# Patient Record
Sex: MALE | Race: WHITE | NOT HISPANIC OR LATINO | Employment: OTHER | URBAN - METROPOLITAN AREA
[De-identification: names, ages, dates, MRNs, and addresses within clinical notes are randomized per-mention and may not be internally consistent; named-entity substitution may affect disease eponyms.]

---

## 2017-01-10 ENCOUNTER — CLINICAL SUPPORT (OUTPATIENT)
Dept: INTERNAL MEDICINE | Facility: CLINIC | Age: 82
End: 2017-01-10

## 2017-01-10 DIAGNOSIS — Z00.00 HEALTH CARE MAINTENANCE: ICD-10-CM

## 2017-01-10 PROCEDURE — 90662 IIV NO PRSV INCREASED AG IM: CPT | Performed by: INTERNAL MEDICINE

## 2017-01-10 PROCEDURE — G0008 ADMIN INFLUENZA VIRUS VAC: HCPCS | Performed by: INTERNAL MEDICINE

## 2017-04-10 ENCOUNTER — OFFICE VISIT (OUTPATIENT)
Dept: INTERNAL MEDICINE | Facility: CLINIC | Age: 82
End: 2017-04-10

## 2017-04-10 VITALS
DIASTOLIC BLOOD PRESSURE: 80 MMHG | WEIGHT: 227 LBS | SYSTOLIC BLOOD PRESSURE: 116 MMHG | TEMPERATURE: 99.1 F | HEART RATE: 84 BPM | BODY MASS INDEX: 31.66 KG/M2 | RESPIRATION RATE: 22 BRPM

## 2017-04-10 DIAGNOSIS — M25.473 ANKLE EDEMA: ICD-10-CM

## 2017-04-10 DIAGNOSIS — I10 ESSENTIAL HYPERTENSION: Primary | ICD-10-CM

## 2017-04-10 PROCEDURE — 99214 OFFICE O/P EST MOD 30 MIN: CPT | Performed by: INTERNAL MEDICINE

## 2017-04-10 RX ORDER — HYDROCHLOROTHIAZIDE 25 MG/1
25 TABLET ORAL DAILY
Qty: 90 TABLET | Refills: 3 | Status: SHIPPED | OUTPATIENT
Start: 2017-04-10 | End: 2018-04-23 | Stop reason: SDUPTHER

## 2017-04-10 NOTE — PROGRESS NOTES
Subjective   Jordi Sam is a 83 y.o. male.     History of Present Illness   For the past 3 weeks he has noticed swelling in his ankles, right greater than left.  Some itching in ankles.  No sob, or chest pain.  Just so happens that Dr. Posadas added amlodipine because his blood pressure was elevated.    The following portions of the patient's history were reviewed and updated as appropriate: allergies, current medications, past medical history and problem list.    Review of Systems   Constitutional: Negative.    Eyes: Negative.    Respiratory: Negative.  Negative for cough, chest tightness, shortness of breath and wheezing.    Cardiovascular: Positive for leg swelling. Negative for chest pain and palpitations.   Gastrointestinal: Negative.  Negative for abdominal distention and abdominal pain.       Objective   Physical Exam   Neck: Normal range of motion. Neck supple.   Cardiovascular: Normal rate, regular rhythm and normal heart sounds.    Pulmonary/Chest: Effort normal and breath sounds normal. No respiratory distress. He has no wheezes. He has no rales.   Abdominal: Soft. Bowel sounds are normal.   Musculoskeletal: He exhibits edema.   2 + ankle edema right and 1 + left.  Mild venous stasis right.   Nursing note and vitals reviewed.      Assessment/Plan   Jordi was seen today for edema in both legs.    Diagnoses and all orders for this visit:    Essential hypertension  -     hydrochlorothiazide (HYDRODIURIL) 25 MG tablet; Take 1 tablet by mouth Daily.    Ankle edema    The edema is from the amlodipine.  Will d/c and increase the HCTZ.  Discussed with him.  Will recheck with appointment in June.

## 2017-04-25 RX ORDER — SIMVASTATIN 40 MG
TABLET ORAL
Qty: 90 TABLET | Refills: 0 | Status: SHIPPED | OUTPATIENT
Start: 2017-04-25 | End: 2017-10-17 | Stop reason: SDUPTHER

## 2017-05-05 ENCOUNTER — HOSPITAL ENCOUNTER (EMERGENCY)
Facility: HOSPITAL | Age: 82
Discharge: HOME | End: 2017-05-05
Attending: EMERGENCY MEDICINE | Admitting: EMERGENCY MEDICINE

## 2017-05-05 VITALS
DIASTOLIC BLOOD PRESSURE: 84 MMHG | SYSTOLIC BLOOD PRESSURE: 140 MMHG | RESPIRATION RATE: 18 BRPM | WEIGHT: 220 LBS | OXYGEN SATURATION: 92 % | HEIGHT: 71 IN | HEART RATE: 63 BPM | TEMPERATURE: 97.6 F | BODY MASS INDEX: 30.8 KG/M2

## 2017-05-05 DIAGNOSIS — T63.441A BEE STING REACTION, ACCIDENTAL OR UNINTENTIONAL, INITIAL ENCOUNTER: Primary | ICD-10-CM

## 2017-05-05 PROCEDURE — 99283 EMERGENCY DEPT VISIT LOW MDM: CPT

## 2017-05-05 PROCEDURE — 96375 TX/PRO/DX INJ NEW DRUG ADDON: CPT

## 2017-05-05 PROCEDURE — 96374 THER/PROPH/DIAG INJ IV PUSH: CPT

## 2017-05-05 PROCEDURE — 25010000002 METHYLPREDNISOLONE PER 125 MG: Performed by: PHYSICIAN ASSISTANT

## 2017-05-05 PROCEDURE — 25010000002 DIPHENHYDRAMINE PER 50 MG: Performed by: PHYSICIAN ASSISTANT

## 2017-05-05 RX ORDER — SODIUM CHLORIDE 0.9 % (FLUSH) 0.9 %
10 SYRINGE (ML) INJECTION AS NEEDED
Status: DISCONTINUED | OUTPATIENT
Start: 2017-05-05 | End: 2017-05-05 | Stop reason: HOSPADM

## 2017-05-05 RX ORDER — DIPHENHYDRAMINE HYDROCHLORIDE 50 MG/ML
25 INJECTION INTRAMUSCULAR; INTRAVENOUS ONCE
Status: COMPLETED | OUTPATIENT
Start: 2017-05-05 | End: 2017-05-05

## 2017-05-05 RX ORDER — FAMOTIDINE 10 MG/ML
20 INJECTION, SOLUTION INTRAVENOUS ONCE
Status: COMPLETED | OUTPATIENT
Start: 2017-05-05 | End: 2017-05-05

## 2017-05-05 RX ORDER — EPINEPHRINE 0.3 MG/.3ML
0.3 INJECTION SUBCUTANEOUS ONCE
Qty: 1 EACH | Refills: 0 | Status: SHIPPED | OUTPATIENT
Start: 2017-05-05 | End: 2017-05-05

## 2017-05-05 RX ORDER — METHYLPREDNISOLONE SODIUM SUCCINATE 125 MG/2ML
125 INJECTION, POWDER, LYOPHILIZED, FOR SOLUTION INTRAMUSCULAR; INTRAVENOUS ONCE
Status: COMPLETED | OUTPATIENT
Start: 2017-05-05 | End: 2017-05-05

## 2017-05-05 RX ADMIN — FAMOTIDINE 20 MG: 10 INJECTION, SOLUTION INTRAVENOUS at 16:13

## 2017-05-05 RX ADMIN — METHYLPREDNISOLONE SODIUM SUCCINATE 125 MG: 125 INJECTION, POWDER, FOR SOLUTION INTRAMUSCULAR; INTRAVENOUS at 16:10

## 2017-05-05 RX ADMIN — DIPHENHYDRAMINE HYDROCHLORIDE 25 MG: 50 INJECTION INTRAMUSCULAR; INTRAVENOUS at 16:12

## 2017-05-09 ENCOUNTER — OFFICE VISIT (OUTPATIENT)
Dept: INTERNAL MEDICINE | Facility: CLINIC | Age: 82
End: 2017-05-09

## 2017-05-09 VITALS
TEMPERATURE: 98.1 F | DIASTOLIC BLOOD PRESSURE: 72 MMHG | BODY MASS INDEX: 31.1 KG/M2 | RESPIRATION RATE: 21 BRPM | HEART RATE: 84 BPM | SYSTOLIC BLOOD PRESSURE: 148 MMHG | WEIGHT: 223 LBS

## 2017-05-09 DIAGNOSIS — T63.441D BEE STING REACTION, ACCIDENTAL OR UNINTENTIONAL, SUBSEQUENT ENCOUNTER: Primary | ICD-10-CM

## 2017-05-09 PROCEDURE — 99213 OFFICE O/P EST LOW 20 MIN: CPT | Performed by: INTERNAL MEDICINE

## 2017-06-22 ENCOUNTER — OFFICE VISIT (OUTPATIENT)
Dept: INTERNAL MEDICINE | Facility: CLINIC | Age: 82
End: 2017-06-22

## 2017-06-22 VITALS
WEIGHT: 223 LBS | RESPIRATION RATE: 18 BRPM | SYSTOLIC BLOOD PRESSURE: 140 MMHG | BODY MASS INDEX: 31.1 KG/M2 | HEART RATE: 80 BPM | DIASTOLIC BLOOD PRESSURE: 84 MMHG | TEMPERATURE: 98.1 F

## 2017-06-22 DIAGNOSIS — E11.9 CONTROLLED TYPE 2 DIABETES MELLITUS WITHOUT COMPLICATION, WITHOUT LONG-TERM CURRENT USE OF INSULIN (HCC): ICD-10-CM

## 2017-06-22 DIAGNOSIS — Z79.899 DRUG THERAPY: ICD-10-CM

## 2017-06-22 DIAGNOSIS — I10 ESSENTIAL HYPERTENSION: ICD-10-CM

## 2017-06-22 DIAGNOSIS — K59.1 FUNCTIONAL DIARRHEA: ICD-10-CM

## 2017-06-22 DIAGNOSIS — J43.1 PANLOBULAR EMPHYSEMA (HCC): ICD-10-CM

## 2017-06-22 DIAGNOSIS — I25.10 CORONARY ARTERIOSCLEROSIS IN NATIVE ARTERY: Primary | ICD-10-CM

## 2017-06-22 DIAGNOSIS — E78.2 MIXED HYPERLIPIDEMIA: ICD-10-CM

## 2017-06-22 LAB
ALBUMIN SERPL-MCNC: 4.3 G/DL (ref 3.2–4.8)
ALBUMIN/GLOB SERPL: 2 G/DL (ref 1.5–2.5)
ALP SERPL-CCNC: 64 U/L (ref 25–100)
ALT SERPL W P-5'-P-CCNC: 14 U/L (ref 7–40)
ANION GAP SERPL CALCULATED.3IONS-SCNC: 6 MMOL/L (ref 3–11)
ARTICHOKE IGE QN: 69 MG/DL (ref 0–130)
AST SERPL-CCNC: 25 U/L (ref 0–33)
BILIRUB SERPL-MCNC: 0.7 MG/DL (ref 0.3–1.2)
BUN BLD-MCNC: 19 MG/DL (ref 9–23)
BUN/CREAT SERPL: 17.3 (ref 7–25)
CALCIUM SPEC-SCNC: 9.7 MG/DL (ref 8.7–10.4)
CHLORIDE SERPL-SCNC: 103 MMOL/L (ref 99–109)
CHOLEST SERPL-MCNC: 135 MG/DL (ref 0–200)
CO2 SERPL-SCNC: 31 MMOL/L (ref 20–31)
CREAT BLD-MCNC: 1.1 MG/DL (ref 0.6–1.3)
DEPRECATED RDW RBC AUTO: 47.1 FL (ref 37–54)
ERYTHROCYTE [DISTWIDTH] IN BLOOD BY AUTOMATED COUNT: 13.4 % (ref 11.3–14.5)
EXPIRATION DATE: NORMAL
GFR SERPL CREATININE-BSD FRML MDRD: 64 ML/MIN/1.73
GLOBULIN UR ELPH-MCNC: 2.1 GM/DL
GLUCOSE BLD-MCNC: 122 MG/DL (ref 70–100)
HBA1C MFR BLD: 6 %
HCT VFR BLD AUTO: 47.7 % (ref 38.9–50.9)
HDLC SERPL-MCNC: 46 MG/DL (ref 40–60)
HGB BLD-MCNC: 15.4 G/DL (ref 13.1–17.5)
Lab: NORMAL
MCH RBC QN AUTO: 31.1 PG (ref 27–31)
MCHC RBC AUTO-ENTMCNC: 32.3 G/DL (ref 32–36)
MCV RBC AUTO: 96.4 FL (ref 80–99)
PLATELET # BLD AUTO: 137 10*3/MM3 (ref 150–450)
PMV BLD AUTO: 10.8 FL (ref 6–12)
POTASSIUM BLD-SCNC: 4.1 MMOL/L (ref 3.5–5.5)
PROT SERPL-MCNC: 6.4 G/DL (ref 5.7–8.2)
RBC # BLD AUTO: 4.95 10*6/MM3 (ref 4.2–5.76)
SODIUM BLD-SCNC: 140 MMOL/L (ref 132–146)
TRIGL SERPL-MCNC: 100 MG/DL (ref 0–150)
WBC NRBC COR # BLD: 7.75 10*3/MM3 (ref 3.5–10.8)

## 2017-06-22 PROCEDURE — 99214 OFFICE O/P EST MOD 30 MIN: CPT | Performed by: INTERNAL MEDICINE

## 2017-06-22 PROCEDURE — 36415 COLL VENOUS BLD VENIPUNCTURE: CPT | Performed by: INTERNAL MEDICINE

## 2017-06-22 PROCEDURE — 83036 HEMOGLOBIN GLYCOSYLATED A1C: CPT | Performed by: INTERNAL MEDICINE

## 2017-06-22 PROCEDURE — 80061 LIPID PANEL: CPT | Performed by: INTERNAL MEDICINE

## 2017-06-22 PROCEDURE — 80053 COMPREHEN METABOLIC PANEL: CPT | Performed by: INTERNAL MEDICINE

## 2017-06-22 PROCEDURE — 85027 COMPLETE CBC AUTOMATED: CPT | Performed by: INTERNAL MEDICINE

## 2017-06-22 NOTE — PROGRESS NOTES
Subjective   Jordi Sam is a 83 y.o. male.     History of Present Illness   For follow up of  CAD no chest pain, sob or palpitations.  COPD still is some sob but no different.  NIDDM no new sx.  HTN no headaches.  Hyperlipidemia on meds, no muscle aches.  BPH is stable.    The following portions of the patient's history were reviewed and updated as appropriate: allergies, current medications, past family history, past medical history, past social history, past surgical history and problem list.    Review of Systems   Constitutional: Negative.  Negative for activity change, appetite change, fatigue and fever.   HENT: Negative.  Negative for dental problem, ear pain, hearing loss, postnasal drip, rhinorrhea, sinus pressure, sore throat, trouble swallowing and voice change.    Eyes: Negative.    Respiratory: Positive for cough and shortness of breath. Negative for chest tightness and wheezing.    Cardiovascular: Negative.  Negative for chest pain, palpitations and leg swelling.   Gastrointestinal: Negative.  Negative for abdominal distention, abdominal pain, blood in stool, constipation, diarrhea and nausea.   Endocrine: Negative.  Negative for polydipsia and polyuria.   Genitourinary: Negative.  Negative for decreased urine volume, dysuria, hematuria and urgency.   Musculoskeletal: Negative.  Negative for arthralgias, back pain and neck pain.        Some right knee pain.   Skin: Negative.  Negative for pallor and rash.   Allergic/Immunologic: Negative.    Neurological: Negative.  Negative for dizziness, tremors, speech difficulty, weakness, numbness and headaches.   Hematological: Negative.  Negative for adenopathy.   Psychiatric/Behavioral: Negative.  Negative for agitation, behavioral problems, confusion, dysphoric mood and sleep disturbance. The patient is not nervous/anxious and is not hyperactive.        Objective   Physical Exam   Constitutional: He is oriented to person, place, and time. He appears  well-developed and well-nourished.   HENT:   Head: Normocephalic and atraumatic.   Right Ear: External ear normal.   Left Ear: External ear normal.   Nose: Nose normal.   Mouth/Throat: Oropharynx is clear and moist.   Eyes: Conjunctivae and EOM are normal. Pupils are equal, round, and reactive to light.   Fundoscopic exam:       The right eye shows no AV nicking and no hemorrhage.        The left eye shows no AV nicking and no hemorrhage.   Neck: Normal range of motion. Neck supple. No thyromegaly present.   Cardiovascular: Normal rate, regular rhythm, normal heart sounds and intact distal pulses.  Exam reveals no gallop and no friction rub.    No murmur heard.  Carotids normal.   Pulmonary/Chest: Effort normal and breath sounds normal. No respiratory distress. He has no wheezes. He has no rales. He exhibits no tenderness.   Abdominal: Soft. Bowel sounds are normal. He exhibits no distension and no mass. There is no tenderness. A hernia (bilateral small inguinal hernias.) is present.   Genitourinary: Testes normal and penis normal.   Musculoskeletal: Normal range of motion.   Lymphadenopathy:     He has no cervical adenopathy.   Neurological: He is alert and oriented to person, place, and time. He has normal reflexes. No cranial nerve deficit.   Skin: Skin is warm and dry.   Psychiatric: He has a normal mood and affect. His behavior is normal. Judgment and thought content normal.   Nursing note and vitals reviewed.      Assessment/Plan   Jordi was seen today for follow-up.    Diagnoses and all orders for this visit:    Coronary arteriosclerosis in native artery    Essential hypertension    Mixed hyperlipidemia  -     Comprehensive Metabolic Panel  -     Lipid Panel    Panlobular emphysema    Functional diarrhea    Controlled type 2 diabetes mellitus without complication, without long-term current use of insulin  -     POC Glycosylated Hemoglobin (Hb A1C)    Drug therapy  -     CBC (No Diff)    All problems are  stable.  Labs as ordered.  Same medications.  Recheck here 4 months.

## 2017-07-27 ENCOUNTER — APPOINTMENT (OUTPATIENT)
Dept: CARDIOLOGY | Facility: HOSPITAL | Age: 82
End: 2017-07-27
Attending: EMERGENCY MEDICINE

## 2017-07-27 ENCOUNTER — APPOINTMENT (OUTPATIENT)
Dept: GENERAL RADIOLOGY | Facility: HOSPITAL | Age: 82
End: 2017-07-27

## 2017-07-27 ENCOUNTER — HOSPITAL ENCOUNTER (EMERGENCY)
Facility: HOSPITAL | Age: 82
Discharge: HOME | End: 2017-07-27
Attending: EMERGENCY MEDICINE | Admitting: EMERGENCY MEDICINE

## 2017-07-27 VITALS
HEART RATE: 63 BPM | HEIGHT: 71 IN | OXYGEN SATURATION: 98 % | DIASTOLIC BLOOD PRESSURE: 68 MMHG | SYSTOLIC BLOOD PRESSURE: 134 MMHG | WEIGHT: 220 LBS | BODY MASS INDEX: 30.8 KG/M2 | RESPIRATION RATE: 14 BRPM | TEMPERATURE: 97.5 F

## 2017-07-27 DIAGNOSIS — M79.602 LEFT ARM PAIN: Primary | ICD-10-CM

## 2017-07-27 DIAGNOSIS — T63.481A INSECT STING, ACCIDENTAL OR UNINTENTIONAL, INITIAL ENCOUNTER: ICD-10-CM

## 2017-07-27 LAB
ALBUMIN SERPL-MCNC: 4.5 G/DL (ref 3.2–4.8)
ALBUMIN/GLOB SERPL: 2 G/DL (ref 1.5–2.5)
ALP SERPL-CCNC: 65 U/L (ref 25–100)
ALT SERPL W P-5'-P-CCNC: 17 U/L (ref 7–40)
ANION GAP SERPL CALCULATED.3IONS-SCNC: 9 MMOL/L (ref 3–11)
AST SERPL-CCNC: 24 U/L (ref 0–33)
BASOPHILS # BLD AUTO: 0.03 10*3/MM3 (ref 0–0.2)
BASOPHILS NFR BLD AUTO: 0.4 % (ref 0–1)
BH CV UPPER VENOUS LEFT AXILLARY AUGMENT: NORMAL
BH CV UPPER VENOUS LEFT AXILLARY COMPRESS: NORMAL
BH CV UPPER VENOUS LEFT AXILLARY PHASIC: NORMAL
BH CV UPPER VENOUS LEFT AXILLARY SPONT: NORMAL
BH CV UPPER VENOUS LEFT BASILIC FOREARM COMPRESS: NORMAL
BH CV UPPER VENOUS LEFT BASILIC UPPER COMPRESS: NORMAL
BH CV UPPER VENOUS LEFT BRACHIAL COMPRESS: NORMAL
BH CV UPPER VENOUS LEFT RADIAL COMPRESS: NORMAL
BH CV UPPER VENOUS LEFT SUBCLAVIAN AUGMENT: NORMAL
BH CV UPPER VENOUS LEFT SUBCLAVIAN COMPRESS: NORMAL
BH CV UPPER VENOUS LEFT SUBCLAVIAN PHASIC: NORMAL
BH CV UPPER VENOUS LEFT SUBCLAVIAN SPONT: NORMAL
BH CV UPPER VENOUS RIGHT SUBCLAVIAN AUGMENT: NORMAL
BH CV UPPER VENOUS RIGHT SUBCLAVIAN COMPRESS: NORMAL
BH CV UPPER VENOUS RIGHT SUBCLAVIAN PHASIC: NORMAL
BH CV UPPER VENOUS RIGHT SUBCLAVIAN SPONT: NORMAL
BILIRUB SERPL-MCNC: 1 MG/DL (ref 0.3–1.2)
BUN BLD-MCNC: 15 MG/DL (ref 9–23)
BUN/CREAT SERPL: 15 (ref 7–25)
CALCIUM SPEC-SCNC: 9.6 MG/DL (ref 8.7–10.4)
CHLORIDE SERPL-SCNC: 103 MMOL/L (ref 99–109)
CO2 SERPL-SCNC: 27 MMOL/L (ref 20–31)
CREAT BLD-MCNC: 1 MG/DL (ref 0.6–1.3)
DEPRECATED RDW RBC AUTO: 47.8 FL (ref 37–54)
EOSINOPHIL # BLD AUTO: 0.32 10*3/MM3 (ref 0–0.3)
EOSINOPHIL NFR BLD AUTO: 4.7 % (ref 0–3)
ERYTHROCYTE [DISTWIDTH] IN BLOOD BY AUTOMATED COUNT: 13.4 % (ref 11.3–14.5)
GFR SERPL CREATININE-BSD FRML MDRD: 71 ML/MIN/1.73
GLOBULIN UR ELPH-MCNC: 2.3 GM/DL
GLUCOSE BLD-MCNC: 105 MG/DL (ref 70–100)
HCT VFR BLD AUTO: 45.8 % (ref 38.9–50.9)
HGB BLD-MCNC: 15.1 G/DL (ref 13.1–17.5)
HOLD SPECIMEN: NORMAL
HOLD SPECIMEN: NORMAL
IMM GRANULOCYTES # BLD: 0.01 10*3/MM3 (ref 0–0.03)
IMM GRANULOCYTES NFR BLD: 0.1 % (ref 0–0.6)
LYMPHOCYTES # BLD AUTO: 1.65 10*3/MM3 (ref 0.6–4.8)
LYMPHOCYTES NFR BLD AUTO: 24.1 % (ref 24–44)
MCH RBC QN AUTO: 31.7 PG (ref 27–31)
MCHC RBC AUTO-ENTMCNC: 33 G/DL (ref 32–36)
MCV RBC AUTO: 96 FL (ref 80–99)
MONOCYTES # BLD AUTO: 0.6 10*3/MM3 (ref 0–1)
MONOCYTES NFR BLD AUTO: 8.8 % (ref 0–12)
NEUTROPHILS # BLD AUTO: 4.23 10*3/MM3 (ref 1.5–8.3)
NEUTROPHILS NFR BLD AUTO: 61.9 % (ref 41–71)
PLATELET # BLD AUTO: 136 10*3/MM3 (ref 150–450)
PMV BLD AUTO: 11 FL (ref 6–12)
POTASSIUM BLD-SCNC: 3.8 MMOL/L (ref 3.5–5.5)
PROT SERPL-MCNC: 6.8 G/DL (ref 5.7–8.2)
RBC # BLD AUTO: 4.77 10*6/MM3 (ref 4.2–5.76)
SODIUM BLD-SCNC: 139 MMOL/L (ref 132–146)
TROPONIN I SERPL-MCNC: 0 NG/ML (ref 0–0.07)
TROPONIN I SERPL-MCNC: 0.01 NG/ML (ref 0–0.07)
WBC NRBC COR # BLD: 6.84 10*3/MM3 (ref 3.5–10.8)
WHOLE BLOOD HOLD SPECIMEN: NORMAL
WHOLE BLOOD HOLD SPECIMEN: NORMAL

## 2017-07-27 PROCEDURE — 93005 ELECTROCARDIOGRAM TRACING: CPT

## 2017-07-27 PROCEDURE — 93971 EXTREMITY STUDY: CPT

## 2017-07-27 PROCEDURE — 84484 ASSAY OF TROPONIN QUANT: CPT

## 2017-07-27 PROCEDURE — 73060 X-RAY EXAM OF HUMERUS: CPT

## 2017-07-27 PROCEDURE — 80053 COMPREHEN METABOLIC PANEL: CPT | Performed by: EMERGENCY MEDICINE

## 2017-07-27 PROCEDURE — 85025 COMPLETE CBC W/AUTO DIFF WBC: CPT | Performed by: EMERGENCY MEDICINE

## 2017-07-27 PROCEDURE — 99284 EMERGENCY DEPT VISIT MOD MDM: CPT

## 2017-07-27 PROCEDURE — 93971 EXTREMITY STUDY: CPT | Performed by: INTERNAL MEDICINE

## 2017-07-27 PROCEDURE — 93005 ELECTROCARDIOGRAM TRACING: CPT | Performed by: EMERGENCY MEDICINE

## 2017-07-27 RX ORDER — SODIUM CHLORIDE 0.9 % (FLUSH) 0.9 %
10 SYRINGE (ML) INJECTION AS NEEDED
Status: DISCONTINUED | OUTPATIENT
Start: 2017-07-27 | End: 2017-07-27 | Stop reason: HOSPADM

## 2017-07-27 RX ORDER — ASPIRIN 81 MG/1
324 TABLET, CHEWABLE ORAL ONCE
Status: COMPLETED | OUTPATIENT
Start: 2017-07-27 | End: 2017-07-27

## 2017-07-27 RX ADMIN — ASPIRIN 81 MG CHEWABLE TABLET 324 MG: 81 TABLET CHEWABLE at 17:05

## 2017-07-27 NOTE — ED PROVIDER NOTES
"Subjective   HPI Comments: 82 yo M presents c/o two complaints.  He states that two days ago, he was stung by a wasp on the distal aspect of his right second digit.  He endorses mild pain at the sting site but the pain has improved spontaneously.  His greatest complaint today is atraumatic pain to his left upper arm.  He denies any injury or known trauma.  No known triggers.  No rashes, fevers, or systemic sxs.  No parasthesias.  Pt is R handed.  He does do lifting and yard work at home and wonders if he may have \"strained something.\"  He denies any accompanying CP/SOB or palpitations.  Pain is currently 5/10 w/ no agg/all factors.        History provided by:  Patient and spouse   used: No        Review of Systems   Musculoskeletal:        L upper arm pain   Skin:        Insect bite   All other systems reviewed and are negative.      Past Medical History:   Diagnosis Date   • Acute inferior myocardial infarction     Acute inferior STEMI with RV infarct features, January 2009.    • CAD (coronary artery disease)    • Dyslipidemia    • Goiter     History of “goiter.”   • Hypertension    • Nephrolithiasis    • Type 2 diabetes mellitus        No Known Allergies    Past Surgical History:   Procedure Laterality Date   • CHOLECYSTECTOMY     • CORONARY ANGIOPLASTY WITH STENT PLACEMENT  01/09/2009    Sirolimus eluting stents to the RCA, 01/09/2009.    • HERNIA REPAIR      Hernia repair x 2.        Family History   Problem Relation Age of Onset   • No Known Problems Mother        Social History     Social History   • Marital status:      Spouse name: N/A   • Number of children: N/A   • Years of education: N/A     Social History Main Topics   • Smoking status: Former Smoker     Packs/day: 2.50     Years: 28.00     Types: Cigarettes     Quit date: 5/16/1983   • Smokeless tobacco: None   • Alcohol use No   • Drug use: No   • Sexual activity: Defer     Other Topics Concern   • None     Social History " Narrative           Objective   Physical Exam   Constitutional: He is oriented to person, place, and time. He appears well-developed and well-nourished. No distress.   Well appearing M in NAD   HENT:   Head: Normocephalic and atraumatic.   Mouth/Throat: Oropharynx is clear and moist.   No MM lesions   Eyes: Conjunctivae are normal. Pupils are equal, round, and reactive to light.   Neck: Normal range of motion. Neck supple. No JVD present.   No bruit present   Cardiovascular: Normal rate, regular rhythm and normal heart sounds.  Exam reveals no gallop and no friction rub.    No murmur heard.  Pulmonary/Chest: Effort normal and breath sounds normal. No respiratory distress. He has no wheezes. He has no rales.   Abdominal: Soft. Bowel sounds are normal. He exhibits no distension and no mass. There is no tenderness. There is no guarding.   Musculoskeletal: Normal range of motion.   LUE: 5/5 strength; normal  strength; painless ROM; no palpable cords; no erythema, warmth induration, crepitus, or POOP to exam   Lymphadenopathy:     He has no cervical adenopathy.   Neurological: He is alert and oriented to person, place, and time. No cranial nerve deficit.   LUE NV intact distally   Skin: Skin is warm and dry. No rash noted. He is not diaphoretic. There is erythema (small area of erythema noted to distal dorsal aspect of R 2nd digit w/o surrounding warmth). No pallor.   Psychiatric: He has a normal mood and affect. Judgment and thought content normal.   Nursing note and vitals reviewed.      Procedures         ED Course  ED Course   Comment By Time   83-year-old male with history of coronary artery disease presents complaining of atraumatic pain to his left upper arm for the past 2 days.  Pain has been constant in nature, and he is unsure as to what may have triggered his symptoms.  No accompanying CP or SOB.  In ED, pt well appearing w/ unremarkable exam.  LUE NV intact w/ normal, painless ROM.  Bounding distal  pulses.  EKG SR w/ first degree AV block, HR of 64, and no ST segments suggestive of or concerning for ischemia.  Will obtain labs and XRs of L humerus and US of LUE as well. Lv Soares MD 07/27 1724   US neg for DVT. Lv Soares MD 07/27 1759   XRs neg. Lv Soares MD 07/27 1814   Repeat troponin and EKG negative and unchanged.  Upon reevaluation, patient improved.  Doubt ACS, PE, dissection or emergent cardiothoracic process this time based on exam, history, clinical presentation, and gestalt.  Reassured and counseled regarding sx tx.  Will f/u w/ PMD w/in one wk.  Agreeable w/ plan and given appropriate return precautions. Lv Soares MD 07/27 1924        Recent Results (from the past 24 hour(s))   Light Blue Top    Collection Time: 07/27/17  4:47 PM   Result Value Ref Range    Extra Tube hold for add-on    Green Top (Gel)    Collection Time: 07/27/17  4:47 PM   Result Value Ref Range    Extra Tube Hold for add-ons.    Lavender Top    Collection Time: 07/27/17  4:47 PM   Result Value Ref Range    Extra Tube hold for add-on    Gold Top - SST    Collection Time: 07/27/17  4:47 PM   Result Value Ref Range    Extra Tube Hold for add-ons.    Comprehensive Metabolic Panel    Collection Time: 07/27/17  4:47 PM   Result Value Ref Range    Glucose 105 (H) 70 - 100 mg/dL    BUN 15 9 - 23 mg/dL    Creatinine 1.00 0.60 - 1.30 mg/dL    Sodium 139 132 - 146 mmol/L    Potassium 3.8 3.5 - 5.5 mmol/L    Chloride 103 99 - 109 mmol/L    CO2 27.0 20.0 - 31.0 mmol/L    Calcium 9.6 8.7 - 10.4 mg/dL    Total Protein 6.8 5.7 - 8.2 g/dL    Albumin 4.50 3.20 - 4.80 g/dL    ALT (SGPT) 17 7 - 40 U/L    AST (SGOT) 24 0 - 33 U/L    Alkaline Phosphatase 65 25 - 100 U/L    Total Bilirubin 1.0 0.3 - 1.2 mg/dL    eGFR Non African Amer 71 >60 mL/min/1.73    Globulin 2.3 gm/dL    A/G Ratio 2.0 1.5 - 2.5 g/dL    BUN/Creatinine Ratio 15.0 7.0 - 25.0    Anion Gap 9.0 3.0 - 11.0 mmol/L   CBC Auto Differential     Collection Time: 07/27/17  4:47 PM   Result Value Ref Range    WBC 6.84 3.50 - 10.80 10*3/mm3    RBC 4.77 4.20 - 5.76 10*6/mm3    Hemoglobin 15.1 13.1 - 17.5 g/dL    Hematocrit 45.8 38.9 - 50.9 %    MCV 96.0 80.0 - 99.0 fL    MCH 31.7 (H) 27.0 - 31.0 pg    MCHC 33.0 32.0 - 36.0 g/dL    RDW 13.4 11.3 - 14.5 %    RDW-SD 47.8 37.0 - 54.0 fl    MPV 11.0 6.0 - 12.0 fL    Platelets 136 (L) 150 - 450 10*3/mm3    Neutrophil % 61.9 41.0 - 71.0 %    Lymphocyte % 24.1 24.0 - 44.0 %    Monocyte % 8.8 0.0 - 12.0 %    Eosinophil % 4.7 (H) 0.0 - 3.0 %    Basophil % 0.4 0.0 - 1.0 %    Immature Grans % 0.1 0.0 - 0.6 %    Neutrophils, Absolute 4.23 1.50 - 8.30 10*3/mm3    Lymphocytes, Absolute 1.65 0.60 - 4.80 10*3/mm3    Monocytes, Absolute 0.60 0.00 - 1.00 10*3/mm3    Eosinophils, Absolute 0.32 (H) 0.00 - 0.30 10*3/mm3    Basophils, Absolute 0.03 0.00 - 0.20 10*3/mm3    Immature Grans, Absolute 0.01 0.00 - 0.03 10*3/mm3   POC Troponin, Rapid    Collection Time: 07/27/17  4:49 PM   Result Value Ref Range    Troponin I 0.00 0.00 - 0.07 ng/mL   Duplex Venous Upper Extremity - Left    Collection Time: 07/27/17  5:51 PM   Result Value Ref Range    Right Subclavian Augment Y     Right Subclavian Compress C     Right Subclavian Phasic Y     Right Subclavian Spont Y     Left Subclavian Augment Y     Left Subclavian Compress C     Left Subclavian Phasic Y     Left Subclavian Spont Y     Left Axillary Augment Y     Left Axillary Compress C     Left Axillary Phasic Y     Left Axillary Spont Y     Left Brachial Compress C     Left Radial Compress C     Left Basilic Upper Compress C     Left Basilic Forearm Compress C    POC Troponin, Rapid    Collection Time: 07/27/17  6:51 PM   Result Value Ref Range    Troponin I 0.01 0.00 - 0.07 ng/mL     Note: In addition to lab results from this visit, the labs listed above may include labs taken at another facility or during a different encounter within the last 24 hours. Please correlate lab times with  "ED admission and discharge times for further clarification of the services performed during this visit.    XR Humerus Left   Final Result   Normal images of the left humerus.       DICTATED:     07/27/2017   EDITED:         07/27/2017       This report was finalized on 7/27/2017 5:47 PM by Dr. Ryan Brady MD.            Vitals:    07/27/17 1633 07/27/17 1745   BP: 171/75 134/68   BP Location: Left arm    Patient Position: Sitting    Pulse: 61    Resp: 14    Temp: 97.5 °F (36.4 °C)    TempSrc: Oral    SpO2: 99% 98%   Weight: 220 lb (99.8 kg)    Height: 71\" (180.3 cm)      Medications   sodium chloride 0.9 % flush 10 mL (not administered)   sodium chloride 0.9 % flush 10 mL (not administered)   aspirin chewable tablet 324 mg (324 mg Oral Given 7/27/17 1705)     ECG/EMG Results (last 24 hours)     Procedure Component Value Units Date/Time    ECG 12 Lead [[id number]] Collected:  07/27/17 1641     Updated:  07/27/17 1641    ECG 12 Lead [[id number]] Collected:  07/27/17 1856     Updated:  07/27/17 1856                  MDM    Final diagnoses:   Left arm pain   Insect sting, accidental or unintentional, initial encounter            Lv Soares MD  07/27/17 1924    "

## 2017-07-27 NOTE — DISCHARGE INSTRUCTIONS

## 2017-08-16 ENCOUNTER — OFFICE VISIT (OUTPATIENT)
Dept: INTERNAL MEDICINE | Facility: CLINIC | Age: 82
End: 2017-08-16

## 2017-08-16 VITALS
DIASTOLIC BLOOD PRESSURE: 78 MMHG | WEIGHT: 224 LBS | HEART RATE: 86 BPM | BODY MASS INDEX: 31.24 KG/M2 | RESPIRATION RATE: 18 BRPM | SYSTOLIC BLOOD PRESSURE: 146 MMHG

## 2017-08-16 DIAGNOSIS — E78.2 MIXED HYPERLIPIDEMIA: ICD-10-CM

## 2017-08-16 DIAGNOSIS — I10 ESSENTIAL HYPERTENSION: ICD-10-CM

## 2017-08-16 DIAGNOSIS — E11.9 CONTROLLED TYPE 2 DIABETES MELLITUS WITHOUT COMPLICATION, WITHOUT LONG-TERM CURRENT USE OF INSULIN (HCC): ICD-10-CM

## 2017-08-16 DIAGNOSIS — E03.9 ACQUIRED HYPOTHYROIDISM: ICD-10-CM

## 2017-08-16 DIAGNOSIS — I25.10 CORONARY ARTERIOSCLEROSIS IN NATIVE ARTERY: ICD-10-CM

## 2017-08-16 DIAGNOSIS — E11.9 TYPE 2 DIABETES MELLITUS WITHOUT COMPLICATION, UNSPECIFIED LONG TERM INSULIN USE STATUS: Primary | ICD-10-CM

## 2017-08-16 LAB
HBA1C MFR BLD: 6.2 %
T4 FREE SERPL-MCNC: 1.07 NG/DL (ref 0.89–1.76)
TSH SERPL DL<=0.05 MIU/L-ACNC: 2.05 MIU/ML (ref 0.35–5.35)

## 2017-08-16 PROCEDURE — 84439 ASSAY OF FREE THYROXINE: CPT | Performed by: INTERNAL MEDICINE

## 2017-08-16 PROCEDURE — 99213 OFFICE O/P EST LOW 20 MIN: CPT | Performed by: INTERNAL MEDICINE

## 2017-08-16 PROCEDURE — 36415 COLL VENOUS BLD VENIPUNCTURE: CPT | Performed by: INTERNAL MEDICINE

## 2017-08-16 PROCEDURE — 84443 ASSAY THYROID STIM HORMONE: CPT | Performed by: INTERNAL MEDICINE

## 2017-08-16 PROCEDURE — 83036 HEMOGLOBIN GLYCOSYLATED A1C: CPT | Performed by: INTERNAL MEDICINE

## 2017-08-16 NOTE — PROGRESS NOTES
Subjective   Jordi Sam is a 83 y.o. male.     History of Present Illness   He has noticed that his blood pressure has been up at home sometimes to 160/90.  He generally feels well but for fatigue.  He has also noted that his blood sugar was up some to 125 or 30 which is a little higher for him.  He has noticed some hoarseness which he has had in the past.  He has a hx of hypothyroidism in the past and was on meds for about 4 years.    The following portions of the patient's history were reviewed and updated as appropriate: allergies, current medications, past medical history and problem list.    Review of Systems   Constitutional: Negative.    HENT: Positive for voice change.    Respiratory: Negative.  Negative for cough, chest tightness, shortness of breath and wheezing.    Cardiovascular: Negative.  Negative for chest pain, palpitations and leg swelling.   Gastrointestinal: Negative.  Negative for abdominal distention and abdominal pain.       Objective   Physical Exam   Constitutional:   He really sounds no more hoarse than usual to me.   Neck: Normal range of motion. Neck supple.   Cardiovascular: Normal rate, regular rhythm, normal heart sounds and intact distal pulses.    Pulmonary/Chest: Effort normal and breath sounds normal.   Abdominal: Soft. Bowel sounds are normal.   Nursing note and vitals reviewed.      Assessment/Plan   Jordi was seen today for diabetes and hypertension.    Diagnoses and all orders for this visit:    Type 2 diabetes mellitus without complication, unspecified long term insulin use status  -     POC Glycosylated Hemoglobin (Hb A1C)  -     T4, Free    Acquired hypothyroidism  -     TSH  -     T4, Free    Coronary arteriosclerosis in native artery    Essential hypertension    Mixed hyperlipidemia    Controlled type 2 diabetes mellitus without complication, without long-term current use of insulin    A1C = 6.2.  Will check thyroid levels.  Right now now need to change  medications.  He will watch blood pressure at home.  Has appointment here in October.

## 2017-08-22 ENCOUNTER — TELEPHONE (OUTPATIENT)
Dept: INTERNAL MEDICINE | Facility: CLINIC | Age: 82
End: 2017-08-22

## 2017-08-22 NOTE — TELEPHONE ENCOUNTER
----- Message from Jessie Frey sent at 8/22/2017 11:44 AM EDT -----  Patient called stating he has a runny nose, runny eyes, sore throat and would like to have a Zpack.  Can be reached at [phone] and uses [facility] in [place].

## 2017-08-28 RX ORDER — SIMVASTATIN 40 MG
TABLET ORAL
Qty: 90 TABLET | Refills: 1 | Status: SHIPPED | OUTPATIENT
Start: 2017-08-28 | End: 2018-02-27 | Stop reason: SDUPTHER

## 2017-10-17 ENCOUNTER — OFFICE VISIT (OUTPATIENT)
Dept: CARDIOLOGY | Facility: CLINIC | Age: 82
End: 2017-10-17

## 2017-10-17 VITALS
SYSTOLIC BLOOD PRESSURE: 144 MMHG | WEIGHT: 223 LBS | DIASTOLIC BLOOD PRESSURE: 67 MMHG | HEIGHT: 71 IN | HEART RATE: 57 BPM | BODY MASS INDEX: 31.22 KG/M2

## 2017-10-17 DIAGNOSIS — I25.10 CORONARY ARTERIOSCLEROSIS IN NATIVE ARTERY: Primary | ICD-10-CM

## 2017-10-17 DIAGNOSIS — I10 ESSENTIAL HYPERTENSION: ICD-10-CM

## 2017-10-17 PROCEDURE — 99213 OFFICE O/P EST LOW 20 MIN: CPT | Performed by: INTERNAL MEDICINE

## 2017-10-17 RX ORDER — AMLODIPINE BESYLATE 10 MG/1
10 TABLET ORAL DAILY
COMMUNITY
End: 2017-10-24

## 2017-10-17 RX ORDER — CARVEDILOL 25 MG/1
25 TABLET ORAL 2 TIMES DAILY
Qty: 180 TABLET | Refills: 3 | Status: SHIPPED | OUTPATIENT
Start: 2017-10-17 | End: 2018-10-30 | Stop reason: SDUPTHER

## 2017-10-17 RX ORDER — MONTELUKAST SODIUM 4 MG/1
1 TABLET, CHEWABLE ORAL AS NEEDED
COMMUNITY
End: 2018-12-27

## 2017-10-17 NOTE — PROGRESS NOTES
"  OFFICE FOLLOW UP     Date of Encounter:10/17/2017     Name: Jordi Sam  : 1934  Address: [facility] [address] [facility] [address]  Phone: [phone]    PCP: Warren Glass MD  [address] [address]    Jordi Sam is a 83 y.o. male.      Chief Complaint: Annual follow up of CAD, HTN    Problem List:   1. Coronary artery disease:  a. Acute inferior STEMI with RV infarct features, 2009.   b. Normal LV function, single vessel  RCA disease.     c. Sirolimus eluting stents to the RCA, 2009.   d. Pericarditis, resolved.   e. C, 2012 (for recurrent angina).   i.  Normal LV function.  ii.  of RCA with failed intervention.  2.  Hypertension.   3. Dyslipidemia. LDL 54 (2016)  4. Diabetes mellitus, type 2.  A1C 6.1 (2016)  5. Nephrolithiasis.   6. History of “goiter.”  7. Surgical history:  a. Cholecystectomy.  b. Hernia repair x 2.     Allergies:  No Known Allergies    Current Medications:  •  aspirin 81 mg by mouth Daily.  •  carvedilol 12.5 MG by mouth 2 (Two) Times a Day.  •  Hydrochlorothiazide 25 MG by mouth Daily.  •  lisinopril 20 MG by mouth 2 (Two) Times a Day.  •  metFORMIN 500 MG tablet, TAKE ONE TABLET BY MOUTH ONCE DAILY  •  nitroglycerin 0.4 MG SL tablet, As Needed for chest pain.  •  Simvastatin 40 MG BY MOUTH ONCE DAILY  •  tamsulosin 0.4 MG by mouth Daily.    History of Present Illness: Mr. Sam returns for follow-up today.  He has been moderately active and asymptomatic.  He specifically denies symptoms of heart failure or any episodes of typical angina.  He does have occasional \"fleeting\" atypical symptoms of chest discomfort.  He tried amlodipine for blood pressure treatment; however, this was discontinued because of severe peripheral edema.  He brings his home blood pressure diary with him and this shows frequent systolic blood pressures in the range of 160-165.  He states that he is compliant with prescribed blood pressure " "medicines.             The following portions of the patient's history were reviewed and updated as appropriate: allergies, current medications and problem list.    HPI: Pertinent positives as listed in the HPI.  All other systems reviewed and negative.    Objective:    Vitals:    10/17/17 1312 10/17/17 1313   BP: 168/75 144/67   BP Location: Left arm Left arm   Patient Position: Sitting Standing   Pulse: 54 57   Weight: 223 lb (101 kg)    Height: 71\" (180.3 cm)    Resting pulse BY ME is 70 BPM    Physical Exam:  GENERAL: Alert, cooperative, in no acute distress.   HEENT: Fundoscopic deferred, otherwise unremarkable.  NECK: No Jugular venous distention, adenopathy, or thyromegaly noted.   HEART: Regular rhythm, normal rate, and no murmurs, gallops, or rubs.   LUNGS: Clear to auscultation bilaterally. No wheezing, rales or ronchi.  ABDOMEN: Flat without evidence of organomegaly, masses, or tenderness.  NEUROLOGIC: No focal abnormalities involving strength or sensation are noted.   EXTREMITIES: No clubbing, cyanosis, or edema noted.     Diagnostic Data:      Procedures      Assessment and Plan: Today we will increase his carvedilol from 12.5 mg to 25 mg by mouth twice daily.  We recommend that systolic blood pressure be maintained no higher than about 1 40 mmHg and that LDL be targeted to a value of less than 70.  Continued surveillance of diabetes mellitus type 2 with a hemoglobin A1c target of less than 7 is also encouraged.  The patient will follow-up with Kishor Negron M.D. in terms of blood pressure control and see me back in one year unless circumstances warrant earlier follow-up.    Scribed for Efrem Posadas MD by Shahla Jacobsen RN. 10/17/2017 1:19 PM.      I, Efrem Posadas MD, [facility], [facility], personally performed the services described in this documentation as scribed by the above named individual in my presence, and it is both accurate and complete. At 1:38 PM on 10/17/2017  EMR " Dragon/Transcription Disclaimer:  Much of this encounter note is an electronic transcription/translation of spoken language to printed text.  The electronic translation of spoken language may permit erroneous, or at times, nonsensical words or phrases to be inadvertently transcribed.  Although I have reviewed the note for such errors, some may still exist.

## 2017-10-24 ENCOUNTER — OFFICE VISIT (OUTPATIENT)
Dept: INTERNAL MEDICINE | Facility: CLINIC | Age: 82
End: 2017-10-24

## 2017-10-24 VITALS
BODY MASS INDEX: 31.8 KG/M2 | HEART RATE: 70 BPM | DIASTOLIC BLOOD PRESSURE: 80 MMHG | RESPIRATION RATE: 21 BRPM | WEIGHT: 228 LBS | SYSTOLIC BLOOD PRESSURE: 142 MMHG

## 2017-10-24 DIAGNOSIS — Z23 NEED FOR INFLUENZA VACCINATION: Primary | ICD-10-CM

## 2017-10-24 DIAGNOSIS — E11.9 CONTROLLED TYPE 2 DIABETES MELLITUS WITHOUT COMPLICATION, WITHOUT LONG-TERM CURRENT USE OF INSULIN (HCC): ICD-10-CM

## 2017-10-24 DIAGNOSIS — I25.10 CORONARY ARTERIOSCLEROSIS IN NATIVE ARTERY: ICD-10-CM

## 2017-10-24 DIAGNOSIS — E78.2 MIXED HYPERLIPIDEMIA: ICD-10-CM

## 2017-10-24 DIAGNOSIS — J43.1 PANLOBULAR EMPHYSEMA (HCC): ICD-10-CM

## 2017-10-24 DIAGNOSIS — I10 ESSENTIAL HYPERTENSION: ICD-10-CM

## 2017-10-24 DIAGNOSIS — K59.1 FUNCTIONAL DIARRHEA: ICD-10-CM

## 2017-10-24 LAB
EXPIRATION DATE: NORMAL
HBA1C MFR BLD: 6.2 %
Lab: NORMAL

## 2017-10-24 PROCEDURE — G0008 ADMIN INFLUENZA VIRUS VAC: HCPCS | Performed by: INTERNAL MEDICINE

## 2017-10-24 PROCEDURE — 99214 OFFICE O/P EST MOD 30 MIN: CPT | Performed by: INTERNAL MEDICINE

## 2017-10-24 PROCEDURE — 90662 IIV NO PRSV INCREASED AG IM: CPT | Performed by: INTERNAL MEDICINE

## 2017-10-24 PROCEDURE — 83036 HEMOGLOBIN GLYCOSYLATED A1C: CPT | Performed by: INTERNAL MEDICINE

## 2017-10-24 NOTE — PROGRESS NOTES
Subjective   Jordi Sam is a 83 y.o. male.     History of Present Illness   For follow up of  CAD no chest pain no palpitations, just saw Dr. Posadas and he increased his coreg because of his blood pressure.  COPD about the same still sob at times.  Hyperlipidemia on meds, no muscle aches.  NIDDM on meds, no new sx.    The following portions of the patient's history were reviewed and updated as appropriate: allergies, current medications, past medical history and problem list.    Review of Systems   Constitutional: Negative.  Negative for fatigue.   Respiratory: Positive for shortness of breath. Negative for cough, chest tightness, wheezing and stridor.    Cardiovascular: Negative.  Negative for chest pain, palpitations and leg swelling.   Gastrointestinal: Positive for diarrhea (has been better lately.). Negative for abdominal distention and abdominal pain.   Genitourinary: Negative.        Objective   Physical Exam   Constitutional: He appears well-developed and well-nourished.   Cardiovascular: Normal rate, regular rhythm and normal heart sounds.  Exam reveals no gallop and no friction rub.    No murmur heard.  Pulmonary/Chest: Effort normal and breath sounds normal. No respiratory distress. He has no wheezes. He has no rales. He exhibits no tenderness.   Abdominal: Soft. Bowel sounds are normal. He exhibits no distension and no mass. There is no tenderness.   Nursing note and vitals reviewed.      Assessment/Plan   Jordi was seen today for diabetes.    Diagnoses and all orders for this visit:    Need for influenza vaccination  -     Flu Vaccine High Dose PF 65YR+    Controlled type 2 diabetes mellitus without complication, without long-term current use of insulin  -     POC Glycosylated Hemoglobin (Hb A1C)    Coronary arteriosclerosis in native artery    Essential hypertension    Mixed hyperlipidemia    Panlobular emphysema    Functional diarrhea    A1C = 6.2  All problems are stable.  Same meds.  Recheck  here 6 months.

## 2017-11-02 RX ORDER — LISINOPRIL 20 MG/1
TABLET ORAL
Qty: 180 TABLET | Refills: 3 | Status: SHIPPED | OUTPATIENT
Start: 2017-11-02 | End: 2018-10-30 | Stop reason: SDUPTHER

## 2017-11-13 ENCOUNTER — TELEPHONE (OUTPATIENT)
Dept: INTERNAL MEDICINE | Facility: CLINIC | Age: 82
End: 2017-11-13

## 2017-11-13 NOTE — TELEPHONE ENCOUNTER
----- Message from Esme Mai MA sent at 11/13/2017  3:04 PM EST -----  Pt called stating his BP has been running high. Last three readings are 171/80, 138/80, 162/77    Pt denies any nausea, fatigue, or any other symptoms. However, Pt nonchalantly states he's had some numbness in his right arm.       [phone]

## 2017-11-20 RX ORDER — AMLODIPINE BESYLATE 5 MG/1
5 TABLET ORAL DAILY
Qty: 30 TABLET | Refills: 2 | Status: SHIPPED | OUTPATIENT
Start: 2017-11-20 | End: 2018-02-06 | Stop reason: SDUPTHER

## 2017-11-20 NOTE — TELEPHONE ENCOUNTER
RX sent to pharmacy . Spoke with Pt and informed them of Rx. Pt verbalized understanding and much appr'c.

## 2017-11-20 NOTE — TELEPHONE ENCOUNTER
PT is already taking Lisinopril 20mg bid. Please advise what you'd like him to take. Did schedule Pt for a 2wk fu on 12-6-17

## 2017-11-20 NOTE — TELEPHONE ENCOUNTER
Have him increase the lisinopril to 20mg twice daily.  Have him make an appointment to see me in two weeks.

## 2017-11-20 NOTE — TELEPHONE ENCOUNTER
PT CALLING BACK TO GIVE US HIS BP READINGS AND PT SAYS IT IS STILL RUNNING HIGH Tuesday IT /72 830 AM , 175/70 AT 9 PM ,   Wednesday 8 /59 , Thursday 7 /77 , Friday  8 /62 AND 2 /81 , Saturday 740 /79 AND 11 /76   PT THINKS IT IS RUNNING HIGHER AT NIGHT , CALL PT TO DISCUSS

## 2017-12-06 ENCOUNTER — OFFICE VISIT (OUTPATIENT)
Dept: INTERNAL MEDICINE | Facility: CLINIC | Age: 82
End: 2017-12-06

## 2017-12-06 VITALS
DIASTOLIC BLOOD PRESSURE: 58 MMHG | HEART RATE: 70 BPM | BODY MASS INDEX: 32.02 KG/M2 | RESPIRATION RATE: 20 BRPM | WEIGHT: 229.6 LBS | SYSTOLIC BLOOD PRESSURE: 136 MMHG | TEMPERATURE: 98.3 F

## 2017-12-06 DIAGNOSIS — E11.9 CONTROLLED TYPE 2 DIABETES MELLITUS WITHOUT COMPLICATION, WITHOUT LONG-TERM CURRENT USE OF INSULIN (HCC): ICD-10-CM

## 2017-12-06 DIAGNOSIS — J43.1 PANLOBULAR EMPHYSEMA (HCC): ICD-10-CM

## 2017-12-06 DIAGNOSIS — I25.10 CORONARY ARTERIOSCLEROSIS IN NATIVE ARTERY: ICD-10-CM

## 2017-12-06 DIAGNOSIS — I10 ESSENTIAL HYPERTENSION: Primary | ICD-10-CM

## 2017-12-06 PROCEDURE — 99213 OFFICE O/P EST LOW 20 MIN: CPT | Performed by: INTERNAL MEDICINE

## 2017-12-06 NOTE — PROGRESS NOTES
Subjective   Jordi Sam is a 83 y.o. male.     History of Present Illness   For follow up of  HTN now on amlodipine as well.  Blood pressures have been a little better in general.  Has a trace of ankle edema worse on right.  Otherwise no headaches or chest pain.  COPD is about the same.    The following portions of the patient's history were reviewed and updated as appropriate: allergies, current medications, past medical history and problem list.    Review of Systems   Constitutional: Negative.    Eyes: Negative.    Respiratory: Negative.  Negative for cough, chest tightness, shortness of breath and wheezing.    Cardiovascular: Positive for leg swelling (trace as noted.). Negative for chest pain and palpitations.   Gastrointestinal: Negative for abdominal distention and abdominal pain.       Objective   Physical Exam   Constitutional: He appears well-developed and well-nourished.   132/80 by me.   Neck: Normal range of motion. Neck supple.   Cardiovascular: Normal rate, regular rhythm and normal heart sounds.  Exam reveals no gallop and no friction rub.    No murmur heard.  Pulmonary/Chest: Effort normal and breath sounds normal. No respiratory distress. He has no wheezes. He has no rales.   Musculoskeletal: He exhibits edema (trace right ankle.).   Nursing note and vitals reviewed.      Assessment/Plan   Jordi was seen today for hypertension.    Diagnoses and all orders for this visit:    Essential hypertension    Coronary arteriosclerosis in native artery    Panlobular emphysema    Controlled type 2 diabetes mellitus without complication, without long-term current use of insulin    Will leave medications as they are for now.  Recheck here in February, he has regular appointment.  He continues to check his blood pressure and will call if problems.

## 2018-02-06 RX ORDER — AMLODIPINE BESYLATE 5 MG/1
TABLET ORAL
Qty: 30 TABLET | Refills: 2 | Status: SHIPPED | OUTPATIENT
Start: 2018-02-06 | End: 2018-03-07 | Stop reason: SDUPTHER

## 2018-02-27 ENCOUNTER — OFFICE VISIT (OUTPATIENT)
Dept: INTERNAL MEDICINE | Facility: CLINIC | Age: 83
End: 2018-02-27

## 2018-02-27 VITALS
RESPIRATION RATE: 21 BRPM | DIASTOLIC BLOOD PRESSURE: 64 MMHG | BODY MASS INDEX: 31.8 KG/M2 | SYSTOLIC BLOOD PRESSURE: 136 MMHG | WEIGHT: 228 LBS | HEART RATE: 74 BPM

## 2018-02-27 DIAGNOSIS — I25.10 CORONARY ARTERIOSCLEROSIS IN NATIVE ARTERY: ICD-10-CM

## 2018-02-27 DIAGNOSIS — E11.9 CONTROLLED TYPE 2 DIABETES MELLITUS WITHOUT COMPLICATION, WITHOUT LONG-TERM CURRENT USE OF INSULIN (HCC): Primary | ICD-10-CM

## 2018-02-27 DIAGNOSIS — E78.2 MIXED HYPERLIPIDEMIA: ICD-10-CM

## 2018-02-27 DIAGNOSIS — I10 ESSENTIAL HYPERTENSION: ICD-10-CM

## 2018-02-27 DIAGNOSIS — J43.1 PANLOBULAR EMPHYSEMA (HCC): ICD-10-CM

## 2018-02-27 LAB
EXPIRATION DATE: NORMAL
HBA1C MFR BLD: 6.2 %
Lab: NORMAL

## 2018-02-27 PROCEDURE — 99214 OFFICE O/P EST MOD 30 MIN: CPT | Performed by: INTERNAL MEDICINE

## 2018-02-27 PROCEDURE — 83036 HEMOGLOBIN GLYCOSYLATED A1C: CPT | Performed by: INTERNAL MEDICINE

## 2018-02-27 RX ORDER — SIMVASTATIN 40 MG
40 TABLET ORAL DAILY
Qty: 90 TABLET | Refills: 3 | Status: SHIPPED | OUTPATIENT
Start: 2018-02-27 | End: 2019-03-04 | Stop reason: SDUPTHER

## 2018-02-27 NOTE — PROGRESS NOTES
Subjective   Jordi Sam is a 84 y.o. male.     History of Present Illness   For follow up of  CAD no chest pain or palpitations.  COPD is always a little sob but no different.  NIDDM sugars have been good.  HTN no headaches.  He has noted some swelling in right ankle with amlodipine.    The following portions of the patient's history were reviewed and updated as appropriate: allergies, current medications, past medical history and problem list.    Review of Systems   Constitutional: Negative.  Negative for fatigue.   HENT: Negative.    Eyes: Negative.    Respiratory: Positive for shortness of breath. Negative for cough, chest tightness and wheezing.    Cardiovascular: Positive for leg swelling. Negative for chest pain and palpitations.   Gastrointestinal: Negative.  Negative for abdominal distention and abdominal pain.   Genitourinary: Negative.        Objective   Physical Exam   Constitutional: He appears well-developed and well-nourished.   Neck: Normal range of motion. Neck supple.   Cardiovascular: Normal rate, regular rhythm, normal heart sounds and intact distal pulses.  Exam reveals no gallop and no friction rub.    No murmur heard.  Pulmonary/Chest: Effort normal and breath sounds normal. No respiratory distress. He has no wheezes. He has no rales.   Abdominal: Soft. Bowel sounds are normal. He exhibits no distension. There is no tenderness.   Musculoskeletal: He exhibits edema (Treace edema right ankle but has evidence of venous stasis.).   Nursing note and vitals reviewed.      Assessment/Plan   Jordi was seen today for hypertension.    Diagnoses and all orders for this visit:    Controlled type 2 diabetes mellitus without complication, without long-term current use of insulin  -     POC Glycosylated Hemoglobin (Hb A1C)    Coronary arteriosclerosis in native artery    Mixed hyperlipidemia  -     simvastatin (ZOCOR) 40 MG tablet; Take 1 tablet by mouth Daily.    Essential hypertension    Panlobular  emphysema    A1C = 6.2.  All problems are stable.  Discussed his swelling and believe is more related to the venous stasis.  Same meds.  Recheck 4 months.

## 2018-03-07 ENCOUNTER — TELEPHONE (OUTPATIENT)
Dept: INTERNAL MEDICINE | Facility: CLINIC | Age: 83
End: 2018-03-07

## 2018-03-07 RX ORDER — AMLODIPINE BESYLATE 5 MG/1
5 TABLET ORAL DAILY
Qty: 90 TABLET | Refills: 2 | Status: ON HOLD | OUTPATIENT
Start: 2018-03-07 | End: 2019-01-30

## 2018-03-07 NOTE — TELEPHONE ENCOUNTER
----- Message from Esme Branch sent at 3/7/2018 11:30 AM EST -----  [phone]  REFILL ON AMLODIPINE 5MG 90 DAY SUPPLY   [facility] DIA

## 2018-04-08 ENCOUNTER — HOSPITAL ENCOUNTER (EMERGENCY)
Facility: HOSPITAL | Age: 83
Discharge: HOME | End: 2018-04-08
Attending: EMERGENCY MEDICINE | Admitting: EMERGENCY MEDICINE

## 2018-04-08 VITALS
RESPIRATION RATE: 20 BRPM | SYSTOLIC BLOOD PRESSURE: 145 MMHG | OXYGEN SATURATION: 97 % | TEMPERATURE: 97.5 F | HEIGHT: 71 IN | DIASTOLIC BLOOD PRESSURE: 77 MMHG | BODY MASS INDEX: 30.8 KG/M2 | HEART RATE: 62 BPM | WEIGHT: 220 LBS

## 2018-04-08 DIAGNOSIS — T16.2XXA FOREIGN BODY OF LEFT EAR, INITIAL ENCOUNTER: Primary | ICD-10-CM

## 2018-04-08 PROCEDURE — 99282 EMERGENCY DEPT VISIT SF MDM: CPT

## 2018-04-08 NOTE — ED PROVIDER NOTES
Subjective   Hearing air broke off in pt left ear. Pt wife tried digging it out but he reports she pushed it in further.    No ear pain.        Foreign Body   Location:  L ear  Suspected object: hearing aid.  Pain quality:  Unable to specify  Pain severity:  No pain  Duration:  2 hours  Timing:  Constant  Progression:  Unable to specify  Chronicity:  New  Worsened by:  Nothing  Ineffective treatments:  Removal attempts with tweezers  Associated symptoms: no abdominal pain, no choking, no drooling and no ear pain        Review of Systems   HENT: Negative for drooling and ear pain.    Respiratory: Negative for choking.    Gastrointestinal: Negative for abdominal pain.   All other systems reviewed and are negative.      Past Medical History:   Diagnosis Date   • Acute inferior myocardial infarction     Acute inferior STEMI with RV infarct features, January 2009.    • CAD (coronary artery disease)    • Dyslipidemia    • Goiter     History of “goiter.”   • Hypertension    • Nephrolithiasis    • Type 2 diabetes mellitus        Allergies   Allergen Reactions   • Bee Venom Swelling       Past Surgical History:   Procedure Laterality Date   • CHOLECYSTECTOMY     • CORONARY ANGIOPLASTY WITH STENT PLACEMENT  01/09/2009    Sirolimus eluting stents to the RCA, 01/09/2009.    • HERNIA REPAIR      Hernia repair x 2.        Family History   Problem Relation Age of Onset   • No Known Problems Mother        Social History     Social History   • Marital status:      Social History Main Topics   • Smoking status: Former Smoker     Packs/day: 2.50     Years: 28.00     Types: Cigarettes     Quit date: 5/16/1983   • Alcohol use No   • Drug use: No   • Sexual activity: Defer     Other Topics Concern   • Not on file           Objective   Physical Exam   Constitutional: He is oriented to person, place, and time. He appears well-developed and well-nourished.   HENT:   Head: Normocephalic and atraumatic.   Right Ear: External ear  normal.   Left Ear: External ear normal.   Nose: Nose normal.   Mouth/Throat: Oropharynx is clear and moist.   Hearing aid piece noted to left ear canal    Eyes: Conjunctivae and EOM are normal. Pupils are equal, round, and reactive to light.   Neck: Normal range of motion. Neck supple.   Cardiovascular: Normal rate, regular rhythm, normal heart sounds and intact distal pulses.    Pulmonary/Chest: Effort normal and breath sounds normal.   Abdominal: Soft. Bowel sounds are normal.   Musculoskeletal: Normal range of motion.   Neurological: He is alert and oriented to person, place, and time.   Skin: Skin is warm and dry.   Psychiatric: He has a normal mood and affect. His behavior is normal. Judgment normal.       Foreign Body Removal - Orifice  Date/Time: 4/8/2018 10:36 AM  Performed by: LIDIA THOMAS  Authorized by: INOCENCIA TENA     Consent:     Consent obtained:  Verbal    Risks discussed:  TM perforation, worsening of condition, pain and damage to surrounding structures  Location:     Location:  Ear    Ear location:  L ear  Pre-procedure details:     Imaging:  None  Anesthesia (see MAR for exact dosages):     Topical anesthetic:  None  Procedure details:     Localization method:  Direct visualization    Removal mechanism:  Forceps    Procedure complexity:  Simple    Foreign bodies recovered:  1    Description:  Hearing aid    Intact foreign body removal: yes    Post-procedure details:     Confirmation:  No additional foreign bodies on visualization    Patient tolerance of procedure:  Tolerated well, no immediate complications             ED Course  ED Course   Comment By Time   FB removed. Compared to other and pt reports all their. No other FB noted in canal. No ear pain.    [facility] another device. MAKAYLA Garland 04/08 1031                  [facility]    Final diagnoses:   Foreign body of left ear, initial encounter            MAKAYLA Garland  04/08/18 1037

## 2018-04-23 DIAGNOSIS — I10 ESSENTIAL HYPERTENSION: ICD-10-CM

## 2018-04-23 RX ORDER — HYDROCHLOROTHIAZIDE 25 MG/1
TABLET ORAL
Qty: 90 TABLET | Refills: 3 | Status: SHIPPED | OUTPATIENT
Start: 2018-04-23 | End: 2019-01-17

## 2018-06-05 ENCOUNTER — TELEPHONE (OUTPATIENT)
Dept: CARDIOLOGY | Facility: CLINIC | Age: 83
End: 2018-06-05

## 2018-06-05 NOTE — TELEPHONE ENCOUNTER
The patient called stating that he has been short of breath for 6 months, which seems to be worsening.  He denies chest pain at this time.  -144.  HR unavailable.  He states he has BLE which started when he began taking amlodipine, and does not improve with elevation.  His dyspnea is worse with exertion and does not worsen when he lays down. I advised that we can move his appt up to 7/17 at 4 pm.  I asked that he let us know if his s/s worsen or change, and to go to the ED if he feels its necessary at any point in the interim.  All questions and concerns addressed at this time.  Understanding verbalized.

## 2018-06-28 ENCOUNTER — OFFICE VISIT (OUTPATIENT)
Dept: INTERNAL MEDICINE | Facility: CLINIC | Age: 83
End: 2018-06-28

## 2018-06-28 VITALS
WEIGHT: 228 LBS | SYSTOLIC BLOOD PRESSURE: 132 MMHG | RESPIRATION RATE: 19 BRPM | BODY MASS INDEX: 31.92 KG/M2 | HEART RATE: 80 BPM | DIASTOLIC BLOOD PRESSURE: 72 MMHG | HEIGHT: 71 IN

## 2018-06-28 DIAGNOSIS — I25.10 CORONARY ARTERIOSCLEROSIS IN NATIVE ARTERY: Primary | ICD-10-CM

## 2018-06-28 DIAGNOSIS — E78.2 MIXED HYPERLIPIDEMIA: ICD-10-CM

## 2018-06-28 DIAGNOSIS — E11.9 CONTROLLED TYPE 2 DIABETES MELLITUS WITHOUT COMPLICATION, WITHOUT LONG-TERM CURRENT USE OF INSULIN (HCC): ICD-10-CM

## 2018-06-28 DIAGNOSIS — I10 ESSENTIAL HYPERTENSION: ICD-10-CM

## 2018-06-28 DIAGNOSIS — J43.1 PANLOBULAR EMPHYSEMA (HCC): ICD-10-CM

## 2018-06-28 DIAGNOSIS — Z79.899 DRUG THERAPY: ICD-10-CM

## 2018-06-28 LAB
ALBUMIN SERPL-MCNC: 4.51 G/DL (ref 3.2–4.8)
ALBUMIN/GLOB SERPL: 2.5 G/DL (ref 1.5–2.5)
ALP SERPL-CCNC: 75 U/L (ref 25–100)
ALT SERPL W P-5'-P-CCNC: 16 U/L (ref 7–40)
ANION GAP SERPL CALCULATED.3IONS-SCNC: 9 MMOL/L (ref 3–11)
ARTICHOKE IGE QN: 50 MG/DL (ref 0–130)
AST SERPL-CCNC: 27 U/L (ref 0–33)
BILIRUB SERPL-MCNC: 0.9 MG/DL (ref 0.3–1.2)
BUN BLD-MCNC: 16 MG/DL (ref 9–23)
BUN/CREAT SERPL: 15.2 (ref 7–25)
CALCIUM SPEC-SCNC: 9.6 MG/DL (ref 8.7–10.4)
CHLORIDE SERPL-SCNC: 104 MMOL/L (ref 99–109)
CHOLEST SERPL-MCNC: 102 MG/DL (ref 0–200)
CO2 SERPL-SCNC: 28 MMOL/L (ref 20–31)
CREAT BLD-MCNC: 1.05 MG/DL (ref 0.6–1.3)
DEPRECATED RDW RBC AUTO: 47.8 FL (ref 37–54)
ERYTHROCYTE [DISTWIDTH] IN BLOOD BY AUTOMATED COUNT: 13.8 % (ref 11.3–14.5)
GFR SERPL CREATININE-BSD FRML MDRD: 67 ML/MIN/1.73
GLOBULIN UR ELPH-MCNC: 1.8 GM/DL
GLUCOSE BLD-MCNC: 131 MG/DL (ref 70–100)
HBA1C MFR BLD: 6.5 % (ref 4.8–5.6)
HCT VFR BLD AUTO: 42.2 % (ref 38.9–50.9)
HDLC SERPL-MCNC: 42 MG/DL (ref 40–60)
HGB BLD-MCNC: 13.6 G/DL (ref 13.1–17.5)
MCH RBC QN AUTO: 30.3 PG (ref 27–31)
MCHC RBC AUTO-ENTMCNC: 32.2 G/DL (ref 32–36)
MCV RBC AUTO: 94 FL (ref 80–99)
PLATELET # BLD AUTO: 100 10*3/MM3 (ref 150–450)
PMV BLD AUTO: 12 FL (ref 6–12)
POTASSIUM BLD-SCNC: 3.9 MMOL/L (ref 3.5–5.5)
PROT SERPL-MCNC: 6.3 G/DL (ref 5.7–8.2)
RBC # BLD AUTO: 4.49 10*6/MM3 (ref 4.2–5.76)
SODIUM BLD-SCNC: 141 MMOL/L (ref 132–146)
TRIGL SERPL-MCNC: 88 MG/DL (ref 0–150)
WBC NRBC COR # BLD: 6.03 10*3/MM3 (ref 3.5–10.8)

## 2018-06-28 PROCEDURE — 83036 HEMOGLOBIN GLYCOSYLATED A1C: CPT | Performed by: INTERNAL MEDICINE

## 2018-06-28 PROCEDURE — 85027 COMPLETE CBC AUTOMATED: CPT | Performed by: INTERNAL MEDICINE

## 2018-06-28 PROCEDURE — 99214 OFFICE O/P EST MOD 30 MIN: CPT | Performed by: INTERNAL MEDICINE

## 2018-06-28 PROCEDURE — 36415 COLL VENOUS BLD VENIPUNCTURE: CPT | Performed by: INTERNAL MEDICINE

## 2018-06-28 PROCEDURE — 80053 COMPREHEN METABOLIC PANEL: CPT | Performed by: INTERNAL MEDICINE

## 2018-06-28 PROCEDURE — 80061 LIPID PANEL: CPT | Performed by: INTERNAL MEDICINE

## 2018-06-28 NOTE — PROGRESS NOTES
Subjective   Jordi Sam is a 84 y.o. male.     History of Present Illness   For follow up of  CAD he has been having some mild exertional chest pain for the past 3 months.  He has an appointment with Dr. Posadas in a few weeks.  HTN on meds.  The norvasc made him swell too much, so he has cut back on it taking it prn.  No headaches.  COPD is about the same, still sob easily.  NIDDM on meds, no sx and sugars have been doing good.  BPH doing fine on meds.    The following portions of the patient's history were reviewed and updated as appropriate: allergies, current medications, past family history, past medical history, past social history, past surgical history and problem list.    Review of Systems   Constitutional: Negative.  Negative for activity change, appetite change, fatigue and fever.   HENT: Negative.  Negative for dental problem, ear pain, hearing loss, postnasal drip, rhinorrhea, sinus pressure, sore throat, trouble swallowing and voice change.    Eyes: Negative.  Negative for redness and visual disturbance.   Respiratory: Positive for shortness of breath. Negative for cough, chest tightness and wheezing.    Cardiovascular: Positive for chest pain and leg swelling. Negative for palpitations.   Gastrointestinal: Negative.  Negative for abdominal distention, abdominal pain, blood in stool, constipation, diarrhea and nausea.   Endocrine: Negative.  Negative for polydipsia and polyuria.   Genitourinary: Negative.  Negative for decreased urine volume, dysuria, hematuria and urgency.   Musculoskeletal: Negative.  Negative for arthralgias, back pain and neck pain.   Skin: Negative.  Negative for pallor and rash.   Allergic/Immunologic: Negative.    Neurological: Negative.  Negative for dizziness, tremors, speech difficulty, weakness, numbness and confusion.   Hematological: Negative.  Negative for adenopathy.   Psychiatric/Behavioral: Negative.  Negative for agitation, behavioral problems, dysphoric mood, sleep  disturbance, negative for hyperactivity and depressed mood. The patient is not nervous/anxious.        Objective   Physical Exam   Constitutional: He is oriented to person, place, and time. He appears well-developed and well-nourished.   HENT:   Head: Normocephalic and atraumatic.   Right Ear: External ear normal.   Left Ear: External ear normal.   Nose: Nose normal.   Mouth/Throat: Oropharynx is clear and moist.   Eyes: Conjunctivae and EOM are normal. Pupils are equal, round, and reactive to light.   Fundoscopic exam:       The right eye shows no AV nicking and no hemorrhage.        The left eye shows no AV nicking and no hemorrhage.   Neck: Normal range of motion. Neck supple. No thyromegaly present.   Cardiovascular: Normal rate, regular rhythm, normal heart sounds and intact distal pulses.  Exam reveals no gallop and no friction rub.    No murmur heard.  Carotids normal.   Pulmonary/Chest: Effort normal and breath sounds normal. No respiratory distress. He has no wheezes. He has no rales. He exhibits no tenderness.   Abdominal: Soft. Bowel sounds are normal. He exhibits no distension and no mass. There is no tenderness. No hernia.   Genitourinary: Testes normal and penis normal.   Musculoskeletal: Normal range of motion. He exhibits edema (one plus bilaterally).   Lymphadenopathy:     He has no cervical adenopathy.   Neurological: He is alert and oriented to person, place, and time. He has normal reflexes. No cranial nerve deficit.   Skin: Skin is warm and dry.   Psychiatric: He has a normal mood and affect. His behavior is normal. Judgment and thought content normal.   Nursing note and vitals reviewed.        Assessment/Plan   Jordi was seen today for essential hypertension.    Diagnoses and all orders for this visit:    Coronary arteriosclerosis in native artery    Essential hypertension    Mixed hyperlipidemia  -     Comprehensive Metabolic Panel  -     Lipid Panel    Panlobular emphysema    Controlled  type 2 diabetes mellitus without complication, without long-term current use of insulin  -     Hemoglobin A1c    Drug therapy  -     CBC (No Diff)    Agreed with cardiology visit.  Rest of problems are stable.  He will start taking 1/2 of 5mg amlodipine daily.  Labs as ordered.  Same meds otherwise.  Recheck 4 months.

## 2018-07-17 ENCOUNTER — OFFICE VISIT (OUTPATIENT)
Dept: CARDIOLOGY | Facility: CLINIC | Age: 83
End: 2018-07-17

## 2018-07-17 VITALS
HEART RATE: 62 BPM | WEIGHT: 222 LBS | SYSTOLIC BLOOD PRESSURE: 117 MMHG | HEIGHT: 71 IN | DIASTOLIC BLOOD PRESSURE: 49 MMHG | BODY MASS INDEX: 31.08 KG/M2

## 2018-07-17 DIAGNOSIS — R07.89 CHEST DISCOMFORT: ICD-10-CM

## 2018-07-17 DIAGNOSIS — I10 ESSENTIAL HYPERTENSION: ICD-10-CM

## 2018-07-17 DIAGNOSIS — R06.02 SHORTNESS OF BREATH: ICD-10-CM

## 2018-07-17 DIAGNOSIS — I25.10 CORONARY ARTERIOSCLEROSIS IN NATIVE ARTERY: Primary | ICD-10-CM

## 2018-07-17 DIAGNOSIS — E78.2 MIXED HYPERLIPIDEMIA: ICD-10-CM

## 2018-07-17 PROCEDURE — 99213 OFFICE O/P EST LOW 20 MIN: CPT | Performed by: INTERNAL MEDICINE

## 2018-07-17 RX ORDER — NITROGLYCERIN 0.4 MG/1
0.4 TABLET SUBLINGUAL
Qty: 25 TABLET | Refills: 6 | Status: SHIPPED | OUTPATIENT
Start: 2018-07-17 | End: 2020-03-31

## 2018-07-17 NOTE — PROGRESS NOTES
"  OFFICE FOLLOW UP     Date of Encounter:2018     Name: Jordi Sam  : 1934  Address: [facility] [address] [facility] [address]  Phone:     PCP: Warren Glass MD  [address] [address]    Jordi Sam is a 84 y.o. male.      Chief Complaint: Follow up of CAD, HTN, HLD - shortness of breath    Problem List:   1. Coronary artery disease:  a. Acute inferior STEMI with RV infarct features, 2009.   b. Normal LV function, single vessel  RCA disease.     c. Sirolimus eluting stents to the RCA, 2009.   d. Pericarditis, resolved.   e. LHC, 2012 (for recurrent angina).   i.  Normal LV function.  ii.  of RCA with failed intervention.  2. Hypertension.   3. Dyslipidemia.  4. Diabetes mellitus, type 2.  A1C 6.1 (2016)  5. Nephrolithiasis.   6. History of “goiter.”  7. Surgical history:  a. Cholecystectomy.  b. Hernia repair x 2.     Allergies   Allergen Reactions   • Bee Venom Swelling       Current Medications:  •  amLODIPine 5 MG by mouth Daily.  •  aspirin 81 MG by mouth Daily  •  carvedilol 25 MG by mouth 2 (Two) Times a Day.  •  colestipol 1 g tablet, Take 1 g by mouth As Needed  •  hydrochlorothiazide 25 MG BY MOUTH ONCE DAILY  •  Lisinopril 20 MG BY MOUTH DAILY  •  loperamide 2 mg by mouth 4 (Four) Times a Day As Needed for diarrhea.  •  metFORMIN 500 MG BY MOUTH ONCE DAILY  •  nitroglycerin 0.4 MG SL As Needed for chest pain.  •  simvastatin 40 MG by mouth Daily  •  tamsulosin 0.4 MG by mouth Daily    History of Present Illness:   Mr. Sam comes in at his request for an \"early\" appointment.  In about May 2018, he began to note exertional dyspnea that was associated with left chest \"pressure\".  Symptoms responded to sublingual nitroglycerin.    In early , the patient continued to have exertional dyspnea that was \"unusual\" for him and worsening; however, associated symptoms of chest discomfort no longer occurred.  At the current time the " "patient has difficulty walking more than one block on level ground or up one flight of stairs at an average pace because of exertional dyspnea.  He has not had symptoms of heart failure.  He has not had symptoms of claudication or neurovascular symptoms.          The following portions of the patient's history were reviewed and updated as appropriate: allergies, current medications and problem list.    ROS: Pertinent positives as listed in the HPI.  All other systems reviewed and negative.    Objective:    Vitals:    07/17/18 1600 07/17/18 1604   BP: 126/68 117/49   BP Location: Left arm Left arm   Patient Position: Sitting Sitting   Pulse: 61 62   Weight: 101 kg (222 lb)    Height: 180.3 cm (71\")        Physical Exam:  GENERAL: Alert, cooperative, in no acute distress.   HEENT: Normocephalic, no adenopathy, no jugular venous distention  HEART: No discrete PMI is noted. Regular rhythm, normal rate, and no murmurs, gallops, or rubs.   LUNGS: Clear to auscultation bilaterally. No wheezing, rales or ronchi.  ABDOMEN: Soft, bowel sounds present, non-tender   NEUROLOGIC: No focal abnormalities involving strength or sensation are noted.   EXTREMITIES: No clubbing, cyanosis, or edema noted. Peripheral pulses are present.    Diagnostic Data:    Lab Results   Component Value Date    HGBA1C 6.50 (H) 06/28/2018     Lab Results   Component Value Date    CHOL 102 06/28/2018    CHLPL 123 05/16/2016    TRIG 88 06/28/2018    HDL 42 06/28/2018    LDL 50 06/28/2018     Lab Results   Component Value Date    GLUCOSE 131 (H) 06/28/2018    BUN 16 06/28/2018    CREATININE 1.05 06/28/2018    EGFRIFNONA 67 06/28/2018    BCR 15.2 06/28/2018    K 3.9 06/28/2018    CO2 28.0 06/28/2018    CALCIUM 9.6 06/28/2018    ALBUMIN 4.51 06/28/2018    AST 27 06/28/2018    ALT 16 06/28/2018     Lab Results   Component Value Date    WBC 6.03 06/28/2018    HGB 13.6 06/28/2018    HCT 42.2 06/28/2018    MCV 94.0 06/28/2018     (L) 06/28/2018 " "    Procedures    Assessment and Plan:   1.  CAD: I'm concerned about the patient's recent symptoms that began is exertional discomfort and continue as unusual exertional dyspnea.  His history of RCA chronic total occlusion with a failed intervention 6 years ago is noted.  I think that it would be prudent for us, at this time, to carry out an exercise Cardiolite SPECT myocardial perfusion study with final disposition to follow.  He will hold carvedilol for 24 hours prior to the exercise study.  2.  HTN: Today's readings are \"at target\" per AHA recommendations.  3.  HLD: His LDL of 50 is \"at target\" for known coronary artery disease.    I, Efrem Posadas MD, [facility], personally performed the services described in this documentation as scribed by the above named individual in my presence, and it is both accurate and complete. At 5:32 PM on 07/17/2018     I will see Jordi Sam back in 6 months or sooner on an as needed basis.    Scribed for Efrem Posadas MD by Shahla Jacobsen RN. 07/17/2018 4:37 PM.    EMR Dragon/Transcription Disclaimer:  Much of this encounter note is an electronic transcription/translation of spoken language to printed text.  The electronic translation of spoken language may permit erroneous, or at times, nonsensical words or phrases to be inadvertently transcribed.  Although I have reviewed the note for such errors, some may still exist.             "

## 2018-07-27 ENCOUNTER — HOSPITAL ENCOUNTER (OUTPATIENT)
Dept: CARDIOLOGY | Facility: HOSPITAL | Age: 83
Discharge: HOME | End: 2018-07-27
Attending: INTERNAL MEDICINE

## 2018-07-27 VITALS
WEIGHT: 222.66 LBS | DIASTOLIC BLOOD PRESSURE: 72 MMHG | BODY MASS INDEX: 31.17 KG/M2 | SYSTOLIC BLOOD PRESSURE: 174 MMHG | HEART RATE: 72 BPM | HEIGHT: 71 IN

## 2018-07-27 DIAGNOSIS — I25.10 CORONARY ARTERIOSCLEROSIS IN NATIVE ARTERY: ICD-10-CM

## 2018-07-27 DIAGNOSIS — R06.02 SHORTNESS OF BREATH: ICD-10-CM

## 2018-07-27 DIAGNOSIS — R07.89 CHEST DISCOMFORT: ICD-10-CM

## 2018-07-27 PROCEDURE — 93017 CV STRESS TEST TRACING ONLY: CPT

## 2018-07-27 PROCEDURE — 0 TECHNETIUM SESTAMIBI: Performed by: INTERNAL MEDICINE

## 2018-07-27 PROCEDURE — 78452 HT MUSCLE IMAGE SPECT MULT: CPT

## 2018-07-27 PROCEDURE — A9500 TC99M SESTAMIBI: HCPCS | Performed by: INTERNAL MEDICINE

## 2018-07-27 PROCEDURE — 93018 CV STRESS TEST I&R ONLY: CPT | Performed by: INTERNAL MEDICINE

## 2018-07-27 PROCEDURE — 78452 HT MUSCLE IMAGE SPECT MULT: CPT | Performed by: INTERNAL MEDICINE

## 2018-07-27 RX ADMIN — TECHNETIUM TC 99M SESTAMIBI 1 DOSE: 1 INJECTION INTRAVENOUS at 13:40

## 2018-07-27 RX ADMIN — TECHNETIUM TC 99M SESTAMIBI 1 DOSE: 1 INJECTION INTRAVENOUS at 11:45

## 2018-07-30 LAB
BH CV STRESS BP STAGE 1: NORMAL
BH CV STRESS DURATION MIN STAGE 1: 4
BH CV STRESS DURATION SEC STAGE 1: 0
BH CV STRESS GRADE STAGE 1: 10
BH CV STRESS METS STAGE 1: 5
BH CV STRESS PROTOCOL 1: NORMAL
BH CV STRESS RECOVERY BP: NORMAL MMHG
BH CV STRESS RECOVERY HR: 80 BPM
BH CV STRESS SPEED STAGE 1: 1.7
BH CV STRESS STAGE 1: 1
LV EF NUC BP: 83 %
MAXIMAL PREDICTED HEART RATE: 136 BPM
PERCENT MAX PREDICTED HR: 88.24 %
STRESS BASELINE BP: NORMAL MMHG
STRESS BASELINE HR: 76 BPM
STRESS O2 SAT REST: 98 %
STRESS PERCENT HR: 104 %
STRESS POST ESTIMATED WORKLOAD: 4.6 METS
STRESS POST EXERCISE DUR MIN: 4 MIN
STRESS POST EXERCISE DUR SEC: 0 SEC
STRESS POST O2 SAT PEAK: 96 %
STRESS POST PEAK BP: NORMAL MMHG
STRESS POST PEAK HR: 120 BPM
STRESS TARGET HR: 116 BPM

## 2018-10-30 RX ORDER — CARVEDILOL 25 MG/1
TABLET ORAL
Qty: 180 TABLET | Refills: 2 | Status: SHIPPED | OUTPATIENT
Start: 2018-10-30 | End: 2019-01-17

## 2018-10-30 RX ORDER — LISINOPRIL 20 MG/1
TABLET ORAL
Qty: 180 TABLET | Refills: 2 | Status: SHIPPED | OUTPATIENT
Start: 2018-10-30 | End: 2019-06-25 | Stop reason: SDUPTHER

## 2018-11-01 ENCOUNTER — OFFICE VISIT (OUTPATIENT)
Dept: INTERNAL MEDICINE | Facility: CLINIC | Age: 83
End: 2018-11-01

## 2018-11-01 VITALS
WEIGHT: 232 LBS | HEART RATE: 80 BPM | SYSTOLIC BLOOD PRESSURE: 112 MMHG | BODY MASS INDEX: 32.37 KG/M2 | DIASTOLIC BLOOD PRESSURE: 64 MMHG | RESPIRATION RATE: 21 BRPM

## 2018-11-01 DIAGNOSIS — E11.9 CONTROLLED TYPE 2 DIABETES MELLITUS WITHOUT COMPLICATION, WITHOUT LONG-TERM CURRENT USE OF INSULIN (HCC): ICD-10-CM

## 2018-11-01 DIAGNOSIS — J43.1 PANLOBULAR EMPHYSEMA (HCC): ICD-10-CM

## 2018-11-01 DIAGNOSIS — E78.2 MIXED HYPERLIPIDEMIA: ICD-10-CM

## 2018-11-01 DIAGNOSIS — Z23 NEED FOR INFLUENZA VACCINATION: Primary | ICD-10-CM

## 2018-11-01 DIAGNOSIS — I25.10 CORONARY ARTERIOSCLEROSIS IN NATIVE ARTERY: ICD-10-CM

## 2018-11-01 DIAGNOSIS — K59.1 FUNCTIONAL DIARRHEA: ICD-10-CM

## 2018-11-01 DIAGNOSIS — I10 ESSENTIAL HYPERTENSION: ICD-10-CM

## 2018-11-01 LAB
EXPIRATION DATE: NORMAL
HBA1C MFR BLD: 6.2 %
Lab: NORMAL

## 2018-11-01 PROCEDURE — 83036 HEMOGLOBIN GLYCOSYLATED A1C: CPT | Performed by: INTERNAL MEDICINE

## 2018-11-01 PROCEDURE — G0008 ADMIN INFLUENZA VIRUS VAC: HCPCS | Performed by: INTERNAL MEDICINE

## 2018-11-01 PROCEDURE — 99214 OFFICE O/P EST MOD 30 MIN: CPT | Performed by: INTERNAL MEDICINE

## 2018-11-01 PROCEDURE — 90662 IIV NO PRSV INCREASED AG IM: CPT | Performed by: INTERNAL MEDICINE

## 2018-11-01 NOTE — PROGRESS NOTES
Subjective   Jordi Sam is a 84 y.o. male.     History of Present Illness   For follow up of  CAD no chest pain or palpitations.  Just had stress test with Dr. Posadas and all was fine.  HTN on meds, does have some mild swelling from the amlodipine.  No headaches.  IBS is stable.  NIDDM on meds, no sx.  COPD is mildly sob, no change.    The following portions of the patient's history were reviewed and updated as appropriate: allergies, current medications, past medical history and problem list.    Review of Systems   Constitutional: Negative.  Negative for fatigue and fever.   Eyes: Negative.    Respiratory: Positive for shortness of breath. Negative for cough, chest tightness and wheezing.    Cardiovascular: Negative.  Negative for chest pain, palpitations and leg swelling.   Gastrointestinal: Positive for diarrhea (mild at times.). Negative for abdominal distention and abdominal pain.   Genitourinary: Negative.    Musculoskeletal:        Is considering right knee replacement.       Objective   Physical Exam   Constitutional: He appears well-developed and well-nourished.   Neck: Normal range of motion. Neck supple.   Cardiovascular: Normal rate, regular rhythm and normal heart sounds.  Exam reveals no gallop and no friction rub.    No murmur heard.  Pulmonary/Chest: Effort normal and breath sounds normal. No respiratory distress. He has no wheezes. He has no rales. He exhibits no tenderness.   Abdominal: Soft. Bowel sounds are normal. He exhibits no distension and no mass. There is no tenderness. There is no guarding.   Musculoskeletal: He exhibits edema (1+ bilaterally.).   Nursing note and vitals reviewed.        Assessment/Plan   Jordi was seen today for essential hypertension.    Diagnoses and all orders for this visit:    Need for influenza vaccination  -     Fluzone High Dose =>65Years    Coronary arteriosclerosis in native artery    Essential hypertension    Mixed hyperlipidemia    Panlobular emphysema  (CMS/[facility])    Functional diarrhea    Controlled type 2 diabetes mellitus without complication, without long-term current use of insulin (CMS/[facility])    A1C = 6.2.  All problems are stable.  Same meds.  Recheck 4 months.

## 2018-12-04 ENCOUNTER — DOCUMENTATION (OUTPATIENT)
Dept: INTERNAL MEDICINE | Facility: CLINIC | Age: 83
End: 2018-12-04

## 2018-12-26 ENCOUNTER — TELEPHONE (OUTPATIENT)
Dept: CARDIOLOGY | Facility: CLINIC | Age: 83
End: 2018-12-26

## 2018-12-26 NOTE — TELEPHONE ENCOUNTER
"Pt phoned reporting SOA with exertion for 2-3 weeks.  Requests appt in the heart and valve clinic.  States he has requested appt with Dr Cuauhtemoc jimenez and office unable to work him in.  Pt also notes \"coughing a lot and lower extremity swelling.\"  W/i with ELYSIA Weber 12/27/2018 @ 8061  Brianna Duran RN    "

## 2018-12-27 ENCOUNTER — HOSPITAL ENCOUNTER (OUTPATIENT)
Dept: GENERAL RADIOLOGY | Facility: HOSPITAL | Age: 83
Discharge: HOME | End: 2018-12-27

## 2018-12-27 ENCOUNTER — HOSPITAL ENCOUNTER (OUTPATIENT)
Dept: CARDIOLOGY | Facility: HOSPITAL | Age: 83
Discharge: HOME | End: 2018-12-27
Admitting: NURSE PRACTITIONER

## 2018-12-27 ENCOUNTER — OFFICE VISIT (OUTPATIENT)
Dept: CARDIOLOGY | Facility: HOSPITAL | Age: 83
End: 2018-12-27

## 2018-12-27 VITALS
HEIGHT: 71 IN | DIASTOLIC BLOOD PRESSURE: 65 MMHG | WEIGHT: 248 LBS | TEMPERATURE: 97 F | RESPIRATION RATE: 20 BRPM | HEART RATE: 62 BPM | OXYGEN SATURATION: 94 % | BODY MASS INDEX: 34.72 KG/M2 | SYSTOLIC BLOOD PRESSURE: 121 MMHG

## 2018-12-27 DIAGNOSIS — I50.9 ACUTE CONGESTIVE HEART FAILURE, UNSPECIFIED HEART FAILURE TYPE (HCC): ICD-10-CM

## 2018-12-27 DIAGNOSIS — R60.0 LOCALIZED EDEMA: Primary | ICD-10-CM

## 2018-12-27 DIAGNOSIS — I48.91 ATRIAL FIBRILLATION, UNSPECIFIED TYPE (HCC): ICD-10-CM

## 2018-12-27 DIAGNOSIS — R60.0 LOCALIZED EDEMA: ICD-10-CM

## 2018-12-27 DIAGNOSIS — R06.02 SHORTNESS OF BREATH: ICD-10-CM

## 2018-12-27 DIAGNOSIS — I50.9 CONGESTIVE HEART FAILURE, UNSPECIFIED HF CHRONICITY, UNSPECIFIED HEART FAILURE TYPE (HCC): ICD-10-CM

## 2018-12-27 DIAGNOSIS — I25.10 CORONARY ARTERIOSCLEROSIS IN NATIVE ARTERY: ICD-10-CM

## 2018-12-27 DIAGNOSIS — I10 ESSENTIAL HYPERTENSION: ICD-10-CM

## 2018-12-27 LAB
ALBUMIN SERPL-MCNC: 3.92 G/DL (ref 3.2–4.8)
ALBUMIN/GLOB SERPL: 2.8 G/DL (ref 1.5–2.5)
ALP SERPL-CCNC: 82 U/L (ref 25–100)
ALT SERPL W P-5'-P-CCNC: 20 U/L (ref 7–40)
ANION GAP SERPL CALCULATED.3IONS-SCNC: 6 MMOL/L (ref 3–11)
AST SERPL-CCNC: 32 U/L (ref 0–33)
BILIRUB SERPL-MCNC: 0.9 MG/DL (ref 0.3–1.2)
BNP SERPL-MCNC: 267 PG/ML (ref 0–100)
BUN BLD-MCNC: 16 MG/DL (ref 9–23)
BUN/CREAT SERPL: 15.4 (ref 7–25)
CALCIUM SPEC-SCNC: 9 MG/DL (ref 8.7–10.4)
CHLORIDE SERPL-SCNC: 104 MMOL/L (ref 99–109)
CO2 SERPL-SCNC: 29 MMOL/L (ref 20–31)
CREAT BLD-MCNC: 1.04 MG/DL (ref 0.6–1.3)
DEPRECATED RDW RBC AUTO: 52.5 FL (ref 37–54)
ERYTHROCYTE [DISTWIDTH] IN BLOOD BY AUTOMATED COUNT: 14.6 % (ref 11.3–14.5)
GFR SERPL CREATININE-BSD FRML MDRD: 68 ML/MIN/1.73
GLOBULIN UR ELPH-MCNC: 1.4 GM/DL
GLUCOSE BLD-MCNC: 124 MG/DL (ref 70–100)
HCT VFR BLD AUTO: 40.4 % (ref 38.9–50.9)
HGB BLD-MCNC: 12.7 G/DL (ref 13.1–17.5)
MAGNESIUM SERPL-MCNC: 2 MG/DL (ref 1.3–2.7)
MCH RBC QN AUTO: 30.8 PG (ref 27–31)
MCHC RBC AUTO-ENTMCNC: 31.4 G/DL (ref 32–36)
MCV RBC AUTO: 98.1 FL (ref 80–99)
PLATELET # BLD AUTO: 80 10*3/MM3 (ref 150–450)
PMV BLD AUTO: 11.5 FL (ref 6–12)
POTASSIUM BLD-SCNC: 4.2 MMOL/L (ref 3.5–5.5)
PROT SERPL-MCNC: 5.3 G/DL (ref 5.7–8.2)
RBC # BLD AUTO: 4.12 10*6/MM3 (ref 4.2–5.76)
SODIUM BLD-SCNC: 139 MMOL/L (ref 132–146)
TSH SERPL DL<=0.05 MIU/L-ACNC: 3.58 MIU/ML (ref 0.35–5.35)
WBC NRBC COR # BLD: 4.93 10*3/MM3 (ref 3.5–10.8)

## 2018-12-27 PROCEDURE — 93010 ELECTROCARDIOGRAM REPORT: CPT | Performed by: INTERNAL MEDICINE

## 2018-12-27 PROCEDURE — 84443 ASSAY THYROID STIM HORMONE: CPT | Performed by: NURSE PRACTITIONER

## 2018-12-27 PROCEDURE — 83880 ASSAY OF NATRIURETIC PEPTIDE: CPT | Performed by: NURSE PRACTITIONER

## 2018-12-27 PROCEDURE — 80053 COMPREHEN METABOLIC PANEL: CPT | Performed by: NURSE PRACTITIONER

## 2018-12-27 PROCEDURE — 93005 ELECTROCARDIOGRAM TRACING: CPT | Performed by: NURSE PRACTITIONER

## 2018-12-27 PROCEDURE — 85027 COMPLETE CBC AUTOMATED: CPT | Performed by: NURSE PRACTITIONER

## 2018-12-27 PROCEDURE — 83735 ASSAY OF MAGNESIUM: CPT | Performed by: NURSE PRACTITIONER

## 2018-12-27 PROCEDURE — 99214 OFFICE O/P EST MOD 30 MIN: CPT | Performed by: NURSE PRACTITIONER

## 2018-12-27 PROCEDURE — 71046 X-RAY EXAM CHEST 2 VIEWS: CPT

## 2018-12-27 RX ORDER — FUROSEMIDE 10 MG/ML
40 INJECTION INTRAMUSCULAR; INTRAVENOUS ONCE
Status: DISCONTINUED | OUTPATIENT
Start: 2018-12-27 | End: 2018-12-27

## 2018-12-27 RX ORDER — POTASSIUM CHLORIDE 20 MEQ/1
20 TABLET, EXTENDED RELEASE ORAL DAILY
Qty: 30 TABLET | Refills: 0 | Status: SHIPPED | OUTPATIENT
Start: 2018-12-27 | End: 2019-01-25 | Stop reason: SDUPTHER

## 2018-12-27 RX ORDER — LANOLIN ALCOHOL/MO/W.PET/CERES
400 CREAM (GRAM) TOPICAL DAILY
COMMUNITY

## 2018-12-27 RX ORDER — FUROSEMIDE 40 MG/1
40 TABLET ORAL DAILY
Qty: 30 TABLET | Refills: 0 | Status: SHIPPED | OUTPATIENT
Start: 2018-12-27 | End: 2019-01-25 | Stop reason: SDUPTHER

## 2018-12-27 NOTE — PROGRESS NOTES
Called and reviewed CXR and lab results with pt and wife    Results for orders placed or performed in visit on 12/27/18   Comprehensive Metabolic Panel   Result Value Ref Range    Glucose 124 (H) 70 - 100 mg/dL    BUN 16 9 - 23 mg/dL    Creatinine 1.04 0.60 - 1.30 mg/dL    Sodium 139 132 - 146 mmol/L    Potassium 4.2 3.5 - 5.5 mmol/L    Chloride 104 99 - 109 mmol/L    CO2 29.0 20.0 - 31.0 mmol/L    Calcium 9.0 8.7 - 10.4 mg/dL    Total Protein 5.3 (L) 5.7 - 8.2 g/dL    Albumin 3.92 3.20 - 4.80 g/dL    ALT (SGPT) 20 7 - 40 U/L    AST (SGOT) 32 0 - 33 U/L    Alkaline Phosphatase 82 25 - 100 U/L    Total Bilirubin 0.9 0.3 - 1.2 mg/dL    eGFR Non African Amer 68 >60 mL/min/1.73    Globulin 1.4 gm/dL    A/G Ratio 2.8 (H) 1.5 - 2.5 g/dL    BUN/Creatinine Ratio 15.4 7.0 - 25.0    Anion Gap 6.0 3.0 - 11.0 mmol/L   CBC (No Diff)   Result Value Ref Range    WBC 4.93 3.50 - 10.80 10*3/mm3    RBC 4.12 (L) 4.20 - 5.76 10*6/mm3    Hemoglobin 12.7 (L) 13.1 - 17.5 g/dL    Hematocrit 40.4 38.9 - 50.9 %    MCV 98.1 80.0 - 99.0 fL    MCH 30.8 27.0 - 31.0 pg    MCHC 31.4 (L) 32.0 - 36.0 g/dL    RDW 14.6 (H) 11.3 - 14.5 %    RDW-SD 52.5 37.0 - 54.0 fl    MPV 11.5 6.0 - 12.0 fL    Platelets 80 (L) 150 - 450 10*3/mm3   BNP   Result Value Ref Range    .0 (H) 0.0 - 100.0 pg/mL   TSH   Result Value Ref Range    TSH 3.582 0.350 - 5.350 mIU/mL   Magnesium   Result Value Ref Range    Magnesium 2.0 1.3 - 2.7 mg/dL     Chest x-ray with trace central pulmonary vascular congestion, trace right and small left pleural effusions with atelectasis    Pt will begin Lasix 40 mg daily.  KCL 20 meq daily. Repeat BMP 1 week.  Completed echo as ordered.      F/u with Dr. Posadas as scheduled.  [facility]/u [facility] as needed, as determined by cardiology, or if s/s worsen or do not improved.

## 2018-12-27 NOTE — PROGRESS NOTES
[facility]  [facility]      Encounter Date:12/27/2018     Jordi Sam  [address] [address]  [unfilled]    1934    Warren Glass MD    Jordi Sam is a 84 y.o. male.      Subjective:     Chief Complaint:  Shortness of Breath (20 lb weight gain, edema)       HPI     Before-year-old male, patient of Dr. Efrem Posadas.  History of coronary artery disease, MI 2009, left heart catheterization 1/13/2012 for recurrent angina with  RCA with failed intervention.  Exercise MPS completed on 7/30/18 with no ischemia.  History of hypertension, dyslipidemia, diabetes type 2, emphysema.      Patient called requesting an appointment due to dyspnea with exertion for 2-3 weeks.  Associated coughing and lower extremity edema. aaprox 20 lb weight gain in 1 month.  Pt denies CP, pressure.  Has not used NTG SL.  Reports decreased appetite.  Cough is non-productive. No fevers,chills, N/V.  Hx of intermittent loose stools. No change in frequency.  Pt denies change in meds.  BP has been well controlled.  100-130's.  HR 60-80's.    Pt denies OTC medication use except for Occasional Pepto-Bismol.Patient denies use of Tylenol, ibuprofen.  No history of liver or kidney problems.  No history of arrhythmias.  No history of CHF.    Patient Active Problem List    Diagnosis Date Noted   • Nephrolithiasis    • Controlled type 2 diabetes mellitus without complication, without long-term current use of insulin (CMS/[facility]) 08/30/2016   • Acute maxillary sinusitis 08/25/2016   • Cough 08/25/2016   • Hemoptysis 08/25/2016   • Coronary arteriosclerosis in native artery 05/16/2016     Note Last Updated: 9/30/2016     1. Coronary artery disease:  a. Acute inferior STEMI with RV infarct features, January 2009.   b. Normal LV function, single vessel RCA disease.     c. Sirolimus eluting stents to the RCA, 01/09/2009.   d. Pericarditis, resolved.   e. LHC, 01/13/2012 (for recurrent angina).    i.  Normal LV function.  ii.  of RCA with failed intervention.       • Panlobular emphysema (CMS/HCC) 05/16/2016   • Functional diarrhea 05/16/2016   • Mixed hyperlipidemia 05/16/2016   • Essential hypertension 05/16/2016         Past Surgical History:   Procedure Laterality Date   • CHOLECYSTECTOMY     • CORONARY ANGIOPLASTY WITH STENT PLACEMENT  01/09/2009    Sirolimus eluting stents to the RCA, 01/09/2009.    • HERNIA REPAIR      Hernia repair x 2.        Allergies   Allergen Reactions   • Bee Venom Swelling         Current Outpatient Medications:   •  amLODIPine (NORVASC) 5 MG tablet, Take 1 tablet by mouth Daily. (Patient taking differently: Take 2.5 mg by mouth Daily.), Disp: 90 tablet, Rfl: 2  •  aspirin 81 MG tablet, Take 81 mg by mouth Daily., Disp: , Rfl:   •  carvedilol (COREG) 25 MG tablet, TAKE ONE TABLET BY MOUTH TWICE DAILY (INCREASE IN DOSE), Disp: 180 tablet, Rfl: 2  •  folic acid (FOLVITE) 400 MCG tablet, Take 400 mcg by mouth Daily., Disp: , Rfl:   •  hydrochlorothiazide (HYDRODIURIL) 25 MG tablet, TAKE ONE TABLET BY MOUTH ONCE DAILY, Disp: 90 tablet, Rfl: 3  •  lisinopril (PRINIVIL,ZESTRIL) 20 MG tablet, TAKE ONE TABLET BY MOUTH TWICE DAILY, Disp: 180 tablet, Rfl: 2  •  metFORMIN (GLUCOPHAGE) 500 MG tablet, TAKE ONE TABLET BY MOUTH ONCE DAILY, Disp: 90 tablet, Rfl: 3  •  simvastatin (ZOCOR) 40 MG tablet, Take 1 tablet by mouth Daily., Disp: 90 tablet, Rfl: 3  •  tamsulosin (FLOMAX) 0.4 MG capsule 24 hr capsule, Take  by mouth Daily., Disp: , Rfl:   •  nitroglycerin (NITROSTAT) 0.4 MG SL tablet, Place 1 tablet under the tongue Every 5 (Five) Minutes As Needed for Chest Pain., Disp: 25 tablet, Rfl: 6    Current Facility-Administered Medications:   •  furosemide (LASIX) injection 40 mg, 40 mg, Intravenous, Once, Diogo Weber APRN    The following portions of the patient's history were reviewed and updated as appropriate: allergies, current medications, past family history, past medical  "history, past social history, past surgical history and problem list.    Review of Systems   Constitution: Positive for weakness and weight gain.   Cardiovascular: Positive for dyspnea on exertion and leg swelling. Negative for chest pain, near-syncope, orthopnea, palpitations, paroxysmal nocturnal dyspnea and syncope.   Respiratory: Positive for cough and shortness of breath.    Gastrointestinal: Positive for bloating. Negative for abdominal pain.   Neurological: Negative for dizziness, light-headedness, loss of balance and numbness.   All other systems reviewed and are negative.      Objective:     Vitals:    12/27/18 1240 12/27/18 1244   BP: 122/63 121/65   BP Location: Left arm Left arm   Patient Position: Sitting Sitting   Pulse: 63 62   Resp: 20    Temp: 97 °F (36.1 °C)    TempSrc: Temporal    SpO2: 94%    Weight: 112 kg (248 lb)    Height: 180.3 cm (71\")          Physical Exam   Constitutional: He is oriented to person, place, and time. He appears well-developed and well-nourished. No distress.   HENT:   Head: Normocephalic and atraumatic.   Mouth/Throat: Oropharynx is clear and moist.   Eyes: Conjunctivae are normal. Pupils are equal, round, and reactive to light. No scleral icterus.   Neck: No hepatojugular reflux and no JVD present. Carotid bruit is not present. No tracheal deviation present. No thyromegaly present.   Cardiovascular: Normal rate, normal heart sounds and intact distal pulses. An irregularly irregular rhythm present. Exam reveals no friction rub.   No murmur heard.  Pulmonary/Chest: Effort normal. He has decreased breath sounds in the left lower field.   Abdominal: Bowel sounds are normal. He exhibits distension. There is no tenderness.   Musculoskeletal: He exhibits edema (2+ bilaterally).   Lymphadenopathy:     He has no cervical adenopathy.   Neurological: He is alert and oriented to person, place, and time.   Skin: Skin is warm, dry and intact. No rash noted. No cyanosis or erythema. No " pallor.   Psychiatric: He has a normal mood and affect. His behavior is normal. Thought content normal.   Vitals reviewed.      Lab and Diagnostic Review:  Lab Results   Component Value Date    WBC 6.03 06/28/2018    HGB 13.6 06/28/2018    HCT 42.2 06/28/2018    MCV 94.0 06/28/2018     (L) 06/28/2018     Lab Results   Component Value Date    GLUCOSE 131 (H) 06/28/2018    BUN 16 06/28/2018    CREATININE 1.05 06/28/2018    EGFRIFNONA 67 06/28/2018    BCR 15.2 06/28/2018    K 3.9 06/28/2018    CO2 28.0 06/28/2018    CALCIUM 9.6 06/28/2018    ALBUMIN 4.51 06/28/2018    AST 27 06/28/2018    ALT 16 06/28/2018     IV diuresis today in office. Patient received Lasix 40 mg  today through a butterfly needle in the Left AC over slow IV push. During IV diuresis, vitals were monitored and stable. Please see IV diuresis record for those vitals. Patient voided 500 ml in the office prior to discharge from the office. Area was free of erythema, ecchymosis, or drainage.   Assessment and Plan:         1. Congestive heart failure, unspecified HF chronicity, unspecified heart failure type (CMS/HCC)  Weight again, abdominal fullness, edema, dyspnea    - Comprehensive Metabolic Panel  - CBC (No Diff)  - BNP  - TSH  - Magnesium    - Adult Transthoracic Echo Complete W/ Cont if Necessary Per Protocol; Future    - XR Chest PA & Lateral; Future      - furosemide (LASIX) injection 40 mg; Infuse 4 mL into a venous catheter 1 (One) Time.    Will add diuretics if labs stable    2. Atrial fibrillation, unspecified type (CMS/HCC)  EKG today.  AFib 56 bpm  Newly diagnosed, duration unknown  Recent normal stress  On BB  Will start Eliquis 5 mg BID    3. Shortness of breath      - Adult Transthoracic Echo Complete W/ Cont if Necessary Per Protocol; Future  - XR Chest PA & Lateral; Future  - ECG 12 Lead; Future  - furosemide (LASIX) injection 40 mg; Infuse 4 mL into a venous catheter 1 (One) Time.    4. Localized edema      5. Coronary  arteriosclerosis in native artery  Normal stress  Asa, statin    6. Essential hypertension  Stable    F/u scheduled with Dr. Posadas in 3 weeks.           *Please note that portions of this note were completed with a voice recognition program. Efforts were made to edit the dictations, but occasionally words are mistranscribed.

## 2018-12-28 DIAGNOSIS — R06.02 SHORTNESS OF BREATH: ICD-10-CM

## 2018-12-28 DIAGNOSIS — I50.9 CONGESTIVE HEART FAILURE, UNSPECIFIED HF CHRONICITY, UNSPECIFIED HEART FAILURE TYPE (HCC): ICD-10-CM

## 2018-12-28 PROCEDURE — 93005 ELECTROCARDIOGRAM TRACING: CPT | Performed by: NURSE PRACTITIONER

## 2018-12-31 ENCOUNTER — TELEPHONE (OUTPATIENT)
Dept: CARDIOLOGY | Facility: HOSPITAL | Age: 83
End: 2018-12-31

## 2018-12-31 NOTE — TELEPHONE ENCOUNTER
Pt reports improved dyspnea, decreased edema and 6 lb weight loss    Have repeat BMP and echo completed as scheduled on 01/04/18      ----- Message from MAKAYLA Hooper sent at 12/27/2018  5:29 PM EST -----  F/u after lasix and KCL  ? Echo to be scheduled  Labs 1 week

## 2019-01-04 ENCOUNTER — HOSPITAL ENCOUNTER (OUTPATIENT)
Dept: CARDIOLOGY | Facility: HOSPITAL | Age: 84
Discharge: HOME | End: 2019-01-04
Admitting: NURSE PRACTITIONER

## 2019-01-04 ENCOUNTER — LAB (OUTPATIENT)
Dept: LAB | Facility: HOSPITAL | Age: 84
End: 2019-01-04

## 2019-01-04 VITALS — BODY MASS INDEX: 33.6 KG/M2 | WEIGHT: 240 LBS | HEIGHT: 71 IN

## 2019-01-04 DIAGNOSIS — I50.9 ACUTE CONGESTIVE HEART FAILURE, UNSPECIFIED HEART FAILURE TYPE (HCC): ICD-10-CM

## 2019-01-04 DIAGNOSIS — R06.02 SHORTNESS OF BREATH: ICD-10-CM

## 2019-01-04 DIAGNOSIS — R60.0 LOCALIZED EDEMA: ICD-10-CM

## 2019-01-04 DIAGNOSIS — I50.9 CONGESTIVE HEART FAILURE, UNSPECIFIED HF CHRONICITY, UNSPECIFIED HEART FAILURE TYPE (HCC): ICD-10-CM

## 2019-01-04 LAB
ANION GAP SERPL CALCULATED.3IONS-SCNC: 5 MMOL/L (ref 3–11)
BH CV ECHO MEAS - AO ROOT AREA (BSA CORRECTED): 1.4
BH CV ECHO MEAS - AO ROOT AREA: 8 CM^2
BH CV ECHO MEAS - AO ROOT DIAM: 3.2 CM
BH CV ECHO MEAS - BSA(HAYCOCK): 2.4 M^2
BH CV ECHO MEAS - BSA: 2.3 M^2
BH CV ECHO MEAS - BZI_BMI: 33.5 KILOGRAMS/M^2
BH CV ECHO MEAS - BZI_METRIC_HEIGHT: 180.3 CM
BH CV ECHO MEAS - BZI_METRIC_WEIGHT: 108.9 KG
BH CV ECHO MEAS - EDV(CUBED): 85.2 ML
BH CV ECHO MEAS - EDV(MOD-SP2): 80 ML
BH CV ECHO MEAS - EDV(MOD-SP4): 50 ML
BH CV ECHO MEAS - EDV(TEICH): 87.7 ML
BH CV ECHO MEAS - EF(CUBED): 63 %
BH CV ECHO MEAS - EF(MOD-BP): 61 %
BH CV ECHO MEAS - EF(MOD-SP2): 65 %
BH CV ECHO MEAS - EF(MOD-SP4): 58 %
BH CV ECHO MEAS - EF(TEICH): 54.7 %
BH CV ECHO MEAS - ESV(CUBED): 31.6 ML
BH CV ECHO MEAS - ESV(MOD-SP2): 28 ML
BH CV ECHO MEAS - ESV(MOD-SP4): 21 ML
BH CV ECHO MEAS - ESV(TEICH): 39.7 ML
BH CV ECHO MEAS - FS: 28.2 %
BH CV ECHO MEAS - IVS/LVPW: 1.1
BH CV ECHO MEAS - IVSD: 1.2 CM
BH CV ECHO MEAS - LA DIMENSION: 4.6 CM
BH CV ECHO MEAS - LA/AO: 1.4
BH CV ECHO MEAS - LAD MAJOR: 7.3 CM
BH CV ECHO MEAS - LAT PEAK E' VEL: 12.1 CM/SEC
BH CV ECHO MEAS - LATERAL E/E' RATIO: 8
BH CV ECHO MEAS - LV DIASTOLIC VOL/BSA (35-75): 21.9 ML/M^2
BH CV ECHO MEAS - LV MASS(C)D: 187.9 GRAMS
BH CV ECHO MEAS - LV MASS(C)DI: 82.4 GRAMS/M^2
BH CV ECHO MEAS - LV SYSTOLIC VOL/BSA (12-30): 9.2 ML/M^2
BH CV ECHO MEAS - LVIDD: 4.4 CM
BH CV ECHO MEAS - LVIDS: 3.2 CM
BH CV ECHO MEAS - LVLD AP2: 7 CM
BH CV ECHO MEAS - LVLD AP4: 6.2 CM
BH CV ECHO MEAS - LVLS AP2: 6.4 CM
BH CV ECHO MEAS - LVLS AP4: 5.7 CM
BH CV ECHO MEAS - LVPWD: 1.1 CM
BH CV ECHO MEAS - MED PEAK E' VEL: 9.3 CM/SEC
BH CV ECHO MEAS - MEDIAL E/E' RATIO: 10.4
BH CV ECHO MEAS - MV DEC SLOPE: 393 CM/SEC^2
BH CV ECHO MEAS - MV DEC TIME: 0.22 SEC
BH CV ECHO MEAS - MV E MAX VEL: 97.2 CM/SEC
BH CV ECHO MEAS - PA ACC SLOPE: 522.5 CM/SEC^2
BH CV ECHO MEAS - PA ACC TIME: 0.12 SEC
BH CV ECHO MEAS - PA PR(ACCEL): 25.9 MMHG
BH CV ECHO MEAS - RAP SYSTOLE: 15 MMHG
BH CV ECHO MEAS - RVSP: 30 MMHG
BH CV ECHO MEAS - SI(CUBED): 23.5 ML/M^2
BH CV ECHO MEAS - SI(MOD-SP2): 22.8 ML/M^2
BH CV ECHO MEAS - SI(MOD-SP4): 12.7 ML/M^2
BH CV ECHO MEAS - SI(TEICH): 21 ML/M^2
BH CV ECHO MEAS - SV(CUBED): 53.6 ML
BH CV ECHO MEAS - SV(MOD-SP2): 52 ML
BH CV ECHO MEAS - SV(MOD-SP4): 29 ML
BH CV ECHO MEAS - SV(TEICH): 48 ML
BH CV ECHO MEAS - TAPSE (>1.6): 1.2 CM2
BH CV ECHO MEAS - TR MAX PG: 15 MMHG
BH CV ECHO MEAS - TR MAX VEL: 192.8 CM/SEC
BH CV ECHO MEASUREMENTS AVERAGE E/E' RATIO: 9.08
BH CV VAS BP RIGHT ARM: NORMAL MMHG
BH CV XLRA - RV BASE: 5.2 CM
BH CV XLRA - RV LENGTH: 6.9 CM
BH CV XLRA - RV MID: 4.3 CM
BH CV XLRA - TDI S': 7 CM/SEC
BUN BLD-MCNC: 19 MG/DL (ref 9–23)
BUN/CREAT SERPL: 17.1 (ref 7–25)
CALCIUM SPEC-SCNC: 9.5 MG/DL (ref 8.7–10.4)
CHLORIDE SERPL-SCNC: 100 MMOL/L (ref 99–109)
CO2 SERPL-SCNC: 35 MMOL/L (ref 20–31)
CREAT BLD-MCNC: 1.11 MG/DL (ref 0.6–1.3)
GFR SERPL CREATININE-BSD FRML MDRD: 63 ML/MIN/1.73
GLUCOSE BLD-MCNC: 93 MG/DL (ref 70–100)
LEFT ATRIUM VOLUME INDEX: 26.8 ML/M^2
LEFT ATRIUM VOLUME: 61 ML
MAXIMAL PREDICTED HEART RATE: 136 BPM
POTASSIUM BLD-SCNC: 3.6 MMOL/L (ref 3.5–5.5)
SODIUM BLD-SCNC: 140 MMOL/L (ref 132–146)
STRESS TARGET HR: 116 BPM

## 2019-01-04 PROCEDURE — 80048 BASIC METABOLIC PNL TOTAL CA: CPT

## 2019-01-04 PROCEDURE — 36415 COLL VENOUS BLD VENIPUNCTURE: CPT

## 2019-01-04 PROCEDURE — 93306 TTE W/DOPPLER COMPLETE: CPT | Performed by: INTERNAL MEDICINE

## 2019-01-04 PROCEDURE — 93306 TTE W/DOPPLER COMPLETE: CPT

## 2019-01-08 ENCOUNTER — DOCUMENTATION (OUTPATIENT)
Dept: CARDIOLOGY | Facility: HOSPITAL | Age: 84
End: 2019-01-08

## 2019-01-08 NOTE — PROGRESS NOTES
Lab Results   Component Value Date    GLUCOSE 93 01/04/2019    CALCIUM 9.5 01/04/2019     01/04/2019    K 3.6 01/04/2019    CO2 35.0 (H) 01/04/2019     01/04/2019    BUN 19 01/04/2019    CREATININE 1.11 01/04/2019    EGFRIFNONA 63 01/04/2019    BCR 17.1 01/04/2019    ANIONGAP 5.0 01/04/2019     Labs stable.  Continue meds as scheduled.  F/u with Dr. Posadas as scheduled.

## 2019-01-09 PROBLEM — I50.9 CHF (CONGESTIVE HEART FAILURE) (HCC): Status: ACTIVE | Noted: 2019-01-09

## 2019-01-10 ENCOUNTER — TELEPHONE (OUTPATIENT)
Dept: CARDIOLOGY | Facility: HOSPITAL | Age: 84
End: 2019-01-10

## 2019-01-10 NOTE — TELEPHONE ENCOUNTER
Called and reviewed echo results with pt.  Pt will fu with Dr. Posadas next week for further management.     ----- Message from Darryl Botello MA sent at 1/9/2019  4:36 PM EST -----  Regarding: results  Pt called for test results.

## 2019-01-17 ENCOUNTER — OFFICE VISIT (OUTPATIENT)
Dept: CARDIOLOGY | Facility: CLINIC | Age: 84
End: 2019-01-17

## 2019-01-17 VITALS
WEIGHT: 225 LBS | BODY MASS INDEX: 31.5 KG/M2 | HEART RATE: 57 BPM | HEIGHT: 71 IN | SYSTOLIC BLOOD PRESSURE: 126 MMHG | DIASTOLIC BLOOD PRESSURE: 47 MMHG

## 2019-01-17 DIAGNOSIS — I25.10 CORONARY ARTERIOSCLEROSIS IN NATIVE ARTERY: ICD-10-CM

## 2019-01-17 DIAGNOSIS — E78.2 MIXED HYPERLIPIDEMIA: Primary | ICD-10-CM

## 2019-01-17 DIAGNOSIS — I50.811 ACUTE RIGHT-SIDED HEART FAILURE (HCC): ICD-10-CM

## 2019-01-17 DIAGNOSIS — I38 VALVULAR DISEASE: ICD-10-CM

## 2019-01-17 DIAGNOSIS — I10 ESSENTIAL HYPERTENSION: ICD-10-CM

## 2019-01-17 DIAGNOSIS — I48.91 ATRIAL FIBRILLATION, UNSPECIFIED TYPE (HCC): ICD-10-CM

## 2019-01-17 DIAGNOSIS — I07.1 SEVERE TRICUSPID VALVE REGURGITATION: ICD-10-CM

## 2019-01-17 PROCEDURE — 99214 OFFICE O/P EST MOD 30 MIN: CPT | Performed by: INTERNAL MEDICINE

## 2019-01-17 PROCEDURE — 93000 ELECTROCARDIOGRAM COMPLETE: CPT | Performed by: INTERNAL MEDICINE

## 2019-01-17 RX ORDER — CARVEDILOL 12.5 MG/1
12.5 TABLET ORAL 2 TIMES DAILY WITH MEALS
COMMUNITY
End: 2019-05-08 | Stop reason: DRUGHIGH

## 2019-01-17 RX ORDER — AMOXICILLIN AND CLAVULANATE POTASSIUM 875; 125 MG/1; MG/1
1 TABLET, FILM COATED ORAL 2 TIMES DAILY
Status: ON HOLD | COMMUNITY
End: 2019-01-30

## 2019-01-17 RX ORDER — HYDROCHLOROTHIAZIDE 12.5 MG/1
25 CAPSULE, GELATIN COATED ORAL DAILY
COMMUNITY
End: 2019-01-30 | Stop reason: HOSPADM

## 2019-01-17 NOTE — PROGRESS NOTES
OFFICE FOLLOW UP     Date of Encounter:2019     Name: Jordi Sam  : 1934  Address: [facility] [address] [facility] [address]  Home Phone: [phone]    PCP: Warren Glass MD  [address] [address]    Jordi Sam is a 84 y.o. male.    Chief Complaint: Follow up of CAD, HTN, HLD    Problem List:   1. Coronary artery disease:  a. Acute inferior STEMI with RV infarct features, 2009.   b. Normal LV function, single vessel  RCA disease.     c. Sirolimus eluting stents to the RCA, 2009.   d. Pericarditis, resolved.   e. LHC, 2012 (for recurrent angina).   i.  Normal LV function.  ii.  of RCA with failed intervention.  f. Stress MPS 2018: NORMAL   2. Right heart failure 2019  a. Echo 2009: Normal LVEF, RV is moderately enlarged, RVSF is mildly reduced, moderate TR  b. Echo 2019: marked enlargement of the right heart, biatrial enlargement, LVSF is normal, large left pleural effusion, severe TR, RVSP 30mmHg  3. New onset atrial fibrillation 2019  a. Asymptomatic  b. CHADsVASC= 6, on Eliquis   4. Hypertension.   5. Dyslipidemia.  6. Diabetes mellitus, type 2.  A1C 6.1 (2016)  7. Nephrolithiasis.   8. History of “goiter.”  9. Thrombocytopenia   10. Surgical history:  a. Cholecystectomy.  b. Hernia repair x 2.   Allergies:  Allergies   Allergen Reactions   • Bee Venom Swelling     Current Medications:  •  amLODIPine 2.5 mg by mouth Daily.  •  amoxicillin-clavulanate 875-125 MG per tablet, Take 1 tablet by mouth 2 (Two) Times a Day. Ends 19  •  apixaban (ELIQUIS) 5 MG tablet tablet, Take 1 tablet by mouth 2 (Two) Times a Day  •  aspirin 81 MG tablet, Take 81 mg by mouth Daily  •  carvedilol (COREG) 12.5 MG tablet, Take 12.5 mg by mouth 2 (Two) Times a Day With Meals.  •  folic acid (FOLVITE) 400 MCG tablet, Take 400 mcg by mouth Daily  •  furosemide (LASIX) 40 MG tablet, Take 1 tablet by mouth Daily  •   "hydrochlorothiazide (MICROZIDE) 12.5 MG capsule, Take 12.5 mg by mouth Daily.  •  lisinopril (PRINIVIL,ZESTRIL) 20 MG tablet, TAKE ONE TABLET BY MOUTH TWICE DAILY  •  metFORMIN (GLUCOPHAGE) 500 MG tablet, TAKE ONE TABLET BY MOUTH ONCE DAILY  •  nitroglycerin (NITROSTAT) 0.4 MG SL As Needed for Chest Pain  •  potassium chloride (K-DUR,KLOR-CON) 20 MEQ CR tablet, Take 1 tablet by mouth Daily.  •  simvastatin (ZOCOR) 40 MG tablet, Take 1 tablet by mouth Daily.  •  tamsulosin (FLOMAX) 0.4 MG capsule 24 hr capsule, Take  by mouth Daily.    History of Present Illness:            Mr. Sam returns today for 6 month follow up. In December he noticed symptoms of severe exertional dyspnea, lower extremity edema, and cough. He was seen in the Heart and Valve center and was diuresed with Lasix. He was also noted to be in new onset atrial fibrillation with controlled ventricular response. Echo, labs and CXR were obtained. Echo showed very enlarged right heart, and normal LVSF, severe TR without PAH. CXR and labs reviewed. He has lost about 10lbs since December, and feels improved. He continues to have exertional dyspnea, orthopnea and PND. His lower extremity edema is improved but still present.     The following portions of the patient's history were reviewed and updated as appropriate: allergies, current medications and problem list.    ROS: Pertinent positives as listed in the HPI.  All other systems reviewed and negative.    Objective:  Vitals:    01/17/19 1525 01/17/19 1528   BP: 119/64 126/47   BP Location: Left arm Left arm   Patient Position: Sitting Standing   Pulse: 55 57   Weight: 102 kg (225 lb)    Height: 180.3 cm (71\")        Physical Exam:  GENERAL: Alert, cooperative, in no acute distress.   HEENT: Normocephalic, no adenopathy, no jugular venous distention  HEART: No discrete PMI is noted. Irregular rhythm, slow rate, and no murmurs, gallops, or rubs.   LUNGS: Diminished left base, no wheezing, rales or " eric.  ABDOMEN: Soft, bowel sounds present, non-tender   NEUROLOGIC: No focal abnormalities involving strength or sensation are noted.   EXTREMITIES: No clubbing, cyanosis. 2+ edema bilaterally     Diagnostic Data:       Lab Results   Component Value Date    TSH 3.582 12/27/2018            BNP                       267                                                                  12/27/2018    Lab Results   Component Value Date    WBC 4.93 12/27/2018    HGB 12.7 (L) 12/27/2018    HCT 40.4 12/27/2018    MCV 98.1 12/27/2018    PLT 80 (L) 12/27/2018      Lab Results   Component Value Date    GLUCOSE 93 01/04/2019    BUN 19 01/04/2019    CREATININE 1.11 01/04/2019    EGFRIFNONA 63 01/04/2019    BCR 17.1 01/04/2019    K 3.6 01/04/2019    CO2 35.0 (H) 01/04/2019    CALCIUM 9.5 01/04/2019    ALBUMIN 3.92 12/27/2018    AST 32 12/27/2018    ALT 20 12/27/2018      Lab Results   Component Value Date    HGBA1C 6.2 11/01/2018        ECG 12 Lead  Date/Time: 1/17/2019 5:07 PM  Performed by: Griselda Bonilla PA-C  Authorized by: Griselda Bonilla PA-C   Rhythm: atrial fibrillation  Rate: bradycardic  BPM: 47  Conduction: right bundle branch block  Clinical impression: abnormal ECG          Assessment and Plan:   1. He has symptoms and signs of right heart failure, which is improved since December, however is not resolved. He needs to continue current medical regimen which includes Lasix and Eliquis.  2. He needs a RANDALL with agitated saline to rule out intracardiac shunt.   3. Following the RANDALL, he will need a right and left heart cath.  4. Final disposition to follow the above.       Scribed for Efrem Posadas MD by Shahla Jacobsen RN. 01/17/2019 3:41 PM.    I, Efrem Posadas MD, [facility], [facility], personally performed the services described in this documentation as scribed by the above named individual in my presence, and it is both accurate and complete. At 5:14 PM on 01/17/2019      EMR  Dragon/Transcription Disclaimer:  Much of this encounter note is an electronic transcription/translation of spoken language to printed text.  The electronic translation of spoken language may permit erroneous, or at times, nonsensical words or phrases to be inadvertently transcribed.  Although I have reviewed the note for such errors, some may still exist.

## 2019-01-22 ENCOUNTER — HOSPITAL ENCOUNTER (OUTPATIENT)
Dept: CARDIOLOGY | Facility: HOSPITAL | Age: 84
Discharge: HOME | End: 2019-01-22
Attending: INTERNAL MEDICINE | Admitting: INTERNAL MEDICINE

## 2019-01-22 VITALS
OXYGEN SATURATION: 92 % | RESPIRATION RATE: 18 BRPM | SYSTOLIC BLOOD PRESSURE: 104 MMHG | DIASTOLIC BLOOD PRESSURE: 54 MMHG | HEART RATE: 61 BPM

## 2019-01-22 DIAGNOSIS — I50.811 ACUTE RIGHT-SIDED HEART FAILURE (HCC): ICD-10-CM

## 2019-01-22 DIAGNOSIS — I07.1 SEVERE TRICUSPID VALVE REGURGITATION: ICD-10-CM

## 2019-01-22 DIAGNOSIS — I25.10 CORONARY ARTERIOSCLEROSIS IN NATIVE ARTERY: ICD-10-CM

## 2019-01-22 DIAGNOSIS — I38 VALVULAR DISEASE: ICD-10-CM

## 2019-01-22 LAB
BH CV ECHO MEAS - BSA(HAYCOCK): 2.3 M^2
BH CV ECHO MEAS - BSA: 2.2 M^2
BH CV ECHO MEAS - BZI_BMI: 31.5 KILOGRAMS/M^2
BH CV ECHO MEAS - BZI_METRIC_HEIGHT: 180 CM
BH CV ECHO MEAS - BZI_METRIC_WEIGHT: 102.1 KG
BH CV VAS BP LEFT ARM: NORMAL MMHG
LV EF 2D ECHO EST: 60 %

## 2019-01-22 PROCEDURE — 93312 ECHO TRANSESOPHAGEAL: CPT | Performed by: INTERNAL MEDICINE

## 2019-01-22 PROCEDURE — 93325 DOPPLER ECHO COLOR FLOW MAPG: CPT

## 2019-01-22 PROCEDURE — 99152 MOD SED SAME PHYS/QHP 5/>YRS: CPT | Performed by: INTERNAL MEDICINE

## 2019-01-22 PROCEDURE — 25010000002 FENTANYL CITRATE (PF) 100 MCG/2ML SOLUTION: Performed by: INTERNAL MEDICINE

## 2019-01-22 PROCEDURE — 93321 DOPPLER ECHO F-UP/LMTD STD: CPT | Performed by: INTERNAL MEDICINE

## 2019-01-22 PROCEDURE — 25010000002 MIDAZOLAM PER 1 MG: Performed by: INTERNAL MEDICINE

## 2019-01-22 PROCEDURE — 93312 ECHO TRANSESOPHAGEAL: CPT

## 2019-01-22 PROCEDURE — 93321 DOPPLER ECHO F-UP/LMTD STD: CPT

## 2019-01-22 PROCEDURE — 93325 DOPPLER ECHO COLOR FLOW MAPG: CPT | Performed by: INTERNAL MEDICINE

## 2019-01-22 RX ORDER — MIDAZOLAM HYDROCHLORIDE 1 MG/ML
INJECTION INTRAMUSCULAR; INTRAVENOUS
Status: COMPLETED | OUTPATIENT
Start: 2019-01-22 | End: 2019-01-22

## 2019-01-22 RX ORDER — FENTANYL CITRATE 50 UG/ML
INJECTION, SOLUTION INTRAMUSCULAR; INTRAVENOUS
Status: COMPLETED | OUTPATIENT
Start: 2019-01-22 | End: 2019-01-22

## 2019-01-22 RX ADMIN — FENTANYL CITRATE 50 MCG: 50 INJECTION, SOLUTION INTRAMUSCULAR; INTRAVENOUS at 10:00

## 2019-01-22 RX ADMIN — MIDAZOLAM HYDROCHLORIDE 2 MG: 1 INJECTION, SOLUTION INTRAMUSCULAR; INTRAVENOUS at 10:03

## 2019-01-22 RX ADMIN — FENTANYL CITRATE 50 MCG: 50 INJECTION, SOLUTION INTRAMUSCULAR; INTRAVENOUS at 10:03

## 2019-01-22 RX ADMIN — METHOHEXITAL SODIUM 10 MG: 500 INJECTION, POWDER, LYOPHILIZED, FOR SOLUTION INTRAMUSCULAR; INTRAVENOUS; RECTAL at 10:08

## 2019-01-22 RX ADMIN — MIDAZOLAM HYDROCHLORIDE 2 MG: 1 INJECTION, SOLUTION INTRAMUSCULAR; INTRAVENOUS at 10:00

## 2019-01-22 RX ADMIN — METHOHEXITAL SODIUM 10 MG: 500 INJECTION, POWDER, LYOPHILIZED, FOR SOLUTION INTRAMUSCULAR; INTRAVENOUS; RECTAL at 10:23

## 2019-01-22 RX ADMIN — METHOHEXITAL SODIUM 10 MG: 500 INJECTION, POWDER, LYOPHILIZED, FOR SOLUTION INTRAMUSCULAR; INTRAVENOUS; RECTAL at 10:06

## 2019-01-22 RX ADMIN — METHOHEXITAL SODIUM 20 MG: 500 INJECTION, POWDER, LYOPHILIZED, FOR SOLUTION INTRAMUSCULAR; INTRAVENOUS; RECTAL at 10:10

## 2019-01-22 NOTE — PROGRESS NOTES
[facility] Daily Note       LOS: 0 days   Patient Care Team:  Warren Glass MD as PCP - General  Warren Glass MD as PCP - Family Medicine    Chief Complaint:  Shortness of breath    Problem List:  1. Coronary artery disease:  a. Acute inferior STEMI with RV infarct features, January 2009.   b. Normal LV function, single vessel  RCA disease.     c. Sirolimus eluting stents to the RCA, 01/09/2009.   d. Pericarditis, resolved.   e. LHC, 01/13/2012 (for recurrent angina).   i.  Normal LV function.  ii.  of RCA with failed intervention.  f. Stress MPS 7/27/2018: NORMAL   2. Right heart failure December 2019  a. Echo 01/2009: Normal LVEF, RV is moderately enlarged, RVSF is mildly reduced, moderate TR  b. Echo 1/4/2019: marked enlargement of the right heart, biatrial enlargement, LVSF is normal, large left pleural effusion, severe TR, RVSP 30mmHg  3. New onset atrial fibrillation December 2019  a. Asymptomatic  b. CHADsVASC= 6, on Eliquis   4. Hypertension.   5. Dyslipidemia.  6. Diabetes mellitus, type 2.  A1C 6.1 (8/2016)  7. Nephrolithiasis.   8. History of “goiter.”  9. Thrombocytopenia   10. Surgical history:  a. Cholecystectomy.  b. Hernia repair x 2.     Subjective     Subjective: Patient recently seen by Dr Posadas on 1/17/19.  In December, he was experiencing severe exertional dyspnea, BLE edema and cough.  He was diuresed with Lasix in the Heart and Valve center at that time.  He was also noted to be in a-fib with CVR.  Echo noted below.  He states he has gone from 240 pounds down to 218 pounds through diuresis, but continues to have exertional dyspnea, orthopnea and PND; though improving.  BLE improved, but not resolved.       Review of Systems:   As above.    Medications:  Current Outpatient Medications on File Prior to Encounter   Medication Sig Dispense Refill   • amLODIPine (NORVASC) 5 MG tablet Take 1 tablet by mouth Daily. (Patient taking differently: Take 2.5 mg by mouth Daily.) 90  tablet 2   • amoxicillin-clavulanate (AUGMENTIN) 875-125 MG per tablet Take 1 tablet by mouth 2 (Two) Times a Day. Ends 1-20-19     • apixaban (ELIQUIS) 5 MG tablet tablet Take 1 tablet by mouth 2 (Two) Times a Day. 60 tablet 3   • aspirin 81 MG tablet Take 81 mg by mouth Daily.     • carvedilol (COREG) 12.5 MG tablet Take 12.5 mg by mouth 2 (Two) Times a Day With Meals.     • folic acid (FOLVITE) 400 MCG tablet Take 400 mcg by mouth Daily.     • furosemide (LASIX) 40 MG tablet Take 1 tablet by mouth Daily. 30 tablet 0   • hydrochlorothiazide (MICROZIDE) 12.5 MG capsule Take 12.5 mg by mouth Daily.     • lisinopril (PRINIVIL,ZESTRIL) 20 MG tablet TAKE ONE TABLET BY MOUTH TWICE DAILY 180 tablet 2   • metFORMIN (GLUCOPHAGE) 500 MG tablet TAKE ONE TABLET BY MOUTH ONCE DAILY 90 tablet 3   • nitroglycerin (NITROSTAT) 0.4 MG SL tablet Place 1 tablet under the tongue Every 5 (Five) Minutes As Needed for Chest Pain. 25 tablet 6   • potassium chloride (K-DUR,KLOR-CON) 20 MEQ CR tablet Take 1 tablet by mouth Daily. 30 tablet 0   • simvastatin (ZOCOR) 40 MG tablet Take 1 tablet by mouth Daily. 90 tablet 3   • tamsulosin (FLOMAX) 0.4 MG capsule 24 hr capsule Take  by mouth Daily.       No current facility-administered medications on file prior to encounter.        Objective     Vital Sign Min/Max for last 24 hours  No Data Recorded   BP  Min: 139/80  Max: 139/80   Pulse  Min: 73  Max: 73   Resp  Min: 18  Max: 18   SpO2  Min: 97 %  Max: 97 %   No Data Recorded   No Data Recorded    No intake or output data in the 24 hours ending 01/22/19 0931         Physical Exam:  GENERAL: well-developed, well-nourished; in no acute distress.   NECK:  Carotid upstrokes are 2+ and  symmetrical without bruits.   LUNGS: Slightly Diminished to auscultation LLL without wheezing, rhonchi, or rales noted.   CARDIOVASCULAR: The heart has an irregularly irregular rate with a normal S1 and S2. There is no murmur, gallop, rub, or click appreciated. The  PMI is nondisplaced.   ABDOMEN: Soft and nontender  NEUROLOGICAL: Nonfocal; Alert and oriented  PERIPHERAL VASCULAR:  Posterior tibial pulses are 2+ and symmetrical. There is 1+ peripheral edema.   SKIN:  Warm and dry    Results Review:    I reviewed the patient's new clinical results.    Tele:  A-fib with occ PVC's @ 60-70    Labs:        Lab Results   Component Value Date    CHOL 102 06/28/2018    CHOL 135 06/22/2017     Lab Results   Component Value Date    TRIG 88 06/28/2018    TRIG 100 06/22/2017    TRIG 109 05/16/2016     Lab Results   Component Value Date    HDL 42 06/28/2018    HDL 46 06/22/2017    HDL 43 05/16/2016     Echocardiogram 1/4/19:  · There is biatrial enlargement.  · There is marked enlargement of the right heart.  · Global and segmental LV wall motion is normal.  · There are sclerotic changes in the aortic valve.  · There is a small circumferential pericardial effusion.  · There is a large left pleural effusion.  · Mild mitral regurgitation is preesent.  · Severe tricuspid regurgitation is seen. The IVC is dilated and inspiratory collapse is absent.  · PA hypertension is not suggested.  · Further evaluation for intracardiac shunting should be considered (this might begin with an agitated saline study).      Ejection Fraction:  61%     Assessment   Assessment:  1. He has symptoms and signs of right heart failure, which is improved since December, however is not resolved. He needs to continue current medical regimen which includes Lasix and Eliquis.  2. He needs a RANDALL with agitated saline to rule out intracardiac shunt.   3. Following the RANDALL, he will need a right and left heart cath.  4. Final disposition to follow the above.       Plan:  RANDALL today with Dr Joanne Majano.  The risks, benefits, and potential complications have all been addressed and he is agreeable to proceed.      Columba Chen, APRN  01/22/19  9:31 AM    DOM Majano MD personally performed the services described  in this documentation as scribed by the above individual in my presence, and it is both accurate and complete.    Joanne Majano MD, FACC

## 2019-01-24 DIAGNOSIS — I25.10 CORONARY ARTERIOSCLEROSIS IN NATIVE ARTERY: ICD-10-CM

## 2019-01-24 DIAGNOSIS — I10 ESSENTIAL HYPERTENSION: ICD-10-CM

## 2019-01-24 DIAGNOSIS — I50.9 CONGESTIVE HEART FAILURE, UNSPECIFIED HF CHRONICITY, UNSPECIFIED HEART FAILURE TYPE (HCC): Primary | ICD-10-CM

## 2019-01-25 DIAGNOSIS — R60.0 LOCALIZED EDEMA: ICD-10-CM

## 2019-01-25 DIAGNOSIS — I50.9 ACUTE CONGESTIVE HEART FAILURE, UNSPECIFIED HEART FAILURE TYPE (HCC): ICD-10-CM

## 2019-01-25 RX ORDER — POTASSIUM CHLORIDE 20 MEQ/1
20 TABLET, EXTENDED RELEASE ORAL DAILY
Qty: 30 TABLET | Refills: 6 | Status: SHIPPED | OUTPATIENT
Start: 2019-01-25 | End: 2019-03-22 | Stop reason: ALTCHOICE

## 2019-01-25 RX ORDER — FUROSEMIDE 40 MG/1
40 TABLET ORAL DAILY
Qty: 30 TABLET | Refills: 6 | Status: SHIPPED | OUTPATIENT
Start: 2019-01-25 | End: 2019-03-05 | Stop reason: SDUPTHER

## 2019-01-28 ENCOUNTER — PREP FOR SURGERY (OUTPATIENT)
Dept: OTHER | Facility: HOSPITAL | Age: 84
End: 2019-01-28

## 2019-01-28 RX ORDER — NITROGLYCERIN 0.4 MG/1
0.4 TABLET SUBLINGUAL
Status: CANCELLED | OUTPATIENT
Start: 2019-01-28

## 2019-01-28 RX ORDER — ACETAMINOPHEN 325 MG/1
650 TABLET ORAL EVERY 4 HOURS PRN
Status: CANCELLED | OUTPATIENT
Start: 2019-01-28

## 2019-01-28 RX ORDER — SODIUM CHLORIDE 0.9 % (FLUSH) 0.9 %
3-10 SYRINGE (ML) INJECTION AS NEEDED
Status: CANCELLED | OUTPATIENT
Start: 2019-01-28

## 2019-01-28 RX ORDER — SODIUM CHLORIDE 0.9 % (FLUSH) 0.9 %
3 SYRINGE (ML) INJECTION EVERY 12 HOURS SCHEDULED
Status: CANCELLED | OUTPATIENT
Start: 2019-01-28

## 2019-01-30 ENCOUNTER — APPOINTMENT (OUTPATIENT)
Dept: GENERAL RADIOLOGY | Facility: HOSPITAL | Age: 84
End: 2019-01-30

## 2019-01-30 ENCOUNTER — HOSPITAL ENCOUNTER (OUTPATIENT)
Facility: HOSPITAL | Age: 84
Discharge: HOME | End: 2019-01-30
Attending: INTERNAL MEDICINE | Admitting: INTERNAL MEDICINE

## 2019-01-30 VITALS
RESPIRATION RATE: 18 BRPM | HEART RATE: 48 BPM | HEIGHT: 71 IN | SYSTOLIC BLOOD PRESSURE: 121 MMHG | WEIGHT: 219.14 LBS | OXYGEN SATURATION: 96 % | DIASTOLIC BLOOD PRESSURE: 59 MMHG | BODY MASS INDEX: 30.68 KG/M2 | TEMPERATURE: 98.4 F

## 2019-01-30 DIAGNOSIS — I10 ESSENTIAL HYPERTENSION: ICD-10-CM

## 2019-01-30 DIAGNOSIS — I25.10 CORONARY ARTERIOSCLEROSIS IN NATIVE ARTERY: ICD-10-CM

## 2019-01-30 DIAGNOSIS — I50.9 CONGESTIVE HEART FAILURE, UNSPECIFIED HF CHRONICITY, UNSPECIFIED HEART FAILURE TYPE (HCC): ICD-10-CM

## 2019-01-30 LAB
ALBUMIN SERPL-MCNC: 4.22 G/DL (ref 3.2–4.8)
ALBUMIN/GLOB SERPL: 2.1 G/DL (ref 1.5–2.5)
ALP SERPL-CCNC: 82 U/L (ref 25–100)
ALT SERPL W P-5'-P-CCNC: 18 U/L (ref 7–40)
ANION GAP SERPL CALCULATED.3IONS-SCNC: 6 MMOL/L (ref 3–11)
ARTERIAL PATENCY WRIST A: ABNORMAL
ARTICHOKE IGE QN: 47 MG/DL (ref 0–130)
AST SERPL-CCNC: 26 U/L (ref 0–33)
ATMOSPHERIC PRESS: ABNORMAL MMHG
BASE EXCESS BLDA CALC-SCNC: 4.4 MMOL/L (ref 0–2)
BASE EXCESS BLDV CALC-SCNC: 5.2 MMOL/L (ref -2–2)
BASE EXCESS BLDV CALC-SCNC: 5.4 MMOL/L (ref -2–2)
BASE EXCESS BLDV CALC-SCNC: 5.5 MMOL/L (ref -2–2)
BASE EXCESS BLDV CALC-SCNC: 5.5 MMOL/L (ref -2–2)
BDY SITE: ABNORMAL
BILIRUB SERPL-MCNC: 1.2 MG/DL (ref 0.3–1.2)
BODY TEMPERATURE: 37 C
BUN BLD-MCNC: 29 MG/DL (ref 9–23)
BUN/CREAT SERPL: 22.8 (ref 7–25)
CALCIUM SPEC-SCNC: 9.5 MG/DL (ref 8.7–10.4)
CHLORIDE SERPL-SCNC: 103 MMOL/L (ref 99–109)
CHOLEST SERPL-MCNC: 94 MG/DL (ref 0–200)
CO2 BLDA-SCNC: 29.5 MMOL/L (ref 23–27)
CO2 BLDA-SCNC: 31.8 MMOL/L (ref 23–27)
CO2 BLDA-SCNC: 31.9 MMOL/L (ref 23–27)
CO2 BLDA-SCNC: 32.1 MMOL/L (ref 23–27)
CO2 BLDA-SCNC: 32.3 MMOL/L (ref 23–27)
CO2 SERPL-SCNC: 32 MMOL/L (ref 20–31)
COHGB MFR BLD: 1.7 %
COHGB MFR BLD: 1.7 %
COHGB MFR BLD: 1.8 %
COHGB MFR BLD: 1.8 %
COHGB MFR BLD: 1.9 % (ref 0–2)
CREAT BLD-MCNC: 1.27 MG/DL (ref 0.6–1.3)
DEPRECATED RDW RBC AUTO: 52.1 FL (ref 37–54)
EPAP: 0
ERYTHROCYTE [DISTWIDTH] IN BLOOD BY AUTOMATED COUNT: 14.7 % (ref 11.3–14.5)
GFR SERPL CREATININE-BSD FRML MDRD: 54 ML/MIN/1.73
GLOBULIN UR ELPH-MCNC: 2 GM/DL
GLUCOSE BLD-MCNC: 117 MG/DL (ref 70–100)
HBA1C MFR BLD: 6.3 % (ref 4.8–5.6)
HCO3 BLDA-SCNC: 28.3 MMOL/L (ref 20–26)
HCO3 BLDV-SCNC: 30.4 MMOL/L (ref 22–28)
HCO3 BLDV-SCNC: 30.6 MMOL/L (ref 22–28)
HCO3 BLDV-SCNC: 30.6 MMOL/L (ref 22–28)
HCO3 BLDV-SCNC: 30.8 MMOL/L (ref 22–28)
HCT VFR BLD AUTO: 41.6 % (ref 38.9–50.9)
HCT VFR BLD CALC: 38.7 %
HDLC SERPL-MCNC: 33 MG/DL (ref 40–60)
HGB BLD-MCNC: 13.2 G/DL (ref 13.1–17.5)
HGB BLDA-MCNC: 12.3 G/DL (ref 13.5–17.5)
HGB BLDA-MCNC: 12.5 G/DL (ref 13.5–17.5)
HGB BLDA-MCNC: 12.6 G/DL (ref 13.5–17.5)
HOROWITZ INDEX BLD+IHG-RTO: 21 %
IPAP: 0
MCH RBC QN AUTO: 30.8 PG (ref 27–31)
MCHC RBC AUTO-ENTMCNC: 31.7 G/DL (ref 32–36)
MCV RBC AUTO: 97.2 FL (ref 80–99)
METHGB BLD QL: 0.6 %
METHGB BLD QL: 0.7 %
METHGB BLD QL: 0.7 %
METHGB BLD QL: 0.8 %
METHGB BLD QL: 0.8 % (ref 0–1.5)
MODALITY: ABNORMAL
NOTE: ABNORMAL
OXYHGB MFR BLDV: 52.7 %
OXYHGB MFR BLDV: 53.9 %
OXYHGB MFR BLDV: 54.2 %
OXYHGB MFR BLDV: 54.6 %
OXYHGB MFR BLDV: 90.1 % (ref 94–99)
PAW @ PEAK INSP FLOW SETTING VENT: 0 CMH2O
PCO2 BLDA: 38.6 MM HG
PCO2 BLDV: 45.5 MM HG (ref 41–51)
PCO2 BLDV: 45.9 MM HG (ref 41–51)
PCO2 BLDV: 46.3 MM HG (ref 41–51)
PCO2 BLDV: 46.9 MM HG (ref 41–51)
PCO2 TEMP ADJ BLD: 38.6 MM HG (ref 35–48)
PH BLDA: 7.47 PH UNITS (ref 7.35–7.45)
PH BLDV: 7.43 PH UNITS
PH, TEMP CORRECTED: 7.47 PH UNITS
PLATELET # BLD AUTO: 93 10*3/MM3 (ref 150–450)
PMV BLD AUTO: 11.7 FL (ref 6–12)
PO2 BLDA: 60.4 MM HG (ref 83–108)
PO2 BLDV: 29.4 MM HG (ref 27–53)
PO2 BLDV: 30 MM HG (ref 27–53)
PO2 BLDV: 30.1 MM HG (ref 27–53)
PO2 BLDV: 30.2 MM HG (ref 27–53)
PO2 TEMP ADJ BLD: 60.4 MM HG (ref 83–108)
POTASSIUM BLD-SCNC: 3.9 MMOL/L (ref 3.5–5.5)
PROT SERPL-MCNC: 6.2 G/DL (ref 5.7–8.2)
RBC # BLD AUTO: 4.28 10*6/MM3 (ref 4.2–5.76)
SAO2 % BLDCOV: 53.9 % (ref 45–75)
SAO2 % BLDCOV: 55.3 % (ref 45–75)
SAO2 % BLDCOV: 55.6 % (ref 45–75)
SAO2 % BLDCOV: 56 % (ref 45–75)
SODIUM BLD-SCNC: 141 MMOL/L (ref 132–146)
TOTAL RATE: 0 BREATHS/MINUTE
TRIGL SERPL-MCNC: 98 MG/DL (ref 0–150)
WBC NRBC COR # BLD: 5.75 10*3/MM3 (ref 3.5–10.8)

## 2019-01-30 PROCEDURE — C1769 GUIDE WIRE: HCPCS | Performed by: INTERNAL MEDICINE

## 2019-01-30 PROCEDURE — C1894 INTRO/SHEATH, NON-LASER: HCPCS | Performed by: INTERNAL MEDICINE

## 2019-01-30 PROCEDURE — C1760 CLOSURE DEV, VASC: HCPCS | Performed by: INTERNAL MEDICINE

## 2019-01-30 PROCEDURE — 80053 COMPREHEN METABOLIC PANEL: CPT | Performed by: PHYSICIAN ASSISTANT

## 2019-01-30 PROCEDURE — A9270 NON-COVERED ITEM OR SERVICE: HCPCS | Performed by: PHYSICIAN ASSISTANT

## 2019-01-30 PROCEDURE — 93460 R&L HRT ART/VENTRICLE ANGIO: CPT | Performed by: INTERNAL MEDICINE

## 2019-01-30 PROCEDURE — 82805 BLOOD GASES W/O2 SATURATION: CPT

## 2019-01-30 PROCEDURE — 71046 X-RAY EXAM CHEST 2 VIEWS: CPT

## 2019-01-30 PROCEDURE — 82820 HEMOGLOBIN-OXYGEN AFFINITY: CPT

## 2019-01-30 PROCEDURE — 80061 LIPID PANEL: CPT | Performed by: PHYSICIAN ASSISTANT

## 2019-01-30 PROCEDURE — 83036 HEMOGLOBIN GLYCOSYLATED A1C: CPT | Performed by: PHYSICIAN ASSISTANT

## 2019-01-30 PROCEDURE — 0 IOPAMIDOL PER 1 ML: Performed by: INTERNAL MEDICINE

## 2019-01-30 PROCEDURE — 85027 COMPLETE CBC AUTOMATED: CPT | Performed by: PHYSICIAN ASSISTANT

## 2019-01-30 PROCEDURE — 63710000001 CLOPIDOGREL 75 MG TABLET: Performed by: PHYSICIAN ASSISTANT

## 2019-01-30 PROCEDURE — 25010000002 FENTANYL CITRATE (PF) 100 MCG/2ML SOLUTION: Performed by: INTERNAL MEDICINE

## 2019-01-30 PROCEDURE — C1751 CATH, INF, PER/CENT/MIDLINE: HCPCS | Performed by: INTERNAL MEDICINE

## 2019-01-30 RX ORDER — FENTANYL CITRATE 50 UG/ML
INJECTION, SOLUTION INTRAMUSCULAR; INTRAVENOUS AS NEEDED
Status: DISCONTINUED | OUTPATIENT
Start: 2019-01-30 | End: 2019-01-30 | Stop reason: HOSPADM

## 2019-01-30 RX ORDER — ACETAMINOPHEN 325 MG/1
650 TABLET ORAL EVERY 4 HOURS PRN
Status: DISCONTINUED | OUTPATIENT
Start: 2019-01-30 | End: 2019-01-30 | Stop reason: HOSPADM

## 2019-01-30 RX ORDER — NITROGLYCERIN 0.4 MG/1
0.4 TABLET SUBLINGUAL
Status: DISCONTINUED | OUTPATIENT
Start: 2019-01-30 | End: 2019-01-30 | Stop reason: HOSPADM

## 2019-01-30 RX ORDER — SODIUM CHLORIDE 0.9 % (FLUSH) 0.9 %
3-10 SYRINGE (ML) INJECTION AS NEEDED
Status: DISCONTINUED | OUTPATIENT
Start: 2019-01-30 | End: 2019-01-30 | Stop reason: HOSPADM

## 2019-01-30 RX ORDER — SODIUM CHLORIDE 9 MG/ML
1.5 INJECTION, SOLUTION INTRAVENOUS ONCE
Status: DISCONTINUED | OUTPATIENT
Start: 2019-01-30 | End: 2019-01-30 | Stop reason: HOSPADM

## 2019-01-30 RX ORDER — LIDOCAINE HYDROCHLORIDE 10 MG/ML
INJECTION, SOLUTION INFILTRATION; PERINEURAL AS NEEDED
Status: DISCONTINUED | OUTPATIENT
Start: 2019-01-30 | End: 2019-01-30 | Stop reason: HOSPADM

## 2019-01-30 RX ORDER — SODIUM CHLORIDE 0.9 % (FLUSH) 0.9 %
3 SYRINGE (ML) INJECTION EVERY 12 HOURS SCHEDULED
Status: DISCONTINUED | OUTPATIENT
Start: 2019-01-30 | End: 2019-01-30 | Stop reason: HOSPADM

## 2019-01-30 RX ORDER — CLOPIDOGREL BISULFATE 75 MG/1
600 TABLET ORAL ONCE
Status: COMPLETED | OUTPATIENT
Start: 2019-01-30 | End: 2019-01-30

## 2019-01-30 RX ADMIN — SODIUM CHLORIDE, PRESERVATIVE FREE 3 ML: 5 INJECTION INTRAVENOUS at 10:40

## 2019-01-30 RX ADMIN — CLOPIDOGREL BISULFATE 600 MG: 75 TABLET ORAL at 10:40

## 2019-01-31 ENCOUNTER — DOCUMENTATION (OUTPATIENT)
Dept: CARDIAC REHAB | Facility: HOSPITAL | Age: 84
End: 2019-01-31

## 2019-02-22 ENCOUNTER — TELEPHONE (OUTPATIENT)
Dept: CARDIOLOGY | Facility: CLINIC | Age: 84
End: 2019-02-22

## 2019-02-22 NOTE — TELEPHONE ENCOUNTER
Pt called to report he has had swelling in his legs and abdomen, weight gain. Current weight: 224 lbs. Last weight at discharge 1/30/19: 219 lbs. Pt states he has some shortness of breath but not much. He is more bothered by the swelling. Per MRJ take extra 40 mg lasix and K 20 meq X 2 days and update on Monday. BMP in one week with Dr. Glass.   Pt aware to take extra 40 mg tonight and then AM/PM dose tomorrow as well. Then resume daily dose schedule. He will take extra K with the 2 additional doses of Lasix. Pt will update weight and symptoms on Monday. VIOLETTE

## 2019-02-25 NOTE — TELEPHONE ENCOUNTER
PT calls with update after taking extra Lasix this past weekend- he lost a bout a pound- he is wondering if he could increase the dose regularly- will discuss with LILLIAN and Shahla-

## 2019-02-26 NOTE — TELEPHONE ENCOUNTER
He is still symptomatic with shortness of breath however edema better. He is wanting to take extra lasix daily. He was advised to take as needed for weight gain > 2 lbs and increase shortness of air. He understands this and needs to have BMP with appt on Friday. Will watch creatinine closely. VIOLETTE

## 2019-03-01 ENCOUNTER — OFFICE VISIT (OUTPATIENT)
Dept: INTERNAL MEDICINE | Facility: CLINIC | Age: 84
End: 2019-03-01

## 2019-03-01 VITALS
SYSTOLIC BLOOD PRESSURE: 124 MMHG | HEART RATE: 64 BPM | TEMPERATURE: 97.8 F | OXYGEN SATURATION: 97 % | RESPIRATION RATE: 24 BRPM | WEIGHT: 228.4 LBS | HEIGHT: 71 IN | BODY MASS INDEX: 31.98 KG/M2 | DIASTOLIC BLOOD PRESSURE: 62 MMHG

## 2019-03-01 DIAGNOSIS — E11.9 TYPE 2 DIABETES MELLITUS WITHOUT COMPLICATION, UNSPECIFIED WHETHER LONG TERM INSULIN USE (HCC): Primary | ICD-10-CM

## 2019-03-01 DIAGNOSIS — I25.10 CORONARY ARTERIOSCLEROSIS IN NATIVE ARTERY: ICD-10-CM

## 2019-03-01 DIAGNOSIS — I10 ESSENTIAL HYPERTENSION: ICD-10-CM

## 2019-03-01 DIAGNOSIS — I50.42 CHRONIC COMBINED SYSTOLIC AND DIASTOLIC CONGESTIVE HEART FAILURE (HCC): ICD-10-CM

## 2019-03-01 DIAGNOSIS — E11.9 CONTROLLED TYPE 2 DIABETES MELLITUS WITHOUT COMPLICATION, WITHOUT LONG-TERM CURRENT USE OF INSULIN (HCC): ICD-10-CM

## 2019-03-01 DIAGNOSIS — E78.2 MIXED HYPERLIPIDEMIA: ICD-10-CM

## 2019-03-01 DIAGNOSIS — I48.20 CHRONIC ATRIAL FIBRILLATION (HCC): ICD-10-CM

## 2019-03-01 PROBLEM — I50.9 CHF (CONGESTIVE HEART FAILURE): Status: RESOLVED | Noted: 2019-01-09 | Resolved: 2019-03-01

## 2019-03-01 LAB
EXPIRATION DATE: NORMAL
HBA1C MFR BLD: 6 %
Lab: NORMAL

## 2019-03-01 PROCEDURE — 83036 HEMOGLOBIN GLYCOSYLATED A1C: CPT | Performed by: INTERNAL MEDICINE

## 2019-03-01 PROCEDURE — 99214 OFFICE O/P EST MOD 30 MIN: CPT | Performed by: INTERNAL MEDICINE

## 2019-03-01 RX ORDER — HYDROCHLOROTHIAZIDE 12.5 MG/1
12.5 TABLET ORAL DAILY
COMMUNITY
End: 2019-03-01 | Stop reason: ALTCHOICE

## 2019-03-01 NOTE — PROGRESS NOTES
Subjective   Jordi Sam is a 85 y.o. male.     History of Present Illness   For follow up of  CHF he is now in atrial fib and followed by cardiology.  He is sob on exertion and has not improved much and has a lot of edema.  COPD is about the same and does not help.  NIDDM no new sx.  HTN on meds, no headaches or chest pain.    The following portions of the patient's history were reviewed and updated as appropriate: allergies, current medications, past medical history, past social history, past surgical history and problem list.    Review of Systems   Constitutional: Positive for fatigue. Negative for fever.   Eyes: Negative.    Respiratory: Positive for shortness of breath. Negative for cough, choking and chest tightness.    Cardiovascular: Positive for leg swelling. Negative for chest pain and palpitations.   Gastrointestinal: Negative.  Negative for abdominal distention and abdominal pain.   Genitourinary: Negative.    Musculoskeletal: Negative.    Neurological: Negative for confusion.       Objective   Physical Exam   Constitutional: He appears well-developed and well-nourished.   Neck: Normal range of motion. Neck supple.   Cardiovascular: Normal rate, normal heart sounds and intact distal pulses. Exam reveals no gallop and no friction rub.   No murmur heard.  Atrial fib. Rate = 60.   Pulmonary/Chest: Effort normal and breath sounds normal. No stridor. No respiratory distress. He has no rales.   Abdominal: Soft. Bowel sounds are normal. He exhibits no distension. There is no tenderness.   Musculoskeletal: He exhibits edema (2+ bilaterally.).   Neurological: He is alert.         Assessment/Plan   Jordi was seen today for diabetes and shortness of breath.    Diagnoses and all orders for this visit:    Type 2 diabetes mellitus without complication, unspecified whether long term insulin use (CMS/[facility])  -     POC Glycosylated Hemoglobin (Hb A1C)  A1C = 6.0.    Chronic combined systolic and diastolic congestive  heart failure (CMS/[facility])  Not doing great.  I recommended he increase his lasix to 80mg every morning.  [facility] and make an appointment for next week.    Coronary arteriosclerosis in native artery  No change.    Chronic atrial fibrillation (CMS/[facility])  On meds, no change but this is what caused his CHF.    Essential hypertension  Stable on meds.    Mixed hyperlipidemia  NO changes.    Controlled type 2 diabetes mellitus without complication, without long-term current use of insulin (CMS/[facility])  Doing fine on meds.    He needs some increase diuresis.  As above.  Same meds otherwise.  Recheck here 4 months.

## 2019-03-04 ENCOUNTER — TELEPHONE (OUTPATIENT)
Dept: CARDIOLOGY | Facility: HOSPITAL | Age: 84
End: 2019-03-04

## 2019-03-04 DIAGNOSIS — E78.2 MIXED HYPERLIPIDEMIA: ICD-10-CM

## 2019-03-04 DIAGNOSIS — R06.02 SHORTNESS OF BREATH: Primary | ICD-10-CM

## 2019-03-04 RX ORDER — SIMVASTATIN 40 MG
TABLET ORAL
Qty: 90 TABLET | Refills: 3 | Status: SHIPPED | OUTPATIENT
Start: 2019-03-04 | End: 2020-03-09

## 2019-03-04 NOTE — TELEPHONE ENCOUNTER
----- Message from Darryl Botello MA sent at 3/4/2019 10:00 AM EST -----  Regarding: Labs on 3/1/19  Called and left message to fax BMP from 3/1/19 from Dr. Glass's office.

## 2019-03-04 NOTE — TELEPHONE ENCOUNTER
----- Message from Darryl Botello MA sent at 3/4/2019  3:51 PM EST -----  Regarding: RE: pavel  Called Dr. Glass's office and spoke with the nurse to see if pt had BMP on Friday. According to the nurse Dr. Glass did not order BMP for this pt on Friday 3/1/19  ----- Message -----  From: Diogo Weber APRN  Sent: 3/4/2019  10:26 AM  To: Darryl Botello MA  Subject: RE: pavel                                       I have not seen patient since 1/2019.  Pt will need to schedule f/u in H&V if not doing better.   ----- Message -----  From: Darryl Botello MA  Sent: 3/4/2019   9:51 AM  To: MAKAYLA Hooper  Subject: fluids                                           Pt called and reported that he saw his PCP Dr. Glass on Friday who increased his Lasix to 80 mg. Pt reported that he took two lasix 40 mg pills (80 mg) on Saturday, Sunday and today morning. Pt reports having some swelling in ankles, SOA, Cough. Pt's wt on Sunday morning was 220lb.

## 2019-03-05 ENCOUNTER — LAB (OUTPATIENT)
Dept: LAB | Facility: HOSPITAL | Age: 84
End: 2019-03-05

## 2019-03-05 ENCOUNTER — HOSPITAL ENCOUNTER (OUTPATIENT)
Dept: GENERAL RADIOLOGY | Facility: HOSPITAL | Age: 84
Discharge: HOME | End: 2019-03-05
Admitting: INTERNAL MEDICINE

## 2019-03-05 ENCOUNTER — OFFICE VISIT (OUTPATIENT)
Dept: CARDIOLOGY | Facility: CLINIC | Age: 84
End: 2019-03-05

## 2019-03-05 VITALS
SYSTOLIC BLOOD PRESSURE: 111 MMHG | HEIGHT: 71 IN | BODY MASS INDEX: 31.36 KG/M2 | HEART RATE: 57 BPM | DIASTOLIC BLOOD PRESSURE: 57 MMHG | WEIGHT: 224 LBS

## 2019-03-05 DIAGNOSIS — R60.0 LOCALIZED EDEMA: ICD-10-CM

## 2019-03-05 DIAGNOSIS — R06.02 SHORTNESS OF BREATH: ICD-10-CM

## 2019-03-05 DIAGNOSIS — I48.20 CHRONIC ATRIAL FIBRILLATION (HCC): ICD-10-CM

## 2019-03-05 DIAGNOSIS — I50.42 CHRONIC COMBINED SYSTOLIC AND DIASTOLIC CONGESTIVE HEART FAILURE (HCC): ICD-10-CM

## 2019-03-05 DIAGNOSIS — I50.42 CHRONIC COMBINED SYSTOLIC AND DIASTOLIC CONGESTIVE HEART FAILURE (HCC): Primary | ICD-10-CM

## 2019-03-05 DIAGNOSIS — I50.9 ACUTE CONGESTIVE HEART FAILURE, UNSPECIFIED HEART FAILURE TYPE (HCC): ICD-10-CM

## 2019-03-05 LAB
ANION GAP SERPL CALCULATED.3IONS-SCNC: 7 MMOL/L (ref 3–11)
BNP SERPL-MCNC: 252 PG/ML (ref 0–100)
BUN BLD-MCNC: 22 MG/DL (ref 9–23)
BUN/CREAT SERPL: 17.2 (ref 7–25)
CALCIUM SPEC-SCNC: 9.1 MG/DL (ref 8.7–10.4)
CHLORIDE SERPL-SCNC: 106 MMOL/L (ref 99–109)
CO2 SERPL-SCNC: 30 MMOL/L (ref 20–31)
CREAT BLD-MCNC: 1.28 MG/DL (ref 0.6–1.3)
GFR SERPL CREATININE-BSD FRML MDRD: 53 ML/MIN/1.73
GLUCOSE BLD-MCNC: 110 MG/DL (ref 70–100)
POTASSIUM BLD-SCNC: 4.5 MMOL/L (ref 3.5–5.5)
SODIUM BLD-SCNC: 143 MMOL/L (ref 132–146)

## 2019-03-05 PROCEDURE — 99214 OFFICE O/P EST MOD 30 MIN: CPT | Performed by: INTERNAL MEDICINE

## 2019-03-05 PROCEDURE — 71046 X-RAY EXAM CHEST 2 VIEWS: CPT

## 2019-03-05 PROCEDURE — 83880 ASSAY OF NATRIURETIC PEPTIDE: CPT

## 2019-03-05 PROCEDURE — 80048 BASIC METABOLIC PNL TOTAL CA: CPT

## 2019-03-05 PROCEDURE — 36415 COLL VENOUS BLD VENIPUNCTURE: CPT

## 2019-03-05 RX ORDER — FUROSEMIDE 80 MG
80 TABLET ORAL DAILY
Qty: 30 TABLET | Refills: 11 | Status: SHIPPED | OUTPATIENT
Start: 2019-03-05 | End: 2019-06-06

## 2019-03-05 RX ORDER — AMLODIPINE BESYLATE 5 MG/1
5 TABLET ORAL DAILY
COMMUNITY
End: 2019-05-08 | Stop reason: SINTOL

## 2019-03-05 NOTE — PROGRESS NOTES
OFFICE FOLLOW UP     Date of Encounter:2019     Name: Jordi Sam  : 1934  Address: [facility] [address] [place] [address]  Home Phone: [phone]    PCP: Warren Glass MD  [address]  [place] [address]    Jordi Sam is a 85 y.o. male.      Chief Complaint: Short of breath, swelling    Problem List:   1. Coronary artery disease:  a. Acute inferior STEMI with RV infarct features, 2009.   b. Normal LV function, single vessel  RCA disease.     c. Sirolimus eluting stents to the RCA, 2009.   d. Pericarditis, resolved.   e. LHC, 2012 (for recurrent angina).   i.  Normal LV function.  ii.  of RCA with failed intervention.  f. Stress MPS 2018: NORMAL   2. Right heart failure 2019  a. Echo 2009: Normal LVEF, RV is moderately enlarged, RVSF is mildly reduced, moderate TR  b. Echo 2019: marked enlargement of the right heart, biatrial enlargement, LVSF is normal, large left pleural effusion, severe TR, RVSP 30mmHg  c. Cardiac Cath (): Normal LV, Normal PAP, RV enlargement and decreased RVEF, Normal LV, No shunt,  of RCA.  d. Large left pleural effusion.  3. New onset atrial fibrillation 2019  a. Asymptomatic  b. CHADsVASC= 6, on Eliquis   4. Hypertension.   5. Dyslipidemia.  6. Diabetes mellitus, type 2.  A1C 6.1 (2016)  7. Nephrolithiasis.   8. History of “goiter.”  9. Thrombocytopenia   10. Surgical history:  a. Cholecystectomy.  b. Hernia repair x 2.     Allergies:  Allergies   Allergen Reactions   • Bee Venom Swelling   • Sulfamethoxazole-Trimethoprim Other (See Comments)       Current Medications:  •  apixaban 5 MG by mouth 2 (Two) Times a Day.  •  aspirin 81 mg by mouth Daily.  •  carvedilol 12.5 mg by mouth 2 (Two) Times a Day With Meals.  •  folic acid  400 mcg by mouth Daily.  •  Furosemide 40 MG by mouth Daily.  •  lisinopril 20 MG BY MOUTH TWICE DAILY  •  metFORMIN 500 MG BY MOUTH ONCE DAILY  •   "nitroglycerin (NITROSTAT) 0.4 MG SL As Needed for Chest Pain  •  potassium chloride (K-DUR,KLOR-CON) 20 MEQ CR by mouth Daily  •  simvastatin 40 MG BY MOUTH ONCE DAILY  •  tamsulosin (FLOMAX) 0.4 MG by mouth Daily.    History of Present Illness:  Mr. Sam returns for follow-up.  He continues to have edema.  His major complaint is one of \"I can't breathe\".  He has had no angina.  He has had no palpitations.  He's had no neurologic symptoms.  Catheterization study findings from January are reviewed in detail.               The following portions of the patient's history were reviewed and updated as appropriate: allergies, current medications and problem list.    ROS: Pertinent positives as listed in the HPI.  All other systems reviewed and negative.    Objective:    Vitals:    03/05/19 1329 03/05/19 1330   BP: 115/60 111/57   BP Location: Left arm Left arm   Patient Position: Sitting Standing   Pulse: 60 57   Weight: 102 kg (224 lb)    Height: 180.3 cm (71\")        Physical Exam:  GENERAL: Alert, cooperative, in no acute distress.   HEENT: Normocephalic, no adenopathy, no jugular venous distention  HEART: No discrete PMI is noted. Regular rhythm, normal rate, and no murmurs, gallops, or rubs.   LUNGS: Clear to auscultation bilaterally. No wheezing, rales or ronchi. Diminished breath sounds left base.  ABDOMEN: Soft, bowel sounds present, non-tender   NEUROLOGIC: No focal abnormalities involving strength or sensation are noted.   EXTREMITIES: No clubbing, cyanosis noted +4 LE edema.    Diagnostic Data:   Lab Results   Component Value Date    GLUCOSE 110 (H) 03/05/2019    CALCIUM 9.1 03/05/2019     03/05/2019    K 4.5 03/05/2019    CO2 30.0 03/05/2019     03/05/2019    BUN 22 03/05/2019    CREATININE 1.28 03/05/2019    EGFRIFNONA 53 (L) 03/05/2019    BCR 17.2 03/05/2019    ANIONGAP 7.0 03/05/2019           BNP:                      252                                                                  " 03/05/2019    Procedures      Assessment and Plan:   1.  Edema/Shortness of breath:    · Patient has stopped amlodipine.  · Repeat CXR today shows large left pleural effusion; needs diagnostic and therapeutic tap.  · BNP= 252, increase furosemide to 80 mg daily, continue KDur 20 meq daily. This is NOT left heart failure.  · Call in 1 week with update on weight/symptoms.  · May need aldosterone antagonist.    I will see Jordi Sam back in 3 months or sooner on an as needed basis.    I, Efrem Posadas MD, [facility], [facility], personally performed the services described in this documentation as scribed by the above named individual in my presence, and it is both accurate and complete. At 5:26 PM on 03/05/2019        Scribed for Efrem Posadas MD by Shahla Jacobsen RN. 03/05/2019 1:40 PM.        EMR Dragon/Transcription Disclaimer:  Much of this encounter note is an electronic transcription/translation of spoken language to printed text.  The electronic translation of spoken language may permit erroneous, or at times, nonsensical words or phrases to be inadvertently transcribed.  Although I have reviewed the note for such errors, some may still exist.

## 2019-03-06 ENCOUNTER — TRANSCRIBE ORDERS (OUTPATIENT)
Dept: CARDIOLOGY | Facility: CLINIC | Age: 84
End: 2019-03-06

## 2019-03-06 DIAGNOSIS — J90 PLEURAL EFFUSION ON LEFT: Primary | ICD-10-CM

## 2019-03-06 NOTE — PROGRESS NOTES
Per [facility] needs left CT guided thoracentesis, written order given to [facility]. Pt aware to hold Eliquis 3/6/19 (last dose was 3/5/19 PM dose). Please schedule for Friday. 2 days off Eliquis. Pt aware and agreeable. KH

## 2019-03-08 ENCOUNTER — HOSPITAL ENCOUNTER (OUTPATIENT)
Dept: CT IMAGING | Facility: HOSPITAL | Age: 84
Discharge: HOME | End: 2019-03-08
Admitting: INTERNAL MEDICINE

## 2019-03-08 VITALS
BODY MASS INDEX: 31.78 KG/M2 | OXYGEN SATURATION: 96 % | SYSTOLIC BLOOD PRESSURE: 108 MMHG | DIASTOLIC BLOOD PRESSURE: 77 MMHG | RESPIRATION RATE: 20 BRPM | HEIGHT: 71 IN | TEMPERATURE: 97.7 F | WEIGHT: 227 LBS | HEART RATE: 65 BPM

## 2019-03-08 DIAGNOSIS — J90 PLEURAL EFFUSION ON LEFT: ICD-10-CM

## 2019-03-08 LAB
APPEARANCE FLD: ABNORMAL
APTT PPP: 37.1 SECONDS (ref 24–37)
COLOR FLD: YELLOW
GLUCOSE BLDC GLUCOMTR-MCNC: 105 MG/DL (ref 70–130)
GLUCOSE FLD-MCNC: 117 MG/DL
INR PPP: 1.36 (ref 0.85–1.16)
LDH FLD-CCNC: 76 U/L
LYMPHOCYTES NFR FLD MANUAL: 72 %
MACROPHAGE FLUID: 8 %
MESOTHL CELL NFR FLD MANUAL: 3 %
MONOCYTES NFR FLD: 14 %
NEUTROPHILS NFR FLD MANUAL: 3 %
PH FLD: 7.78 [PH]
PLATELET # BLD AUTO: 91 10*3/MM3 (ref 150–450)
PROT FLD-MCNC: 2.5 G/DL
PROTHROMBIN TIME: 16.2 SECONDS (ref 11.2–14.3)
RBC # FLD AUTO: 5000 /MM3
WBC # FLD AUTO: 303 /MM3

## 2019-03-08 PROCEDURE — 87015 SPECIMEN INFECT AGNT CONCNTJ: CPT | Performed by: INTERNAL MEDICINE

## 2019-03-08 PROCEDURE — 87205 SMEAR GRAM STAIN: CPT | Performed by: INTERNAL MEDICINE

## 2019-03-08 PROCEDURE — 88112 CYTOPATH CELL ENHANCE TECH: CPT | Performed by: INTERNAL MEDICINE

## 2019-03-08 PROCEDURE — 85730 THROMBOPLASTIN TIME PARTIAL: CPT | Performed by: INTERNAL MEDICINE

## 2019-03-08 PROCEDURE — C1729 CATH, DRAINAGE: HCPCS

## 2019-03-08 PROCEDURE — 84157 ASSAY OF PROTEIN OTHER: CPT | Performed by: INTERNAL MEDICINE

## 2019-03-08 PROCEDURE — 82945 GLUCOSE OTHER FLUID: CPT | Performed by: INTERNAL MEDICINE

## 2019-03-08 PROCEDURE — 89051 BODY FLUID CELL COUNT: CPT | Performed by: INTERNAL MEDICINE

## 2019-03-08 PROCEDURE — 88305 TISSUE EXAM BY PATHOLOGIST: CPT | Performed by: INTERNAL MEDICINE

## 2019-03-08 PROCEDURE — 75989 ABSCESS DRAINAGE UNDER X-RAY: CPT

## 2019-03-08 PROCEDURE — 83615 LACTATE (LD) (LDH) ENZYME: CPT | Performed by: INTERNAL MEDICINE

## 2019-03-08 PROCEDURE — 85049 AUTOMATED PLATELET COUNT: CPT | Performed by: INTERNAL MEDICINE

## 2019-03-08 PROCEDURE — 87075 CULTR BACTERIA EXCEPT BLOOD: CPT | Performed by: INTERNAL MEDICINE

## 2019-03-08 PROCEDURE — 85610 PROTHROMBIN TIME: CPT | Performed by: INTERNAL MEDICINE

## 2019-03-08 PROCEDURE — 83986 ASSAY PH BODY FLUID NOS: CPT | Performed by: INTERNAL MEDICINE

## 2019-03-08 PROCEDURE — 82962 GLUCOSE BLOOD TEST: CPT

## 2019-03-08 PROCEDURE — 87070 CULTURE OTHR SPECIMN AEROBIC: CPT | Performed by: INTERNAL MEDICINE

## 2019-03-08 RX ORDER — SODIUM CHLORIDE 0.9 % (FLUSH) 0.9 %
3-10 SYRINGE (ML) INJECTION AS NEEDED
Status: DISCONTINUED | OUTPATIENT
Start: 2019-03-08 | End: 2019-03-09 | Stop reason: HOSPADM

## 2019-03-08 RX ORDER — SODIUM CHLORIDE 0.9 % (FLUSH) 0.9 %
3 SYRINGE (ML) INJECTION EVERY 12 HOURS SCHEDULED
Status: DISCONTINUED | OUTPATIENT
Start: 2019-03-08 | End: 2019-03-09 | Stop reason: HOSPADM

## 2019-03-08 RX ORDER — LIDOCAINE HYDROCHLORIDE 10 MG/ML
10 INJECTION, SOLUTION INFILTRATION; PERINEURAL ONCE
Status: COMPLETED | OUTPATIENT
Start: 2019-03-08 | End: 2019-03-08

## 2019-03-08 RX ADMIN — LIDOCAINE HYDROCHLORIDE 10 ML: 10 INJECTION, SOLUTION INFILTRATION; PERINEURAL at 11:30

## 2019-03-08 NOTE — NURSING NOTE
Given printed and oral discharge instructions. Verbalizes understanding. Discharged per wheelchair to front entrance with spouse driving.

## 2019-03-08 NOTE — DISCHARGE INSTR - ACTIVITY
Rest quietly at home today.  You may resume light activity tomorrow as tolerated.  You may shower and remove the bandage tomorrow.

## 2019-03-08 NOTE — NURSING NOTE
Patient placed on cardiac monitors, vitals stable. Images taken and reviewed by Dr. Brady. A total volume of 1500 ml dark serous fluid removed from the left pleural space. Patient tolerated well. Report to RAVINDRA.

## 2019-03-08 NOTE — NURSING NOTE
Returned to room. Tolerated procedure well. Band aid dressing clean, dry, and intact Lt lower lateral chest. No c/o. HOB elevated, provided lunch tray. O2 dec to 2 lpm with O2 sat 100 %

## 2019-03-11 ENCOUNTER — TELEPHONE (OUTPATIENT)
Dept: INFUSION THERAPY | Facility: HOSPITAL | Age: 84
End: 2019-03-11

## 2019-03-12 LAB
BACTERIA FLD CULT: NORMAL
GRAM STN SPEC: NORMAL
LAB AP CASE REPORT: NORMAL
PATH REPORT.FINAL DX SPEC: NORMAL

## 2019-03-14 ENCOUNTER — TELEPHONE (OUTPATIENT)
Dept: INTERNAL MEDICINE | Facility: CLINIC | Age: 84
End: 2019-03-14

## 2019-03-14 NOTE — TELEPHONE ENCOUNTER
Called pt to schedule subsequent AWV.  Patient agrees to AWV appt this Tues, 3/19/19. Office notified.

## 2019-03-15 LAB — BACTERIA SPEC ANAEROBE CULT: NORMAL

## 2019-03-19 ENCOUNTER — OFFICE VISIT (OUTPATIENT)
Dept: INTERNAL MEDICINE | Facility: CLINIC | Age: 84
End: 2019-03-19

## 2019-03-19 VITALS
RESPIRATION RATE: 18 BRPM | TEMPERATURE: 97.7 F | HEIGHT: 71 IN | BODY MASS INDEX: 31.58 KG/M2 | DIASTOLIC BLOOD PRESSURE: 50 MMHG | WEIGHT: 225.6 LBS | SYSTOLIC BLOOD PRESSURE: 98 MMHG | HEART RATE: 60 BPM

## 2019-03-19 DIAGNOSIS — I10 ESSENTIAL HYPERTENSION: ICD-10-CM

## 2019-03-19 DIAGNOSIS — Z23 NEED FOR VACCINATION: ICD-10-CM

## 2019-03-19 DIAGNOSIS — Z00.00 MEDICARE ANNUAL WELLNESS VISIT, INITIAL: Primary | ICD-10-CM

## 2019-03-19 LAB
A/C: NORMAL
EXPIRATION DATE: NORMAL
Lab: NORMAL
POC CREATININE URINE: 300
POC MICROALBUMIN URINE: 150

## 2019-03-19 PROCEDURE — G0438 PPPS, INITIAL VISIT: HCPCS | Performed by: NURSE PRACTITIONER

## 2019-03-19 PROCEDURE — G0009 ADMIN PNEUMOCOCCAL VACCINE: HCPCS | Performed by: NURSE PRACTITIONER

## 2019-03-19 PROCEDURE — 82044 UR ALBUMIN SEMIQUANTITATIVE: CPT | Performed by: NURSE PRACTITIONER

## 2019-03-19 PROCEDURE — 90732 PPSV23 VACC 2 YRS+ SUBQ/IM: CPT | Performed by: NURSE PRACTITIONER

## 2019-03-19 NOTE — PATIENT INSTRUCTIONS
Fall Prevention in the Home, Adult  Falls can cause injuries. They can happen to people of all ages. There are many things you can do to make your home safe and to help prevent falls. Ask for help when making these changes, if needed.  What actions can I take to prevent falls?  General Instructions  · Use good lighting in all rooms. Replace any light bulbs that burn out.  · Turn on the lights when you go into a dark area. Use night-lights.  · Keep items that you use often in easy-to-reach places. Lower the shelves around your home if necessary.  · Set up your furniture so you have a clear path. Avoid moving your furniture around.  · Do not have throw rugs and other things on the floor that can make you trip.  · Avoid walking on wet floors.  · If any of your floors are uneven, fix them.  · Add color or contrast paint or tape to clearly jarod and help you see:  ? Any grab bars or handrails.  ? First and last steps of stairways.  ? Where the edge of each step is.  · If you use a stepladder:  ? Make sure that it is fully opened. Do not climb a closed stepladder.  ? Make sure that both sides of the stepladder are locked into place.  ? Ask someone to hold the stepladder for you while you use it.  · If there are any pets around you, be aware of where they are.  What can I do in the bathroom?  · Keep the floor dry. Clean up any water that spills onto the floor as soon as it happens.  · Remove soap buildup in the tub or shower regularly.  · Use non-skid mats or decals on the floor of the tub or shower.  · Attach bath mats securely with double-sided, non-slip rug tape.  · If you need to sit down in the shower, use a plastic, non-slip stool.  · Install grab bars by the toilet and in the tub and shower. Do not use towel bars as grab bars.  What can I do in the bedroom?  · Make sure that you have a light by your bed that is easy to reach.  · Do not use any sheets or blankets that are too big for your bed. They should not hang  down onto the floor.  · Have a firm chair that has side arms. You can use this for support while you get dressed.  What can I do in the kitchen?  · Clean up any spills right away.  · If you need to reach something above you, use a strong step stool that has a grab bar.  · Keep electrical cords out of the way.  · Do not use floor polish or wax that makes floors slippery. If you must use wax, use non-skid floor wax.  What can I do with my stairs?  · Do not leave any items on the stairs.  · Make sure that you have a light switch at the top of the stairs and the bottom of the stairs. If you do not have them, ask someone to add them for you.  · Make sure that there are handrails on both sides of the stairs, and use them. Fix handrails that are broken or loose. Make sure that handrails are as long as the stairways.  · Install non-slip stair treads on all stairs in your home.  · Avoid having throw rugs at the top or bottom of the stairs. If you do have throw rugs, attach them to the floor with carpet tape.  · Choose a carpet that does not hide the edge of the steps on the stairway.  · Check any carpeting to make sure that it is firmly attached to the stairs. Fix any carpet that is loose or worn.  What can I do on the outside of my home?  · Use bright outdoor lighting.  · Regularly fix the edges of walkways and driveways and fix any cracks.  · Remove anything that might make you trip as you walk through a door, such as a raised step or threshold.  · Trim any bushes or trees on the path to your home.  · Regularly check to see if handrails are loose or broken. Make sure that both sides of any steps have handrails.  · Install guardrails along the edges of any raised decks and porches.  · Clear walking paths of anything that might make someone trip, such as tools or rocks.  · Have any leaves, snow, or ice cleared regularly.  · Use sand or salt on walking paths during winter.  · Clean up any spills in your garage right away.  This includes grease or oil spills.  What other actions can I take?  · Wear shoes that:  ? Have a low heel. Do not wear high heels.  ? Have rubber bottoms.  ? Are comfortable and fit you well.  ? Are closed at the toe. Do not wear open-toe sandals.  · Use tools that help you move around (mobility aids) if they are needed. These include:  ? Canes.  ? Walkers.  ? Scooters.  ? Crutches.  · Review your medicines with your doctor. Some medicines can make you feel dizzy. This can increase your chance of falling.  Ask your doctor what other things you can do to help prevent falls.  Where to find more information  · Centers for Disease Control and Prevention, [place]: https://cdc.gov  · National [place] on Aging: https://qj1crvf.marni.nih.gov  Contact a doctor if:  · You are afraid of falling at home.  · You feel weak, drowsy, or dizzy at home.  · You fall at home.  Summary  · There are many simple things that you can do to make your home safe and to help prevent falls.  · Ways to make your home safe include removing tripping hazards and installing grab bars in the bathroom.  · Ask for help when making these changes in your home.  This information is not intended to replace advice given to you by your health care provider. Make sure you discuss any questions you have with your health care provider.  Document Released: 10/14/2010 Document Revised: 08/02/2018 Document Reviewed: 08/02/2018  [employer] Patient Education © 2019 [employer]  BMI for Adults  Body mass index (BMI) is a number that is calculated from a person's weight and height. In most adults, the number is used to find how much of an adult's weight is made up of fat. BMI is not as accurate as a direct measure of body fat.  How is BMI calculated?  BMI is calculated by dividing weight in kilograms by height in meters squared. It can also be calculated by dividing weight in pounds by height in inches squared, then multiplying the resulting number by 703.  Charts are available to help you find your BMI quickly and easily without doing this calculation.  How is BMI interpreted?  Health care professionals use BMI charts to identify whether an adult is underweight, at a normal weight, or overweight based on the following guidelines:  · Underweight: BMI less than 18.5.  · Normal weight: BMI between 18.5 and 24.9.  · Overweight: BMI between 25 and 29.9.  · Obese: BMI of 30 and above.    BMI is usually interpreted the same for males and females.  Weight includes both fat and muscle, so someone with a muscular build, such as an athlete, may have a BMI that is higher than 24.9. In cases like these, BMI may not accurately depict body fat. To determine if excess body fat is the cause of a BMI of 25 or higher, further assessments may need to be done by a health care provider.  Why is BMI a useful tool?  BMI is used to identify a possible weight problem that may be related to a medical problem or may increase the risk for medical problems. BMI can also be used to promote changes to reach a healthy weight.  This information is not intended to replace advice given to you by your health care provider. Make sure you discuss any questions you have with your health care provider.  Document Released: 08/29/2005 Document Revised: 04/27/2017 Document Reviewed: 05/15/2015  [employer] Interactive Patient Education © 2018 [employer]

## 2019-03-19 NOTE — PROGRESS NOTES
QUICK REFERENCE INFORMATION:  The ABCs of the Annual Wellness Visit    Initial Medicare Wellness Visit    HEALTH RISK ASSESSMENT    1934    Recent Hospitalizations:  No hospitalization(s) within the last year. Heart cath on 2019-no overnight stay.        Current Medical Providers:  Patient Care Team:  Warren Glass MD as PCP - General  Warren Glass MD as PCP - Family Medicine  Warren Glass MD as PCP - Claims Attributed        Smoking Status:  Social History     Tobacco Use   Smoking Status Former Smoker   • Packs/day: 2.50   • Years: 28.00   • Pack years: 70.00   • Types: Cigarettes   • Last attempt to quit: 1983   • Years since quittin.8   Smokeless Tobacco Never Used       Alcohol Consumption:  Social History     Substance and Sexual Activity   Alcohol Use No       Depression Screen:   PHQ-2/PHQ-9 Depression Screening 3/19/2019   Little interest or pleasure in doing things 0   Feeling down, depressed, or hopeless 0   Total Score 0       Health Habits and Functional and Cognitive Screening:  Functional & Cognitive Status 3/19/2019   Do you have difficulty preparing food and eating? No   Do you have difficulty bathing yourself, getting dressed or grooming yourself? No   Do you have difficulty using the toilet? No   Do you have difficulty moving around from place to place? Yes   Do you have trouble with steps or getting out of a bed or a chair? Yes   In the past year have you fallen or experienced a near fall? No   Current Diet Well Balanced Diet   Dental Exam Up to date   Eye Exam Up to date   Exercise (times per week) 2 times per week   Current Exercise Activities Include Walking   Do you need help using the phone?  Yes   Are you deaf or do you have serious difficulty hearing?  Yes   Do you need help with transportation? No   Do you need help shopping? No   Do you need help preparing meals?  No   Do you need help with housework?  Yes   Do you need help with laundry? Yes   Do  you need help taking your medications? Yes   Do you need help managing money? No   Do you ever drive or ride in a car without wearing a seat belt? No   Have you felt unusual stress, anger or loneliness in the last month? No   Who do you live with? Spouse   If you need help, do you have trouble finding someone available to you? No   Have you been bothered in the last four weeks by sexual problems? No   Do you have difficulty concentrating, remembering or making decisions? Yes           Does the patient have evidence of cognitive impairment? No    Asiprin use counseling: Taking ASA appropriately as indicated      Recent Lab Results:    Visual Acuity:  No exam data present    Age-appropriate Screening Schedule:  Refer to the list below for future screening recommendations based on patient's age, sex and/or medical conditions. Orders for these recommended tests are listed in the plan section. The patient has been provided with a written plan.    Health Maintenance   Topic Date Due   • TDAP/TD VACCINES (1 - Tdap) 01/31/1953   • ZOSTER VACCINE (1 of 2) 01/31/1984   • HEMOGLOBIN A1C  09/01/2019   • LIPID PANEL  01/30/2020   • URINE MICROALBUMIN  03/19/2020   • INFLUENZA VACCINE  Completed   • PNEUMOCOCCAL VACCINES (65+ LOW/MEDIUM RISK)  Completed        Subjective   History of Present Illness    Jordi Sam is a 85 y.o. male who presents for an Annual Wellness Visit.  HTN controlled.  The following portions of the patient's history were reviewed and updated as appropriate: allergies, current medications, past family history, past medical history, past social history, past surgical history and problem list.    Outpatient Medications Prior to Visit   Medication Sig Dispense Refill   • amLODIPine (NORVASC) 5 MG tablet Take 5 mg by mouth Daily.     • apixaban (ELIQUIS) 5 MG tablet tablet Take 1 tablet by mouth 2 (Two) Times a Day. 60 tablet 3   • aspirin 81 MG tablet Take 81 mg by mouth Daily.     • carvedilol (COREG) 12.5  MG tablet Take 12.5 mg by mouth 2 (Two) Times a Day With Meals.     • folic acid (FOLVITE) 400 MCG tablet Take 400 mcg by mouth Daily.     • furosemide (LASIX) 80 MG tablet Take 1 tablet by mouth Daily. 30 tablet 11   • lisinopril (PRINIVIL,ZESTRIL) 20 MG tablet TAKE ONE TABLET BY MOUTH TWICE DAILY 180 tablet 2   • metFORMIN (GLUCOPHAGE) 500 MG tablet TAKE ONE TABLET BY MOUTH ONCE DAILY 90 tablet 3   • nitroglycerin (NITROSTAT) 0.4 MG SL tablet Place 1 tablet under the tongue Every 5 (Five) Minutes As Needed for Chest Pain. 25 tablet 6   • potassium chloride (K-DUR,KLOR-CON) 20 MEQ CR tablet Take 1 tablet by mouth Daily. 30 tablet 6   • simvastatin (ZOCOR) 40 MG tablet TAKE 1 TABLET BY MOUTH ONCE DAILY 90 tablet 3   • tamsulosin (FLOMAX) 0.4 MG capsule 24 hr capsule Take  by mouth Daily.       No facility-administered medications prior to visit.        Patient Active Problem List   Diagnosis   • Coronary arteriosclerosis in native artery   • Panlobular emphysema (CMS/HCC)   • Functional diarrhea   • Mixed hyperlipidemia   • Essential hypertension   • Acute maxillary sinusitis   • Cough   • Hemoptysis   • Controlled type 2 diabetes mellitus without complication, without long-term current use of insulin (CMS/HCC)   • Nephrolithiasis   • Chronic combined systolic and diastolic congestive heart failure (CMS/HCC)   • Chronic atrial fibrillation (CMS/HCC)   • Shortness of breath       Advance Care Planning:  has an advance directive - a copy has been provided and is in file    Identification of Risk Factors:  Risk factors include: weight , unhealthy diet, cardiovascular risk, increased fall risk, vision limitations, hearing limitations and polypharmacy.    Review of Systems    Compared to one year ago, the patient feels his physical health is worse-due to heart issues.  Compared to one year ago, the patient feels his mental health is worse-more worried about health.    Objective     Physical Exam    Vitals:    03/19/19  "0850   BP: 98/50   Pulse: 60   Resp: 18   Temp: 97.7 °F (36.5 °C)   Weight: 102 kg (225 lb 9.6 oz)   Height: 180.3 cm (71\")   PainSc: 0-No pain       Patient's Body mass index is 31.46 kg/m². BMI is above normal parameters. Recommendations include: educational material, exercise counseling and nutrition counseling.      Assessment/Plan   Patient Self-Management and Personalized Health Advice  The patient has been provided with information about: diet, exercise, weight management, prevention of cardiac or vascular disease, the relationship between weight and GERD and fall prevention and preventive services including:   · Exercise counseling provided, Fall Risk assessment done, Pneumococcal vaccine , TdaP vaccine, Zostavax vaccine (Herpes Zoster).    Visit Diagnoses:    ICD-10-CM ICD-9-CM   1. Medicare annual wellness visit, initial Z00.00 V70.0   2. Essential hypertension I10 401.9   3. Need for vaccination Z23 V05.9       Orders Placed This Encounter   Procedures   • Pneumococcal Polysaccharide Vaccine 23-Valent Greater Than or Equal To 3yo Subcutaneous / IM   • POC Microalbumin       Outpatient Encounter Medications as of 3/19/2019   Medication Sig Dispense Refill   • amLODIPine (NORVASC) 5 MG tablet Take 5 mg by mouth Daily.     • apixaban (ELIQUIS) 5 MG tablet tablet Take 1 tablet by mouth 2 (Two) Times a Day. 60 tablet 3   • aspirin 81 MG tablet Take 81 mg by mouth Daily.     • carvedilol (COREG) 12.5 MG tablet Take 12.5 mg by mouth 2 (Two) Times a Day With Meals.     • folic acid (FOLVITE) 400 MCG tablet Take 400 mcg by mouth Daily.     • furosemide (LASIX) 80 MG tablet Take 1 tablet by mouth Daily. 30 tablet 11   • lisinopril (PRINIVIL,ZESTRIL) 20 MG tablet TAKE ONE TABLET BY MOUTH TWICE DAILY 180 tablet 2   • metFORMIN (GLUCOPHAGE) 500 MG tablet TAKE ONE TABLET BY MOUTH ONCE DAILY 90 tablet 3   • nitroglycerin (NITROSTAT) 0.4 MG SL tablet Place 1 tablet under the tongue Every 5 (Five) Minutes As Needed for " Chest Pain. 25 tablet 6   • potassium chloride (K-DUR,KLOR-CON) 20 MEQ CR tablet Take 1 tablet by mouth Daily. 30 tablet 6   • simvastatin (ZOCOR) 40 MG tablet TAKE 1 TABLET BY MOUTH ONCE DAILY 90 tablet 3   • tamsulosin (FLOMAX) 0.4 MG capsule 24 hr capsule Take  by mouth Daily.       No facility-administered encounter medications on file as of 3/19/2019.      Patient to inquire about Tdap and Shingrix vaccines at pharmacy.  Reviewed use of high risk medication in the elderly: yes  Reviewed for potential of harmful drug interactions in the elderly: yes    Follow Up:  Return if symptoms worsen or fail to improve, for F/U for Subsequent Wellness in one year & a day.     An After Visit Summary and PPPS with all of these plans were given to the patient.

## 2019-03-21 ENCOUNTER — TELEPHONE (OUTPATIENT)
Dept: CARDIOLOGY | Facility: CLINIC | Age: 84
End: 2019-03-21

## 2019-03-21 DIAGNOSIS — R60.0 LOCALIZED EDEMA: Primary | ICD-10-CM

## 2019-03-21 NOTE — TELEPHONE ENCOUNTER
Patient calling in to report symptoms after recent thoracentesis.  States weight has not changed since procedure.  Continues to complain of SOA and fatigue. Edema is located in the abdomen.  Says he sleeps a lot, and doesn't have much of an appetite.  Can only tolerate minimal activity.  Denies CP.  He is still taking lasix 80mg daily.  He would like to know if Dr. Posadas has any other recs.  Please advise.

## 2019-03-22 RX ORDER — EPLERENONE 25 MG/1
25 TABLET, FILM COATED ORAL DAILY
Qty: 30 TABLET | Refills: 11 | Status: SHIPPED | OUTPATIENT
Start: 2019-03-22 | End: 2019-03-22 | Stop reason: ALTCHOICE

## 2019-03-22 RX ORDER — SPIRONOLACTONE 25 MG/1
25 TABLET ORAL DAILY
Qty: 30 TABLET | Refills: 11 | Status: SHIPPED | OUTPATIENT
Start: 2019-03-22 | End: 2019-05-08

## 2019-03-22 NOTE — TELEPHONE ENCOUNTER
Per [facility] start Inspra 25 mg daily, stop potassium, BMP in a week. Update in 1 week. Pt aware and agreeable. KH    Insurance denied Inspra, must try Spironolactone 25 mg daily. Rx sent to pharmacy

## 2019-03-28 ENCOUNTER — LAB (OUTPATIENT)
Dept: LAB | Facility: HOSPITAL | Age: 84
End: 2019-03-28

## 2019-03-28 DIAGNOSIS — R60.0 LOCALIZED EDEMA: ICD-10-CM

## 2019-03-28 LAB
ANION GAP SERPL CALCULATED.3IONS-SCNC: 11 MMOL/L (ref 3–11)
BUN BLD-MCNC: 25 MG/DL (ref 9–23)
BUN/CREAT SERPL: 20 (ref 7–25)
CALCIUM SPEC-SCNC: 8.6 MG/DL (ref 8.7–10.4)
CHLORIDE SERPL-SCNC: 102 MMOL/L (ref 99–109)
CO2 SERPL-SCNC: 27 MMOL/L (ref 20–31)
CREAT BLD-MCNC: 1.25 MG/DL (ref 0.6–1.3)
GFR SERPL CREATININE-BSD FRML MDRD: 55 ML/MIN/1.73
GLUCOSE BLD-MCNC: 98 MG/DL (ref 70–100)
POTASSIUM BLD-SCNC: 4.3 MMOL/L (ref 3.5–5.5)
SODIUM BLD-SCNC: 140 MMOL/L (ref 132–146)

## 2019-03-28 PROCEDURE — 80048 BASIC METABOLIC PNL TOTAL CA: CPT

## 2019-03-28 PROCEDURE — 36415 COLL VENOUS BLD VENIPUNCTURE: CPT

## 2019-04-02 ENCOUNTER — TELEPHONE (OUTPATIENT)
Dept: CARDIOLOGY | Facility: CLINIC | Age: 84
End: 2019-04-02

## 2019-04-02 DIAGNOSIS — R06.02 SHORTNESS OF BREATH: Primary | ICD-10-CM

## 2019-04-02 NOTE — TELEPHONE ENCOUNTER
"Pt reports he is still short of breath at night. Has to sleep propped up. Still has abdominal \"swelling\". Weight has not changed. Per [facility] check CXR after thoracentesis one month ago. Pt aware and agreeable, will have CXR 4/3/19  "

## 2019-04-03 ENCOUNTER — HOSPITAL ENCOUNTER (OUTPATIENT)
Dept: GENERAL RADIOLOGY | Facility: HOSPITAL | Age: 84
Discharge: HOME | End: 2019-04-03
Admitting: INTERNAL MEDICINE

## 2019-04-03 DIAGNOSIS — R06.02 SHORTNESS OF BREATH: ICD-10-CM

## 2019-04-03 PROCEDURE — 71046 X-RAY EXAM CHEST 2 VIEWS: CPT

## 2019-04-04 DIAGNOSIS — J90 RECURRENT LEFT PLEURAL EFFUSION: Primary | ICD-10-CM

## 2019-04-04 NOTE — PROGRESS NOTES
Per [facility] review of CXR, NOT heart failure. Needs pulmonary evalaution for recurrent left pleural effusion. Pt will continue same medications and will await pulmonary evaluation. Pt agreeable. KH

## 2019-04-08 ENCOUNTER — OFFICE VISIT (OUTPATIENT)
Dept: PULMONOLOGY | Facility: CLINIC | Age: 84
End: 2019-04-08

## 2019-04-08 ENCOUNTER — PREP FOR SURGERY (OUTPATIENT)
Dept: OTHER | Facility: HOSPITAL | Age: 84
End: 2019-04-08

## 2019-04-08 VITALS
SYSTOLIC BLOOD PRESSURE: 92 MMHG | HEART RATE: 50 BPM | TEMPERATURE: 97.7 F | OXYGEN SATURATION: 98 % | DIASTOLIC BLOOD PRESSURE: 64 MMHG

## 2019-04-08 DIAGNOSIS — J90 PLEURAL EFFUSION: Primary | ICD-10-CM

## 2019-04-08 DIAGNOSIS — G47.30 SLEEP-DISORDERED BREATHING: ICD-10-CM

## 2019-04-08 PROCEDURE — 94729 DIFFUSING CAPACITY: CPT | Performed by: INTERNAL MEDICINE

## 2019-04-08 PROCEDURE — 99205 OFFICE O/P NEW HI 60 MIN: CPT | Performed by: INTERNAL MEDICINE

## 2019-04-08 PROCEDURE — 94726 PLETHYSMOGRAPHY LUNG VOLUMES: CPT | Performed by: INTERNAL MEDICINE

## 2019-04-08 PROCEDURE — 94060 EVALUATION OF WHEEZING: CPT | Performed by: INTERNAL MEDICINE

## 2019-04-08 RX ORDER — SODIUM CHLORIDE 0.9 % (FLUSH) 0.9 %
3-10 SYRINGE (ML) INJECTION AS NEEDED
Status: CANCELLED | OUTPATIENT
Start: 2019-04-08

## 2019-04-08 RX ORDER — LIDOCAINE HYDROCHLORIDE 40 MG/ML
4 INJECTION, SOLUTION RETROBULBAR; TOPICAL ONCE
Status: CANCELLED | OUTPATIENT
Start: 2019-04-08 | End: 2019-04-08

## 2019-04-08 RX ORDER — SODIUM CHLORIDE 0.9 % (FLUSH) 0.9 %
3 SYRINGE (ML) INJECTION EVERY 12 HOURS SCHEDULED
Status: CANCELLED | OUTPATIENT
Start: 2019-04-08

## 2019-04-08 RX ORDER — SODIUM CHLORIDE 9 MG/ML
125 INJECTION, SOLUTION INTRAVENOUS CONTINUOUS
Status: CANCELLED | OUTPATIENT
Start: 2019-04-08

## 2019-04-08 RX ORDER — ALBUTEROL SULFATE 90 UG/1
4 AEROSOL, METERED RESPIRATORY (INHALATION) ONCE
Status: COMPLETED | OUTPATIENT
Start: 2019-04-08 | End: 2019-04-08

## 2019-04-08 RX ADMIN — ALBUTEROL SULFATE 4 PUFF: 90 AEROSOL, METERED RESPIRATORY (INHALATION) at 08:34

## 2019-04-08 NOTE — PROGRESS NOTES
CC:    Pleural Effusion    HPI:    84 y/o WM w/ h/o CAD, ICM EF 60%, prior stents, small PFO /w bi-directional shunt, small pericardial effusion, Afib on Eliquis since Dec 2018, HTN, HLD, T2DM who presents for evaluation of recurrent left sided effusion.  Patient also has an interesting history of RV dysfunction.  He had an inferior wall STEMI in 2009 w/ stent to RCA.  He recently had a left and right heart cath that showed patent coronaries except for the RCA which had  w/ probable in-stent stenosis.  Hemodynamics showed CI 1.63, Mean PA 19, PCWP 14, LVEDP 15-20, PVR 1.4 Wood units, and at least moderate global hypokinesia of the RV w/ RV enlargement.  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    He was first noted to have a left sided pleural effusion on CXR in December 2018.  This was eventually drained in 3/2019 in IR w/ drainage of 1400 cc of bloody fluid.  Fluid studies are consistent with a bloody transudate w/ negative culture and cytology.  He did have some symptomatic relief but the effusion re-occurred on repeat CXR 4/3/19.  Doesn't recall any trauma to left ta-thorax.  Has significant smoking history 2.5 ppd x 28 years, quit in 1993.  No hemoptysis, no wheezing.  Also reports weight loss, abdominal swelling, LE edema, and poor appetite.  Remains on Eliquis for Afib.    Also notes snoring, PND, and daytime somnolence.    PMH:    Past Medical History:   Diagnosis Date   • A-fib (CMS/HCC)    • Acute inferior myocardial infarction (CMS/HCC)     Acute inferior STEMI with RV infarct features, January 2009.    • CAD (coronary artery disease)    • Dyslipidemia    • Goiter     History of “goiter.”   • Hyperlipidemia    • Hypertension    • Nephrolithiasis    • Type 2 diabetes mellitus (CMS/HCC)      PSH:    Past Surgical History:   Procedure Laterality Date   • CARDIAC CATHETERIZATION Bilateral 1/30/2019    Procedure: Right and Left Heart Cath;   Surgeon: Efrem Posadas MD;  Location: [facility] CATH INVASIVE LOCATION;  Service: Cardiology   • CARDIAC CATHETERIZATION     • CHOLECYSTECTOMY     • CORONARY ANGIOPLASTY WITH STENT PLACEMENT  2009    Sirolimus eluting stents to the RCA, 2009.    • HERNIA REPAIR      Hernia repair x 2.      FH:    Family History   Problem Relation Age of Onset   • Dementia Mother    • Stroke Mother    • Heart disease Father    • Heart attack Father    • Aneurysm Father    • Cancer Sister    • No Known Problems Brother    • No Known Problems Sister    • Heart disease Brother         CABG   • Hypertension Daughter    • Hypertension Son      SH:    Social History     Socioeconomic History   • Marital status:      Spouse name: Melly   • Number of children: 4   • Years of education: Not on file   • Highest education level: Not on file   Occupational History   • Occupation: retired     Comment: works [employer] [place]   Social Needs   • Financial resource strain: Not hard at all   • Food insecurity:     Worry: Never true     Inability: Never true   • Transportation needs:     Medical: No     Non-medical: No   Tobacco Use   • Smoking status: Former Smoker     Packs/day: 2.50     Years: 28.00     Pack years: 70.00     Types: Cigarettes     Last attempt to quit: 1983     Years since quittin.9   • Smokeless tobacco: Never Used   Substance and Sexual Activity   • Alcohol use: No   • Drug use: No   • Sexual activity: Defer   Lifestyle   • Physical activity:     Days per week: 0 days     Minutes per session: 0 min   • Stress: Not at all   Relationships   • Social connections:     Talks on phone: Twice a week     Gets together: Once a week     Attends [employer] service: 1 to 4 times per year     Active member of club or organization: No     Attends meetings of clubs or organizations: Never     Relationship status:    Social History Narrative    Caffeine Intake: 2-3 servings per day    Patient lives at home wit  his wife     ALLERGIES:    Allergies   Allergen Reactions   • Bee Venom Swelling   • Sulfamethoxazole-Trimethoprim Other (See Comments)     Pt unaware     MEDICATIONS:      Current Outpatient Medications:   •  apixaban (ELIQUIS) 5 MG tablet tablet, Take 1 tablet by mouth 2 (Two) Times a Day., Disp: 60 tablet, Rfl: 3  •  aspirin 81 MG tablet, Take 81 mg by mouth Daily., Disp: , Rfl:   •  carvedilol (COREG) 12.5 MG tablet, Take 12.5 mg by mouth 2 (Two) Times a Day With Meals., Disp: , Rfl:   •  folic acid (FOLVITE) 400 MCG tablet, Take 400 mcg by mouth Daily., Disp: , Rfl:   •  furosemide (LASIX) 80 MG tablet, Take 1 tablet by mouth Daily., Disp: 30 tablet, Rfl: 11  •  lisinopril (PRINIVIL,ZESTRIL) 20 MG tablet, TAKE ONE TABLET BY MOUTH TWICE DAILY, Disp: 180 tablet, Rfl: 2  •  metFORMIN (GLUCOPHAGE) 500 MG tablet, TAKE ONE TABLET BY MOUTH ONCE DAILY, Disp: 90 tablet, Rfl: 3  •  nitroglycerin (NITROSTAT) 0.4 MG SL tablet, Place 1 tablet under the tongue Every 5 (Five) Minutes As Needed for Chest Pain., Disp: 25 tablet, Rfl: 6  •  simvastatin (ZOCOR) 40 MG tablet, TAKE 1 TABLET BY MOUTH ONCE DAILY, Disp: 90 tablet, Rfl: 3  •  spironolactone (ALDACTONE) 25 MG tablet, Take 1 tablet by mouth Daily., Disp: 30 tablet, Rfl: 11  •  tamsulosin (FLOMAX) 0.4 MG capsule 24 hr capsule, Take  by mouth Daily., Disp: , Rfl:   •  amLODIPine (NORVASC) 5 MG tablet, Take 5 mg by mouth Daily., Disp: , Rfl:   No current facility-administered medications for this visit.   ROS:  Per HPI, otherwise all systems reviewed and negative.    DIAGNOSTIC DATA (Reviewed and interpreted by me unless otherwise specified):    PFT 4/8/19 - combined moderately severe obstruction and restriction, no change w/ BD, moderate reduction in DLCO corrects for alveolar volume    Vitals:    04/08/19 0946   BP: 92/64   Pulse:    Temp:    SpO2:        Physical Exam   Constitutional: Oriented to person, place, and time. Appears well-developed and well-nourished.    Head: Normocephalic and atraumatic.   Nose: Nose normal.   Mouth/Throat: Oropharynx is clear and moist.   Eyes: Conjunctivae are normal.  Pupils normal.  Neck: No tracheal deviation present.   Cardiovascular: Normal rate, regular rhythm, normal heart sounds and intact distal pulses.  Exam reveals no gallop and no friction rub.  No thrill.  No JVD. 2+ edema.  No murmur heard.  Pulmonary/Chest:diminished left base.  No tenderness to palpation.  No clubbing.   Abdominal: Soft. Bowel sounds are normal. No distension. No tenderness. There is no guarding.   Musculoskeletal: Normal range of motion.  No tenderness.  Lymphadenopathy:  No cervical adenopathy.   Neurological:  No new focal neurological deficits observed   Skin: Skin is warm and dry. No rash noted.   Psychiatric: Normal mood and affect.  Behavior is normal. Judgment normal.    Assessment/Plan     1)  Recurrent Pleural Effusion - very bloody fluid collection, lack of infectious symptoms / trauma, prior tobacco abuse, all raise alarms for and underlying malignancy.  This needs to be definitively ruled out.  Will hold eliquis 2 days prior to procedure and plan on bronch w/ airway exam and repeat thoracentesis.  Immediately after thoracentesis will get CT Chest / Abdomen / Pelvis w/ IV contrast to evaluate for underlying malignancy.  If effusion re-occurs after this and we will still don't have a definitive diagnosis the next step would be VATS w/ pleurodesis and biopsies of parietal pleura.      2)  Sleep disordered breathing - order in house split night PSG    On separate note BP was on low side today, repeat was better, no symptoms attributable to this.    RTC 1 month    Marciano Higginbotham MD  Pulmonology and Critical Care Medicine  04/08/19 10:22 AM  Electronically Signed    C.C.:  No ref. provider found, Warren Glass MD

## 2019-04-08 NOTE — H&P (VIEW-ONLY)
CC:    Pleural Effusion    HPI:    84 y/o WM w/ h/o CAD, ICM EF 60%, prior stents, small PFO /w bi-directional shunt, small pericardial effusion, Afib on Eliquis since Dec 2018, HTN, HLD, T2DM who presents for evaluation of recurrent left sided effusion.  Patient also has an interesting history of RV dysfunction.  He had an inferior wall STEMI in 2009 w/ stent to RCA.  He recently had a left and right heart cath that showed patent coronaries except for the RCA which had  w/ probable in-stent stenosis.  Hemodynamics showed CI 1.63, Mean PA 19, PCWP 14, LVEDP 15-20, PVR 1.4 Wood units, and at least moderate global hypokinesia of the RV w/ RV enlargement.  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    He was first noted to have a left sided pleural effusion on CXR in December 2018.  This was eventually drained in 3/2019 in IR w/ drainage of 1400 cc of bloody fluid.  Fluid studies are consistent with a bloody transudate w/ negative culture and cytology.  He did have some symptomatic relief but the effusion re-occurred on repeat CXR 4/3/19.  Doesn't recall any trauma to left ta-thorax.  Has significant smoking history 2.5 ppd x 28 years, quit in 1993.  No hemoptysis, no wheezing.  Also reports weight loss, abdominal swelling, LE edema, and poor appetite.  Remains on Eliquis for Afib.    Also notes snoring, PND, and daytime somnolence.    PMH:    Past Medical History:   Diagnosis Date   • A-fib (CMS/HCC)    • Acute inferior myocardial infarction (CMS/HCC)     Acute inferior STEMI with RV infarct features, January 2009.    • CAD (coronary artery disease)    • Dyslipidemia    • Goiter     History of “goiter.”   • Hyperlipidemia    • Hypertension    • Nephrolithiasis    • Type 2 diabetes mellitus (CMS/HCC)      PSH:    Past Surgical History:   Procedure Laterality Date   • CARDIAC CATHETERIZATION Bilateral 1/30/2019    Procedure: Right and Left Heart Cath;   Surgeon: Efrem Posadas MD;  Location: [facility] CATH INVASIVE LOCATION;  Service: Cardiology   • CARDIAC CATHETERIZATION     • CHOLECYSTECTOMY     • CORONARY ANGIOPLASTY WITH STENT PLACEMENT  2009    Sirolimus eluting stents to the RCA, 2009.    • HERNIA REPAIR      Hernia repair x 2.      FH:    Family History   Problem Relation Age of Onset   • Dementia Mother    • Stroke Mother    • Heart disease Father    • Heart attack Father    • Aneurysm Father    • Cancer Sister    • No Known Problems Brother    • No Known Problems Sister    • Heart disease Brother         CABG   • Hypertension Daughter    • Hypertension Son      SH:    Social History     Socioeconomic History   • Marital status:      Spouse name: Melly   • Number of children: 4   • Years of education: Not on file   • Highest education level: Not on file   Occupational History   • Occupation: retired     Comment: works [employer]   Social Needs   • Financial resource strain: Not hard at all   • Food insecurity:     Worry: Never true     Inability: Never true   • Transportation needs:     Medical: No     Non-medical: No   Tobacco Use   • Smoking status: Former Smoker     Packs/day: 2.50     Years: 28.00     Pack years: 70.00     Types: Cigarettes     Last attempt to quit: 1983     Years since quittin.9   • Smokeless tobacco: Never Used   Substance and Sexual Activity   • Alcohol use: No   • Drug use: No   • Sexual activity: Defer   Lifestyle   • Physical activity:     Days per week: 0 days     Minutes per session: 0 min   • Stress: Not at all   Relationships   • Social connections:     Talks on phone: Twice a week     Gets together: Once a week     Attends Jew service: 1 to 4 times per year     Active member of club or organization: No     Attends meetings of clubs or organizations: Never     Relationship status:    Social History Narrative    Caffeine Intake: 2-3 servings per day    Patient lives at home wit  his wife     ALLERGIES:    Allergies   Allergen Reactions   • Bee Venom Swelling   • Sulfamethoxazole-Trimethoprim Other (See Comments)     Pt unaware     MEDICATIONS:      Current Outpatient Medications:   •  apixaban (ELIQUIS) 5 MG tablet tablet, Take 1 tablet by mouth 2 (Two) Times a Day., Disp: 60 tablet, Rfl: 3  •  aspirin 81 MG tablet, Take 81 mg by mouth Daily., Disp: , Rfl:   •  carvedilol (COREG) 12.5 MG tablet, Take 12.5 mg by mouth 2 (Two) Times a Day With Meals., Disp: , Rfl:   •  folic acid (FOLVITE) 400 MCG tablet, Take 400 mcg by mouth Daily., Disp: , Rfl:   •  furosemide (LASIX) 80 MG tablet, Take 1 tablet by mouth Daily., Disp: 30 tablet, Rfl: 11  •  lisinopril (PRINIVIL,ZESTRIL) 20 MG tablet, TAKE ONE TABLET BY MOUTH TWICE DAILY, Disp: 180 tablet, Rfl: 2  •  metFORMIN (GLUCOPHAGE) 500 MG tablet, TAKE ONE TABLET BY MOUTH ONCE DAILY, Disp: 90 tablet, Rfl: 3  •  nitroglycerin (NITROSTAT) 0.4 MG SL tablet, Place 1 tablet under the tongue Every 5 (Five) Minutes As Needed for Chest Pain., Disp: 25 tablet, Rfl: 6  •  simvastatin (ZOCOR) 40 MG tablet, TAKE 1 TABLET BY MOUTH ONCE DAILY, Disp: 90 tablet, Rfl: 3  •  spironolactone (ALDACTONE) 25 MG tablet, Take 1 tablet by mouth Daily., Disp: 30 tablet, Rfl: 11  •  tamsulosin (FLOMAX) 0.4 MG capsule 24 hr capsule, Take  by mouth Daily., Disp: , Rfl:   •  amLODIPine (NORVASC) 5 MG tablet, Take 5 mg by mouth Daily., Disp: , Rfl:   No current facility-administered medications for this visit.   ROS:  Per HPI, otherwise all systems reviewed and negative.    DIAGNOSTIC DATA (Reviewed and interpreted by me unless otherwise specified):    PFT 4/8/19 - combined moderately severe obstruction and restriction, no change w/ BD, moderate reduction in DLCO corrects for alveolar volume    Vitals:    04/08/19 0946   BP: 92/64   Pulse:    Temp:    SpO2:        Physical Exam   Constitutional: Oriented to person, place, and time. Appears well-developed and well-nourished.    Head: Normocephalic and atraumatic.   Nose: Nose normal.   Mouth/Throat: Oropharynx is clear and moist.   Eyes: Conjunctivae are normal.  Pupils normal.  Neck: No tracheal deviation present.   Cardiovascular: Normal rate, regular rhythm, normal heart sounds and intact distal pulses.  Exam reveals no gallop and no friction rub.  No thrill.  No JVD. 2+ edema.  No murmur heard.  Pulmonary/Chest:diminished left base.  No tenderness to palpation.  No clubbing.   Abdominal: Soft. Bowel sounds are normal. No distension. No tenderness. There is no guarding.   Musculoskeletal: Normal range of motion.  No tenderness.  Lymphadenopathy:  No cervical adenopathy.   Neurological:  No new focal neurological deficits observed   Skin: Skin is warm and dry. No rash noted.   Psychiatric: Normal mood and affect.  Behavior is normal. Judgment normal.    Assessment/Plan     1)  Recurrent Pleural Effusion - very bloody fluid collection, lack of infectious symptoms / trauma, prior tobacco abuse, all raise alarms for and underlying malignancy.  This needs to be definitively ruled out.  Will hold eliquis 2 days prior to procedure and plan on bronch w/ airway exam and repeat thoracentesis.  Immediately after thoracentesis will get CT Chest / Abdomen / Pelvis w/ IV contrast to evaluate for underlying malignancy.  If effusion re-occurs after this and we will still don't have a definitive diagnosis the next step would be VATS w/ pleurodesis and biopsies of parietal pleura.      2)  Sleep disordered breathing - order in house split night PSG    On separate note BP was on low side today, repeat was better, no symptoms attributable to this.    RTC 1 month    Marciano Higginbotham MD  Pulmonology and Critical Care Medicine  04/08/19 10:22 AM  Electronically Signed    C.C.:  No ref. provider found, Warren Glass MD

## 2019-04-12 ENCOUNTER — ANESTHESIA EVENT (OUTPATIENT)
Dept: GASTROENTEROLOGY | Facility: HOSPITAL | Age: 84
End: 2019-04-12

## 2019-04-12 ENCOUNTER — ANESTHESIA (OUTPATIENT)
Dept: GASTROENTEROLOGY | Facility: HOSPITAL | Age: 84
End: 2019-04-12

## 2019-04-12 ENCOUNTER — HOSPITAL ENCOUNTER (OUTPATIENT)
Facility: HOSPITAL | Age: 84
Setting detail: HOSPITAL OUTPATIENT SURGERY
Discharge: HOME | End: 2019-04-12
Attending: INTERNAL MEDICINE | Admitting: INTERNAL MEDICINE

## 2019-04-12 ENCOUNTER — APPOINTMENT (OUTPATIENT)
Dept: CT IMAGING | Facility: HOSPITAL | Age: 84
End: 2019-04-12

## 2019-04-12 VITALS
HEART RATE: 58 BPM | SYSTOLIC BLOOD PRESSURE: 97 MMHG | RESPIRATION RATE: 14 BRPM | DIASTOLIC BLOOD PRESSURE: 54 MMHG | TEMPERATURE: 96.9 F | OXYGEN SATURATION: 93 % | WEIGHT: 224 LBS | HEIGHT: 70 IN | BODY MASS INDEX: 32.07 KG/M2

## 2019-04-12 DIAGNOSIS — J90 PLEURAL EFFUSION: ICD-10-CM

## 2019-04-12 DIAGNOSIS — Q31.9: Primary | ICD-10-CM

## 2019-04-12 LAB
APPEARANCE FLD: ABNORMAL
COLOR FLD: YELLOW
GLUCOSE BLDC GLUCOMTR-MCNC: 103 MG/DL (ref 70–130)
GLUCOSE FLD-MCNC: 119 MG/DL
LDH FLD-CCNC: 86 U/L
LYMPHOCYTES NFR FLD MANUAL: 68 %
MACROPHAGE FLUID: 8 %
MESOTHL CELL NFR FLD MANUAL: 6 %
MONOCYTES NFR FLD: 10 %
NEUTROPHILS NFR FLD MANUAL: 8 %
PH FLD: 7.79 [PH]
PROT FLD-MCNC: 2.5 G/DL
RBC # FLD AUTO: 5000 /MM3
WBC # FLD AUTO: 223 /MM3

## 2019-04-12 PROCEDURE — 87206 SMEAR FLUORESCENT/ACID STAI: CPT | Performed by: INTERNAL MEDICINE

## 2019-04-12 PROCEDURE — 88112 CYTOPATH CELL ENHANCE TECH: CPT | Performed by: INTERNAL MEDICINE

## 2019-04-12 PROCEDURE — 87070 CULTURE OTHR SPECIMN AEROBIC: CPT | Performed by: INTERNAL MEDICINE

## 2019-04-12 PROCEDURE — 83986 ASSAY PH BODY FLUID NOS: CPT | Performed by: INTERNAL MEDICINE

## 2019-04-12 PROCEDURE — 89051 BODY FLUID CELL COUNT: CPT | Performed by: INTERNAL MEDICINE

## 2019-04-12 PROCEDURE — 71260 CT THORAX DX C+: CPT

## 2019-04-12 PROCEDURE — 83615 LACTATE (LD) (LDH) ENZYME: CPT | Performed by: INTERNAL MEDICINE

## 2019-04-12 PROCEDURE — 74178 CT ABD&PLV WO CNTR FLWD CNTR: CPT

## 2019-04-12 PROCEDURE — 32555 ASPIRATE PLEURA W/ IMAGING: CPT | Performed by: INTERNAL MEDICINE

## 2019-04-12 PROCEDURE — 82962 GLUCOSE BLOOD TEST: CPT

## 2019-04-12 PROCEDURE — 25010000002 IOPAMIDOL 61 % SOLUTION: Performed by: INTERNAL MEDICINE

## 2019-04-12 PROCEDURE — 84157 ASSAY OF PROTEIN OTHER: CPT | Performed by: INTERNAL MEDICINE

## 2019-04-12 PROCEDURE — 87102 FUNGUS ISOLATION CULTURE: CPT | Performed by: INTERNAL MEDICINE

## 2019-04-12 PROCEDURE — 87116 MYCOBACTERIA CULTURE: CPT | Performed by: INTERNAL MEDICINE

## 2019-04-12 PROCEDURE — 87075 CULTR BACTERIA EXCEPT BLOOD: CPT | Performed by: INTERNAL MEDICINE

## 2019-04-12 PROCEDURE — 87205 SMEAR GRAM STAIN: CPT | Performed by: INTERNAL MEDICINE

## 2019-04-12 PROCEDURE — 25010000002 PROPOFOL 10 MG/ML EMULSION: Performed by: NURSE ANESTHETIST, CERTIFIED REGISTERED

## 2019-04-12 PROCEDURE — 31622 DX BRONCHOSCOPE/WASH: CPT | Performed by: INTERNAL MEDICINE

## 2019-04-12 PROCEDURE — 87015 SPECIMEN INFECT AGNT CONCNTJ: CPT | Performed by: INTERNAL MEDICINE

## 2019-04-12 PROCEDURE — 88305 TISSUE EXAM BY PATHOLOGIST: CPT | Performed by: INTERNAL MEDICINE

## 2019-04-12 PROCEDURE — 82945 GLUCOSE OTHER FLUID: CPT | Performed by: INTERNAL MEDICINE

## 2019-04-12 RX ORDER — SODIUM CHLORIDE 0.9 % (FLUSH) 0.9 %
3 SYRINGE (ML) INJECTION EVERY 12 HOURS SCHEDULED
Status: DISCONTINUED | OUTPATIENT
Start: 2019-04-12 | End: 2019-04-12 | Stop reason: HOSPADM

## 2019-04-12 RX ORDER — IPRATROPIUM BROMIDE AND ALBUTEROL SULFATE 2.5; .5 MG/3ML; MG/3ML
3 SOLUTION RESPIRATORY (INHALATION) ONCE AS NEEDED
Status: DISCONTINUED | OUTPATIENT
Start: 2019-04-12 | End: 2019-04-12 | Stop reason: HOSPADM

## 2019-04-12 RX ORDER — LIDOCAINE HYDROCHLORIDE 10 MG/ML
0.5 INJECTION, SOLUTION EPIDURAL; INFILTRATION; INTRACAUDAL; PERINEURAL ONCE AS NEEDED
Status: DISCONTINUED | OUTPATIENT
Start: 2019-04-12 | End: 2019-04-12 | Stop reason: HOSPADM

## 2019-04-12 RX ORDER — PROMETHAZINE HYDROCHLORIDE 25 MG/ML
6.25 INJECTION, SOLUTION INTRAMUSCULAR; INTRAVENOUS ONCE AS NEEDED
Status: DISCONTINUED | OUTPATIENT
Start: 2019-04-12 | End: 2019-04-12 | Stop reason: HOSPADM

## 2019-04-12 RX ORDER — EPHEDRINE SULFATE 50 MG/ML
INJECTION, SOLUTION INTRAVENOUS AS NEEDED
Status: DISCONTINUED | OUTPATIENT
Start: 2019-04-12 | End: 2019-04-12 | Stop reason: SURG

## 2019-04-12 RX ORDER — PROPOFOL 10 MG/ML
VIAL (ML) INTRAVENOUS AS NEEDED
Status: DISCONTINUED | OUTPATIENT
Start: 2019-04-12 | End: 2019-04-12 | Stop reason: SURG

## 2019-04-12 RX ORDER — MEPERIDINE HYDROCHLORIDE 25 MG/ML
12.5 INJECTION INTRAMUSCULAR; INTRAVENOUS; SUBCUTANEOUS
Status: DISCONTINUED | OUTPATIENT
Start: 2019-04-12 | End: 2019-04-12 | Stop reason: HOSPADM

## 2019-04-12 RX ORDER — LIDOCAINE HYDROCHLORIDE 10 MG/ML
INJECTION, SOLUTION EPIDURAL; INFILTRATION; INTRACAUDAL; PERINEURAL AS NEEDED
Status: DISCONTINUED | OUTPATIENT
Start: 2019-04-12 | End: 2019-04-12 | Stop reason: SURG

## 2019-04-12 RX ORDER — SODIUM CHLORIDE, SODIUM LACTATE, POTASSIUM CHLORIDE, CALCIUM CHLORIDE 600; 310; 30; 20 MG/100ML; MG/100ML; MG/100ML; MG/100ML
9 INJECTION, SOLUTION INTRAVENOUS CONTINUOUS
Status: DISCONTINUED | OUTPATIENT
Start: 2019-04-12 | End: 2019-04-12 | Stop reason: HOSPADM

## 2019-04-12 RX ORDER — HYDROCODONE BITARTRATE AND ACETAMINOPHEN 5; 325 MG/1; MG/1
1 TABLET ORAL ONCE AS NEEDED
Status: DISCONTINUED | OUTPATIENT
Start: 2019-04-12 | End: 2019-04-12 | Stop reason: HOSPADM

## 2019-04-12 RX ORDER — LABETALOL HYDROCHLORIDE 5 MG/ML
5 INJECTION, SOLUTION INTRAVENOUS
Status: DISCONTINUED | OUTPATIENT
Start: 2019-04-12 | End: 2019-04-12 | Stop reason: HOSPADM

## 2019-04-12 RX ORDER — SODIUM CHLORIDE 0.9 % (FLUSH) 0.9 %
1-10 SYRINGE (ML) INJECTION AS NEEDED
Status: DISCONTINUED | OUTPATIENT
Start: 2019-04-12 | End: 2019-04-12 | Stop reason: HOSPADM

## 2019-04-12 RX ORDER — HYDRALAZINE HYDROCHLORIDE 20 MG/ML
5 INJECTION INTRAMUSCULAR; INTRAVENOUS
Status: DISCONTINUED | OUTPATIENT
Start: 2019-04-12 | End: 2019-04-12 | Stop reason: HOSPADM

## 2019-04-12 RX ORDER — SODIUM CHLORIDE 0.9 % (FLUSH) 0.9 %
3-10 SYRINGE (ML) INJECTION AS NEEDED
Status: DISCONTINUED | OUTPATIENT
Start: 2019-04-12 | End: 2019-04-12 | Stop reason: HOSPADM

## 2019-04-12 RX ORDER — SODIUM CHLORIDE 9 MG/ML
50 INJECTION, SOLUTION INTRAVENOUS CONTINUOUS
Status: DISCONTINUED | OUTPATIENT
Start: 2019-04-12 | End: 2019-04-12 | Stop reason: HOSPADM

## 2019-04-12 RX ORDER — SODIUM CHLORIDE 9 MG/ML
125 INJECTION, SOLUTION INTRAVENOUS CONTINUOUS
Status: DISCONTINUED | OUTPATIENT
Start: 2019-04-12 | End: 2019-04-12 | Stop reason: HOSPADM

## 2019-04-12 RX ORDER — FENTANYL CITRATE 50 UG/ML
50 INJECTION, SOLUTION INTRAMUSCULAR; INTRAVENOUS
Status: DISCONTINUED | OUTPATIENT
Start: 2019-04-12 | End: 2019-04-12 | Stop reason: HOSPADM

## 2019-04-12 RX ORDER — LIDOCAINE HYDROCHLORIDE 40 MG/ML
4 INJECTION, SOLUTION RETROBULBAR; TOPICAL ONCE
Status: COMPLETED | OUTPATIENT
Start: 2019-04-12 | End: 2019-04-12

## 2019-04-12 RX ORDER — NALOXONE HCL 0.4 MG/ML
0.4 VIAL (ML) INJECTION AS NEEDED
Status: DISCONTINUED | OUTPATIENT
Start: 2019-04-12 | End: 2019-04-12 | Stop reason: HOSPADM

## 2019-04-12 RX ORDER — ONDANSETRON 2 MG/ML
4 INJECTION INTRAMUSCULAR; INTRAVENOUS ONCE AS NEEDED
Status: DISCONTINUED | OUTPATIENT
Start: 2019-04-12 | End: 2019-04-12 | Stop reason: HOSPADM

## 2019-04-12 RX ORDER — FAMOTIDINE 20 MG/1
20 TABLET, FILM COATED ORAL ONCE
Status: DISCONTINUED | OUTPATIENT
Start: 2019-04-12 | End: 2019-04-12 | Stop reason: HOSPADM

## 2019-04-12 RX ORDER — PROMETHAZINE HYDROCHLORIDE 25 MG/1
25 SUPPOSITORY RECTAL ONCE AS NEEDED
Status: DISCONTINUED | OUTPATIENT
Start: 2019-04-12 | End: 2019-04-12 | Stop reason: HOSPADM

## 2019-04-12 RX ORDER — PROMETHAZINE HYDROCHLORIDE 25 MG/1
25 TABLET ORAL ONCE AS NEEDED
Status: DISCONTINUED | OUTPATIENT
Start: 2019-04-12 | End: 2019-04-12 | Stop reason: HOSPADM

## 2019-04-12 RX ORDER — SODIUM CHLORIDE 9 MG/ML
INJECTION, SOLUTION INTRAVENOUS CONTINUOUS PRN
Status: DISCONTINUED | OUTPATIENT
Start: 2019-04-12 | End: 2019-04-12 | Stop reason: SURG

## 2019-04-12 RX ORDER — FAMOTIDINE 10 MG/ML
20 INJECTION, SOLUTION INTRAVENOUS ONCE
Status: COMPLETED | OUTPATIENT
Start: 2019-04-12 | End: 2019-04-12

## 2019-04-12 RX ADMIN — PROPOFOL 30 MG: 10 INJECTION, EMULSION INTRAVENOUS at 11:05

## 2019-04-12 RX ADMIN — IOPAMIDOL 95 ML: 612 INJECTION, SOLUTION INTRAVENOUS at 13:15

## 2019-04-12 RX ADMIN — SODIUM CHLORIDE: 9 INJECTION, SOLUTION INTRAVENOUS at 10:51

## 2019-04-12 RX ADMIN — PROPOFOL 70 MG: 10 INJECTION, EMULSION INTRAVENOUS at 10:56

## 2019-04-12 RX ADMIN — LIDOCAINE HYDROCHLORIDE 50 MG: 10 INJECTION, SOLUTION EPIDURAL; INFILTRATION; INTRACAUDAL; PERINEURAL at 10:56

## 2019-04-12 RX ADMIN — EPHEDRINE SULFATE 10 MG: 50 INJECTION INTRAMUSCULAR; INTRAVENOUS; SUBCUTANEOUS at 11:09

## 2019-04-12 RX ADMIN — FAMOTIDINE 20 MG: 10 INJECTION, SOLUTION INTRAVENOUS at 10:03

## 2019-04-12 RX ADMIN — SODIUM CHLORIDE 50 ML/HR: 9 INJECTION, SOLUTION INTRAVENOUS at 09:56

## 2019-04-12 RX ADMIN — LIDOCAINE HYDROCHLORIDE 4 ML: 40 INJECTION, SOLUTION RETROBULBAR; TOPICAL at 10:37

## 2019-04-12 NOTE — ANESTHESIA PREPROCEDURE EVALUATION
Anesthesia Evaluation     Patient summary reviewed and Nursing notes reviewed                Airway   Mallampati: II  Dental      Pulmonary    (+) pleural effusion,   Cardiovascular     (+) hypertension, CAD,       Neuro/Psych- negative ROS  GI/Hepatic/Renal/Endo    (+)   diabetes mellitus,     Musculoskeletal (-) negative ROS    Abdominal    Substance History - negative use     OB/GYN negative ob/gyn ROS         Other                        Anesthesia Plan    ASA 3     MAC     intravenous induction   Anesthetic plan, all risks, benefits, and alternatives have been provided, discussed and informed consent has been obtained with: patient.

## 2019-04-12 NOTE — OP NOTE
Pre-Op Diagnosis:  Recurrent Left Sided Pleural Effusion    Post-Op Diagnosis:  1) Recurrent Left Sided Pleural Effusion 2) Abnormal Arytenoid Cartilage 3) Saber Sheath Trachea 4) Severe EDAC     Procedures:    1) Bronchoscopy    2) Ultrasound Guided Thoracentesis (Left) w/ drainage of 750 cc pleural fluid    Indication:    Diagnostic and therapeutic tap, r/o endobronchial malignancy    Description:    Patient given MAC for sedation.  Anesthetized with topical lidocaine.  Bronchoscope introduced through the oropharynx.  The arytenoid cartilages appeared displaced and abnormal, however the vocal cords did appear to abduct and adduct.  Trachea was intubated.  Patient noted to have a saber sheathed trachea and severe EDAC.  Airways examined to segmental level and were normal.  No endobronchial lesions noted.    Next we used ultrasound to examined the left chest.  A large anechoic fluid collection was noted.  An appropriate site was marked and the skin was prepped and draped in the usual sterile fashion.  An 22 gauge needle was used to anesthetize the skin, subcutaneous tissues, and pleural.  We had good return of pleural fluid just over the rib.  An 11 blade was used to make a small puncture in the skin at this site.  Thoracentesis catheter was then thread in the same path just over the rib in the left pleural space in the mid to posterior axillary line.  We had good return of pleural fluid and the catheter was threaded over the needle.  Then hand aspirated 750 cc of dark orange colored pleural fluid.  Catheter was removed, no immediate complications.    Impression and Plan:    1) Pleural Effusion - follow up studies.  Get STAT CT Chest / Abdomen / Pelvis to evaluate for malignancy post thoracentesis (will get better images of underlying pleura & lung parenchyma).    2) Saber Sheath Trachea / EDAC - continue with split night PSG as ordered.  This strengthens the argument for CPAP.    3) Abnormal Arytenoid Cartilage -  refer to ENT    Marciano Higginbotham MD  Pulmonology and Critical Care Medicine  04/12/19 11:33 AM  Electronically Signed

## 2019-04-12 NOTE — ANESTHESIA POSTPROCEDURE EVALUATION
Patient: Jordi Sam    Procedure Summary     Date:  04/12/19 Room / Location:   [facility] /  [facility]    Anesthesia Start:  1051 Anesthesia Stop:  1127    Procedure:  BRONCHOSCOPY WITH THORACENTESIS (N/A Bronchus) Diagnosis:       Pleural effusion      (Pleural effusion [J90])    Surgeon:  Marciano Higginbotham MD Provider:  Ryan Arroyo MD    Anesthesia Type:  MAC ASA Status:  3          Anesthesia Type: MAC  Last vitals  BP   116/66 (04/12/19 0939)   Temp   97.4 °F (36.3 °C) (04/12/19 0939)   Pulse   55 (04/12/19 0939)   Resp   16 (04/12/19 0939)     SpO2   97 % (04/12/19 0939)     Post Anesthesia Care and Evaluation    Patient location during evaluation: PACU  Patient participation: complete - patient participated  Level of consciousness: awake  Pain score: 0  Pain management: adequate  Airway patency: patent  Anesthetic complications: No anesthetic complications  PONV Status: none  Cardiovascular status: hemodynamically stable and acceptable  Respiratory status: nonlabored ventilation, acceptable and nasal cannula  Hydration status: acceptable

## 2019-04-15 LAB
BACTERIA FLD CULT: NORMAL
GRAM STN SPEC: NORMAL
GRAM STN SPEC: NORMAL

## 2019-04-16 LAB — GIE STN SPEC: NORMAL

## 2019-04-17 LAB — BACTERIA SPEC ANAEROBE CULT: NORMAL

## 2019-04-18 ENCOUNTER — DOCUMENTATION (OUTPATIENT)
Dept: PULMONOLOGY | Facility: CLINIC | Age: 84
End: 2019-04-18

## 2019-04-18 DIAGNOSIS — K74.60 CIRRHOSIS OF LIVER WITH ASCITES, UNSPECIFIED HEPATIC CIRRHOSIS TYPE (HCC): Primary | ICD-10-CM

## 2019-04-18 DIAGNOSIS — R18.8 CIRRHOSIS OF LIVER WITH ASCITES, UNSPECIFIED HEPATIC CIRRHOSIS TYPE (HCC): Primary | ICD-10-CM

## 2019-04-18 DIAGNOSIS — J43.1 PANLOBULAR EMPHYSEMA (HCC): ICD-10-CM

## 2019-04-18 NOTE — PROGRESS NOTES
Called patient with results of CT Scans and pleural fluid.  Still waiting on cytology.  Most concerning finding is cirrhotic liver and ascites.  He does have some abdominal discomfort.  Will have patient get tested for Hep B / C, get diagnostic and therapeutic paracentesis, and refer to GI.  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  Patient verbalized understanding.  Will call him back when I have cytology.    Marciano Higginbotham MD  Pulmonology and Critical Care Medicine  04/18/19 4:22 PM  Electronically Signed

## 2019-04-19 LAB
LAB AP CASE REPORT: NORMAL
LAB AP FLOW CYTOMETRY SUMMARY: NORMAL
PATH REPORT.FINAL DX SPEC: NORMAL

## 2019-05-01 ENCOUNTER — HOSPITAL ENCOUNTER (OUTPATIENT)
Dept: CT IMAGING | Facility: HOSPITAL | Age: 84
Discharge: HOME | End: 2019-05-01
Admitting: INTERNAL MEDICINE

## 2019-05-01 VITALS
HEART RATE: 63 BPM | SYSTOLIC BLOOD PRESSURE: 119 MMHG | DIASTOLIC BLOOD PRESSURE: 50 MMHG | WEIGHT: 227.6 LBS | TEMPERATURE: 97.4 F | RESPIRATION RATE: 20 BRPM | OXYGEN SATURATION: 93 % | BODY MASS INDEX: 32.58 KG/M2 | HEIGHT: 70 IN

## 2019-05-01 DIAGNOSIS — J43.1 PANLOBULAR EMPHYSEMA (HCC): ICD-10-CM

## 2019-05-01 LAB
ALBUMIN FLD-MCNC: 1.9 G/DL
APPEARANCE FLD: ABNORMAL
APTT PPP: 35.8 SECONDS (ref 24–37)
COLOR FLD: YELLOW
GLUCOSE BLDC GLUCOMTR-MCNC: 99 MG/DL (ref 70–130)
GLUCOSE FLD-MCNC: 109 MG/DL
INR PPP: 1.28 (ref 0.85–1.16)
LDH FLD-CCNC: 79 U/L
LYMPHOCYTES NFR FLD MANUAL: 10 %
MACROPHAGE FLUID: 73 %
MESOTHL CELL NFR FLD MANUAL: 3 %
MONOCYTES NFR FLD: 6 %
NEUTROPHILS NFR FLD MANUAL: 8 %
PLATELET # BLD AUTO: 98 10*3/MM3 (ref 140–450)
PROT FLD-MCNC: 3.2 G/DL
PROTHROMBIN TIME: 15.4 SECONDS (ref 11.2–14.3)
RBC # FLD AUTO: 7000 /MM3
WBC # FLD AUTO: 251 /MM3

## 2019-05-01 PROCEDURE — C1729 CATH, DRAINAGE: HCPCS

## 2019-05-01 PROCEDURE — 88112 CYTOPATH CELL ENHANCE TECH: CPT | Performed by: INTERNAL MEDICINE

## 2019-05-01 PROCEDURE — 75989 ABSCESS DRAINAGE UNDER X-RAY: CPT

## 2019-05-01 PROCEDURE — 87015 SPECIMEN INFECT AGNT CONCNTJ: CPT | Performed by: INTERNAL MEDICINE

## 2019-05-01 PROCEDURE — 87147 CULTURE TYPE IMMUNOLOGIC: CPT | Performed by: INTERNAL MEDICINE

## 2019-05-01 PROCEDURE — 82962 GLUCOSE BLOOD TEST: CPT

## 2019-05-01 PROCEDURE — 85730 THROMBOPLASTIN TIME PARTIAL: CPT | Performed by: RADIOLOGY

## 2019-05-01 PROCEDURE — 84157 ASSAY OF PROTEIN OTHER: CPT | Performed by: INTERNAL MEDICINE

## 2019-05-01 PROCEDURE — 87070 CULTURE OTHR SPECIMN AEROBIC: CPT | Performed by: INTERNAL MEDICINE

## 2019-05-01 PROCEDURE — 87205 SMEAR GRAM STAIN: CPT | Performed by: INTERNAL MEDICINE

## 2019-05-01 PROCEDURE — 83615 LACTATE (LD) (LDH) ENZYME: CPT | Performed by: INTERNAL MEDICINE

## 2019-05-01 PROCEDURE — 88305 TISSUE EXAM BY PATHOLOGIST: CPT | Performed by: INTERNAL MEDICINE

## 2019-05-01 PROCEDURE — 82042 OTHER SOURCE ALBUMIN QUAN EA: CPT | Performed by: INTERNAL MEDICINE

## 2019-05-01 PROCEDURE — 82945 GLUCOSE OTHER FLUID: CPT | Performed by: INTERNAL MEDICINE

## 2019-05-01 PROCEDURE — 85610 PROTHROMBIN TIME: CPT | Performed by: RADIOLOGY

## 2019-05-01 PROCEDURE — 89051 BODY FLUID CELL COUNT: CPT | Performed by: INTERNAL MEDICINE

## 2019-05-01 PROCEDURE — 85049 AUTOMATED PLATELET COUNT: CPT | Performed by: RADIOLOGY

## 2019-05-01 RX ORDER — LIDOCAINE HYDROCHLORIDE 10 MG/ML
10 INJECTION, SOLUTION EPIDURAL; INFILTRATION; INTRACAUDAL; PERINEURAL ONCE
Status: COMPLETED | OUTPATIENT
Start: 2019-05-01 | End: 2019-05-01

## 2019-05-01 RX ORDER — SODIUM CHLORIDE 0.9 % (FLUSH) 0.9 %
3 SYRINGE (ML) INJECTION EVERY 12 HOURS SCHEDULED
Status: DISCONTINUED | OUTPATIENT
Start: 2019-05-01 | End: 2019-05-02 | Stop reason: HOSPADM

## 2019-05-01 RX ORDER — SODIUM CHLORIDE 0.9 % (FLUSH) 0.9 %
3-10 SYRINGE (ML) INJECTION AS NEEDED
Status: DISCONTINUED | OUTPATIENT
Start: 2019-05-01 | End: 2019-05-02 | Stop reason: HOSPADM

## 2019-05-01 RX ADMIN — LIDOCAINE HYDROCHLORIDE 10 ML: 10 INJECTION, SOLUTION EPIDURAL; INFILTRATION; INTRACAUDAL; PERINEURAL at 13:00

## 2019-05-01 NOTE — NURSING NOTE
Paracentesis done by Dr Bautista.  Pt tolerated well.  5.2 liters of fluid removed, specimen sent to lab.  Pt taken back to [facility] by this nurse and report given at bedside.  Pt clear to discharge one hour post procedure per Dr Bautista.

## 2019-05-02 ENCOUNTER — TELEPHONE (OUTPATIENT)
Dept: INFUSION THERAPY | Facility: HOSPITAL | Age: 84
End: 2019-05-02

## 2019-05-03 LAB
LAB AP CASE REPORT: NORMAL
PATH REPORT.FINAL DX SPEC: NORMAL

## 2019-05-04 LAB
BACTERIA FLD CULT: ABNORMAL
GRAM STN SPEC: ABNORMAL

## 2019-05-08 ENCOUNTER — OFFICE VISIT (OUTPATIENT)
Dept: PULMONOLOGY | Facility: CLINIC | Age: 84
End: 2019-05-08

## 2019-05-08 VITALS
RESPIRATION RATE: 18 BRPM | OXYGEN SATURATION: 97 % | HEART RATE: 70 BPM | HEIGHT: 70 IN | DIASTOLIC BLOOD PRESSURE: 64 MMHG | SYSTOLIC BLOOD PRESSURE: 120 MMHG | WEIGHT: 220 LBS | TEMPERATURE: 97.3 F | BODY MASS INDEX: 31.5 KG/M2

## 2019-05-08 DIAGNOSIS — R18.8 OTHER ASCITES: ICD-10-CM

## 2019-05-08 DIAGNOSIS — Z11.59 ENCOUNTER FOR SCREENING FOR OTHER VIRAL DISEASES: ICD-10-CM

## 2019-05-08 DIAGNOSIS — J90 PLEURAL EFFUSION: Primary | ICD-10-CM

## 2019-05-08 LAB
ANION GAP SERPL CALCULATED.3IONS-SCNC: 12.4 MMOL/L
BUN BLD-MCNC: 33 MG/DL (ref 8–23)
BUN/CREAT SERPL: 20.8 (ref 7–25)
CALCIUM SPEC-SCNC: 9.1 MG/DL (ref 8.6–10.5)
CHLORIDE SERPL-SCNC: 102 MMOL/L (ref 98–107)
CO2 SERPL-SCNC: 25.6 MMOL/L (ref 22–29)
CREAT BLD-MCNC: 1.59 MG/DL (ref 0.76–1.27)
GFR SERPL CREATININE-BSD FRML MDRD: 42 ML/MIN/1.73
GLUCOSE BLD-MCNC: 101 MG/DL (ref 65–99)
POTASSIUM BLD-SCNC: 4.5 MMOL/L (ref 3.5–5.2)
SODIUM BLD-SCNC: 140 MMOL/L (ref 136–145)

## 2019-05-08 PROCEDURE — 87350 HEPATITIS BE AG IA: CPT | Performed by: INTERNAL MEDICINE

## 2019-05-08 PROCEDURE — 80074 ACUTE HEPATITIS PANEL: CPT | Performed by: INTERNAL MEDICINE

## 2019-05-08 PROCEDURE — G0499 HEPB SCREEN HIGH RISK INDIV: HCPCS | Performed by: INTERNAL MEDICINE

## 2019-05-08 PROCEDURE — 86707 HEPATITIS BE ANTIBODY: CPT | Performed by: INTERNAL MEDICINE

## 2019-05-08 PROCEDURE — 36415 COLL VENOUS BLD VENIPUNCTURE: CPT | Performed by: INTERNAL MEDICINE

## 2019-05-08 PROCEDURE — 99214 OFFICE O/P EST MOD 30 MIN: CPT | Performed by: INTERNAL MEDICINE

## 2019-05-08 PROCEDURE — 80048 BASIC METABOLIC PNL TOTAL CA: CPT | Performed by: INTERNAL MEDICINE

## 2019-05-08 RX ORDER — SPIRONOLACTONE 100 MG/1
200 TABLET, FILM COATED ORAL DAILY
Qty: 60 TABLET | Refills: 4 | Status: SHIPPED | OUTPATIENT
Start: 2019-05-08 | End: 2019-06-06

## 2019-05-08 RX ORDER — CARVEDILOL 25 MG/1
25 TABLET ORAL 2 TIMES DAILY WITH MEALS
Refills: 2 | COMMUNITY
Start: 2019-04-27 | End: 2019-06-25 | Stop reason: SDUPTHER

## 2019-05-08 NOTE — PROGRESS NOTES
CC:    Pleural Effusion    HPI:    86 y/o WM w/ h/o CAD, ICM EF 60%, prior stents, small PFO /w bi-directional shunt, small pericardial effusion, Afib on Eliquis since Dec 2018, HTN, HLD, T2DM who presents for evaluation of recurrent left sided effusion.  Patient also has an interesting history of RV dysfunction.  He had an inferior wall STEMI in 2009 w/ stent to RCA.  He recently had a left and right heart cath that showed patent coronaries except for the RCA which had  w/ probable in-stent stenosis.  Hemodynamics showed CI 1.63, Mean PA 19, PCWP 14, LVEDP 15-20, PVR 1.4 Wood units, and at least moderate global hypokinesia of the RV w/ RV enlargement.  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    He was first noted to have a left sided pleural effusion on CXR in December 2018.  This was eventually drained in 3/2019 in IR w/ drainage of 1400 cc of bloody fluid.  Fluid studies are consistent with a bloody transudate w/ negative culture and cytology.  He did have some symptomatic relief but the effusion re-occurred on repeat CXR 4/3/19.  Doesn't recall any trauma to left ta-thorax.  Has significant smoking history 2.5 ppd x 28 years, quit in 1993.  No hemoptysis, no wheezing.  Also reports weight loss, abdominal swelling, LE edema, and poor appetite.  Remains on Eliquis for Afib.    Also notes snoring, PND, and daytime somnolence.    INTERVAL HISTORY:    Patient returns for follow up.  Had bronchoscopy & thoracentesis with benign findings.  Had paracentesis w/ drainage of 5.2 liters of fluid.  Cultures and cytology negative (CNS - contaminant).  Unfortunately it appears his ascites and pleural effusion have re-accumulated.    PMH:    Past Medical History:   Diagnosis Date   • A-fib (CMS/HCC)    • Acute inferior myocardial infarction (CMS/HCC)     Acute inferior STEMI with RV infarct features, January 2009.    • CAD (coronary artery disease)    •  Dyslipidemia    • Goiter     History of “goiter.”   • Hyperlipidemia    • Hypertension    • Nephrolithiasis    • Type 2 diabetes mellitus (CMS/HCC)      PSH:    Past Surgical History:   Procedure Laterality Date   • BRONCHOSCOPY N/A 2019    Procedure: BRONCHOSCOPY WITH THORACENTESIS;  Surgeon: Marciano Higginbotham MD;  Location:  [facility];  Service: Pulmonary   • CARDIAC CATHETERIZATION Bilateral 2019    Procedure: Right and Left Heart Cath;  Surgeon: Efrem Posadas MD;  Location:  [facility] CATH INVASIVE [facility]ATION;  Service: Cardiology   • CARDIAC CATHETERIZATION     • CHOLECYSTECTOMY     • CORONARY ANGIOPLASTY WITH STENT PLACEMENT  2009    Sirolimus eluting stents to the RCA, 2009.    • HERNIA REPAIR      Hernia repair x 2.      FH:    Family History   Problem Relation Age of Onset   • Dementia Mother    • Stroke Mother    • Heart disease Father    • Heart attack Father    • Aneurysm Father    • Cancer Sister    • No Known Problems Brother    • No Known Problems Sister    • Heart disease Brother         CABG   • Hypertension Daughter    • Hypertension Son      SH:    Social History     Socioeconomic History   • Marital status:      Spouse name: Melly   • Number of children: 4   • Years of education: Not on file   • Highest education level: Not on file   Occupational History   • Occupation: retired     Comment: works [employer]   Social Needs   • Financial resource strain: Not hard at all   • Food insecurity:     Worry: Never true     Inability: Never true   • Transportation needs:     Medical: No     Non-medical: No   Tobacco Use   • Smoking status: Former Smoker     Packs/day: 2.50     Years: 28.00     Pack years: 70.00     Types: Cigarettes     Last attempt to quit: 1983     Years since quittin.0   • Smokeless tobacco: Never Used   Substance and Sexual Activity   • Alcohol use: No   • Drug use: No   • Sexual activity: Defer   Lifestyle   • Physical  activity:     Days per week: 0 days     Minutes per session: 0 min   • Stress: Not at all   Relationships   • Social connections:     Talks on phone: Twice a week     Gets together: Once a week     Attends Rastafari service: 1 to 4 times per year     Active member of club or organization: No     Attends meetings of clubs or organizations: Never     Relationship status:    Social History Narrative    Caffeine Intake: 2-3 servings per day    Patient lives at home wit his wife     ALLERGIES:    Allergies   Allergen Reactions   • Bee Venom Swelling   • Sulfamethoxazole-Trimethoprim Other (See Comments)     Pt unaware     MEDICATIONS:      Current Outpatient Medications:   •  apixaban (ELIQUIS) 5 MG tablet tablet, Take 1 tablet by mouth 2 (Two) Times a Day., Disp: 60 tablet, Rfl: 3  •  aspirin 81 MG tablet, Take 81 mg by mouth Daily., Disp: , Rfl:   •  carvedilol (COREG) 25 MG tablet, Take 25 mg by mouth 2 (Two) Times a Day With Meals., Disp: , Rfl: 2  •  folic acid (FOLVITE) 400 MCG tablet, Take 400 mcg by mouth Daily., Disp: , Rfl:   •  furosemide (LASIX) 80 MG tablet, Take 1 tablet by mouth Daily., Disp: 30 tablet, Rfl: 11  •  lisinopril (PRINIVIL,ZESTRIL) 20 MG tablet, TAKE ONE TABLET BY MOUTH TWICE DAILY, Disp: 180 tablet, Rfl: 2  •  metFORMIN (GLUCOPHAGE) 500 MG tablet, TAKE ONE TABLET BY MOUTH ONCE DAILY, Disp: 90 tablet, Rfl: 3  •  nitroglycerin (NITROSTAT) 0.4 MG SL tablet, Place 1 tablet under the tongue Every 5 (Five) Minutes As Needed for Chest Pain., Disp: 25 tablet, Rfl: 6  •  simvastatin (ZOCOR) 40 MG tablet, TAKE 1 TABLET BY MOUTH ONCE DAILY, Disp: 90 tablet, Rfl: 3  •  spironolactone (ALDACTONE) 25 MG tablet, Take 1 tablet by mouth Daily., Disp: 30 tablet, Rfl: 11  •  tamsulosin (FLOMAX) 0.4 MG capsule 24 hr capsule, Take  by mouth Daily., Disp: , Rfl:   ROS:  Per HPI, otherwise all systems reviewed and negative.    DIAGNOSTIC DATA (Reviewed and interpreted by me unless otherwise  specified):    PFT 4/8/19 - combined moderately severe obstruction and restriction, no change w/ BD, moderate reduction in DLCO corrects for alveolar volume    CXR 5/8/19 - moderate left effusion    Vitals:    05/08/19 1153   BP: 120/64   Pulse: 70   Resp: 18   Temp: 97.3 °F (36.3 °C)   SpO2: 97%       Physical Exam   Constitutional: Oriented to person, place, and time. Appears well-developed and well-nourished.   Head: Normocephalic and atraumatic.   Nose: Nose normal.   Mouth/Throat: Oropharynx is clear and moist.   Eyes: Conjunctivae are normal.  Pupils normal.  Neck: No tracheal deviation present.   Cardiovascular: Normal rate, regular rhythm, normal heart sounds and intact distal pulses.  Exam reveals no gallop and no friction rub.  No thrill.  No JVD. 2+ edema.  No murmur heard.  Pulmonary/Chest:diminished left base.  No tenderness to palpation.  No clubbing.   Abdominal: Soft. Distended abdomen, non tender  Musculoskeletal: Normal range of motion.  No tenderness.  Lymphadenopathy:  No cervical adenopathy.   Neurological:  No new focal neurological deficits observed   Skin: Skin is warm and dry. No rash noted.   Psychiatric: Normal mood and affect.  Behavior is normal. Judgment normal.    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  Needs treatment of underlying cirrhosis or this will not resolve.  Check Hep B+C profile today.  Has hepatology apt at  later this month.  Order repeat therapeutic paracentesis.  Spent some time counseling him on sodium restriction to < 2 grams daily.  Currently on Lasix 80 daily and aldactone 25 mg.  Will increase aldactone to 200 mg to maintain appropriate ratio for management of ascites.  Check BMP today and in 1 week.  Ascites appears refractory.  TIPS evaluation?    2)  Sleep disordered breathing - PSG pending    RTC 3 months w/ CXR    Marciano Higginbotham MD  Pulmonology and Critical Care Medicine  05/08/19 2:31 PM  Electronically  Signed    C.C.:  Efrem Posadas MD, Warren Glass MD

## 2019-05-10 LAB
HBV CORE AB SER DONR QL IA: NEGATIVE
HBV CORE IGM SERPL QL IA: NEGATIVE
HBV E AB SERPL QL IA: NEGATIVE
HBV E AG SERPL QL IA: NEGATIVE
HBV SURFACE AB SER QL: NON REACTIVE
HBV SURFACE AG SERPL QL IA: NEGATIVE
HCV AB S/CO SERPL IA: <0.1 S/CO RATIO (ref 0–0.9)
LABORATORY COMMENT REPORT: NORMAL

## 2019-05-13 ENCOUNTER — TELEPHONE (OUTPATIENT)
Dept: PULMONOLOGY | Facility: CLINIC | Age: 84
End: 2019-05-13

## 2019-05-13 NOTE — TELEPHONE ENCOUNTER
Patient calling to state that since increasing the dose of Aldactone from 25mg a to 200 mg a day he has had diarrhea ,nause,and stomach cramping.    Per April notified patient to go back to the 25mg dose and come in to have his labs drawn and to keep his appointment with the Liver Specialist   Patient verbalized understanding

## 2019-05-14 ENCOUNTER — LAB (OUTPATIENT)
Dept: PULMONOLOGY | Facility: CLINIC | Age: 84
End: 2019-05-14

## 2019-05-14 DIAGNOSIS — J90 PLEURAL EFFUSION: ICD-10-CM

## 2019-05-14 PROCEDURE — 80048 BASIC METABOLIC PNL TOTAL CA: CPT | Performed by: INTERNAL MEDICINE

## 2019-05-15 ENCOUNTER — TELEPHONE (OUTPATIENT)
Dept: PULMONOLOGY | Facility: CLINIC | Age: 84
End: 2019-05-15

## 2019-05-15 LAB
ANION GAP SERPL CALCULATED.3IONS-SCNC: 9.4 MMOL/L
BUN BLD-MCNC: 40 MG/DL (ref 8–23)
BUN/CREAT SERPL: 20.1 (ref 7–25)
CALCIUM SPEC-SCNC: 8.8 MG/DL (ref 8.6–10.5)
CHLORIDE SERPL-SCNC: 102 MMOL/L (ref 98–107)
CO2 SERPL-SCNC: 27.6 MMOL/L (ref 22–29)
CREAT BLD-MCNC: 1.99 MG/DL (ref 0.76–1.27)
GFR SERPL CREATININE-BSD FRML MDRD: 32 ML/MIN/1.73
GLUCOSE BLD-MCNC: 98 MG/DL (ref 65–99)
POTASSIUM BLD-SCNC: 5.4 MMOL/L (ref 3.5–5.2)
SODIUM BLD-SCNC: 139 MMOL/L (ref 136–145)

## 2019-05-15 NOTE — TELEPHONE ENCOUNTER
I tried to call patient at home number [phone] and line remains busy. I did call his emergency contact number, his wife, on [phone] and left voice message to call me back at the office and alerted her to the fact the their line was busy all day.  If she does not call by tomorrow and we are still unable to reach by phone, I will send certified letter.

## 2019-05-15 NOTE — TELEPHONE ENCOUNTER
----- Message from Marciano Higginbotham MD sent at 5/15/2019  2:48 PM EDT -----  Regarding: patient follow up  I've tried to call this jazmyn 3 times today and its busy.  He has cirrhosis, hepatic hydrothorax and ascites.  He was on lasix 80 and aldactone 100.  Last visit I increased his aldactone to 200.  His Cr and potassium are worse.    Please call him and have him hold all diuretics for 3 days then resume Lasix at 40 daily and aldactone at 100 daily.  He needs to come back and get BMP 1 week after that.    Thanks,  Marciano

## 2019-05-16 ENCOUNTER — TELEPHONE (OUTPATIENT)
Dept: PULMONOLOGY | Facility: CLINIC | Age: 84
End: 2019-05-16

## 2019-05-16 DIAGNOSIS — K74.60 CIRRHOSIS OF LIVER WITH ASCITES, UNSPECIFIED HEPATIC CIRRHOSIS TYPE (HCC): Primary | ICD-10-CM

## 2019-05-16 DIAGNOSIS — R18.8 CIRRHOSIS OF LIVER WITH ASCITES, UNSPECIFIED HEPATIC CIRRHOSIS TYPE (HCC): Primary | ICD-10-CM

## 2019-05-16 NOTE — TELEPHONE ENCOUNTER
Spoke with patient and his wife on the phone today. Discussed lab results with them, including increasing CRE, BUN, and K+.  The patient had already called the office and had not tolerated the increased dose of aldactone, which he had been instructed to reduce dose back to his original dose of 25 mg. He had been instructed to increase dose to 200 mg, but had only taken 3-4 days at that amount. Due to increases in the aforementioned labs, Dr Marciano Higginbotham wanted to hold his Lasix and aldactone x 3 days, then resume at reduced doses of Lasix 40 mg and aldactone 100 mg daily. Since the patient was already at previous dose, they wanted to resume at the 25 mg dose of aldactone since he had not tolerated increased dose at all. Patient and wife were given these instructions as well as RTC in one week to recheck labs.    The patient also noted that he has a sleep study the night before he has his appointment with the Hepatologist at .  He wanted to cancel sleep study for now. I agreed to take care of that for him and reinforced to him that the GI/ Liver doctor was the most important appointment at this time.

## 2019-05-20 ENCOUNTER — TELEPHONE (OUTPATIENT)
Dept: PULMONOLOGY | Facility: CLINIC | Age: 84
End: 2019-05-20

## 2019-05-20 NOTE — TELEPHONE ENCOUNTER
Patient wanted to speak with someone about the Aldactone dr Tariq prescribed and the lasix if they can interact will you please call

## 2019-05-23 ENCOUNTER — LAB (OUTPATIENT)
Dept: PULMONOLOGY | Facility: CLINIC | Age: 84
End: 2019-05-23

## 2019-05-23 DIAGNOSIS — K74.60 CIRRHOSIS OF LIVER WITH ASCITES, UNSPECIFIED HEPATIC CIRRHOSIS TYPE (HCC): ICD-10-CM

## 2019-05-23 DIAGNOSIS — R18.8 CIRRHOSIS OF LIVER WITH ASCITES, UNSPECIFIED HEPATIC CIRRHOSIS TYPE (HCC): ICD-10-CM

## 2019-05-23 LAB
ALBUMIN SERPL-MCNC: 3.8 G/DL (ref 3.5–5.2)
ALBUMIN/GLOB SERPL: 2.1 G/DL
ALP SERPL-CCNC: 70 U/L (ref 39–117)
ALT SERPL W P-5'-P-CCNC: 13 U/L (ref 1–41)
ANION GAP SERPL CALCULATED.3IONS-SCNC: 12.9 MMOL/L
AST SERPL-CCNC: 25 U/L (ref 1–40)
BILIRUB SERPL-MCNC: 0.7 MG/DL (ref 0.2–1.2)
BUN BLD-MCNC: 31 MG/DL (ref 8–23)
BUN/CREAT SERPL: 18.2 (ref 7–25)
CALCIUM SPEC-SCNC: 8.7 MG/DL (ref 8.6–10.5)
CHLORIDE SERPL-SCNC: 103 MMOL/L (ref 98–107)
CO2 SERPL-SCNC: 25.1 MMOL/L (ref 22–29)
CREAT BLD-MCNC: 1.7 MG/DL (ref 0.76–1.27)
GFR SERPL CREATININE-BSD FRML MDRD: 38 ML/MIN/1.73
GLOBULIN UR ELPH-MCNC: 1.8 GM/DL
GLUCOSE BLD-MCNC: 91 MG/DL (ref 65–99)
POTASSIUM BLD-SCNC: 5 MMOL/L (ref 3.5–5.2)
PROT SERPL-MCNC: 5.6 G/DL (ref 6–8.5)
SODIUM BLD-SCNC: 141 MMOL/L (ref 136–145)

## 2019-05-23 PROCEDURE — 80053 COMPREHEN METABOLIC PANEL: CPT | Performed by: NURSE PRACTITIONER

## 2019-05-24 LAB
FUNGUS WND CULT: NORMAL
MYCOBACTERIUM SPEC CULT: NORMAL
NIGHT BLUE STAIN TISS: NORMAL

## 2019-06-06 ENCOUNTER — OFFICE VISIT (OUTPATIENT)
Dept: CARDIOLOGY | Facility: CLINIC | Age: 84
End: 2019-06-06

## 2019-06-06 VITALS
SYSTOLIC BLOOD PRESSURE: 106 MMHG | HEART RATE: 60 BPM | HEIGHT: 70 IN | WEIGHT: 220 LBS | DIASTOLIC BLOOD PRESSURE: 59 MMHG | BODY MASS INDEX: 31.5 KG/M2

## 2019-06-06 DIAGNOSIS — I48.0 PAF (PAROXYSMAL ATRIAL FIBRILLATION) (HCC): ICD-10-CM

## 2019-06-06 DIAGNOSIS — I50.812 CHRONIC RIGHT HEART FAILURE (HCC): Primary | ICD-10-CM

## 2019-06-06 DIAGNOSIS — I10 ESSENTIAL HYPERTENSION: ICD-10-CM

## 2019-06-06 PROBLEM — I50.813 ACUTE ON CHRONIC RIGHT-SIDED HEART FAILURE (HCC): Status: ACTIVE | Noted: 2019-06-06

## 2019-06-06 PROCEDURE — 99214 OFFICE O/P EST MOD 30 MIN: CPT | Performed by: INTERNAL MEDICINE

## 2019-06-06 RX ORDER — SPIRONOLACTONE 25 MG/1
25 TABLET ORAL DAILY
COMMUNITY
End: 2019-06-25

## 2019-06-06 RX ORDER — FUROSEMIDE 20 MG/1
20 TABLET ORAL DAILY
COMMUNITY
End: 2019-06-26 | Stop reason: SDUPTHER

## 2019-06-06 NOTE — PROGRESS NOTES
OFFICE FOLLOW UP     Date of Encounter:2019     Name: Jordi Sam  : 1934  Address: [facility] [address] [place] [address]    PCP: Warren Glass MD  [address] [facility] [address]  [facility] [address]    Jordi Sam is a 85 y.o. male.    Chief Complaint: Follow up of CAD, PAF    Problem List:   1. Coronary artery disease:  a. Acute inferior STEMI with RV infarct features, 2009.   b. Normal LV function, single vessel  RCA disease.     c. Sirolimus eluting stents to the RCA, 2009.   d. Pericarditis, resolved.   e. LHC, 2012 (for recurrent angina).   i.  Normal LV function.  ii.  of RCA with failed intervention.  f. Stress MPS 2018: Normal   g. LHC 2019: single vessel CAD with  of RCA, normal LVSF   2. 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 Echo 2019: marked enlargement of the right heart, biatrial enlargement, LVSF is normal, large left pleural effusion, severe TR, RVSP 30mmHg  b. Cardiac Cath (): Normal LV, Normal PAP, RV enlargement and decreased RVSF  c. Thoracentesis and Paracentesis March and May 2019  3. New onset atrial fibrillation 2019  a. Asymptomatic  b. CHADsVASC= 6, on Eliquis   4. Hypertension.   5. Dyslipidemia.  6. Diabetes mellitus, type 2.  A1C 6.1 (2016)  7. Nephrolithiasis.   8. History of “goiter.”  9. Thrombocytopenia  10. Cirrhosis, recent evaluation by [facility]  a. Paracentesis with findings consistent with cardiac ascites, Spring 2019  11. Surgical history:  a. Cholecystectomy.  b. Hernia repair x 2.   Allergies:  Allergies   Allergen Reactions   • Bee Venom Swelling   • Sulfamethoxazole-Trimethoprim Other (See Comments)     Pt unaware     Current Medications:  •  apixaban (ELIQUIS) 5 MG tablet tablet, Take 1 tablet by mouth 2 (Two) Times a Day  •  aspirin 81 MG tablet, Take 81 mg by mouth Daily  •  carvedilol (COREG) 25 MG tablet, Take 25 mg by mouth 2 (Two) Times  "a Day With Meals.  •  folic acid (FOLVITE) 400 MCG tablet, Take 400 mcg by mouth Daily.  •  furosemide (LASIX) 20 MG tablet, Take 20 mg by mouth Daily  •  lisinopril (PRINIVIL,ZESTRIL) 20 MG tablet, TAKE ONE TABLET BY MOUTH TWICE DAILY  •  metFORMIN (GLUCOPHAGE) 500 MG tablet, TAKE ONE TABLET BY MOUTH ONCE DAILY  •  nitroglycerin (NITROSTAT) 0.4 MG SL As Needed for Chest Pain.  •  simvastatin (ZOCOR) 40 MG tablet, TAKE 1 TABLET BY MOUTH ONCE DAILY  •  spironolactone (ALDACTONE) 25 MG tablet, Take 25 mg by mouth Daily  •  tamsulosin (FLOMAX) 0.4 MG capsule 24 hr capsule, Take  by mouth Daily    History of Present Illness:           Mr. Sam presents for follow up. He has recently been seen by  Hepatology for his cirrhosis and ascites. He underwent a paracentesis last month with 6L of fluid removed, and studies on this were consistent with cardiac ascites. His Spironolactone dose was increased to 200mg however he could not tolerate this dose and had renal insufficiency, therefore this was reduced to 25mg again. Recently, his Lasix was also reduced to 20mg. He reports he is generally comfortable now in terms of his edema and dyspnea.     The following portions of the patient's history were reviewed and updated as appropriate: allergies, current medications and problem list.    ROS: Pertinent positives as listed in the HPI.  All other systems reviewed and negative.    Objective:  Vitals:    06/06/19 1548 06/06/19 1549   BP: 121/63 106/59   BP Location: Left arm Left arm   Patient Position: Sitting Standing   Pulse: 55 60   Weight: 99.8 kg (220 lb)    Height: 177.8 cm (70\")      Physical Exam:  GENERAL: Alert, cooperative, in no acute distress.   HEENT: Normocephalic, no adenopathy, + jugular venous distention to mandible sitting up with prominent R wave  HEART: No discrete PMI is noted. Regular rhythm, normal rate, distant tones  LUNGS: Diminished left base, no wheezing, rales or ronchi.  ABDOMEN: Distended, bowel " sounds present, non-tender   NEUROLOGIC: No focal abnormalities involving strength or sensation are noted.   EXTREMITIES: No clubbing, cyanosis, 4+ edema noted.     Diagnostic Data:    Lab Results   Component Value Date    GLUCOSE 91 05/23/2019    BUN 31 (H) 05/23/2019    CREATININE 1.70 (H) 05/23/2019    EGFRIFNONA 38 (L) 05/23/2019    BCR 18.2 05/23/2019    K 5.0 05/23/2019    CO2 25.1 05/23/2019    CALCIUM 8.7 05/23/2019    ALBUMIN 3.80 05/23/2019    AST 25 05/23/2019    ALT 13 05/23/2019      Lab Results    PLT 98 (L) 05/01/2019     Labs 5/30/19:  CBC :WBC 5.45, RBC 3.94, Hgb 12.4, Hct 38.1, Plt 112  CMP: Glucose 111, BUN 29, Cr 1.47, Na 140, K 4.8, Cl 104, CO2 25, AST 30, ALT 17, Alk Phos 89  PT: 19.4, INR 1.7     Procedures    Assessment and Plan:   1.  Right heart failure with ascites: The only treatment available is diuresis as tolerated. He is comfortable now, and we will continue current dose of Lasix and Spironolactone. He also needs RV unloading to avoid hypoxia and hypercarbia and this can be followed by Dr. Higginbotham.   2.  PAF: Continue Eliquis for stroke prevention.  3.  HTN: controlled  4. CAD: asymptomatic. Continue ASA and statin.     I will see Jordi Sam back in 3 months or sooner on an as needed basis.      Scribed for Efrem Posadas MD by Griselda Bonilla PA-C. 06/06/2019 3:52 PM.  I, Efrem Posadas MD, [facility], [facility], personally performed the services described in this documentation as scribed by the above named individual in my presence, and it is both accurate and complete. At 5:11 PM on 06/06/2019        EMR Dragon/Transcription Disclaimer:  Much of this encounter note is an electronic transcription/translation of spoken language to printed text.  The electronic translation of spoken language may permit erroneous, or at times, nonsensical words or phrases to be inadvertently transcribed.  Although I have reviewed the note for such errors, some may still exist.

## 2019-06-11 ENCOUNTER — TRANSCRIBE ORDERS (OUTPATIENT)
Dept: ADMINISTRATIVE | Facility: HOSPITAL | Age: 84
End: 2019-06-11

## 2019-06-11 DIAGNOSIS — R18.8 OTHER ASCITES: Primary | ICD-10-CM

## 2019-06-17 ENCOUNTER — HOSPITAL ENCOUNTER (OUTPATIENT)
Dept: CT IMAGING | Facility: HOSPITAL | Age: 84
Discharge: HOME | End: 2019-06-17
Admitting: INTERNAL MEDICINE

## 2019-06-17 VITALS
HEART RATE: 61 BPM | TEMPERATURE: 97.6 F | SYSTOLIC BLOOD PRESSURE: 96 MMHG | HEIGHT: 70 IN | RESPIRATION RATE: 20 BRPM | DIASTOLIC BLOOD PRESSURE: 46 MMHG | WEIGHT: 220 LBS | OXYGEN SATURATION: 96 % | BODY MASS INDEX: 31.5 KG/M2

## 2019-06-17 DIAGNOSIS — R18.8 OTHER ASCITES: ICD-10-CM

## 2019-06-17 LAB
APPEARANCE FLD: ABNORMAL
APTT PPP: 37.5 SECONDS (ref 24–37)
COLOR FLD: YELLOW
GLUCOSE BLDC GLUCOMTR-MCNC: 105 MG/DL (ref 70–130)
INR PPP: 1.39 (ref 0.85–1.16)
LYMPHOCYTES NFR FLD MANUAL: 35 %
MACROPHAGE FLUID: 46 %
MESOTHL CELL NFR FLD MANUAL: 8 %
MONOCYTES NFR FLD: 4 %
NEUTROPHILS NFR FLD MANUAL: 7 %
PLATELET # BLD AUTO: 106 10*3/MM3 (ref 140–450)
PROT FLD-MCNC: 3.2 G/DL
PROTHROMBIN TIME: 16.4 SECONDS (ref 11.2–14.3)
RBC # FLD AUTO: 4000 /MM3
WBC # FLD AUTO: 167 /MM3

## 2019-06-17 PROCEDURE — C1729 CATH, DRAINAGE: HCPCS

## 2019-06-17 PROCEDURE — 84157 ASSAY OF PROTEIN OTHER: CPT | Performed by: NURSE PRACTITIONER

## 2019-06-17 PROCEDURE — 75989 ABSCESS DRAINAGE UNDER X-RAY: CPT

## 2019-06-17 PROCEDURE — 85610 PROTHROMBIN TIME: CPT | Performed by: RADIOLOGY

## 2019-06-17 PROCEDURE — 82962 GLUCOSE BLOOD TEST: CPT

## 2019-06-17 PROCEDURE — 85049 AUTOMATED PLATELET COUNT: CPT | Performed by: RADIOLOGY

## 2019-06-17 PROCEDURE — 89051 BODY FLUID CELL COUNT: CPT | Performed by: NURSE PRACTITIONER

## 2019-06-17 PROCEDURE — 85730 THROMBOPLASTIN TIME PARTIAL: CPT | Performed by: RADIOLOGY

## 2019-06-17 RX ORDER — SODIUM CHLORIDE 0.9 % (FLUSH) 0.9 %
3-10 SYRINGE (ML) INJECTION AS NEEDED
Status: DISCONTINUED | OUTPATIENT
Start: 2019-06-17 | End: 2019-06-18 | Stop reason: HOSPADM

## 2019-06-17 RX ORDER — SODIUM CHLORIDE 0.9 % (FLUSH) 0.9 %
3 SYRINGE (ML) INJECTION EVERY 12 HOURS SCHEDULED
Status: DISCONTINUED | OUTPATIENT
Start: 2019-06-17 | End: 2019-06-18 | Stop reason: HOSPADM

## 2019-06-17 RX ORDER — LIDOCAINE HYDROCHLORIDE 10 MG/ML
20 INJECTION, SOLUTION INFILTRATION; PERINEURAL ONCE
Status: COMPLETED | OUTPATIENT
Start: 2019-06-17 | End: 2019-06-17

## 2019-06-17 RX ADMIN — LIDOCAINE HYDROCHLORIDE 20 ML: 10 INJECTION, SOLUTION INFILTRATION; PERINEURAL at 11:02

## 2019-06-17 NOTE — NURSING NOTE
Paracentesis performed by Dr Monge. VSS throughout procedure, pt tolerated well. 5600 mL of fluid were removed, samples sent to lab for testing. Report called to RAVINDRA. Pt may be discharged 1 hour post procedure.

## 2019-06-17 NOTE — POST-PROCEDURE NOTE
Radiology Procedure    Pre-procedure: Informed written consent was obtained prior to beginning.   Mr. Sam agrees to proceed with CT guided paracentesis.                 Procedure Performed: CT guided paracentesis.     IV Sedation and/or Anesthesia:  No    Complications: None.    Preliminary Findings: 5.6 liters thin light yellow ascites.  Samples sent for labs ordered.    Specimen Removed: As above.     Estimated Blood Loss:  0ml    Post-Procedure Diagnosis: Ascites.    Post-Procedure Plan: Observe 1 hour and d/c.  He has no c/o.      Standard Discharge Instructions Given:yes     David Monge MD  06/17/19  11:57 AM

## 2019-06-18 ENCOUNTER — TELEPHONE (OUTPATIENT)
Dept: INFUSION THERAPY | Facility: HOSPITAL | Age: 84
End: 2019-06-18

## 2019-06-19 ENCOUNTER — LAB (OUTPATIENT)
Dept: LAB | Facility: HOSPITAL | Age: 84
End: 2019-06-19

## 2019-06-19 ENCOUNTER — TRANSCRIBE ORDERS (OUTPATIENT)
Dept: LAB | Facility: HOSPITAL | Age: 84
End: 2019-06-19

## 2019-06-19 DIAGNOSIS — R18.8 OTHER ASCITES: Primary | ICD-10-CM

## 2019-06-19 DIAGNOSIS — R18.8 OTHER ASCITES: ICD-10-CM

## 2019-06-19 LAB
ANION GAP SERPL CALCULATED.3IONS-SCNC: 11.2 MMOL/L
BUN BLD-MCNC: 38 MG/DL (ref 8–23)
BUN/CREAT SERPL: 24.1 (ref 7–25)
CALCIUM SPEC-SCNC: 8.5 MG/DL (ref 8.6–10.5)
CHLORIDE SERPL-SCNC: 102 MMOL/L (ref 98–107)
CO2 SERPL-SCNC: 22.8 MMOL/L (ref 22–29)
CREAT BLD-MCNC: 1.58 MG/DL (ref 0.76–1.27)
GFR SERPL CREATININE-BSD FRML MDRD: 42 ML/MIN/1.73
GLUCOSE BLD-MCNC: 97 MG/DL (ref 65–99)
POTASSIUM BLD-SCNC: 5.6 MMOL/L (ref 3.5–5.2)
SODIUM BLD-SCNC: 136 MMOL/L (ref 136–145)

## 2019-06-19 PROCEDURE — 36415 COLL VENOUS BLD VENIPUNCTURE: CPT

## 2019-06-19 PROCEDURE — 80048 BASIC METABOLIC PNL TOTAL CA: CPT

## 2019-06-21 ENCOUNTER — TELEPHONE (OUTPATIENT)
Dept: CARDIOLOGY | Facility: CLINIC | Age: 84
End: 2019-06-21

## 2019-06-21 NOTE — TELEPHONE ENCOUNTER
Pt has ascites and underwent paracentesis on Monday 6/17/19, 5.5 L removed and pt has remained off Lasix and spironolactone since Dontae Villavicencio APRBLU put on hold. Wife reports today that he is swelling again and becoming more uncomfortable. Per [facility] restart Lasix 20 mg daily and follow up with  clinic early next week. Will see in follow up week of 7/1/19 with [facility]. Last Cr = 1.58, K 5.6. Reports BP 81/47 this morning and not taking lisinopril and carvedilol. He was advised to not take Lasix if SBP remains <90 and is advised to go to ER if symptoms worsen.    Mayda in  Center will call and schedule if needed early next week. Pt does not wish to follow up with  GI, will do paracentesis as needed for palliative treatment. Will likely need referral to palliative care (not discussed with wife at this time).

## 2019-06-25 ENCOUNTER — OFFICE VISIT (OUTPATIENT)
Dept: CARDIOLOGY | Facility: HOSPITAL | Age: 84
End: 2019-06-25

## 2019-06-25 VITALS
BODY MASS INDEX: 31.92 KG/M2 | OXYGEN SATURATION: 98 % | RESPIRATION RATE: 20 BRPM | HEART RATE: 69 BPM | WEIGHT: 223 LBS | HEIGHT: 70 IN | TEMPERATURE: 97.2 F | DIASTOLIC BLOOD PRESSURE: 48 MMHG | SYSTOLIC BLOOD PRESSURE: 106 MMHG

## 2019-06-25 DIAGNOSIS — I95.2 HYPOTENSION DUE TO DRUGS: ICD-10-CM

## 2019-06-25 DIAGNOSIS — I50.812 CHRONIC RIGHT-SIDED HEART FAILURE (HCC): ICD-10-CM

## 2019-06-25 DIAGNOSIS — R18.8 OTHER ASCITES: ICD-10-CM

## 2019-06-25 DIAGNOSIS — I48.91 ATRIAL FIBRILLATION, UNSPECIFIED TYPE (HCC): ICD-10-CM

## 2019-06-25 LAB
ANION GAP SERPL CALCULATED.3IONS-SCNC: 9.3 MMOL/L
BUN BLD-MCNC: 33 MG/DL (ref 8–23)
BUN/CREAT SERPL: 21.4 (ref 7–25)
CALCIUM SPEC-SCNC: 9 MG/DL (ref 8.6–10.5)
CHLORIDE SERPL-SCNC: 107 MMOL/L (ref 98–107)
CO2 SERPL-SCNC: 20.7 MMOL/L (ref 22–29)
CREAT BLD-MCNC: 1.54 MG/DL (ref 0.76–1.27)
DEPRECATED RDW RBC AUTO: 59.2 FL (ref 37–54)
ERYTHROCYTE [DISTWIDTH] IN BLOOD BY AUTOMATED COUNT: 14.9 % (ref 12.3–15.4)
GFR SERPL CREATININE-BSD FRML MDRD: 43 ML/MIN/1.73
GLUCOSE BLD-MCNC: 117 MG/DL (ref 65–99)
HCT VFR BLD AUTO: 38.6 % (ref 37.5–51)
HGB BLD-MCNC: 11.3 G/DL (ref 13–17.7)
MCH RBC QN AUTO: 31.9 PG (ref 26.6–33)
MCHC RBC AUTO-ENTMCNC: 29.3 G/DL (ref 31.5–35.7)
MCV RBC AUTO: 109 FL (ref 79–97)
PLATELET # BLD AUTO: 119 10*3/MM3 (ref 140–450)
PMV BLD AUTO: 10.9 FL (ref 6–12)
POTASSIUM BLD-SCNC: 5.5 MMOL/L (ref 3.5–5.2)
RBC # BLD AUTO: 3.54 10*6/MM3 (ref 4.14–5.8)
SODIUM BLD-SCNC: 137 MMOL/L (ref 136–145)
WBC NRBC COR # BLD: 5.08 10*3/MM3 (ref 3.4–10.8)

## 2019-06-25 PROCEDURE — 99214 OFFICE O/P EST MOD 30 MIN: CPT | Performed by: NURSE PRACTITIONER

## 2019-06-25 PROCEDURE — 85027 COMPLETE CBC AUTOMATED: CPT | Performed by: NURSE PRACTITIONER

## 2019-06-25 PROCEDURE — 80048 BASIC METABOLIC PNL TOTAL CA: CPT | Performed by: NURSE PRACTITIONER

## 2019-06-25 RX ORDER — FUROSEMIDE 10 MG/ML
40 INJECTION INTRAMUSCULAR; INTRAVENOUS ONCE
Status: DISCONTINUED | OUTPATIENT
Start: 2019-06-25 | End: 2019-06-25

## 2019-06-25 RX ORDER — LISINOPRIL 10 MG/1
10 TABLET ORAL DAILY
Qty: 30 TABLET | Refills: 3 | Status: SHIPPED | OUTPATIENT
Start: 2019-06-25 | End: 2019-07-11

## 2019-06-25 RX ORDER — CARVEDILOL 25 MG/1
12.5 TABLET ORAL 2 TIMES DAILY WITH MEALS
Qty: 60 TABLET | Refills: 2 | Status: SHIPPED | OUTPATIENT
Start: 2019-06-25 | End: 2019-06-25 | Stop reason: SDUPTHER

## 2019-06-25 RX ORDER — CARVEDILOL 12.5 MG/1
12.5 TABLET ORAL 2 TIMES DAILY WITH MEALS
Qty: 60 TABLET | Refills: 3 | Status: SHIPPED | OUTPATIENT
Start: 2019-06-25 | End: 2019-10-14 | Stop reason: SDUPTHER

## 2019-06-25 NOTE — PROGRESS NOTES
[facility]  Heart and Valve Center      Encounter Date:06/25/2019     Jordi Sam  [address] [address]  [unfilled]    1934    Warren Glass MD    Jordi Sam is a 85 y.o. male.      Subjective:     Chief Complaint:  Edema (with abdominal fullness) and Hypotension       HPI     85-year-old male with a history of coronary artery disease, right-sided heart failure with cirrhosis and ascites.  History of thoracentesis and paracentesis.  Patient underwent last paracentesis 6/17/2019, 5.6 L removed.  At that time Lasix and spironolactone was placed on hold.  Patient recently called [facility] reporting worsening swelling.  Dr. Posadas restarted Lasix 20 mg daily with instructions to follow-up in the heart valve center this week.  Creatinine 1.58, potassium 5.6.  Patient has been hypotensive with systolic blood pressures in the 80s.  Pt still take Lisinopril 20 mg BID, Coreg 25 mg daily (instread of BID).    He was instructed to hold Lasix for systolic blood pressure less than 90. Pt has not taken since instructed to start.   Wife states is been followed by  CRISTHIAN.  He has scheduled an EGD and follow-up visit.  After that visit she states she would like her care to be transferred to [facility].  Patient reports having for paracentesis since December.  Paracentesis as needed for palliative treatments.    Patient reports dyspnea, edema, weakness, nausea, abdominal fullness.    Patient Active Problem List    Diagnosis Date Noted   • Other ascites 06/25/2019     Note Last Updated: 6/25/2019     · CT-guided paracentesis 5/1/2019 (5.2 L removed)  · CT-guided paracentesis 6/17/2019 (5.6 L removed)     • Chronic right heart failure (CMS/HCC) 06/06/2019   • PAF (paroxysmal atrial fibrillation) (CMS/HCC) 06/06/2019   • Pleural effusion 04/08/2019     Note Last Updated: 4/8/2019     Added automatically from request for surgery [id number]     • Shortness of breath 03/05/2019   • Chronic  atrial fibrillation (CMS/HCC) 03/01/2019   • Chronic combined systolic and diastolic congestive heart failure (CMS/HCC) 01/25/2019     Note Last Updated: 1/25/2019     Added automatically from request for surgery [id number]     • Nephrolithiasis    • Controlled type 2 diabetes mellitus without complication, without long-term current use of insulin (CMS/HCC) 08/30/2016   • Acute maxillary sinusitis 08/25/2016   • Cough 08/25/2016   • Hemoptysis 08/25/2016   • Coronary arteriosclerosis in native artery 05/16/2016     Note Last Updated: 9/30/2016     1. Coronary artery disease:  a. Acute inferior STEMI with RV infarct features, January 2009.   b. Normal LV function, single vessel RCA disease.     c. Sirolimus eluting stents to the RCA, 01/09/2009.   d. Pericarditis, resolved.   e. [facility], 01/13/2012 (for recurrent angina).   i.  Normal LV function.  ii.  of RCA with failed intervention.       • Panlobular emphysema (CMS/HCC) 05/16/2016   • Functional diarrhea 05/16/2016   • Mixed hyperlipidemia 05/16/2016   • Essential hypertension 05/16/2016         Past Surgical History:   Procedure Laterality Date   • BRONCHOSCOPY N/A 4/12/2019    Procedure: BRONCHOSCOPY WITH THORACENTESIS;  Surgeon: Marciano Higginbotham MD;  Location:  [facility];  Service: Pulmonary   • CARDIAC CATHETERIZATION Bilateral 1/30/2019    Procedure: Right and Left Heart Cath;  Surgeon: Efrem Posadas MD;  Location:  [facility] CATH INVASIVE LOCATION;  Service: Cardiology   • CARDIAC CATHETERIZATION     • CHOLECYSTECTOMY     • CORONARY ANGIOPLASTY WITH STENT PLACEMENT  01/09/2009    Sirolimus eluting stents to the RCA, 01/09/2009.    • HERNIA REPAIR      Hernia repair x 2.    • PARACENTESIS  06/17/2019       Allergies   Allergen Reactions   • Bee Venom Swelling   • Sulfamethoxazole-Trimethoprim Other (See Comments)     Pt unaware         Current Outpatient Medications:   •  apixaban (ELIQUIS) 5 MG tablet tablet, Take 1 tablet by mouth 2 (Two)  Times a Day., Disp: 60 tablet, Rfl: 3  •  aspirin 81 MG tablet, Take 81 mg by mouth Daily., Disp: , Rfl:   •  carvedilol (COREG) 25 MG tablet, Take 25 mg by mouth 2 (Two) Times a Day With Meals., Disp: , Rfl: 2  •  folic acid (FOLVITE) 400 MCG tablet, Take 400 mcg by mouth Daily., Disp: , Rfl:   •  furosemide (LASIX) 20 MG tablet, Take 20 mg by mouth Daily., Disp: , Rfl:   •  lisinopril (PRINIVIL,ZESTRIL) 20 MG tablet, TAKE ONE TABLET BY MOUTH TWICE DAILY, Disp: 180 tablet, Rfl: 2  •  metFORMIN (GLUCOPHAGE) 500 MG tablet, TAKE ONE TABLET BY MOUTH ONCE DAILY, Disp: 90 tablet, Rfl: 3  •  nitroglycerin (NITROSTAT) 0.4 MG SL tablet, Place 1 tablet under the tongue Every 5 (Five) Minutes As Needed for Chest Pain., Disp: 25 tablet, Rfl: 6  •  simvastatin (ZOCOR) 40 MG tablet, TAKE 1 TABLET BY MOUTH ONCE DAILY, Disp: 90 tablet, Rfl: 3  •  tamsulosin (FLOMAX) 0.4 MG capsule 24 hr capsule, Take  by mouth Daily., Disp: , Rfl:     The following portions of the patient's history were reviewed and updated today during office visit as appropriate: allergies, current medications, past family history, past medical history, past social history, past surgical history and problem list.    Review of Systems   Constitution: Positive for weakness and weight gain.   Cardiovascular: Positive for dyspnea on exertion and leg swelling. Negative for chest pain, near-syncope, orthopnea, palpitations, paroxysmal nocturnal dyspnea and syncope.   Respiratory: Positive for cough and shortness of breath.    Gastrointestinal: Positive for bloating. Negative for abdominal pain.   Neurological: Negative for dizziness, light-headedness, loss of balance and numbness.   All other systems reviewed and are negative.      Objective:     Vitals:    06/25/19 1518   BP: 106/48   BP Location: Right arm   Patient Position: Sitting   Cuff Size: Adult   Pulse: 69   Resp: 20   Temp: 97.2 °F (36.2 °C)   TempSrc: Temporal   SpO2: 98%   Weight: 101 kg (223 lb)  "  Height: 177.8 cm (70\")         Physical Exam   Constitutional: He is oriented to person, place, and time. He appears ill. No distress.   HENT:   Mouth/Throat: Oropharynx is clear and moist.   Eyes: No scleral icterus.   Neck: JVD present. No hepatojugular reflux present. Carotid bruit is not present. No tracheal deviation present. No thyromegaly present.   Cardiovascular: Normal rate, normal heart sounds and intact distal pulses. An irregularly irregular rhythm present. Exam reveals no friction rub.   No murmur heard.  Pulmonary/Chest: Effort normal. He has decreased breath sounds in the left lower field.   Abdominal: Bowel sounds are normal. He exhibits distension. There is no tenderness.   Musculoskeletal: He exhibits edema (2+ bilaterally).   Lymphadenopathy:     He has no cervical adenopathy.   Neurological: He is alert and oriented to person, place, and time.   Skin: Skin is warm, dry and intact. No rash noted. No cyanosis or erythema. No pallor.   Psychiatric: He has a normal mood and affect. His behavior is normal. Thought content normal.   Vitals reviewed.      Lab and Diagnostic Review:  Lab Results   Component Value Date    GLUCOSE 97 06/19/2019    BUN 38 (H) 06/19/2019    CREATININE 1.58 (H) 06/19/2019    EGFRIFNONA 42 (L) 06/19/2019    BCR 24.1 06/19/2019    K 5.6 (H) 06/19/2019    CO2 22.8 06/19/2019    CALCIUM 8.5 (L) 06/19/2019    ALBUMIN 3.80 05/23/2019    AST 25 05/23/2019    ALT 13 05/23/2019     Lab Results   Component Value Date    WBC 5.75 01/30/2019    HGB 13.2 01/30/2019    HCT 41.6 01/30/2019    MCV 97.2 01/30/2019     (L) 06/17/2019       Assessment and Plan:         1. Right sided HF  Attempted to give IV Lasix.  2 unsuccessful attempts.  Patient declined further attempts.  Patient to take Lasix 40 mg today, then continue 20 mg daily.  May need to consider torsemide    - CBC (No Diff)  - Basic Metabolic Panel    Bmp next week    2. Other ascites  Patient has had multiple " paracentesis.  Question if a peritoneal drain would be an option.    Patient would like to transfer care from [facility] to [facility].  Patient plans to follow-up with a EGD scheduled next week with [facility] and will discuss further plans.    3. Hypotension due to drugs  Decrease:  - lisinopril (PRINIVIL,ZESTRIL) 10 MG tablet; Take 1 tablet by mouth Daily.  Dispense: 30 tablet; Refill: 3  - carvedilol (COREG) 25 MG tablet; Take 0.5 tablets by mouth 2 (Two) Times a Day With Meals.  Dispense: 60 tablet; Refill: 2    4. Atrial fibrillation, unspecified type (CMS/HCC)  Age 85, creatinine 1.5 or greater  Decrease  - apixaban (ELIQUIS) 2.5 MG tablet tablet; Take 1 tablet by mouth 2 (Two) Times a Day.  Dispense: 60 tablet; Refill: 3      F/u next week with Dr. Posadas.  [facility]u [facility] as needed or as determined by [facility]    *Please note that portions of this note were completed with a voice recognition program. Efforts were made to edit the dictations, but occasionally words are mistranscribed.

## 2019-06-26 ENCOUNTER — TELEPHONE (OUTPATIENT)
Dept: CARDIOLOGY | Facility: HOSPITAL | Age: 84
End: 2019-06-26

## 2019-06-26 RX ORDER — FUROSEMIDE 20 MG/1
20 TABLET ORAL DAILY
Qty: 90 TABLET | Refills: 1 | Status: SHIPPED | OUTPATIENT
Start: 2019-06-26 | End: 2019-07-26 | Stop reason: SDUPTHER

## 2019-06-27 ENCOUNTER — TELEPHONE (OUTPATIENT)
Dept: CARDIOLOGY | Facility: HOSPITAL | Age: 84
End: 2019-06-27

## 2019-06-27 NOTE — TELEPHONE ENCOUNTER
Call and reviewed lab results with pt and wife.    Reports feeling better today.  Down 1 lb.  Less bloated, less SOB.  /59    Has f/u with Dr. Posadas next week.      Pt will be scheduled f/u 3 weeks or sooner in [facility].

## 2019-06-28 ENCOUNTER — TELEPHONE (OUTPATIENT)
Dept: CARDIOLOGY | Facility: CLINIC | Age: 84
End: 2019-06-28

## 2019-06-28 NOTE — TELEPHONE ENCOUNTER
Spouse wants  opinion if he should have his EGD done on Monday at  and if so how long he needs to be off his Eliquis? Please advise.

## 2019-07-02 ENCOUNTER — OFFICE VISIT (OUTPATIENT)
Dept: CARDIOLOGY | Facility: CLINIC | Age: 84
End: 2019-07-02

## 2019-07-02 ENCOUNTER — LAB (OUTPATIENT)
Dept: LAB | Facility: HOSPITAL | Age: 84
End: 2019-07-02

## 2019-07-02 VITALS
HEART RATE: 65 BPM | WEIGHT: 224.2 LBS | BODY MASS INDEX: 31.39 KG/M2 | DIASTOLIC BLOOD PRESSURE: 50 MMHG | SYSTOLIC BLOOD PRESSURE: 97 MMHG | HEIGHT: 71 IN

## 2019-07-02 DIAGNOSIS — I50.812 CHRONIC RIGHT HEART FAILURE (HCC): Primary | ICD-10-CM

## 2019-07-02 DIAGNOSIS — I50.812 CHRONIC RIGHT-SIDED HEART FAILURE (HCC): ICD-10-CM

## 2019-07-02 DIAGNOSIS — R18.8 OTHER ASCITES: ICD-10-CM

## 2019-07-02 DIAGNOSIS — I48.20 CHRONIC ATRIAL FIBRILLATION (HCC): ICD-10-CM

## 2019-07-02 LAB
ANION GAP SERPL CALCULATED.3IONS-SCNC: 11.2 MMOL/L (ref 5–15)
BUN BLD-MCNC: 28 MG/DL (ref 8–23)
BUN/CREAT SERPL: 20.4 (ref 7–25)
CALCIUM SPEC-SCNC: 8.9 MG/DL (ref 8.6–10.5)
CHLORIDE SERPL-SCNC: 103 MMOL/L (ref 98–107)
CO2 SERPL-SCNC: 23.8 MMOL/L (ref 22–29)
CREAT BLD-MCNC: 1.37 MG/DL (ref 0.76–1.27)
GFR SERPL CREATININE-BSD FRML MDRD: 49 ML/MIN/1.73
GLUCOSE BLD-MCNC: 89 MG/DL (ref 65–99)
POTASSIUM BLD-SCNC: 4.8 MMOL/L (ref 3.5–5.2)
SODIUM BLD-SCNC: 138 MMOL/L (ref 136–145)

## 2019-07-02 PROCEDURE — 36415 COLL VENOUS BLD VENIPUNCTURE: CPT

## 2019-07-02 PROCEDURE — 99214 OFFICE O/P EST MOD 30 MIN: CPT | Performed by: INTERNAL MEDICINE

## 2019-07-02 PROCEDURE — 80048 BASIC METABOLIC PNL TOTAL CA: CPT

## 2019-07-02 NOTE — PROGRESS NOTES
OFFICE FOLLOW UP     Date of Encounter:2019     Name: Jordi Sam  : 1934  Address: [facility] [address] [place] [address]    PCP: Warren Glass MD  [address]  [facility] [address]    Jordi Sam is a 85 y.o. male.      Chief Complaint: Edema, Right heart failure, ascites, low BP, renal insufficiency    Problem List:   1. Coronary artery disease:  a. Acute inferior STEMI with RV infarct features, 2009.   b. Normal LV function, single vessel  RCA disease.     c. Sirolimus eluting stents to the RCA, 2009.   d. Pericarditis, resolved.   e. LHC, 2012 (for recurrent angina).   i.  Normal LV function.  ii.  of RCA with failed intervention.  f. Stress MPS 2018: Normal   g. LHC 2019: single vessel CAD with  of RCA, normal LVSF   2. 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 Echo 2019: marked enlargement of the right heart, biatrial enlargement, LVSF is normal, large left pleural effusion, severe TR, RVSP 30mmHg  b. Cardiac Cath (): Normal LV, Normal PAP, RV enlargement and decreased RVSF  c. Thoracentesis and Paracentesis March and May 2019  3. New onset atrial fibrillation 2019  a. Asymptomatic  b. CHADsVASC= 6, on Eliquis   4. Hypertension.   5. Dyslipidemia.  6. Diabetes mellitus, type 2.  A1C 6.1 (2016)  7. Nephrolithiasis.   8. History of “goiter.”  9. Thrombocytopenia  10. Cirrhosis, recent evaluation by [facility]  a. Paracentesis with findings consistent with cardiac ascites, Spring 2019  11. Surgical history:  a. Cholecystectomy.  b. Hernia repair x 2.     Allergies:  Allergies   Allergen Reactions   • Bee Venom Swelling   • Sulfamethoxazole-Trimethoprim Other (See Comments)     Pt unaware       Current Medications:  •  apixaban (ELIQUIS) 2.5 MG tablet tablet, Take 1 tablet by mouth 2 (Two) Times a Day.  •  aspirin 81 MG tablet, Take 81 mg by mouth Daily.  •  carvedilol  "(COREG) 12.5 MG tablet, Take 1 tablet by mouth 2 (Two) Times a Day With Meals  •  folic acid (FOLVITE) 400 MCG tablet, Take 400 mcg by mouth Daily.  •  furosemide (LASIX) 20 MG tablet, Take 1 tablet by mouth Daily.  •  lisinopril (PRINIVIL,ZESTRIL) 10 MG tablet, Take 1 tablet by mouth Daily  •  nitroglycerin (NITROSTAT) 0.4 MG SL tablet, Place 1 tablet under the tongue Every 5 (Five) Minutes As Needed for Chest Pain.  •  simvastatin (ZOCOR) 40 MG tablet, TAKE 1 TABLET BY MOUTH ONCE DAILY  •  tamsulosin (FLOMAX) 0.4 MG capsule 24 hr capsule, Take  by mouth Daily    History of Present Illness:       He returns to clinic for followup today. EGD scheduled with [facility] was cancelled by patient. He feels some better with decrease in Lasix. Weight has not significanlty increased since last visit. He reports last Thursday was a \"good\" day and he was able to get outside and do yard work. The following day he felt \"awful\" and thinks he over exerted himself. His last paracentesis was performed on 6/17/19 and removed over 5 liters of fluid.  GI service stopped aldactone and lasix due to elevated Cr of 1.99. He has had issues with low blood pressure as well.      The following portions of the patient's history were reviewed and updated as appropriate: allergies, current medications and problem list.    ROS: Pertinent positives as listed in the HPI.  All other systems reviewed and negative.    Objective:    Vitals:    07/02/19 1154 07/02/19 1156   BP: 106/64 97/50   BP Location: Left arm Left arm   Patient Position: Sitting Standing   Pulse: 62 65   Weight: 102 kg (224 lb 3.2 oz)    Height: 179.1 cm (70.5\")      Wt Readings from Last 3 Encounters:   07/02/19 102 kg (224 lb 3.2 oz)   06/25/19 101 kg (223 lb)   06/17/19 99.8 kg (220 lb)         Physical Exam:  GENERAL: Alert, cooperative, in no acute distress.   HEENT: Normocephalic, no adenopathy, jugular venous distention noted  HEART: No discrete PMI is noted. Regular rhythm, " normal rate, and no murmurs, gallops, or rubs.   LUNGS: Clear to auscultation bilaterally. No wheezing, rales or ronchi.  ABDOMEN: Distended, bowel sounds present, non-tender, ascites noted.   NEUROLOGIC: No focal abnormalities involving strength or sensation are noted.   EXTREMITIES: No clubbing, cyanosis. 4+ edema.      Diagnostic Data:    Lab Results   Component Value Date    GLUCOSE 117 (H) 06/25/2019    CALCIUM 9.0 06/25/2019     06/25/2019    K 5.5 (H) 06/25/2019    CO2 20.7 (L) 06/25/2019     06/25/2019    BUN 33 (H) 06/25/2019    CREATININE 1.54 (H) 06/25/2019    EGFRIFNONA 43 (L) 06/25/2019    BCR 21.4 06/25/2019    ANIONGAP 9.3 06/25/2019     Procedures    Assessment and Plan:   1. Right sided heart failure with LE edema and ascites: Stop lisinopril. Restart spironolactone 25 mg daily and increase furosemide to 40 mg daily for 1 week. Patient and wife will track daily weights and keep BP log.  2.  Renal Insufficiency : Check BMP today. Diuresis with close monitoring of creatinine. He will continue low dose Eliquis.  3.  Cirrhosis/ascites: Reschedule EGD with [facility]. Further paracentesis at [facility] discretion. Ok to hold Eliquis 48-72 hours prior to any procedures.    Scribed for Efrem Posadas MD by Shahla Jacobsen RN. 7/2/2019  1:00 PM           EMR Dragon/Transcription Disclaimer:  Much of this encounter note is an electronic transcription/translation of spoken language to printed text.  The electronic translation of spoken language may permit erroneous, or at times, nonsensical words or phrases to be inadvertently transcribed.  Although I have reviewed the note for such errors, some may still exist.

## 2019-07-03 ENCOUNTER — TELEPHONE (OUTPATIENT)
Dept: CARDIOLOGY | Facility: HOSPITAL | Age: 84
End: 2019-07-03

## 2019-07-03 NOTE — TELEPHONE ENCOUNTER
Called and reviewed lab results with pt.    Lab on 07/02/2019   Component Date Value Ref Range Status   • Glucose 07/02/2019 89  65 - 99 mg/dL Final   • BUN 07/02/2019 28* 8 - 23 mg/dL Final   • Creatinine 07/02/2019 1.37* 0.76 - 1.27 mg/dL Final   • Sodium 07/02/2019 138  136 - 145 mmol/L Final   • Potassium 07/02/2019 4.8  3.5 - 5.2 mmol/L Final   • Chloride 07/02/2019 103  98 - 107 mmol/L Final   • CO2 07/02/2019 23.8  22.0 - 29.0 mmol/L Final   • Calcium 07/02/2019 8.9  8.6 - 10.5 mg/dL Final   • eGFR Non African Amer 07/02/2019 49* >60 mL/min/1.73 Final   • BUN/Creatinine Ratio 07/02/2019 20.4  7.0 - 25.0 Final   • Anion Gap 07/02/2019 11.2  5.0 - 15.0 mmol/L Final     Seen by Dr. Posadas yesterday with med changes.  Plans to follow with [facility] for management.  Encouraged to call with any concerns.

## 2019-07-11 ENCOUNTER — OFFICE VISIT (OUTPATIENT)
Dept: INTERNAL MEDICINE | Facility: CLINIC | Age: 84
End: 2019-07-11

## 2019-07-11 VITALS
RESPIRATION RATE: 19 BRPM | WEIGHT: 225 LBS | HEART RATE: 70 BPM | SYSTOLIC BLOOD PRESSURE: 108 MMHG | BODY MASS INDEX: 31.83 KG/M2 | DIASTOLIC BLOOD PRESSURE: 64 MMHG

## 2019-07-11 DIAGNOSIS — I10 ESSENTIAL HYPERTENSION: ICD-10-CM

## 2019-07-11 DIAGNOSIS — K74.69 OTHER CIRRHOSIS OF LIVER (HCC): ICD-10-CM

## 2019-07-11 DIAGNOSIS — I50.812 CHRONIC RIGHT HEART FAILURE (HCC): ICD-10-CM

## 2019-07-11 DIAGNOSIS — I48.20 CHRONIC ATRIAL FIBRILLATION (HCC): Primary | ICD-10-CM

## 2019-07-11 DIAGNOSIS — E11.9 CONTROLLED TYPE 2 DIABETES MELLITUS WITHOUT COMPLICATION, WITHOUT LONG-TERM CURRENT USE OF INSULIN (HCC): ICD-10-CM

## 2019-07-11 DIAGNOSIS — R18.8 OTHER ASCITES: ICD-10-CM

## 2019-07-11 DIAGNOSIS — J90 PLEURAL EFFUSION: ICD-10-CM

## 2019-07-11 DIAGNOSIS — J43.1 PANLOBULAR EMPHYSEMA (HCC): ICD-10-CM

## 2019-07-11 PROCEDURE — 99214 OFFICE O/P EST MOD 30 MIN: CPT | Performed by: INTERNAL MEDICINE

## 2019-07-11 NOTE — PROGRESS NOTES
Subjective   Jordi Sma is a 85 y.o. male.     History of Present Illness   For follow up of  Chronic atrial fibrillation, he is still sob some, having edema but no chest pain or palpitations.  Chronic right heart failure as above.  NIDDM his sugars have been fine and he is off of meds.  COPD is about the same, he is still sob.  Diagnosed at  with cirrhosis of the liver.  He has had multiple paracenteses but still has a lot of fluid and edema.    The following portions of the patient's history were reviewed and updated as appropriate: allergies, current medications, past medical history, past social history and problem list.    Review of Systems   Constitutional: Positive for fatigue. Negative for fever.   Eyes: Negative.    Respiratory: Positive for shortness of breath. Negative for cough and wheezing.    Cardiovascular: Positive for leg swelling. Negative for chest pain and palpitations.   Gastrointestinal: Positive for abdominal distention. Negative for abdominal pain, blood in stool, constipation, diarrhea and GERD.   Genitourinary: Negative.        Objective   Physical Exam   Constitutional: He appears well-developed and well-nourished.   Neck: Normal range of motion.   Cardiovascular: Normal rate and normal heart sounds. Exam reveals no gallop and no friction rub.   No murmur heard.  Atrial fibrillation with normal rate.   Pulmonary/Chest: Effort normal and breath sounds normal. No respiratory distress. He has no wheezes. He has no rales. He exhibits no tenderness.   Abdominal: Soft. He exhibits distension (mild ascites.). He exhibits no mass. There is no rebound.   Liver does not seem enlarged.   Musculoskeletal: He exhibits edema (3+ bilaterally.).   Neurological: He is alert.   Nursing note and vitals reviewed.        Assessment/Plan   Jordi was seen today for essential hypertension.    Diagnoses and all orders for this visit:    Chronic atrial fibrillation (CMS/HCC)  Stable, but not great, is  followed by cardiology, no change.    Essential hypertension  Stable, no change.    Panlobular emphysema (CMS/HCC)  Stable, no change, sees pulmonary.    Pleural effusion  Better, no change.    Controlled type 2 diabetes mellitus without complication, without long-term current use of insulin (CMS/HCC)  Better, no change.    Other ascites  Better, but still some there, no change.    Other cirrhosis of liver (CMS/HCC)  Being followed at .    Chronic right heart failure (CMS/HCC)  Stable as above, followed by cardiolgy.    All problems are stable but not great.  Same meds.  Recheck 6 months.

## 2019-07-26 ENCOUNTER — OFFICE VISIT (OUTPATIENT)
Dept: CARDIOLOGY | Facility: HOSPITAL | Age: 84
End: 2019-07-26

## 2019-07-26 ENCOUNTER — TELEPHONE (OUTPATIENT)
Dept: CARDIOLOGY | Facility: HOSPITAL | Age: 84
End: 2019-07-26

## 2019-07-26 VITALS
RESPIRATION RATE: 16 BRPM | WEIGHT: 228 LBS | OXYGEN SATURATION: 97 % | HEART RATE: 56 BPM | HEIGHT: 71 IN | DIASTOLIC BLOOD PRESSURE: 62 MMHG | BODY MASS INDEX: 31.92 KG/M2 | TEMPERATURE: 98 F | SYSTOLIC BLOOD PRESSURE: 130 MMHG

## 2019-07-26 DIAGNOSIS — I95.2 HYPOTENSION DUE TO DRUGS: ICD-10-CM

## 2019-07-26 DIAGNOSIS — I48.20 CHRONIC ATRIAL FIBRILLATION (HCC): ICD-10-CM

## 2019-07-26 DIAGNOSIS — I50.812 CHRONIC RIGHT HEART FAILURE (HCC): Primary | ICD-10-CM

## 2019-07-26 PROBLEM — N18.30 STAGE 3 CHRONIC KIDNEY DISEASE (HCC): Status: ACTIVE | Noted: 2019-07-26

## 2019-07-26 LAB
ANION GAP SERPL CALCULATED.3IONS-SCNC: 8 MMOL/L (ref 5–15)
BUN BLD-MCNC: 20 MG/DL (ref 8–23)
BUN/CREAT SERPL: 17.5 (ref 7–25)
CALCIUM SPEC-SCNC: 8.8 MG/DL (ref 8.6–10.5)
CHLORIDE SERPL-SCNC: 104 MMOL/L (ref 98–107)
CO2 SERPL-SCNC: 26 MMOL/L (ref 22–29)
CREAT BLD-MCNC: 1.14 MG/DL (ref 0.76–1.27)
GFR SERPL CREATININE-BSD FRML MDRD: 61 ML/MIN/1.73
GLUCOSE BLD-MCNC: 104 MG/DL (ref 65–99)
POTASSIUM BLD-SCNC: 4.1 MMOL/L (ref 3.5–5.2)
SODIUM BLD-SCNC: 138 MMOL/L (ref 136–145)

## 2019-07-26 PROCEDURE — 80048 BASIC METABOLIC PNL TOTAL CA: CPT | Performed by: NURSE PRACTITIONER

## 2019-07-26 PROCEDURE — 99214 OFFICE O/P EST MOD 30 MIN: CPT | Performed by: NURSE PRACTITIONER

## 2019-07-26 RX ORDER — FUROSEMIDE 40 MG/1
40 TABLET ORAL 2 TIMES DAILY
Qty: 60 TABLET | Refills: 3 | Status: SHIPPED | OUTPATIENT
Start: 2019-07-26 | End: 2019-11-07

## 2019-07-26 RX ORDER — SPIRONOLACTONE 25 MG/1
25 TABLET ORAL DAILY
COMMUNITY
End: 2019-07-26 | Stop reason: SDUPTHER

## 2019-07-26 RX ORDER — SPIRONOLACTONE 25 MG/1
50 TABLET ORAL DAILY
Qty: 60 TABLET | Refills: 0
Start: 2019-07-26 | End: 2020-01-08 | Stop reason: SDUPTHER

## 2019-07-26 RX ORDER — FUROSEMIDE 10 MG/ML
40 INJECTION INTRAMUSCULAR; INTRAVENOUS ONCE
Status: DISCONTINUED | OUTPATIENT
Start: 2019-07-26 | End: 2019-08-13

## 2019-07-26 NOTE — PROGRESS NOTES
[facility]      Encounter Date:07/26/2019     Jordi Sam  [id number] [id number]  [unfilled]    1934    Warren Glass MD Charles D Ryan is a 85 y.o. male.      Subjective:     Chief Complaint:  Shortness of Breath (abdominal fullness)       HPI     85-year-old male with history of coronary artery disease, right-sided heart failure with cirrhosis and ascites.  History of thoracentesis and paracentesis.  Last paracentesis 6/17/2019, 5.6 L removed.  Patient has struggled with hypotension and chronic kidney disease.  Lisinopril recently discontinued.  Spironolactone restarted (had been discontinued).  Lasix increased to 40 mg.  Patient with chronic atrial fib  on low-dose Eliquis.  Pt plans to keep f/u with [facility] for upcoming scheduled EGD.       Patient presents today complaining of worsening abdominal fullness, dyspnea.  States he had been doing better after stopping lisinopril.  He had been out working in his yard riding the [employer].  This something he had not been able to do recently.  Over the last 2 weeks symptoms have progressively worsened.  He continues Lasix 40 mg daily and Aldactone.  Denies chest pain, pressure, PND, orthopnea.  Eats small meals twice a day.  Worsening edema. .    Patient Active Problem List    Diagnosis Date Noted   • Stage 3 chronic kidney disease (CMS/HCC) 07/26/2019   • Other cirrhosis of liver (CMS/HCC) 07/11/2019   • Other ascites 06/25/2019     Note Last Updated: 6/25/2019     · CT-guided paracentesis 5/1/2019 (5.2 L removed)  · CT-guided paracentesis 6/17/2019 (5.6 L removed)     • Chronic right heart failure (CMS/HCC) 06/06/2019   • Pleural effusion 04/08/2019     Note Last Updated: 4/8/2019     Added automatically from request for surgery [id number]     • Shortness of breath 03/05/2019   • Chronic atrial fibrillation (CMS/HCC) 03/01/2019   • Nephrolithiasis    • Controlled type 2 diabetes mellitus without  complication, without long-term current use of insulin (CMS/[facility]) 08/30/2016   • Coronary arteriosclerosis in native artery 05/16/2016     Note Last Updated: 9/30/2016     1. Coronary artery disease:  a. Acute inferior STEMI with RV infarct features, January 2009.   b. Normal LV function, single vessel RCA disease.     c. Sirolimus eluting stents to the RCA, 01/09/2009.   d. Pericarditis, resolved.   e. [facility], 01/13/2012 (for recurrent angina).   i.  Normal LV function.  ii.  of RCA with failed intervention.       • Panlobular emphysema (CMS/[facility]) 05/16/2016   • Functional diarrhea 05/16/2016   • Mixed hyperlipidemia 05/16/2016   • Essential hypertension 05/16/2016         Past Surgical History:   Procedure Laterality Date   • BRONCHOSCOPY N/A 4/12/2019    Procedure: BRONCHOSCOPY WITH THORACENTESIS;  Surgeon: Marciano Higginbotham MD;  Location:  [facility];  Service: Pulmonary   • CARDIAC CATHETERIZATION Bilateral 1/30/2019    Procedure: Right and Left Heart Cath;  Surgeon: Efrem Posadas MD;  Location:  [facility];  Service: Cardiology   • CARDIAC CATHETERIZATION     • CHOLECYSTECTOMY     • CORONARY ANGIOPLASTY WITH STENT PLACEMENT  01/09/2009    Sirolimus eluting stents to the RCA, 01/09/2009.    • HERNIA REPAIR      Hernia repair x 2.    • PARACENTESIS  06/17/2019       Allergies   Allergen Reactions   • Bee Venom Swelling   • Sulfamethoxazole-Trimethoprim Other (See Comments)     Pt unaware         Current Outpatient Medications:   •  apixaban (ELIQUIS) 2.5 MG tablet tablet, Take 1 tablet by mouth 2 (Two) Times a Day., Disp: 60 tablet, Rfl: 3  •  aspirin 81 MG tablet, Take 81 mg by mouth Daily., Disp: , Rfl:   •  carvedilol (COREG) 12.5 MG tablet, Take 1 tablet by mouth 2 (Two) Times a Day With Meals., Disp: 60 tablet, Rfl: 3  •  folic acid (FOLVITE) 400 MCG tablet, Take 400 mcg by mouth Daily., Disp: , Rfl:   •  furosemide (LASIX) 40 MG tablet, Take 1 tablet by mouth 2 (Two)  "Times a Day., Disp: 60 tablet, Rfl: 3  •  nitroglycerin (NITROSTAT) 0.4 MG SL tablet, Place 1 tablet under the tongue Every 5 (Five) Minutes As Needed for Chest Pain., Disp: 25 tablet, Rfl: 6  •  simvastatin (ZOCOR) 40 MG tablet, TAKE 1 TABLET BY MOUTH ONCE DAILY, Disp: 90 tablet, Rfl: 3  •  spironolactone (ALDACTONE) 25 MG tablet, Take 25 mg by mouth Daily., Disp: , Rfl:   •  tamsulosin (FLOMAX) 0.4 MG capsule 24 hr capsule, Take  by mouth Daily., Disp: , Rfl:     The following portions of the patient's history were reviewed and updated today during office visit as appropriate: allergies, current medications, past family history, past medical history, past social history, past surgical history and problem list.    Review of Systems   Constitution: Positive for weakness and weight gain.   Cardiovascular: Positive for dyspnea on exertion and leg swelling. Negative for chest pain, near-syncope, orthopnea, palpitations, paroxysmal nocturnal dyspnea and syncope.   Respiratory: Positive for cough and shortness of breath.    Gastrointestinal: Positive for bloating. Negative for abdominal pain.   Neurological: Negative for dizziness, light-headedness, loss of balance and numbness.   All other systems reviewed and are negative.      Objective:     Vitals:    07/26/19 1245   BP: 130/62   BP Location: Left arm   Patient Position: Sitting   Cuff Size: Adult   Pulse: 56   Resp: 16   Temp: 98 °F (36.7 °C)   TempSrc: Temporal   SpO2: 97%   Weight: 103 kg (228 lb)   Height: 179.1 cm (70.5\")         Physical Exam   Constitutional: He is oriented to person, place, and time. He appears ill. No distress.   HENT:   Mouth/Throat: Oropharynx is clear and moist.   Eyes: No scleral icterus.   Neck: JVD present. No hepatojugular reflux present. Carotid bruit is not present. No tracheal deviation present. No thyromegaly present.   Cardiovascular: Normal rate, normal heart sounds and intact distal pulses. An irregularly irregular rhythm " present. Exam reveals no friction rub.   No murmur heard.  Pulmonary/Chest: Effort normal. He has decreased breath sounds in the left lower field.   Abdominal: Bowel sounds are normal. He exhibits distension. There is no tenderness.   Musculoskeletal: He exhibits edema (2+ bilaterally).   Lymphadenopathy:     He has no cervical adenopathy.   Neurological: He is alert and oriented to person, place, and time.   Skin: Skin is warm, dry and intact. No rash noted. No cyanosis or erythema. No pallor.   Psychiatric: He has a normal mood and affect. His behavior is normal. Thought content normal.   Vitals reviewed.      Lab and Diagnostic Review:  Lab on 07/02/2019   Component Date Value Ref Range Status   • Glucose 07/02/2019 89  65 - 99 mg/dL Final   • BUN 07/02/2019 28* 8 - 23 mg/dL Final   • Creatinine 07/02/2019 1.37* 0.76 - 1.27 mg/dL Final   • Sodium 07/02/2019 138  136 - 145 mmol/L Final   • Potassium 07/02/2019 4.8  3.5 - 5.2 mmol/L Final   • Chloride 07/02/2019 103  98 - 107 mmol/L Final   • CO2 07/02/2019 23.8  22.0 - 29.0 mmol/L Final   • Calcium 07/02/2019 8.9  8.6 - 10.5 mg/dL Final   • eGFR Non African Amer 07/02/2019 49* >60 mL/min/1.73 Final   • BUN/Creatinine Ratio 07/02/2019 20.4  7.0 - 25.0 Final   • Anion Gap 07/02/2019 11.2  5.0 - 15.0 mmol/L Final     IV diuresis today in office. Patient received Lasix 40  mg today through a butterfly needed in the left AC over slow IV push. During IV diuresis, vitals were monitored and stable. Please see IV diuresis record for those vitals. Patient voided X1(pt did not use urinal for measuring).  Area was free of erythema, ecchymosis, or drainage.   Assessment and Plan:         1. Chronic right heart failure (CMS/HCC)  Lasix 40 mg IV X1 given today    Increase  - furosemide (LASIX) 40 MG tablet; Take 1 tablet by mouth 2 (Two) Times a Day.  Dispense: 60 tablet; Refill: 3    Increase aldactone 50 mg daily    - Basic Metabolic Panel stat    May consider paracentesis if  symptoms do not improve prove.  2. Chronic atrial fibrillation (CMS/HCC)  eliquis    3. Hypotension due to drugs  Improved off ace    F/u 2 weeks or sooner if need.       *Please note that portions of this note were completed with a voice recognition program. Efforts were made to edit the dictations, but occasionally words are mistranscribed.

## 2019-07-30 ENCOUNTER — TELEPHONE (OUTPATIENT)
Dept: CARDIOLOGY | Facility: HOSPITAL | Age: 84
End: 2019-07-30

## 2019-07-30 NOTE — TELEPHONE ENCOUNTER
Attempted to call and f/u on H[facility] s/s after receiving IV Lasix last Friday and adjustments of PO lasix and aldactone.    No answer on both home and cell.  Left message to call back.

## 2019-07-30 NOTE — TELEPHONE ENCOUNTER
Spoke to patient and states that he has been feeling much better and that he has lost 1 lb. No wt gain and not a lot of shortness of breath. Patient states that he has had some ankle swelling but has been keeping his legs elevated.

## 2019-08-07 ENCOUNTER — OFFICE VISIT (OUTPATIENT)
Dept: PULMONOLOGY | Facility: CLINIC | Age: 84
End: 2019-08-07

## 2019-08-07 VITALS
OXYGEN SATURATION: 95 % | HEIGHT: 71 IN | HEART RATE: 60 BPM | WEIGHT: 222 LBS | BODY MASS INDEX: 31.08 KG/M2 | DIASTOLIC BLOOD PRESSURE: 70 MMHG | TEMPERATURE: 97.6 F | SYSTOLIC BLOOD PRESSURE: 116 MMHG

## 2019-08-07 DIAGNOSIS — R06.02 SHORTNESS OF BREATH: ICD-10-CM

## 2019-08-07 DIAGNOSIS — I50.812 CHRONIC RIGHT HEART FAILURE (HCC): ICD-10-CM

## 2019-08-07 DIAGNOSIS — J90 PLEURAL EFFUSION: Primary | ICD-10-CM

## 2019-08-07 PROCEDURE — 99214 OFFICE O/P EST MOD 30 MIN: CPT | Performed by: NURSE PRACTITIONER

## 2019-08-07 NOTE — PROGRESS NOTES
[facility] Pulmonary Follow up    CHIEF COMPLAINT    Follow-up pleural effusion    HISTORY OF PRESENT ILLNESS    Jordi Sam is a 85 y.o.male here today for follow-up of his pleural effusion.  He was last seen in the office in May by Dr. Marciano Higginbotham.  At his last appointment he had a follow-up for a bronchoscopy and thoracentesis.  These findings were benign.  He had a paracentesis with drainage of 5.2 L of fluid as well.  Since his last appointment he has seen a liver specialist at [facility] and is planning on having an EGD performed in September.  He is here today for a 3-month follow-up.    He states that he is followed closely by Dr. Posadas and MAKAYLA Hendrickson at the CHF clinic.  They are currently adjusting his diuretics and he states that he is breathing better than he has in a long time.  He states that his weight has remained fairly stable within 1 to 2 pounds.  He does weigh daily.    He denies fever, chills, sputum production, hemoptysis, night sweats, weight loss, chest pain or palpitations.  He has chronic lower extremity edema.  He denies any sinus or allergy symptoms.  He denies reflux symptoms.  He denies any sleeping difficulties.  He states he feels well rested.  He states that he has slept better over the last couple of months now that he is sleeping on his abdomen.  Dr. Higginbotham had recommended him have a sleep study performed at his last appointment however he canceled this appointment.    He is up-to-date on his current vaccinations.  He is accompanied today by his wife.    Patient Active Problem List   Diagnosis   • Coronary arteriosclerosis in native artery   • Panlobular emphysema (CMS/HCC)   • Functional diarrhea   • Mixed hyperlipidemia   • Essential hypertension   • Controlled type 2 diabetes mellitus without complication, without long-term current use of insulin (CMS/HCC)   • Nephrolithiasis   • Chronic atrial fibrillation (CMS/HCC)   • Shortness of breath   • Pleural  effusion   • Chronic right heart failure (CMS/HCC)   • Other ascites   • Other cirrhosis of liver (CMS/HCC)   • Stage 3 chronic kidney disease (CMS/HCC)       Allergies   Allergen Reactions   • Bee Venom Swelling   • Sulfamethoxazole-Trimethoprim Other (See Comments)     Pt unaware       Current Outpatient Medications:   •  apixaban (ELIQUIS) 2.5 MG tablet tablet, Take 1 tablet by mouth 2 (Two) Times a Day., Disp: 60 tablet, Rfl: 3  •  aspirin 81 MG tablet, Take 81 mg by mouth Daily., Disp: , Rfl:   •  carvedilol (COREG) 12.5 MG tablet, Take 1 tablet by mouth 2 (Two) Times a Day With Meals., Disp: 60 tablet, Rfl: 3  •  folic acid (FOLVITE) 400 MCG tablet, Take 400 mcg by mouth Daily., Disp: , Rfl:   •  furosemide (LASIX) 40 MG tablet, Take 1 tablet by mouth 2 (Two) Times a Day., Disp: 60 tablet, Rfl: 3  •  nitroglycerin (NITROSTAT) 0.4 MG SL tablet, Place 1 tablet under the tongue Every 5 (Five) Minutes As Needed for Chest Pain., Disp: 25 tablet, Rfl: 6  •  simvastatin (ZOCOR) 40 MG tablet, TAKE 1 TABLET BY MOUTH ONCE DAILY, Disp: 90 tablet, Rfl: 3  •  spironolactone (ALDACTONE) 25 MG tablet, Take 2 tablets by mouth Daily., Disp: 60 tablet, Rfl: 0  •  tamsulosin (FLOMAX) 0.4 MG capsule 24 hr capsule, Take  by mouth Daily., Disp: , Rfl:     Current Facility-Administered Medications:   •  furosemide (LASIX) injection 40 mg, 40 mg, Intravenous, Once, Diogo Weber APRN  MEDICATION LIST AND ALLERGIES REVIEWED.    Social History     Tobacco Use   • Smoking status: Former Smoker     Packs/day: 2.50     Years: 28.00     Pack years: 70.00     Types: Cigarettes     Last attempt to quit: 1983     Years since quittin.2   • Smokeless tobacco: Never Used   Substance Use Topics   • Alcohol use: No   • Drug use: No       FAMILY AND SOCIAL HISTORY REVIEWED.    Review of Systems   Constitutional: Negative for activity change, appetite change, fatigue, fever and unexpected weight change.   HENT: Negative for congestion,  "postnasal drip, rhinorrhea, sinus pressure, sore throat and voice change.    Eyes: Negative for visual disturbance.   Respiratory: Positive for cough and shortness of breath. Negative for chest tightness and wheezing.    Cardiovascular: Negative for chest pain, palpitations and leg swelling.   Gastrointestinal: Negative for abdominal distention, abdominal pain, nausea and vomiting.   Endocrine: Negative for cold intolerance and heat intolerance.   Genitourinary: Negative for difficulty urinating and urgency.   Musculoskeletal: Negative for arthralgias, back pain and neck pain.   Skin: Negative for color change and pallor.   Allergic/Immunologic: Negative for environmental allergies and food allergies.   Neurological: Negative for dizziness, syncope, weakness and light-headedness.   Hematological: Negative for adenopathy. Does not bruise/bleed easily.   Psychiatric/Behavioral: Negative for agitation and behavioral problems.   .    /70   Pulse 60   Temp 97.6 °F (36.4 °C)   Ht 179.1 cm (70.5\")   Wt 101 kg (222 lb)   SpO2 95% Comment: resting, room air  BMI 31.40 kg/m²     Immunization History   Administered Date(s) Administered   • Flu Mist 01/19/2016   • Flu Vaccine High Dose PF 65YR+ 01/19/2016, 01/10/2017, 10/24/2017, 11/01/2018   • Pneumococcal Conjugate 13-Valent (PCV13) 01/19/2016   • Pneumococcal Polysaccharide (PPSV23) 03/19/2019   • Pneumococcal, Unspecified 01/19/2016       Physical Exam   Constitutional: He is oriented to person, place, and time. He appears well-developed and well-nourished.   HENT:   Head: Normocephalic and atraumatic.   Eyes: Pupils are equal, round, and reactive to light.   Neck: Normal range of motion. Neck supple. No thyromegaly present.   Cardiovascular: Normal rate, regular rhythm, normal heart sounds and intact distal pulses. Exam reveals no gallop and no friction rub.   No murmur heard.  Pulmonary/Chest: Effort normal and breath sounds normal. No respiratory distress. He " has no wheezes. He has no rales. He exhibits no tenderness.   Abdominal: Soft. Bowel sounds are normal. There is no tenderness.   Musculoskeletal: Normal range of motion.   Lymphadenopathy:     He has no cervical adenopathy.   Neurological: He is alert and oriented to person, place, and time.   Skin: Skin is warm and dry. Capillary refill takes less than 2 seconds. He is not diaphoretic.   Psychiatric: He has a normal mood and affect. His behavior is normal.   Nursing note and vitals reviewed.        RESULTS    Xr Chest Pa & Lateral    Result Date: 8/7/2019  There is a stable, relatively small left pleural effusion and left basilar compressive atelectasis. There has been no change when compared with 05/08/2019.  D:  08/07/2019 E:  08/07/2019       PROBLEM LIST    Problem List Items Addressed This Visit        Cardiovascular and Mediastinum    Chronic right heart failure (CMS/HCC)       Respiratory    Shortness of breath    Pleural effusion - Primary    Overview     Added automatically from request for surgery [id number]         Relevant Orders    XR Chest PA & Lateral (Completed)            DISCUSSION    Mr. Sam was here for follow-up of his pleural effusion.  We reviewed his chest x-ray in the office today and he remains to have a stable small left pleural effusion.  I did review this chest x-ray with Dr. Higginbotham as well in the office today and he does not recommend a thoracentesis at this time.  The patient has no respiratory distress and no current complaints of shortness of breath.  He seems to to be breathing better than he has in quite some time.    He will need to continue close follow-up with MAKAYLA Miranda and Mai Posadas for his CHF.  He has a follow-up next week with Loraine.      At this time, I do not think that he would benefit from a paracentesis.  I did advise him that if his breathing were to worsen that we could order a paracentesis to be performed at [facility].  His last  paracentesis was in May.  I advised him that he could call and we would have a paracentesis performed at the hospital if he were to develop new symptoms.  Currently I do not think that he needs this performed.  He is agreeable to this plan.    He will continue to follow-up with the liver specialist at [facility].  He needs to have his EGD performed in September.    At this time he would like to cancel his sleep study.  He states that he is sleeping well and denies any daytime somnolence or fatigue.  I will cancel this testing and advised him that if he would like to reorder this he can call me and I would reorder it.    He will follow-up in 3 months or sooner if his symptoms worsen.  He will call with any additional concerns or questions.  I spent 25 minutes with the patient and family. I spent > 50% percent of this time counseling and discussing diagnosis, prognosis, diagnostic testing, evaluation, current status, treatment options and management.    Elda Arriola, MAKAYLA  08/07/201912:17 PM  Electronically signed     Please note that portions of this note were completed with a voice recognition program. Efforts were made to edit the dictations, but occasionally words are mistranscribed.      CC: Warren Glass MD

## 2019-08-13 ENCOUNTER — OFFICE VISIT (OUTPATIENT)
Dept: CARDIOLOGY | Facility: HOSPITAL | Age: 84
End: 2019-08-13

## 2019-08-13 ENCOUNTER — LAB (OUTPATIENT)
Dept: LAB | Facility: HOSPITAL | Age: 84
End: 2019-08-13

## 2019-08-13 VITALS
HEIGHT: 71 IN | BODY MASS INDEX: 31.38 KG/M2 | DIASTOLIC BLOOD PRESSURE: 63 MMHG | RESPIRATION RATE: 15 BRPM | WEIGHT: 224.13 LBS | TEMPERATURE: 97.2 F | HEART RATE: 60 BPM | OXYGEN SATURATION: 99 % | SYSTOLIC BLOOD PRESSURE: 136 MMHG

## 2019-08-13 DIAGNOSIS — R18.8 OTHER ASCITES: ICD-10-CM

## 2019-08-13 DIAGNOSIS — I48.20 CHRONIC ATRIAL FIBRILLATION (HCC): ICD-10-CM

## 2019-08-13 DIAGNOSIS — I10 ESSENTIAL HYPERTENSION: ICD-10-CM

## 2019-08-13 DIAGNOSIS — N18.30 STAGE 3 CHRONIC KIDNEY DISEASE (HCC): ICD-10-CM

## 2019-08-13 DIAGNOSIS — I50.812 CHRONIC RIGHT HEART FAILURE (HCC): ICD-10-CM

## 2019-08-13 DIAGNOSIS — K74.69 OTHER CIRRHOSIS OF LIVER (HCC): ICD-10-CM

## 2019-08-13 DIAGNOSIS — I50.812 CHRONIC RIGHT HEART FAILURE (HCC): Primary | ICD-10-CM

## 2019-08-13 LAB
ANION GAP SERPL CALCULATED.3IONS-SCNC: 14.1 MMOL/L (ref 5–15)
BUN BLD-MCNC: 23 MG/DL (ref 8–23)
BUN/CREAT SERPL: 17 (ref 7–25)
CALCIUM SPEC-SCNC: 9 MG/DL (ref 8.6–10.5)
CHLORIDE SERPL-SCNC: 99 MMOL/L (ref 98–107)
CO2 SERPL-SCNC: 26.9 MMOL/L (ref 22–29)
CREAT BLD-MCNC: 1.35 MG/DL (ref 0.76–1.27)
GFR SERPL CREATININE-BSD FRML MDRD: 50 ML/MIN/1.73
GLUCOSE BLD-MCNC: 104 MG/DL (ref 65–99)
POTASSIUM BLD-SCNC: 4.4 MMOL/L (ref 3.5–5.2)
SODIUM BLD-SCNC: 140 MMOL/L (ref 136–145)

## 2019-08-13 PROCEDURE — 80048 BASIC METABOLIC PNL TOTAL CA: CPT

## 2019-08-13 PROCEDURE — 99214 OFFICE O/P EST MOD 30 MIN: CPT | Performed by: NURSE PRACTITIONER

## 2019-08-13 PROCEDURE — 36415 COLL VENOUS BLD VENIPUNCTURE: CPT

## 2019-08-13 NOTE — PROGRESS NOTES
[facility]  [facility] Valve [facility]      Encounter Date:08/13/2019     Jordi Sam  [address] [address]  [unfilled]    1934    Warren Glass MD    Jordi Sam is a 85 y.o. male.      Subjective:     Chief Complaint:  Follow-up (heart failure)       HPI     85-year-old male with history of CAD, right-sided heart failure with cirrhosis and ascites.  History of thoracentesis and paracentesis.  Last paracentesis 6/17/2019, 5.6 L removed.  Lisinopril recently discontinued due to hypotension and chronic kidney disease.  Creatinine improved with reinitiation of spironolactone last office visit.  Lasix increased to twice a day last office visit due to worsening abdominal fullness, edema, dyspnea.  Repeat BMP stable.  Patient on chronic atrial fibrillation with low-dose Eliquis.  Patient has follow-up with [facility] for upcoming EGD.  Last chest x-ray with pulmonary showed a stable small left pleural effusion no recommendation for thoracentesis.    Patient reports dyspnea has improved slightly.  Weight has remained stable but no significant weight loss, approximately 4 pounds.  Describes continued abdominal fullness, tightness, abdominal discomfort and difficulty ambulating due to abdominal fullness.  Patient did not increase Aldactone on last office visit as requested.  He is only taken Spironolactone 25 mg a day.  Patient fearful of making medication change due to history of nausea with medicine in the past.  Denies chest pain, pressure, dizziness, near syncope, syncope.    Patient Active Problem List   Diagnosis   • Coronary arteriosclerosis in native artery   • Panlobular emphysema (CMS/HCC)   • Functional diarrhea   • Mixed hyperlipidemia   • Essential hypertension   • Controlled type 2 diabetes mellitus without complication, without long-term current use of insulin (CMS/HCC)   • Nephrolithiasis   • Chronic atrial fibrillation (CMS/HCC)   • Shortness of breath    • Pleural effusion   • Chronic right heart failure (CMS/HCC)   • Other ascites   • Other cirrhosis of liver (CMS/HCC)   • Stage 3 chronic kidney disease (CMS/HCC)         Past Surgical History:   Procedure Laterality Date   • BRONCHOSCOPY N/A 4/12/2019    Procedure: BRONCHOSCOPY WITH THORACENTESIS;  Surgeon: Marciano Higginbotham MD;  Location:  [facility];  Service: Pulmonary   • CARDIAC CATHETERIZATION Bilateral 1/30/2019    Procedure: Right and Left Heart Cath;  Surgeon: Efrem Posadas MD;  Location:  [facility] CATH INVASIVE LOCATION;  Service: Cardiology   • CARDIAC CATHETERIZATION     • CHOLECYSTECTOMY     • CORONARY ANGIOPLASTY WITH STENT PLACEMENT  01/09/2009    Sirolimus eluting stents to the RCA, 01/09/2009.    • HERNIA REPAIR      Hernia repair x 2.    • PARACENTESIS  06/17/2019       Allergies   Allergen Reactions   • Bee Venom Swelling   • Sulfamethoxazole-Trimethoprim Other (See Comments)     Pt unaware         Current Outpatient Medications:   •  apixaban (ELIQUIS) 2.5 MG tablet tablet, Take 1 tablet by mouth 2 (Two) Times a Day., Disp: 60 tablet, Rfl: 3  •  aspirin 81 MG tablet, Take 81 mg by mouth Daily., Disp: , Rfl:   •  carvedilol (COREG) 12.5 MG tablet, Take 1 tablet by mouth 2 (Two) Times a Day With Meals., Disp: 60 tablet, Rfl: 3  •  folic acid (FOLVITE) 400 MCG tablet, Take 400 mcg by mouth Daily., Disp: , Rfl:   •  furosemide (LASIX) 40 MG tablet, Take 1 tablet by mouth 2 (Two) Times a Day., Disp: 60 tablet, Rfl: 3  •  nitroglycerin (NITROSTAT) 0.4 MG SL tablet, Place 1 tablet under the tongue Every 5 (Five) Minutes As Needed for Chest Pain., Disp: 25 tablet, Rfl: 6  •  simvastatin (ZOCOR) 40 MG tablet, TAKE 1 TABLET BY MOUTH ONCE DAILY, Disp: 90 tablet, Rfl: 3  •  spironolactone (ALDACTONE) 25 MG tablet, Take 2 tablets by mouth Daily., Disp: 60 tablet, Rfl: 0  •  tamsulosin (FLOMAX) 0.4 MG capsule 24 hr capsule, Take  by mouth Daily., Disp: , Rfl:   No current  "facility-administered medications for this visit.     The following portions of the patient's history were reviewed and updated today during office visit as appropriate: allergies, current medications, past family history, past medical history, past social history, past surgical history and problem list.    Review of Systems   Constitution: Positive for weakness and weight gain.   Cardiovascular: Positive for dyspnea on exertion and leg swelling. Negative for chest pain, near-syncope, orthopnea, palpitations, paroxysmal nocturnal dyspnea and syncope.   Respiratory: Positive for cough and shortness of breath.    Gastrointestinal: Positive for bloating. Negative for abdominal pain.   Neurological: Negative for dizziness, light-headedness, loss of balance and numbness.   All other systems reviewed and are negative.      Objective:     Vitals:    08/13/19 1322   BP: 136/63   BP Location: Right arm   Patient Position: Sitting   Cuff Size: Adult   Pulse: 60   Resp: 15   Temp: 97.2 °F (36.2 °C)   TempSrc: Temporal   SpO2: 99%   Weight: 102 kg (224 lb 2 oz)   Height: 179.1 cm (70.5\")         Physical Exam   Constitutional: He is oriented to person, place, and time. He appears ill. No distress.   HENT:   Mouth/Throat: Oropharynx is clear and moist.   Eyes: No scleral icterus.   Neck: JVD present. No hepatojugular reflux present. Carotid bruit is not present. No tracheal deviation present. No thyromegaly present.   Cardiovascular: Normal rate, normal heart sounds and intact distal pulses. An irregularly irregular rhythm present. Exam reveals no friction rub.   No murmur heard.  Pulmonary/Chest: Effort normal. He has decreased breath sounds in the left lower field.   Abdominal: Bowel sounds are normal. He exhibits distension. There is no tenderness.   Musculoskeletal: He exhibits edema (2+ bilaterally).   Lymphadenopathy:     He has no cervical adenopathy.   Neurological: He is alert and oriented to person, place, and time. "   Skin: Skin is warm, dry and intact. No rash noted. No cyanosis or erythema. No pallor.   Psychiatric: He has a normal mood and affect. His behavior is normal. Thought content normal.   Vitals reviewed.      Lab and Diagnostic Review:  Lab Results   Component Value Date    WBC 5.08 06/25/2019    HGB 11.3 (L) 06/25/2019    HCT 38.6 06/25/2019    .0 (H) 06/25/2019     (L) 06/25/2019     Lab Results   Component Value Date    GLUCOSE 104 (H) 07/26/2019    CALCIUM 8.8 07/26/2019     07/26/2019    K 4.1 07/26/2019    CO2 26.0 07/26/2019     07/26/2019    BUN 20 07/26/2019    CREATININE 1.14 07/26/2019    EGFRIFNONA 61 07/26/2019    BCR 17.5 07/26/2019    ANIONGAP 8.0 07/26/2019       Assessment and Plan:         1. Chronic right heart failure (CMS/HCC)  Continue lasix  Increase aldactone 50 mg daily    - Basic Metabolic Panel; today    - CT Guided Paracentesis; palliative    2. Chronic atrial fibrillation (CMS/HCC)  eliquis hold 48 hrs prior    3. Essential hypertension  Stable      4. Other ascites    - Basic Metabolic Panel; Future  - CT Guided Paracentesis; Future    5. Stage 3 chronic kidney disease (CMS/HCC)    - Basic Metabolic Panel; Future    6.  Liver cirrhosis      F/u with Dr. Posadas as scheduled.  [facility]u [facility] 3 months or as needed.     *Please note that portions of this note were completed with a voice recognition program. Efforts were made to edit the dictations, but occasionally words are mistranscribed.

## 2019-08-16 ENCOUNTER — TELEPHONE (OUTPATIENT)
Dept: CARDIOLOGY | Facility: HOSPITAL | Age: 84
End: 2019-08-16

## 2019-08-16 NOTE — TELEPHONE ENCOUNTER
reviewed lab results with patient.     No change in Meds at this time.       ----- Message from Jeanne Gordon CMA sent at 8/16/2019  2:32 PM EDT -----  Patient returned call.

## 2019-08-23 ENCOUNTER — HOSPITAL ENCOUNTER (OUTPATIENT)
Dept: CT IMAGING | Facility: HOSPITAL | Age: 84
Discharge: HOME | End: 2019-08-23
Admitting: RADIOLOGY

## 2019-08-23 VITALS
HEIGHT: 71 IN | WEIGHT: 217.6 LBS | BODY MASS INDEX: 30.46 KG/M2 | OXYGEN SATURATION: 98 % | RESPIRATION RATE: 20 BRPM | HEART RATE: 73 BPM | TEMPERATURE: 97.8 F | DIASTOLIC BLOOD PRESSURE: 74 MMHG | SYSTOLIC BLOOD PRESSURE: 121 MMHG

## 2019-08-23 DIAGNOSIS — I50.812 CHRONIC RIGHT HEART FAILURE (HCC): ICD-10-CM

## 2019-08-23 DIAGNOSIS — R18.8 OTHER ASCITES: ICD-10-CM

## 2019-08-23 LAB
APTT PPP: 40.1 SECONDS (ref 24–37)
GLUCOSE BLDC GLUCOMTR-MCNC: 106 MG/DL (ref 70–130)
INR PPP: 1.28 (ref 0.85–1.16)
PLATELET # BLD AUTO: 106 10*3/MM3 (ref 140–450)
PROTHROMBIN TIME: 15.4 SECONDS (ref 11.2–14.3)

## 2019-08-23 PROCEDURE — 85610 PROTHROMBIN TIME: CPT | Performed by: RADIOLOGY

## 2019-08-23 PROCEDURE — 85049 AUTOMATED PLATELET COUNT: CPT | Performed by: RADIOLOGY

## 2019-08-23 PROCEDURE — C1729 CATH, DRAINAGE: HCPCS

## 2019-08-23 PROCEDURE — 82962 GLUCOSE BLOOD TEST: CPT

## 2019-08-23 PROCEDURE — 85730 THROMBOPLASTIN TIME PARTIAL: CPT | Performed by: RADIOLOGY

## 2019-08-23 PROCEDURE — 25010000003 LIDOCAINE 1 % SOLUTION: Performed by: RADIOLOGY

## 2019-08-23 PROCEDURE — 75989 ABSCESS DRAINAGE UNDER X-RAY: CPT

## 2019-08-23 RX ORDER — SODIUM CHLORIDE 0.9 % (FLUSH) 0.9 %
3-10 SYRINGE (ML) INJECTION AS NEEDED
Status: DISCONTINUED | OUTPATIENT
Start: 2019-08-23 | End: 2019-08-24 | Stop reason: HOSPADM

## 2019-08-23 RX ORDER — LIDOCAINE HYDROCHLORIDE 10 MG/ML
20 INJECTION, SOLUTION INFILTRATION; PERINEURAL ONCE
Status: COMPLETED | OUTPATIENT
Start: 2019-08-23 | End: 2019-08-23

## 2019-08-23 RX ORDER — SODIUM CHLORIDE 0.9 % (FLUSH) 0.9 %
3 SYRINGE (ML) INJECTION EVERY 12 HOURS SCHEDULED
Status: DISCONTINUED | OUTPATIENT
Start: 2019-08-23 | End: 2019-08-24 | Stop reason: HOSPADM

## 2019-08-23 RX ADMIN — LIDOCAINE HYDROCHLORIDE 20 ML: 10 INJECTION, SOLUTION INFILTRATION; PERINEURAL at 11:05

## 2019-08-23 NOTE — NURSING NOTE
Paracentesis done by Dr Brady.  5.7 liters of fluid removed.  Pt tolerated well.  Report called to RAVINDRA

## 2019-08-26 ENCOUNTER — TELEPHONE (OUTPATIENT)
Dept: INFUSION THERAPY | Facility: HOSPITAL | Age: 84
End: 2019-08-26

## 2019-09-12 ENCOUNTER — OFFICE VISIT (OUTPATIENT)
Dept: CARDIOLOGY | Facility: CLINIC | Age: 84
End: 2019-09-12

## 2019-09-12 VITALS
BODY MASS INDEX: 30.52 KG/M2 | HEIGHT: 71 IN | HEART RATE: 59 BPM | SYSTOLIC BLOOD PRESSURE: 90 MMHG | WEIGHT: 218 LBS | DIASTOLIC BLOOD PRESSURE: 52 MMHG

## 2019-09-12 DIAGNOSIS — I50.812 CHRONIC RIGHT HEART FAILURE (HCC): Primary | ICD-10-CM

## 2019-09-12 PROCEDURE — 99213 OFFICE O/P EST LOW 20 MIN: CPT | Performed by: INTERNAL MEDICINE

## 2019-09-12 NOTE — PROGRESS NOTES
OFFICE FOLLOW UP     Date of Encounter:2019     Name: Jordi Sam  : 1934  Address: [facility] [address] [place] [address]    PCP: Warren Glass MD  [address]  [facility] [address]    Jordi Sam is a 85 y.o. male.      Chief Complaint: Right heart failure    Problem List:   1. Coronary artery disease:  a. Acute inferior STEMI with RV infarct features, 2009.   b. Normal LV function, single vessel  RCA disease.     c. Sirolimus eluting stents to the RCA, 2009.   d. Pericarditis, resolved.   e. LHC, 2012 (for recurrent angina).   i.  Normal LV function.  ii.  of RCA with failed intervention.  f. Stress MPS 2018: Normal   g. LHC 2019: single vessel CAD with  of RCA, normal LVSF   2. 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 Echo 2019: marked enlargement of the right heart, biatrial enlargement, LVSF is normal, large left pleural effusion, severe TR, RVSP 30mmHg  b. Cardiac Cath (): Normal LV, Normal PAP, RV enlargement and decreased RVSF  c. Thoracentesis and Paracentesis March and May 2019  d. Paracentesis, 5700 mL off 2019  3. New onset atrial fibrillation 2019  a. Asymptomatic  b. CHADsVASC= 6, on Eliquis   4. Hypertension.   5. Dyslipidemia.  6. Diabetes mellitus, type 2.  A1C 6.1 (2016)  7. Nephrolithiasis.   8. History of “goiter.”  9. Thrombocytopenia  10. Cirrhosis, recent evaluation by [facility]  a. Paracentesis with findings consistent with cardiac ascites, Spring 2019  11. Surgical history:  a. Cholecystectomy.  b. Hernia repair x 2.   Allergies:  Allergies   Allergen Reactions   • Bee Venom Swelling   • Sulfamethoxazole-Trimethoprim Other (See Comments)     Pt unaware       Current Medications:  •  apixaban (ELIQUIS) 2.5 MG tablet tablet, Take 1 tablet by mouth 2 (Two) Times a Day.  •  aspirin 81 MG tablet, Take 81 mg by mouth Daily.  •  carvedilol (COREG)  "12.5 MG tablet, Take 1 tablet by mouth 2 (Two) Times a Day With Meals.  •  folic acid (FOLVITE) 400 MCG tablet, Take 400 mcg by mouth Daily  •  furosemide (LASIX) 40 MG tablet, Take 1 tablet by mouth 2 (Two) Times a Day.  •  nitroglycerin (NITROSTAT) 0.4 MG SL tablet, Place 1 tablet under the tongue Every 5 (Five) Minutes As Needed for Chest Pain.  •  simvastatin (ZOCOR) 40 MG tablet, TAKE 1 TABLET BY MOUTH ONCE DAILY  •  spironolactone (ALDACTONE) 25 MG tablet by mouth Daily.  •  tamsulosin (FLOMAX) 0.4 MG capsule 24 hr capsule, Take  by mouth Daily.    History of Present Illness:       Jordi Sam returns for follow up today with his daughter. He has seen Diogo in the heart failure clinic and Dr. Brady for IV diuresis and paracentesis respectively. He has some shortness of breath with exertion and orthopnea. His hip bothers him and when he lies down to relieve the hip pain he has trouble breathing. He can feel the fluid shift into his lungs.      The following portions of the patient's history were reviewed and updated as appropriate: allergies, current medications and problem list.    ROS: Pertinent positives as listed in the HPI.  All other systems reviewed and negative.    Objective:    Vitals:    09/12/19 1437 09/12/19 1443   BP: 113/64 90/52   BP Location: Left arm Left arm   Patient Position: Sitting Standing   Pulse: 62 59   Weight: 98.9 kg (218 lb)    Height: 180.3 cm (71\")        Physical Exam:  GENERAL: Alert, cooperative, in no acute distress.   HEENT: Normocephalic, no adenopathy, jugular venous distention to the angle of the jaw.  HEART: No discrete PMI is noted. Regular rhythm, normal rate, and no murmurs, gallops, or rubs.   LUNGS: Clear to auscultation bilaterally. No wheezing, rales or ronchi.  ABDOMEN: soft, bowel sounds present, non-tender. Ascites noted.  NEUROLOGIC: No focal abnormalities involving strength or sensation are noted.   EXTREMITIES: No clubbing, cyanosis noted. +4 lower " extremity edema      Diagnostic Data:    Lab Results   Component Value Date    WBC 5.08 06/25/2019    HGB 11.3 (L) 06/25/2019    HCT 38.6 06/25/2019    .0 (H) 06/25/2019     (L) 08/23/2019      Lab Results   Component Value Date    GLUCOSE 104 (H) 08/13/2019    CALCIUM 9.0 08/13/2019     08/13/2019    K 4.4 08/13/2019    CO2 26.9 08/13/2019    CL 99 08/13/2019    BUN 23 08/13/2019    CREATININE 1.35 (H) 08/13/2019    EGFRIFNONA 50 (L) 08/13/2019    BCR 17.0 08/13/2019    ANIONGAP 14.1 08/13/2019      Procedures      Assessment and Plan:   1.  Right heart failure: He has JVD to the angle of the jaw and +4 pitting lower extremity edema. Ascites has recurred after recent paracentesis. He continues with furosemide twice daily and spironolactone 25 mg daily (this is his max tolerated dose). We recommend obtaining knee high compression stockings for wear during the day. He will continue follow up with MAKAYLA Hooper as needed for IV diuresis and/or orders for therapeutic paracentesis.    We will see Jordi Sam back in 3 months or sooner on an as needed basis.    Scribed for Efrem Posadas MD by Shahla Jacobsen RN. 09/12/2019 3:39 PM.

## 2019-10-08 ENCOUNTER — TELEPHONE (OUTPATIENT)
Dept: CARDIOLOGY | Facility: HOSPITAL | Age: 84
End: 2019-10-08

## 2019-10-08 NOTE — TELEPHONE ENCOUNTER
Patient called and states that he has been having issues with abdominal fullness. Patient states that he has not been having any SOB or abnormal weight gain or edema any other places but states that this has been an ongoing problem and has been seen at the [facility] before. Patient was given an appointment for when Diogo return but advised that if he started to develop symptoms of SOB or edema any other places with weight gain to call and we would get him in sooner.

## 2019-10-14 DIAGNOSIS — I95.2 HYPOTENSION DUE TO DRUGS: ICD-10-CM

## 2019-10-15 RX ORDER — CARVEDILOL 12.5 MG/1
12.5 TABLET ORAL 2 TIMES DAILY WITH MEALS
Qty: 60 TABLET | Refills: 6 | Status: SHIPPED | OUTPATIENT
Start: 2019-10-15 | End: 2020-03-31

## 2019-10-16 ENCOUNTER — OFFICE VISIT (OUTPATIENT)
Dept: CARDIOLOGY | Facility: HOSPITAL | Age: 84
End: 2019-10-16

## 2019-10-16 ENCOUNTER — LAB (OUTPATIENT)
Dept: LAB | Facility: HOSPITAL | Age: 84
End: 2019-10-16

## 2019-10-16 VITALS
SYSTOLIC BLOOD PRESSURE: 119 MMHG | RESPIRATION RATE: 18 BRPM | HEIGHT: 71 IN | HEART RATE: 50 BPM | BODY MASS INDEX: 31.01 KG/M2 | TEMPERATURE: 97.5 F | DIASTOLIC BLOOD PRESSURE: 56 MMHG | OXYGEN SATURATION: 98 % | WEIGHT: 221.5 LBS

## 2019-10-16 DIAGNOSIS — R18.8 OTHER ASCITES: ICD-10-CM

## 2019-10-16 DIAGNOSIS — I48.20 CHRONIC ATRIAL FIBRILLATION (HCC): ICD-10-CM

## 2019-10-16 DIAGNOSIS — K74.69 OTHER CIRRHOSIS OF LIVER (HCC): ICD-10-CM

## 2019-10-16 DIAGNOSIS — I50.812 CHRONIC RIGHT HEART FAILURE (HCC): Primary | ICD-10-CM

## 2019-10-16 DIAGNOSIS — N18.30 STAGE 3 CHRONIC KIDNEY DISEASE (HCC): ICD-10-CM

## 2019-10-16 DIAGNOSIS — I95.2 HYPOTENSION DUE TO DRUGS: ICD-10-CM

## 2019-10-16 DIAGNOSIS — I50.812 CHRONIC RIGHT HEART FAILURE (HCC): ICD-10-CM

## 2019-10-16 LAB
ANION GAP SERPL CALCULATED.3IONS-SCNC: 12 MMOL/L (ref 5–15)
BASOPHILS # BLD AUTO: 0.05 10*3/MM3 (ref 0–0.2)
BASOPHILS NFR BLD AUTO: 0.9 % (ref 0–1.5)
BUN BLD-MCNC: 23 MG/DL (ref 8–23)
BUN/CREAT SERPL: 16.8 (ref 7–25)
CALCIUM SPEC-SCNC: 9 MG/DL (ref 8.6–10.5)
CHLORIDE SERPL-SCNC: 96 MMOL/L (ref 98–107)
CO2 SERPL-SCNC: 28 MMOL/L (ref 22–29)
CREAT BLD-MCNC: 1.37 MG/DL (ref 0.76–1.27)
DEPRECATED RDW RBC AUTO: 46.4 FL (ref 37–54)
EOSINOPHIL # BLD AUTO: 0.08 10*3/MM3 (ref 0–0.4)
EOSINOPHIL NFR BLD AUTO: 1.4 % (ref 0.3–6.2)
ERYTHROCYTE [DISTWIDTH] IN BLOOD BY AUTOMATED COUNT: 13 % (ref 12.3–15.4)
GFR SERPL CREATININE-BSD FRML MDRD: 49 ML/MIN/1.73
GLUCOSE BLD-MCNC: 86 MG/DL (ref 65–99)
HCT VFR BLD AUTO: 37.4 % (ref 37.5–51)
HGB BLD-MCNC: 12 G/DL (ref 13–17.7)
LYMPHOCYTES # BLD AUTO: 0.94 10*3/MM3 (ref 0.7–3.1)
LYMPHOCYTES NFR BLD AUTO: 16.8 % (ref 19.6–45.3)
MCH RBC QN AUTO: 31.3 PG (ref 26.6–33)
MCHC RBC AUTO-ENTMCNC: 32.1 G/DL (ref 31.5–35.7)
MCV RBC AUTO: 97.7 FL (ref 79–97)
MONOCYTES # BLD AUTO: 0.5 10*3/MM3 (ref 0.1–0.9)
MONOCYTES NFR BLD AUTO: 9 % (ref 5–12)
NEUTROPHILS # BLD AUTO: 4 10*3/MM3 (ref 1.7–7)
NEUTROPHILS NFR BLD AUTO: 71.7 % (ref 42.7–76)
PLATELET # BLD AUTO: 115 10*3/MM3 (ref 140–450)
PMV BLD AUTO: 11.1 FL (ref 6–12)
POTASSIUM BLD-SCNC: 4.3 MMOL/L (ref 3.5–5.2)
RBC # BLD AUTO: 3.83 10*6/MM3 (ref 4.14–5.8)
SODIUM BLD-SCNC: 136 MMOL/L (ref 136–145)
WBC NRBC COR # BLD: 5.58 10*3/MM3 (ref 3.4–10.8)

## 2019-10-16 PROCEDURE — 99214 OFFICE O/P EST MOD 30 MIN: CPT | Performed by: NURSE PRACTITIONER

## 2019-10-16 PROCEDURE — 36415 COLL VENOUS BLD VENIPUNCTURE: CPT

## 2019-10-16 PROCEDURE — 85025 COMPLETE CBC W/AUTO DIFF WBC: CPT

## 2019-10-16 PROCEDURE — 80048 BASIC METABOLIC PNL TOTAL CA: CPT

## 2019-10-16 NOTE — PROGRESS NOTES
[facility]  Heart and Valve Center      Encounter Date:10/16/2019     Jordi PRANAY Flaco  [address] [address]  [unfilled]    1934    Warren Glass MD Charles D Ryan is a 85 y.o. male.      Subjective:     Chief Complaint:  Ascites and Follow-up       HPI     85-year-old male with a history of CAD, right-sided heart failure with cirrhosis and ascites.  History of thoracentesis and paracentesis.  Last CT-guided paracentesis on 8/23/2019 with 5.7 L removed. (total 3 since 04/2019)  Patient currently not on lisinopril due to hypotension and worsening kidney function.  Kidney function initially improved after d/c of ace with reinitiation of spironolactone. Pt unwilling to increase aldactone due to fear of nausea with higher dosing in th past Lasix currently twice a day.  Tells me today that he often forgets his afternoon dosing. Patient with chronic atrial fibrillation on the low-dose Eliquis.  Patient follow-up with [facility] GI/hepatology. Pt requested to transfer care to  GI.     Patient reports over the last 2-week he has had increased abdominal swelling, fatigue.  Denies chest pain.  Dyspnea at baseline moderate with exertion.  Edema is at baseline (moderate).  Not wearing compression stockings. Requesting paracentesis.     Patient Active Problem List    Diagnosis Date Noted   • Stage 3 chronic kidney disease (CMS/HCC) 07/26/2019   • Other cirrhosis of liver (CMS/HCC) 07/11/2019   • Other ascites 06/25/2019     Note Last Updated: 8/23/2019     · CT-guided paracentesis 5/1/2019 (5.2 L removed)  · CT-guided paracentesis 6/17/2019 (5.6 L removed)  · CT-Guided paracentesis 8/23/2019: 5700 mL removed     • Chronic right heart failure (CMS/HCC) 06/06/2019   • Pleural effusion 04/08/2019     Note Last Updated: 4/8/2019     Added automatically from request for surgery [id number]     • Shortness of breath 03/05/2019   • Chronic atrial fibrillation 03/01/2019   •  Nephrolithiasis    • Controlled type 2 diabetes mellitus without complication, without long-term current use of insulin (CMS/[facility]) 08/30/2016   • Coronary arteriosclerosis in native artery 05/16/2016     Note Last Updated: 9/30/2016     1. Coronary artery disease:  a. Acute inferior STEMI with RV infarct features, January 2009.   b. Normal LV function, single vessel RCA disease.     c. Sirolimus eluting stents to the RCA, 01/09/2009.   d. Pericarditis, resolved.   e. C, 01/13/2012 (for recurrent angina).   i.  Normal LV function.  ii.  of RCA with failed intervention.       • Panlobular emphysema (CMS/[facility]) 05/16/2016   • Functional diarrhea 05/16/2016   • Mixed hyperlipidemia 05/16/2016   • Essential hypertension 05/16/2016         Past Surgical History:   Procedure Laterality Date   • BRONCHOSCOPY N/A 4/12/2019    Procedure: BRONCHOSCOPY WITH THORACENTESIS;  Surgeon: Marciano Higginbotham MD;  Location:  [facility] ENDOSCOPY;  Service: Pulmonary   • CARDIAC CATHETERIZATION Bilateral 1/30/2019    Procedure: Right and Left Heart Cath;  Surgeon: Efrem Posadas MD;  Location:  [facility] CATH INVASIVE LOCATION;  Service: Cardiology   • CARDIAC CATHETERIZATION     • CHOLECYSTECTOMY     • CORONARY ANGIOPLASTY WITH STENT PLACEMENT  01/09/2009    Sirolimus eluting stents to the RCA, 01/09/2009.    • HERNIA REPAIR      Hernia repair x 2.    • PARACENTESIS  06/17/2019       Allergies   Allergen Reactions   • Bee Venom Swelling   • Sulfamethoxazole-Trimethoprim Other (See Comments)     Pt unaware         Current Outpatient Medications:   •  apixaban (ELIQUIS) 2.5 MG tablet tablet, Take 1 tablet by mouth 2 (Two) Times a Day., Disp: 60 tablet, Rfl: 3  •  aspirin 81 MG tablet, Take 81 mg by mouth Daily., Disp: , Rfl:   •  carvedilol (COREG) 12.5 MG tablet, Take 1 tablet by mouth 2 (Two) Times a Day With Meals., Disp: 60 tablet, Rfl: 6  •  folic acid (FOLVITE) 400 MCG tablet, Take 400 mcg by mouth Daily., Disp: , Rfl:   •   "furosemide (LASIX) 40 MG tablet, Take 1 tablet by mouth 2 (Two) Times a Day., Disp: 60 tablet, Rfl: 3  •  nitroglycerin (NITROSTAT) 0.4 MG SL tablet, Place 1 tablet under the tongue Every 5 (Five) Minutes As Needed for Chest Pain., Disp: 25 tablet, Rfl: 6  •  simvastatin (ZOCOR) 40 MG tablet, TAKE 1 TABLET BY MOUTH ONCE DAILY, Disp: 90 tablet, Rfl: 3  •  spironolactone (ALDACTONE) 25 MG tablet, Take 2 tablets by mouth Daily., Disp: 60 tablet, Rfl: 0  •  tamsulosin (FLOMAX) 0.4 MG capsule 24 hr capsule, Take  by mouth Daily., Disp: , Rfl:     The following portions of the patient's history were reviewed and updated today during office visit as appropriate: allergies, current medications, past family history, past medical history, past social history, past surgical history and problem list.    Review of Systems   Constitution: Positive for weakness and weight gain.   Cardiovascular: Positive for dyspnea on exertion and leg swelling. Negative for chest pain, near-syncope, orthopnea, palpitations, paroxysmal nocturnal dyspnea and syncope.   Respiratory: Positive for cough and shortness of breath.    Gastrointestinal: Positive for bloating. Negative for abdominal pain.   Neurological: Negative for dizziness, light-headedness, loss of balance and numbness.   All other systems reviewed and are negative.      Objective:     Vitals:    10/16/19 0942   BP: 119/56   BP Location: Left arm   Patient Position: Sitting   Cuff Size: Adult   Pulse: 50   Resp: 18   Temp: 97.5 °F (36.4 °C)   TempSrc: Temporal   SpO2: 98%   Weight: 100 kg (221 lb 8 oz)   Height: 180.3 cm (71\")         Physical Exam   Constitutional: He is oriented to person, place, and time. He appears ill (chroniclly ill). No distress.   HENT:   Mouth/Throat: Oropharynx is clear and moist.   Eyes: No scleral icterus.   Neck: JVD present. No hepatojugular reflux present. Carotid bruit is not present. No tracheal deviation present. No thyromegaly present. "   Cardiovascular: Normal rate, normal heart sounds and intact distal pulses. An irregularly irregular rhythm present. Exam reveals no friction rub.   No murmur heard.  Pulmonary/Chest: Effort normal. He has decreased breath sounds in the left lower field.   Abdominal: Bowel sounds are normal. He exhibits distension. There is no tenderness.   Musculoskeletal: He exhibits edema (2+ bilaterally).   Lymphadenopathy:     He has no cervical adenopathy.   Neurological: He is alert and oriented to person, place, and time.   Skin: Skin is warm, dry and intact. No rash noted. No cyanosis or erythema. No pallor.   Psychiatric: He has a normal mood and affect. His behavior is normal. Thought content normal.   Vitals reviewed.      Lab and Diagnostic Review:  Lab Results   Component Value Date    GLUCOSE 104 (H) 08/13/2019    CALCIUM 9.0 08/13/2019     08/13/2019    K 4.4 08/13/2019    CO2 26.9 08/13/2019    CL 99 08/13/2019    BUN 23 08/13/2019    CREATININE 1.35 (H) 08/13/2019    EGFRIFNONA 50 (L) 08/13/2019    BCR 17.0 08/13/2019    ANIONGAP 14.1 08/13/2019     Lab Results   Component Value Date    WBC 5.08 06/25/2019    HGB 11.3 (L) 06/25/2019    HCT 38.6 06/25/2019    .0 (H) 06/25/2019     (L) 08/23/2019     Lab Results   Component Value Date    ALT 13 05/23/2019       Assessment and Plan:         1. Chronic right heart failure (CMS/HCC)  Encouraged patient to be compliance with med dosing  Increase aldactone 50 mg as prescribed.    - Basic Metabolic Panel; Future  - CBC & Differential; Future    2. Other ascites  Schedule paracentesis interventional radiology  Pt requesting referral to [facility] ([facility])    3. Chronic atrial fibrillation  Hold eliquis for 3 days    4. Stage 3 chronic kidney disease (CMS/HCC)  Continue to monitor    5. Hypotension due to drugs  May consider adding midodrine    F/u 3 months or sooner if needed.     *Please note that portions of this note were completed with a  voice recognition program. Efforts were made to edit the dictations, but occasionally words are mistranscribed.

## 2019-10-30 ENCOUNTER — HOSPITAL ENCOUNTER (OUTPATIENT)
Dept: CT IMAGING | Facility: HOSPITAL | Age: 84
Discharge: HOME | End: 2019-10-30
Admitting: NURSE PRACTITIONER

## 2019-10-30 VITALS
SYSTOLIC BLOOD PRESSURE: 103 MMHG | OXYGEN SATURATION: 98 % | HEIGHT: 71 IN | BODY MASS INDEX: 31.33 KG/M2 | RESPIRATION RATE: 16 BRPM | WEIGHT: 223.8 LBS | TEMPERATURE: 97.6 F | DIASTOLIC BLOOD PRESSURE: 51 MMHG | HEART RATE: 60 BPM

## 2019-10-30 DIAGNOSIS — R18.8 OTHER ASCITES: ICD-10-CM

## 2019-10-30 DIAGNOSIS — I50.812 CHRONIC RIGHT HEART FAILURE (HCC): ICD-10-CM

## 2019-10-30 LAB
APTT PPP: 38.3 SECONDS (ref 24–37)
GLUCOSE BLDC GLUCOMTR-MCNC: 104 MG/DL (ref 70–130)
INR PPP: 1.33 (ref 0.85–1.16)
PLATELET # BLD AUTO: 118 10*3/MM3 (ref 140–450)
PROTHROMBIN TIME: 15.8 SECONDS (ref 11.2–14.3)

## 2019-10-30 PROCEDURE — 85730 THROMBOPLASTIN TIME PARTIAL: CPT | Performed by: RADIOLOGY

## 2019-10-30 PROCEDURE — 25010000002 ALBUMIN HUMAN 25% PER 50 ML: Performed by: NURSE PRACTITIONER

## 2019-10-30 PROCEDURE — 82962 GLUCOSE BLOOD TEST: CPT

## 2019-10-30 PROCEDURE — C1729 CATH, DRAINAGE: HCPCS

## 2019-10-30 PROCEDURE — 85049 AUTOMATED PLATELET COUNT: CPT | Performed by: RADIOLOGY

## 2019-10-30 PROCEDURE — 85610 PROTHROMBIN TIME: CPT | Performed by: RADIOLOGY

## 2019-10-30 PROCEDURE — 75989 ABSCESS DRAINAGE UNDER X-RAY: CPT

## 2019-10-30 PROCEDURE — P9047 ALBUMIN (HUMAN), 25%, 50ML: HCPCS | Performed by: NURSE PRACTITIONER

## 2019-10-30 PROCEDURE — 25010000003 LIDOCAINE 1 % SOLUTION: Performed by: RADIOLOGY

## 2019-10-30 RX ORDER — ALBUMIN (HUMAN) 12.5 G/50ML
25 SOLUTION INTRAVENOUS ONCE
Status: COMPLETED | OUTPATIENT
Start: 2019-10-30 | End: 2019-10-30

## 2019-10-30 RX ORDER — LIDOCAINE HYDROCHLORIDE 10 MG/ML
20 INJECTION, SOLUTION INFILTRATION; PERINEURAL ONCE
Status: COMPLETED | OUTPATIENT
Start: 2019-10-30 | End: 2019-10-30

## 2019-10-30 RX ORDER — SODIUM CHLORIDE 0.9 % (FLUSH) 0.9 %
10 SYRINGE (ML) INJECTION AS NEEDED
Status: DISCONTINUED | OUTPATIENT
Start: 2019-10-30 | End: 2019-10-31 | Stop reason: HOSPADM

## 2019-10-30 RX ADMIN — LIDOCAINE HYDROCHLORIDE 20 ML: 10 INJECTION, SOLUTION INFILTRATION; PERINEURAL at 11:08

## 2019-10-30 RX ADMIN — ALBUMIN HUMAN 25 G: 0.25 SOLUTION INTRAVENOUS at 12:24

## 2019-10-30 NOTE — NURSING NOTE
Patient here for paracentesis today, tolerated well. Pt had 10.5 L of fluid removed today, which is more than he has previously had removed. Call placed to ordering provider (cardiology) who ordered albumin infusion. Pt tolerated well. Pt verbalizes understanding of discharge instructions, and has no questions at this time.

## 2019-10-30 NOTE — NURSING NOTE
Patient placed on cardiac monitors, vitals stable. Images taken and reviewed by Dr. Bautista. [place] 15242 ml serous fluid was removed from the left side of the peritoneum. Patient tolerated well. Report to RAVINDRA.

## 2019-11-06 ENCOUNTER — OFFICE VISIT (OUTPATIENT)
Dept: PULMONOLOGY | Facility: CLINIC | Age: 84
End: 2019-11-06

## 2019-11-06 VITALS
BODY MASS INDEX: 30.24 KG/M2 | SYSTOLIC BLOOD PRESSURE: 100 MMHG | HEART RATE: 80 BPM | WEIGHT: 216 LBS | OXYGEN SATURATION: 96 % | DIASTOLIC BLOOD PRESSURE: 78 MMHG | TEMPERATURE: 98.4 F | HEIGHT: 71 IN

## 2019-11-06 DIAGNOSIS — J90 PLEURAL EFFUSION: Primary | ICD-10-CM

## 2019-11-06 PROCEDURE — 99213 OFFICE O/P EST LOW 20 MIN: CPT | Performed by: INTERNAL MEDICINE

## 2019-11-06 NOTE — PROGRESS NOTES
CC:    Pleural Effusion    HPI:    84 y/o WM w/ h/o CAD, ICM EF 60%, prior stents, small PFO /w bi-directional shunt, small pericardial effusion, Afib on Eliquis since Dec 2018, HTN, HLD, T2DM who presents for evaluation of recurrent left sided effusion.  Patient also has an interesting history of RV dysfunction.  He had an inferior wall STEMI in 2009 w/ stent to RCA.  He recently had a left and right heart cath that showed patent coronaries except for the RCA which had  w/ probable in-stent stenosis.  Hemodynamics showed CI 1.63, Mean PA 19, PCWP 14, LVEDP 15-20, PVR 1.4 Wood units, and at least moderate global hypokinesia of the RV w/ RV enlargement.  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    He was first noted to have a left sided pleural effusion on CXR in December 2018.  This was eventually drained in 3/2019 in IR w/ drainage of 1400 cc of bloody fluid.  Fluid studies are consistent with a bloody transudate w/ negative culture and cytology.  He did have some symptomatic relief but the effusion re-occurred on repeat CXR 4/3/19.  Doesn't recall any trauma to left ta-thorax.  Has significant smoking history 2.5 ppd x 28 years, quit in 1993.  No hemoptysis, no wheezing.  Also reports weight loss, abdominal swelling, LE edema, and poor appetite.  Remains on Eliquis for Afib.    Also notes snoring, PND, and daytime somnolence.    Patient has portal hypertension and refractory ascites.  Has had several paracentesis.  Following with  hepatology.    INTERVAL HISTORY:    Just had a paracentesis 10/30/19 w/ 10 liters removed.  Feeling better.  Has some allergic rhinitis.    PMH:    Past Medical History:   Diagnosis Date   • A-fib (CMS/HCC)    • Acute inferior myocardial infarction (CMS/HCC)     Acute inferior STEMI with RV infarct features, January 2009.    • CAD (coronary artery disease)    • Dyslipidemia    • Goiter     History of “goiter.”   •  Hyperlipidemia    • Hypertension    • Nephrolithiasis    • Type 2 diabetes mellitus (CMS/HCC)      PSH:    Past Surgical History:   Procedure Laterality Date   • BRONCHOSCOPY N/A 2019    Procedure: BRONCHOSCOPY WITH THORACENTESIS;  Surgeon: Marciano Higginbotham MD;  Location:  [facility];  Service: Pulmonary   • CARDIAC CATHETERIZATION Bilateral 2019    Procedure: Right and Left Heart Cath;  Surgeon: Efrem Posadas MD;  Location:  [facility];  Service: Cardiology   • CARDIAC CATHETERIZATION     • CHOLECYSTECTOMY     • CORONARY ANGIOPLASTY WITH STENT PLACEMENT  2009    Sirolimus eluting stents to the RCA, 2009.    • HERNIA REPAIR      Hernia repair x 2.    • PARACENTESIS  2019     FH:    Family History   Problem Relation Age of Onset   • Dementia Mother    • Stroke Mother    • Heart disease Father    • Heart attack Father    • Aneurysm Father    • Cancer Sister    • No Known Problems Brother    • No Known Problems Sister    • Heart disease Brother         CABG   • Hypertension Daughter    • Hypertension Son      SH:    Social History     Socioeconomic History   • Marital status:      Spouse name: Melly   • Number of children: 4   • Years of education: Not on file   • Highest education level: Not on file   Occupational History   • Occupation: retired     Comment: works [employer]   Social Needs   • Financial resource strain: Not hard at all   • Food insecurity:     Worry: Never true     Inability: Never true   • Transportation needs:     Medical: No     Non-medical: No   Tobacco Use   • Smoking status: Former Smoker     Packs/day: 2.50     Years: 28.00     Pack years: 70.00     Types: Cigarettes     Last attempt to quit: 1983     Years since quittin.5   • Smokeless tobacco: Never Used   Substance and Sexual Activity   • Alcohol use: No   • Drug use: No   • Sexual activity: Defer   Lifestyle   • Physical activity:     Days per week:  0 days     Minutes per session: 0 min   • Stress: Not at all   Relationships   • Social connections:     Talks on phone: Twice a week     Gets together: Once a week     Attends Temple service: 1 to 4 times per year     Active member of club or organization: No     Attends meetings of clubs or organizations: Never     Relationship status:    Social History Narrative    Caffeine Intake: 2  servings per day (coffee)    Patient lives at home wit his wife     ALLERGIES:    Allergies   Allergen Reactions   • Bee Venom Swelling   • Sulfamethoxazole-Trimethoprim Other (See Comments)     Pt unaware     MEDICATIONS:      Current Outpatient Medications:   •  apixaban (ELIQUIS) 2.5 MG tablet tablet, Take 1 tablet by mouth 2 (Two) Times a Day. (Patient taking differently: Take 1.25 mg by mouth 2 (Two) Times a Day.), Disp: 60 tablet, Rfl: 3  •  aspirin 81 MG tablet, Take 81 mg by mouth Daily., Disp: , Rfl:   •  carvedilol (COREG) 12.5 MG tablet, Take 1 tablet by mouth 2 (Two) Times a Day With Meals., Disp: 60 tablet, Rfl: 6  •  folic acid (FOLVITE) 400 MCG tablet, Take 400 mcg by mouth Daily., Disp: , Rfl:   •  furosemide (LASIX) 40 MG tablet, Take 1 tablet by mouth 2 (Two) Times a Day., Disp: 60 tablet, Rfl: 3  •  nitroglycerin (NITROSTAT) 0.4 MG SL tablet, Place 1 tablet under the tongue Every 5 (Five) Minutes As Needed for Chest Pain., Disp: 25 tablet, Rfl: 6  •  simvastatin (ZOCOR) 40 MG tablet, TAKE 1 TABLET BY MOUTH ONCE DAILY, Disp: 90 tablet, Rfl: 3  •  spironolactone (ALDACTONE) 25 MG tablet, Take 2 tablets by mouth Daily. (Patient taking differently: Take 25 mg by mouth Daily.), Disp: 60 tablet, Rfl: 0  •  tamsulosin (FLOMAX) 0.4 MG capsule 24 hr capsule, Take  by mouth Daily., Disp: , Rfl:   ROS:  Per HPI, otherwise all systems reviewed and negative.    DIAGNOSTIC DATA (Reviewed and interpreted by me unless otherwise specified):    PFT 4/8/19 - combined moderately severe obstruction and restriction, no change  w/ BD, moderate reduction in DLCO corrects for alveolar volume    CXR 5/8/19 - moderate left effusion    Vitals:    11/06/19 1106   BP: 100/78   Pulse: 80   Temp: 98.4 °F (36.9 °C)   SpO2: 96%       Physical Exam   Constitutional: Oriented to person, place, and time. Appears well-developed and well-nourished.   Head: Normocephalic and atraumatic.   Nose: Nose normal.   Mouth/Throat: Oropharynx is clear and moist.   Eyes: Conjunctivae are normal.  Pupils normal.  Neck: No tracheal deviation present.   Cardiovascular: Normal rate, regular rhythm, normal heart sounds and intact distal pulses.  Exam reveals no gallop and no friction rub.  No thrill.  No JVD. 2+ edema.  No murmur heard.  Pulmonary/Chest:diminished left base.  No tenderness to palpation.  No clubbing.   Abdominal: Soft. Distended abdomen, non tender  Musculoskeletal: Normal range of motion.  No tenderness.  Lymphadenopathy:  No cervical adenopathy.   Neurological:  No new focal neurological deficits observed   Skin: Skin is warm and dry. No rash noted.   Psychiatric: Normal mood and affect.  Behavior is normal. Judgment normal.    Assessment/Plan     1)  Recurrent Pleural Effusion - hepatic hydrothorax.  Being managed appropriately.  I don't have anything to add at this time from a pulmonary standpoint.    2) Allergic Rhinitis - add flonase    RTC prn    Marciano Higginbotham MD  Pulmonology and Critical Care Medicine  11/06/19 11:33 AM  Electronically Signed    C.C.:  Efrem Posadas MD, Warren Glass MD

## 2019-11-07 ENCOUNTER — OFFICE VISIT (OUTPATIENT)
Dept: GASTROENTEROLOGY | Facility: CLINIC | Age: 84
End: 2019-11-07

## 2019-11-07 ENCOUNTER — LAB (OUTPATIENT)
Dept: LAB | Facility: HOSPITAL | Age: 84
End: 2019-11-07

## 2019-11-07 VITALS
DIASTOLIC BLOOD PRESSURE: 57 MMHG | SYSTOLIC BLOOD PRESSURE: 103 MMHG | HEART RATE: 54 BPM | HEIGHT: 71 IN | BODY MASS INDEX: 30.04 KG/M2 | WEIGHT: 214.6 LBS

## 2019-11-07 DIAGNOSIS — K74.60 CIRRHOSIS OF LIVER WITH ASCITES, UNSPECIFIED HEPATIC CIRRHOSIS TYPE (HCC): Primary | ICD-10-CM

## 2019-11-07 DIAGNOSIS — R18.8 CIRRHOSIS OF LIVER WITH ASCITES, UNSPECIFIED HEPATIC CIRRHOSIS TYPE (HCC): ICD-10-CM

## 2019-11-07 DIAGNOSIS — K72.10 END STAGE LIVER DISEASE (HCC): ICD-10-CM

## 2019-11-07 DIAGNOSIS — K74.60 CIRRHOSIS OF LIVER WITH ASCITES, UNSPECIFIED HEPATIC CIRRHOSIS TYPE (HCC): ICD-10-CM

## 2019-11-07 DIAGNOSIS — R18.8 CIRRHOSIS OF LIVER WITH ASCITES, UNSPECIFIED HEPATIC CIRRHOSIS TYPE (HCC): Primary | ICD-10-CM

## 2019-11-07 LAB
ALBUMIN SERPL-MCNC: 3.4 G/DL (ref 3.5–5.2)
ALBUMIN/GLOB SERPL: 1.3 G/DL
ALP SERPL-CCNC: 92 U/L (ref 39–117)
ALPHA-FETOPROTEIN: 2.32 NG/ML (ref 0–8.3)
ALT SERPL W P-5'-P-CCNC: 15 U/L (ref 1–41)
ANION GAP SERPL CALCULATED.3IONS-SCNC: 10.9 MMOL/L (ref 5–15)
AST SERPL-CCNC: 26 U/L (ref 1–40)
BASOPHILS # BLD AUTO: 0.05 10*3/MM3 (ref 0–0.2)
BASOPHILS NFR BLD AUTO: 0.9 % (ref 0–1.5)
BILIRUB SERPL-MCNC: 0.5 MG/DL (ref 0.2–1.2)
BUN BLD-MCNC: 22 MG/DL (ref 8–23)
BUN/CREAT SERPL: 16.1 (ref 7–25)
CALCIUM SPEC-SCNC: 8.8 MG/DL (ref 8.6–10.5)
CHLORIDE SERPL-SCNC: 102 MMOL/L (ref 98–107)
CO2 SERPL-SCNC: 28.1 MMOL/L (ref 22–29)
CREAT BLD-MCNC: 1.37 MG/DL (ref 0.76–1.27)
DEPRECATED RDW RBC AUTO: 47.3 FL (ref 37–54)
EOSINOPHIL # BLD AUTO: 0.11 10*3/MM3 (ref 0–0.4)
EOSINOPHIL NFR BLD AUTO: 1.9 % (ref 0.3–6.2)
ERYTHROCYTE [DISTWIDTH] IN BLOOD BY AUTOMATED COUNT: 13.2 % (ref 12.3–15.4)
GFR SERPL CREATININE-BSD FRML MDRD: 49 ML/MIN/1.73
GLOBULIN UR ELPH-MCNC: 2.6 GM/DL
GLUCOSE BLD-MCNC: 99 MG/DL (ref 65–99)
HCT VFR BLD AUTO: 38.6 % (ref 37.5–51)
HGB BLD-MCNC: 12.2 G/DL (ref 13–17.7)
IMM GRANULOCYTES # BLD AUTO: 0.01 10*3/MM3 (ref 0–0.05)
IMM GRANULOCYTES NFR BLD AUTO: 0.2 % (ref 0–0.5)
INR PPP: 1.36 (ref 0.85–1.16)
LYMPHOCYTES # BLD AUTO: 0.91 10*3/MM3 (ref 0.7–3.1)
LYMPHOCYTES NFR BLD AUTO: 15.8 % (ref 19.6–45.3)
MCH RBC QN AUTO: 30.3 PG (ref 26.6–33)
MCHC RBC AUTO-ENTMCNC: 31.6 G/DL (ref 31.5–35.7)
MCV RBC AUTO: 96 FL (ref 79–97)
MONOCYTES # BLD AUTO: 0.55 10*3/MM3 (ref 0.1–0.9)
MONOCYTES NFR BLD AUTO: 9.6 % (ref 5–12)
NEUTROPHILS # BLD AUTO: 4.12 10*3/MM3 (ref 1.7–7)
NEUTROPHILS NFR BLD AUTO: 71.6 % (ref 42.7–76)
NRBC BLD AUTO-RTO: 0 /100 WBC (ref 0–0.2)
PLATELET # BLD AUTO: 132 10*3/MM3 (ref 140–450)
PMV BLD AUTO: 10.6 FL (ref 6–12)
POTASSIUM BLD-SCNC: 4.5 MMOL/L (ref 3.5–5.2)
PROT SERPL-MCNC: 6 G/DL (ref 6–8.5)
PROTHROMBIN TIME: 16.1 SECONDS (ref 11.2–14.3)
RBC # BLD AUTO: 4.02 10*6/MM3 (ref 4.14–5.8)
SODIUM BLD-SCNC: 141 MMOL/L (ref 136–145)
WBC NRBC COR # BLD: 5.75 10*3/MM3 (ref 3.4–10.8)

## 2019-11-07 PROCEDURE — 36415 COLL VENOUS BLD VENIPUNCTURE: CPT | Performed by: NURSE PRACTITIONER

## 2019-11-07 PROCEDURE — 99214 OFFICE O/P EST MOD 30 MIN: CPT | Performed by: NURSE PRACTITIONER

## 2019-11-07 PROCEDURE — 80053 COMPREHEN METABOLIC PANEL: CPT | Performed by: NURSE PRACTITIONER

## 2019-11-07 PROCEDURE — 85025 COMPLETE CBC W/AUTO DIFF WBC: CPT | Performed by: NURSE PRACTITIONER

## 2019-11-07 PROCEDURE — 85610 PROTHROMBIN TIME: CPT

## 2019-11-07 PROCEDURE — 82105 ALPHA-FETOPROTEIN SERUM: CPT | Performed by: NURSE PRACTITIONER

## 2019-11-07 RX ORDER — BUMETANIDE 2 MG/1
2 TABLET ORAL 2 TIMES DAILY
Qty: 60 TABLET | Refills: 1 | Status: SHIPPED | OUTPATIENT
Start: 2019-11-07 | End: 2019-12-19 | Stop reason: SDDI

## 2019-11-07 NOTE — PROGRESS NOTES
GASTROENTEROLOGY OFFICE NOTE  Jordi Sam  [id number]  1934    CARE TEAM  Patient Care Team:  Warren Glass MD as PCP - General  Warren Glass MD as PCP - Family Medicine  Efrem Posadas MD as PCP - Claims Attributed  Efrem Posadas MD as Consulting Physician (Cardiology)  Jordi Law MD as Consulting Physician (Urology)    Referring Provider: Warren Glass MD    Chief Complaint   Patient presents with   • Cirrhosis        HISTORY OF PRESENT ILLNESS:  Mr. Sam is an 85-year-old male seen in consultation for cirrhosis with coronary artery disease, hyperlipidemia, hypertension, atrial fibrillation, right heart systolic failure, and obesity.    He reports that he began having problems with ascites in September 2018, and started on diuretics in December 2018 when he was also diagnosed with heart failure and atrial fibrillation.  He underwent a thoracentesis (3/2019) with 1.5 L removed, and paracentesis (5/2019) with 6 L removed (no SBP).  Records available for review reflect SAAG 1.9 (ascites albumin 1.9, serum albumin 3.8), and ascitic total protein 3.2 consistent with cardiac ascites.  He is currently taking Lasix 40 mg twice daily and spironolactone 25 mg daily.  This is apparently the maximum dose that he can tolerate due to impaired renal function.  His ascites and peripheral edema is not well controlled however.  He continues to require paracentesis about every 8 weeks.  His last paracentesis was on October 30 with 10 L removed.    Most recent abdominal imaging, CT abdomen and pelvis (4/2019) with moderate pericardial effusion, small to moderate left pleural effusion, small to moderate ascites, and small lobulated liver suggesting cirrhosis (no hepatic lesions, no thrombus).  Right heart cath (1/2019) with LV EF 70% and severe tricuspid regurgitation.  Previous serologic workup for hepatitides ([facility]) was unremarkable.    Today, he complains of abdominal distention  however, he is not symptomatic with shortness of breath as he typically becomes as his ascites increases.  He denies fever/chills, nausea, vomiting, diarrhea, constipation.  He denies rash, itching, abdominal pain, jaundice, dark urine, confusion, hematemesis, melena, or hematochezia.    PAST MEDICAL HISTORY  Past Medical History:   Diagnosis Date   • A-fib (CMS/HCC)    • Acute inferior myocardial infarction (CMS/[facility])     Acute inferior STEMI with RV infarct features, January 2009.    • CAD (coronary artery disease)    • Dyslipidemia    • Goiter     History of “goiter.”   • Hyperlipidemia    • Hypertension    • Nephrolithiasis    • Type 2 diabetes mellitus (CMS/HCC)         PAST SURGICAL HISTORY  Past Surgical History:   Procedure Laterality Date   • BRONCHOSCOPY N/A 4/12/2019    Procedure: BRONCHOSCOPY WITH THORACENTESIS;  Surgeon: Marciano Higginbotham MD;  Location:  [email or web address];  Service: Pulmonary   • CARDIAC CATHETERIZATION Bilateral 1/30/2019    Procedure: Right and Left Heart Cath;  Surgeon: Efrem Posadas MD;  Location:  [facility];  Service: Cardiology   • CARDIAC CATHETERIZATION     • CHOLECYSTECTOMY     • CORONARY ANGIOPLASTY WITH STENT PLACEMENT  01/09/2009    Sirolimus eluting stents to the RCA, 01/09/2009.    • HERNIA REPAIR      Hernia repair x 2.    • PARACENTESIS  06/17/2019        MEDICATIONS:    Current Outpatient Medications:   •  apixaban (ELIQUIS) 2.5 MG tablet tablet, Take 1 tablet by mouth 2 (Two) Times a Day. (Patient taking differently: Take 1.25 mg by mouth 2 (Two) Times a Day.), Disp: 60 tablet, Rfl: 3  •  aspirin 81 MG tablet, Take 81 mg by mouth Daily., Disp: , Rfl:   •  bumetanide (BUMEX) 2 MG tablet, Take 1 tablet by mouth 2 (Two) Times a Day., Disp: 60 tablet, Rfl: 1  •  carvedilol (COREG) 12.5 MG tablet, Take 1 tablet by mouth 2 (Two) Times a Day With Meals., Disp: 60 tablet, Rfl: 6  •  folic acid (FOLVITE) 400 MCG tablet, Take 400 mcg by mouth  Daily., Disp: , Rfl:   •  nitroglycerin (NITROSTAT) 0.4 MG SL tablet, Place 1 tablet under the tongue Every 5 (Five) Minutes As Needed for Chest Pain., Disp: 25 tablet, Rfl: 6  •  simvastatin (ZOCOR) 40 MG tablet, TAKE 1 TABLET BY MOUTH ONCE DAILY, Disp: 90 tablet, Rfl: 3  •  spironolactone (ALDACTONE) 25 MG tablet, Take 2 tablets by mouth Daily. (Patient taking differently: Take 25 mg by mouth Daily.), Disp: 60 tablet, Rfl: 0  •  tamsulosin (FLOMAX) 0.4 MG capsule 24 hr capsule, Take  by mouth Daily., Disp: , Rfl:     ALLERGIES  Allergies   Allergen Reactions   • Bee Venom Swelling   • Sulfamethoxazole-Trimethoprim Other (See Comments)     Pt unaware       FAMILY HISTORY:  Family History   Problem Relation Age of Onset   • Dementia Mother    • Stroke Mother    • Heart disease Father    • Heart attack Father    • Aneurysm Father    • Cancer Sister    • No Known Problems Brother    • No Known Problems Sister    • Heart disease Brother         CABG   • Hypertension Daughter    • Hypertension Son        SOCIAL HISTORY  Social History     Socioeconomic History   • Marital status:      Spouse name: Melly   • Number of children: 4   • Years of education: Not on file   • Highest education level: Not on file   Occupational History   • Occupation: retired     Comment: works [employer]   Social Needs   • Financial resource strain: Not hard at all   • Food insecurity:     Worry: Never true     Inability: Never true   • Transportation needs:     Medical: No     Non-medical: No   Tobacco Use   • Smoking status: Former Smoker     Packs/day: 2.50     Years: 28.00     Pack years: 70.00     Types: Cigarettes     Last attempt to quit: 1983     Years since quittin.5   • Smokeless tobacco: Never Used   Substance and Sexual Activity   • Alcohol use: No   • Drug use: No   • Sexual activity: Defer   Lifestyle   • Physical activity:     Days per week: 0 days     Minutes per session: 0 min   • Stress: Not at all    Relationships   • Social connections:     Talks on phone: Twice a week     Gets together: Once a week     Attends [employer] service: 1 to 4 times per year     Active member of club or organization: No     Attends meetings of clubs or organizations: Never     Relationship status:    Social History Narrative    Caffeine Intake: 2  servings per day (coffee)    Patient lives at home wit his wife       REVIEW OF SYSTEMS  Review of Systems   Constitutional: Positive for fatigue. Negative for activity change, appetite change, chills, diaphoresis, fever, unexpected weight gain and unexpected weight loss.   HENT: Negative for congestion, dental problem, drooling, ear discharge, ear pain, facial swelling, hearing loss, mouth sores, nosebleeds, postnasal drip, rhinorrhea, sinus pressure, sneezing, sore throat, swollen glands, tinnitus, trouble swallowing and voice change.    Respiratory: Negative for apnea, cough, choking, chest tightness, shortness of breath, wheezing and stridor.    Cardiovascular: Positive for leg swelling. Negative for chest pain and palpitations.   Gastrointestinal: Positive for abdominal distention. Negative for abdominal pain, anal bleeding, blood in stool, constipation, diarrhea, nausea, rectal pain, vomiting, GERD and indigestion.   Endocrine: Negative for cold intolerance, heat intolerance, polydipsia, polyphagia and polyuria.   Musculoskeletal: Positive for arthralgias. Negative for back pain, gait problem, joint swelling, myalgias, neck pain, neck stiffness and bursitis.   Skin: Negative for color change, dry skin, rash and skin lesions.   Allergic/Immunologic: Negative for environmental allergies, food allergies and immunocompromised state.   Neurological: Negative for dizziness, tremors, seizures, syncope, facial asymmetry, speech difficulty, weakness, light-headedness, numbness, headache, memory problem and confusion.   Hematological: Negative for adenopathy. Bruises/bleeds easily.  "  Psychiatric/Behavioral: Negative for agitation, behavioral problems, decreased concentration, dysphoric mood, hallucinations, self-injury, sleep disturbance, suicidal ideas, negative for hyperactivity, depressed mood and stress. The patient is not nervous/anxious.      PHYSICAL EXAM   /57 (BP Location: Right arm, Patient Position: Sitting, Cuff Size: Adult)   Pulse 54   Ht 180.3 cm (70.98\")   Wt 97.3 kg (214 lb 9.6 oz)   BMI 29.94 kg/m²   Physical Exam   Constitutional: He is oriented to person, place, and time. He appears well-developed and well-nourished.   HENT:   Head: Normocephalic.   Mouth/Throat: Oropharynx is clear and moist.   Eyes: EOM are normal. Pupils are equal, round, and reactive to light.   Neck: Normal range of motion. Neck supple. JVD present.   Cardiovascular: Normal rate and regular rhythm.   Pulmonary/Chest: Effort normal and breath sounds normal. He has no wheezes. He has no rales.   Abdominal: Soft. Bowel sounds are normal. He exhibits distension and ascites. He exhibits no mass. There is no tenderness. There is no rebound and no guarding. No hernia.   Musculoskeletal: Normal range of motion.   +4 lower extremity edema, bilaterally   Neurological: He is alert and oriented to person, place, and time. No cranial nerve deficit.   Skin: Skin is warm and dry.   Psychiatric: He has a normal mood and affect. His behavior is normal. Judgment normal.   Nursing note and vitals reviewed.    Results Review:  [facility] records reviewed and discussed with patient.     ASSESSMENT / PLAN  1. End stage liver disease  -Discussed with patient that he is not likely transplant candidate due to age and comorbidity   -MELDNa pending  Plan:  -CMP, CBC, PT/INR today    2. Cirrhosis, decompensated by ascites  -cirrhosis secondary to cardiac dysfunction  - no evidence of hepatic encephalopathy    # Ascites/Pedal edema  -Low sodium diet 2 gm/day  -Not controlled on current diuretics. I am going to try to get " better control of his ascites with close monitoring of his renal function  Plan:  -Discontinue Lasix  -Continue Spironolactone 25 mg daily  -Begin Bumex 2 mg BID, BMP in 4 days  -Paracentesis prn    # Surveillance for Esophageal varices needed  Plan:  -EGD    # Hepatocellular carcinoma surveillance  - Abdominal imaging every six months  - Last abdominal imaging CT abd/pelvis (4/2019). Result: small lobulated liver suggesting cirrhosis (no hepatic lesions, no thrombus)  Plan:  -US abdomen complete    # Nutrition  Plan:  -High protein diet  -Low sodium diet 2 gm/day  -Avoid alcohol, avoid NSAIDS    Return in about 4 weeks (around 12/5/2019).    I discussed the patients findings and my recommendations with patient and family    Nilton Jade, APRN

## 2019-11-08 PROBLEM — K72.10 END STAGE LIVER DISEASE (HCC): Status: ACTIVE | Noted: 2019-11-08

## 2019-11-08 PROBLEM — K74.60 CIRRHOSIS OF LIVER WITH ASCITES (HCC): Status: ACTIVE | Noted: 2019-07-11

## 2019-11-08 PROBLEM — R18.8 CIRRHOSIS OF LIVER WITH ASCITES: Status: ACTIVE | Noted: 2019-07-11

## 2019-11-11 ENCOUNTER — APPOINTMENT (OUTPATIENT)
Dept: LAB | Facility: HOSPITAL | Age: 84
End: 2019-11-11

## 2019-11-11 ENCOUNTER — LAB (OUTPATIENT)
Dept: LAB | Facility: HOSPITAL | Age: 84
End: 2019-11-11

## 2019-11-11 DIAGNOSIS — R18.8 CIRRHOSIS OF LIVER WITH ASCITES, UNSPECIFIED HEPATIC CIRRHOSIS TYPE (HCC): Primary | ICD-10-CM

## 2019-11-11 DIAGNOSIS — K74.60 CIRRHOSIS OF LIVER WITH ASCITES, UNSPECIFIED HEPATIC CIRRHOSIS TYPE (HCC): Primary | ICD-10-CM

## 2019-11-11 DIAGNOSIS — R18.8 CIRRHOSIS OF LIVER WITH ASCITES, UNSPECIFIED HEPATIC CIRRHOSIS TYPE (HCC): ICD-10-CM

## 2019-11-11 DIAGNOSIS — K74.60 CIRRHOSIS OF LIVER WITH ASCITES, UNSPECIFIED HEPATIC CIRRHOSIS TYPE (HCC): ICD-10-CM

## 2019-11-11 LAB
ANION GAP SERPL CALCULATED.3IONS-SCNC: 9.3 MMOL/L (ref 5–15)
BUN BLD-MCNC: 22 MG/DL (ref 8–23)
BUN/CREAT SERPL: 14.9 (ref 7–25)
CALCIUM SPEC-SCNC: 8.6 MG/DL (ref 8.6–10.5)
CHLORIDE SERPL-SCNC: 99 MMOL/L (ref 98–107)
CO2 SERPL-SCNC: 32.7 MMOL/L (ref 22–29)
CREAT BLD-MCNC: 1.48 MG/DL (ref 0.76–1.27)
GFR SERPL CREATININE-BSD FRML MDRD: 45 ML/MIN/1.73
GLUCOSE BLD-MCNC: 82 MG/DL (ref 65–99)
POTASSIUM BLD-SCNC: 4.2 MMOL/L (ref 3.5–5.2)
SODIUM BLD-SCNC: 141 MMOL/L (ref 136–145)

## 2019-11-11 PROCEDURE — 80048 BASIC METABOLIC PNL TOTAL CA: CPT

## 2019-11-11 PROCEDURE — 36415 COLL VENOUS BLD VENIPUNCTURE: CPT

## 2019-11-12 ENCOUNTER — TELEPHONE (OUTPATIENT)
Dept: GASTROENTEROLOGY | Facility: CLINIC | Age: 84
End: 2019-11-12

## 2019-11-12 DIAGNOSIS — R18.8 CIRRHOSIS OF LIVER WITH ASCITES, UNSPECIFIED HEPATIC CIRRHOSIS TYPE (HCC): Primary | ICD-10-CM

## 2019-11-12 DIAGNOSIS — K74.60 CIRRHOSIS OF LIVER WITH ASCITES, UNSPECIFIED HEPATIC CIRRHOSIS TYPE (HCC): Primary | ICD-10-CM

## 2019-11-15 ENCOUNTER — HOSPITAL ENCOUNTER (OUTPATIENT)
Dept: ULTRASOUND IMAGING | Facility: HOSPITAL | Age: 84
Discharge: HOME | End: 2019-11-15
Admitting: NURSE PRACTITIONER

## 2019-11-15 ENCOUNTER — LAB (OUTPATIENT)
Dept: LAB | Facility: HOSPITAL | Age: 84
End: 2019-11-15

## 2019-11-15 DIAGNOSIS — R18.8 CIRRHOSIS OF LIVER WITH ASCITES, UNSPECIFIED HEPATIC CIRRHOSIS TYPE (HCC): ICD-10-CM

## 2019-11-15 DIAGNOSIS — K74.60 CIRRHOSIS OF LIVER WITH ASCITES, UNSPECIFIED HEPATIC CIRRHOSIS TYPE (HCC): ICD-10-CM

## 2019-11-15 LAB
ANION GAP SERPL CALCULATED.3IONS-SCNC: 10 MMOL/L (ref 5–15)
BUN BLD-MCNC: 29 MG/DL (ref 8–23)
BUN/CREAT SERPL: 22 (ref 7–25)
CALCIUM SPEC-SCNC: 8.9 MG/DL (ref 8.6–10.5)
CHLORIDE SERPL-SCNC: 100 MMOL/L (ref 98–107)
CO2 SERPL-SCNC: 31 MMOL/L (ref 22–29)
CREAT BLD-MCNC: 1.32 MG/DL (ref 0.76–1.27)
GFR SERPL CREATININE-BSD FRML MDRD: 52 ML/MIN/1.73
GLUCOSE BLD-MCNC: 96 MG/DL (ref 65–99)
POTASSIUM BLD-SCNC: 4.6 MMOL/L (ref 3.5–5.2)
SODIUM BLD-SCNC: 141 MMOL/L (ref 136–145)

## 2019-11-15 PROCEDURE — 36415 COLL VENOUS BLD VENIPUNCTURE: CPT

## 2019-11-15 PROCEDURE — 76700 US EXAM ABDOM COMPLETE: CPT

## 2019-11-15 PROCEDURE — 80048 BASIC METABOLIC PNL TOTAL CA: CPT

## 2019-11-21 ENCOUNTER — TELEPHONE (OUTPATIENT)
Dept: INFUSION THERAPY | Facility: HOSPITAL | Age: 84
End: 2019-11-21

## 2019-11-22 ENCOUNTER — HOSPITAL ENCOUNTER (OUTPATIENT)
Dept: CT IMAGING | Facility: HOSPITAL | Age: 84
Discharge: HOME | End: 2019-11-22
Admitting: NURSE PRACTITIONER

## 2019-11-22 VITALS
WEIGHT: 217.6 LBS | OXYGEN SATURATION: 90 % | DIASTOLIC BLOOD PRESSURE: 98 MMHG | RESPIRATION RATE: 20 BRPM | SYSTOLIC BLOOD PRESSURE: 133 MMHG | HEIGHT: 71 IN | HEART RATE: 89 BPM | BODY MASS INDEX: 30.46 KG/M2 | TEMPERATURE: 97.1 F

## 2019-11-22 DIAGNOSIS — R18.8 OTHER ASCITES: ICD-10-CM

## 2019-11-22 LAB
ALBUMIN FLD-MCNC: 2.1 G/DL
AMYLASE FLD-CCNC: 47 U/L
APPEARANCE FLD: CLEAR
APTT PPP: 37.5 SECONDS (ref 24–37)
COLOR FLD: YELLOW
GLUCOSE BLDC GLUCOMTR-MCNC: 115 MG/DL (ref 70–130)
GLUCOSE FLD-MCNC: 123 MG/DL
INR PPP: 1.25 (ref 0.85–1.16)
LDH FLD-CCNC: 89 U/L
LYMPHOCYTES NFR FLD MANUAL: 18 %
MACROPHAGE FLUID: 73 %
MONOCYTES NFR FLD: 1 %
NEUTROPHILS NFR FLD MANUAL: 8 %
PLATELET # BLD AUTO: 117 10*3/MM3 (ref 140–450)
PROT FLD-MCNC: 2.9 G/DL
PROTHROMBIN TIME: 15.1 SECONDS (ref 11.2–14.3)
RBC # FLD AUTO: 2000 /MM3
WBC # FLD AUTO: 108 /MM3

## 2019-11-22 PROCEDURE — 87015 SPECIMEN INFECT AGNT CONCNTJ: CPT | Performed by: NURSE PRACTITIONER

## 2019-11-22 PROCEDURE — 83615 LACTATE (LD) (LDH) ENZYME: CPT | Performed by: NURSE PRACTITIONER

## 2019-11-22 PROCEDURE — 85730 THROMBOPLASTIN TIME PARTIAL: CPT | Performed by: RADIOLOGY

## 2019-11-22 PROCEDURE — 75989 ABSCESS DRAINAGE UNDER X-RAY: CPT

## 2019-11-22 PROCEDURE — 85610 PROTHROMBIN TIME: CPT | Performed by: RADIOLOGY

## 2019-11-22 PROCEDURE — 88112 CYTOPATH CELL ENHANCE TECH: CPT | Performed by: RADIOLOGY

## 2019-11-22 PROCEDURE — 82150 ASSAY OF AMYLASE: CPT | Performed by: NURSE PRACTITIONER

## 2019-11-22 PROCEDURE — 82945 GLUCOSE OTHER FLUID: CPT | Performed by: NURSE PRACTITIONER

## 2019-11-22 PROCEDURE — 89051 BODY FLUID CELL COUNT: CPT | Performed by: NURSE PRACTITIONER

## 2019-11-22 PROCEDURE — 85049 AUTOMATED PLATELET COUNT: CPT | Performed by: RADIOLOGY

## 2019-11-22 PROCEDURE — 88305 TISSUE EXAM BY PATHOLOGIST: CPT | Performed by: RADIOLOGY

## 2019-11-22 PROCEDURE — 82247 BILIRUBIN TOTAL: CPT | Performed by: NURSE PRACTITIONER

## 2019-11-22 PROCEDURE — 87070 CULTURE OTHR SPECIMN AEROBIC: CPT | Performed by: NURSE PRACTITIONER

## 2019-11-22 PROCEDURE — 82042 OTHER SOURCE ALBUMIN QUAN EA: CPT | Performed by: NURSE PRACTITIONER

## 2019-11-22 PROCEDURE — 25010000003 LIDOCAINE 1 % SOLUTION: Performed by: RADIOLOGY

## 2019-11-22 PROCEDURE — 82962 GLUCOSE BLOOD TEST: CPT

## 2019-11-22 PROCEDURE — 87205 SMEAR GRAM STAIN: CPT | Performed by: NURSE PRACTITIONER

## 2019-11-22 PROCEDURE — C1729 CATH, DRAINAGE: HCPCS

## 2019-11-22 PROCEDURE — 84157 ASSAY OF PROTEIN OTHER: CPT | Performed by: NURSE PRACTITIONER

## 2019-11-22 RX ORDER — SODIUM CHLORIDE 0.9 % (FLUSH) 0.9 %
3 SYRINGE (ML) INJECTION EVERY 12 HOURS SCHEDULED
Status: DISCONTINUED | OUTPATIENT
Start: 2019-11-22 | End: 2019-11-23 | Stop reason: HOSPADM

## 2019-11-22 RX ORDER — SODIUM CHLORIDE 0.9 % (FLUSH) 0.9 %
10 SYRINGE (ML) INJECTION AS NEEDED
Status: DISCONTINUED | OUTPATIENT
Start: 2019-11-22 | End: 2019-11-23 | Stop reason: HOSPADM

## 2019-11-22 RX ORDER — LIDOCAINE HYDROCHLORIDE 10 MG/ML
10 INJECTION, SOLUTION INFILTRATION; PERINEURAL ONCE
Status: COMPLETED | OUTPATIENT
Start: 2019-11-22 | End: 2019-11-22

## 2019-11-22 RX ADMIN — LIDOCAINE HYDROCHLORIDE 10 ML: 10 INJECTION, SOLUTION INFILTRATION; PERINEURAL at 11:16

## 2019-11-22 NOTE — PROCEDURES
Radiology Procedure    Pre-procedure: Large volume ascites     Procedure Performed: CT-guided paracentesis      IV Sedation and/or Anesthesia:  No    Complications: None    Preliminary Findings: Resolution of greater portion fluid s/p drainage    Specimen Removed: 7L yellow fluid    Estimated Blood Loss:  0ml    Post-Procedure Diagnosis: Recurrent ascites    Post-Procedure Plan: Return to ordering physician for future recommendations and management       Standard Discharge Instructions Given:yes     Joe Thakkar DO  11/22/19  1:40 PM

## 2019-11-22 NOTE — NURSING NOTE
Pt discharged from ir dept s/p paracentesis.  Pt tolerated procedure.  Discharge instructions reviewed with patient and spouse both verbalize understanding.  Pt transported to exit via wheelchair.

## 2019-11-22 NOTE — NURSING NOTE
Patient placed on cardiac monitors, vitals stable. Images taken and reviewed by Dr. Bautista. A total volume of 7000 ml serous fluid was removed from the left side of the peritoneum. Patient tolerated well. Report to RAVINDRA

## 2019-11-25 ENCOUNTER — TELEPHONE (OUTPATIENT)
Dept: INFUSION THERAPY | Facility: HOSPITAL | Age: 84
End: 2019-11-25

## 2019-11-25 LAB
BACTERIA FLD CULT: NORMAL
GRAM STN SPEC: NORMAL
GRAM STN SPEC: NORMAL

## 2019-11-26 LAB
BILIRUB SERPL-MCNC: 0.5 MG/DL
LAB AP CASE REPORT: NORMAL
PATH REPORT.FINAL DX SPEC: NORMAL

## 2019-12-12 ENCOUNTER — PREP FOR SURGERY (OUTPATIENT)
Dept: OTHER | Facility: HOSPITAL | Age: 84
End: 2019-12-12

## 2019-12-12 DIAGNOSIS — K74.60 CIRRHOSIS OF LIVER WITH ASCITES, UNSPECIFIED HEPATIC CIRRHOSIS TYPE (HCC): Primary | ICD-10-CM

## 2019-12-12 DIAGNOSIS — R18.8 CIRRHOSIS OF LIVER WITH ASCITES, UNSPECIFIED HEPATIC CIRRHOSIS TYPE (HCC): Primary | ICD-10-CM

## 2019-12-12 RX ORDER — ALBUMIN (HUMAN) 12.5 G/50ML
12.5 SOLUTION INTRAVENOUS ONCE
Status: CANCELLED | OUTPATIENT
Start: 2019-12-12 | End: 2019-12-12

## 2019-12-16 DIAGNOSIS — I50.812 CHRONIC RIGHT HEART FAILURE (HCC): ICD-10-CM

## 2019-12-16 RX ORDER — FUROSEMIDE 40 MG/1
TABLET ORAL
Qty: 60 TABLET | Refills: 0 | OUTPATIENT
Start: 2019-12-16

## 2019-12-16 NOTE — TELEPHONE ENCOUNTER
He did not tolerate the change to Bumex (Cr increased) so he went back to Lasix 40 mg BID which is likely the best we will be able to do with his ascites and edema given his kidney function. The patient is aware of this. I have called the pharmacy to clarify this and refilled Lasix.

## 2019-12-16 NOTE — TELEPHONE ENCOUNTER
Please review refill request for furosemide 40 mg tablets.  Therapy has been changed by Nilton Jade since last seen by MAKAYLA Tian, at [facility]    Thank you  Maye Carlson, PachecoD

## 2019-12-19 ENCOUNTER — OFFICE VISIT (OUTPATIENT)
Dept: CARDIOLOGY | Facility: CLINIC | Age: 84
End: 2019-12-19

## 2019-12-19 VITALS
BODY MASS INDEX: 29.96 KG/M2 | WEIGHT: 214 LBS | DIASTOLIC BLOOD PRESSURE: 55 MMHG | HEIGHT: 71 IN | SYSTOLIC BLOOD PRESSURE: 96 MMHG | HEART RATE: 60 BPM

## 2019-12-19 DIAGNOSIS — I25.10 CORONARY ARTERIOSCLEROSIS IN NATIVE ARTERY: ICD-10-CM

## 2019-12-19 DIAGNOSIS — I10 ESSENTIAL HYPERTENSION: ICD-10-CM

## 2019-12-19 DIAGNOSIS — E78.2 MIXED HYPERLIPIDEMIA: Primary | ICD-10-CM

## 2019-12-19 DIAGNOSIS — I50.812 CHRONIC RIGHT HEART FAILURE (HCC): ICD-10-CM

## 2019-12-19 PROCEDURE — 99213 OFFICE O/P EST LOW 20 MIN: CPT | Performed by: INTERNAL MEDICINE

## 2019-12-19 RX ORDER — FUROSEMIDE 40 MG/1
40 TABLET ORAL 2 TIMES DAILY
COMMUNITY
End: 2020-02-25 | Stop reason: SDUPTHER

## 2019-12-19 NOTE — PROGRESS NOTES
OFFICE FOLLOW UP     Date of Encounter:2019     Name: Jordi Sam  : 1934  Address: [facility] [address] [place] [address]    PCP: Warren Glass MD  [address]  [facility] [address]    Jordi Sam is a 85 y.o. male.      Chief Complaint: Follow up of right heart failure    Problem List:   1. Coronary artery disease:  a. Acute inferior STEMI with RV infarct features, 2009.   b. Normal LV function, single vessel  RCA disease.     c. Sirolimus eluting stents to the RCA, 2009.   d. Pericarditis, resolved.   e. LHC, 2012 (for recurrent angina).   i.  Normal LV function.  ii.  of RCA with failed intervention.  f. Stress MPS 2018: Normal   g. LHC 2019: single vessel CAD with  of RCA, normal LVSF   2. 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 Echo 2019: marked enlargement of the right heart, biatrial enlargement, LVSF is normal, large left pleural effusion, severe TR, RVSP 30mmHg  b. Cardiac Cath (): Normal LV, Normal PAP, RV enlargement and decreased RVSF  c. Thoracentesis and Paracentesis March and May 2019  d. Paracentesis, 5700 mL off 2019  3. New onset atrial fibrillation 2019  a. Asymptomatic  b. CHADsVASC= 6, on Eliquis   4. Hypertension.   5. Dyslipidemia.  6. Diabetes mellitus, type 2.  A1C 6.1 (2016)  7. Nephrolithiasis.   8. History of “goiter.”  9. Thrombocytopenia  10. Cirrhosis, recent evaluation by [facility]  a. Paracentesis with findings consistent with cardiac ascites, Spring 2019  11. Surgical history:  a. Cholecystectomy.  b. Hernia repair x 2.     Allergies:  Allergies   Allergen Reactions   • Bee Venom Swelling   • Sulfamethoxazole-Trimethoprim Other (See Comments)     Pt unaware       Current Medications:  •  apixaban (ELIQUIS) 2.5 MG tablet tablet, Take 1 tablet by mouth 2 (Two) Times a Day.  •  aspirin 81 MG tablet, Take 81 mg by mouth Daily  •   "carvedilol (COREG) 12.5 MG tablet, Take 1 tablet by mouth 2 (Two) Times a Day With Meals  •  folic acid (FOLVITE) 400 MCG tablet, Take 400 mcg by mouth Daily.  •  furosemide (LASIX) 40 MG tablet, Take 40 mg by mouth 2 (Two) Times a Day.  •  nitroglycerin (NITROSTAT) 0.4 MG SL tablet, Place 1 tablet under the tongue Every 5 (Five) Minutes As Needed for Chest Pain  •  simvastatin (ZOCOR) 40 MG tablet, TAKE 1 TABLET BY MOUTH ONCE DAILY  •  spironolactone (ALDACTONE) 25 MG by mouth Daily.  •  tamsulosin (FLOMAX) 0.4 MG capsule 24 hr capsule, Take  by mouth Daily    History of Present Illness:   Jordi Sam returns for follow up today. He is followed by a GI APRN here at [facility] for cirrhosis. He is scheduled for paracentesis on Monday through their office. He had one brief episode of chest pressure on Sunday. This resolved without intervention and has not recurred. He is mostly sedentary. He states his shortness of breath has improved. He is tolerating medications listed above.      The following portions of the patient's history were reviewed and updated as appropriate: allergies, current medications and problem list.    ROS: Pertinent positives as listed in the HPI.  All other systems reviewed and negative.    Objective:    Vitals:    12/19/19 1410 12/19/19 1411   BP: 110/59 96/55   BP Location: Right arm Right arm   Patient Position: Sitting Standing   Pulse: 56 60   Weight: 97.1 kg (214 lb) 97.1 kg (214 lb)   Height: 180.3 cm (71\") 180.3 cm (71\")       Physical Exam:  GENERAL: Alert, cooperative, in no acute distress.   HEENT: Normocephalic, no adenopathy, no jugular venous distention  HEART: No discrete PMI is noted. Regular rhythm, normal rate, and no murmurs, gallops, or rubs. Distant heart tones.  LUNGS: Clear to auscultation bilaterally. No wheezing, rales or ronchi. diminished  ABDOMEN: Soft, bowel sounds present, non-tender, significant ascites noted  NEUROLOGIC: No focal abnormalities involving strength " or sensation are noted.   EXTREMITIES: No clubbing, cyanosis noted. +4 LE edema noted    Diagnostic Data:  No new labs available to review.     Procedures    Assessment and Plan:   1.  Chronic right heart failure: He has significant ascites and lower extremity edema. He is not had EGD recommended by GI to evaluate for esophageal vari. We have asked him to call their office to set this up. We will also send our note and ask them to follow up ASAP.     We will see Jordi Sam back in 6 months or sooner on a PRN basis.   Scribed for Efrem Posadas MD by Shahla Jacobsen RN. 12/19/2019 3:40 PM.      EMR Dragon/Transcription Disclaimer:  Much of this encounter note is an electronic transcription/translation of spoken language to printed text.  The electronic translation of spoken language may permit erroneous, or at times, nonsensical words or phrases to be inadvertently transcribed.  Although I have reviewed the note for such errors, some may still exist.

## 2019-12-23 ENCOUNTER — TELEPHONE (OUTPATIENT)
Dept: GASTROENTEROLOGY | Facility: CLINIC | Age: 84
End: 2019-12-23

## 2019-12-23 ENCOUNTER — HOSPITAL ENCOUNTER (OUTPATIENT)
Dept: CT IMAGING | Facility: HOSPITAL | Age: 84
Discharge: HOME | End: 2019-12-23
Admitting: INTERNAL MEDICINE

## 2019-12-23 ENCOUNTER — DOCUMENTATION (OUTPATIENT)
Dept: GASTROENTEROLOGY | Facility: CLINIC | Age: 84
End: 2019-12-23

## 2019-12-23 VITALS
HEART RATE: 58 BPM | SYSTOLIC BLOOD PRESSURE: 98 MMHG | RESPIRATION RATE: 20 BRPM | DIASTOLIC BLOOD PRESSURE: 56 MMHG | BODY MASS INDEX: 30.8 KG/M2 | HEIGHT: 71 IN | TEMPERATURE: 97.3 F | WEIGHT: 220 LBS | OXYGEN SATURATION: 97 %

## 2019-12-23 DIAGNOSIS — R18.8 CIRRHOSIS OF LIVER WITH ASCITES, UNSPECIFIED HEPATIC CIRRHOSIS TYPE (HCC): ICD-10-CM

## 2019-12-23 DIAGNOSIS — K74.60 CIRRHOSIS OF LIVER WITH ASCITES, UNSPECIFIED HEPATIC CIRRHOSIS TYPE (HCC): ICD-10-CM

## 2019-12-23 LAB
APTT PPP: 36 SECONDS (ref 24–37)
GLUCOSE BLDC GLUCOMTR-MCNC: 107 MG/DL (ref 70–130)
INR PPP: 1.2 (ref 0.85–1.16)
PLATELET # BLD AUTO: 108 10*3/MM3 (ref 140–450)
PROTHROMBIN TIME: 14.6 SECONDS (ref 11.2–14.3)

## 2019-12-23 PROCEDURE — 85049 AUTOMATED PLATELET COUNT: CPT | Performed by: RADIOLOGY

## 2019-12-23 PROCEDURE — 85610 PROTHROMBIN TIME: CPT | Performed by: RADIOLOGY

## 2019-12-23 PROCEDURE — C1729 CATH, DRAINAGE: HCPCS

## 2019-12-23 PROCEDURE — 25010000003 LIDOCAINE 1 % SOLUTION: Performed by: INTERNAL MEDICINE

## 2019-12-23 PROCEDURE — 82962 GLUCOSE BLOOD TEST: CPT

## 2019-12-23 PROCEDURE — 75989 ABSCESS DRAINAGE UNDER X-RAY: CPT

## 2019-12-23 PROCEDURE — 85730 THROMBOPLASTIN TIME PARTIAL: CPT | Performed by: RADIOLOGY

## 2019-12-23 RX ORDER — LIDOCAINE HYDROCHLORIDE 10 MG/ML
20 INJECTION, SOLUTION INFILTRATION; PERINEURAL ONCE
Status: COMPLETED | OUTPATIENT
Start: 2019-12-23 | End: 2019-12-23

## 2019-12-23 RX ADMIN — LIDOCAINE HYDROCHLORIDE 20 ML: 10 INJECTION, SOLUTION INFILTRATION; PERINEURAL at 10:22

## 2019-12-23 NOTE — NURSING NOTE
Paracentesis performed by Dr Thakkar. NOBLE throughout procedure, pt tolerated well. 8750 mL of fluid removed. Report called to RAVINDRA. Pt may be discharged 1 hour post procedure.

## 2019-12-23 NOTE — PROCEDURES
Radiology Procedure    Pre-procedure: Large volume abdominopelvic ascites     Procedure Performed: CT-guided paracentesis      IV Sedation and/or Anesthesia:  No    Complications: None    Preliminary Findings: Resolution of ascites s/p drainage     Specimen Removed: 8750ml yellow-clear fluid    Estimated Blood Loss:  0ml    Post-Procedure Diagnosis: Recurrent ascites    Post-Procedure Plan: Return to ordering physician for future management     Standard Discharge Instructions Given:yes     Joe Thakkar DO  12/23/19  11:12 AM

## 2019-12-23 NOTE — TELEPHONE ENCOUNTER
Called MAKAYLA Chaney because even though  placed the order for a Ct guided paracentesis it is a [facility] patient.     Notified MAKAYLA Chaney that interventional radiology left a voicemail  that they took 8.7 Liters off the patient and wanted to know if the physician would want to put in a order for albumin. Unfortunately by the time we heard the voicemail after clinic and called Bailee back  in inerventional radiology they had let the patient leave without confirming what the physician wanted .     Called to check on the patient. Patient is doing okay and just resting at this time. Notified the patient if they need anything just to give our office a call and we would be open from 8:30AM to 12:00PM  Tomorrow.

## 2019-12-24 ENCOUNTER — TELEPHONE (OUTPATIENT)
Dept: INFUSION THERAPY | Facility: HOSPITAL | Age: 84
End: 2019-12-24

## 2020-01-08 ENCOUNTER — OFFICE VISIT (OUTPATIENT)
Dept: CARDIOLOGY | Facility: HOSPITAL | Age: 85
End: 2020-01-08

## 2020-01-08 VITALS
SYSTOLIC BLOOD PRESSURE: 105 MMHG | BODY MASS INDEX: 30.73 KG/M2 | OXYGEN SATURATION: 97 % | WEIGHT: 219.5 LBS | HEART RATE: 61 BPM | HEIGHT: 71 IN | RESPIRATION RATE: 18 BRPM | TEMPERATURE: 97.1 F | DIASTOLIC BLOOD PRESSURE: 51 MMHG

## 2020-01-08 DIAGNOSIS — I50.812 CHRONIC RIGHT HEART FAILURE (HCC): ICD-10-CM

## 2020-01-08 DIAGNOSIS — I95.2 HYPOTENSION DUE TO DRUGS: ICD-10-CM

## 2020-01-08 DIAGNOSIS — N18.30 STAGE 3 CHRONIC KIDNEY DISEASE (HCC): ICD-10-CM

## 2020-01-08 DIAGNOSIS — I48.20 CHRONIC ATRIAL FIBRILLATION (HCC): ICD-10-CM

## 2020-01-08 DIAGNOSIS — K74.69 OTHER CIRRHOSIS OF LIVER (HCC): ICD-10-CM

## 2020-01-08 PROCEDURE — 99214 OFFICE O/P EST MOD 30 MIN: CPT | Performed by: NURSE PRACTITIONER

## 2020-01-08 RX ORDER — SPIRONOLACTONE 25 MG/1
50 TABLET ORAL DAILY
Qty: 60 TABLET | Refills: 0 | Status: SHIPPED | OUTPATIENT
Start: 2020-01-08 | End: 2020-02-18

## 2020-01-08 NOTE — PROGRESS NOTES
[facility]  Heart [facility] Valve [facility]      Encounter Date:01/08/2020     Jordi PRANAY Sam  [address] [address]  [unfilled]    1934    Warren Glass MD Charles PRANAY Flaco is a 85 y.o. male.      Subjective:     Chief Complaint:  Follow-up (heart failure)       HPI     85-year-old male with a history of CAD, right-sided heart failure with cirrhosis and ascites.  History of thoracentesis and paracentesis.  Last CT-guided paracentesis on Last CT-guided paracentesis 12/23/2019 with removal of 8750 mL from abdomen.  Patient currently not on lisinopril due to hypotension and worsening kidney function.  Kidney function initially improved after d/c of ace with reinitiation of spironolactone. Fluid status not well controlled.  Seeing [facility] with change from lasix to Bumex.  Worsening kidney funciton and resuming Lasix.  Pt has not increased aldactone as prescribed due to fear of nausea. Patient with chronic atrial fibrillation on the low-dose Eliquis.     Pt has not had recommended upper GI to evaluate for esophageal varices.      Patient reports over the last 2-week he has had increased abdominal swelling, fatigue.  Denies chest pain.  Dyspnea at baseline moderate with exertion.  Edema is at baseline (moderate).  Not wearing compression stockings.      Patient has follow-up with GI in 1 week    Patient Active Problem List    Diagnosis Date Noted   • End stage liver disease (CMS/HCC) 11/08/2019   • Stage 3 chronic kidney disease (CMS/HCC) 07/26/2019   • Cirrhosis of liver with ascites (CMS/HCC) 07/11/2019   • Other ascites 06/25/2019     Note Last Updated: 8/23/2019     · CT-guided paracentesis 5/1/2019 (5.2 L removed)  · CT-guided paracentesis 6/17/2019 (5.6 L removed)  · CT-Guided paracentesis 8/23/2019: 5700 mL removed     • Chronic right heart failure (CMS/HCC) 06/06/2019   • Pleural effusion 04/08/2019     Note Last Updated: 4/8/2019     Added automatically from request for  surgery [id number]     • Shortness of breath 03/05/2019   • Chronic atrial fibrillation 03/01/2019   • Nephrolithiasis    • Controlled type 2 diabetes mellitus without complication, without long-term current use of insulin (CMS/[facility]) 08/30/2016   • Coronary arteriosclerosis in native artery 05/16/2016     Note Last Updated: 9/30/2016     1. Coronary artery disease:  a. Acute inferior STEMI with RV infarct features, January 2009.   b. Normal LV function, single vessel RCA disease.     c. Sirolimus eluting stents to the RCA, 01/09/2009.   d. Pericarditis, resolved.   e. LHC, 01/13/2012 (for recurrent angina).   i.  Normal LV function.  ii.  of RCA with failed intervention.       • Panlobular emphysema (CMS/[facility]) 05/16/2016   • Functional diarrhea 05/16/2016   • Mixed hyperlipidemia 05/16/2016   • Essential hypertension 05/16/2016         Past Surgical History:   Procedure Laterality Date   • BRONCHOSCOPY N/A 4/12/2019    Procedure: BRONCHOSCOPY WITH THORACENTESIS;  Surgeon: Marciano Higginbotham MD;  Location:  [facility] ENDOSCOPY;  Service: Pulmonary   • CARDIAC CATHETERIZATION Bilateral 1/30/2019    Procedure: Right and Left Heart Cath;  Surgeon: Efrem Posadas MD;  Location:  [facility] CATH INVASIVE LOCATION;  Service: Cardiology   • CARDIAC CATHETERIZATION     • CHOLECYSTECTOMY     • CORONARY ANGIOPLASTY WITH STENT PLACEMENT  01/09/2009    Sirolimus eluting stents to the RCA, 01/09/2009.    • HERNIA REPAIR      Hernia repair x 2.    • PARACENTESIS  06/17/2019       Allergies   Allergen Reactions   • Bee Venom Swelling   • Sulfamethoxazole-Trimethoprim Other (See Comments)     Pt unaware         Current Outpatient Medications:   •  apixaban (ELIQUIS) 2.5 MG tablet tablet, Take 1 tablet by mouth 2 (Two) Times a Day., Disp: 60 tablet, Rfl: 3  •  aspirin 81 MG tablet, Take 81 mg by mouth Daily., Disp: , Rfl:   •  carvedilol (COREG) 12.5 MG tablet, Take 1 tablet by mouth 2 (Two) Times a Day With Meals., Disp: 60  "tablet, Rfl: 6  •  folic acid (FOLVITE) 400 MCG tablet, Take 400 mcg by mouth Daily., Disp: , Rfl:   •  furosemide (LASIX) 40 MG tablet, Take 40 mg by mouth 2 (Two) Times a Day., Disp: , Rfl:   •  nitroglycerin (NITROSTAT) 0.4 MG SL tablet, Place 1 tablet under the tongue Every 5 (Five) Minutes As Needed for Chest Pain., Disp: 25 tablet, Rfl: 6  •  simvastatin (ZOCOR) 40 MG tablet, TAKE 1 TABLET BY MOUTH ONCE DAILY, Disp: 90 tablet, Rfl: 3  •  spironolactone (ALDACTONE) 25 MG tablet, Take 2 tablets by mouth Daily., Disp: 60 tablet, Rfl: 0  •  tamsulosin (FLOMAX) 0.4 MG capsule 24 hr capsule, Take  by mouth Daily., Disp: , Rfl:     The following portions of the patient's history were reviewed and updated today during office visit as appropriate: allergies, current medications, past family history, past medical history, past social history, past surgical history and problem list.    Review of Systems   Constitution: Positive for weight gain.   Cardiovascular: Positive for dyspnea on exertion and leg swelling. Negative for chest pain, near-syncope, orthopnea, palpitations, paroxysmal nocturnal dyspnea and syncope.   Respiratory: Positive for shortness of breath.    Gastrointestinal: Positive for bloating. Negative for abdominal pain.   Neurological: Positive for weakness. Negative for dizziness, light-headedness, loss of balance and numbness.   All other systems reviewed and are negative.      Objective:     Vitals:    01/08/20 1003   BP: 105/51   BP Location: Right arm   Patient Position: Sitting   Cuff Size: Adult   Pulse: 61   Resp: 18   Temp: 97.1 °F (36.2 °C)   TempSrc: Temporal   SpO2: 97%   Weight: 99.6 kg (219 lb 8 oz)   Height: 180.3 cm (71\")         Physical Exam   Constitutional: He is oriented to person, place, and time. He appears ill (chroniclly ill). No distress.   HENT:   Mouth/Throat: Oropharynx is clear and moist.   Eyes: No scleral icterus.   Neck: JVD present. No hepatojugular reflux present. " Carotid bruit is not present. No tracheal deviation present. No thyromegaly present.   Cardiovascular: Normal rate, normal heart sounds and intact distal pulses. An irregularly irregular rhythm present. Exam reveals no friction rub.   No murmur heard.  Pulmonary/Chest: Effort normal. He has decreased breath sounds in the left lower field.   Abdominal: Bowel sounds are normal. He exhibits distension. There is no tenderness.   Musculoskeletal: He exhibits edema (2+ bilaterally ).   Lymphadenopathy:     He has no cervical adenopathy.   Neurological: He is alert and oriented to person, place, and time.   Skin: Skin is warm, dry and intact. No rash noted. No cyanosis or erythema. No pallor.   Psychiatric: He has a normal mood and affect. His behavior is normal. Thought content normal.   Vitals reviewed.      Lab and Diagnostic Review:  Lab Results   Component Value Date    GLUCOSE 96 11/15/2019    CALCIUM 8.9 11/15/2019     11/15/2019    K 4.6 11/15/2019    CO2 31.0 (H) 11/15/2019     11/15/2019    BUN 29 (H) 11/15/2019    CREATININE 1.32 (H) 11/15/2019    EGFRIFNONA 52 (L) 11/15/2019    BCR 22.0 11/15/2019    ANIONGAP 10.0 11/15/2019     Lab Results   Component Value Date    WBC 5.75 11/07/2019    HGB 12.2 (L) 11/07/2019    HCT 38.6 11/07/2019    MCV 96.0 11/07/2019     (L) 12/23/2019     Lab Results   Component Value Date    ALT 15 11/07/2019     [facility]  Assessment and Plan:         1. Chronic right heart failure (CMS/HCC)  Encouraged patient to be compliance with med dosing  Increase aldactone 50 mg as prescribed.    - Basic Metabolic Panel; Future  - CBC & Differential; Future    2. Other ascites  Followed by [facility] GI     3. Chronic atrial fibrillation  Hold eliquis for 3 days prior to paracentesis scheduled in future    4. Stage 3 chronic kidney disease (CMS/HCC)  Continue to monitor  Cmp next week    5. Hypotension due to drugs  BP stable.     F/u 3 months or sooner if needed.     *Please note  that portions of this note were completed with a voice recognition program. Efforts were made to edit the dictations, but occasionally words are mistranscribed.

## 2020-01-17 ENCOUNTER — LAB (OUTPATIENT)
Dept: LAB | Facility: HOSPITAL | Age: 85
End: 2020-01-17

## 2020-01-17 ENCOUNTER — OFFICE VISIT (OUTPATIENT)
Dept: GASTROENTEROLOGY | Facility: CLINIC | Age: 85
End: 2020-01-17

## 2020-01-17 ENCOUNTER — PREP FOR SURGERY (OUTPATIENT)
Dept: OTHER | Facility: HOSPITAL | Age: 85
End: 2020-01-17

## 2020-01-17 VITALS
BODY MASS INDEX: 31.27 KG/M2 | DIASTOLIC BLOOD PRESSURE: 57 MMHG | WEIGHT: 223.4 LBS | HEIGHT: 71 IN | HEART RATE: 65 BPM | SYSTOLIC BLOOD PRESSURE: 113 MMHG

## 2020-01-17 DIAGNOSIS — R18.8 OTHER ASCITES: Primary | ICD-10-CM

## 2020-01-17 DIAGNOSIS — K74.60 CIRRHOSIS OF LIVER WITH ASCITES, UNSPECIFIED HEPATIC CIRRHOSIS TYPE (HCC): Primary | ICD-10-CM

## 2020-01-17 DIAGNOSIS — R18.8 CIRRHOSIS OF LIVER WITH ASCITES, UNSPECIFIED HEPATIC CIRRHOSIS TYPE (HCC): Primary | ICD-10-CM

## 2020-01-17 DIAGNOSIS — K72.10 END STAGE LIVER DISEASE (HCC): ICD-10-CM

## 2020-01-17 DIAGNOSIS — N18.30 STAGE 3 CHRONIC KIDNEY DISEASE (HCC): ICD-10-CM

## 2020-01-17 LAB
ALBUMIN SERPL-MCNC: 3.7 G/DL (ref 3.5–5.2)
ALBUMIN/GLOB SERPL: 1.5 G/DL
ALP SERPL-CCNC: 74 U/L (ref 39–117)
ALT SERPL W P-5'-P-CCNC: 11 U/L (ref 1–41)
ANION GAP SERPL CALCULATED.3IONS-SCNC: 16.4 MMOL/L (ref 5–15)
AST SERPL-CCNC: 20 U/L (ref 1–40)
BILIRUB SERPL-MCNC: 0.5 MG/DL (ref 0.2–1.2)
BUN BLD-MCNC: 24 MG/DL (ref 8–23)
BUN/CREAT SERPL: 16.4 (ref 7–25)
CALCIUM SPEC-SCNC: 8.9 MG/DL (ref 8.6–10.5)
CHLORIDE SERPL-SCNC: 99 MMOL/L (ref 98–107)
CO2 SERPL-SCNC: 26.6 MMOL/L (ref 22–29)
CREAT BLD-MCNC: 1.46 MG/DL (ref 0.76–1.27)
GFR SERPL CREATININE-BSD FRML MDRD: 46 ML/MIN/1.73
GLOBULIN UR ELPH-MCNC: 2.5 GM/DL
GLUCOSE BLD-MCNC: 84 MG/DL (ref 65–99)
POTASSIUM BLD-SCNC: 4.3 MMOL/L (ref 3.5–5.2)
PROT SERPL-MCNC: 6.2 G/DL (ref 6–8.5)
SODIUM BLD-SCNC: 142 MMOL/L (ref 136–145)

## 2020-01-17 PROCEDURE — 80053 COMPREHEN METABOLIC PANEL: CPT

## 2020-01-17 PROCEDURE — 99214 OFFICE O/P EST MOD 30 MIN: CPT | Performed by: NURSE PRACTITIONER

## 2020-01-17 PROCEDURE — 36415 COLL VENOUS BLD VENIPUNCTURE: CPT

## 2020-01-17 RX ORDER — ALBUMIN (HUMAN) 12.5 G/50ML
12.5 SOLUTION INTRAVENOUS ONCE
Status: CANCELLED | OUTPATIENT
Start: 2020-01-17 | End: 2020-01-17

## 2020-01-17 NOTE — PROGRESS NOTES
GASTROENTEROLOGY OFFICE NOTE  Jordi Sam  [id number]  1934    CARE TEAM  Patient Care Team:  Warren Glass MD as PCP - General  Warren Glass MD as PCP - Family Medicine  Efrem Posadas MD as PCP - Claims Attributed  Efrem Posadas MD as Consulting Physician (Cardiology)  Jordi Law MD as Consulting Physician (Urology)    Referring Provider: Warren Glass MD    Chief Complaint   Patient presents with   • Cirrhosis     f/u        HISTORY OF PRESENT ILLNESS:  Mr. Sam presents in follow-up for cirrhosis, etiology likely secondary to cardiac dysfunction.  Previous work-up at [facility] was negative for liver disease.  CT abdomen in April 2019 showed a small lobulated liver suggestive of cirrhosis.  He has had problems with ascites since September 2018 and was also diagnosed with heart failure and atrial fibrillation.  Paracentesis in May 2019 show ascitic fluid with total protein 3.2 consistent with cardiac ascites.  He is currently taking Lasix 40 mg twice daily and spironolactone 25 mg daily.  He has been unable to tolerate any higher doses secondary to impaired renal function.  At his last visit we attempted changing Lasix to Bumex which he could not tolerate either.  Most recently his cardiologist has increased his spironolactone to 25 mg twice daily.  He has lab work pending today.    His last paracentesis was a month ago.  His abdomen has gradually become distended again and he would like to schedule paracentesis on a monthly basis.  Today, he complains of abdominal distention however, he is not symptomatic with shortness of breath as he has been in the past with ascites.  He denies fever/chills, nausea, vomiting, diarrhea, constipation.  He denies rash, itching, abdominal pain, jaundice, dark urine, confusion, hematemesis, melena, or hematochezia.    PAST MEDICAL HISTORY  Past Medical History:   Diagnosis Date   • A-fib (CMS/HCC)    • Acute inferior myocardial infarction  (CMS/[facility])     Acute inferior STEMI with RV infarct features, January 2009.    • CAD (coronary artery disease)    • Dyslipidemia    • Goiter     History of “goiter.”   • Hyperlipidemia    • Hypertension    • Nephrolithiasis    • Type 2 diabetes mellitus (CMS/[facility])         PAST SURGICAL HISTORY  Past Surgical History:   Procedure Laterality Date   • BRONCHOSCOPY N/A 4/12/2019    Procedure: BRONCHOSCOPY WITH THORACENTESIS;  Surgeon: Marciaon Higginbotham MD;  Location:  [facility];  Service: Pulmonary   • CARDIAC CATHETERIZATION Bilateral 1/30/2019    Procedure: Right and Left Heart Cath;  Surgeon: Efrem Posadas MD;  Location:  [facility] CATH INVASIVE LOCATION;  Service: Cardiology   • CARDIAC CATHETERIZATION     • CHOLECYSTECTOMY     • CORONARY ANGIOPLASTY WITH STENT PLACEMENT  01/09/2009    Sirolimus eluting stents to the RCA, 01/09/2009.    • HERNIA REPAIR      Hernia repair x 2.    • PARACENTESIS  06/17/2019        MEDICATIONS:    Current Outpatient Medications:   •  apixaban (ELIQUIS) 2.5 MG tablet tablet, Take 1 tablet by mouth 2 (Two) Times a Day., Disp: 60 tablet, Rfl: 3  •  aspirin 81 MG tablet, Take 81 mg by mouth Daily., Disp: , Rfl:   •  carvedilol (COREG) 12.5 MG tablet, Take 1 tablet by mouth 2 (Two) Times a Day With Meals., Disp: 60 tablet, Rfl: 6  •  folic acid (FOLVITE) 400 MCG tablet, Take 400 mcg by mouth Daily., Disp: , Rfl:   •  furosemide (LASIX) 40 MG tablet, Take 40 mg by mouth 2 (Two) Times a Day., Disp: , Rfl:   •  nitroglycerin (NITROSTAT) 0.4 MG SL tablet, Place 1 tablet under the tongue Every 5 (Five) Minutes As Needed for Chest Pain., Disp: 25 tablet, Rfl: 6  •  simvastatin (ZOCOR) 40 MG tablet, TAKE 1 TABLET BY MOUTH ONCE DAILY, Disp: 90 tablet, Rfl: 3  •  spironolactone (ALDACTONE) 25 MG tablet, Take 2 tablets by mouth Daily., Disp: 60 tablet, Rfl: 0  •  tamsulosin (FLOMAX) 0.4 MG capsule 24 hr capsule, Take  by mouth Daily., Disp: , Rfl:     ALLERGIES  Allergies   Allergen  Reactions   • Bee Venom Swelling   • Sulfamethoxazole-Trimethoprim Other (See Comments)     Pt unaware       FAMILY HISTORY:  Family History   Problem Relation Age of Onset   • Dementia Mother    • Stroke Mother    • Heart disease Father    • Heart attack Father    • Aneurysm Father    • Cancer Sister    • No Known Problems Brother    • No Known Problems Sister    • Heart disease Brother         CABG   • Hypertension Daughter    • Hypertension Son        SOCIAL HISTORY  Social History     Socioeconomic History   • Marital status:      Spouse name: Melly   • Number of children: 4   • Years of education: Not on file   • Highest education level: Not on file   Occupational History   • Occupation: retired     Comment: works [employer] [place]   Social Needs   • Financial resource strain: Not hard at all   • Food insecurity:     Worry: Never true     Inability: Never true   • Transportation needs:     Medical: No     Non-medical: No   Tobacco Use   • Smoking status: Former Smoker     Packs/day: 2.50     Years: 28.00     Pack years: 70.00     Types: Cigarettes     Last attempt to quit: 1983     Years since quittin.7   • Smokeless tobacco: Never Used   Substance and Sexual Activity   • Alcohol use: No   • Drug use: No   • Sexual activity: Defer   Lifestyle   • Physical activity:     Days per week: 0 days     Minutes per session: 0 min   • Stress: Not at all   Relationships   • Social connections:     Talks on phone: Twice a week     Gets together: Once a week     Attends [employer] service: 1 to 4 times per year     Active member of club or organization: No     Attends meetings of clubs or organizations: Never     Relationship status:    Social History Narrative    Caffeine Intake: 2  servings per day (coffee)    Patient lives at home wit his wife       REVIEW OF SYSTEMS  Review of Systems   Constitutional: Negative for activity change, appetite change, chills, diaphoresis, fatigue, fever, unexpected  "weight gain and unexpected weight loss.   HENT: Negative for trouble swallowing and voice change.    Cardiovascular: Positive for palpitations and leg swelling.   Gastrointestinal: Positive for abdominal distention. Negative for abdominal pain, anal bleeding, blood in stool, constipation, diarrhea, nausea, rectal pain, vomiting, GERD and indigestion.   Skin: Positive for dry skin.     PHYSICAL EXAM   /57 (BP Location: Right arm, Patient Position: Sitting, Cuff Size: Adult)   Pulse 65   Ht 180.3 cm (70.98\")   Wt 101 kg (223 lb 6.4 oz)   BMI 31.17 kg/m²   Physical Exam   Constitutional: He is oriented to person, place, and time. He appears well-developed and well-nourished.   HENT:   Head: Normocephalic.   Mouth/Throat: Oropharynx is clear and moist.   Eyes: Pupils are equal, round, and reactive to light. EOM are normal.   Neck: Normal range of motion. Neck supple.   Cardiovascular: Normal rate and regular rhythm.   Pulmonary/Chest: Effort normal and breath sounds normal. He has no wheezes. He has no rales.   Abdominal: Bowel sounds are normal. He exhibits distension and ascites. He exhibits no mass. There is no tenderness. There is no rebound and no guarding. No hernia.   Musculoskeletal: Normal range of motion.   Neurological: He is alert and oriented to person, place, and time. No cranial nerve deficit.   Skin: Skin is warm and dry.   Psychiatric: He has a normal mood and affect. His behavior is normal. Judgment normal.   Nursing note and vitals reviewed.    Results Review:  [facility] records reviewed and discussed with patient.     ASSESSMENT / PLAN  1. End stage liver disease  -Not likely transplant candidate due to age and comorbidity   -MELDNa 13 (Nov 2019 labs)     2. Cirrhosis, decompensated by ascites  -cirrhosis secondary to cardiac dysfunction  - no evidence of hepatic encephalopathy     # Ascites/Pedal edema  -Low sodium diet 2 gm/day  Plan:  -Continue Lasix 40 mg BID + Spironolactone 25 mg BID   " Spironolactone was recently increased by cardiology, hopefully he can tolerate this but I suspect that he will not be able to given his renal function.  I will not make changes to his diuretics today as I am sure cardiology will be following up with his blood work today to make changes if needed.  -Paracentesis prn, we will set these up for him on a monthly basis.  He knows to give us a call if he seems to need these more frequently.     # Surveillance for Esophageal varices needed  Plan:  -Risk >Benefit from EGD.  We will defer scheduling EGD.     # Hepatocellular carcinoma surveillance  - Abdominal imaging every six months  - CT abd/pelvis (4/2019). Result: small lobulated liver suggesting cirrhosis (no hepatic lesions, no thrombus)  - Ultrasound liver (11/2019) Result: No mention of lesions.  Nodular surface of liver consistent with cirrhosis, ascites, and mild splenomegaly.  Plan:  -US abdomen complete in 6 months (May 2019)     # Nutrition  Plan:  -High protein diet  -Low sodium diet 2 gm/day  -Avoid alcohol, avoid NSAIDS    Return in about 6 months (around 7/17/2020).    I discussed the patients findings and my recommendations with patient and family    MAKAYLA Jimenez

## 2020-01-22 ENCOUNTER — HOSPITAL ENCOUNTER (OUTPATIENT)
Dept: CT IMAGING | Facility: HOSPITAL | Age: 85
Discharge: HOME | End: 2020-01-22
Admitting: NURSE PRACTITIONER

## 2020-01-22 VITALS
HEIGHT: 71 IN | WEIGHT: 220 LBS | DIASTOLIC BLOOD PRESSURE: 54 MMHG | OXYGEN SATURATION: 96 % | BODY MASS INDEX: 30.8 KG/M2 | SYSTOLIC BLOOD PRESSURE: 108 MMHG | RESPIRATION RATE: 18 BRPM | TEMPERATURE: 97.6 F | HEART RATE: 62 BPM

## 2020-01-22 DIAGNOSIS — K74.60 CIRRHOSIS OF LIVER WITH ASCITES, UNSPECIFIED HEPATIC CIRRHOSIS TYPE (HCC): ICD-10-CM

## 2020-01-22 DIAGNOSIS — R18.8 CIRRHOSIS OF LIVER WITH ASCITES, UNSPECIFIED HEPATIC CIRRHOSIS TYPE (HCC): ICD-10-CM

## 2020-01-22 LAB
APTT PPP: 36.8 SECONDS (ref 24–37)
GLUCOSE BLDC GLUCOMTR-MCNC: 97 MG/DL (ref 70–130)
INR PPP: 1.28 (ref 0.85–1.16)
PLATELET # BLD AUTO: 108 10*3/MM3 (ref 140–450)
PROTHROMBIN TIME: 15.4 SECONDS (ref 11.2–14.3)

## 2020-01-22 PROCEDURE — P9047 ALBUMIN (HUMAN), 25%, 50ML: HCPCS | Performed by: INTERNAL MEDICINE

## 2020-01-22 PROCEDURE — 25010000002 ALBUMIN HUMAN 25% PER 50 ML: Performed by: INTERNAL MEDICINE

## 2020-01-22 PROCEDURE — C1729 CATH, DRAINAGE: HCPCS

## 2020-01-22 PROCEDURE — 85049 AUTOMATED PLATELET COUNT: CPT | Performed by: RADIOLOGY

## 2020-01-22 PROCEDURE — 85610 PROTHROMBIN TIME: CPT | Performed by: RADIOLOGY

## 2020-01-22 PROCEDURE — 82962 GLUCOSE BLOOD TEST: CPT

## 2020-01-22 PROCEDURE — 75989 ABSCESS DRAINAGE UNDER X-RAY: CPT

## 2020-01-22 PROCEDURE — 85730 THROMBOPLASTIN TIME PARTIAL: CPT | Performed by: RADIOLOGY

## 2020-01-22 RX ORDER — LIDOCAINE HYDROCHLORIDE 10 MG/ML
20 INJECTION, SOLUTION EPIDURAL; INFILTRATION; INTRACAUDAL; PERINEURAL ONCE
Status: COMPLETED | OUTPATIENT
Start: 2020-01-22 | End: 2020-01-22

## 2020-01-22 RX ORDER — ALBUMIN (HUMAN) 12.5 G/50ML
12.5 SOLUTION INTRAVENOUS ONCE
Status: DISCONTINUED | OUTPATIENT
Start: 2020-01-22 | End: 2020-01-23 | Stop reason: HOSPADM

## 2020-01-22 RX ORDER — SODIUM CHLORIDE 0.9 % (FLUSH) 0.9 %
3 SYRINGE (ML) INJECTION EVERY 12 HOURS SCHEDULED
Status: DISCONTINUED | OUTPATIENT
Start: 2020-01-22 | End: 2020-01-23 | Stop reason: HOSPADM

## 2020-01-22 RX ORDER — SODIUM CHLORIDE 0.9 % (FLUSH) 0.9 %
10 SYRINGE (ML) INJECTION AS NEEDED
Status: DISCONTINUED | OUTPATIENT
Start: 2020-01-22 | End: 2020-01-23 | Stop reason: HOSPADM

## 2020-01-22 RX ORDER — ALBUMIN (HUMAN) 12.5 G/50ML
12.5 SOLUTION INTRAVENOUS ONCE
Status: COMPLETED | OUTPATIENT
Start: 2020-01-22 | End: 2020-01-22

## 2020-01-22 RX ADMIN — ALBUMIN HUMAN 37.5 G: 0.25 SOLUTION INTRAVENOUS at 12:13

## 2020-01-22 RX ADMIN — LIDOCAINE HYDROCHLORIDE 20 ML: 10 INJECTION, SOLUTION EPIDURAL; INFILTRATION; INTRACAUDAL; PERINEURAL at 10:47

## 2020-01-22 NOTE — NURSING NOTE
Pt here for paracentesis today, pt has had several before, tolerated very well, although he stated that he feels a bit nauseated after his procedures, today and in general afterward. He declined medications. Pt having no leaking and no pain at site. Pt received 37.5 g of albumin today ( 6.25 g for the first 5L, then 6.25 for each L after that). Pt and wife received discharge instructions and have no questions at this time. Pt is planning on working on a recurring order so that he can be scheduled every 4 weeks.

## 2020-01-22 NOTE — NURSING NOTE
Patient placed on cardiac monitors, vitals stable. Images taken and reviewed by Dr. Bautista. A total volume of 9900 ml serous fluid was removed from the left side of the peritoneum. Patient tolerated well. Report to RAVINDRA

## 2020-01-23 ENCOUNTER — TELEPHONE (OUTPATIENT)
Dept: CARDIOLOGY | Facility: HOSPITAL | Age: 85
End: 2020-01-23

## 2020-01-23 ENCOUNTER — TELEPHONE (OUTPATIENT)
Dept: INFUSION THERAPY | Facility: HOSPITAL | Age: 85
End: 2020-01-23

## 2020-01-23 DIAGNOSIS — I50.812 CHRONIC RIGHT HEART FAILURE (HCC): ICD-10-CM

## 2020-01-23 DIAGNOSIS — N18.30 CKD (CHRONIC KIDNEY DISEASE) STAGE 3, GFR 30-59 ML/MIN (HCC): Primary | ICD-10-CM

## 2020-01-23 NOTE — TELEPHONE ENCOUNTER
Called and reviewed lab results.  Pt is tolerating increase dose of aldactone.  Pt with worsening Creatinine 1.4 from 1.3    Pt has paracentesis yesterday with improved abdominal fullness.    Patient will continue spironolactone 50 mg daily.  Repeat lab work in 1 week.  If kidney function worsens we will adjust medication but at this time continue current medications

## 2020-01-29 ENCOUNTER — LAB (OUTPATIENT)
Dept: LAB | Facility: HOSPITAL | Age: 85
End: 2020-01-29

## 2020-01-29 DIAGNOSIS — N18.30 CKD (CHRONIC KIDNEY DISEASE) STAGE 3, GFR 30-59 ML/MIN (HCC): ICD-10-CM

## 2020-01-29 DIAGNOSIS — I50.812 CHRONIC RIGHT HEART FAILURE (HCC): ICD-10-CM

## 2020-01-29 LAB
ANION GAP SERPL CALCULATED.3IONS-SCNC: 11.1 MMOL/L (ref 5–15)
BUN BLD-MCNC: 23 MG/DL (ref 8–23)
BUN/CREAT SERPL: 15.9 (ref 7–25)
CALCIUM SPEC-SCNC: 9 MG/DL (ref 8.6–10.5)
CHLORIDE SERPL-SCNC: 101 MMOL/L (ref 98–107)
CO2 SERPL-SCNC: 26.9 MMOL/L (ref 22–29)
CREAT BLD-MCNC: 1.45 MG/DL (ref 0.76–1.27)
GFR SERPL CREATININE-BSD FRML MDRD: 46 ML/MIN/1.73
GLUCOSE BLD-MCNC: 99 MG/DL (ref 65–99)
POTASSIUM BLD-SCNC: 5.3 MMOL/L (ref 3.5–5.2)
SODIUM BLD-SCNC: 139 MMOL/L (ref 136–145)

## 2020-01-29 PROCEDURE — 80048 BASIC METABOLIC PNL TOTAL CA: CPT

## 2020-01-29 PROCEDURE — 36415 COLL VENOUS BLD VENIPUNCTURE: CPT

## 2020-01-30 ENCOUNTER — TELEPHONE (OUTPATIENT)
Dept: CARDIOLOGY | Facility: HOSPITAL | Age: 85
End: 2020-01-30

## 2020-01-30 DIAGNOSIS — E87.5 HYPERKALEMIA: Primary | ICD-10-CM

## 2020-01-30 DIAGNOSIS — N18.30 CKD (CHRONIC KIDNEY DISEASE) STAGE 3, GFR 30-59 ML/MIN (HCC): ICD-10-CM

## 2020-01-30 NOTE — TELEPHONE ENCOUNTER
Called and reviewed lab results.      Creatinine stable at 1.4.  K 5.3.      Pt report edema has improved on aldactone.      Continue current meds.  Repeat BMP 2 weeks (order to be mailed to patient.

## 2020-02-13 ENCOUNTER — LAB (OUTPATIENT)
Dept: LAB | Facility: HOSPITAL | Age: 85
End: 2020-02-13

## 2020-02-13 DIAGNOSIS — N18.30 CKD (CHRONIC KIDNEY DISEASE) STAGE 3, GFR 30-59 ML/MIN (HCC): ICD-10-CM

## 2020-02-13 DIAGNOSIS — E87.5 HYPERKALEMIA: ICD-10-CM

## 2020-02-13 LAB
ANION GAP SERPL CALCULATED.3IONS-SCNC: 10.6 MMOL/L (ref 5–15)
BUN BLD-MCNC: 31 MG/DL (ref 8–23)
BUN/CREAT SERPL: 25 (ref 7–25)
CALCIUM SPEC-SCNC: 8.7 MG/DL (ref 8.6–10.5)
CHLORIDE SERPL-SCNC: 98 MMOL/L (ref 98–107)
CO2 SERPL-SCNC: 27.4 MMOL/L (ref 22–29)
CREAT BLD-MCNC: 1.24 MG/DL (ref 0.76–1.27)
GFR SERPL CREATININE-BSD FRML MDRD: 55 ML/MIN/1.73
GLUCOSE BLD-MCNC: 141 MG/DL (ref 65–99)
POTASSIUM BLD-SCNC: 4.2 MMOL/L (ref 3.5–5.2)
SODIUM BLD-SCNC: 136 MMOL/L (ref 136–145)

## 2020-02-13 PROCEDURE — 80048 BASIC METABOLIC PNL TOTAL CA: CPT

## 2020-02-13 PROCEDURE — 36415 COLL VENOUS BLD VENIPUNCTURE: CPT

## 2020-02-14 ENCOUNTER — RESULTS ENCOUNTER (OUTPATIENT)
Dept: OTHER | Facility: HOSPITAL | Age: 85
End: 2020-02-14

## 2020-02-14 DIAGNOSIS — R18.8 CIRRHOSIS OF LIVER WITH ASCITES, UNSPECIFIED HEPATIC CIRRHOSIS TYPE (HCC): ICD-10-CM

## 2020-02-14 DIAGNOSIS — K74.60 CIRRHOSIS OF LIVER WITH ASCITES, UNSPECIFIED HEPATIC CIRRHOSIS TYPE (HCC): ICD-10-CM

## 2020-02-18 DIAGNOSIS — I50.812 CHRONIC RIGHT HEART FAILURE (HCC): ICD-10-CM

## 2020-02-18 RX ORDER — SPIRONOLACTONE 25 MG/1
TABLET ORAL
Qty: 60 TABLET | Refills: 3 | Status: SHIPPED | OUTPATIENT
Start: 2020-02-18 | End: 2020-04-01 | Stop reason: SDUPTHER

## 2020-02-18 NOTE — TELEPHONE ENCOUNTER
Patient last seen on 1/8/20 and will follow up on 4/8/20. His spironolactone dose had been increased to 2 tablets at previous visit and his labs and edema have been stable on this dose. Will send refills to [facility].     Jeanne Fernandez, PachecoD

## 2020-02-25 RX ORDER — FUROSEMIDE 40 MG/1
40 TABLET ORAL 2 TIMES DAILY
Qty: 180 TABLET | Refills: 1 | Status: SHIPPED | OUTPATIENT
Start: 2020-02-25 | End: 2020-10-20

## 2020-02-28 ENCOUNTER — TELEPHONE (OUTPATIENT)
Dept: CARDIOLOGY | Facility: HOSPITAL | Age: 85
End: 2020-02-28

## 2020-02-28 DIAGNOSIS — N18.30 CKD (CHRONIC KIDNEY DISEASE) STAGE 3, GFR 30-59 ML/MIN (HCC): Primary | ICD-10-CM

## 2020-02-28 DIAGNOSIS — E87.5 HYPERKALEMIA: ICD-10-CM

## 2020-02-28 NOTE — TELEPHONE ENCOUNTER
Patients wife called and is requesting a lab order to check his blood work to see if they are on the right track.

## 2020-03-02 ENCOUNTER — HOSPITAL ENCOUNTER (OUTPATIENT)
Dept: CT IMAGING | Facility: HOSPITAL | Age: 85
Discharge: HOME | End: 2020-03-02
Admitting: RADIOLOGY

## 2020-03-02 VITALS
RESPIRATION RATE: 18 BRPM | WEIGHT: 208.2 LBS | DIASTOLIC BLOOD PRESSURE: 55 MMHG | HEART RATE: 64 BPM | SYSTOLIC BLOOD PRESSURE: 110 MMHG | BODY MASS INDEX: 29.05 KG/M2 | TEMPERATURE: 97.7 F | OXYGEN SATURATION: 95 %

## 2020-03-02 DIAGNOSIS — K74.60 CIRRHOSIS OF LIVER WITH ASCITES, UNSPECIFIED HEPATIC CIRRHOSIS TYPE (HCC): ICD-10-CM

## 2020-03-02 DIAGNOSIS — R18.8 CIRRHOSIS OF LIVER WITH ASCITES, UNSPECIFIED HEPATIC CIRRHOSIS TYPE (HCC): ICD-10-CM

## 2020-03-02 LAB
APTT PPP: 36.2 SECONDS (ref 24–37)
GLUCOSE BLDC GLUCOMTR-MCNC: 115 MG/DL (ref 70–130)
INR PPP: 1.21 (ref 0.85–1.16)
PLATELET # BLD AUTO: 103 10*3/MM3 (ref 140–450)
PROTHROMBIN TIME: 15 SECONDS (ref 11.5–14)

## 2020-03-02 PROCEDURE — 85730 THROMBOPLASTIN TIME PARTIAL: CPT | Performed by: RADIOLOGY

## 2020-03-02 PROCEDURE — C1729 CATH, DRAINAGE: HCPCS

## 2020-03-02 PROCEDURE — 25010000002 ALBUMIN HUMAN 25% PER 50 ML: Performed by: NURSE PRACTITIONER

## 2020-03-02 PROCEDURE — 85610 PROTHROMBIN TIME: CPT | Performed by: RADIOLOGY

## 2020-03-02 PROCEDURE — 25010000003 LIDOCAINE 1 % SOLUTION: Performed by: RADIOLOGY

## 2020-03-02 PROCEDURE — 85049 AUTOMATED PLATELET COUNT: CPT | Performed by: RADIOLOGY

## 2020-03-02 PROCEDURE — 82962 GLUCOSE BLOOD TEST: CPT

## 2020-03-02 PROCEDURE — P9047 ALBUMIN (HUMAN), 25%, 50ML: HCPCS | Performed by: NURSE PRACTITIONER

## 2020-03-02 PROCEDURE — 75989 ABSCESS DRAINAGE UNDER X-RAY: CPT

## 2020-03-02 RX ORDER — ALBUMIN (HUMAN) 12.5 G/50ML
12.5 SOLUTION INTRAVENOUS ONCE
Status: COMPLETED | OUTPATIENT
Start: 2020-03-02 | End: 2020-03-02

## 2020-03-02 RX ORDER — LIDOCAINE HYDROCHLORIDE 10 MG/ML
10 INJECTION, SOLUTION INFILTRATION; PERINEURAL ONCE
Status: COMPLETED | OUTPATIENT
Start: 2020-03-02 | End: 2020-03-02

## 2020-03-02 RX ORDER — SODIUM CHLORIDE 0.9 % (FLUSH) 0.9 %
10 SYRINGE (ML) INJECTION AS NEEDED
Status: DISCONTINUED | OUTPATIENT
Start: 2020-03-02 | End: 2020-03-03 | Stop reason: HOSPADM

## 2020-03-02 RX ORDER — ALBUMIN (HUMAN) 12.5 G/50ML
12.5 SOLUTION INTRAVENOUS ONCE
Status: DISCONTINUED | OUTPATIENT
Start: 2020-03-02 | End: 2020-03-02

## 2020-03-02 RX ORDER — SODIUM CHLORIDE 0.9 % (FLUSH) 0.9 %
3 SYRINGE (ML) INJECTION EVERY 12 HOURS SCHEDULED
Status: DISCONTINUED | OUTPATIENT
Start: 2020-03-02 | End: 2020-03-03 | Stop reason: HOSPADM

## 2020-03-02 RX ADMIN — LIDOCAINE HYDROCHLORIDE 10 ML: 10 INJECTION, SOLUTION INFILTRATION; PERINEURAL at 10:55

## 2020-03-02 RX ADMIN — ALBUMIN HUMAN 25 G: 0.25 SOLUTION INTRAVENOUS at 12:50

## 2020-03-02 NOTE — NURSING NOTE
Patient placed on cardiac monitors, vitals stable. Images taken and reviewed by Dr. Thakkar. A total volume of 7700 ml serous fluid was removed from the left side of the peritoneum. Patient tolerated well. Report to RAVINDRA.

## 2020-03-02 NOTE — PROCEDURES
Radiology Procedure    Pre-procedure: Large volume ascites     Procedure Performed: CT-guided paracentesis      IV Sedation and/or Anesthesia:  No    Complications: None    Preliminary Findings: Near-total resolution of ascites s/p drainage     Specimen Removed: 7700mL yellow fluid    Estimated Blood Loss:  0ml    Post-Procedure Diagnosis: Recurrent ascites    Post-Procedure Plan: Return to ordering physician for future management and orders    Standard Discharge Instructions Given:yes     Joe Thakkar DO  03/02/20  1:17 PM

## 2020-03-05 ENCOUNTER — LAB (OUTPATIENT)
Dept: LAB | Facility: HOSPITAL | Age: 85
End: 2020-03-05

## 2020-03-05 DIAGNOSIS — E87.5 HYPERKALEMIA: ICD-10-CM

## 2020-03-05 DIAGNOSIS — N18.30 CKD (CHRONIC KIDNEY DISEASE) STAGE 3, GFR 30-59 ML/MIN (HCC): ICD-10-CM

## 2020-03-05 LAB
ANION GAP SERPL CALCULATED.3IONS-SCNC: 10.3 MMOL/L (ref 5–15)
BUN BLD-MCNC: 23 MG/DL (ref 8–23)
BUN/CREAT SERPL: 17.8 (ref 7–25)
CALCIUM SPEC-SCNC: 8.8 MG/DL (ref 8.6–10.5)
CHLORIDE SERPL-SCNC: 100 MMOL/L (ref 98–107)
CO2 SERPL-SCNC: 28.7 MMOL/L (ref 22–29)
CREAT BLD-MCNC: 1.29 MG/DL (ref 0.76–1.27)
GFR SERPL CREATININE-BSD FRML MDRD: 53 ML/MIN/1.73
GLUCOSE BLD-MCNC: 110 MG/DL (ref 65–99)
POTASSIUM BLD-SCNC: 4.2 MMOL/L (ref 3.5–5.2)
SODIUM BLD-SCNC: 139 MMOL/L (ref 136–145)

## 2020-03-05 PROCEDURE — 36415 COLL VENOUS BLD VENIPUNCTURE: CPT

## 2020-03-05 PROCEDURE — 80048 BASIC METABOLIC PNL TOTAL CA: CPT

## 2020-03-06 DIAGNOSIS — N18.30 CKD (CHRONIC KIDNEY DISEASE) STAGE 3, GFR 30-59 ML/MIN (HCC): Primary | ICD-10-CM

## 2020-03-09 DIAGNOSIS — E78.2 MIXED HYPERLIPIDEMIA: ICD-10-CM

## 2020-03-09 RX ORDER — SIMVASTATIN 40 MG
TABLET ORAL
Qty: 90 TABLET | Refills: 0 | Status: SHIPPED | OUTPATIENT
Start: 2020-03-09 | End: 2020-06-08 | Stop reason: SDUPTHER

## 2020-03-13 ENCOUNTER — RESULTS ENCOUNTER (OUTPATIENT)
Dept: OTHER | Facility: HOSPITAL | Age: 85
End: 2020-03-13

## 2020-03-13 DIAGNOSIS — K74.60 CIRRHOSIS OF LIVER WITH ASCITES, UNSPECIFIED HEPATIC CIRRHOSIS TYPE (HCC): ICD-10-CM

## 2020-03-13 DIAGNOSIS — R18.8 CIRRHOSIS OF LIVER WITH ASCITES, UNSPECIFIED HEPATIC CIRRHOSIS TYPE (HCC): ICD-10-CM

## 2020-03-31 DIAGNOSIS — I95.2 HYPOTENSION DUE TO DRUGS: ICD-10-CM

## 2020-03-31 DIAGNOSIS — I50.812 CHRONIC RIGHT HEART FAILURE (HCC): ICD-10-CM

## 2020-03-31 RX ORDER — NITROGLYCERIN 0.4 MG/1
TABLET SUBLINGUAL
Qty: 25 TABLET | Refills: 3 | Status: SHIPPED | OUTPATIENT
Start: 2020-03-31

## 2020-03-31 RX ORDER — CARVEDILOL 12.5 MG/1
TABLET ORAL
Qty: 180 TABLET | Refills: 1 | Status: SHIPPED | OUTPATIENT
Start: 2020-03-31 | End: 2020-07-31 | Stop reason: HOSPADM

## 2020-04-01 RX ORDER — SPIRONOLACTONE 25 MG/1
50 TABLET ORAL DAILY
Qty: 180 TABLET | Refills: 1 | Status: SHIPPED | OUTPATIENT
Start: 2020-04-01 | End: 2020-12-16

## 2020-04-01 NOTE — TELEPHONE ENCOUNTER
Last seen on 1/8/20 and will follow up on 4/8/20.  sent refills for spironolactone 25 mg tablets, 2 tablets oral daily to [facility].   90 day supply requested.    Maye Carlson, PharmD

## 2020-04-09 ENCOUNTER — LAB (OUTPATIENT)
Dept: LAB | Facility: HOSPITAL | Age: 85
End: 2020-04-09

## 2020-04-09 DIAGNOSIS — N18.30 CKD (CHRONIC KIDNEY DISEASE) STAGE 3, GFR 30-59 ML/MIN (HCC): ICD-10-CM

## 2020-04-09 LAB
ANION GAP SERPL CALCULATED.3IONS-SCNC: 9.7 MMOL/L (ref 5–15)
BUN BLD-MCNC: 31 MG/DL (ref 8–23)
BUN/CREAT SERPL: 21.1 (ref 7–25)
CALCIUM SPEC-SCNC: 9.2 MG/DL (ref 8.6–10.5)
CHLORIDE SERPL-SCNC: 99 MMOL/L (ref 98–107)
CO2 SERPL-SCNC: 27.3 MMOL/L (ref 22–29)
CREAT BLD-MCNC: 1.47 MG/DL (ref 0.76–1.27)
GFR SERPL CREATININE-BSD FRML MDRD: 45 ML/MIN/1.73
GLUCOSE BLD-MCNC: 116 MG/DL (ref 65–99)
POTASSIUM BLD-SCNC: 4.3 MMOL/L (ref 3.5–5.2)
SODIUM BLD-SCNC: 136 MMOL/L (ref 136–145)

## 2020-04-09 PROCEDURE — 36415 COLL VENOUS BLD VENIPUNCTURE: CPT

## 2020-04-09 PROCEDURE — 80048 BASIC METABOLIC PNL TOTAL CA: CPT

## 2020-04-10 ENCOUNTER — RESULTS ENCOUNTER (OUTPATIENT)
Dept: OTHER | Facility: HOSPITAL | Age: 85
End: 2020-04-10

## 2020-04-10 DIAGNOSIS — K74.60 CIRRHOSIS OF LIVER WITH ASCITES, UNSPECIFIED HEPATIC CIRRHOSIS TYPE (HCC): ICD-10-CM

## 2020-04-10 DIAGNOSIS — R18.8 CIRRHOSIS OF LIVER WITH ASCITES, UNSPECIFIED HEPATIC CIRRHOSIS TYPE (HCC): ICD-10-CM

## 2020-04-13 ENCOUNTER — HOSPITAL ENCOUNTER (OUTPATIENT)
Dept: CT IMAGING | Facility: HOSPITAL | Age: 85
Discharge: HOME | End: 2020-04-13
Admitting: RADIOLOGY

## 2020-04-13 VITALS
DIASTOLIC BLOOD PRESSURE: 51 MMHG | HEIGHT: 71 IN | WEIGHT: 204.4 LBS | HEART RATE: 56 BPM | TEMPERATURE: 97.6 F | RESPIRATION RATE: 18 BRPM | SYSTOLIC BLOOD PRESSURE: 131 MMHG | OXYGEN SATURATION: 98 % | BODY MASS INDEX: 28.61 KG/M2

## 2020-04-13 LAB — GLUCOSE BLDC GLUCOMTR-MCNC: 120 MG/DL (ref 70–130)

## 2020-04-13 PROCEDURE — 82962 GLUCOSE BLOOD TEST: CPT

## 2020-04-13 PROCEDURE — 75989 ABSCESS DRAINAGE UNDER X-RAY: CPT

## 2020-04-13 PROCEDURE — C1729 CATH, DRAINAGE: HCPCS

## 2020-04-13 RX ORDER — LIDOCAINE HYDROCHLORIDE 10 MG/ML
20 INJECTION, SOLUTION EPIDURAL; INFILTRATION; INTRACAUDAL; PERINEURAL ONCE
Status: COMPLETED | OUTPATIENT
Start: 2020-04-13 | End: 2020-04-13

## 2020-04-13 RX ORDER — SODIUM CHLORIDE 0.9 % (FLUSH) 0.9 %
3 SYRINGE (ML) INJECTION EVERY 12 HOURS SCHEDULED
Status: DISCONTINUED | OUTPATIENT
Start: 2020-04-13 | End: 2020-04-14 | Stop reason: HOSPADM

## 2020-04-13 RX ADMIN — LIDOCAINE HYDROCHLORIDE 20 ML: 10 INJECTION, SOLUTION EPIDURAL; INFILTRATION; INTRACAUDAL; PERINEURAL at 11:32

## 2020-04-13 NOTE — NURSING NOTE
Paracentesis performed by Dr Harper.  7.7 liters yellow fluid removed.  Pt tolerated well.  Report called to RAVINDRA

## 2020-04-13 NOTE — H&P
History and Physical      Chief complaint Cirrhosis with recurrent large volume symptomatic ascites  Subjective     Patient is a 86 y.o. male presents with large volume symptomatic ascites. Wanted to wait till COVID 19 controlled but became more uncomfortable.    Review of Systems   Not reviewed    History  Past Medical History:   Diagnosis Date   • A-fib (CMS/HCC)    • Acute inferior myocardial infarction (CMS/HCC)     Acute inferior STEMI with RV infarct features, 2009.    • CAD (coronary artery disease)    • Dyslipidemia    • Goiter     History of “goiter.”   • Hyperlipidemia    • Hypertension    • Nephrolithiasis    • Type 2 diabetes mellitus (CMS/HCC)      Past Surgical History:   Procedure Laterality Date   • BRONCHOSCOPY N/A 2019    Procedure: BRONCHOSCOPY WITH THORACENTESIS;  Surgeon: Marciano Higginbotham MD;  Location:  [facility];  Service: Pulmonary   • CARDIAC CATHETERIZATION Bilateral 2019    Procedure: Right and Left Heart Cath;  Surgeon: Efrem Posadas MD;  Location:  [facility];  Service: Cardiology   • CARDIAC CATHETERIZATION     • CHOLECYSTECTOMY     • CORONARY ANGIOPLASTY WITH STENT PLACEMENT  2009    Sirolimus eluting stents to the RCA, 2009.    • HERNIA REPAIR      Hernia repair x 2.    • PARACENTESIS  2019     Family History   Problem Relation Age of Onset   • Dementia Mother    • Stroke Mother    • Heart disease Father    • Heart attack Father    • Aneurysm Father    • Cancer Sister    • No Known Problems Brother    • No Known Problems Sister    • Heart disease Brother         CABG   • Hypertension Daughter    • Hypertension Son      Social History     Tobacco Use   • Smoking status: Former Smoker     Packs/day: 2.50     Years: 28.00     Pack years: 70.00     Types: Cigarettes     Last attempt to quit: 1983     Years since quittin.9   • Smokeless tobacco: Never Used   Substance Use Topics   • Alcohol use: No   •  Drug use: No       (Not in a hospital admission)  Allergies:  Bee venom and Sulfamethoxazole-trimethoprim    Objective     Vital Signs  Temp:  [97.6 °F (36.4 °C)] 97.6 °F (36.4 °C)  Heart Rate:  [49-67] 56  Resp:  [18-20] 18  BP: (104-131)/(47-73) 131/51    Physical Exam:    No exam performed today,    Results Review:    I reviewed the patient's new clinical results.  I reviewed the patient's new imaging results and agree with the interpretation.    Assessment/Plan       * No active hospital problems. *      Cirrhosis with large volume symptomatic ascites.   Planned CT guided paracentesis    Elijah Harper MD  04/13/20  16:27

## 2020-04-13 NOTE — POST-PROCEDURE NOTE
Interventional Radiology Operative Note    Date: 04/13/20     Time: 16:32     Pre-op Diagnosis: Cirrhosis with symptomatic large volume ascites  Post-op Diagnosis: Same    Procedure: CT guided paracentesis    Surgeon: BJORN Harper M.D.  Assistants: None    Sedation: None    Estimated Blood Loss (EBL): Trace     Urine Output (UOP): N/A (short procedure)    IVF: N/A (short procedure)    Findings: Large volume ascites    Specimens: 7.7 liters serous fluid.    Complications: No immediate    Disposition: Recovery. Stable.     
no

## 2020-04-14 ENCOUNTER — TELEPHONE (OUTPATIENT)
Dept: INFUSION THERAPY | Facility: HOSPITAL | Age: 85
End: 2020-04-14

## 2020-05-08 ENCOUNTER — RESULTS ENCOUNTER (OUTPATIENT)
Dept: OTHER | Facility: HOSPITAL | Age: 85
End: 2020-05-08

## 2020-05-08 ENCOUNTER — TELEPHONE (OUTPATIENT)
Dept: CARDIOLOGY | Facility: HOSPITAL | Age: 85
End: 2020-05-08

## 2020-05-08 DIAGNOSIS — R18.8 CIRRHOSIS OF LIVER WITH ASCITES, UNSPECIFIED HEPATIC CIRRHOSIS TYPE (HCC): ICD-10-CM

## 2020-05-08 DIAGNOSIS — I50.812 CHRONIC RIGHT-SIDED HEART FAILURE (HCC): Primary | ICD-10-CM

## 2020-05-08 DIAGNOSIS — N18.30 CKD (CHRONIC KIDNEY DISEASE) STAGE 3, GFR 30-59 ML/MIN (HCC): ICD-10-CM

## 2020-05-08 DIAGNOSIS — K74.60 CIRRHOSIS OF LIVER WITH ASCITES, UNSPECIFIED HEPATIC CIRRHOSIS TYPE (HCC): ICD-10-CM

## 2020-05-11 ENCOUNTER — LAB (OUTPATIENT)
Dept: LAB | Facility: HOSPITAL | Age: 85
End: 2020-05-11

## 2020-05-11 DIAGNOSIS — I50.812 CHRONIC RIGHT-SIDED HEART FAILURE (HCC): ICD-10-CM

## 2020-05-11 DIAGNOSIS — N18.30 CKD (CHRONIC KIDNEY DISEASE) STAGE 3, GFR 30-59 ML/MIN (HCC): ICD-10-CM

## 2020-05-11 LAB
ANION GAP SERPL CALCULATED.3IONS-SCNC: 11.7 MMOL/L (ref 5–15)
BUN BLD-MCNC: 24 MG/DL (ref 8–23)
BUN/CREAT SERPL: 17.1 (ref 7–25)
CALCIUM SPEC-SCNC: 9.1 MG/DL (ref 8.6–10.5)
CHLORIDE SERPL-SCNC: 99 MMOL/L (ref 98–107)
CO2 SERPL-SCNC: 29.3 MMOL/L (ref 22–29)
CREAT BLD-MCNC: 1.4 MG/DL (ref 0.76–1.27)
DEPRECATED RDW RBC AUTO: 47.2 FL (ref 37–54)
ERYTHROCYTE [DISTWIDTH] IN BLOOD BY AUTOMATED COUNT: 13.5 % (ref 12.3–15.4)
GFR SERPL CREATININE-BSD FRML MDRD: 48 ML/MIN/1.73
GLUCOSE BLD-MCNC: 175 MG/DL (ref 65–99)
HCT VFR BLD AUTO: 40 % (ref 37.5–51)
HGB BLD-MCNC: 13.3 G/DL (ref 13–17.7)
MCH RBC QN AUTO: 31.6 PG (ref 26.6–33)
MCHC RBC AUTO-ENTMCNC: 33.3 G/DL (ref 31.5–35.7)
MCV RBC AUTO: 95 FL (ref 79–97)
PLATELET # BLD AUTO: 103 10*3/MM3 (ref 140–450)
PMV BLD AUTO: 11.1 FL (ref 6–12)
POTASSIUM BLD-SCNC: 4.3 MMOL/L (ref 3.5–5.2)
RBC # BLD AUTO: 4.21 10*6/MM3 (ref 4.14–5.8)
SODIUM BLD-SCNC: 140 MMOL/L (ref 136–145)
WBC NRBC COR # BLD: 4.74 10*3/MM3 (ref 3.4–10.8)

## 2020-05-11 PROCEDURE — 80048 BASIC METABOLIC PNL TOTAL CA: CPT

## 2020-05-11 PROCEDURE — 36415 COLL VENOUS BLD VENIPUNCTURE: CPT

## 2020-05-11 PROCEDURE — 85027 COMPLETE CBC AUTOMATED: CPT

## 2020-05-19 ENCOUNTER — HOSPITAL ENCOUNTER (OUTPATIENT)
Dept: CT IMAGING | Facility: HOSPITAL | Age: 85
Discharge: HOME | End: 2020-05-19
Admitting: RADIOLOGY

## 2020-05-19 VITALS
DIASTOLIC BLOOD PRESSURE: 66 MMHG | HEIGHT: 71 IN | RESPIRATION RATE: 18 BRPM | OXYGEN SATURATION: 90 % | SYSTOLIC BLOOD PRESSURE: 132 MMHG | HEART RATE: 66 BPM | BODY MASS INDEX: 28.52 KG/M2

## 2020-05-19 PROCEDURE — C1729 CATH, DRAINAGE: HCPCS

## 2020-05-19 PROCEDURE — 75989 ABSCESS DRAINAGE UNDER X-RAY: CPT

## 2020-05-19 PROCEDURE — 25010000003 LIDOCAINE 1 % SOLUTION: Performed by: RADIOLOGY

## 2020-05-19 RX ORDER — LIDOCAINE HYDROCHLORIDE 10 MG/ML
20 INJECTION, SOLUTION INFILTRATION; PERINEURAL ONCE
Status: COMPLETED | OUTPATIENT
Start: 2020-05-19 | End: 2020-05-19

## 2020-05-19 RX ADMIN — LIDOCAINE HYDROCHLORIDE 20 ML: 10 INJECTION, SOLUTION INFILTRATION; PERINEURAL at 09:14

## 2020-05-19 NOTE — PROGRESS NOTES
Assessment/Plan   Assessment & Plan    Subjective   Subjective  Heart Rate:  [50-77] 60  Resp:  [18-20] 18  BP: ()/(45-89) 109/64  @[id number]()@  @IOTHISSHIFT()@    Objective   Objective    * No active hospital problems. *

## 2020-05-19 NOTE — POST-PROCEDURE NOTE
Interventional Radiology Operative Note    Date: 05/19/20     Time: 10:45     Pre-op Diagnosis: Cirrhosis with symptomatic large volume ascites  Post-op Diagnosis: Same     Procedure: CT guided paracentesis     Surgeon: BJORN Harper M.D.  Assistants: None     Sedation: None     Estimated Blood Loss (EBL): Trace      Urine Output (UOP): N/A (short procedure)     IVF: N/A (short procedure)     Findings: Large volume ascites     Specimens: 7.5 liters serous fluid.     Complications: No immediate     Disposition: Recovery. Stable.

## 2020-05-19 NOTE — NURSING NOTE
Patient placed on cardiac monitors, vitals stable. Images taken and reviewed by . A total volume of 7.5 L serous fluid was removed from the left side of the peritoneum. Patient tolerated well. Report to RAVINDRA.

## 2020-05-19 NOTE — H&P
History and Physical      Chief complaint Aymptomatic ascites    Subjective     Patient is a 86 y.o. male presents with recurrent large volume symptomatic ascites related to cirrhosis    Review of Systems    Not reviewed    History  Past Medical History:   Diagnosis Date   • A-fib (CMS/HCC)    • Acute inferior myocardial infarction (CMS/HCC)     Acute inferior STEMI with RV infarct features, 2009.    • CAD (coronary artery disease)    • Dyslipidemia    • Goiter     History of “goiter.”   • Hyperlipidemia    • Hypertension    • Nephrolithiasis    • Type 2 diabetes mellitus (CMS/HCC)      Past Surgical History:   Procedure Laterality Date   • BRONCHOSCOPY N/A 2019    Procedure: BRONCHOSCOPY WITH THORACENTESIS;  Surgeon: Marciano Higginbotham MD;  Location:  [facility];  Service: Pulmonary   • CARDIAC CATHETERIZATION Bilateral 2019    Procedure: Right and Left Heart Cath;  Surgeon: Efrem Posadas MD;  Location:  [facility] LOCATION;  Service: Cardiology   • CARDIAC CATHETERIZATION     • CHOLECYSTECTOMY     • CORONARY ANGIOPLASTY WITH STENT PLACEMENT  2009    Sirolimus eluting stents to the RCA, 2009.    • HERNIA REPAIR      Hernia repair x 2.    • PARACENTESIS  2019     Family History   Problem Relation Age of Onset   • Dementia Mother    • Stroke Mother    • Heart disease Father    • Heart attack Father    • Aneurysm Father    • Cancer Sister    • No Known Problems Brother    • No Known Problems Sister    • Heart disease Brother         CABG   • Hypertension Daughter    • Hypertension Son      Social History     Tobacco Use   • Smoking status: Former Smoker     Packs/day: 2.50     Years: 28.00     Pack years: 70.00     Types: Cigarettes     Last attempt to quit: 1983     Years since quittin.0   • Smokeless tobacco: Never Used   Substance Use Topics   • Alcohol use: No   • Drug use: No       (Not in a hospital admission)  Allergies:  Bee venom and  Sulfamethoxazole-trimethoprim    Objective     Vital Signs  Heart Rate:  [50-77] 60  Resp:  [18-20] 18  BP: ()/(45-89) 109/64    Physical Exam:    No exam performed today,    Results Review:    I reviewed the patient's new clinical results.  I reviewed the patient's new imaging results and agree with the interpretation.    Assessment/Plan       * No active hospital problems. *      Cirrhosis  Recurrent large volume ascites    Planned CT guided paracentesis

## 2020-06-02 ENCOUNTER — OFFICE VISIT (OUTPATIENT)
Dept: INTERNAL MEDICINE | Facility: CLINIC | Age: 85
End: 2020-06-02

## 2020-06-02 VITALS
DIASTOLIC BLOOD PRESSURE: 56 MMHG | HEART RATE: 50 BPM | HEIGHT: 70 IN | WEIGHT: 209.13 LBS | SYSTOLIC BLOOD PRESSURE: 102 MMHG | BODY MASS INDEX: 29.94 KG/M2 | RESPIRATION RATE: 18 BRPM | OXYGEN SATURATION: 99 % | TEMPERATURE: 98 F

## 2020-06-02 DIAGNOSIS — K42.9 UMBILICAL HERNIA WITHOUT OBSTRUCTION AND WITHOUT GANGRENE: ICD-10-CM

## 2020-06-02 DIAGNOSIS — Z00.00 MEDICARE ANNUAL WELLNESS VISIT, SUBSEQUENT: Primary | ICD-10-CM

## 2020-06-02 PROCEDURE — 99213 OFFICE O/P EST LOW 20 MIN: CPT | Performed by: NURSE PRACTITIONER

## 2020-06-02 PROCEDURE — G0439 PPPS, SUBSEQ VISIT: HCPCS | Performed by: NURSE PRACTITIONER

## 2020-06-02 NOTE — PROGRESS NOTES
The ABCs of the Annual Wellness Visit  Subsequent Medicare Wellness Visit    Chief Complaint   Patient presents with   • Medicare Wellness-subsequent     fluid taken off stomach, handicap sign for vehicle, hernia,        Subjective   History of Present Illness:  Jordi Sam is a 86 y.o. male who presents for a Subsequent Medicare Wellness Visit.    HEALTH RISK ASSESSMENT    Recent Hospitalizations:  No hospitalization(s) within the last year.    Current Medical Providers:  Patient Care Team:  Emely Kramer MD as PCP - General (Internal Medicine)  Diogo Weber APRN as PCP - Claims Attributed  Efrem Posadas MD as Consulting Physician (Cardiology)  Jordi Law MD as Consulting Physician (Urology)    Smoking Status:  Social History     Tobacco Use   Smoking Status Former Smoker   • Packs/day: 2.50   • Years: 28.00   • Pack years: 70.00   • Types: Cigarettes   • Last attempt to quit: 1983   • Years since quittin.0   Smokeless Tobacco Never Used       Alcohol Consumption:  Social History     Substance and Sexual Activity   Alcohol Use No       Depression Screen:   PHQ-2/PHQ-9 Depression Screening 2020   Little interest or pleasure in doing things 0   Feeling down, depressed, or hopeless 0   Total Score 0       Fall Risk Screen:  STEADI Fall Risk Assessment was completed, and patient is at LOW risk for falls.Assessment completed on:2020    Health Habits and Functional and Cognitive Screening:  Functional & Cognitive Status 2020   Do you have difficulty preparing food and eating? No   Do you have difficulty bathing yourself, getting dressed or grooming yourself? No   Do you have difficulty using the toilet? No   Do you have difficulty moving around from place to place? No   Do you have trouble with steps or getting out of a bed or a chair? No   Current Diet Well Balanced Diet   Dental Exam Up to date   Eye Exam Up to date   Exercise (times per week) 2 times per week   Current  Exercise Activities Include Gardening   Do you need help using the phone?  No   Are you deaf or do you have serious difficulty hearing?  Yes   Do you need help with transportation? No   Do you need help shopping? No   Do you need help preparing meals?  No   Do you need help with housework?  No   Do you need help with laundry? No   Do you need help taking your medications? No   Do you need help managing money? No   Do you ever drive or ride in a car without wearing a seat belt? No   Have you felt unusual stress, anger or loneliness in the last month? No   Who do you live with? Spouse   If you need help, do you have trouble finding someone available to you? No   Have you been bothered in the last four weeks by sexual problems? No   Do you have difficulty concentrating, remembering or making decisions? No         Does the patient have evidence of cognitive impairment? No    Asprin use counseling:Taking ASA appropriately as indicated    Age-appropriate Screening Schedule:  Refer to the list below for future screening recommendations based on patient's age, sex and/or medical conditions. Orders for these recommended tests are listed in the plan section. The patient has been provided with a written plan.    Health Maintenance   Topic Date Due   • TDAP/TD VACCINES (1 - Tdap) 01/31/1945   • ZOSTER VACCINE (1 of 2) 01/31/1984   • HEMOGLOBIN A1C  09/01/2019   • DIABETIC EYE EXAM  10/31/2019   • LIPID PANEL  01/30/2020   • URINE MICROALBUMIN  03/19/2020   • INFLUENZA VACCINE  08/01/2020   • PNEUMOCOCCAL VACCINE (65+ HIGH RISK)  Completed          The following portions of the patient's history were reviewed and updated as appropriate: allergies, current medications, past family history, past medical history, past social history, past surgical history and problem list.    Outpatient Medications Prior to Visit   Medication Sig Dispense Refill   • apixaban (ELIQUIS) 2.5 MG tablet tablet Take 1 tablet by mouth 2 (Two) Times a Day.  60 tablet 3   • aspirin 81 MG tablet Take 81 mg by mouth Daily.     • Aspirin Buf,CaCarb-MgCarb-MgO, 81 MG tablet aspirin 81 mg tablet   Daily     • carvedilol (COREG) 12.5 MG tablet TAKE 1 TABLET BY MOUTH TWICE DAILY WITH MEALS 180 tablet 1   • folic acid (FOLVITE) 400 MCG tablet Take 400 mcg by mouth Daily.     • furosemide (LASIX) 40 MG tablet Take 1 tablet by mouth 2 (Two) Times a Day. 180 tablet 1   • nitroglycerin (NITROSTAT) 0.4 MG SL tablet DISSOLVE ONE TABLET UNDER THE TONGUE EVERY 5 MINUTES AS NEEDED FOR CHEST PAIN.  DO NOT EXCEED A TOTAL OF 3 DOSES IN 15 MINUTES 25 tablet 3   • simvastatin (ZOCOR) 40 MG tablet Take 1 tablet by mouth once daily 90 tablet 0   • spironolactone (ALDACTONE) 25 MG tablet Take 2 tablets by mouth Daily. 180 tablet 1   • tamsulosin (FLOMAX) 0.4 MG capsule 24 hr capsule Take  by mouth Daily.       No facility-administered medications prior to visit.        Patient Active Problem List   Diagnosis   • Coronary arteriosclerosis in native artery   • Panlobular emphysema (CMS/HCC)   • Functional diarrhea   • Mixed hyperlipidemia   • Essential hypertension   • Controlled type 2 diabetes mellitus without complication, without long-term current use of insulin (CMS/HCC)   • Nephrolithiasis   • Chronic atrial fibrillation   • Shortness of breath   • Pleural effusion   • Chronic right heart failure (CMS/HCC)   • Other ascites   • Cirrhosis of liver with ascites (CMS/HCC)   • Stage 3 chronic kidney disease (CMS/HCC)   • End stage liver disease (CMS/HCC)       Advanced Care Planning:  ACP discussion was held with the patient during this visit. Patient has an advance directive (not in EMR), copy requested.    Review of Systems   Gastrointestinal:        Hernia in umbilicus which is new       Compared to one year ago, the patient feels his physical health is worse.  Compared to one year ago, the patient feels his mental health is the same.    Reviewed chart for potential of high risk medication  "in the elderly: yes  Reviewed chart for potential of harmful drug interactions in the elderly:yes    Objective         Vitals:    06/02/20 1507   BP: 102/56   BP Location: Right arm   Patient Position: Sitting   Cuff Size: Adult   Pulse: 50   Resp: 18   Temp: 98 °F (36.7 °C)   TempSrc: Temporal   SpO2: 99%   Weight: 94.9 kg (209 lb 2 oz)   Height: 177.8 cm (70\")   PainSc:   6   PainLoc: Abdomen   Finger Rub Hearing{Test (right ear):passed  Finger Rub Hearing{Test (left ear):passed        Body mass index is 30.01 kg/m².  Discussed the patient's BMI with him. The BMI is in the acceptable range.    Physical Exam   Constitutional: He is oriented to person, place, and time. He appears well-developed and well-nourished.   Abdominal: A hernia (reducible bluish warm 1inch diameter hernia palpable) is present.   Neurological: He is alert and oriented to person, place, and time.   Skin: Skin is warm and dry.   Psychiatric: He has a normal mood and affect. His behavior is normal. Thought content normal.   Nursing note and vitals reviewed.            Assessment/Plan   Medicare Risks and Personalized Health Plan  CMS Preventative Services Quick Reference  Depression/Dysphoria  Fall Risk  Obesity/Overweight     The above risks/problems have been discussed with the patient.  Pertinent information has been shared with the patient in the After Visit Summary.  Follow up plans and orders are seen below in the Assessment/Plan Section.    Diagnoses and all orders for this visit:    1. Medicare annual wellness visit, subsequent (Primary)    2. Umbilical hernia without obstruction and without gangrene  -     Ambulatory Referral to General Surgery      Follow Up:  Return in about 1 year (around 6/2/2021), or set up fasting appt with Dr CURRY anytime now, for Medicare Wellness.     An After Visit Summary and PPPS were given to the patient.             "

## 2020-06-02 NOTE — PATIENT INSTRUCTIONS
"DASH Eating Plan  DASH stands for \"Dietary Approaches to Stop Hypertension.\" The DASH eating plan is a healthy eating plan that has been shown to reduce high blood pressure (hypertension). It may also reduce your risk for type 2 diabetes, heart disease, and stroke. The DASH eating plan may also help with weight loss.  What are tips for following this plan?    General guidelines  · Avoid eating more than 2,300 mg (milligrams) of salt (sodium) a day. If you have hypertension, you may need to reduce your sodium intake to 1,500 mg a day.  · Limit alcohol intake to no more than 1 drink a day for nonpregnant women and 2 drinks a day for men. One drink equals 12 oz of beer, 5 oz of wine, or 1½ oz of hard liquor.  · Work with your health care provider to maintain a healthy body weight or to lose weight. Ask what an ideal weight is for you.  · Get at least 30 minutes of exercise that causes your heart to beat faster (aerobic exercise) most days of the week. Activities may include walking, swimming, or biking.  · Work with your health care provider or diet and nutrition specialist (dietitian) to adjust your eating plan to your individual calorie needs.  Reading food labels    · Check food labels for the amount of sodium per serving. Choose foods with less than 5 percent of the Daily Value of sodium. Generally, foods with less than 300 mg of sodium per serving fit into this eating plan.  · To find whole grains, look for the word \"whole\" as the first word in the ingredient list.  Shopping  · Buy products labeled as \"low-sodium\" or \"no salt added.\"  · Buy fresh foods. Avoid canned foods and premade or frozen meals.  Cooking  · Avoid adding salt when cooking. Use salt-free seasonings or herbs instead of table salt or sea salt. Check with your health care provider or pharmacist before using salt substitutes.  · Do not mack foods. Cook foods using healthy methods such as baking, boiling, grilling, and broiling instead.  · Cook with " heart-healthy oils, such as olive, canola, soybean, or sunflower oil.  Meal planning  · Eat a balanced diet that includes:  ? 5 or more servings of fruits and vegetables each day. At each meal, try to fill half of your plate with fruits and vegetables.  ? Up to 6-8 servings of whole grains each day.  ? Less than 6 oz of lean meat, poultry, or fish each day. A 3-oz serving of meat is about the same size as a deck of cards. One egg equals 1 oz.  ? 2 servings of low-fat dairy each day.  ? A serving of nuts, seeds, or beans 5 times each week.  ? Heart-healthy fats. Healthy fats called Omega-3 fatty acids are found in foods such as flaxseeds and coldwater fish, like sardines, salmon, and mackerel.  · Limit how much you eat of the following:  ? Canned or prepackaged foods.  ? Food that is high in trans fat, such as fried foods.  ? Food that is high in saturated fat, such as fatty meat.  ? Sweets, desserts, sugary drinks, and other foods with added sugar.  ? Full-fat dairy products.  · Do not salt foods before eating.  · Try to eat at least 2 vegetarian meals each week.  · Eat more home-cooked food and less restaurant, buffet, and fast food.  · When eating at a restaurant, ask that your food be prepared with less salt or no salt, if possible.  What foods are recommended?  The items listed may not be a complete list. Talk with your dietitian about what dietary choices are best for you.  Grains  Whole-grain or whole-wheat bread. Whole-grain or whole-wheat pasta. Brown rice. Oatmeal. Quinoa. Bulgur. Whole-grain and low-sodium cereals. Joanna bread. Low-fat, low-sodium crackers. Whole-wheat flour tortillas.  Vegetables  Fresh or frozen vegetables (raw, steamed, roasted, or grilled). Low-sodium or reduced-sodium tomato and vegetable juice. Low-sodium or reduced-sodium tomato sauce and tomato paste. Low-sodium or reduced-sodium canned vegetables.  Fruits  All fresh, dried, or frozen fruit. Canned fruit in natural juice (without  added sugar).  Meat and other protein foods  Skinless chicken or turkey. Ground chicken or turkey. Pork with fat trimmed off. Fish and seafood. Egg whites. Dried beans, peas, or lentils. Unsalted nuts, nut butters, and seeds. Unsalted canned beans. Lean cuts of beef with fat trimmed off. Low-sodium, lean deli meat.  Dairy  Low-fat (1%) or fat-free (skim) milk. Fat-free, low-fat, or reduced-fat cheeses. Nonfat, low-sodium ricotta or cottage cheese. Low-fat or nonfat yogurt. Low-fat, low-sodium cheese.  Fats and oils  Soft margarine without trans fats. Vegetable oil. Low-fat, reduced-fat, or light mayonnaise and salad dressings (reduced-sodium). Canola, safflower, olive, soybean, and sunflower oils. Avocado.  Seasoning and other foods  Herbs. Spices. Seasoning mixes without salt. Unsalted popcorn and pretzels. Fat-free sweets.  What foods are not recommended?  The items listed may not be a complete list. Talk with your dietitian about what dietary choices are best for you.  Grains  Baked goods made with fat, such as croissants, muffins, or some breads. Dry pasta or rice meal packs.  Vegetables  Creamed or fried vegetables. Vegetables in a cheese sauce. Regular canned vegetables (not low-sodium or reduced-sodium). Regular canned tomato sauce and paste (not low-sodium or reduced-sodium). Regular tomato and vegetable juice (not low-sodium or reduced-sodium). Pickles. Olives.  Fruits  Canned fruit in a light or heavy syrup. Fried fruit. Fruit in cream or butter sauce.  Meat and other protein foods  Fatty cuts of meat. Ribs. Fried meat. Vicente. Sausage. Bologna and other processed lunch meats. Salami. Fatback. Hotdogs. Bratwurst. Salted nuts and seeds. Canned beans with added salt. Canned or smoked fish. Whole eggs or egg yolks. Chicken or turkey with skin.  Dairy  Whole or 2% milk, cream, and half-and-half. Whole or full-fat cream cheese. Whole-fat or sweetened yogurt. Full-fat cheese. Nondairy creamers. Whipped toppings.  Processed cheese and cheese spreads.  Fats and oils  Butter. Stick margarine. Lard. Shortening. Ghee. Vicente fat. Tropical oils, such as coconut, palm kernel, or palm oil.  Seasoning and other foods  Salted popcorn and pretzels. Onion salt, garlic salt, seasoned salt, table salt, and sea salt. Worcestershire sauce. Tartar sauce. Barbecue sauce. Teriyaki sauce. Soy sauce, including reduced-sodium. Steak sauce. Canned and packaged gravies. Fish sauce. Oyster sauce. Cocktail sauce. Horseradish that you find on the shelf. Ketchup. Mustard. Meat flavorings and tenderizers. Bouillon cubes. Hot sauce and Tabasco sauce. Premade or packaged marinades. Premade or packaged taco seasonings. Relishes. Regular salad dressings.  Where to find more information:  · National Heart, Lung, and Blood [place]: www.nhlbi.nih.gov  · American Heart Association: www.heart.org  Summary  · The DASH eating plan is a healthy eating plan that has been shown to reduce high blood pressure (hypertension). It may also reduce your risk for type 2 diabetes, heart disease, and stroke.  · With the DASH eating plan, you should limit salt (sodium) intake to 2,300 mg a day. If you have hypertension, you may need to reduce your sodium intake to 1,500 mg a day.  · When on the DASH eating plan, aim to eat more fresh fruits and vegetables, whole grains, lean proteins, low-fat dairy, and heart-healthy fats.  · Work with your health care provider or diet and nutrition specialist (dietitian) to adjust your eating plan to your individual calorie needs.  This information is not intended to replace advice given to you by your health care provider. Make sure you discuss any questions you have with your health care provider.  Document Released: 12/06/2012 Document Revised: 11/30/2018 Document Reviewed: 12/11/2017  Elsevier Patient Education © 2020 [employer]      Medicare Wellness  Personal Prevention Plan of Service     Date of Office Visit:  06/02/2020  Encounter  Provider:  MAKAYLA Fernandez  Place of Service:  [facility]  Patient Name: Jordi Sam  :  1934    As part of the Medicare Wellness portion of your visit today, we are providing you with this personalized preventive plan of services (PPPS). This plan is based upon recommendations of the [place] Preventive Services Task Force (USPSTF) and the Advisory Committee on Immunization Practices (ACIP).    This lists the preventive care services that should be considered, and provides dates of when you are due. Items listed as completed are up-to-date and do not require any further intervention.    Health Maintenance   Topic Date Due   • TDAP/TD VACCINES (1 - Tdap) 1945   • ZOSTER VACCINE (1 of 2) 1984   • HEMOGLOBIN A1C  2019   • DIABETIC EYE EXAM  10/31/2019   • LIPID PANEL  2020   • MEDICARE ANNUAL WELLNESS  2020   • URINE MICROALBUMIN  2020   • INFLUENZA VACCINE  2020   • PNEUMOCOCCAL VACCINE (65+ HIGH RISK)  Completed       No orders of the defined types were placed in this encounter.      Return in about 1 year (around 2021) for Medicare Wellness.

## 2020-06-05 ENCOUNTER — RESULTS ENCOUNTER (OUTPATIENT)
Dept: OTHER | Facility: HOSPITAL | Age: 85
End: 2020-06-05

## 2020-06-05 DIAGNOSIS — R18.8 CIRRHOSIS OF LIVER WITH ASCITES, UNSPECIFIED HEPATIC CIRRHOSIS TYPE (HCC): ICD-10-CM

## 2020-06-05 DIAGNOSIS — K74.60 CIRRHOSIS OF LIVER WITH ASCITES, UNSPECIFIED HEPATIC CIRRHOSIS TYPE (HCC): ICD-10-CM

## 2020-06-08 DIAGNOSIS — E78.2 MIXED HYPERLIPIDEMIA: ICD-10-CM

## 2020-06-08 RX ORDER — SIMVASTATIN 40 MG
40 TABLET ORAL DAILY
Qty: 90 TABLET | Refills: 0 | Status: SHIPPED | OUTPATIENT
Start: 2020-06-08 | End: 2020-06-11 | Stop reason: SDUPTHER

## 2020-06-08 NOTE — TELEPHONE ENCOUNTER
PATIENT'S WIFE DAVID  IS REQUESTING A REFILL FOR     simvastatin (ZOCOR) 40 MG tablet      DAVID CALL BACK [phone]  [facility]

## 2020-06-10 ENCOUNTER — LAB (OUTPATIENT)
Dept: LAB | Facility: HOSPITAL | Age: 85
End: 2020-06-10

## 2020-06-10 ENCOUNTER — TELEPHONE (OUTPATIENT)
Dept: CARDIOLOGY | Facility: HOSPITAL | Age: 85
End: 2020-06-10

## 2020-06-10 DIAGNOSIS — I50.812 CHRONIC RIGHT HEART FAILURE (HCC): Primary | ICD-10-CM

## 2020-06-10 DIAGNOSIS — K74.60 CIRRHOSIS OF LIVER WITH ASCITES, UNSPECIFIED HEPATIC CIRRHOSIS TYPE (HCC): ICD-10-CM

## 2020-06-10 DIAGNOSIS — R18.8 CIRRHOSIS OF LIVER WITH ASCITES, UNSPECIFIED HEPATIC CIRRHOSIS TYPE (HCC): ICD-10-CM

## 2020-06-10 DIAGNOSIS — I50.812 CHRONIC RIGHT HEART FAILURE (HCC): ICD-10-CM

## 2020-06-10 PROCEDURE — 80053 COMPREHEN METABOLIC PANEL: CPT

## 2020-06-10 PROCEDURE — 36415 COLL VENOUS BLD VENIPUNCTURE: CPT

## 2020-06-10 NOTE — TELEPHONE ENCOUNTER
Patient is having a paracentesis and states that he has labs before to have his kidney function checked. He needs an order.

## 2020-06-11 DIAGNOSIS — E78.2 MIXED HYPERLIPIDEMIA: ICD-10-CM

## 2020-06-11 LAB
ALBUMIN SERPL-MCNC: 3.5 G/DL (ref 3.5–5.2)
ALBUMIN/GLOB SERPL: 1.7 G/DL
ALP SERPL-CCNC: 66 U/L (ref 39–117)
ALT SERPL W P-5'-P-CCNC: 9 U/L (ref 1–41)
ANION GAP SERPL CALCULATED.3IONS-SCNC: 11 MMOL/L (ref 5–15)
AST SERPL-CCNC: 22 U/L (ref 1–40)
BILIRUB SERPL-MCNC: 0.6 MG/DL (ref 0.2–1.2)
BUN BLD-MCNC: 27 MG/DL (ref 8–23)
BUN/CREAT SERPL: 20 (ref 7–25)
CALCIUM SPEC-SCNC: 8.7 MG/DL (ref 8.6–10.5)
CHLORIDE SERPL-SCNC: 103 MMOL/L (ref 98–107)
CO2 SERPL-SCNC: 26 MMOL/L (ref 22–29)
CREAT BLD-MCNC: 1.35 MG/DL (ref 0.76–1.27)
GFR SERPL CREATININE-BSD FRML MDRD: 50 ML/MIN/1.73
GLOBULIN UR ELPH-MCNC: 2.1 GM/DL
GLUCOSE BLD-MCNC: 150 MG/DL (ref 65–99)
POTASSIUM BLD-SCNC: 4.5 MMOL/L (ref 3.5–5.2)
PROT SERPL-MCNC: 5.6 G/DL (ref 6–8.5)
SODIUM BLD-SCNC: 140 MMOL/L (ref 136–145)

## 2020-06-11 RX ORDER — SIMVASTATIN 40 MG
40 TABLET ORAL DAILY
Qty: 90 TABLET | Refills: 0 | Status: SHIPPED | OUTPATIENT
Start: 2020-06-11 | End: 2020-09-11 | Stop reason: SDUPTHER

## 2020-06-17 ENCOUNTER — HOSPITAL ENCOUNTER (OUTPATIENT)
Dept: CT IMAGING | Facility: HOSPITAL | Age: 85
Discharge: HOME | End: 2020-06-17
Admitting: RADIOLOGY

## 2020-06-17 VITALS
TEMPERATURE: 97.4 F | SYSTOLIC BLOOD PRESSURE: 122 MMHG | HEART RATE: 83 BPM | RESPIRATION RATE: 18 BRPM | HEIGHT: 70 IN | DIASTOLIC BLOOD PRESSURE: 93 MMHG | OXYGEN SATURATION: 98 % | WEIGHT: 213.8 LBS | BODY MASS INDEX: 30.61 KG/M2

## 2020-06-17 LAB
APTT PPP: 34.9 SECONDS (ref 24–37)
GLUCOSE BLDC GLUCOMTR-MCNC: 104 MG/DL (ref 70–130)
INR PPP: 1.2 (ref 0.85–1.16)
PLATELET # BLD AUTO: 97 10*3/MM3 (ref 140–450)
PROTHROMBIN TIME: 14.9 SECONDS (ref 11.5–14)

## 2020-06-17 PROCEDURE — 85730 THROMBOPLASTIN TIME PARTIAL: CPT | Performed by: RADIOLOGY

## 2020-06-17 PROCEDURE — 85049 AUTOMATED PLATELET COUNT: CPT | Performed by: RADIOLOGY

## 2020-06-17 PROCEDURE — 82962 GLUCOSE BLOOD TEST: CPT

## 2020-06-17 PROCEDURE — 75989 ABSCESS DRAINAGE UNDER X-RAY: CPT

## 2020-06-17 PROCEDURE — C1729 CATH, DRAINAGE: HCPCS

## 2020-06-17 PROCEDURE — 85610 PROTHROMBIN TIME: CPT | Performed by: RADIOLOGY

## 2020-06-17 RX ORDER — SODIUM CHLORIDE 0.9 % (FLUSH) 0.9 %
3 SYRINGE (ML) INJECTION EVERY 12 HOURS SCHEDULED
Status: DISCONTINUED | OUTPATIENT
Start: 2020-06-17 | End: 2020-06-18 | Stop reason: HOSPADM

## 2020-06-17 RX ORDER — LIDOCAINE HYDROCHLORIDE 10 MG/ML
20 INJECTION, SOLUTION EPIDURAL; INFILTRATION; INTRACAUDAL; PERINEURAL ONCE
Status: COMPLETED | OUTPATIENT
Start: 2020-06-17 | End: 2020-06-17

## 2020-06-17 RX ORDER — SODIUM CHLORIDE 0.9 % (FLUSH) 0.9 %
10 SYRINGE (ML) INJECTION AS NEEDED
Status: DISCONTINUED | OUTPATIENT
Start: 2020-06-17 | End: 2020-06-18 | Stop reason: HOSPADM

## 2020-06-17 RX ADMIN — LIDOCAINE HYDROCHLORIDE 20 ML: 10 INJECTION, SOLUTION EPIDURAL; INFILTRATION; INTRACAUDAL; PERINEURAL at 10:38

## 2020-06-17 NOTE — NURSING NOTE
Patient placed on cardiac monitors, vital signs stable. Images taken and reviewed by Dr. Harper. Paracentesis was performed with a total volume of 7800 mL of fluid removed.  Patient tolerated well, vitals remained stable throughout procedure. Report called to RAVINDRA and patient returned to unit via stretcher.

## 2020-06-18 ENCOUNTER — TELEPHONE (OUTPATIENT)
Dept: INFUSION THERAPY | Facility: HOSPITAL | Age: 85
End: 2020-06-18

## 2020-06-18 NOTE — POST-PROCEDURE NOTE
Interventional Radiology Operative Note    Date: 06/17/20     Time: 20:07     Pre-op Diagnosis: Cirrhosis with symptomatic large volume ascites  Post-op Diagnosis: Same     Procedure: CT guided paracentesis     Surgeon: BJORN Harper M.D.  Assistants: None     Sedation: None     Estimated Blood Loss (EBL): Trace      Urine Output (UOP): N/A (short procedure)     IVF: N/A (short procedure)     Findings: Large volume ascites     Specimens: 7.8 liters serous fluid.     Complications: No immediate     Disposition: Recovery. Stable.

## 2020-06-23 ENCOUNTER — OFFICE VISIT (OUTPATIENT)
Dept: CARDIOLOGY | Facility: CLINIC | Age: 85
End: 2020-06-23

## 2020-06-23 VITALS
WEIGHT: 202.4 LBS | DIASTOLIC BLOOD PRESSURE: 60 MMHG | HEART RATE: 70 BPM | OXYGEN SATURATION: 99 % | SYSTOLIC BLOOD PRESSURE: 116 MMHG | HEIGHT: 71 IN | BODY MASS INDEX: 28.34 KG/M2

## 2020-06-23 DIAGNOSIS — I50.812 CHRONIC RIGHT HEART FAILURE (HCC): ICD-10-CM

## 2020-06-23 DIAGNOSIS — I48.20 CHRONIC ATRIAL FIBRILLATION (HCC): ICD-10-CM

## 2020-06-23 DIAGNOSIS — I25.10 CORONARY ARTERIOSCLEROSIS IN NATIVE ARTERY: Primary | ICD-10-CM

## 2020-06-23 PROCEDURE — 99214 OFFICE O/P EST MOD 30 MIN: CPT | Performed by: INTERNAL MEDICINE

## 2020-06-23 NOTE — PROGRESS NOTES
OFFICE FOLLOW UP     Date of Encounter:2020     Name: Jordi Sam  : 1934  Address: [facility] [address] [place] [address]    PCP: Emely Kramer MD  [address]  [facility] [address]    Jordi Sam is a 86 y.o. male.      Chief Complaint: Follow up of CAD, right heart failure, Afib    Problem List:   1. Coronary artery disease:  a. Acute inferior STEMI with RV infarct features, 2009.   b. Normal LV function, single vessel  RCA disease.     c. Sirolimus eluting stents to the RCA, 2009.   d. Pericarditis, resolved.   e. LHC, 2012 (for recurrent angina).   i.  Normal LV function.  ii.  of RCA with failed intervention.  f. Stress MPS 2018: Normal   g. LHC 2019: single vessel CAD with  of RCA, normal LVSF   2. 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 Echo 2019: marked enlargement of the right heart, biatrial enlargement, LVSF is normal, large left pleural effusion, severe TR, RVSP 30mmHg  b. Cardiac Cath (): Normal LV, Normal PAP, RV enlargement and decreased RVSF  c. Thoracentesis and Paracentesis March and May 2019  d. Paracentesis, 5700 mL off 2019  3. New onset atrial fibrillation 2019  a. Asymptomatic  b. CHADsVASC= 6, on Eliquis   4. Hypertension.   5. Dyslipidemia.  6. Diabetes mellitus, type 2.  A1C 6.1 (2016)  7. Nephrolithiasis.   8. History of “goiter.”  9. Thrombocytopenia  10. Cirrhosis, recent evaluation by [facility]  a. Paracentesis with findings consistent with cardiac ascites, Spring 2019  11. Surgical history:  a. Cholecystectomy.  b. Hernia repair x 2.     Allergies:  Allergies   Allergen Reactions   • Bee Venom Swelling   • Sulfamethoxazole-Trimethoprim Other (See Comments)     Pt unaware       Current Medications:  •  apixaban (ELIQUIS) 2.5 MG tablet tablet, Take 1 tablet by mouth 2 (Two) Times a Day  •  aspirin 81 MG tablet, Take 81 mg by mouth  "Daily.  •  carvedilol (COREG) 12.5 MG tablet, TAKE 1 TABLET BY MOUTH TWICE DAILY WITH MEALS  •  folic acid (FOLVITE) 400 MCG tablet, Take 400 mcg by mouth Daily.  •  furosemide (LASIX) 40 MG tablet, Take 1 tablet by mouth 2 (Two) Times a Day.  •  nitroglycerin (NITROSTAT) 0.4 MG SL tablet, DISSOLVE ONE TABLET UNDER THE TONGUE EVERY 5 MINUTES AS NEEDED FOR CHEST PAIN.  DO NOT EXCEED A TOTAL OF 3 DOSES IN 15 MINUTES  •  simvastatin (ZOCOR) 40 MG tablet, Take 1 tablet by mouth Daily.  •  spironolactone (ALDACTONE) 25 MG tablet, Take 2 tablets by mouth Daily.  •  tamsulosin (FLOMAX) 0.4 MG capsule 24 hr capsule, Take 1 capsule by mouth Daily    History of Present Illness:      Jordi Sam returns for scheduled follow up. He is getting monthly paracentesis for ascites. He has an abdominal hernia that bothers him when his ascites is due to be tapped. He may consider hernia repair if recommended by GI and approved by Dr. Posadas. He follows closely with GI and the heart failure clinic. He denies unusual shortness of air or chest pain. He is compliant with medications listed above.     The following portions of the patient's history were reviewed and updated as appropriate: allergies, current medications and problem list.    ROS: Pertinent positives as listed in the HPI.  All other systems reviewed and negative.    Objective:    Vitals:    06/23/20 1447 06/23/20 1506   BP: 107/66 116/60   BP Location: Right arm Right arm   Patient Position: Sitting Standing   Pulse: 55 70   SpO2: 99%    Weight: 91.8 kg (202 lb 6.4 oz)    Height: 180.3 cm (71\")        Physical Exam:  GENERAL: Alert, cooperative, in no acute distress.   HEENT: Normocephalic, no adenopathy, no jugular venous distention  HEART: No discrete PMI is noted. Irregularly, irregular rhythm, normal rate, and no murmurs, gallops, or rubs.   LUNGS: Clear to auscultation bilaterally. No wheezing, rales or ronchi.  ABDOMEN: Soft, bowel sounds present, non-tender "   NEUROLOGIC: No focal abnormalities involving strength or sensation are noted.   EXTREMITIES: No clubbing, cyanosis, or edema noted.       Diagnostic Data:    Lab Results   Component Value Date    GLUCOSE 150 (H) 06/10/2020    BUN 27 (H) 06/10/2020    CREATININE 1.35 (H) 06/10/2020    EGFRIFNONA 50 (L) 06/10/2020    BCR 20.0 06/10/2020    K 4.5 06/10/2020    CO2 26.0 06/10/2020    CALCIUM 8.7 06/10/2020    ALBUMIN 3.50 06/10/2020    AST 22 06/10/2020    ALT 9 06/10/2020      Lab Results   Component Value Date    WBC 4.74 05/11/2020    HGB 13.3 05/11/2020    HCT 40.0 05/11/2020    MCV 95.0 05/11/2020    PLT 97 (L) 06/17/2020       Procedures      Assessment and Plan:   1.  CAD:  No angina noted. Continue aspirin and statin.  2.  Right heart failure:  Ascites noted, lungs clear. Continue current medications.  3.  Afib: Rate controlled, on Eliquis. Continue current medications.     I will see Jordi Sam back in 6 months or sooner on an as needed basis.    Scribed for Efrem Posadas MD by Shahla Jacobsen RN. 06/23/2020 3:44 PM.     EMR Dragon/Transcription Disclaimer:  Much of this encounter note is an electronic transcription/translation of spoken language to printed text.  The electronic translation of spoken language may permit erroneous, or at times, nonsensical words or phrases to be inadvertently transcribed.  Although I have reviewed the note for such errors, some may still exist.

## 2020-07-03 ENCOUNTER — RESULTS ENCOUNTER (OUTPATIENT)
Dept: OTHER | Facility: HOSPITAL | Age: 85
End: 2020-07-03

## 2020-07-03 DIAGNOSIS — K74.60 CIRRHOSIS OF LIVER WITH ASCITES, UNSPECIFIED HEPATIC CIRRHOSIS TYPE (HCC): ICD-10-CM

## 2020-07-03 DIAGNOSIS — R18.8 CIRRHOSIS OF LIVER WITH ASCITES, UNSPECIFIED HEPATIC CIRRHOSIS TYPE (HCC): ICD-10-CM

## 2020-07-09 ENCOUNTER — TELEPHONE (OUTPATIENT)
Dept: CARDIOLOGY | Facility: HOSPITAL | Age: 85
End: 2020-07-09

## 2020-07-09 DIAGNOSIS — I50.812 CHRONIC RIGHT HEART FAILURE (HCC): Primary | ICD-10-CM

## 2020-07-09 NOTE — TELEPHONE ENCOUNTER
Patient's wife called requesting a lab order before 7/20/2020 that's when he is supposed to have his paracentesis done Thank you

## 2020-07-14 ENCOUNTER — LAB (OUTPATIENT)
Dept: LAB | Facility: HOSPITAL | Age: 85
End: 2020-07-14

## 2020-07-14 DIAGNOSIS — I50.812 CHRONIC RIGHT HEART FAILURE (HCC): ICD-10-CM

## 2020-07-14 LAB
ALBUMIN SERPL-MCNC: 3.5 G/DL (ref 3.5–5.2)
ALBUMIN/GLOB SERPL: 1.6 G/DL
ALP SERPL-CCNC: 66 U/L (ref 39–117)
ALT SERPL W P-5'-P-CCNC: 12 U/L (ref 1–41)
ANION GAP SERPL CALCULATED.3IONS-SCNC: 9.7 MMOL/L (ref 5–15)
AST SERPL-CCNC: 22 U/L (ref 1–40)
BILIRUB SERPL-MCNC: 0.6 MG/DL (ref 0–1.2)
BUN SERPL-MCNC: 26 MG/DL (ref 8–23)
BUN/CREAT SERPL: 16.4 (ref 7–25)
CALCIUM SPEC-SCNC: 9 MG/DL (ref 8.6–10.5)
CHLORIDE SERPL-SCNC: 101 MMOL/L (ref 98–107)
CO2 SERPL-SCNC: 28.3 MMOL/L (ref 22–29)
CREAT SERPL-MCNC: 1.59 MG/DL (ref 0.76–1.27)
GFR SERPL CREATININE-BSD FRML MDRD: 41 ML/MIN/1.73
GLOBULIN UR ELPH-MCNC: 2.2 GM/DL
GLUCOSE SERPL-MCNC: 123 MG/DL (ref 65–99)
POTASSIUM SERPL-SCNC: 4.8 MMOL/L (ref 3.5–5.2)
PROT SERPL-MCNC: 5.7 G/DL (ref 6–8.5)
SODIUM SERPL-SCNC: 139 MMOL/L (ref 136–145)

## 2020-07-14 PROCEDURE — 80053 COMPREHEN METABOLIC PANEL: CPT

## 2020-07-14 PROCEDURE — 36415 COLL VENOUS BLD VENIPUNCTURE: CPT

## 2020-07-16 ENCOUNTER — TELEPHONE (OUTPATIENT)
Dept: CARDIOLOGY | Facility: HOSPITAL | Age: 85
End: 2020-07-16

## 2020-07-16 DIAGNOSIS — N18.30 CKD (CHRONIC KIDNEY DISEASE) STAGE 3, GFR 30-59 ML/MIN (HCC): Primary | ICD-10-CM

## 2020-07-16 NOTE — TELEPHONE ENCOUNTER
Called to review lab results.    Pt with worsening creatinine, 1.5.  No change in recent meds.  Pt is scheduled paracentesis next week.  Denies edema.    Repeat BMP 1 week after paracentesis.  If still elevated will adjust diuretics.

## 2020-07-20 ENCOUNTER — TELEPHONE (OUTPATIENT)
Dept: GASTROENTEROLOGY | Facility: CLINIC | Age: 85
End: 2020-07-20

## 2020-07-20 ENCOUNTER — HOSPITAL ENCOUNTER (OUTPATIENT)
Dept: CT IMAGING | Facility: HOSPITAL | Age: 85
Discharge: HOME | End: 2020-07-20
Admitting: RADIOLOGY

## 2020-07-20 VITALS
WEIGHT: 207.8 LBS | SYSTOLIC BLOOD PRESSURE: 107 MMHG | DIASTOLIC BLOOD PRESSURE: 56 MMHG | RESPIRATION RATE: 18 BRPM | HEART RATE: 62 BPM | TEMPERATURE: 97 F | OXYGEN SATURATION: 99 % | HEIGHT: 71 IN | BODY MASS INDEX: 29.09 KG/M2

## 2020-07-20 PROCEDURE — C1729 CATH, DRAINAGE: HCPCS

## 2020-07-20 PROCEDURE — 25010000002 ALBUMIN HUMAN 25% PER 50 ML: Performed by: NURSE PRACTITIONER

## 2020-07-20 PROCEDURE — 75989 ABSCESS DRAINAGE UNDER X-RAY: CPT

## 2020-07-20 PROCEDURE — P9047 ALBUMIN (HUMAN), 25%, 50ML: HCPCS | Performed by: NURSE PRACTITIONER

## 2020-07-20 RX ORDER — SODIUM CHLORIDE 0.9 % (FLUSH) 0.9 %
3 SYRINGE (ML) INJECTION EVERY 12 HOURS SCHEDULED
Status: CANCELLED | OUTPATIENT
Start: 2020-07-20

## 2020-07-20 RX ORDER — SODIUM CHLORIDE 0.9 % (FLUSH) 0.9 %
10 SYRINGE (ML) INJECTION AS NEEDED
Status: CANCELLED | OUTPATIENT
Start: 2020-07-20

## 2020-07-20 RX ORDER — ALBUMIN (HUMAN) 12.5 G/50ML
25 SOLUTION INTRAVENOUS ONCE
Status: COMPLETED | OUTPATIENT
Start: 2020-07-20 | End: 2020-07-20

## 2020-07-20 RX ORDER — LIDOCAINE HYDROCHLORIDE 10 MG/ML
20 INJECTION, SOLUTION EPIDURAL; INFILTRATION; INTRACAUDAL; PERINEURAL ONCE
Status: DISCONTINUED | OUTPATIENT
Start: 2020-07-20 | End: 2020-07-21 | Stop reason: HOSPADM

## 2020-07-20 RX ADMIN — ALBUMIN HUMAN 25 G: 0.25 SOLUTION INTRAVENOUS at 11:12

## 2020-07-20 NOTE — POST-PROCEDURE NOTE
Interventional Radiology Operative Note    Date: 07/20/20     Time: 10:02     Pre-op Diagnosis: Cirrhosis with symptomatic large volume ascites  Post-op Diagnosis: Same     Procedure: CT guided paracentesis     Surgeon: BJORN Harper M.D.  Assistants: None     Sedation: None     Estimated Blood Loss (EBL): Trace      Urine Output (UOP): N/A (short procedure)     IVF: N/A (short procedure)     Findings: Large volume ascites     Specimens: 7.0 liters serous fluid.     Complications: No immediate

## 2020-07-20 NOTE — TELEPHONE ENCOUNTER
Albumin order given verbally for Ct guided paracentesis to   Gisel Freitas RN at Interventional radiology.       Per MAKAYLA Chaney     Post paracentesis the patient should receive.  Medication: Albumin Human 25% IV solution 12.5g   Route : intravenous   Ordered dose: 12.5 g  Administration Instructions: Given for every liter off after 5 liters.       Waited for read back from Gisel Freitas RN and confirmed the medicine, dose,  and administration instructions were all correct .

## 2020-07-20 NOTE — NURSING NOTE
Paracentesis performed by Dr Harper. 7000 ml of fluid removed, pt tolerated well, vitals remained stable throughout procedure, report called to RAVINDRA.

## 2020-07-21 ENCOUNTER — TELEPHONE (OUTPATIENT)
Dept: INFUSION THERAPY | Facility: HOSPITAL | Age: 85
End: 2020-07-21

## 2020-07-24 ENCOUNTER — LAB (OUTPATIENT)
Dept: LAB | Facility: HOSPITAL | Age: 85
End: 2020-07-24

## 2020-07-24 DIAGNOSIS — N18.30 CKD (CHRONIC KIDNEY DISEASE) STAGE 3, GFR 30-59 ML/MIN (HCC): ICD-10-CM

## 2020-07-24 PROCEDURE — 36415 COLL VENOUS BLD VENIPUNCTURE: CPT

## 2020-07-24 PROCEDURE — 80048 BASIC METABOLIC PNL TOTAL CA: CPT

## 2020-07-25 LAB
ANION GAP SERPL CALCULATED.3IONS-SCNC: 5.9 MMOL/L (ref 5–15)
BUN SERPL-MCNC: 21 MG/DL (ref 8–23)
BUN/CREAT SERPL: 14.6 (ref 7–25)
CALCIUM SPEC-SCNC: 8.9 MG/DL (ref 8.6–10.5)
CHLORIDE SERPL-SCNC: 102 MMOL/L (ref 98–107)
CO2 SERPL-SCNC: 27.1 MMOL/L (ref 22–29)
CREAT SERPL-MCNC: 1.44 MG/DL (ref 0.76–1.27)
GFR SERPL CREATININE-BSD FRML MDRD: 47 ML/MIN/1.73
GLUCOSE SERPL-MCNC: 131 MG/DL (ref 65–99)
POTASSIUM SERPL-SCNC: 5 MMOL/L (ref 3.5–5.2)
SODIUM SERPL-SCNC: 135 MMOL/L (ref 136–145)

## 2020-07-26 ENCOUNTER — HOSPITAL ENCOUNTER (INPATIENT)
Facility: HOSPITAL | Age: 85
LOS: 5 days | Discharge: HOME | End: 2020-07-31
Attending: EMERGENCY MEDICINE | Admitting: FAMILY MEDICINE

## 2020-07-26 ENCOUNTER — ANESTHESIA (OUTPATIENT)
Dept: PERIOP | Facility: HOSPITAL | Age: 85
End: 2020-07-26

## 2020-07-26 ENCOUNTER — APPOINTMENT (OUTPATIENT)
Dept: CT IMAGING | Facility: HOSPITAL | Age: 85
End: 2020-07-26

## 2020-07-26 ENCOUNTER — ANESTHESIA EVENT (OUTPATIENT)
Dept: PERIOP | Facility: HOSPITAL | Age: 85
End: 2020-07-26

## 2020-07-26 DIAGNOSIS — R10.33 PERIUMBILICAL PAIN: ICD-10-CM

## 2020-07-26 DIAGNOSIS — K42.0 INCARCERATED UMBILICAL HERNIA: Primary | ICD-10-CM

## 2020-07-26 LAB
ABO GROUP BLD: NORMAL
ALBUMIN SERPL-MCNC: 3.9 G/DL (ref 3.5–5.2)
ALBUMIN/GLOB SERPL: 1.4 G/DL
ALP SERPL-CCNC: 85 U/L (ref 39–117)
ALT SERPL W P-5'-P-CCNC: 14 U/L (ref 1–41)
ANION GAP SERPL CALCULATED.3IONS-SCNC: 10 MMOL/L (ref 5–15)
AST SERPL-CCNC: 26 U/L (ref 1–40)
BASOPHILS # BLD AUTO: 0.05 10*3/MM3 (ref 0–0.2)
BASOPHILS NFR BLD AUTO: 0.6 % (ref 0–1.5)
BILIRUB SERPL-MCNC: 0.7 MG/DL (ref 0–1.2)
BILIRUB UR QL STRIP: NEGATIVE
BLD GP AB SCN SERPL QL: NEGATIVE
BUN SERPL-MCNC: 24 MG/DL (ref 8–23)
BUN/CREAT SERPL: 16.4 (ref 7–25)
CALCIUM SPEC-SCNC: 9.1 MG/DL (ref 8.6–10.5)
CHLORIDE SERPL-SCNC: 100 MMOL/L (ref 98–107)
CLARITY UR: CLEAR
CO2 SERPL-SCNC: 26 MMOL/L (ref 22–29)
COLOR UR: YELLOW
CREAT SERPL-MCNC: 1.46 MG/DL (ref 0.76–1.27)
D-LACTATE SERPL-SCNC: 1.4 MMOL/L (ref 0.5–2)
DEPRECATED RDW RBC AUTO: 48.8 FL (ref 37–54)
EOSINOPHIL # BLD AUTO: 0.66 10*3/MM3 (ref 0–0.4)
EOSINOPHIL NFR BLD AUTO: 8.3 % (ref 0.3–6.2)
ERYTHROCYTE [DISTWIDTH] IN BLOOD BY AUTOMATED COUNT: 14 % (ref 12.3–15.4)
GFR SERPL CREATININE-BSD FRML MDRD: 46 ML/MIN/1.73
GLOBULIN UR ELPH-MCNC: 2.7 GM/DL
GLUCOSE BLDC GLUCOMTR-MCNC: 113 MG/DL (ref 70–130)
GLUCOSE SERPL-MCNC: 174 MG/DL (ref 65–99)
GLUCOSE UR STRIP-MCNC: NEGATIVE MG/DL
HCT VFR BLD AUTO: 44.5 % (ref 37.5–51)
HGB BLD-MCNC: 14.4 G/DL (ref 13–17.7)
HGB UR QL STRIP.AUTO: NEGATIVE
HOLD SPECIMEN: NORMAL
HOLD SPECIMEN: NORMAL
IMM GRANULOCYTES # BLD AUTO: 0.03 10*3/MM3 (ref 0–0.05)
IMM GRANULOCYTES NFR BLD AUTO: 0.4 % (ref 0–0.5)
INR PPP: 1.4 (ref 0.85–1.16)
KETONES UR QL STRIP: NEGATIVE
LEUKOCYTE ESTERASE UR QL STRIP.AUTO: NEGATIVE
LIPASE SERPL-CCNC: 52 U/L (ref 13–60)
LYMPHOCYTES # BLD AUTO: 0.75 10*3/MM3 (ref 0.7–3.1)
LYMPHOCYTES NFR BLD AUTO: 9.4 % (ref 19.6–45.3)
MCH RBC QN AUTO: 30.9 PG (ref 26.6–33)
MCHC RBC AUTO-ENTMCNC: 32.4 G/DL (ref 31.5–35.7)
MCV RBC AUTO: 95.5 FL (ref 79–97)
MONOCYTES # BLD AUTO: 0.53 10*3/MM3 (ref 0.1–0.9)
MONOCYTES NFR BLD AUTO: 6.6 % (ref 5–12)
NEUTROPHILS NFR BLD AUTO: 5.98 10*3/MM3 (ref 1.7–7)
NEUTROPHILS NFR BLD AUTO: 74.7 % (ref 42.7–76)
NITRITE UR QL STRIP: NEGATIVE
NRBC BLD AUTO-RTO: 0 /100 WBC (ref 0–0.2)
PH UR STRIP.AUTO: 6.5 [PH] (ref 5–8)
PLATELET # BLD AUTO: 112 10*3/MM3 (ref 140–450)
PMV BLD AUTO: 10.8 FL (ref 6–12)
POTASSIUM SERPL-SCNC: 4.4 MMOL/L (ref 3.5–5.2)
PROT SERPL-MCNC: 6.6 G/DL (ref 6–8.5)
PROT UR QL STRIP: NEGATIVE
PROTHROMBIN TIME: 16.8 SECONDS (ref 11.5–14)
RBC # BLD AUTO: 4.66 10*6/MM3 (ref 4.14–5.8)
RH BLD: POSITIVE
SARS-COV-2 RNA RESP QL NAA+PROBE: NOT DETECTED
SODIUM SERPL-SCNC: 136 MMOL/L (ref 136–145)
SP GR UR STRIP: 1.01 (ref 1–1.03)
T&S EXPIRATION DATE: NORMAL
UROBILINOGEN UR QL STRIP: NORMAL
WBC # BLD AUTO: 8 10*3/MM3 (ref 3.4–10.8)
WHOLE BLOOD HOLD SPECIMEN: NORMAL
WHOLE BLOOD HOLD SPECIMEN: NORMAL

## 2020-07-26 PROCEDURE — 86850 RBC ANTIBODY SCREEN: CPT | Performed by: SURGERY

## 2020-07-26 PROCEDURE — 25010000002 PROPOFOL 10 MG/ML EMULSION: Performed by: ANESTHESIOLOGY

## 2020-07-26 PROCEDURE — 25010000002 FENTANYL CITRATE (PF) 100 MCG/2ML SOLUTION: Performed by: ANESTHESIOLOGY

## 2020-07-26 PROCEDURE — 25010000002 SUCCINYLCHOLINE PER 20 MG: Performed by: ANESTHESIOLOGY

## 2020-07-26 PROCEDURE — 81003 URINALYSIS AUTO W/O SCOPE: CPT | Performed by: EMERGENCY MEDICINE

## 2020-07-26 PROCEDURE — 25010000002 ONDANSETRON PER 1 MG

## 2020-07-26 PROCEDURE — 86901 BLOOD TYPING SEROLOGIC RH(D): CPT | Performed by: SURGERY

## 2020-07-26 PROCEDURE — 74176 CT ABD & PELVIS W/O CONTRAST: CPT

## 2020-07-26 PROCEDURE — 25010000002 PHENYLEPHRINE PER 1 ML: Performed by: ANESTHESIOLOGY

## 2020-07-26 PROCEDURE — 0WQF0ZZ REPAIR ABDOMINAL WALL, OPEN APPROACH: ICD-10-PCS | Performed by: SURGERY

## 2020-07-26 PROCEDURE — 83605 ASSAY OF LACTIC ACID: CPT | Performed by: EMERGENCY MEDICINE

## 2020-07-26 PROCEDURE — 86900 BLOOD TYPING SEROLOGIC ABO: CPT | Performed by: SURGERY

## 2020-07-26 PROCEDURE — 87635 SARS-COV-2 COVID-19 AMP PRB: CPT | Performed by: PHYSICIAN ASSISTANT

## 2020-07-26 PROCEDURE — 83690 ASSAY OF LIPASE: CPT | Performed by: EMERGENCY MEDICINE

## 2020-07-26 PROCEDURE — 85610 PROTHROMBIN TIME: CPT | Performed by: SURGERY

## 2020-07-26 PROCEDURE — 86901 BLOOD TYPING SEROLOGIC RH(D): CPT

## 2020-07-26 PROCEDURE — 80053 COMPREHEN METABOLIC PANEL: CPT | Performed by: EMERGENCY MEDICINE

## 2020-07-26 PROCEDURE — 86900 BLOOD TYPING SEROLOGIC ABO: CPT

## 2020-07-26 PROCEDURE — 25010000002 HYDROMORPHONE PER 4 MG: Performed by: EMERGENCY MEDICINE

## 2020-07-26 PROCEDURE — 25010000003 LIDOCAINE 1 % SOLUTION: Performed by: ANESTHESIOLOGY

## 2020-07-26 PROCEDURE — 99284 EMERGENCY DEPT VISIT MOD MDM: CPT

## 2020-07-26 PROCEDURE — 85025 COMPLETE CBC W/AUTO DIFF WBC: CPT | Performed by: EMERGENCY MEDICINE

## 2020-07-26 PROCEDURE — 82962 GLUCOSE BLOOD TEST: CPT

## 2020-07-26 PROCEDURE — 25010000003 CEFAZOLIN IN DEXTROSE 2-4 GM/100ML-% SOLUTION: Performed by: NURSE PRACTITIONER

## 2020-07-26 PROCEDURE — 25010000002 DEXAMETHASONE PER 1 MG: Performed by: ANESTHESIOLOGY

## 2020-07-26 PROCEDURE — 0W9J0ZZ DRAINAGE OF PELVIC CAVITY, OPEN APPROACH: ICD-10-PCS | Performed by: SURGERY

## 2020-07-26 PROCEDURE — 99223 1ST HOSP IP/OBS HIGH 75: CPT | Performed by: INTERNAL MEDICINE

## 2020-07-26 DEVICE — PROXIMATE RELOADABLE LINEAR CUTTER WITH SAFETY LOCK-OUT.  75MM LINEAR CUTTER.
Type: IMPLANTABLE DEVICE | Site: ABDOMEN | Status: FUNCTIONAL
Brand: PROXIMATE

## 2020-07-26 RX ORDER — GLYCOPYRROLATE 0.2 MG/ML
INJECTION INTRAMUSCULAR; INTRAVENOUS AS NEEDED
Status: DISCONTINUED | OUTPATIENT
Start: 2020-07-26 | End: 2020-07-26 | Stop reason: SURG

## 2020-07-26 RX ORDER — HYDROMORPHONE HYDROCHLORIDE 1 MG/ML
0.5 INJECTION, SOLUTION INTRAMUSCULAR; INTRAVENOUS; SUBCUTANEOUS
Status: DISCONTINUED | OUTPATIENT
Start: 2020-07-26 | End: 2020-07-26 | Stop reason: HOSPADM

## 2020-07-26 RX ORDER — HYDROMORPHONE HYDROCHLORIDE 1 MG/ML
0.5 INJECTION, SOLUTION INTRAMUSCULAR; INTRAVENOUS; SUBCUTANEOUS
Status: DISCONTINUED | OUTPATIENT
Start: 2020-07-26 | End: 2020-07-31 | Stop reason: HOSPADM

## 2020-07-26 RX ORDER — SODIUM CHLORIDE, SODIUM LACTATE, POTASSIUM CHLORIDE, CALCIUM CHLORIDE 600; 310; 30; 20 MG/100ML; MG/100ML; MG/100ML; MG/100ML
INJECTION, SOLUTION INTRAVENOUS CONTINUOUS PRN
Status: DISCONTINUED | OUTPATIENT
Start: 2020-07-26 | End: 2020-07-26 | Stop reason: SURG

## 2020-07-26 RX ORDER — PROPOFOL 10 MG/ML
VIAL (ML) INTRAVENOUS AS NEEDED
Status: DISCONTINUED | OUTPATIENT
Start: 2020-07-26 | End: 2020-07-26 | Stop reason: SURG

## 2020-07-26 RX ORDER — FUROSEMIDE 40 MG/1
40 TABLET ORAL
Status: DISCONTINUED | OUTPATIENT
Start: 2020-07-27 | End: 2020-07-31 | Stop reason: HOSPADM

## 2020-07-26 RX ORDER — ATORVASTATIN CALCIUM 40 MG/1
40 TABLET, FILM COATED ORAL NIGHTLY
Status: DISCONTINUED | OUTPATIENT
Start: 2020-07-26 | End: 2020-07-31 | Stop reason: HOSPADM

## 2020-07-26 RX ORDER — HYDROMORPHONE HYDROCHLORIDE 1 MG/ML
0.5 INJECTION, SOLUTION INTRAMUSCULAR; INTRAVENOUS; SUBCUTANEOUS ONCE
Status: COMPLETED | OUTPATIENT
Start: 2020-07-26 | End: 2020-07-26

## 2020-07-26 RX ORDER — ONDANSETRON 2 MG/ML
4 INJECTION INTRAMUSCULAR; INTRAVENOUS ONCE
Status: COMPLETED | OUTPATIENT
Start: 2020-07-26 | End: 2020-07-26

## 2020-07-26 RX ORDER — ACETAMINOPHEN 160 MG/5ML
650 SOLUTION ORAL EVERY 4 HOURS PRN
Status: DISCONTINUED | OUTPATIENT
Start: 2020-07-26 | End: 2020-07-31 | Stop reason: HOSPADM

## 2020-07-26 RX ORDER — CARVEDILOL 12.5 MG/1
12.5 TABLET ORAL 2 TIMES DAILY WITH MEALS
Status: DISCONTINUED | OUTPATIENT
Start: 2020-07-27 | End: 2020-07-28

## 2020-07-26 RX ORDER — DEXTROSE MONOHYDRATE 25 G/50ML
25 INJECTION, SOLUTION INTRAVENOUS
Status: DISCONTINUED | OUTPATIENT
Start: 2020-07-26 | End: 2020-07-31 | Stop reason: HOSPADM

## 2020-07-26 RX ORDER — SODIUM CHLORIDE 0.9 % (FLUSH) 0.9 %
10 SYRINGE (ML) INJECTION AS NEEDED
Status: DISCONTINUED | OUTPATIENT
Start: 2020-07-26 | End: 2020-07-31 | Stop reason: HOSPADM

## 2020-07-26 RX ORDER — FENTANYL CITRATE 50 UG/ML
50 INJECTION, SOLUTION INTRAMUSCULAR; INTRAVENOUS
Status: DISCONTINUED | OUTPATIENT
Start: 2020-07-26 | End: 2020-07-26 | Stop reason: HOSPADM

## 2020-07-26 RX ORDER — CEFAZOLIN SODIUM 2 G/100ML
2 INJECTION, SOLUTION INTRAVENOUS EVERY 8 HOURS
Status: COMPLETED | OUTPATIENT
Start: 2020-07-27 | End: 2020-07-28

## 2020-07-26 RX ORDER — ACETAMINOPHEN 650 MG/1
650 SUPPOSITORY RECTAL EVERY 4 HOURS PRN
Status: DISCONTINUED | OUTPATIENT
Start: 2020-07-26 | End: 2020-07-31 | Stop reason: HOSPADM

## 2020-07-26 RX ORDER — LANOLIN ALCOHOL/MO/W.PET/CERES
400 CREAM (GRAM) TOPICAL DAILY
Status: DISCONTINUED | OUTPATIENT
Start: 2020-07-27 | End: 2020-07-31 | Stop reason: HOSPADM

## 2020-07-26 RX ORDER — MAGNESIUM HYDROXIDE 1200 MG/15ML
LIQUID ORAL AS NEEDED
Status: DISCONTINUED | OUTPATIENT
Start: 2020-07-26 | End: 2020-07-26 | Stop reason: HOSPADM

## 2020-07-26 RX ORDER — SODIUM CHLORIDE 9 MG/ML
100 INJECTION, SOLUTION INTRAVENOUS CONTINUOUS
Status: DISCONTINUED | OUTPATIENT
Start: 2020-07-26 | End: 2020-07-26

## 2020-07-26 RX ORDER — NICOTINE POLACRILEX 4 MG
15 LOZENGE BUCCAL
Status: DISCONTINUED | OUTPATIENT
Start: 2020-07-26 | End: 2020-07-31 | Stop reason: HOSPADM

## 2020-07-26 RX ORDER — ONDANSETRON 2 MG/ML
4 INJECTION INTRAMUSCULAR; INTRAVENOUS EVERY 6 HOURS PRN
Status: DISCONTINUED | OUTPATIENT
Start: 2020-07-26 | End: 2020-07-31 | Stop reason: HOSPADM

## 2020-07-26 RX ORDER — OXYCODONE HYDROCHLORIDE AND ACETAMINOPHEN 5; 325 MG/1; MG/1
1 TABLET ORAL EVERY 4 HOURS PRN
Status: DISCONTINUED | OUTPATIENT
Start: 2020-07-26 | End: 2020-07-31 | Stop reason: HOSPADM

## 2020-07-26 RX ORDER — LIDOCAINE HYDROCHLORIDE 10 MG/ML
INJECTION, SOLUTION INFILTRATION; PERINEURAL AS NEEDED
Status: DISCONTINUED | OUTPATIENT
Start: 2020-07-26 | End: 2020-07-26 | Stop reason: SURG

## 2020-07-26 RX ORDER — ONDANSETRON 4 MG/1
4 TABLET, FILM COATED ORAL EVERY 6 HOURS PRN
Status: DISCONTINUED | OUTPATIENT
Start: 2020-07-26 | End: 2020-07-31 | Stop reason: HOSPADM

## 2020-07-26 RX ORDER — DEXAMETHASONE SODIUM PHOSPHATE 4 MG/ML
INJECTION, SOLUTION INTRA-ARTICULAR; INTRALESIONAL; INTRAMUSCULAR; INTRAVENOUS; SOFT TISSUE AS NEEDED
Status: DISCONTINUED | OUTPATIENT
Start: 2020-07-26 | End: 2020-07-26 | Stop reason: SURG

## 2020-07-26 RX ORDER — TAMSULOSIN HYDROCHLORIDE 0.4 MG/1
0.4 CAPSULE ORAL DAILY
Status: DISCONTINUED | OUTPATIENT
Start: 2020-07-27 | End: 2020-07-31 | Stop reason: HOSPADM

## 2020-07-26 RX ORDER — ROCURONIUM BROMIDE 10 MG/ML
INJECTION, SOLUTION INTRAVENOUS AS NEEDED
Status: DISCONTINUED | OUTPATIENT
Start: 2020-07-26 | End: 2020-07-26 | Stop reason: SURG

## 2020-07-26 RX ORDER — SODIUM CHLORIDE 0.9 % (FLUSH) 0.9 %
10 SYRINGE (ML) INJECTION EVERY 12 HOURS SCHEDULED
Status: DISCONTINUED | OUTPATIENT
Start: 2020-07-26 | End: 2020-07-31 | Stop reason: HOSPADM

## 2020-07-26 RX ORDER — ONDANSETRON 2 MG/ML
INJECTION INTRAMUSCULAR; INTRAVENOUS
Status: COMPLETED
Start: 2020-07-26 | End: 2020-07-26

## 2020-07-26 RX ORDER — SUCCINYLCHOLINE CHLORIDE 20 MG/ML
INJECTION INTRAMUSCULAR; INTRAVENOUS AS NEEDED
Status: DISCONTINUED | OUTPATIENT
Start: 2020-07-26 | End: 2020-07-26 | Stop reason: SURG

## 2020-07-26 RX ORDER — SPIRONOLACTONE 50 MG/1
50 TABLET, FILM COATED ORAL DAILY
Status: DISCONTINUED | OUTPATIENT
Start: 2020-07-27 | End: 2020-07-31 | Stop reason: HOSPADM

## 2020-07-26 RX ORDER — BUPIVACAINE HYDROCHLORIDE AND EPINEPHRINE 5; 5 MG/ML; UG/ML
INJECTION, SOLUTION PERINEURAL AS NEEDED
Status: DISCONTINUED | OUTPATIENT
Start: 2020-07-26 | End: 2020-07-26 | Stop reason: HOSPADM

## 2020-07-26 RX ORDER — CEFAZOLIN SODIUM 2 G/100ML
2 INJECTION, SOLUTION INTRAVENOUS
Status: COMPLETED | OUTPATIENT
Start: 2020-07-27 | End: 2020-07-27

## 2020-07-26 RX ORDER — ACETAMINOPHEN 325 MG/1
650 TABLET ORAL EVERY 4 HOURS PRN
Status: DISCONTINUED | OUTPATIENT
Start: 2020-07-26 | End: 2020-07-31 | Stop reason: HOSPADM

## 2020-07-26 RX ORDER — FENTANYL CITRATE 50 UG/ML
INJECTION, SOLUTION INTRAMUSCULAR; INTRAVENOUS AS NEEDED
Status: DISCONTINUED | OUTPATIENT
Start: 2020-07-26 | End: 2020-07-26 | Stop reason: SURG

## 2020-07-26 RX ADMIN — DEXAMETHASONE SODIUM PHOSPHATE 4 MG: 4 INJECTION, SOLUTION INTRAMUSCULAR; INTRAVENOUS at 19:53

## 2020-07-26 RX ADMIN — SODIUM CHLORIDE 1000 ML: 9 INJECTION, SOLUTION INTRAVENOUS at 16:39

## 2020-07-26 RX ADMIN — ROCURONIUM BROMIDE 25 MG: 10 SOLUTION INTRAVENOUS at 19:50

## 2020-07-26 RX ADMIN — SODIUM CHLORIDE, POTASSIUM CHLORIDE, SODIUM LACTATE AND CALCIUM CHLORIDE: 600; 310; 30; 20 INJECTION, SOLUTION INTRAVENOUS at 20:29

## 2020-07-26 RX ADMIN — GLYCOPYRROLATE 0.2 MG: 0.2 INJECTION INTRAMUSCULAR; INTRAVENOUS at 20:18

## 2020-07-26 RX ADMIN — PHENYLEPHRINE HYDROCHLORIDE 200 MCG: 10 INJECTION, SOLUTION INTRAMUSCULAR; INTRAVENOUS; SUBCUTANEOUS at 19:53

## 2020-07-26 RX ADMIN — CEFAZOLIN SODIUM 2 G: 2 INJECTION, SOLUTION INTRAVENOUS at 19:45

## 2020-07-26 RX ADMIN — PROPOFOL 100 MG: 10 INJECTION, EMULSION INTRAVENOUS at 19:45

## 2020-07-26 RX ADMIN — EPHEDRINE SULFATE 10 MG: 50 INJECTION INTRAMUSCULAR; INTRAVENOUS; SUBCUTANEOUS at 19:58

## 2020-07-26 RX ADMIN — ONDANSETRON 4 MG: 2 INJECTION INTRAMUSCULAR; INTRAVENOUS at 16:39

## 2020-07-26 RX ADMIN — FENTANYL CITRATE 100 MCG: 50 INJECTION, SOLUTION INTRAMUSCULAR; INTRAVENOUS at 19:45

## 2020-07-26 RX ADMIN — HYDROMORPHONE HYDROCHLORIDE 0.5 MG: 1 INJECTION, SOLUTION INTRAMUSCULAR; INTRAVENOUS; SUBCUTANEOUS at 19:08

## 2020-07-26 RX ADMIN — LIDOCAINE HYDROCHLORIDE 50 MG: 10 INJECTION, SOLUTION INFILTRATION; PERINEURAL at 19:45

## 2020-07-26 RX ADMIN — SUCCINYLCHOLINE CHLORIDE 100 MG: 20 INJECTION, SOLUTION INTRAMUSCULAR; INTRAVENOUS; PARENTERAL at 19:45

## 2020-07-26 RX ADMIN — FAMOTIDINE 20 MG: 10 INJECTION, SOLUTION INTRAVENOUS at 19:30

## 2020-07-26 RX ADMIN — ROCURONIUM BROMIDE 5 MG: 10 SOLUTION INTRAVENOUS at 19:45

## 2020-07-26 RX ADMIN — HYDROMORPHONE HYDROCHLORIDE 0.5 MG: 1 INJECTION, SOLUTION INTRAMUSCULAR; INTRAVENOUS; SUBCUTANEOUS at 16:39

## 2020-07-26 RX ADMIN — GLYCOPYRROLATE 0.2 MG: 0.2 INJECTION INTRAMUSCULAR; INTRAVENOUS at 20:15

## 2020-07-26 RX ADMIN — PHENYLEPHRINE HYDROCHLORIDE 100 MCG: 10 INJECTION, SOLUTION INTRAMUSCULAR; INTRAVENOUS; SUBCUTANEOUS at 19:47

## 2020-07-26 RX ADMIN — SODIUM CHLORIDE, POTASSIUM CHLORIDE, SODIUM LACTATE AND CALCIUM CHLORIDE 500 ML: 600; 310; 30; 20 INJECTION, SOLUTION INTRAVENOUS at 19:29

## 2020-07-26 RX ADMIN — HYDROMORPHONE HYDROCHLORIDE 0.5 MG: 1 INJECTION, SOLUTION INTRAMUSCULAR; INTRAVENOUS; SUBCUTANEOUS at 17:01

## 2020-07-26 RX ADMIN — SODIUM CHLORIDE, POTASSIUM CHLORIDE, SODIUM LACTATE AND CALCIUM CHLORIDE: 600; 310; 30; 20 INJECTION, SOLUTION INTRAVENOUS at 19:38

## 2020-07-26 NOTE — ANESTHESIA PROCEDURE NOTES
Airway  Urgency: elective    Date/Time: 7/26/2020 7:45 PM  Airway not difficult    General Information and Staff    Patient location during procedure: OR    Indications and Patient Condition  Indications for airway management: airway protection    Preoxygenated: yes  MILS not maintained throughout  Mask difficulty assessment: 1 - vent by mask    Final Airway Details  Final airway type: endotracheal airway      Successful airway: ETT  Cuffed: yes   Successful intubation technique: direct laryngoscopy  Endotracheal tube insertion site: oral  Blade: Reggie  Blade size: 3  ETT size (mm): 7.5  Cormack-Lehane Classification: grade IIb - view of arytenoids or posterior of glottis only  Placement verified by: chest auscultation and capnometry   Measured from: lips  ETT/EBT  to lips (cm): 20  Number of attempts at approach: 1  Assessment: lips, teeth, and gum same as pre-op and atraumatic intubation    Additional Comments  Negative epigastric sounds, Breath sound equal bilaterally with symmetric chest rise and fall

## 2020-07-26 NOTE — ANESTHESIA PREPROCEDURE EVALUATION
Anesthesia Evaluation     Patient summary reviewed and Nursing notes reviewed   no history of anesthetic complications:  NPO Solid Status: Waived due to emergency  NPO Liquid Status: Waived due to emergency           Airway   Mallampati: II  TM distance: >3 FB  Neck ROM: full  No difficulty expected  Dental - normal exam     Pulmonary - normal exam    breath sounds clear to auscultation  (+) COPD,   Cardiovascular - normal exam    ECG reviewed  PT is on anticoagulation therapy  Rhythm: regular  Rate: normal    (+) hypertension, CAD, cardiac stents dysrhythmias Atrial Fib,     ROS comment: 1/19 Echo:  · Estimated EF = 60%.  · Left ventricular systolic function is normal.  · Right ventricular cavity is mildly dilated.  · Mild mitral valve regurgitation is present  · Moderate tricuspid valve regurgitation is present.    Neuro/Psych- negative ROS  GI/Hepatic/Renal/Endo    (+)   liver disease (Cirrhosis), renal disease CRI, diabetes mellitus type 2,     ROS Comment: Acute incarcerated umbilical hernia    Musculoskeletal (-) negative ROS    Abdominal    Substance History - negative use     OB/GYN          Other - negative ROS                       Anesthesia Plan    ASA 3     general     intravenous induction     Anesthetic plan, all risks, benefits, and alternatives have been provided, discussed and informed consent has been obtained with: patient.    Plan discussed with CRNA.

## 2020-07-27 LAB
ABO GROUP BLD: NORMAL
ALBUMIN SERPL-MCNC: 2.9 G/DL (ref 3.5–5.2)
ALBUMIN/GLOB SERPL: 1.3 G/DL
ALP SERPL-CCNC: 62 U/L (ref 39–117)
ALT SERPL W P-5'-P-CCNC: 9 U/L (ref 1–41)
ANION GAP SERPL CALCULATED.3IONS-SCNC: 8 MMOL/L (ref 5–15)
AST SERPL-CCNC: 21 U/L (ref 1–40)
BASOPHILS # BLD AUTO: 0.01 10*3/MM3 (ref 0–0.2)
BASOPHILS NFR BLD AUTO: 0.1 % (ref 0–1.5)
BILIRUB SERPL-MCNC: 0.5 MG/DL (ref 0–1.2)
BUN SERPL-MCNC: 18 MG/DL (ref 8–23)
BUN/CREAT SERPL: 12 (ref 7–25)
CALCIUM SPEC-SCNC: 8.8 MG/DL (ref 8.6–10.5)
CHLORIDE SERPL-SCNC: 99 MMOL/L (ref 98–107)
CHOLEST SERPL-MCNC: 99 MG/DL (ref 0–200)
CO2 SERPL-SCNC: 27 MMOL/L (ref 22–29)
CREAT SERPL-MCNC: 1.5 MG/DL (ref 0.76–1.27)
DEPRECATED RDW RBC AUTO: 49.8 FL (ref 37–54)
EOSINOPHIL # BLD AUTO: 0 10*3/MM3 (ref 0–0.4)
EOSINOPHIL NFR BLD AUTO: 0 % (ref 0.3–6.2)
ERYTHROCYTE [DISTWIDTH] IN BLOOD BY AUTOMATED COUNT: 13.9 % (ref 12.3–15.4)
GFR SERPL CREATININE-BSD FRML MDRD: 44 ML/MIN/1.73
GLOBULIN UR ELPH-MCNC: 2.3 GM/DL
GLUCOSE BLDC GLUCOMTR-MCNC: 134 MG/DL (ref 70–130)
GLUCOSE BLDC GLUCOMTR-MCNC: 149 MG/DL (ref 70–130)
GLUCOSE BLDC GLUCOMTR-MCNC: 153 MG/DL (ref 70–130)
GLUCOSE BLDC GLUCOMTR-MCNC: 98 MG/DL (ref 70–130)
GLUCOSE SERPL-MCNC: 160 MG/DL (ref 65–99)
HBA1C MFR BLD: 6.1 % (ref 4.8–5.6)
HCT VFR BLD AUTO: 45.9 % (ref 37.5–51)
HDLC SERPL-MCNC: 39 MG/DL (ref 40–60)
HGB BLD-MCNC: 14.7 G/DL (ref 13–17.7)
IMM GRANULOCYTES # BLD AUTO: 0.03 10*3/MM3 (ref 0–0.05)
IMM GRANULOCYTES NFR BLD AUTO: 0.4 % (ref 0–0.5)
LDLC SERPL CALC-MCNC: 49 MG/DL (ref 0–100)
LDLC/HDLC SERPL: 1.25 {RATIO}
LYMPHOCYTES # BLD AUTO: 0.57 10*3/MM3 (ref 0.7–3.1)
LYMPHOCYTES NFR BLD AUTO: 7.7 % (ref 19.6–45.3)
MAGNESIUM SERPL-MCNC: 2.3 MG/DL (ref 1.6–2.4)
MCH RBC QN AUTO: 30.9 PG (ref 26.6–33)
MCHC RBC AUTO-ENTMCNC: 32 G/DL (ref 31.5–35.7)
MCV RBC AUTO: 96.6 FL (ref 79–97)
MONOCYTES # BLD AUTO: 0.12 10*3/MM3 (ref 0.1–0.9)
MONOCYTES NFR BLD AUTO: 1.6 % (ref 5–12)
NEUTROPHILS NFR BLD AUTO: 6.72 10*3/MM3 (ref 1.7–7)
NEUTROPHILS NFR BLD AUTO: 90.2 % (ref 42.7–76)
NRBC BLD AUTO-RTO: 0 /100 WBC (ref 0–0.2)
PLATELET # BLD AUTO: 100 10*3/MM3 (ref 140–450)
PMV BLD AUTO: 10.9 FL (ref 6–12)
POTASSIUM SERPL-SCNC: 5 MMOL/L (ref 3.5–5.2)
PROT SERPL-MCNC: 5.2 G/DL (ref 6–8.5)
RBC # BLD AUTO: 4.75 10*6/MM3 (ref 4.14–5.8)
RH BLD: POSITIVE
SODIUM SERPL-SCNC: 134 MMOL/L (ref 136–145)
TRIGL SERPL-MCNC: 56 MG/DL (ref 0–150)
VLDLC SERPL-MCNC: 11.2 MG/DL
WBC # BLD AUTO: 7.45 10*3/MM3 (ref 3.4–10.8)

## 2020-07-27 PROCEDURE — 83735 ASSAY OF MAGNESIUM: CPT | Performed by: SURGERY

## 2020-07-27 PROCEDURE — 99232 SBSQ HOSP IP/OBS MODERATE 35: CPT | Performed by: FAMILY MEDICINE

## 2020-07-27 PROCEDURE — 97530 THERAPEUTIC ACTIVITIES: CPT

## 2020-07-27 PROCEDURE — 97162 PT EVAL MOD COMPLEX 30 MIN: CPT

## 2020-07-27 PROCEDURE — 25010000003 CEFAZOLIN IN DEXTROSE 2-4 GM/100ML-% SOLUTION: Performed by: NURSE PRACTITIONER

## 2020-07-27 PROCEDURE — 85025 COMPLETE CBC W/AUTO DIFF WBC: CPT | Performed by: SURGERY

## 2020-07-27 PROCEDURE — 97116 GAIT TRAINING THERAPY: CPT

## 2020-07-27 PROCEDURE — 83036 HEMOGLOBIN GLYCOSYLATED A1C: CPT | Performed by: SURGERY

## 2020-07-27 PROCEDURE — 82962 GLUCOSE BLOOD TEST: CPT

## 2020-07-27 PROCEDURE — 80053 COMPREHEN METABOLIC PANEL: CPT | Performed by: SURGERY

## 2020-07-27 PROCEDURE — 97166 OT EVAL MOD COMPLEX 45 MIN: CPT

## 2020-07-27 PROCEDURE — 63710000001 INSULIN LISPRO (HUMAN) PER 5 UNITS: Performed by: SURGERY

## 2020-07-27 PROCEDURE — 25010000003 CEFAZOLIN IN DEXTROSE 2-4 GM/100ML-% SOLUTION: Performed by: SURGERY

## 2020-07-27 PROCEDURE — 80061 LIPID PANEL: CPT | Performed by: SURGERY

## 2020-07-27 RX ADMIN — CEFAZOLIN SODIUM 2 G: 2 INJECTION, SOLUTION INTRAVENOUS at 03:54

## 2020-07-27 RX ADMIN — APIXABAN 2.5 MG: 2.5 TABLET, FILM COATED ORAL at 14:09

## 2020-07-27 RX ADMIN — CEFAZOLIN SODIUM 2 G: 2 INJECTION, SOLUTION INTRAVENOUS at 20:18

## 2020-07-27 RX ADMIN — BENZOCAINE AND MENTHOL 1 LOZENGE: 15; 3.6 LOZENGE ORAL at 22:42

## 2020-07-27 RX ADMIN — CARVEDILOL 12.5 MG: 12.5 TABLET, FILM COATED ORAL at 08:32

## 2020-07-27 RX ADMIN — OXYCODONE HYDROCHLORIDE AND ACETAMINOPHEN 1 TABLET: 5; 325 TABLET ORAL at 08:35

## 2020-07-27 RX ADMIN — FUROSEMIDE 40 MG: 40 TABLET ORAL at 17:01

## 2020-07-27 RX ADMIN — CEFAZOLIN SODIUM 2 G: 2 INJECTION, SOLUTION INTRAVENOUS at 12:05

## 2020-07-27 RX ADMIN — INSULIN LISPRO 2 UNITS: 100 INJECTION, SOLUTION INTRAVENOUS; SUBCUTANEOUS at 12:04

## 2020-07-27 RX ADMIN — SPIRONOLACTONE 50 MG: 50 TABLET ORAL at 08:32

## 2020-07-27 RX ADMIN — OXYCODONE HYDROCHLORIDE AND ACETAMINOPHEN 1 TABLET: 5; 325 TABLET ORAL at 22:19

## 2020-07-27 RX ADMIN — CEFAZOLIN SODIUM 2 G: 2 INJECTION, SOLUTION INTRAVENOUS at 07:53

## 2020-07-27 RX ADMIN — TAMSULOSIN HYDROCHLORIDE 0.4 MG: 0.4 CAPSULE ORAL at 08:32

## 2020-07-27 RX ADMIN — FUROSEMIDE 40 MG: 40 TABLET ORAL at 08:32

## 2020-07-27 RX ADMIN — CARVEDILOL 12.5 MG: 12.5 TABLET, FILM COATED ORAL at 17:00

## 2020-07-27 RX ADMIN — ATORVASTATIN CALCIUM 40 MG: 40 TABLET, FILM COATED ORAL at 20:18

## 2020-07-27 RX ADMIN — SODIUM CHLORIDE, PRESERVATIVE FREE 10 ML: 5 INJECTION INTRAVENOUS at 08:33

## 2020-07-27 RX ADMIN — FOLIC ACID TAB 400 MCG 400 MCG: 400 TAB at 08:31

## 2020-07-27 NOTE — ANESTHESIA POSTPROCEDURE EVALUATION
Patient: Jordi Sam    Procedure Summary     Date:  07/26/20 Room / Location:   [facility] OR 02 /  [facility]    Anesthesia Start:  1938 Anesthesia Stop:  2045    Procedure:  UMBILICAL HERNIA REPAIR (N/A Abdomen) Diagnosis:  Umbilical hernia with obstruction but no gangrene    Surgeon:  Maribell Her MD Provider:  Chapin Reed MD    Anesthesia Type:  general ASA Status:  3          Anesthesia Type: general    Vitals  Vitals Value Taken Time   /71 7/26/2020  8:45 PM   Temp 97.7 °F (36.5 °C) 7/26/2020  8:45 PM   Pulse 72 7/26/2020  8:45 PM   Resp     SpO2 97 % 7/26/2020  8:45 PM           Post Anesthesia Care and Evaluation    Patient location during evaluation: PACU  Patient participation: complete - patient participated  Level of consciousness: awake and alert  Pain score: 0  Pain management: adequate  Airway patency: patent  Anesthetic complications: No anesthetic complications  PONV Status: none  Cardiovascular status: hemodynamically stable and acceptable  Respiratory status: nonlabored ventilation, acceptable and nasal cannula  Hydration status: acceptable

## 2020-07-28 LAB
ANION GAP SERPL CALCULATED.3IONS-SCNC: 9 MMOL/L (ref 5–15)
BUN SERPL-MCNC: 27 MG/DL (ref 8–23)
BUN/CREAT SERPL: 19.4 (ref 7–25)
CALCIUM SPEC-SCNC: 8.6 MG/DL (ref 8.6–10.5)
CHLORIDE SERPL-SCNC: 99 MMOL/L (ref 98–107)
CO2 SERPL-SCNC: 24 MMOL/L (ref 22–29)
CREAT SERPL-MCNC: 1.39 MG/DL (ref 0.76–1.27)
DEPRECATED RDW RBC AUTO: 47.9 FL (ref 37–54)
ERYTHROCYTE [DISTWIDTH] IN BLOOD BY AUTOMATED COUNT: 14 % (ref 12.3–15.4)
GFR SERPL CREATININE-BSD FRML MDRD: 48 ML/MIN/1.73
GLUCOSE BLDC GLUCOMTR-MCNC: 112 MG/DL (ref 70–130)
GLUCOSE BLDC GLUCOMTR-MCNC: 114 MG/DL (ref 70–130)
GLUCOSE SERPL-MCNC: 135 MG/DL (ref 65–99)
HCT VFR BLD AUTO: 39.6 % (ref 37.5–51)
HGB BLD-MCNC: 13.3 G/DL (ref 13–17.7)
MCH RBC QN AUTO: 31.6 PG (ref 26.6–33)
MCHC RBC AUTO-ENTMCNC: 33.6 G/DL (ref 31.5–35.7)
MCV RBC AUTO: 94.1 FL (ref 79–97)
PLATELET # BLD AUTO: 130 10*3/MM3 (ref 140–450)
PMV BLD AUTO: 10.9 FL (ref 6–12)
POTASSIUM SERPL-SCNC: 4.8 MMOL/L (ref 3.5–5.2)
RBC # BLD AUTO: 4.21 10*6/MM3 (ref 4.14–5.8)
SODIUM SERPL-SCNC: 132 MMOL/L (ref 136–145)
WBC # BLD AUTO: 6.97 10*3/MM3 (ref 3.4–10.8)

## 2020-07-28 PROCEDURE — 25010000003 CEFAZOLIN IN DEXTROSE 2-4 GM/100ML-% SOLUTION: Performed by: SURGERY

## 2020-07-28 PROCEDURE — 80048 BASIC METABOLIC PNL TOTAL CA: CPT | Performed by: FAMILY MEDICINE

## 2020-07-28 PROCEDURE — 82962 GLUCOSE BLOOD TEST: CPT

## 2020-07-28 PROCEDURE — 97530 THERAPEUTIC ACTIVITIES: CPT

## 2020-07-28 PROCEDURE — 85027 COMPLETE CBC AUTOMATED: CPT | Performed by: SURGERY

## 2020-07-28 PROCEDURE — 99232 SBSQ HOSP IP/OBS MODERATE 35: CPT | Performed by: FAMILY MEDICINE

## 2020-07-28 PROCEDURE — 97116 GAIT TRAINING THERAPY: CPT

## 2020-07-28 RX ORDER — CARVEDILOL 6.25 MG/1
6.25 TABLET ORAL 2 TIMES DAILY WITH MEALS
Status: DISCONTINUED | OUTPATIENT
Start: 2020-07-28 | End: 2020-07-31 | Stop reason: HOSPADM

## 2020-07-28 RX ORDER — DOCUSATE SODIUM 100 MG/1
100 CAPSULE, LIQUID FILLED ORAL 2 TIMES DAILY
Status: DISCONTINUED | OUTPATIENT
Start: 2020-07-28 | End: 2020-07-31 | Stop reason: HOSPADM

## 2020-07-28 RX ADMIN — FOLIC ACID TAB 400 MCG 400 MCG: 400 TAB at 09:03

## 2020-07-28 RX ADMIN — CARVEDILOL 6.25 MG: 6.25 TABLET, FILM COATED ORAL at 17:05

## 2020-07-28 RX ADMIN — TAMSULOSIN HYDROCHLORIDE 0.4 MG: 0.4 CAPSULE ORAL at 09:03

## 2020-07-28 RX ADMIN — ATORVASTATIN CALCIUM 40 MG: 40 TABLET, FILM COATED ORAL at 20:40

## 2020-07-28 RX ADMIN — APIXABAN 2.5 MG: 2.5 TABLET, FILM COATED ORAL at 09:02

## 2020-07-28 RX ADMIN — OXYCODONE HYDROCHLORIDE AND ACETAMINOPHEN 1 TABLET: 5; 325 TABLET ORAL at 02:41

## 2020-07-28 RX ADMIN — OXYCODONE HYDROCHLORIDE AND ACETAMINOPHEN 1 TABLET: 5; 325 TABLET ORAL at 09:02

## 2020-07-28 RX ADMIN — CEFAZOLIN SODIUM 2 G: 2 INJECTION, SOLUTION INTRAVENOUS at 12:23

## 2020-07-28 RX ADMIN — DOCUSATE SODIUM 100 MG: 100 CAPSULE, LIQUID FILLED ORAL at 20:40

## 2020-07-28 RX ADMIN — SODIUM CHLORIDE, PRESERVATIVE FREE 10 ML: 5 INJECTION INTRAVENOUS at 20:42

## 2020-07-28 RX ADMIN — FUROSEMIDE 40 MG: 40 TABLET ORAL at 17:05

## 2020-07-28 RX ADMIN — FUROSEMIDE 40 MG: 40 TABLET ORAL at 09:03

## 2020-07-28 RX ADMIN — OXYCODONE HYDROCHLORIDE AND ACETAMINOPHEN 1 TABLET: 5; 325 TABLET ORAL at 21:40

## 2020-07-28 RX ADMIN — DOCUSATE SODIUM 100 MG: 100 CAPSULE, LIQUID FILLED ORAL at 12:23

## 2020-07-28 RX ADMIN — OXYCODONE HYDROCHLORIDE AND ACETAMINOPHEN 1 TABLET: 5; 325 TABLET ORAL at 17:05

## 2020-07-28 RX ADMIN — CEFAZOLIN SODIUM 2 G: 2 INJECTION, SOLUTION INTRAVENOUS at 03:36

## 2020-07-28 RX ADMIN — APIXABAN 2.5 MG: 2.5 TABLET, FILM COATED ORAL at 20:40

## 2020-07-28 RX ADMIN — CEFAZOLIN SODIUM 2 G: 2 INJECTION, SOLUTION INTRAVENOUS at 20:42

## 2020-07-29 LAB
ANION GAP SERPL CALCULATED.3IONS-SCNC: 7 MMOL/L (ref 5–15)
BUN SERPL-MCNC: 32 MG/DL (ref 8–23)
BUN/CREAT SERPL: 22.7 (ref 7–25)
CALCIUM SPEC-SCNC: 8.6 MG/DL (ref 8.6–10.5)
CHLORIDE SERPL-SCNC: 96 MMOL/L (ref 98–107)
CO2 SERPL-SCNC: 29 MMOL/L (ref 22–29)
CREAT SERPL-MCNC: 1.41 MG/DL (ref 0.76–1.27)
DEPRECATED RDW RBC AUTO: 49.9 FL (ref 37–54)
ERYTHROCYTE [DISTWIDTH] IN BLOOD BY AUTOMATED COUNT: 14.2 % (ref 12.3–15.4)
GFR SERPL CREATININE-BSD FRML MDRD: 48 ML/MIN/1.73
GLUCOSE SERPL-MCNC: 107 MG/DL (ref 65–99)
HCT VFR BLD AUTO: 40.9 % (ref 37.5–51)
HGB BLD-MCNC: 13.1 G/DL (ref 13–17.7)
MCH RBC QN AUTO: 30.8 PG (ref 26.6–33)
MCHC RBC AUTO-ENTMCNC: 32 G/DL (ref 31.5–35.7)
MCV RBC AUTO: 96 FL (ref 79–97)
PLATELET # BLD AUTO: 112 10*3/MM3 (ref 140–450)
PMV BLD AUTO: 10.9 FL (ref 6–12)
POTASSIUM SERPL-SCNC: 4.4 MMOL/L (ref 3.5–5.2)
RBC # BLD AUTO: 4.26 10*6/MM3 (ref 4.14–5.8)
SODIUM SERPL-SCNC: 132 MMOL/L (ref 136–145)
WBC # BLD AUTO: 5.51 10*3/MM3 (ref 3.4–10.8)

## 2020-07-29 PROCEDURE — 99232 SBSQ HOSP IP/OBS MODERATE 35: CPT | Performed by: FAMILY MEDICINE

## 2020-07-29 PROCEDURE — 80048 BASIC METABOLIC PNL TOTAL CA: CPT | Performed by: FAMILY MEDICINE

## 2020-07-29 PROCEDURE — 85027 COMPLETE CBC AUTOMATED: CPT | Performed by: FAMILY MEDICINE

## 2020-07-29 PROCEDURE — 97116 GAIT TRAINING THERAPY: CPT

## 2020-07-29 PROCEDURE — 97530 THERAPEUTIC ACTIVITIES: CPT

## 2020-07-29 PROCEDURE — 97110 THERAPEUTIC EXERCISES: CPT

## 2020-07-29 RX ADMIN — OXYCODONE HYDROCHLORIDE AND ACETAMINOPHEN 1 TABLET: 5; 325 TABLET ORAL at 18:00

## 2020-07-29 RX ADMIN — FUROSEMIDE 40 MG: 40 TABLET ORAL at 17:14

## 2020-07-29 RX ADMIN — APIXABAN 2.5 MG: 2.5 TABLET, FILM COATED ORAL at 08:21

## 2020-07-29 RX ADMIN — OXYCODONE HYDROCHLORIDE AND ACETAMINOPHEN 1 TABLET: 5; 325 TABLET ORAL at 08:21

## 2020-07-29 RX ADMIN — CARVEDILOL 6.25 MG: 6.25 TABLET, FILM COATED ORAL at 17:13

## 2020-07-29 RX ADMIN — SODIUM CHLORIDE, PRESERVATIVE FREE 10 ML: 5 INJECTION INTRAVENOUS at 21:58

## 2020-07-29 RX ADMIN — DOCUSATE SODIUM 100 MG: 100 CAPSULE, LIQUID FILLED ORAL at 08:21

## 2020-07-29 RX ADMIN — SODIUM CHLORIDE, PRESERVATIVE FREE 10 ML: 5 INJECTION INTRAVENOUS at 08:21

## 2020-07-29 RX ADMIN — TAMSULOSIN HYDROCHLORIDE 0.4 MG: 0.4 CAPSULE ORAL at 08:21

## 2020-07-29 RX ADMIN — MAGNESIUM HYDROXIDE 10 ML: 2400 SUSPENSION ORAL at 21:57

## 2020-07-29 RX ADMIN — DOCUSATE SODIUM 100 MG: 100 CAPSULE, LIQUID FILLED ORAL at 21:58

## 2020-07-29 RX ADMIN — ATORVASTATIN CALCIUM 40 MG: 40 TABLET, FILM COATED ORAL at 21:57

## 2020-07-29 RX ADMIN — FOLIC ACID TAB 400 MCG 400 MCG: 400 TAB at 08:21

## 2020-07-29 RX ADMIN — MAGNESIUM HYDROXIDE 10 ML: 2400 SUSPENSION ORAL at 10:43

## 2020-07-30 ENCOUNTER — APPOINTMENT (OUTPATIENT)
Dept: CT IMAGING | Facility: HOSPITAL | Age: 85
End: 2020-07-30

## 2020-07-30 PROCEDURE — 97530 THERAPEUTIC ACTIVITIES: CPT

## 2020-07-30 PROCEDURE — 25010000003 LIDOCAINE 1 % SOLUTION: Performed by: RADIOLOGY

## 2020-07-30 PROCEDURE — 75989 ABSCESS DRAINAGE UNDER X-RAY: CPT

## 2020-07-30 PROCEDURE — 99232 SBSQ HOSP IP/OBS MODERATE 35: CPT | Performed by: NURSE PRACTITIONER

## 2020-07-30 PROCEDURE — 97116 GAIT TRAINING THERAPY: CPT

## 2020-07-30 PROCEDURE — 0W9G3ZZ DRAINAGE OF PERITONEAL CAVITY, PERCUTANEOUS APPROACH: ICD-10-PCS | Performed by: RADIOLOGY

## 2020-07-30 RX ORDER — BISACODYL 10 MG
10 SUPPOSITORY, RECTAL RECTAL DAILY
Status: DISCONTINUED | OUTPATIENT
Start: 2020-07-30 | End: 2020-07-31 | Stop reason: HOSPADM

## 2020-07-30 RX ORDER — SODIUM PHOSPHATE,MONO-DIBASIC 19G-7G/118
1 ENEMA (ML) RECTAL ONCE AS NEEDED
Status: DISCONTINUED | OUTPATIENT
Start: 2020-07-30 | End: 2020-07-31 | Stop reason: HOSPADM

## 2020-07-30 RX ORDER — LIDOCAINE HYDROCHLORIDE 10 MG/ML
20 INJECTION, SOLUTION INFILTRATION; PERINEURAL ONCE
Status: COMPLETED | OUTPATIENT
Start: 2020-07-30 | End: 2020-07-30

## 2020-07-30 RX ADMIN — FUROSEMIDE 40 MG: 40 TABLET ORAL at 17:17

## 2020-07-30 RX ADMIN — MAGNESIUM HYDROXIDE 10 ML: 2400 SUSPENSION ORAL at 10:03

## 2020-07-30 RX ADMIN — FOLIC ACID TAB 400 MCG 400 MCG: 400 TAB at 10:04

## 2020-07-30 RX ADMIN — CARVEDILOL 6.25 MG: 6.25 TABLET, FILM COATED ORAL at 10:04

## 2020-07-30 RX ADMIN — APIXABAN 2.5 MG: 2.5 TABLET, FILM COATED ORAL at 10:04

## 2020-07-30 RX ADMIN — OXYCODONE HYDROCHLORIDE AND ACETAMINOPHEN 1 TABLET: 5; 325 TABLET ORAL at 19:56

## 2020-07-30 RX ADMIN — OXYCODONE HYDROCHLORIDE AND ACETAMINOPHEN 1 TABLET: 5; 325 TABLET ORAL at 03:01

## 2020-07-30 RX ADMIN — MAGNESIUM HYDROXIDE 10 ML: 2400 SUSPENSION ORAL at 19:58

## 2020-07-30 RX ADMIN — CARVEDILOL 6.25 MG: 6.25 TABLET, FILM COATED ORAL at 17:17

## 2020-07-30 RX ADMIN — FUROSEMIDE 40 MG: 40 TABLET ORAL at 10:04

## 2020-07-30 RX ADMIN — SPIRONOLACTONE 50 MG: 50 TABLET ORAL at 10:04

## 2020-07-30 RX ADMIN — APIXABAN 2.5 MG: 2.5 TABLET, FILM COATED ORAL at 19:59

## 2020-07-30 RX ADMIN — ATORVASTATIN CALCIUM 40 MG: 40 TABLET, FILM COATED ORAL at 19:58

## 2020-07-30 RX ADMIN — BISACODYL 10 MG: 10 SUPPOSITORY RECTAL at 18:05

## 2020-07-30 RX ADMIN — DOCUSATE SODIUM 100 MG: 100 CAPSULE, LIQUID FILLED ORAL at 10:04

## 2020-07-30 RX ADMIN — LIDOCAINE HYDROCHLORIDE 20 ML: 10 INJECTION, SOLUTION INFILTRATION; PERINEURAL at 09:22

## 2020-07-30 RX ADMIN — TAMSULOSIN HYDROCHLORIDE 0.4 MG: 0.4 CAPSULE ORAL at 10:04

## 2020-07-30 RX ADMIN — DOCUSATE SODIUM 100 MG: 100 CAPSULE, LIQUID FILLED ORAL at 19:58

## 2020-07-31 ENCOUNTER — RESULTS ENCOUNTER (OUTPATIENT)
Dept: OTHER | Facility: HOSPITAL | Age: 85
End: 2020-07-31

## 2020-07-31 VITALS
WEIGHT: 186 LBS | OXYGEN SATURATION: 96 % | BODY MASS INDEX: 26.04 KG/M2 | HEIGHT: 71 IN | SYSTOLIC BLOOD PRESSURE: 108 MMHG | DIASTOLIC BLOOD PRESSURE: 73 MMHG | HEART RATE: 61 BPM | RESPIRATION RATE: 16 BRPM | TEMPERATURE: 98.5 F

## 2020-07-31 DIAGNOSIS — R18.8 CIRRHOSIS OF LIVER WITH ASCITES, UNSPECIFIED HEPATIC CIRRHOSIS TYPE (HCC): ICD-10-CM

## 2020-07-31 DIAGNOSIS — K74.60 CIRRHOSIS OF LIVER WITH ASCITES, UNSPECIFIED HEPATIC CIRRHOSIS TYPE (HCC): ICD-10-CM

## 2020-07-31 PROBLEM — K42.0 INCARCERATED UMBILICAL HERNIA: Status: RESOLVED | Noted: 2020-07-26 | Resolved: 2020-07-31

## 2020-07-31 PROCEDURE — 99239 HOSP IP/OBS DSCHRG MGMT >30: CPT | Performed by: FAMILY MEDICINE

## 2020-07-31 PROCEDURE — 97110 THERAPEUTIC EXERCISES: CPT

## 2020-07-31 PROCEDURE — 97530 THERAPEUTIC ACTIVITIES: CPT

## 2020-07-31 RX ORDER — PSEUDOEPHEDRINE HCL 30 MG
100 TABLET ORAL 2 TIMES DAILY
Qty: 60 EACH | Refills: 0 | Status: SHIPPED | OUTPATIENT
Start: 2020-07-31 | End: 2020-11-17

## 2020-07-31 RX ORDER — OXYCODONE HYDROCHLORIDE AND ACETAMINOPHEN 5; 325 MG/1; MG/1
1 TABLET ORAL EVERY 4 HOURS PRN
Qty: 12 TABLET | Refills: 0 | Status: SHIPPED | OUTPATIENT
Start: 2020-07-31 | End: 2020-08-05

## 2020-07-31 RX ORDER — CARVEDILOL 6.25 MG/1
6.25 TABLET ORAL 2 TIMES DAILY WITH MEALS
Qty: 60 TABLET | Refills: 0 | Status: SHIPPED | OUTPATIENT
Start: 2020-07-31 | End: 2020-08-28 | Stop reason: SDUPTHER

## 2020-07-31 RX ADMIN — FUROSEMIDE 40 MG: 40 TABLET ORAL at 09:08

## 2020-07-31 RX ADMIN — SPIRONOLACTONE 50 MG: 50 TABLET ORAL at 09:08

## 2020-07-31 RX ADMIN — TAMSULOSIN HYDROCHLORIDE 0.4 MG: 0.4 CAPSULE ORAL at 09:08

## 2020-07-31 RX ADMIN — APIXABAN 2.5 MG: 2.5 TABLET, FILM COATED ORAL at 09:08

## 2020-07-31 RX ADMIN — BISACODYL 10 MG: 10 SUPPOSITORY RECTAL at 09:09

## 2020-07-31 RX ADMIN — FOLIC ACID TAB 400 MCG 400 MCG: 400 TAB at 09:08

## 2020-07-31 RX ADMIN — MAGNESIUM HYDROXIDE 10 ML: 2400 SUSPENSION ORAL at 09:08

## 2020-07-31 RX ADMIN — DOCUSATE SODIUM 100 MG: 100 CAPSULE, LIQUID FILLED ORAL at 09:08

## 2020-07-31 RX ADMIN — CARVEDILOL 6.25 MG: 6.25 TABLET, FILM COATED ORAL at 09:08

## 2020-08-01 ENCOUNTER — READMISSION MANAGEMENT (OUTPATIENT)
Dept: CALL CENTER | Facility: HOSPITAL | Age: 85
End: 2020-08-01

## 2020-08-01 NOTE — OUTREACH NOTE
Prep Survey      Responses   [facility] facility patient discharged from?  [place]   Is LACE score < 7 ?  No   Eligibility  Readm Mgmt   Discharge diagnosis  Incarcerateds umbilical hernia, s/p repair, DM II, cirrhosis of liver with ascites, chronic afib. CKD III   COVID-19 Test Status  Negative   Does the patient have one of the following disease processes/diagnoses(primary or secondary)?  General Surgery   Does the patient have Home health ordered?  No   Is there a DME ordered?  No   Comments regarding appointments  see AVS   Medication alerts for this patient  see AVS   Prep survey completed?  Yes          Linnea Whitney RN

## 2020-08-04 ENCOUNTER — READMISSION MANAGEMENT (OUTPATIENT)
Dept: CALL CENTER | Facility: HOSPITAL | Age: 85
End: 2020-08-04

## 2020-08-10 ENCOUNTER — OFFICE VISIT (OUTPATIENT)
Dept: FAMILY MEDICINE CLINIC | Facility: CLINIC | Age: 85
End: 2020-08-10

## 2020-08-10 VITALS
WEIGHT: 188.6 LBS | OXYGEN SATURATION: 99 % | DIASTOLIC BLOOD PRESSURE: 62 MMHG | BODY MASS INDEX: 26.4 KG/M2 | SYSTOLIC BLOOD PRESSURE: 112 MMHG | HEART RATE: 57 BPM | HEIGHT: 71 IN

## 2020-08-10 DIAGNOSIS — R18.8 CIRRHOSIS OF LIVER WITH ASCITES, UNSPECIFIED HEPATIC CIRRHOSIS TYPE (HCC): ICD-10-CM

## 2020-08-10 DIAGNOSIS — I50.812 CHRONIC RIGHT HEART FAILURE (HCC): ICD-10-CM

## 2020-08-10 DIAGNOSIS — K72.10 END STAGE LIVER DISEASE (HCC): Primary | ICD-10-CM

## 2020-08-10 DIAGNOSIS — I48.20 CHRONIC ATRIAL FIBRILLATION (HCC): ICD-10-CM

## 2020-08-10 DIAGNOSIS — N18.30 STAGE 3 CHRONIC KIDNEY DISEASE (HCC): ICD-10-CM

## 2020-08-10 DIAGNOSIS — K74.60 CIRRHOSIS OF LIVER WITH ASCITES, UNSPECIFIED HEPATIC CIRRHOSIS TYPE (HCC): ICD-10-CM

## 2020-08-10 PROBLEM — Z09 S/P UMBILICAL HERNIA REPAIR, FOLLOW-UP EXAM: Status: ACTIVE | Noted: 2020-08-10

## 2020-08-10 PROCEDURE — 99204 OFFICE O/P NEW MOD 45 MIN: CPT | Performed by: INTERNAL MEDICINE

## 2020-08-10 NOTE — PROGRESS NOTES
Jordi Sam  1934  [id number]  Patient Care Team:  Jasper Avery MD as PCP - General (Internal Medicine)  Diogo Weber APRN as PCP - Claims Attributed  Efrem Posadas MD as Consulting Physician (Cardiology)  Jordi Law MD as Consulting Physician (Urology)    Jordi Sam is a 86 y.o. male here today to establish care.  This patient is accompanied by their wife who contributes to the history of their care.    Chief Complaint:    Chief Complaint   Patient presents with   • Establish Care   • Hospital Follow Up        History of Present Illness:   A pleasant 86-year-old gentleman who is known cirrhosis.  Is recently been discharged from [facility] after urgent/emergent exploratory laparotomy with hernia repair.  Since this repair he is been wearing an abdominal binder.  He denies any abdominal pain nausea or vomiting.  There is been no drainage from his incision.  He typically gets, as ordered by gastroenterology frequent therapeutic paracentesis.    Past Medical History:   Diagnosis Date   • A-fib (CMS/HCC)    • Acute inferior myocardial infarction (CMS/HCC)     Acute inferior STEMI with RV infarct features, January 2009.    • CAD (coronary artery disease)    • Dyslipidemia    • Goiter     History of “goiter.”   • Hyperlipidemia    • Hypertension    • Nephrolithiasis    • Type 2 diabetes mellitus (CMS/HCC)        Past Surgical History:   Procedure Laterality Date   • BRONCHOSCOPY N/A 4/12/2019    Procedure: BRONCHOSCOPY WITH THORACENTESIS;  Surgeon: Marciano Higginbotham MD;  Location:  [facility];  Service: Pulmonary   • CARDIAC CATHETERIZATION Bilateral 1/30/2019    Procedure: Right and Left Heart Cath;  Surgeon: Efrem Posadas MD;  Location:  [facility];  Service: Cardiology   • CARDIAC CATHETERIZATION     • CHOLECYSTECTOMY     • CORONARY ANGIOPLASTY WITH STENT PLACEMENT  01/09/2009    Sirolimus eluting stents to the RCA, 01/09/2009.    • HERNIA  REPAIR      Hernia repair x 2.    • PARACENTESIS  2019   • UMBILICAL HERNIA REPAIR N/A 2020    Procedure: UMBILICAL HERNIA REPAIR;  Surgeon: Maribell Her MD;  Location: [facility];  Service: General;  Laterality: N/A;        Family History   Problem Relation Age of Onset   • Dementia Mother    • Stroke Mother    • Heart disease Father    • Heart attack Father    • Aneurysm Father    • Cancer Sister    • No Known Problems Brother    • No Known Problems Sister    • Heart disease Brother         CABG   • Hypertension Daughter    • Hypertension Son        Social History     Socioeconomic History   • Marital status:      Spouse name: Melly   • Number of children: 4   • Years of education: Not on file   • Highest education level: Not on file   Occupational History   • Occupation: retired     Comment: works [employer] [place]   Social Needs   • Financial resource strain: Not hard at all   • Food insecurity:     Worry: Never true     Inability: Never true   • Transportation needs:     Medical: No     Non-medical: No   Tobacco Use   • Smoking status: Former Smoker     Packs/day: 2.50     Years: 28.00     Pack years: 70.00     Types: Cigarettes     Last attempt to quit: 1983     Years since quittin.2   • Smokeless tobacco: Never Used   Substance and Sexual Activity   • Alcohol use: No   • Drug use: No   • Sexual activity: Defer   Lifestyle   • Physical activity:     Days per week: 0 days     Minutes per session: 0 min   • Stress: Not at all   Relationships   • Social connections:     Talks on phone: Twice a week     Gets together: Once a week     Attends Congregational service: 1 to 4 times per year     Active member of club or organization: No     Attends meetings of clubs or organizations: Never     Relationship status:    Social History Narrative    Caffeine Intake: 2  servings per day (coffee)    Patient lives at home wit his wife       Allergies   Allergen Reactions   • Bee Venom  "Swelling   • Sulfamethoxazole-Trimethoprim Other (See Comments)     Pt unaware       Review of Systems:    Review of Systems   Constitutional: Negative.  Negative for activity change, unexpected weight gain and unexpected weight loss.   HENT: Negative.    Eyes: Negative.    Respiratory: Negative for shortness of breath.    Cardiovascular: Negative for chest pain, palpitations and leg swelling.   Gastrointestinal: Negative for diarrhea, nausea and vomiting.        Denies any abdominal distention, he is wearing his binding belt.  No leakage from his incision.   Endocrine: Negative for cold intolerance and heat intolerance.   Musculoskeletal: Positive for arthralgias.        Chronic right knee pain and instability   Skin: Negative.    Neurological: Negative.    Hematological: Negative.        Vitals:    08/10/20 1505   BP: 112/62   BP Location: Left arm   Patient Position: Sitting   Cuff Size: Adult   Pulse: 57   SpO2: 99%   Weight: 85.5 kg (188 lb 9.6 oz)   Height: 180.3 cm (71\")     Body mass index is 26.3 kg/m².      Current Outpatient Medications:   •  apixaban (ELIQUIS) 2.5 MG tablet tablet, Take 1 tablet by mouth 2 (Two) Times a Day., Disp: 60 tablet, Rfl: 3  •  aspirin 81 MG tablet, Take 81 mg by mouth Daily., Disp: , Rfl:   •  carvedilol (COREG) 6.25 MG tablet, Take 1 tablet by mouth 2 (Two) Times a Day With Meals., Disp: 60 tablet, Rfl: 0  •  docusate sodium 100 MG capsule, Take 100 mg by mouth 2 (Two) Times a Day., Disp: 60 each, Rfl: 0  •  folic acid (FOLVITE) 400 MCG tablet, Take 400 mcg by mouth Daily., Disp: , Rfl:   •  furosemide (LASIX) 40 MG tablet, Take 1 tablet by mouth 2 (Two) Times a Day., Disp: 180 tablet, Rfl: 1  •  magnesium hydroxide (MILK OF MAGNESIA) 2400 MG/10ML suspension suspension, Take 10 mL by mouth Daily As Needed (Constipation)., Disp: 300 mL, Rfl: 0  •  nitroglycerin (NITROSTAT) 0.4 MG SL tablet, DISSOLVE ONE TABLET UNDER THE TONGUE EVERY 5 MINUTES AS NEEDED FOR CHEST PAIN.  DO NOT " EXCEED A TOTAL OF 3 DOSES IN 15 MINUTES, Disp: 25 tablet, Rfl: 3  •  simvastatin (ZOCOR) 40 MG tablet, Take 1 tablet by mouth Daily., Disp: 90 tablet, Rfl: 0  •  spironolactone (ALDACTONE) 25 MG tablet, Take 2 tablets by mouth Daily., Disp: 180 tablet, Rfl: 1  •  tamsulosin (FLOMAX) 0.4 MG capsule 24 hr capsule, Take 1 capsule by mouth Daily., Disp: , Rfl:     Physical Exam:    Physical Exam   Constitutional: He is oriented to person, place, and time. He appears well-developed and well-nourished. No distress.   HENT:   Head: Normocephalic and atraumatic.   Right Ear: External ear normal.   Left Ear: External ear normal.   Mouth/Throat: Oropharynx is clear and moist. No oropharyngeal exudate.   Eyes: Conjunctivae and EOM are normal. Right eye exhibits no discharge. No scleral icterus.   Neck: Normal range of motion. Neck supple. No JVD present. No tracheal deviation present. No thyromegaly present.   Cardiovascular: Regular rhythm, normal heart sounds and intact distal pulses.   PMI nondisplaced, bradycardic S1-S2, trace edema   Pulmonary/Chest: Effort normal and breath sounds normal. He has no wheezes. He has no rales.   No dullness to percussion   Abdominal: Soft. Bowel sounds are normal. There is no tenderness. There is no rebound and no guarding.   , Binder in place   Musculoskeletal:   Ambulates with limp right lower extremity cane in left hand   Lymphadenopathy:     He has no cervical adenopathy.   Neurological: He is alert and oriented to person, place, and time. He exhibits normal muscle tone. Coordination normal.   Skin: Skin is warm and dry. Capillary refill takes less than 2 seconds. No rash noted. He is not diaphoretic. No pallor.   Psychiatric: He has a normal mood and affect. Judgment normal.   Nursing note and vitals reviewed.      Procedures    Results Review:    I reviewed the patient's new clinical results.  I reviewed the patient's other test results and agree with the  interpretation    Assessment/Plan:    Problem List Items Addressed This Visit        Cardiovascular and Mediastinum    Chronic atrial fibrillation (CMS/HCC)    Current Assessment & Plan     His Eliquis has been resumed.  Rate appears controlled on new dose of Coreg.         Relevant Medications    nitroglycerin (NITROSTAT) 0.4 MG SL tablet    carvedilol (COREG) 6.25 MG tablet    Chronic right heart failure (CMS/HCC)    Current Assessment & Plan     He appears euvolemic.  There is some trace lower extremity edema however given his postoperative status this would not be unusual.  Basic metabolic panel has been requested.  Continue Lasix, Aldactone and Coreg.         Relevant Medications    nitroglycerin (NITROSTAT) 0.4 MG SL tablet    spironolactone (ALDACTONE) 25 MG tablet    carvedilol (COREG) 6.25 MG tablet    Other Relevant Orders    CBC w AUTO Differential    Basic metabolic panel       Digestive    Cirrhosis of liver with ascites (CMS/HCC)    Current Assessment & Plan     He requires paracentesis approximately every 4 weeks.  This is secondary to chronic right heart failure.  I see no evidence of portosystemic encephalopathy.         Relevant Medications    albumin human 25 % IV SOLN 12.5 g (Completed)    albumin human 25 % IV SOLN 12.5 g (Completed)    End stage liver disease (CMS/HCC) - Primary    Relevant Orders    CBC w AUTO Differential    Basic metabolic panel       Genitourinary    Stage 3 chronic kidney disease (CMS/HCC)    Current Assessment & Plan     Renal condition will be rechecked today with a basic metabolic panel given the perioperative fluid shifts and chronic Lasix use.         Relevant Medications    furosemide (LASIX) 40 MG tablet    spironolactone (ALDACTONE) 25 MG tablet          Plan of care reviewed with patient at the conclusion of today's visit. Education was provided regarding diagnosis and management.  Patient verbalizes understanding of and agreement with management plan.    Return  in about 3 months (around 11/10/2020).    Jasper Avery MD    Please note that portions of this note may have been completed with a voice recognition program. Efforts were made to edit the dictations, but occasionally words are mistranscribed.

## 2020-08-10 NOTE — ASSESSMENT & PLAN NOTE
He appears euvolemic.  There is some trace lower extremity edema however given his postoperative status this would not be unusual.  Basic metabolic panel has been requested.  Continue Lasix, Aldactone and Coreg.

## 2020-08-10 NOTE — ASSESSMENT & PLAN NOTE
He requires paracentesis approximately every 4 weeks.  This is secondary to chronic right heart failure.  I see no evidence of portosystemic encephalopathy.

## 2020-08-10 NOTE — ASSESSMENT & PLAN NOTE
Renal condition will be rechecked today with a basic metabolic panel given the perioperative fluid shifts and chronic Lasix use.

## 2020-08-12 ENCOUNTER — READMISSION MANAGEMENT (OUTPATIENT)
Dept: CALL CENTER | Facility: HOSPITAL | Age: 85
End: 2020-08-12

## 2020-08-12 NOTE — OUTREACH NOTE
General Surgery Week 2 Survey      Responses   [facility] patient discharged from?  [place]   Does the patient have one of the following disease processes/diagnoses(primary or secondary)?  General Surgery   Week 2 attempt successful?  Yes   Call start time  1631   Call end time  1634   Discharge diagnosis  Incarcerateds umbilical hernia, s/p repair, DM II, cirrhosis of liver with ascites, chronic afib. CKD III   Meds reviewed with patient/caregiver?  Yes   Is the patient taking all medications as directed (includes completed medication regime)?  Yes   Medication comments  pain well controlled   Has the patient kept scheduled appointments due by today?  N/A   Comments  appt tomorrow with Dr ZURITA   Psychosocial issues?  No   What is the patient's perception of their health status since discharge?  Improving   Is the patient /caregiver able to teach back basic post-op care?  Lifting as instructed by MD in discharge instructions   Is the patient/caregiver able to teach back signs and symptoms of incisional infection?  Increased redness, swelling or pain at the incisonal site, Increased drainage or bleeding   Is the patient/caregiver able to teach back the hierarchy of who to call/visit for symptoms/problems? PCP, Specialist, Home health nurse, Urgent Care, ED, 911  Yes   Additional teach back comments  Pt doing well and eatng and drinking well   Week 2 call completed?  Yes          Janeth Aden RN

## 2020-08-13 ENCOUNTER — LAB (OUTPATIENT)
Dept: LAB | Facility: HOSPITAL | Age: 85
End: 2020-08-13

## 2020-08-13 DIAGNOSIS — K72.10 END STAGE LIVER DISEASE (HCC): ICD-10-CM

## 2020-08-13 DIAGNOSIS — I50.812 CHRONIC RIGHT HEART FAILURE (HCC): ICD-10-CM

## 2020-08-13 LAB
ANION GAP SERPL CALCULATED.3IONS-SCNC: 11.1 MMOL/L (ref 5–15)
BASOPHILS # BLD AUTO: 0.05 10*3/MM3 (ref 0–0.2)
BASOPHILS NFR BLD AUTO: 0.9 % (ref 0–1.5)
BUN SERPL-MCNC: 29 MG/DL (ref 8–23)
BUN/CREAT SERPL: 20.7 (ref 7–25)
CALCIUM SPEC-SCNC: 9.1 MG/DL (ref 8.6–10.5)
CHLORIDE SERPL-SCNC: 99 MMOL/L (ref 98–107)
CO2 SERPL-SCNC: 27.9 MMOL/L (ref 22–29)
CREAT SERPL-MCNC: 1.4 MG/DL (ref 0.76–1.27)
DEPRECATED RDW RBC AUTO: 46.9 FL (ref 37–54)
EOSINOPHIL # BLD AUTO: 0.18 10*3/MM3 (ref 0–0.4)
EOSINOPHIL NFR BLD AUTO: 3.4 % (ref 0.3–6.2)
ERYTHROCYTE [DISTWIDTH] IN BLOOD BY AUTOMATED COUNT: 13.5 % (ref 12.3–15.4)
GFR SERPL CREATININE-BSD FRML MDRD: 48 ML/MIN/1.73
GLUCOSE SERPL-MCNC: 78 MG/DL (ref 65–99)
HCT VFR BLD AUTO: 36.7 % (ref 37.5–51)
HGB BLD-MCNC: 12.3 G/DL (ref 13–17.7)
IMM GRANULOCYTES # BLD AUTO: 0.01 10*3/MM3 (ref 0–0.05)
IMM GRANULOCYTES NFR BLD AUTO: 0.2 % (ref 0–0.5)
LYMPHOCYTES # BLD AUTO: 0.77 10*3/MM3 (ref 0.7–3.1)
LYMPHOCYTES NFR BLD AUTO: 14.5 % (ref 19.6–45.3)
MCH RBC QN AUTO: 31.9 PG (ref 26.6–33)
MCHC RBC AUTO-ENTMCNC: 33.5 G/DL (ref 31.5–35.7)
MCV RBC AUTO: 95.1 FL (ref 79–97)
MONOCYTES # BLD AUTO: 0.55 10*3/MM3 (ref 0.1–0.9)
MONOCYTES NFR BLD AUTO: 10.3 % (ref 5–12)
NEUTROPHILS NFR BLD AUTO: 3.76 10*3/MM3 (ref 1.7–7)
NEUTROPHILS NFR BLD AUTO: 70.7 % (ref 42.7–76)
NRBC BLD AUTO-RTO: 0 /100 WBC (ref 0–0.2)
PLATELET # BLD AUTO: 138 10*3/MM3 (ref 140–450)
PMV BLD AUTO: 10.8 FL (ref 6–12)
POTASSIUM SERPL-SCNC: 4.3 MMOL/L (ref 3.5–5.2)
RBC # BLD AUTO: 3.86 10*6/MM3 (ref 4.14–5.8)
SODIUM SERPL-SCNC: 138 MMOL/L (ref 136–145)
WBC # BLD AUTO: 5.32 10*3/MM3 (ref 3.4–10.8)

## 2020-08-13 PROCEDURE — 85025 COMPLETE CBC W/AUTO DIFF WBC: CPT

## 2020-08-13 PROCEDURE — 80048 BASIC METABOLIC PNL TOTAL CA: CPT

## 2020-08-13 PROCEDURE — 36415 COLL VENOUS BLD VENIPUNCTURE: CPT

## 2020-08-14 ENCOUNTER — PREP FOR SURGERY (OUTPATIENT)
Dept: OTHER | Facility: HOSPITAL | Age: 85
End: 2020-08-14

## 2020-08-14 DIAGNOSIS — R18.8 CIRRHOSIS OF LIVER WITH ASCITES, UNSPECIFIED HEPATIC CIRRHOSIS TYPE (HCC): Primary | ICD-10-CM

## 2020-08-14 DIAGNOSIS — K74.60 CIRRHOSIS OF LIVER WITH ASCITES, UNSPECIFIED HEPATIC CIRRHOSIS TYPE (HCC): Primary | ICD-10-CM

## 2020-08-14 RX ORDER — ALBUMIN (HUMAN) 12.5 G/50ML
6.25 SOLUTION INTRAVENOUS ONCE
Status: CANCELLED | OUTPATIENT
Start: 2020-08-14 | End: 2020-08-14

## 2020-08-20 ENCOUNTER — READMISSION MANAGEMENT (OUTPATIENT)
Dept: CALL CENTER | Facility: HOSPITAL | Age: 85
End: 2020-08-20

## 2020-08-20 NOTE — OUTREACH NOTE
General Surgery Week 3 Survey      Responses   [facility] patient discharged from?  [place]   Does the patient have one of the following disease processes/diagnoses(primary or secondary)?  General Surgery   Week 3 attempt successful?  Yes   Call start time  1516   Call end time  1522   Discharge diagnosis  Incarcerateds umbilical hernia, s/p repair, DM II, cirrhosis of liver with ascites, chronic afib. CKD III   Meds reviewed with patient/caregiver?  Yes   Is the patient taking all medications as directed (includes completed medication regime)?  Yes   Medication comments  A little pain occasionally take pain pill only when needed.   Has the patient kept scheduled appointments due by today?  Yes   Comments  08/13/22020 Dr. YUDITH Avrey, PCP has seen since discharge.   DME comments  Wearing abdominal binder   What is the patient's perception of their health status since discharge?  Improving   Week 3 call completed?  Yes          Nicci Ayoub RN

## 2020-08-26 ENCOUNTER — HOSPITAL ENCOUNTER (OUTPATIENT)
Dept: CT IMAGING | Facility: HOSPITAL | Age: 85
Discharge: HOME | End: 2020-08-26
Admitting: RADIOLOGY

## 2020-08-26 VITALS
DIASTOLIC BLOOD PRESSURE: 57 MMHG | RESPIRATION RATE: 18 BRPM | WEIGHT: 194 LBS | SYSTOLIC BLOOD PRESSURE: 111 MMHG | OXYGEN SATURATION: 97 % | HEART RATE: 67 BPM | BODY MASS INDEX: 27.06 KG/M2 | TEMPERATURE: 97.8 F

## 2020-08-26 DIAGNOSIS — K74.60 CIRRHOSIS OF LIVER WITH ASCITES, UNSPECIFIED HEPATIC CIRRHOSIS TYPE (HCC): ICD-10-CM

## 2020-08-26 DIAGNOSIS — R18.8 CIRRHOSIS OF LIVER WITH ASCITES, UNSPECIFIED HEPATIC CIRRHOSIS TYPE (HCC): ICD-10-CM

## 2020-08-26 PROCEDURE — 25010000003 LIDOCAINE 1 % SOLUTION: Performed by: RADIOLOGY

## 2020-08-26 PROCEDURE — C1729 CATH, DRAINAGE: HCPCS

## 2020-08-26 PROCEDURE — 75989 ABSCESS DRAINAGE UNDER X-RAY: CPT

## 2020-08-26 RX ORDER — SODIUM CHLORIDE 0.9 % (FLUSH) 0.9 %
10 SYRINGE (ML) INJECTION AS NEEDED
Status: DISCONTINUED | OUTPATIENT
Start: 2020-08-26 | End: 2020-08-27 | Stop reason: HOSPADM

## 2020-08-26 RX ORDER — ALBUMIN (HUMAN) 12.5 G/50ML
6.25 SOLUTION INTRAVENOUS ONCE
Status: DISCONTINUED | OUTPATIENT
Start: 2020-08-26 | End: 2020-08-27 | Stop reason: HOSPADM

## 2020-08-26 RX ORDER — LIDOCAINE HYDROCHLORIDE 10 MG/ML
20 INJECTION, SOLUTION INFILTRATION; PERINEURAL ONCE
Status: COMPLETED | OUTPATIENT
Start: 2020-08-26 | End: 2020-08-26

## 2020-08-26 RX ORDER — SODIUM CHLORIDE 0.9 % (FLUSH) 0.9 %
3 SYRINGE (ML) INJECTION EVERY 12 HOURS SCHEDULED
Status: DISCONTINUED | OUTPATIENT
Start: 2020-08-26 | End: 2020-08-27 | Stop reason: HOSPADM

## 2020-08-26 RX ADMIN — LIDOCAINE HYDROCHLORIDE 20 ML: 10 INJECTION, SOLUTION INFILTRATION; PERINEURAL at 09:20

## 2020-08-26 NOTE — NURSING NOTE
Paracentesis performed by Dr Harper.  3.5 liters fluid removed.  Pt tolerated well.  Report called to RAVINDRA

## 2020-08-26 NOTE — POST-PROCEDURE NOTE
Interventional Radiology Operative Note    Date: 08/26/20     Time: 17:16       Pre-op Diagnosis: Patient status post repair of incarcerated umbilical hernia causing small bowel obstruction on 7/26/2020.  He has underlying liver cirrhosis with extensive ascites.  Post-op Diagnosis: Same     Procedure: CT guided paracentesis     Surgeon: BJORN Harper M.D.  Assistants: None     Sedation: None     Estimated Blood Loss (EBL): Trace      Urine Output (UOP): N/A (short procedure)     IVF: N/A (short procedure)     Findings: Small volume ascites     Specimens: 3500 mL serous fluid.     Complications: No immediate     Disposition: Recovery. Stable

## 2020-08-26 NOTE — NURSING NOTE
Pt reports that he forgot to hold his Eliquis prior to procedure today and that his last dose was last night at 2200.  Reported this to Dr. Harper who was okay to proceed with planned paracentesis.  Explained to patient and his wife that this puts him at an increased risk for bleeding post procedure. Pt and wife verbalize understanding and wish to proceed as scheduled.

## 2020-08-26 NOTE — NURSING NOTE
Pt discharged from ir dept s/p paracentesis.  Pt tolerated procedure without complications.  Total of 3.5 liters removed therefore did not meet criteria for Albumin infusion post fluid removal.  Puncture site appeared to be mildly raised about quarter-sized area, could be attributed to injection of numbing agent to that area or small hematoma formation.  Bandaid to site with bloody drainage but not increased from arrival to unit post procedure and no active drainage noted from puncture site itself.  Changed dressing placing pressure dressing over area and instructed patient and spouse to monitor site for any increase in swelling, if increase in swelling was to occur, instructed them to hold pressure x 10 minutes and reassess.  For any signs of bleeding contact health care provider.  Both verbalize understanding.  Pt transported to exit via wheelchair per CNA.

## 2020-08-27 ENCOUNTER — TELEPHONE (OUTPATIENT)
Dept: INFUSION THERAPY | Facility: HOSPITAL | Age: 85
End: 2020-08-27

## 2020-08-28 ENCOUNTER — RESULTS ENCOUNTER (OUTPATIENT)
Dept: OTHER | Facility: HOSPITAL | Age: 85
End: 2020-08-28

## 2020-08-28 DIAGNOSIS — R18.8 CIRRHOSIS OF LIVER WITH ASCITES, UNSPECIFIED HEPATIC CIRRHOSIS TYPE (HCC): ICD-10-CM

## 2020-08-28 DIAGNOSIS — K74.60 CIRRHOSIS OF LIVER WITH ASCITES, UNSPECIFIED HEPATIC CIRRHOSIS TYPE (HCC): ICD-10-CM

## 2020-08-28 RX ORDER — CARVEDILOL 6.25 MG/1
6.25 TABLET ORAL 2 TIMES DAILY WITH MEALS
Qty: 60 TABLET | Refills: 0 | Status: SHIPPED | OUTPATIENT
Start: 2020-08-28 | End: 2020-09-26

## 2020-08-28 NOTE — TELEPHONE ENCOUNTER
Caller: Jordi Sam    Relationship: Self    Best call back number:     Medication needed:   Requested Prescriptions     Pending Prescriptions Disp Refills   • carvedilol (COREG) 6.25 MG tablet 60 tablet 0     Sig: Take 1 tablet by mouth 2 (Two) Times a Day With Meals.       When do you need the refill by: 09/01    What details did the patient provide when requesting the medication: WILL BE OUT NEXT WEEK    Does the patient have less than a 3 day supply:  [] Yes  [x] No    What is the patient's preferred pharmacy: [address] [phone] [facility] [phone]

## 2020-09-11 DIAGNOSIS — E78.2 MIXED HYPERLIPIDEMIA: ICD-10-CM

## 2020-09-11 RX ORDER — SIMVASTATIN 40 MG
40 TABLET ORAL DAILY
Qty: 90 TABLET | Refills: 0 | Status: SHIPPED | OUTPATIENT
Start: 2020-09-11 | End: 2020-12-09

## 2020-09-23 ENCOUNTER — TELEPHONE (OUTPATIENT)
Dept: CARDIOLOGY | Facility: HOSPITAL | Age: 85
End: 2020-09-23

## 2020-09-23 DIAGNOSIS — K74.60 CIRRHOSIS OF LIVER WITH ASCITES, UNSPECIFIED HEPATIC CIRRHOSIS TYPE (HCC): ICD-10-CM

## 2020-09-23 DIAGNOSIS — I50.812 CHRONIC RIGHT HEART FAILURE (HCC): Primary | ICD-10-CM

## 2020-09-23 DIAGNOSIS — N18.30 CKD (CHRONIC KIDNEY DISEASE) STAGE 3, GFR 30-59 ML/MIN (HCC): ICD-10-CM

## 2020-09-23 DIAGNOSIS — R18.8 CIRRHOSIS OF LIVER WITH ASCITES, UNSPECIFIED HEPATIC CIRRHOSIS TYPE (HCC): ICD-10-CM

## 2020-09-23 NOTE — TELEPHONE ENCOUNTER
Will order labs    Also schedule patient a f/u within the next 4-6 weeks.  He has not been seen in [facility] for >6 months.

## 2020-09-23 NOTE — TELEPHONE ENCOUNTER
Patient requesting lab work for his paracentesis. He hasn't set the appointment yet but needs to soon.

## 2020-09-24 ENCOUNTER — LAB (OUTPATIENT)
Dept: LAB | Facility: HOSPITAL | Age: 85
End: 2020-09-24

## 2020-09-24 DIAGNOSIS — N18.30 CKD (CHRONIC KIDNEY DISEASE) STAGE 3, GFR 30-59 ML/MIN (HCC): ICD-10-CM

## 2020-09-24 DIAGNOSIS — R18.8 CIRRHOSIS OF LIVER WITH ASCITES, UNSPECIFIED HEPATIC CIRRHOSIS TYPE (HCC): ICD-10-CM

## 2020-09-24 DIAGNOSIS — K74.60 CIRRHOSIS OF LIVER WITH ASCITES, UNSPECIFIED HEPATIC CIRRHOSIS TYPE (HCC): ICD-10-CM

## 2020-09-24 DIAGNOSIS — I50.812 CHRONIC RIGHT HEART FAILURE (HCC): ICD-10-CM

## 2020-09-24 LAB
ALBUMIN SERPL-MCNC: 3.5 G/DL (ref 3.5–5.2)
ALBUMIN/GLOB SERPL: 1.5 G/DL
ALP SERPL-CCNC: 78 U/L (ref 39–117)
ALT SERPL W P-5'-P-CCNC: 10 U/L (ref 1–41)
ANION GAP SERPL CALCULATED.3IONS-SCNC: 7 MMOL/L (ref 5–15)
AST SERPL-CCNC: 19 U/L (ref 1–40)
BASOPHILS # BLD AUTO: 0.04 10*3/MM3 (ref 0–0.2)
BASOPHILS NFR BLD AUTO: 0.8 % (ref 0–1.5)
BILIRUB SERPL-MCNC: 0.5 MG/DL (ref 0–1.2)
BUN SERPL-MCNC: 26 MG/DL (ref 8–23)
BUN/CREAT SERPL: 21.3 (ref 7–25)
CALCIUM SPEC-SCNC: 8.7 MG/DL (ref 8.6–10.5)
CHLORIDE SERPL-SCNC: 103 MMOL/L (ref 98–107)
CO2 SERPL-SCNC: 28 MMOL/L (ref 22–29)
CREAT SERPL-MCNC: 1.22 MG/DL (ref 0.76–1.27)
DEPRECATED RDW RBC AUTO: 47.8 FL (ref 37–54)
EOSINOPHIL # BLD AUTO: 0.1 10*3/MM3 (ref 0–0.4)
EOSINOPHIL NFR BLD AUTO: 2 % (ref 0.3–6.2)
ERYTHROCYTE [DISTWIDTH] IN BLOOD BY AUTOMATED COUNT: 13.4 % (ref 12.3–15.4)
GFR SERPL CREATININE-BSD FRML MDRD: 56 ML/MIN/1.73
GLOBULIN UR ELPH-MCNC: 2.4 GM/DL
GLUCOSE SERPL-MCNC: 108 MG/DL (ref 65–99)
HCT VFR BLD AUTO: 37.7 % (ref 37.5–51)
HGB BLD-MCNC: 12.2 G/DL (ref 13–17.7)
IMM GRANULOCYTES # BLD AUTO: 0 10*3/MM3 (ref 0–0.05)
IMM GRANULOCYTES NFR BLD AUTO: 0 % (ref 0–0.5)
LYMPHOCYTES # BLD AUTO: 0.85 10*3/MM3 (ref 0.7–3.1)
LYMPHOCYTES NFR BLD AUTO: 17 % (ref 19.6–45.3)
MCH RBC QN AUTO: 31.4 PG (ref 26.6–33)
MCHC RBC AUTO-ENTMCNC: 32.4 G/DL (ref 31.5–35.7)
MCV RBC AUTO: 96.9 FL (ref 79–97)
MONOCYTES # BLD AUTO: 0.45 10*3/MM3 (ref 0.1–0.9)
MONOCYTES NFR BLD AUTO: 9 % (ref 5–12)
NEUTROPHILS NFR BLD AUTO: 3.56 10*3/MM3 (ref 1.7–7)
NEUTROPHILS NFR BLD AUTO: 71.2 % (ref 42.7–76)
NRBC BLD AUTO-RTO: 0 /100 WBC (ref 0–0.2)
PLATELET # BLD AUTO: 116 10*3/MM3 (ref 140–450)
PMV BLD AUTO: 11.3 FL (ref 6–12)
POTASSIUM SERPL-SCNC: 4.1 MMOL/L (ref 3.5–5.2)
PROT SERPL-MCNC: 5.9 G/DL (ref 6–8.5)
RBC # BLD AUTO: 3.89 10*6/MM3 (ref 4.14–5.8)
SODIUM SERPL-SCNC: 138 MMOL/L (ref 136–145)
WBC # BLD AUTO: 5 10*3/MM3 (ref 3.4–10.8)

## 2020-09-24 PROCEDURE — 36415 COLL VENOUS BLD VENIPUNCTURE: CPT

## 2020-09-24 PROCEDURE — 80053 COMPREHEN METABOLIC PANEL: CPT

## 2020-09-24 PROCEDURE — 85025 COMPLETE CBC W/AUTO DIFF WBC: CPT

## 2020-09-25 ENCOUNTER — RESULTS ENCOUNTER (OUTPATIENT)
Dept: OTHER | Facility: HOSPITAL | Age: 85
End: 2020-09-25

## 2020-09-25 ENCOUNTER — TELEPHONE (OUTPATIENT)
Dept: GASTROENTEROLOGY | Facility: CLINIC | Age: 85
End: 2020-09-25

## 2020-09-25 DIAGNOSIS — K74.60 CIRRHOSIS OF LIVER WITH ASCITES, UNSPECIFIED HEPATIC CIRRHOSIS TYPE (HCC): ICD-10-CM

## 2020-09-25 DIAGNOSIS — R18.8 CIRRHOSIS OF LIVER WITH ASCITES, UNSPECIFIED HEPATIC CIRRHOSIS TYPE (HCC): ICD-10-CM

## 2020-09-25 NOTE — TELEPHONE ENCOUNTER
Patient called in for scheduling of Paracentesis. Patient was left voicemail with appt scheduled for 9/30/20 @ 8:15 with a arrival of 7:45am for Paracentesis at [facility]

## 2020-09-26 RX ORDER — CARVEDILOL 6.25 MG/1
TABLET ORAL
Qty: 60 TABLET | Refills: 2 | Status: SHIPPED | OUTPATIENT
Start: 2020-09-26 | End: 2020-12-18 | Stop reason: SDUPTHER

## 2020-09-30 ENCOUNTER — HOSPITAL ENCOUNTER (OUTPATIENT)
Dept: CT IMAGING | Facility: HOSPITAL | Age: 85
Discharge: HOME | End: 2020-09-30
Admitting: RADIOLOGY

## 2020-09-30 VITALS
RESPIRATION RATE: 18 BRPM | SYSTOLIC BLOOD PRESSURE: 109 MMHG | HEART RATE: 61 BPM | BODY MASS INDEX: 28.46 KG/M2 | TEMPERATURE: 97.7 F | WEIGHT: 203.3 LBS | HEIGHT: 71 IN | DIASTOLIC BLOOD PRESSURE: 49 MMHG | OXYGEN SATURATION: 100 %

## 2020-09-30 LAB
APTT PPP: 36.6 SECONDS (ref 24–37)
INR PPP: 1.16 (ref 0.85–1.16)
PLATELET # BLD AUTO: 124 10*3/MM3 (ref 140–450)
PROTHROMBIN TIME: 14.6 SECONDS (ref 11.5–14)

## 2020-09-30 PROCEDURE — 85730 THROMBOPLASTIN TIME PARTIAL: CPT | Performed by: RADIOLOGY

## 2020-09-30 PROCEDURE — P9047 ALBUMIN (HUMAN), 25%, 50ML: HCPCS | Performed by: NURSE PRACTITIONER

## 2020-09-30 PROCEDURE — C1729 CATH, DRAINAGE: HCPCS

## 2020-09-30 PROCEDURE — 75989 ABSCESS DRAINAGE UNDER X-RAY: CPT

## 2020-09-30 PROCEDURE — 25010000003 LIDOCAINE 1 % SOLUTION: Performed by: RADIOLOGY

## 2020-09-30 PROCEDURE — 85049 AUTOMATED PLATELET COUNT: CPT | Performed by: RADIOLOGY

## 2020-09-30 PROCEDURE — 25010000002 ALBUMIN HUMAN 25% PER 50 ML: Performed by: NURSE PRACTITIONER

## 2020-09-30 PROCEDURE — 85610 PROTHROMBIN TIME: CPT | Performed by: RADIOLOGY

## 2020-09-30 RX ORDER — SODIUM CHLORIDE 0.9 % (FLUSH) 0.9 %
10 SYRINGE (ML) INJECTION AS NEEDED
Status: DISCONTINUED | OUTPATIENT
Start: 2020-09-30 | End: 2020-10-01 | Stop reason: HOSPADM

## 2020-09-30 RX ORDER — LIDOCAINE HYDROCHLORIDE 10 MG/ML
20 INJECTION, SOLUTION INFILTRATION; PERINEURAL ONCE
Status: COMPLETED | OUTPATIENT
Start: 2020-09-30 | End: 2020-09-30

## 2020-09-30 RX ORDER — SODIUM CHLORIDE 0.9 % (FLUSH) 0.9 %
3 SYRINGE (ML) INJECTION EVERY 12 HOURS SCHEDULED
Status: DISCONTINUED | OUTPATIENT
Start: 2020-09-30 | End: 2020-10-01 | Stop reason: HOSPADM

## 2020-09-30 RX ORDER — ALBUMIN (HUMAN) 12.5 G/50ML
12.5 SOLUTION INTRAVENOUS ONCE
Status: COMPLETED | OUTPATIENT
Start: 2020-09-30 | End: 2020-09-30

## 2020-09-30 RX ADMIN — LIDOCAINE HYDROCHLORIDE 20 ML: 10 INJECTION, SOLUTION INFILTRATION; PERINEURAL at 10:48

## 2020-09-30 RX ADMIN — ALBUMIN HUMAN 6.25 G: 0.25 SOLUTION INTRAVENOUS at 12:00

## 2020-10-01 ENCOUNTER — TELEPHONE (OUTPATIENT)
Dept: INFUSION THERAPY | Facility: HOSPITAL | Age: 85
End: 2020-10-01

## 2020-10-01 NOTE — POST-PROCEDURE NOTE
Radiology Procedure    Pre-procedure: Informed written consent was obtained. Mr. Sam agrees to proceed with CT guided paracentesis.                  Procedure Performed: CT guided paracentesis     IV Sedation and/or Anesthesia:  No    Complications: None.     Preliminary Findings: 5.4 liters thin light yellow ascites    Specimen Removed: As above.     Estimated Blood Loss:  0ml    Post-Procedure Diagnosis: Ascites     Post-Procedure Plan: Observe 1 hour and d/c.     Standard Discharge Instructions Given:yes     David Monge MD  09/30/20  20:29 EDT

## 2020-10-20 RX ORDER — FUROSEMIDE 40 MG/1
TABLET ORAL
Qty: 180 TABLET | Refills: 1 | Status: SHIPPED | OUTPATIENT
Start: 2020-10-20 | End: 2021-06-14

## 2020-10-20 NOTE — TELEPHONE ENCOUNTER
Lab Results   Component Value Date    GLUCOSE 108 (H) 09/24/2020    CALCIUM 8.7 09/24/2020     09/24/2020    K 4.1 09/24/2020    CO2 28.0 09/24/2020     09/24/2020    BUN 26 (H) 09/24/2020    CREATININE 1.22 09/24/2020    EGFRIFNONA 56 (L) 09/24/2020    BCR 21.3 09/24/2020    ANIONGAP 7.0 09/24/2020    next appt 1/2020

## 2020-10-23 ENCOUNTER — RESULTS ENCOUNTER (OUTPATIENT)
Dept: OTHER | Facility: HOSPITAL | Age: 85
End: 2020-10-23

## 2020-10-23 DIAGNOSIS — K74.60 CIRRHOSIS OF LIVER WITH ASCITES, UNSPECIFIED HEPATIC CIRRHOSIS TYPE (HCC): ICD-10-CM

## 2020-10-23 DIAGNOSIS — R18.8 CIRRHOSIS OF LIVER WITH ASCITES, UNSPECIFIED HEPATIC CIRRHOSIS TYPE (HCC): ICD-10-CM

## 2020-10-28 NOTE — PROGRESS NOTES
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 male.      Subjective:     Chief Complaint:  Congestive Heart Failure and Establish Care       HPI     86-year-old male with CAD, right-sided heart failure with cirrhosis and ascites.  Periodic paracentesis followed by GI. Chronic Afib  Could not tolerate ACE, ARB, Arni due to hypotension and worsening kidney function.  Currently on spironolactone. lasix.    Report report he has done much better about his bowel obstruction.  Still requiring paracentesis every 4 weeks. Reports baseline dyspnea and PND.  Will sleep sitting up in chair.  Moderate fatigue.  Average appetite.  Denies Cp, pressure, palpitations, dizziness, syncope.  Mild lower extremity edema.     Patient Active Problem List    Diagnosis Date Noted   • S/P umbilical hernia repair, follow-up exam 08/10/2020   • End stage liver disease (CMS/HCC) 11/08/2019   • Stage 3 chronic kidney disease 07/26/2019   • Cirrhosis of liver with ascites (CMS/HCC) 07/11/2019   • Other ascites 06/25/2019     Note Last Updated: 8/23/2019     · CT-guided paracentesis 5/1/2019 (5.2 L removed)  · CT-guided paracentesis 6/17/2019 (5.6 L removed)  · CT-Guided paracentesis 8/23/2019: 5700 mL removed     • Chronic right heart failure (CMS/HCC) 06/06/2019   • Pleural effusion 04/08/2019     Note Last Updated: 4/8/2019     Added automatically from request for surgery [id number]     • Shortness of breath 03/05/2019   • Chronic atrial fibrillation (CMS/HCC) 03/01/2019   • Nephrolithiasis    • Controlled type 2 diabetes mellitus without complication, without long-term current use of insulin (CMS/HCC) 08/30/2016   • Coronary arteriosclerosis in native artery 05/16/2016     Note Last Updated: 9/30/2016     1. Coronary artery disease:  a. Acute inferior STEMI with RV infarct  features, January 2009.   b. Normal LV function, single vessel RCA disease.     c. Sirolimus eluting stents to the RCA, 01/09/2009.   d. Pericarditis, resolved.   e. [facility], 01/13/2012 (for recurrent angina).   i.  Normal LV function.  ii.  of RCA with failed intervention.       • Panlobular emphysema (CMS/HCC) 05/16/2016   • Functional diarrhea 05/16/2016   • Mixed hyperlipidemia 05/16/2016   • Essential hypertension 05/16/2016         Past Surgical History:   Procedure Laterality Date   • BRONCHOSCOPY N/A 4/12/2019    Procedure: BRONCHOSCOPY WITH THORACENTESIS;  Surgeon: Marciano Higginbotham MD;  Location:  [facility];  Service: Pulmonary   • CARDIAC CATHETERIZATION Bilateral 1/30/2019    Procedure: Right and Left Heart Cath;  Surgeon: Efrem Posadas MD;  Location:  [facility];  Service: Cardiology   • CARDIAC CATHETERIZATION     • CHOLECYSTECTOMY     • CORONARY ANGIOPLASTY WITH STENT PLACEMENT  01/09/2009    Sirolimus eluting stents to the RCA, 01/09/2009.    • HERNIA REPAIR      Hernia repair x 2.    • PARACENTESIS  06/17/2019   • UMBILICAL HERNIA REPAIR N/A 7/26/2020    Procedure: UMBILICAL HERNIA REPAIR;  Surgeon: Maribell Her MD;  Location:  [facility] OR;  Service: General;  Laterality: N/A;       Allergies   Allergen Reactions   • Bee Venom Swelling   • Sulfamethoxazole-Trimethoprim Other (See Comments)     Pt unaware         Current Outpatient Medications:   •  apixaban (ELIQUIS) 2.5 MG tablet tablet, Take 1 tablet by mouth 2 (Two) Times a Day., Disp: 60 tablet, Rfl: 6  •  carvedilol (COREG) 6.25 MG tablet, TAKE 1 TABLET BY MOUTH TWICE DAILY WITH MEALS, Disp: 60 tablet, Rfl: 2  •  folic acid (FOLVITE) 400 MCG tablet, Take 400 mcg by mouth Daily., Disp: , Rfl:   •  furosemide (LASIX) 40 MG tablet, Take 1 tablet by mouth twice daily, Disp: 180 tablet, Rfl: 1  •  magnesium hydroxide (MILK OF MAGNESIA) 2400 MG/10ML suspension suspension, Take 10 mL by mouth Daily As Needed  "(Constipation)., Disp: 300 mL, Rfl: 0  •  nitroglycerin (NITROSTAT) 0.4 MG SL tablet, DISSOLVE ONE TABLET UNDER THE TONGUE EVERY 5 MINUTES AS NEEDED FOR CHEST PAIN.  DO NOT EXCEED A TOTAL OF 3 DOSES IN 15 MINUTES, Disp: 25 tablet, Rfl: 3  •  simvastatin (ZOCOR) 40 MG tablet, Take 1 tablet by mouth Daily., Disp: 90 tablet, Rfl: 0  •  spironolactone (ALDACTONE) 25 MG tablet, Take 2 tablets by mouth Daily., Disp: 180 tablet, Rfl: 1  •  tamsulosin (FLOMAX) 0.4 MG capsule 24 hr capsule, Take 1 capsule by mouth Daily., Disp: , Rfl:   •  aspirin 81 MG tablet, Take 81 mg by mouth Daily., Disp: , Rfl:   •  docusate sodium 100 MG capsule, Take 100 mg by mouth 2 (Two) Times a Day., Disp: 60 each, Rfl: 0    The following portions of the patient's history were reviewed and updated today during office visit as appropriate: allergies, current medications, past family history, past medical history, past social history, past surgical history and problem list.    Review of Systems   Constitution: Positive for weight gain.   Cardiovascular: Positive for dyspnea on exertion and leg swelling. Negative for chest pain, near-syncope, orthopnea, palpitations, paroxysmal nocturnal dyspnea and syncope.   Respiratory: Positive for shortness of breath.    Gastrointestinal: Positive for bloating. Negative for abdominal pain.   Neurological: Positive for weakness. Negative for dizziness, light-headedness, loss of balance and numbness.   All other systems reviewed and are negative.      Objective:     Vitals:    10/29/20 1107   BP: 110/58   BP Location: Left arm   Patient Position: Sitting   Cuff Size: Adult   Pulse: 61   Resp: 14   Temp: 97.5 °F (36.4 °C)   SpO2: 98%   Weight: 92.5 kg (204 lb)   Height: 180.3 cm (71\")       Body mass index is 28.45 kg/m².      Vitals signs reviewed.   Constitutional:       General: Not in acute distress.     Appearance: Well-developed and not in distress. Chronically ill-appearing.   Eyes:      General: No " scleral icterus.     Conjunctiva/sclera: Conjunctivae normal.   HENT:      Head: Normocephalic and atraumatic.   Neck:      Thyroid: No thyromegaly.      Vascular: No carotid bruit, hepatojugular reflux or JVD.      Trachea: No tracheal deviation.      Lymphadenopathy: No cervical adenopathy.   Pulmonary:      Effort: Pulmonary effort is normal.      Breath sounds: Normal breath sounds.   Cardiovascular:      Normal rate. Irregular rhythm.   Pulses:     Intact distal pulses.   Edema:     Peripheral edema (1+ bilateral edema) present.  Abdominal:      General: Bowel sounds are normal. There is distension.      Tenderness: There is no abdominal tenderness.   Skin:     General: Skin is warm and dry. There is no cyanosis.      Coloration: Skin is not pale.      Findings: No erythema or rash.   Neurological:      Mental Status: Alert, oriented to person, place, and time and oriented to person, place and time.   Psychiatric:         Behavior: Behavior normal. Behavior is cooperative.         Thought Content: Thought content normal.         Lab and Diagnostic Review:  Lab Results   Component Value Date    WBC 5.00 09/24/2020    HGB 12.2 (L) 09/24/2020    HCT 37.7 09/24/2020    MCV 96.9 09/24/2020     (L) 09/30/2020     Lab Results   Component Value Date    GLUCOSE 108 (H) 09/24/2020    BUN 26 (H) 09/24/2020    CREATININE 1.22 09/24/2020    EGFRIFNONA 56 (L) 09/24/2020    BCR 21.3 09/24/2020    K 4.1 09/24/2020    CO2 28.0 09/24/2020    CALCIUM 8.7 09/24/2020    ALBUMIN 3.50 09/24/2020    AST 19 09/24/2020    ALT 10 09/24/2020       Assessment and Plan:         1. Chronic right heart failure (CMS/HCC)  EF 60% 01/22/19    - Comprehensive Metabolic Panel; Standing  - CBC & Differential; Standing    Currently on spironolactone and Lasix  2. Coronary arteriosclerosis in native artery  Asa, statin  No CP    3. Chronic atrial fibrillation (CMS/HCC)  Rate controlled with carvedilol  Hold eliquis 3 days prior to  paracentesis.  - ECG 12 Lead; afib 65 bpm    - apixaban (ELIQUIS) 2.5 MG tablet tablet; Take 1 tablet by mouth 2 (Two) Times a Day.  Dispense: 60 tablet; Refill: 6    F/u 6 months or sooner if needed.     *Please note that portions of this note were completed with a voice recognition program. Efforts were made to edit the dictations, but occasionally words are mistranscribed.

## 2020-10-29 ENCOUNTER — OFFICE VISIT (OUTPATIENT)
Dept: CARDIOLOGY | Facility: HOSPITAL | Age: 85
End: 2020-10-29

## 2020-10-29 ENCOUNTER — HOSPITAL ENCOUNTER (OUTPATIENT)
Dept: CARDIOLOGY | Facility: HOSPITAL | Age: 85
Discharge: HOME | End: 2020-10-29

## 2020-10-29 VITALS
RESPIRATION RATE: 14 BRPM | WEIGHT: 204 LBS | HEART RATE: 61 BPM | SYSTOLIC BLOOD PRESSURE: 110 MMHG | DIASTOLIC BLOOD PRESSURE: 58 MMHG | OXYGEN SATURATION: 98 % | HEIGHT: 71 IN | TEMPERATURE: 97.5 F | BODY MASS INDEX: 28.56 KG/M2

## 2020-10-29 DIAGNOSIS — I48.20 CHRONIC ATRIAL FIBRILLATION (HCC): ICD-10-CM

## 2020-10-29 DIAGNOSIS — I25.10 CORONARY ARTERIOSCLEROSIS IN NATIVE ARTERY: ICD-10-CM

## 2020-10-29 DIAGNOSIS — I50.812 CHRONIC RIGHT HEART FAILURE (HCC): Primary | ICD-10-CM

## 2020-10-29 LAB
QT INTERVAL: 444 MS
QTC INTERVAL: 461 MS

## 2020-10-29 PROCEDURE — 93005 ELECTROCARDIOGRAM TRACING: CPT | Performed by: NURSE PRACTITIONER

## 2020-10-29 PROCEDURE — 93010 ELECTROCARDIOGRAM REPORT: CPT | Performed by: INTERNAL MEDICINE

## 2020-10-29 PROCEDURE — 99214 OFFICE O/P EST MOD 30 MIN: CPT | Performed by: NURSE PRACTITIONER

## 2020-10-30 ENCOUNTER — LAB (OUTPATIENT)
Dept: LAB | Facility: HOSPITAL | Age: 85
End: 2020-10-30

## 2020-10-30 DIAGNOSIS — I50.812 CHRONIC RIGHT HEART FAILURE (HCC): ICD-10-CM

## 2020-10-30 LAB
ALBUMIN SERPL-MCNC: 3.9 G/DL (ref 3.5–5.2)
ALBUMIN/GLOB SERPL: 1.8 G/DL
ALP SERPL-CCNC: 75 U/L (ref 39–117)
ALT SERPL W P-5'-P-CCNC: 9 U/L (ref 1–41)
ANION GAP SERPL CALCULATED.3IONS-SCNC: 8.8 MMOL/L (ref 5–15)
AST SERPL-CCNC: 16 U/L (ref 1–40)
BASOPHILS # BLD AUTO: 0.04 10*3/MM3 (ref 0–0.2)
BASOPHILS NFR BLD AUTO: 0.7 % (ref 0–1.5)
BILIRUB SERPL-MCNC: 0.5 MG/DL (ref 0–1.2)
BUN SERPL-MCNC: 30 MG/DL (ref 8–23)
BUN/CREAT SERPL: 18.8 (ref 7–25)
CALCIUM SPEC-SCNC: 9.2 MG/DL (ref 8.6–10.5)
CHLORIDE SERPL-SCNC: 101 MMOL/L (ref 98–107)
CO2 SERPL-SCNC: 28.2 MMOL/L (ref 22–29)
CREAT SERPL-MCNC: 1.6 MG/DL (ref 0.76–1.27)
DEPRECATED RDW RBC AUTO: 43.6 FL (ref 37–54)
EOSINOPHIL # BLD AUTO: 0.08 10*3/MM3 (ref 0–0.4)
EOSINOPHIL NFR BLD AUTO: 1.5 % (ref 0.3–6.2)
ERYTHROCYTE [DISTWIDTH] IN BLOOD BY AUTOMATED COUNT: 13 % (ref 12.3–15.4)
GFR SERPL CREATININE-BSD FRML MDRD: 41 ML/MIN/1.73
GLOBULIN UR ELPH-MCNC: 2.2 GM/DL
GLUCOSE SERPL-MCNC: 129 MG/DL (ref 65–99)
HCT VFR BLD AUTO: 38.2 % (ref 37.5–51)
HGB BLD-MCNC: 12.8 G/DL (ref 13–17.7)
IMM GRANULOCYTES # BLD AUTO: 0.02 10*3/MM3 (ref 0–0.05)
IMM GRANULOCYTES NFR BLD AUTO: 0.4 % (ref 0–0.5)
LYMPHOCYTES # BLD AUTO: 0.74 10*3/MM3 (ref 0.7–3.1)
LYMPHOCYTES NFR BLD AUTO: 13.8 % (ref 19.6–45.3)
MCH RBC QN AUTO: 30.7 PG (ref 26.6–33)
MCHC RBC AUTO-ENTMCNC: 33.5 G/DL (ref 31.5–35.7)
MCV RBC AUTO: 91.6 FL (ref 79–97)
MONOCYTES # BLD AUTO: 0.43 10*3/MM3 (ref 0.1–0.9)
MONOCYTES NFR BLD AUTO: 8 % (ref 5–12)
NEUTROPHILS NFR BLD AUTO: 4.07 10*3/MM3 (ref 1.7–7)
NEUTROPHILS NFR BLD AUTO: 75.6 % (ref 42.7–76)
NRBC BLD AUTO-RTO: 0 /100 WBC (ref 0–0.2)
PLATELET # BLD AUTO: 129 10*3/MM3 (ref 140–450)
PMV BLD AUTO: 11 FL (ref 6–12)
POTASSIUM SERPL-SCNC: 4.4 MMOL/L (ref 3.5–5.2)
PROT SERPL-MCNC: 6.1 G/DL (ref 6–8.5)
RBC # BLD AUTO: 4.17 10*6/MM3 (ref 4.14–5.8)
SODIUM SERPL-SCNC: 138 MMOL/L (ref 136–145)
WBC # BLD AUTO: 5.38 10*3/MM3 (ref 3.4–10.8)

## 2020-10-30 PROCEDURE — 36415 COLL VENOUS BLD VENIPUNCTURE: CPT

## 2020-10-30 PROCEDURE — 80053 COMPREHEN METABOLIC PANEL: CPT

## 2020-10-30 PROCEDURE — 85025 COMPLETE CBC W/AUTO DIFF WBC: CPT

## 2020-11-03 ENCOUNTER — TELEPHONE (OUTPATIENT)
Dept: CARDIOLOGY | Facility: HOSPITAL | Age: 85
End: 2020-11-03

## 2020-11-03 DIAGNOSIS — N18.30 STAGE 3 CHRONIC KIDNEY DISEASE, UNSPECIFIED WHETHER STAGE 3A OR 3B CKD (HCC): Primary | ICD-10-CM

## 2020-11-03 NOTE — TELEPHONE ENCOUNTER
Called to review lab results      Worsening kidney function with scheduled paracentesis.    Historically kidney function had worsened right before paracentesis and improved after.  Had paracentesis as scheduled.  Repeat kidney function in 2 weeks.

## 2020-11-11 ENCOUNTER — HOSPITAL ENCOUNTER (OUTPATIENT)
Dept: INTERVENTIONAL RADIOLOGY/VASCULAR | Facility: HOSPITAL | Age: 85
Discharge: HOME | End: 2020-11-11
Admitting: RADIOLOGY

## 2020-11-11 VITALS
OXYGEN SATURATION: 94 % | BODY MASS INDEX: 28.56 KG/M2 | HEIGHT: 71 IN | WEIGHT: 204 LBS | DIASTOLIC BLOOD PRESSURE: 78 MMHG | RESPIRATION RATE: 16 BRPM | SYSTOLIC BLOOD PRESSURE: 110 MMHG | HEART RATE: 80 BPM | TEMPERATURE: 97.1 F

## 2020-11-11 DIAGNOSIS — K74.60 CIRRHOSIS OF LIVER WITH ASCITES, UNSPECIFIED HEPATIC CIRRHOSIS TYPE (HCC): ICD-10-CM

## 2020-11-11 DIAGNOSIS — R18.8 CIRRHOSIS OF LIVER WITH ASCITES, UNSPECIFIED HEPATIC CIRRHOSIS TYPE (HCC): ICD-10-CM

## 2020-11-11 PROCEDURE — 76942 ECHO GUIDE FOR BIOPSY: CPT

## 2020-11-11 PROCEDURE — C1729 CATH, DRAINAGE: HCPCS

## 2020-11-11 RX ORDER — LIDOCAINE HYDROCHLORIDE 10 MG/ML
6 INJECTION, SOLUTION EPIDURAL; INFILTRATION; INTRACAUDAL; PERINEURAL ONCE
Status: COMPLETED | OUTPATIENT
Start: 2020-11-11 | End: 2020-11-11

## 2020-11-11 RX ORDER — SODIUM CHLORIDE 0.9 % (FLUSH) 0.9 %
3 SYRINGE (ML) INJECTION EVERY 12 HOURS SCHEDULED
Status: DISCONTINUED | OUTPATIENT
Start: 2020-11-11 | End: 2020-11-12 | Stop reason: HOSPADM

## 2020-11-11 RX ADMIN — LIDOCAINE HYDROCHLORIDE 6 ML: 10 INJECTION, SOLUTION EPIDURAL; INFILTRATION; INTRACAUDAL; PERINEURAL at 11:35

## 2020-11-11 NOTE — POST-PROCEDURE NOTE
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  Assistants: None     Sedation: None     Estimated Blood Loss (EBL): Trace      Urine Output (UOP): N/A (short procedure)     IVF: N/A (short procedure)     Findings: Small volume ascites     Specimens: 4650 mL serous fluid.      Complications: No immediate     Disposition: Recovery. Stable

## 2020-11-12 ENCOUNTER — TELEPHONE (OUTPATIENT)
Dept: INFUSION THERAPY | Facility: HOSPITAL | Age: 85
End: 2020-11-12

## 2020-11-17 ENCOUNTER — OFFICE VISIT (OUTPATIENT)
Dept: FAMILY MEDICINE CLINIC | Facility: CLINIC | Age: 85
End: 2020-11-17

## 2020-11-17 VITALS
BODY MASS INDEX: 27.44 KG/M2 | SYSTOLIC BLOOD PRESSURE: 110 MMHG | DIASTOLIC BLOOD PRESSURE: 62 MMHG | HEIGHT: 71 IN | WEIGHT: 196 LBS

## 2020-11-17 DIAGNOSIS — I10 ESSENTIAL HYPERTENSION: ICD-10-CM

## 2020-11-17 DIAGNOSIS — R18.8 CIRRHOSIS OF LIVER WITH ASCITES, UNSPECIFIED HEPATIC CIRRHOSIS TYPE (HCC): ICD-10-CM

## 2020-11-17 DIAGNOSIS — C44.90 SKIN CANCER: Primary | ICD-10-CM

## 2020-11-17 DIAGNOSIS — K59.01 SLOW TRANSIT CONSTIPATION: ICD-10-CM

## 2020-11-17 DIAGNOSIS — K74.60 CIRRHOSIS OF LIVER WITH ASCITES, UNSPECIFIED HEPATIC CIRRHOSIS TYPE (HCC): ICD-10-CM

## 2020-11-17 DIAGNOSIS — E11.9 TYPE 2 DIABETES MELLITUS WITHOUT COMPLICATION, WITHOUT LONG-TERM CURRENT USE OF INSULIN (HCC): ICD-10-CM

## 2020-11-17 PROBLEM — K59.00 CONSTIPATION: Status: ACTIVE | Noted: 2020-11-17

## 2020-11-17 PROCEDURE — 99214 OFFICE O/P EST MOD 30 MIN: CPT | Performed by: INTERNAL MEDICINE

## 2020-11-17 PROCEDURE — 90694 VACC AIIV4 NO PRSRV 0.5ML IM: CPT | Performed by: INTERNAL MEDICINE

## 2020-11-17 PROCEDURE — G0008 ADMIN INFLUENZA VIRUS VAC: HCPCS | Performed by: INTERNAL MEDICINE

## 2020-11-17 RX ORDER — POLYETHYLENE GLYCOL 3350 17 G/17G
17 POWDER, FOR SOLUTION ORAL DAILY
Qty: 30 PACKET | Refills: 3 | Status: SHIPPED | OUTPATIENT
Start: 2020-11-17 | End: 2021-01-05

## 2020-11-17 RX ORDER — MAGNESIUM OXIDE 400 MG/1
400 TABLET ORAL DAILY
Qty: 30 TABLET | Refills: 1 | Status: SHIPPED | OUTPATIENT
Start: 2020-11-17 | End: 2021-01-05

## 2020-11-17 NOTE — PROGRESS NOTES
Jordi Sam  1934  [id number]  Patient Care Team:  Jasper Avery MD as PCP - General (Internal Medicine)  Efrem Posadas MD as Consulting Physician (Cardiology)  Jordi Law MD as Consulting Physician (Urology)  Chapin Villagran MD as Consulting Physician (Otolaryngology)  Gen Laurent MD as Consulting Physician (Gastroenterology)    Jordi Sam is a 86 y.o. male here today for follow up.     This patient is accompanied by their   who contributes to the history of their care.    Chief Complaint:    Chief Complaint   Patient presents with   • Follow-up   • Hypertension   • Diabetes        History of Present Illness:  I have reviewed and/or updated the patient's past medical, past surgical, family, social history, problem list and allergies as appropriate.  He returns for evaluation.  He has chronic heart failure with end-stage liver disease and cirrhosis secondary to congestive hepatopathy.  He just recently underwent an ultrasound guided paracentesis with return of 4 L.  He is breathing much better.  He still having a little oozing of blood from the puncture site.  He has been placing Bactroban and a Band-Aid.  He did resume his Eliquis immediately after the procedure.  No pain no fevers or chills.    Skin right ear is rough and irritated.  This is in the preauricular region.  It does not bleed.  He is not tried any creams or lotions.  He is concerned about the possibility of skin cancer.  Additionally he is had issues chronically with his bowels.  Constipation/ for days on end, then liquid stool. No blood, abd pain nausea or vomiitng he is not trying anything over-the-counter for her bowels.  Sometimes he has extremely hard bowel movements.    Denies any chest pain he has chronic orthopnea some PND.  No lower extremity edema.    Checks his sugars the highest is typically 126.  Denies any excessive thirst or excessive urination.      Review of Systems:    Review of Systems  "  Constitutional: Negative.    HENT: Negative.    Eyes: Negative.    Respiratory: Positive for shortness of breath.    Cardiovascular: Negative for chest pain, palpitations and leg swelling.   Gastrointestinal: Positive for constipation and diarrhea.   Endocrine: Negative for cold intolerance, heat intolerance, polydipsia, polyphagia and polyuria.   Skin: Positive for dry skin and skin lesions.   Neurological: Negative.    Hematological: Bruises/bleeds easily.       Vitals:    11/17/20 1118   BP: 110/62   BP Location: Right arm   Patient Position: Sitting   Cuff Size: Adult   Weight: 88.9 kg (196 lb)   Height: 180.3 cm (71\")     Body mass index is 27.34 kg/m².      Current Outpatient Medications:   •  apixaban (ELIQUIS) 2.5 MG tablet tablet, Take 1 tablet by mouth 2 (Two) Times a Day., Disp: 60 tablet, Rfl: 6  •  carvedilol (COREG) 6.25 MG tablet, TAKE 1 TABLET BY MOUTH TWICE DAILY WITH MEALS, Disp: 60 tablet, Rfl: 2  •  folic acid (FOLVITE) 400 MCG tablet, Take 400 mcg by mouth Daily., Disp: , Rfl:   •  furosemide (LASIX) 40 MG tablet, Take 1 tablet by mouth twice daily, Disp: 180 tablet, Rfl: 1  •  nitroglycerin (NITROSTAT) 0.4 MG SL tablet, DISSOLVE ONE TABLET UNDER THE TONGUE EVERY 5 MINUTES AS NEEDED FOR CHEST PAIN.  DO NOT EXCEED A TOTAL OF 3 DOSES IN 15 MINUTES, Disp: 25 tablet, Rfl: 3  •  simvastatin (ZOCOR) 40 MG tablet, Take 1 tablet by mouth Daily., Disp: 90 tablet, Rfl: 0  •  spironolactone (ALDACTONE) 25 MG tablet, Take 2 tablets by mouth Daily., Disp: 180 tablet, Rfl: 1  •  tamsulosin (FLOMAX) 0.4 MG capsule 24 hr capsule, Take 1 capsule by mouth Daily., Disp: , Rfl:   •  magnesium oxide (MAG-OX) 400 MG tablet, Take 1 tablet by mouth Daily., Disp: 30 tablet, Rfl: 1  •  polyethylene glycol (MIRALAX) 17 g packet, Take 17 g by mouth Daily., Disp: 30 packet, Rfl: 3    Physical Exam:    Physical Exam  Vitals signs and nursing note reviewed.   Constitutional:       General: He is not in acute distress.     " Appearance: He is well-developed. He is not diaphoretic.      Comments: Chronically ill-appearing however in good spirits   HENT:      Head: Normocephalic and atraumatic.      Right Ear: Tympanic membrane and external ear normal.      Left Ear: Tympanic membrane, ear canal and external ear normal.      Mouth/Throat:      Mouth: Mucous membranes are moist.      Pharynx: Oropharynx is clear. No oropharyngeal exudate or posterior oropharyngeal erythema.   Eyes:      General: No scleral icterus.        Right eye: No discharge.         Left eye: Discharge present.     Conjunctiva/sclera: Conjunctivae normal.      Comments: Anicteric   Neck:      Musculoskeletal: Normal range of motion and neck supple.      Thyroid: No thyromegaly.      Vascular: No JVD.      Trachea: No tracheal deviation.   Cardiovascular:      Rate and Rhythm: Normal rate and regular rhythm.      Pulses: Normal pulses.      Heart sounds: Normal heart sounds.      Comments: PMI nondisplaced  Pulmonary:      Effort: Pulmonary effort is normal.      Breath sounds: Normal breath sounds. No wheezing or rales.   Abdominal:      General: Bowel sounds are normal.      Palpations: Abdomen is soft. There is no mass.      Tenderness: There is no abdominal tenderness. There is no right CVA tenderness, guarding or rebound.      Comments: There is some mild oozing at the puncture site.  This was cleaned redressed with Bactroban and Band-Aid.   Musculoskeletal:      Comments: Normal gait   Lymphadenopathy:      Cervical: No cervical adenopathy.   Skin:     General: Skin is warm and dry.      Capillary Refill: Capillary refill takes less than 2 seconds.      Coloration: Skin is not pale.      Findings: No rash.      Comments: Is approximately half centimeter by quarter centimeter scaly raised lesion in the preauricular area of the right.  This most consistent with a large actinic keratosis, basal cell or squamous cell.  Numerous seborrheic keratoses and actinic  keratoses present on the face   Neurological:      Mental Status: He is alert and oriented to person, place, and time.      Cranial Nerves: No cranial nerve deficit.      Motor: No abnormal muscle tone.      Coordination: Coordination normal.   Psychiatric:         Mood and Affect: Mood normal.         Behavior: Behavior normal.         Judgment: Judgment normal.         Procedures    Results Review:    I reviewed the patient's new clinical results.    Assessment/Plan:  This gentleman is here for 3-month follow-up.  He is chronically issues with chronic systolic heart failure with congestive hepatopathy.  He is managing his shortness of breath with periodic paracenteses.  He is currently in approximately every 4 to 6 weeks he may be making these more frequent.  As far as the oozing of asked him to hold his Eliquis for 2 days continue to apply dry bandage.  Currently if worsening bleeding occurs he should seek care in the emergency room hold pressure.  His blood pressure seems under good control he watches the salt in his fluid intake given his cardiac and liver issues.  He will continue on his current medical regime.    Regarding his constipation I suspect he has overflow diarrhea.  I have placed him on daily MiraLAX and explained the rationale.    I do think he needs to see dermatology as I am concerned about the possibility of squamous cell CA of the right face.  I do think this needs to be removed.  Problem List Items Addressed This Visit        Cardiovascular and Mediastinum    Essential hypertension    Relevant Medications    spironolactone (ALDACTONE) 25 MG tablet    carvedilol (COREG) 6.25 MG tablet    furosemide (LASIX) 40 MG tablet       Digestive    Cirrhosis of liver with ascites (CMS/HCC)    Constipation       Endocrine    RESOLVED: Diabetes mellitus (CMS/HCC)    Current Assessment & Plan     Diabetes is stable and diet controlled.  Hemoglobin A1c will be checked at next visit.           Other Visit  Diagnoses     Skin cancer    -  Primary    Relevant Orders    Ambulatory Referral to Dermatology          Plan of care reviewed with patient at the conclusion of today's visit. Education was provided regarding diagnosis and management.  Patient verbalizes understanding of and agreement with management plan.    Return in about 3 months (around 2/17/2021).    Jasper Avery MD    Please note that portions of this note may have been completed with a voice recognition program. Efforts were made to edit the dictations, but occasionally words are mistranscribed.

## 2020-11-24 ENCOUNTER — LAB (OUTPATIENT)
Dept: LAB | Facility: HOSPITAL | Age: 85
End: 2020-11-24

## 2020-11-24 DIAGNOSIS — I50.812 CHRONIC RIGHT HEART FAILURE (HCC): ICD-10-CM

## 2020-11-24 PROCEDURE — 80053 COMPREHEN METABOLIC PANEL: CPT

## 2020-11-24 PROCEDURE — 85025 COMPLETE CBC W/AUTO DIFF WBC: CPT

## 2020-11-24 PROCEDURE — 36415 COLL VENOUS BLD VENIPUNCTURE: CPT

## 2020-11-25 LAB
ALBUMIN SERPL-MCNC: 3.5 G/DL (ref 3.5–5.2)
ALBUMIN/GLOB SERPL: 1.3 G/DL
ALP SERPL-CCNC: 76 U/L (ref 39–117)
ALT SERPL W P-5'-P-CCNC: 11 U/L (ref 1–41)
ANION GAP SERPL CALCULATED.3IONS-SCNC: 9.2 MMOL/L (ref 5–15)
AST SERPL-CCNC: 23 U/L (ref 1–40)
BASOPHILS # BLD AUTO: 0.04 10*3/MM3 (ref 0–0.2)
BASOPHILS NFR BLD AUTO: 0.7 % (ref 0–1.5)
BILIRUB SERPL-MCNC: 0.5 MG/DL (ref 0–1.2)
BUN SERPL-MCNC: 28 MG/DL (ref 8–23)
BUN/CREAT SERPL: 20 (ref 7–25)
CALCIUM SPEC-SCNC: 9.2 MG/DL (ref 8.6–10.5)
CHLORIDE SERPL-SCNC: 101 MMOL/L (ref 98–107)
CO2 SERPL-SCNC: 29.8 MMOL/L (ref 22–29)
CREAT SERPL-MCNC: 1.4 MG/DL (ref 0.76–1.27)
DEPRECATED RDW RBC AUTO: 44.1 FL (ref 37–54)
EOSINOPHIL # BLD AUTO: 0.12 10*3/MM3 (ref 0–0.4)
EOSINOPHIL NFR BLD AUTO: 2.2 % (ref 0.3–6.2)
ERYTHROCYTE [DISTWIDTH] IN BLOOD BY AUTOMATED COUNT: 13 % (ref 12.3–15.4)
GFR SERPL CREATININE-BSD FRML MDRD: 48 ML/MIN/1.73
GLOBULIN UR ELPH-MCNC: 2.8 GM/DL
GLUCOSE SERPL-MCNC: 124 MG/DL (ref 65–99)
HCT VFR BLD AUTO: 39.4 % (ref 37.5–51)
HGB BLD-MCNC: 12.9 G/DL (ref 13–17.7)
IMM GRANULOCYTES # BLD AUTO: 0.01 10*3/MM3 (ref 0–0.05)
IMM GRANULOCYTES NFR BLD AUTO: 0.2 % (ref 0–0.5)
LYMPHOCYTES # BLD AUTO: 0.89 10*3/MM3 (ref 0.7–3.1)
LYMPHOCYTES NFR BLD AUTO: 16 % (ref 19.6–45.3)
MCH RBC QN AUTO: 30.4 PG (ref 26.6–33)
MCHC RBC AUTO-ENTMCNC: 32.7 G/DL (ref 31.5–35.7)
MCV RBC AUTO: 92.9 FL (ref 79–97)
MONOCYTES # BLD AUTO: 0.44 10*3/MM3 (ref 0.1–0.9)
MONOCYTES NFR BLD AUTO: 7.9 % (ref 5–12)
NEUTROPHILS NFR BLD AUTO: 4.06 10*3/MM3 (ref 1.7–7)
NEUTROPHILS NFR BLD AUTO: 73 % (ref 42.7–76)
NRBC BLD AUTO-RTO: 0 /100 WBC (ref 0–0.2)
PLATELET # BLD AUTO: 134 10*3/MM3 (ref 140–450)
PMV BLD AUTO: 10.9 FL (ref 6–12)
POTASSIUM SERPL-SCNC: 4.6 MMOL/L (ref 3.5–5.2)
PROT SERPL-MCNC: 6.3 G/DL (ref 6–8.5)
RBC # BLD AUTO: 4.24 10*6/MM3 (ref 4.14–5.8)
SODIUM SERPL-SCNC: 140 MMOL/L (ref 136–145)
WBC # BLD AUTO: 5.56 10*3/MM3 (ref 3.4–10.8)

## 2020-12-07 ENCOUNTER — PREP FOR SURGERY (OUTPATIENT)
Dept: OTHER | Facility: HOSPITAL | Age: 85
End: 2020-12-07

## 2020-12-07 DIAGNOSIS — R18.8 CIRRHOSIS OF LIVER WITH ASCITES, UNSPECIFIED HEPATIC CIRRHOSIS TYPE (HCC): Primary | ICD-10-CM

## 2020-12-07 DIAGNOSIS — K74.60 CIRRHOSIS OF LIVER WITH ASCITES, UNSPECIFIED HEPATIC CIRRHOSIS TYPE (HCC): Primary | ICD-10-CM

## 2020-12-07 RX ORDER — ALBUMIN (HUMAN) 12.5 G/50ML
12.5 SOLUTION INTRAVENOUS ONCE
Status: CANCELLED | OUTPATIENT
Start: 2020-12-07 | End: 2020-12-07

## 2020-12-08 ENCOUNTER — APPOINTMENT (OUTPATIENT)
Dept: LAB | Facility: HOSPITAL | Age: 85
End: 2020-12-08

## 2020-12-08 ENCOUNTER — LAB (OUTPATIENT)
Dept: LAB | Facility: HOSPITAL | Age: 85
End: 2020-12-08

## 2020-12-08 DIAGNOSIS — I50.812 CHRONIC RIGHT HEART FAILURE (HCC): ICD-10-CM

## 2020-12-08 DIAGNOSIS — N18.30 STAGE 3 CHRONIC KIDNEY DISEASE, UNSPECIFIED WHETHER STAGE 3A OR 3B CKD (HCC): ICD-10-CM

## 2020-12-08 LAB
BASOPHILS # BLD AUTO: 0.03 10*3/MM3 (ref 0–0.2)
BASOPHILS NFR BLD AUTO: 0.5 % (ref 0–1.5)
DEPRECATED RDW RBC AUTO: 46.2 FL (ref 37–54)
EOSINOPHIL # BLD AUTO: 0.1 10*3/MM3 (ref 0–0.4)
EOSINOPHIL NFR BLD AUTO: 1.7 % (ref 0.3–6.2)
ERYTHROCYTE [DISTWIDTH] IN BLOOD BY AUTOMATED COUNT: 13.5 % (ref 12.3–15.4)
HCT VFR BLD AUTO: 40 % (ref 37.5–51)
HGB BLD-MCNC: 13.4 G/DL (ref 13–17.7)
IMM GRANULOCYTES # BLD AUTO: 0.01 10*3/MM3 (ref 0–0.05)
IMM GRANULOCYTES NFR BLD AUTO: 0.2 % (ref 0–0.5)
LYMPHOCYTES # BLD AUTO: 0.87 10*3/MM3 (ref 0.7–3.1)
LYMPHOCYTES NFR BLD AUTO: 14.5 % (ref 19.6–45.3)
MCH RBC QN AUTO: 31.5 PG (ref 26.6–33)
MCHC RBC AUTO-ENTMCNC: 33.5 G/DL (ref 31.5–35.7)
MCV RBC AUTO: 93.9 FL (ref 79–97)
MONOCYTES # BLD AUTO: 0.59 10*3/MM3 (ref 0.1–0.9)
MONOCYTES NFR BLD AUTO: 9.8 % (ref 5–12)
NEUTROPHILS NFR BLD AUTO: 4.39 10*3/MM3 (ref 1.7–7)
NEUTROPHILS NFR BLD AUTO: 73.3 % (ref 42.7–76)
NRBC BLD AUTO-RTO: 0 /100 WBC (ref 0–0.2)
PLATELET # BLD AUTO: 135 10*3/MM3 (ref 140–450)
PMV BLD AUTO: 11 FL (ref 6–12)
RBC # BLD AUTO: 4.26 10*6/MM3 (ref 4.14–5.8)
WBC # BLD AUTO: 5.99 10*3/MM3 (ref 3.4–10.8)

## 2020-12-08 PROCEDURE — 85025 COMPLETE CBC W/AUTO DIFF WBC: CPT

## 2020-12-08 PROCEDURE — 36415 COLL VENOUS BLD VENIPUNCTURE: CPT

## 2020-12-08 PROCEDURE — 80053 COMPREHEN METABOLIC PANEL: CPT

## 2020-12-09 DIAGNOSIS — E78.2 MIXED HYPERLIPIDEMIA: ICD-10-CM

## 2020-12-09 LAB
ALBUMIN SERPL-MCNC: 3.8 G/DL (ref 3.5–5.2)
ALBUMIN/GLOB SERPL: 1.3 G/DL
ALP SERPL-CCNC: 91 U/L (ref 39–117)
ALT SERPL W P-5'-P-CCNC: 10 U/L (ref 1–41)
ANION GAP SERPL CALCULATED.3IONS-SCNC: 12.7 MMOL/L (ref 5–15)
AST SERPL-CCNC: 25 U/L (ref 1–40)
BILIRUB SERPL-MCNC: 0.6 MG/DL (ref 0–1.2)
BUN SERPL-MCNC: 25 MG/DL (ref 8–23)
BUN/CREAT SERPL: 17 (ref 7–25)
CALCIUM SPEC-SCNC: 9.4 MG/DL (ref 8.6–10.5)
CHLORIDE SERPL-SCNC: 103 MMOL/L (ref 98–107)
CO2 SERPL-SCNC: 26.3 MMOL/L (ref 22–29)
CREAT SERPL-MCNC: 1.47 MG/DL (ref 0.76–1.27)
GFR SERPL CREATININE-BSD FRML MDRD: 45 ML/MIN/1.73
GLOBULIN UR ELPH-MCNC: 3 GM/DL
GLUCOSE SERPL-MCNC: 86 MG/DL (ref 65–99)
POTASSIUM SERPL-SCNC: 4.9 MMOL/L (ref 3.5–5.2)
PROT SERPL-MCNC: 6.8 G/DL (ref 6–8.5)
SODIUM SERPL-SCNC: 142 MMOL/L (ref 136–145)

## 2020-12-09 RX ORDER — SIMVASTATIN 40 MG
TABLET ORAL
Qty: 90 TABLET | Refills: 1 | Status: SHIPPED | OUTPATIENT
Start: 2020-12-09 | End: 2021-06-14

## 2020-12-10 ENCOUNTER — HOSPITAL ENCOUNTER (OUTPATIENT)
Dept: CT IMAGING | Facility: HOSPITAL | Age: 85
Discharge: HOME | End: 2020-12-10
Admitting: INTERNAL MEDICINE

## 2020-12-10 VITALS
SYSTOLIC BLOOD PRESSURE: 119 MMHG | HEART RATE: 63 BPM | HEIGHT: 71 IN | WEIGHT: 204 LBS | DIASTOLIC BLOOD PRESSURE: 71 MMHG | BODY MASS INDEX: 28.56 KG/M2 | OXYGEN SATURATION: 97 % | RESPIRATION RATE: 18 BRPM | TEMPERATURE: 98 F

## 2020-12-10 DIAGNOSIS — R18.8 CIRRHOSIS OF LIVER WITH ASCITES, UNSPECIFIED HEPATIC CIRRHOSIS TYPE (HCC): ICD-10-CM

## 2020-12-10 DIAGNOSIS — K74.60 CIRRHOSIS OF LIVER WITH ASCITES, UNSPECIFIED HEPATIC CIRRHOSIS TYPE (HCC): ICD-10-CM

## 2020-12-10 PROCEDURE — C1729 CATH, DRAINAGE: HCPCS

## 2020-12-10 PROCEDURE — 75989 ABSCESS DRAINAGE UNDER X-RAY: CPT

## 2020-12-10 PROCEDURE — 25010000003 LIDOCAINE 1 % SOLUTION: Performed by: RADIOLOGY

## 2020-12-10 PROCEDURE — P9047 ALBUMIN (HUMAN), 25%, 50ML: HCPCS | Performed by: INTERNAL MEDICINE

## 2020-12-10 PROCEDURE — 25010000002 ALBUMIN HUMAN 25% PER 50 ML: Performed by: INTERNAL MEDICINE

## 2020-12-10 RX ORDER — SODIUM CHLORIDE 0.9 % (FLUSH) 0.9 %
3 SYRINGE (ML) INJECTION EVERY 12 HOURS SCHEDULED
Status: DISCONTINUED | OUTPATIENT
Start: 2020-12-10 | End: 2020-12-11 | Stop reason: HOSPADM

## 2020-12-10 RX ORDER — LIDOCAINE HYDROCHLORIDE 10 MG/ML
20 INJECTION, SOLUTION INFILTRATION; PERINEURAL ONCE
Status: COMPLETED | OUTPATIENT
Start: 2020-12-10 | End: 2020-12-10

## 2020-12-10 RX ORDER — SODIUM CHLORIDE 0.9 % (FLUSH) 0.9 %
10 SYRINGE (ML) INJECTION AS NEEDED
Status: DISCONTINUED | OUTPATIENT
Start: 2020-12-10 | End: 2020-12-11 | Stop reason: HOSPADM

## 2020-12-10 RX ORDER — ALBUMIN (HUMAN) 12.5 G/50ML
12.5 SOLUTION INTRAVENOUS ONCE
Status: COMPLETED | OUTPATIENT
Start: 2020-12-10 | End: 2020-12-10

## 2020-12-10 RX ADMIN — LIDOCAINE HYDROCHLORIDE 20 ML: 10 INJECTION, SOLUTION INFILTRATION; PERINEURAL at 11:45

## 2020-12-10 RX ADMIN — ALBUMIN HUMAN 12.5 G: 0.25 SOLUTION INTRAVENOUS at 12:09

## 2020-12-10 NOTE — POST-PROCEDURE NOTE
Interventional Radiology Operative Note    Date: 12/10/20     Time: 12:47 EST     Pre-op Diagnosis:  CAD, right-sided heart failure with cirrhosis and ascites  Post-op Diagnosis: Same     Procedure: CT guided paracentesis     Surgeon: BJORN Harper M.D.  Assistants: None     Sedation: None     Estimated Blood Loss (EBL): Trace      Urine Output (UOP): N/A (short procedure)     IVF: N/A (short procedure)     Findings: Small volume ascites     Specimens: 4700 mL serous fluid.      Complications: No immediate     Disposition: Recovery. Stable

## 2020-12-10 NOTE — NURSING NOTE
Tolerated procedure well. Vss. Band aid dressing clean, dry, and intact RLQ. Tolerating activity and eating. Given oral discharge instructions. Verbalizes understanding. Discharged per wheelchair to front entrance with family driving.

## 2020-12-11 ENCOUNTER — TELEPHONE (OUTPATIENT)
Dept: INFUSION THERAPY | Facility: HOSPITAL | Age: 85
End: 2020-12-11

## 2020-12-11 LAB — GLUCOSE BLDC GLUCOMTR-MCNC: 244 MG/DL (ref 70–130)

## 2020-12-16 DIAGNOSIS — I50.812 CHRONIC RIGHT HEART FAILURE (HCC): ICD-10-CM

## 2020-12-16 RX ORDER — SPIRONOLACTONE 50 MG/1
50 TABLET, FILM COATED ORAL DAILY
Qty: 90 TABLET | Refills: 2 | Status: SHIPPED | OUTPATIENT
Start: 2020-12-16 | End: 2021-09-14

## 2020-12-16 NOTE — TELEPHONE ENCOUNTER
----- Message from MAKAYLA Hooper sent at 12/16/2020 12:17 PM EST -----  I received a refill request for spironolactone 25 mg, 2 tablets daily.  Ask patient if he would be okay if I changed it to spironolactone 50 mg 1 tab daily.  Does he have any concerns for change.

## 2020-12-18 RX ORDER — CARVEDILOL 6.25 MG/1
6.25 TABLET ORAL 2 TIMES DAILY WITH MEALS
Qty: 60 TABLET | Refills: 2 | Status: SHIPPED | OUTPATIENT
Start: 2020-12-18 | End: 2021-05-04

## 2021-01-05 ENCOUNTER — OFFICE VISIT (OUTPATIENT)
Dept: CARDIOLOGY | Facility: CLINIC | Age: 86
End: 2021-01-05

## 2021-01-05 VITALS
HEART RATE: 61 BPM | SYSTOLIC BLOOD PRESSURE: 123 MMHG | WEIGHT: 204 LBS | HEIGHT: 71 IN | BODY MASS INDEX: 28.56 KG/M2 | OXYGEN SATURATION: 99 % | DIASTOLIC BLOOD PRESSURE: 62 MMHG

## 2021-01-05 DIAGNOSIS — I50.812 CHRONIC RIGHT HEART FAILURE (HCC): ICD-10-CM

## 2021-01-05 DIAGNOSIS — I48.20 CHRONIC ATRIAL FIBRILLATION (HCC): ICD-10-CM

## 2021-01-05 DIAGNOSIS — I25.10 CORONARY ARTERIOSCLEROSIS IN NATIVE ARTERY: Primary | ICD-10-CM

## 2021-01-05 DIAGNOSIS — R18.8 OTHER ASCITES: ICD-10-CM

## 2021-01-05 PROCEDURE — 99214 OFFICE O/P EST MOD 30 MIN: CPT | Performed by: INTERNAL MEDICINE

## 2021-01-05 NOTE — H&P (VIEW-ONLY)
OFFICE FOLLOW UP     Date of Encounter:2021     Name: Jordi Sam  : 1934  Address: [facility] [address] [facility] [address]    PCP: Jasper Avery MD  [address] [place] [address]    Jordi Sam is a 86 y.o. male.    Chief Complaint: Follow up of CAD, RHF, PAF    Problem List:   1. Coronary artery disease:  a. Acute inferior STEMI with RV infarct features, 2009.   b. Normal LV function, single vessel  RCA disease.     c. Sirolimus eluting stents to the RCA, 2009.   d. Pericarditis, resolved.   e. LHC, 2012 (for recurrent angina).   i.  Normal LV function.  ii.  of RCA with failed intervention.  f. Stress MPS 2018: Normal   g. LHC 2019: single vessel CAD with  of RCA, normal LVSF   2. 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 Echo 2019: marked enlargement of the right heart, biatrial enlargement, LVSF is normal, large left pleural effusion, severe TR, RVSP 30mmHg  b. Cardiac Cath (): Normal LV, Normal PAP, RV enlargement and decreased RVSF  c. Thoracentesis and Paracentesis March and May 2019  d. Paracentesis, 5700 mL off 2019  3. New onset atrial fibrillation 2019  a. Asymptomatic  b. CHADsVASC= 6, on Eliquis   4. Hypertension.   5. Dyslipidemia.  6. Diabetes mellitus, type 2.  A1C 6.1 (2016)  7. Nephrolithiasis.   8. History of “goiter.”  9. Thrombocytopenia  10. Incarcerated umbilical hernia with repair, 2020 (Dr. ZURITA)  11. Cirrhosis, recent evaluation by [facility]  a. Paracentesis with findings consistent with cardiac ascites, Spring 2019  12. Surgical history:  a. Cholecystectomy.  b. Hernia repair x 2.     Allergies:  Allergies   Allergen Reactions   • Bee Venom Swelling   • Sulfamethoxazole-Trimethoprim Other (See Comments)     Pt unaware       Current Medications:  •  apixaban (ELIQUIS) 2.5 MG tablet tablet, Take 1 tablet by mouth 2 (Two) Times a Day  •  carvedilol  "(COREG) 6.25 MG tablet, Take 1 tablet by mouth 2 (Two) Times a Day With Meals  •  folic acid (FOLVITE) 400 MCG tablet, Take 400 mcg by mouth Daily.  •  furosemide (LASIX) 40 MG tablet, Take 1 tablet by mouth twice daily  •  nitroglycerin (NITROSTAT) 0.4 MG SL tablet, DISSOLVE ONE TABLET UNDER THE TONGUE EVERY 5 MINUTES AS NEEDED FOR CHEST PAIN.  DO NOT EXCEED A TOTAL OF 3 DOSES IN 15 MINUTES  •  simvastatin (ZOCOR) 40 MG tablet, Take 1 tablet by mouth once daily  •  spironolactone (ALDACTONE) 50 MG tablet, Take 1 tablet by mouth Daily.  •  tamsulosin (FLOMAX) 0.4 MG capsule 24 hr capsule, Take 1 capsule by mouth Daily.    History of Present Illness:  Jordi Sam returns for scheduled follow up today. He states \"I feel better than I have in a long time\". He has brief atypical chest pain lasting seconds. He denies unusual shortness of breath. Last paracentesis was in December. He is tolerating medications listed above. Review of lab work over the last 6 months reveals improvement in kidney function. He denies unusual bleeding related to Eliquis.        The following portions of the patient's history were reviewed and updated as appropriate: allergies, current medications and problem list.    ROS: Pertinent positives as listed in the HPI.  All other systems reviewed and negative.    Objective:  Vitals:    01/05/21 1429 01/05/21 1432   BP: 131/67 123/62   BP Location: Left arm Left arm   Patient Position: Sitting Standing   Pulse: 60 61   SpO2: 99%    Weight: 92.5 kg (204 lb)    Height: 180.3 cm (71\")      Body mass index is 28.45 kg/m².    Physical Exam:  GENERAL: Alert, cooperative, in no acute distress.   HEENT: Normocephalic, no adenopathy, no jugular venous distention  HEART: No discrete PMI is noted. Irregular rhythm, normal rate, and no murmurs, gallops, or rubs.   LUNGS: Clear to auscultation bilaterally. No wheezing, rales or ronchi.  ABDOMEN: Soft, bowel sounds present, non-tender- Ascites " "noted.  NEUROLOGIC: No focal abnormalities involving strength or sensation are noted.   EXTREMITIES: No clubbing, cyanosis, or edema noted.      Diagnostic Data:    Lab Results   Component Value Date    GLUCOSE 86 12/08/2020    BUN 25 (H) 12/08/2020    CREATININE 1.47 (H) 12/08/2020    EGFRIFNONA 45 (L) 12/08/2020    BCR 17.0 12/08/2020    K 4.9 12/08/2020    CO2 26.3 12/08/2020    CALCIUM 9.4 12/08/2020    ALBUMIN 3.80 12/08/2020    AST 25 12/08/2020    ALT 10 12/08/2020      Lab Results   Component Value Date    WBC 5.99 12/08/2020    HGB 13.4 12/08/2020    HCT 40.0 12/08/2020    MCV 93.9 12/08/2020     (L) 12/08/2020      Procedures    Assessment and Plan:   1.  CAD:  Asymptomatic. Continue BMT. We will call him to make sure he is taking low dose enteric coated aspirin.   2.  Right heart failure:  Due for another paracentesis next week. Continue current medications.   3.  PAF:  Irregular, normal rate today. We will check BMP again and if creatinine remains stable we may need to increase Eliquis to 5 mg BID. We will fill out paperwork for Eliquis assistance.     \"All things considered\"--- he really looks good.    I will see Jordi Sam back in 6 months or sooner on an as needed basis.    Scribed for Efrem Posadas MD by Shahla Jacobsen RN. 01/05/2021 15:57 EST.                  "

## 2021-01-05 NOTE — PROGRESS NOTES
OFFICE FOLLOW UP     Date of Encounter:2021     Name: Jordi Sam  : 1934  Address: [facility] [address] [facility] [address]    PCP: Jasper Avery MD  [address] [place] [address]    Jordi Sam is a 86 y.o. male.    Chief Complaint: Follow up of CAD, RHF, PAF    Problem List:   1. Coronary artery disease:  a. Acute inferior STEMI with RV infarct features, 2009.   b. Normal LV function, single vessel  RCA disease.     c. Sirolimus eluting stents to the RCA, 2009.   d. Pericarditis, resolved.   e. LHC, 2012 (for recurrent angina).   i.  Normal LV function.  ii.  of RCA with failed intervention.  f. Stress MPS 2018: Normal   g. LHC 2019: single vessel CAD with  of RCA, normal LVSF   2. 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 Echo 2019: marked enlargement of the right heart, biatrial enlargement, LVSF is normal, large left pleural effusion, severe TR, RVSP 30mmHg  b. Cardiac Cath (): Normal LV, Normal PAP, RV enlargement and decreased RVSF  c. Thoracentesis and Paracentesis March and May 2019  d. Paracentesis, 5700 mL off 2019  3. New onset atrial fibrillation 2019  a. Asymptomatic  b. CHADsVASC= 6, on Eliquis   4. Hypertension.   5. Dyslipidemia.  6. Diabetes mellitus, type 2.  A1C 6.1 (2016)  7. Nephrolithiasis.   8. History of “goiter.”  9. Thrombocytopenia  10. Incarcerated umbilical hernia with repair, 2020 (Dr. ZURITA)  11. Cirrhosis, recent evaluation by [facility]  a. Paracentesis with findings consistent with cardiac ascites, Spring 2019  12. Surgical history:  a. Cholecystectomy.  b. Hernia repair x 2.     Allergies:  Allergies   Allergen Reactions   • Bee Venom Swelling   • Sulfamethoxazole-Trimethoprim Other (See Comments)     Pt unaware       Current Medications:  •  apixaban (ELIQUIS) 2.5 MG tablet tablet, Take 1 tablet by mouth 2 (Two) Times a Day  •  carvedilol  "(COREG) 6.25 MG tablet, Take 1 tablet by mouth 2 (Two) Times a Day With Meals  •  folic acid (FOLVITE) 400 MCG tablet, Take 400 mcg by mouth Daily.  •  furosemide (LASIX) 40 MG tablet, Take 1 tablet by mouth twice daily  •  nitroglycerin (NITROSTAT) 0.4 MG SL tablet, DISSOLVE ONE TABLET UNDER THE TONGUE EVERY 5 MINUTES AS NEEDED FOR CHEST PAIN.  DO NOT EXCEED A TOTAL OF 3 DOSES IN 15 MINUTES  •  simvastatin (ZOCOR) 40 MG tablet, Take 1 tablet by mouth once daily  •  spironolactone (ALDACTONE) 50 MG tablet, Take 1 tablet by mouth Daily.  •  tamsulosin (FLOMAX) 0.4 MG capsule 24 hr capsule, Take 1 capsule by mouth Daily.    History of Present Illness:  Jordi Sam returns for scheduled follow up today. He states \"I feel better than I have in a long time\". He has brief atypical chest pain lasting seconds. He denies unusual shortness of breath. Last paracentesis was in December. He is tolerating medications listed above. Review of lab work over the last 6 months reveals improvement in kidney function. He denies unusual bleeding related to Eliquis.        The following portions of the patient's history were reviewed and updated as appropriate: allergies, current medications and problem list.    ROS: Pertinent positives as listed in the HPI.  All other systems reviewed and negative.    Objective:  Vitals:    01/05/21 1429 01/05/21 1432   BP: 131/67 123/62   BP Location: Left arm Left arm   Patient Position: Sitting Standing   Pulse: 60 61   SpO2: 99%    Weight: 92.5 kg (204 lb)    Height: 180.3 cm (71\")      Body mass index is 28.45 kg/m².    Physical Exam:  GENERAL: Alert, cooperative, in no acute distress.   HEENT: Normocephalic, no adenopathy, no jugular venous distention  HEART: No discrete PMI is noted. Irregular rhythm, normal rate, and no murmurs, gallops, or rubs.   LUNGS: Clear to auscultation bilaterally. No wheezing, rales or ronchi.  ABDOMEN: Soft, bowel sounds present, non-tender- Ascites " "noted.  NEUROLOGIC: No focal abnormalities involving strength or sensation are noted.   EXTREMITIES: No clubbing, cyanosis, or edema noted.      Diagnostic Data:    Lab Results   Component Value Date    GLUCOSE 86 12/08/2020    BUN 25 (H) 12/08/2020    CREATININE 1.47 (H) 12/08/2020    EGFRIFNONA 45 (L) 12/08/2020    BCR 17.0 12/08/2020    K 4.9 12/08/2020    CO2 26.3 12/08/2020    CALCIUM 9.4 12/08/2020    ALBUMIN 3.80 12/08/2020    AST 25 12/08/2020    ALT 10 12/08/2020      Lab Results   Component Value Date    WBC 5.99 12/08/2020    HGB 13.4 12/08/2020    HCT 40.0 12/08/2020    MCV 93.9 12/08/2020     (L) 12/08/2020      Procedures    Assessment and Plan:   1.  CAD:  Asymptomatic. Continue BMT. We will call him to make sure he is taking low dose enteric coated aspirin.   2.  Right heart failure:  Due for another paracentesis next week. Continue current medications.   3.  PAF:  Irregular, normal rate today. We will check BMP again and if creatinine remains stable we may need to increase Eliquis to 5 mg BID. We will fill out paperwork for Eliquis assistance.     \"All things considered\"--- he really looks good.    I will see Jordi Sam back in 6 months or sooner on an as needed basis.    Scribed for Efrem Posadas MD by Shahla Jacobsen RN. 01/05/2021 15:57 EST.                  "

## 2021-01-08 ENCOUNTER — LAB (OUTPATIENT)
Dept: LAB | Facility: HOSPITAL | Age: 86
End: 2021-01-08

## 2021-01-08 DIAGNOSIS — I50.812 CHRONIC RIGHT HEART FAILURE (HCC): ICD-10-CM

## 2021-01-08 LAB
BASOPHILS # BLD AUTO: 0.04 10*3/MM3 (ref 0–0.2)
BASOPHILS NFR BLD AUTO: 0.8 % (ref 0–1.5)
DEPRECATED RDW RBC AUTO: 45.6 FL (ref 37–54)
EOSINOPHIL # BLD AUTO: 0.1 10*3/MM3 (ref 0–0.4)
EOSINOPHIL NFR BLD AUTO: 2 % (ref 0.3–6.2)
ERYTHROCYTE [DISTWIDTH] IN BLOOD BY AUTOMATED COUNT: 13.3 % (ref 12.3–15.4)
HCT VFR BLD AUTO: 38.8 % (ref 37.5–51)
HGB BLD-MCNC: 12.5 G/DL (ref 13–17.7)
IMM GRANULOCYTES # BLD AUTO: 0.01 10*3/MM3 (ref 0–0.05)
IMM GRANULOCYTES NFR BLD AUTO: 0.2 % (ref 0–0.5)
LYMPHOCYTES # BLD AUTO: 0.72 10*3/MM3 (ref 0.7–3.1)
LYMPHOCYTES NFR BLD AUTO: 14.1 % (ref 19.6–45.3)
MCH RBC QN AUTO: 30 PG (ref 26.6–33)
MCHC RBC AUTO-ENTMCNC: 32.2 G/DL (ref 31.5–35.7)
MCV RBC AUTO: 93.3 FL (ref 79–97)
MONOCYTES # BLD AUTO: 0.44 10*3/MM3 (ref 0.1–0.9)
MONOCYTES NFR BLD AUTO: 8.6 % (ref 5–12)
NEUTROPHILS NFR BLD AUTO: 3.79 10*3/MM3 (ref 1.7–7)
NEUTROPHILS NFR BLD AUTO: 74.3 % (ref 42.7–76)
NRBC BLD AUTO-RTO: 0 /100 WBC (ref 0–0.2)
PLATELET # BLD AUTO: 131 10*3/MM3 (ref 140–450)
PMV BLD AUTO: 11.1 FL (ref 6–12)
RBC # BLD AUTO: 4.16 10*6/MM3 (ref 4.14–5.8)
WBC # BLD AUTO: 5.1 10*3/MM3 (ref 3.4–10.8)

## 2021-01-08 PROCEDURE — 85025 COMPLETE CBC W/AUTO DIFF WBC: CPT

## 2021-01-08 PROCEDURE — 36415 COLL VENOUS BLD VENIPUNCTURE: CPT

## 2021-01-08 PROCEDURE — 80053 COMPREHEN METABOLIC PANEL: CPT

## 2021-01-09 LAB
ALBUMIN SERPL-MCNC: 3.6 G/DL (ref 3.5–5.2)
ALBUMIN/GLOB SERPL: 1.2 G/DL
ALP SERPL-CCNC: 75 U/L (ref 39–117)
ALT SERPL W P-5'-P-CCNC: 10 U/L (ref 1–41)
ANION GAP SERPL CALCULATED.3IONS-SCNC: 10.7 MMOL/L (ref 5–15)
AST SERPL-CCNC: 21 U/L (ref 1–40)
BILIRUB SERPL-MCNC: 0.6 MG/DL (ref 0–1.2)
BUN SERPL-MCNC: 27 MG/DL (ref 8–23)
BUN/CREAT SERPL: 21.8 (ref 7–25)
CALCIUM SPEC-SCNC: 9 MG/DL (ref 8.6–10.5)
CHLORIDE SERPL-SCNC: 100 MMOL/L (ref 98–107)
CO2 SERPL-SCNC: 27.3 MMOL/L (ref 22–29)
CREAT SERPL-MCNC: 1.24 MG/DL (ref 0.76–1.27)
GFR SERPL CREATININE-BSD FRML MDRD: 55 ML/MIN/1.73
GLOBULIN UR ELPH-MCNC: 2.9 GM/DL
GLUCOSE SERPL-MCNC: 119 MG/DL (ref 65–99)
POTASSIUM SERPL-SCNC: 4.6 MMOL/L (ref 3.5–5.2)
PROT SERPL-MCNC: 6.5 G/DL (ref 6–8.5)
SODIUM SERPL-SCNC: 138 MMOL/L (ref 136–145)

## 2021-01-13 ENCOUNTER — PREP FOR SURGERY (OUTPATIENT)
Dept: OTHER | Facility: HOSPITAL | Age: 86
End: 2021-01-13

## 2021-01-13 DIAGNOSIS — R18.8 CIRRHOSIS OF LIVER WITH ASCITES, UNSPECIFIED HEPATIC CIRRHOSIS TYPE (HCC): Primary | ICD-10-CM

## 2021-01-13 DIAGNOSIS — K74.60 CIRRHOSIS OF LIVER WITH ASCITES, UNSPECIFIED HEPATIC CIRRHOSIS TYPE (HCC): Primary | ICD-10-CM

## 2021-01-13 RX ORDER — ALBUMIN (HUMAN) 12.5 G/50ML
12.5 SOLUTION INTRAVENOUS ONCE
Status: CANCELLED | OUTPATIENT
Start: 2021-01-13 | End: 2021-01-13

## 2021-01-19 ENCOUNTER — HOSPITAL ENCOUNTER (OUTPATIENT)
Dept: INTERVENTIONAL RADIOLOGY/VASCULAR | Facility: HOSPITAL | Age: 86
Discharge: HOME | End: 2021-01-19
Admitting: RADIOLOGY

## 2021-01-19 VITALS
RESPIRATION RATE: 20 BRPM | HEIGHT: 71 IN | DIASTOLIC BLOOD PRESSURE: 84 MMHG | BODY MASS INDEX: 28.84 KG/M2 | OXYGEN SATURATION: 96 % | TEMPERATURE: 97.6 F | HEART RATE: 66 BPM | WEIGHT: 206 LBS | SYSTOLIC BLOOD PRESSURE: 116 MMHG

## 2021-01-19 DIAGNOSIS — R18.8 CIRRHOSIS OF LIVER WITH ASCITES, UNSPECIFIED HEPATIC CIRRHOSIS TYPE (HCC): ICD-10-CM

## 2021-01-19 DIAGNOSIS — K74.60 CIRRHOSIS OF LIVER WITH ASCITES, UNSPECIFIED HEPATIC CIRRHOSIS TYPE (HCC): ICD-10-CM

## 2021-01-19 LAB
APTT PPP: 37.2 SECONDS (ref 24–37)
GLUCOSE BLDC GLUCOMTR-MCNC: 117 MG/DL (ref 70–130)
INR PPP: 1.19 (ref 0.85–1.16)
PLATELET # BLD AUTO: 115 10*3/MM3 (ref 140–450)
PROTHROMBIN TIME: 14.8 SECONDS (ref 11.5–14)

## 2021-01-19 PROCEDURE — 76942 ECHO GUIDE FOR BIOPSY: CPT

## 2021-01-19 PROCEDURE — C1729 CATH, DRAINAGE: HCPCS

## 2021-01-19 PROCEDURE — 85049 AUTOMATED PLATELET COUNT: CPT | Performed by: RADIOLOGY

## 2021-01-19 PROCEDURE — 85730 THROMBOPLASTIN TIME PARTIAL: CPT | Performed by: RADIOLOGY

## 2021-01-19 PROCEDURE — 82962 GLUCOSE BLOOD TEST: CPT

## 2021-01-19 PROCEDURE — 85610 PROTHROMBIN TIME: CPT | Performed by: RADIOLOGY

## 2021-01-19 RX ORDER — LIDOCAINE HYDROCHLORIDE 10 MG/ML
10 INJECTION, SOLUTION EPIDURAL; INFILTRATION; INTRACAUDAL; PERINEURAL ONCE
Status: COMPLETED | OUTPATIENT
Start: 2021-01-19 | End: 2021-01-19

## 2021-01-19 RX ORDER — SODIUM CHLORIDE 0.9 % (FLUSH) 0.9 %
3 SYRINGE (ML) INJECTION EVERY 12 HOURS SCHEDULED
Status: DISCONTINUED | OUTPATIENT
Start: 2021-01-19 | End: 2021-01-20 | Stop reason: HOSPADM

## 2021-01-19 RX ORDER — SODIUM CHLORIDE 0.9 % (FLUSH) 0.9 %
10 SYRINGE (ML) INJECTION AS NEEDED
Status: DISCONTINUED | OUTPATIENT
Start: 2021-01-19 | End: 2021-01-20 | Stop reason: HOSPADM

## 2021-01-19 RX ORDER — ALBUMIN (HUMAN) 12.5 G/50ML
12.5 SOLUTION INTRAVENOUS ONCE
Status: DISCONTINUED | OUTPATIENT
Start: 2021-01-19 | End: 2021-01-20 | Stop reason: HOSPADM

## 2021-01-19 RX ADMIN — LIDOCAINE HYDROCHLORIDE 10 ML: 10 INJECTION, SOLUTION EPIDURAL; INFILTRATION; INTRACAUDAL; PERINEURAL at 12:42

## 2021-01-19 NOTE — NURSING NOTE
Patient here for paracentesis. Monitors applied, Dr Harper performed paracentesis with 4300 ml fluid removed. No labs ordered. Pt tolerated well, report called to 4D and patient returned to floor.

## 2021-01-19 NOTE — POST-PROCEDURE NOTE
Interventional Radiology Operative Note    Date: 01/19/21     Time: 17:49 EST     Pre-op Diagnosis:  CAD, right-sided heart failure with cirrhosis and ascites  Post-op Diagnosis: Same     Procedure: CT guided paracentesis     Surgeon: BJORN Harper M.D.  Assistants: None     Sedation: None     Estimated Blood Loss (EBL): Trace      Urine Output (UOP): N/A (short procedure)     IVF: N/A (short procedure)     Findings: Small volume ascites     Specimens: 4350 mL serous fluid.      Complications: No immediate     Disposition: Recovery. Stable

## 2021-01-20 ENCOUNTER — TELEPHONE (OUTPATIENT)
Dept: INFUSION THERAPY | Facility: HOSPITAL | Age: 86
End: 2021-01-20

## 2021-01-21 ENCOUNTER — OFFICE VISIT (OUTPATIENT)
Dept: GASTROENTEROLOGY | Facility: CLINIC | Age: 86
End: 2021-01-21

## 2021-01-21 VITALS
TEMPERATURE: 97.9 F | HEIGHT: 71 IN | SYSTOLIC BLOOD PRESSURE: 129 MMHG | DIASTOLIC BLOOD PRESSURE: 64 MMHG | HEART RATE: 56 BPM | BODY MASS INDEX: 27.86 KG/M2 | WEIGHT: 199 LBS

## 2021-01-21 DIAGNOSIS — K74.60 CIRRHOSIS OF LIVER WITH ASCITES, UNSPECIFIED HEPATIC CIRRHOSIS TYPE (HCC): Primary | ICD-10-CM

## 2021-01-21 DIAGNOSIS — R18.8 CIRRHOSIS OF LIVER WITH ASCITES, UNSPECIFIED HEPATIC CIRRHOSIS TYPE (HCC): Primary | ICD-10-CM

## 2021-01-21 PROCEDURE — 99214 OFFICE O/P EST MOD 30 MIN: CPT | Performed by: NURSE PRACTITIONER

## 2021-01-21 NOTE — PROGRESS NOTES
GASTROENTEROLOGY OFFICE NOTE  Jordi Sam  [id number]  1934    CARE TEAM  Patient Care Team:  Jasper Avery MD as PCP - General (Internal Medicine)  Efrem Posadas MD as Consulting Physician (Cardiology)  Jordi Law MD as Consulting Physician (Urology)  Chapin Villagran MD as Consulting Physician (Otolaryngology)  Gen Laurent MD as Consulting Physician (Gastroenterology)    Referring Provider: Jasper Avery MD    Chief Complaint   Patient presents with   • Cirrhosis     f/u        HISTORY OF PRESENT ILLNESS:  Mr. 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    He is currently taking Lasix 40 mg twice daily and Aldactone 50 mg daily as directed by cardiology.  He has had problems with acute kidney injury related to diuretics.  He continues to require paracentesis about every 4 weeks.    He denies any evidence of GI bleeding.  He has not had surveillance for esophageal or gastric varices as the risk versus benefit is greater for EGD and he does currently take Coreg secondary to cardiac history.    He denies any changes in state of consciousness, intellectual function, personality or behavior has no history of hepatic encephalopathy.    Last abdominal imaging was a CT scan in July 2020 for assessment of periumbilical pain.  No mention of liver or liver lesions noted.    He reports that he is feeling better than he has in a long time.  Overall he seems to be doing very well.    PAST MEDICAL HISTORY  Past Medical History:   Diagnosis Date   • A-fib (CMS/HCC)    • Acute inferior myocardial infarction (CMS/HCC)     Acute inferior STEMI with RV infarct features, January 2009.    • CAD (coronary artery disease)    • Dyslipidemia     • Goiter     History of “goiter.”   • Hyperlipidemia    • Hypertension    • Nephrolithiasis    • Type 2 diabetes mellitus (CMS/HCC)         PAST SURGICAL HISTORY  Past Surgical History:   Procedure Laterality Date   • BRONCHOSCOPY N/A 4/12/2019    Procedure: BRONCHOSCOPY WITH THORACENTESIS;  Surgeon: Marciano Higginbotham MD;  Location:  [facility];  Service: Pulmonary   • CARDIAC CATHETERIZATION Bilateral 1/30/2019    Procedure: Right and Left Heart Cath;  Surgeon: Efrem Posadas MD;  Location:  [facility];  Service: Cardiology   • CARDIAC CATHETERIZATION     • CHOLECYSTECTOMY     • CORONARY ANGIOPLASTY WITH STENT PLACEMENT  01/09/2009    Sirolimus eluting stents to the RCA, 01/09/2009.    • HERNIA REPAIR      Hernia repair x 2.    • PARACENTESIS  06/17/2019   • UMBILICAL HERNIA REPAIR N/A 7/26/2020    Procedure: UMBILICAL HERNIA REPAIR;  Surgeon: Maribell Her MD;  Location:  [facility];  Service: General;  Laterality: N/A;        MEDICATIONS:    Current Outpatient Medications:   •  apixaban (ELIQUIS) 2.5 MG tablet tablet, Take 1 tablet by mouth 2 (Two) Times a Day., Disp: 60 tablet, Rfl: 6  •  carvedilol (COREG) 6.25 MG tablet, Take 1 tablet by mouth 2 (Two) Times a Day With Meals., Disp: 60 tablet, Rfl: 2  •  folic acid (FOLVITE) 400 MCG tablet, Take 400 mcg by mouth Daily., Disp: , Rfl:   •  furosemide (LASIX) 40 MG tablet, Take 1 tablet by mouth twice daily, Disp: 180 tablet, Rfl: 1  •  nitroglycerin (NITROSTAT) 0.4 MG SL tablet, DISSOLVE ONE TABLET UNDER THE TONGUE EVERY 5 MINUTES AS NEEDED FOR CHEST PAIN.  DO NOT EXCEED A TOTAL OF 3 DOSES IN 15 MINUTES, Disp: 25 tablet, Rfl: 3  •  simvastatin (ZOCOR) 40 MG tablet, Take 1 tablet by mouth once daily, Disp: 90 tablet, Rfl: 1  •  spironolactone (ALDACTONE) 50 MG tablet, Take 1 tablet by mouth Daily., Disp: 90 tablet, Rfl: 2  •  tamsulosin (FLOMAX) 0.4 MG capsule 24 hr capsule, Take 1 capsule by mouth Daily., Disp: , Rfl:      ALLERGIES  Allergies   Allergen Reactions   • Bee Venom Swelling   • Sulfamethoxazole-Trimethoprim Other (See Comments)     Pt unaware       FAMILY HISTORY:  Family History   Problem Relation Age of Onset   • Dementia Mother    • Stroke Mother    • Heart disease Father    • Heart attack Father    • Aneurysm Father    • Cancer Sister    • No Known Problems Brother    • No Known Problems Sister    • Heart disease Brother         CABG   • Hypertension Daughter    • Hypertension Son    • Colon cancer Neg Hx    • Colon polyps Neg Hx        SOCIAL HISTORY  Social History     Socioeconomic History   • Marital status:      Spouse name: Melly   • Number of children: 4   • Years of education: Not on file   • Highest education level: Not on file   Occupational History   • Occupation: retired     Comment: works [employer]   Social Needs   • Financial resource strain: Not hard at all   • Food insecurity     Worry: Never true     Inability: Never true   • Transportation needs     Medical: No     Non-medical: No   Tobacco Use   • Smoking status: Former Smoker     Packs/day: 2.50     Years: 28.00     Pack years: 70.00     Types: Cigarettes     Quit date: 1983     Years since quittin.7   • Smokeless tobacco: Never Used   Substance and Sexual Activity   • Alcohol use: No   • Drug use: No   • Sexual activity: Defer   Lifestyle   • Physical activity     Days per week: 0 days     Minutes per session: 0 min   • Stress: Not at all   Relationships   • Social connections     Talks on phone: Twice a week     Gets together: Once a week     Attends Sabianism service: 1 to 4 times per year     Active member of club or organization: No     Attends meetings of clubs or organizations: Never     Relationship status:    Social History Narrative    Caffeine Intake: 2  servings per day (coffee)    Patient lives at home wit his wife       REVIEW OF SYSTEMS  Review of Systems   Constitutional: Negative for activity  "change, appetite change, chills, diaphoresis, fatigue, fever, unexpected weight gain and unexpected weight loss.   HENT: Negative for trouble swallowing and voice change.    Gastrointestinal: Positive for abdominal distention. Negative for abdominal pain, anal bleeding, blood in stool, constipation, diarrhea, nausea, rectal pain, vomiting, GERD and indigestion.         PHYSICAL EXAM   /64 (BP Location: Right arm, Patient Position: Sitting, Cuff Size: Adult)   Pulse 56   Temp 97.9 °F (36.6 °C) (Temporal)   Ht 180.3 cm (70.98\")   Wt 90.3 kg (199 lb)   BMI 27.77 kg/m²   Physical Exam  Vitals signs and nursing note reviewed.   Constitutional:       Appearance: Normal appearance. He is well-developed.   HENT:      Head: Normocephalic and atraumatic.      Nose: Nose normal.      Mouth/Throat:      Mouth: Mucous membranes are moist.      Pharynx: Oropharynx is clear.   Eyes:      Pupils: Pupils are equal, round, and reactive to light.   Neck:      Musculoskeletal: Normal range of motion and neck supple.   Cardiovascular:      Rate and Rhythm: Normal rate. Rhythm irregular.      Heart sounds: No murmur.   Pulmonary:      Effort: Pulmonary effort is normal.      Breath sounds: Normal breath sounds. No wheezing or rales.   Abdominal:      General: Bowel sounds are normal. There is distension.      Palpations: Abdomen is soft. There is no mass.      Tenderness: There is no abdominal tenderness. There is no guarding or rebound.      Hernia: No hernia is present.   Musculoskeletal: Normal range of motion.   Skin:     General: Skin is warm and dry.   Neurological:      Mental Status: He is alert and oriented to person, place, and time.      Cranial Nerves: No cranial nerve deficit.   Psychiatric:         Behavior: Behavior normal.         Judgment: Judgment normal.       Results Review:  I have reviewed the patient's new clinical results.    ASSESSMENT / PLAN  1. End stage liver disease  -Not likely transplant " candidate due to age and comorbidity   -MELDNa 10 (1/8/2021 labs)     2. Cirrhosis, decompensated by ascites  -cirrhosis secondary to cardiac dysfunction  - no evidence of hepatic encephalopathy     # Ascites/Pedal edema  Plan:  -Continue Lasix 40 mg BID-as directed by cardiology  -Continue Spironolactone 50 mg daily-as directed by cardiology  -Paracentesis prn  -Low-sodium 2 g/day diet     # Surveillance for Esophageal varices needed  Plan:  -Defer EGD as risk > benefit  -Continue Coreg as directed by cardiology     # Hepatocellular carcinoma surveillance  - Abdominal imaging every six months  Plan:  -US liver     # Nutrition  Plan:  -High protein diet  -Low sodium diet 2 gm/day  -Avoid alcohol, avoid NSAIDS       Return in about 6 months (around 7/21/2021).    I discussed the patients findings and my recommendations with patient    Nilton Jade, APRN

## 2021-02-10 ENCOUNTER — HOSPITAL ENCOUNTER (OUTPATIENT)
Dept: ULTRASOUND IMAGING | Facility: HOSPITAL | Age: 86
Discharge: HOME | End: 2021-02-10
Admitting: NURSE PRACTITIONER

## 2021-02-10 DIAGNOSIS — R18.8 CIRRHOSIS OF LIVER WITH ASCITES, UNSPECIFIED HEPATIC CIRRHOSIS TYPE (HCC): ICD-10-CM

## 2021-02-10 DIAGNOSIS — K74.60 CIRRHOSIS OF LIVER WITH ASCITES, UNSPECIFIED HEPATIC CIRRHOSIS TYPE (HCC): ICD-10-CM

## 2021-02-10 PROCEDURE — 76705 ECHO EXAM OF ABDOMEN: CPT

## 2021-02-17 ENCOUNTER — OFFICE VISIT (OUTPATIENT)
Dept: FAMILY MEDICINE CLINIC | Facility: CLINIC | Age: 86
End: 2021-02-17

## 2021-02-17 ENCOUNTER — HOSPITAL ENCOUNTER (OUTPATIENT)
Dept: GENERAL RADIOLOGY | Facility: HOSPITAL | Age: 86
Discharge: HOME | End: 2021-02-17
Admitting: INTERNAL MEDICINE

## 2021-02-17 VITALS
HEART RATE: 80 BPM | SYSTOLIC BLOOD PRESSURE: 118 MMHG | HEIGHT: 71 IN | DIASTOLIC BLOOD PRESSURE: 78 MMHG | BODY MASS INDEX: 29.4 KG/M2 | WEIGHT: 210 LBS

## 2021-02-17 DIAGNOSIS — I50.812 CHRONIC RIGHT HEART FAILURE (HCC): ICD-10-CM

## 2021-02-17 DIAGNOSIS — E11.9 CONTROLLED TYPE 2 DIABETES MELLITUS WITHOUT COMPLICATION, WITHOUT LONG-TERM CURRENT USE OF INSULIN (HCC): ICD-10-CM

## 2021-02-17 DIAGNOSIS — H61.21 HEARING LOSS OF RIGHT EAR DUE TO CERUMEN IMPACTION: Primary | ICD-10-CM

## 2021-02-17 DIAGNOSIS — M25.551 RIGHT HIP PAIN: ICD-10-CM

## 2021-02-17 DIAGNOSIS — I10 ESSENTIAL HYPERTENSION: ICD-10-CM

## 2021-02-17 DIAGNOSIS — H61.21 HEARING LOSS OF RIGHT EAR DUE TO CERUMEN IMPACTION: ICD-10-CM

## 2021-02-17 DIAGNOSIS — R25.2 MUSCLE CRAMPS: ICD-10-CM

## 2021-02-17 PROCEDURE — 73502 X-RAY EXAM HIP UNI 2-3 VIEWS: CPT

## 2021-02-17 PROCEDURE — 99214 OFFICE O/P EST MOD 30 MIN: CPT | Performed by: INTERNAL MEDICINE

## 2021-02-17 RX ORDER — MAGNESIUM OXIDE 400 MG/1
400 TABLET ORAL DAILY
Qty: 30 TABLET | Refills: 6 | Status: SHIPPED | OUTPATIENT
Start: 2021-02-17 | End: 2022-01-21 | Stop reason: SDDI

## 2021-02-17 NOTE — ASSESSMENT & PLAN NOTE
Aside from his chronic edema he appears euvolemic.  He will continue symptomatic treatment with paracentesis.  Continue Aldactone Lasix and Coreg.  Continue salt and fluid monitoring.

## 2021-02-17 NOTE — ASSESSMENT & PLAN NOTE
Hypertension is improving with treatment.  Continue current treatment regimen.  Dietary sodium restriction.  Regular aerobic exercise.  Continue current medications.  Blood pressure will be reassessed in 3 months.

## 2021-02-17 NOTE — PROGRESS NOTES
Jordi Sam  1934  [id number]  Patient Care Team:  Jasper Avery MD as PCP - General (Internal Medicine)  Efrem Posadas MD as Consulting Physician (Cardiology)  Jordi Law MD as Consulting Physician (Urology)  Chapin Villagran MD as Consulting Physician (Otolaryngology)  Gen Laurent MD as Consulting Physician (Gastroenterology)    Jordi Sam is a 87 y.o. male here today for follow up.     This patient is accompanied by their self who contributes to the history of their care.    Chief Complaint:    Chief Complaint   Patient presents with   • Hypertension     f/u   • Diabetes     f/u   • Foreign Body in Ear     rt ear piece of hearing aid in ear   • Leg Pain     cramping in legs           History of Present Illness:  I have reviewed and/or updated the patient's past medical, past surgical, family, social history, problem list and allergies as appropriate.     Jordi is still requiring your regular paracentesis.  He is actually breathing well today.  Denies any orthopnea or PND.  He does get chronic lower extremity edema however it is stable.  He does report cramps in his legs at night.  He does not take proton pump inhibitor nor does he take a magnesium supplement.  He is on Lasix as well as Aldactone.  Cramps often happen at night.  He also has right hip pain worse when he is laying down.  It is dull in nature.  No numbness or tingling no back pain.  This eases with assuming the sitting or standing position.  There is no stiffness.  He does have derangement in his knees however is not a knee replacement candidate secondary to his end-stage heart disease and liver.  Sugars have been in the 120s at home.  No excessive thirst no visual changes no excessive urination.  Externally the tip of his hearing aid is shown up missing.  He has felt in his ear with a Q-tip and feels that he is convinced himself that it is within.  There is been decreased hearing on the  "right.    Review of Systems:    Review of Systems   Constitutional: Positive for fatigue. Negative for chills, fever, unexpected weight gain and unexpected weight loss.   HENT: Positive for hearing loss. Negative for ear pain, postnasal drip, sinus pressure and sore throat.    Eyes: Negative for blurred vision, double vision and visual disturbance.   Respiratory: Negative for cough, shortness of breath and wheezing.    Cardiovascular: Positive for leg swelling. Negative for chest pain and palpitations.   Gastrointestinal: Negative for abdominal pain, blood in stool, diarrhea, nausea and vomiting.   Endocrine: Negative for cold intolerance, heat intolerance, polydipsia, polyphagia and polyuria.   Genitourinary: Negative for dysuria, flank pain and hematuria.   Musculoskeletal: Positive for arthralgias and myalgias. Negative for joint swelling.   Skin: Negative for dry skin and rash.   Neurological: Negative for weakness, numbness and headache.   Psychiatric/Behavioral: Negative for self-injury, suicidal ideas and depressed mood.       Vitals:    02/17/21 1016   BP: 118/78   Pulse: 80   Weight: 95.3 kg (210 lb)   Height: 180.3 cm (70.98\")     Body mass index is 29.31 kg/m².      Current Outpatient Medications:   •  apixaban (ELIQUIS) 2.5 MG tablet tablet, Take 1 tablet by mouth 2 (Two) Times a Day., Disp: 60 tablet, Rfl: 6  •  carvedilol (COREG) 6.25 MG tablet, Take 1 tablet by mouth 2 (Two) Times a Day With Meals., Disp: 60 tablet, Rfl: 2  •  folic acid (FOLVITE) 400 MCG tablet, Take 400 mcg by mouth Daily., Disp: , Rfl:   •  furosemide (LASIX) 40 MG tablet, Take 1 tablet by mouth twice daily, Disp: 180 tablet, Rfl: 1  •  nitroglycerin (NITROSTAT) 0.4 MG SL tablet, DISSOLVE ONE TABLET UNDER THE TONGUE EVERY 5 MINUTES AS NEEDED FOR CHEST PAIN.  DO NOT EXCEED A TOTAL OF 3 DOSES IN 15 MINUTES, Disp: 25 tablet, Rfl: 3  •  spironolactone (ALDACTONE) 50 MG tablet, Take 1 tablet by mouth Daily., Disp: 90 tablet, Rfl: 2  •  " tamsulosin (FLOMAX) 0.4 MG capsule 24 hr capsule, Take 1 capsule by mouth Daily., Disp: , Rfl:   •  magnesium oxide (MAG-OX) 400 MG tablet, Take 1 tablet by mouth Daily., Disp: 30 tablet, Rfl: 6  •  simvastatin (ZOCOR) 40 MG tablet, Take 1 tablet by mouth once daily, Disp: 90 tablet, Rfl: 1    Physical Exam:    Physical Exam  Vitals signs and nursing note reviewed.   Constitutional:       General: He is not in acute distress.     Appearance: He is well-developed. He is not diaphoretic.   HENT:      Head: Normocephalic and atraumatic.      Right Ear: Ear canal and external ear normal. There is impacted cerumen.      Left Ear: External ear normal.      Mouth/Throat:      Mouth: Mucous membranes are moist.      Pharynx: Oropharynx is clear. No oropharyngeal exudate.   Eyes:      General: No scleral icterus.        Right eye: No discharge.      Conjunctiva/sclera: Conjunctivae normal.   Neck:      Musculoskeletal: Normal range of motion and neck supple.      Thyroid: No thyromegaly.      Vascular: No JVD.      Trachea: No tracheal deviation.   Cardiovascular:      Rate and Rhythm: Normal rate and regular rhythm.      Pulses: Normal pulses.      Heart sounds: Normal heart sounds.      Comments: PMI nondisplaced  Pulmonary:      Effort: Pulmonary effort is normal.      Breath sounds: Normal breath sounds. No wheezing or rales.   Abdominal:      General: Bowel sounds are normal.      Palpations: Abdomen is soft.      Tenderness: There is no abdominal tenderness. There is no guarding or rebound.   Musculoskeletal:      Right lower leg: Edema present.      Left lower leg: Edema present.      Comments: Normal gait.  There is no trochanteric tenderness.  MARY BETH ER is negative.  Leg raise negative.   Lymphadenopathy:      Cervical: No cervical adenopathy.   Skin:     General: Skin is warm and dry.      Capillary Refill: Capillary refill takes less than 2 seconds.      Coloration: Skin is not pale.      Findings: No rash.    Neurological:      Mental Status: He is alert and oriented to person, place, and time.      Motor: No abnormal muscle tone.      Coordination: Coordination normal.   Psychiatric:         Judgment: Judgment normal.         Ear Cerumen Removal    Date/Time: 2/17/2021 10:55 AM  Performed by: Jasper Avery MD  Authorized by: Jasper Avery MD     Anesthesia:  Local Anesthetic: none  Location details: right ear  Patient tolerance: patient tolerated the procedure well with no immediate complications  Procedure type: instrumentation and irrigation         Results Review:    I reviewed the patient's new clinical results.    Assessment/Plan:    Problem List Items Addressed This Visit        Cardiac and Vasculature    Essential hypertension    Current Assessment & Plan     Hypertension is improving with treatment.  Continue current treatment regimen.  Dietary sodium restriction.  Regular aerobic exercise.  Continue current medications.  Blood pressure will be reassessed in 3 months.         Relevant Medications    furosemide (LASIX) 40 MG tablet    spironolactone (ALDACTONE) 50 MG tablet    carvedilol (COREG) 6.25 MG tablet       ENT    Hearing loss of right ear due to cerumen impaction - Primary    Relevant Orders    Ear Cerumen Removal    XR Hip With or Without Pelvis 2 - 3 View Right       Endocrine and Metabolic    Controlled type 2 diabetes mellitus without complication, without long-term current use of insulin (CMS/[facility])    Current Assessment & Plan     Diabetes is improving with lifestyle modifications.   Continue current treatment regimen.  Dietary recommendations for ADA diet.  Regular aerobic exercise.  Diabetes will be reassessed in 3 months.            Musculoskeletal and Injuries    Right hip pain    Relevant Orders    XR Hip With or Without Pelvis 2 - 3 View Right       Symptoms and Signs    Muscle cramps    Current Assessment & Plan     Suspect this is likely from magnesium loss from diuretics.  I  placed him on magnesium oxide 400 mg daily.  Report effectiveness follow-up.            Other    Chronic right heart failure (CMS/HCC)    Current Assessment & Plan     Aside from his chronic edema he appears euvolemic.  He will continue symptomatic treatment with paracentesis.  Continue Aldactone Lasix and Coreg.  Continue salt and fluid monitoring.         Relevant Medications    nitroglycerin (NITROSTAT) 0.4 MG SL tablet    spironolactone (ALDACTONE) 50 MG tablet    carvedilol (COREG) 6.25 MG tablet          Plan of care reviewed with patient at the conclusion of today's visit. Education was provided regarding diagnosis and management.  Patient verbalizes understanding of and agreement with management plan.    Return in about 3 months (around 5/17/2021).    Jasper Avery MD    Please note that portions of this note may have been completed with a voice recognition program. Efforts were made to edit the dictations, but occasionally words are mistranscribed.

## 2021-02-17 NOTE — ASSESSMENT & PLAN NOTE
Suspect this is likely from magnesium loss from diuretics.  I placed him on magnesium oxide 400 mg daily.  Report effectiveness follow-up.

## 2021-02-17 NOTE — ASSESSMENT & PLAN NOTE
Diabetes is improving with lifestyle modifications.   Continue current treatment regimen.  Dietary recommendations for ADA diet.  Regular aerobic exercise.  Diabetes will be reassessed in 3 months.

## 2021-02-17 NOTE — H&P (VIEW-ONLY)
Jordi Sam  1934  [id number]  Patient Care Team:  Jasper Avery MD as PCP - General (Internal Medicine)  Efrem Posadas MD as Consulting Physician (Cardiology)  Jordi Law MD as Consulting Physician (Urology)  Chapin Villagran MD as Consulting Physician (Otolaryngology)  Gen Laurent MD as Consulting Physician (Gastroenterology)    Jordi Sam is a 87 y.o. male here today for follow up.     This patient is accompanied by their self who contributes to the history of their care.    Chief Complaint:    Chief Complaint   Patient presents with   • Hypertension     f/u   • Diabetes     f/u   • Foreign Body in Ear     rt ear piece of hearing aid in ear   • Leg Pain     cramping in legs           History of Present Illness:  I have reviewed and/or updated the patient's past medical, past surgical, family, social history, problem list and allergies as appropriate.     Jordi is still requiring your regular paracentesis.  He is actually breathing well today.  Denies any orthopnea or PND.  He does get chronic lower extremity edema however it is stable.  He does report cramps in his legs at night.  He does not take proton pump inhibitor nor does he take a magnesium supplement.  He is on Lasix as well as Aldactone.  Cramps often happen at night.  He also has right hip pain worse when he is laying down.  It is dull in nature.  No numbness or tingling no back pain.  This eases with assuming the sitting or standing position.  There is no stiffness.  He does have derangement in his knees however is not a knee replacement candidate secondary to his end-stage heart disease and liver.  Sugars have been in the 120s at home.  No excessive thirst no visual changes no excessive urination.  Externally the tip of his hearing aid is shown up missing.  He has felt in his ear with a Q-tip and feels that he is convinced himself that it is within.  There is been decreased hearing on the  "right.    Review of Systems:    Review of Systems   Constitutional: Positive for fatigue. Negative for chills, fever, unexpected weight gain and unexpected weight loss.   HENT: Positive for hearing loss. Negative for ear pain, postnasal drip, sinus pressure and sore throat.    Eyes: Negative for blurred vision, double vision and visual disturbance.   Respiratory: Negative for cough, shortness of breath and wheezing.    Cardiovascular: Positive for leg swelling. Negative for chest pain and palpitations.   Gastrointestinal: Negative for abdominal pain, blood in stool, diarrhea, nausea and vomiting.   Endocrine: Negative for cold intolerance, heat intolerance, polydipsia, polyphagia and polyuria.   Genitourinary: Negative for dysuria, flank pain and hematuria.   Musculoskeletal: Positive for arthralgias and myalgias. Negative for joint swelling.   Skin: Negative for dry skin and rash.   Neurological: Negative for weakness, numbness and headache.   Psychiatric/Behavioral: Negative for self-injury, suicidal ideas and depressed mood.       Vitals:    02/17/21 1016   BP: 118/78   Pulse: 80   Weight: 95.3 kg (210 lb)   Height: 180.3 cm (70.98\")     Body mass index is 29.31 kg/m².      Current Outpatient Medications:   •  apixaban (ELIQUIS) 2.5 MG tablet tablet, Take 1 tablet by mouth 2 (Two) Times a Day., Disp: 60 tablet, Rfl: 6  •  carvedilol (COREG) 6.25 MG tablet, Take 1 tablet by mouth 2 (Two) Times a Day With Meals., Disp: 60 tablet, Rfl: 2  •  folic acid (FOLVITE) 400 MCG tablet, Take 400 mcg by mouth Daily., Disp: , Rfl:   •  furosemide (LASIX) 40 MG tablet, Take 1 tablet by mouth twice daily, Disp: 180 tablet, Rfl: 1  •  nitroglycerin (NITROSTAT) 0.4 MG SL tablet, DISSOLVE ONE TABLET UNDER THE TONGUE EVERY 5 MINUTES AS NEEDED FOR CHEST PAIN.  DO NOT EXCEED A TOTAL OF 3 DOSES IN 15 MINUTES, Disp: 25 tablet, Rfl: 3  •  spironolactone (ALDACTONE) 50 MG tablet, Take 1 tablet by mouth Daily., Disp: 90 tablet, Rfl: 2  •  " tamsulosin (FLOMAX) 0.4 MG capsule 24 hr capsule, Take 1 capsule by mouth Daily., Disp: , Rfl:   •  magnesium oxide (MAG-OX) 400 MG tablet, Take 1 tablet by mouth Daily., Disp: 30 tablet, Rfl: 6  •  simvastatin (ZOCOR) 40 MG tablet, Take 1 tablet by mouth once daily, Disp: 90 tablet, Rfl: 1    Physical Exam:    Physical Exam  Vitals signs and nursing note reviewed.   Constitutional:       General: He is not in acute distress.     Appearance: He is well-developed. He is not diaphoretic.   HENT:      Head: Normocephalic and atraumatic.      Right Ear: Ear canal and external ear normal. There is impacted cerumen.      Left Ear: External ear normal.      Mouth/Throat:      Mouth: Mucous membranes are moist.      Pharynx: Oropharynx is clear. No oropharyngeal exudate.   Eyes:      General: No scleral icterus.        Right eye: No discharge.      Conjunctiva/sclera: Conjunctivae normal.   Neck:      Musculoskeletal: Normal range of motion and neck supple.      Thyroid: No thyromegaly.      Vascular: No JVD.      Trachea: No tracheal deviation.   Cardiovascular:      Rate and Rhythm: Normal rate and regular rhythm.      Pulses: Normal pulses.      Heart sounds: Normal heart sounds.      Comments: PMI nondisplaced  Pulmonary:      Effort: Pulmonary effort is normal.      Breath sounds: Normal breath sounds. No wheezing or rales.   Abdominal:      General: Bowel sounds are normal.      Palpations: Abdomen is soft.      Tenderness: There is no abdominal tenderness. There is no guarding or rebound.   Musculoskeletal:      Right lower leg: Edema present.      Left lower leg: Edema present.      Comments: Normal gait.  There is no trochanteric tenderness.  MARY BETH ER is negative.  Leg raise negative.   Lymphadenopathy:      Cervical: No cervical adenopathy.   Skin:     General: Skin is warm and dry.      Capillary Refill: Capillary refill takes less than 2 seconds.      Coloration: Skin is not pale.      Findings: No rash.    Neurological:      Mental Status: He is alert and oriented to person, place, and time.      Motor: No abnormal muscle tone.      Coordination: Coordination normal.   Psychiatric:         Judgment: Judgment normal.         Ear Cerumen Removal    Date/Time: 2/17/2021 10:55 AM  Performed by: Jasper Avery MD  Authorized by: Jasper Avery MD     Anesthesia:  Local Anesthetic: none  Location details: right ear  Patient tolerance: patient tolerated the procedure well with no immediate complications  Procedure type: instrumentation and irrigation         Results Review:    I reviewed the patient's new clinical results.    Assessment/Plan:    Problem List Items Addressed This Visit        Cardiac and Vasculature    Essential hypertension    Current Assessment & Plan     Hypertension is improving with treatment.  Continue current treatment regimen.  Dietary sodium restriction.  Regular aerobic exercise.  Continue current medications.  Blood pressure will be reassessed in 3 months.         Relevant Medications    furosemide (LASIX) 40 MG tablet    spironolactone (ALDACTONE) 50 MG tablet    carvedilol (COREG) 6.25 MG tablet       ENT    Hearing loss of right ear due to cerumen impaction - Primary    Relevant Orders    Ear Cerumen Removal    XR Hip With or Without Pelvis 2 - 3 View Right       Endocrine and Metabolic    Controlled type 2 diabetes mellitus without complication, without long-term current use of insulin (CMS/[facility])    Current Assessment & Plan     Diabetes is improving with lifestyle modifications.   Continue current treatment regimen.  Dietary recommendations for ADA diet.  Regular aerobic exercise.  Diabetes will be reassessed in 3 months.            Musculoskeletal and Injuries    Right hip pain    Relevant Orders    XR Hip With or Without Pelvis 2 - 3 View Right       Symptoms and Signs    Muscle cramps    Current Assessment & Plan     Suspect this is likely from magnesium loss from diuretics.  I  placed him on magnesium oxide 400 mg daily.  Report effectiveness follow-up.            Other    Chronic right heart failure (CMS/HCC)    Current Assessment & Plan     Aside from his chronic edema he appears euvolemic.  He will continue symptomatic treatment with paracentesis.  Continue Aldactone Lasix and Coreg.  Continue salt and fluid monitoring.         Relevant Medications    nitroglycerin (NITROSTAT) 0.4 MG SL tablet    spironolactone (ALDACTONE) 50 MG tablet    carvedilol (COREG) 6.25 MG tablet          Plan of care reviewed with patient at the conclusion of today's visit. Education was provided regarding diagnosis and management.  Patient verbalizes understanding of and agreement with management plan.    Return in about 3 months (around 5/17/2021).    Jasper Avery MD    Please note that portions of this note may have been completed with a voice recognition program. Efforts were made to edit the dictations, but occasionally words are mistranscribed.

## 2021-02-18 ENCOUNTER — PREP FOR SURGERY (OUTPATIENT)
Dept: OTHER | Facility: HOSPITAL | Age: 86
End: 2021-02-18

## 2021-02-18 DIAGNOSIS — K74.60 CIRRHOSIS OF LIVER WITH ASCITES, UNSPECIFIED HEPATIC CIRRHOSIS TYPE (HCC): Primary | ICD-10-CM

## 2021-02-18 DIAGNOSIS — R18.8 CIRRHOSIS OF LIVER WITH ASCITES, UNSPECIFIED HEPATIC CIRRHOSIS TYPE (HCC): Primary | ICD-10-CM

## 2021-02-18 RX ORDER — ALBUMIN (HUMAN) 12.5 G/50ML
12.5 SOLUTION INTRAVENOUS ONCE
Status: CANCELLED | OUTPATIENT
Start: 2021-02-18 | End: 2021-02-18

## 2021-02-22 ENCOUNTER — HOSPITAL ENCOUNTER (OUTPATIENT)
Dept: CT IMAGING | Facility: HOSPITAL | Age: 86
Discharge: HOME | End: 2021-02-22
Admitting: RADIOLOGY

## 2021-02-22 VITALS
OXYGEN SATURATION: 94 % | TEMPERATURE: 97.4 F | SYSTOLIC BLOOD PRESSURE: 101 MMHG | HEART RATE: 72 BPM | RESPIRATION RATE: 18 BRPM | DIASTOLIC BLOOD PRESSURE: 56 MMHG

## 2021-02-22 DIAGNOSIS — K74.60 CIRRHOSIS OF LIVER WITH ASCITES, UNSPECIFIED HEPATIC CIRRHOSIS TYPE (HCC): ICD-10-CM

## 2021-02-22 DIAGNOSIS — R18.8 CIRRHOSIS OF LIVER WITH ASCITES, UNSPECIFIED HEPATIC CIRRHOSIS TYPE (HCC): ICD-10-CM

## 2021-02-22 PROCEDURE — P9047 ALBUMIN (HUMAN), 25%, 50ML: HCPCS | Performed by: NURSE PRACTITIONER

## 2021-02-22 PROCEDURE — 25010000002 ALBUMIN HUMAN 25% PER 50 ML: Performed by: NURSE PRACTITIONER

## 2021-02-22 PROCEDURE — 75989 ABSCESS DRAINAGE UNDER X-RAY: CPT

## 2021-02-22 PROCEDURE — C1729 CATH, DRAINAGE: HCPCS

## 2021-02-22 RX ORDER — SODIUM CHLORIDE 0.9 % (FLUSH) 0.9 %
3 SYRINGE (ML) INJECTION EVERY 12 HOURS SCHEDULED
Status: DISCONTINUED | OUTPATIENT
Start: 2021-02-22 | End: 2021-02-23 | Stop reason: HOSPADM

## 2021-02-22 RX ORDER — SODIUM CHLORIDE 0.9 % (FLUSH) 0.9 %
10 SYRINGE (ML) INJECTION AS NEEDED
Status: DISCONTINUED | OUTPATIENT
Start: 2021-02-22 | End: 2021-02-23 | Stop reason: HOSPADM

## 2021-02-22 RX ORDER — ALBUMIN (HUMAN) 12.5 G/50ML
12.5 SOLUTION INTRAVENOUS ONCE
Status: COMPLETED | OUTPATIENT
Start: 2021-02-22 | End: 2021-02-22

## 2021-02-22 RX ORDER — LIDOCAINE HYDROCHLORIDE 10 MG/ML
20 INJECTION, SOLUTION EPIDURAL; INFILTRATION; INTRACAUDAL; PERINEURAL ONCE
Status: COMPLETED | OUTPATIENT
Start: 2021-02-22 | End: 2021-02-22

## 2021-02-22 RX ADMIN — ALBUMIN HUMAN 12.5 G: 0.25 SOLUTION INTRAVENOUS at 11:48

## 2021-02-22 RX ADMIN — LIDOCAINE HYDROCHLORIDE 20 ML: 10 INJECTION, SOLUTION EPIDURAL; INFILTRATION; INTRACAUDAL; PERINEURAL at 11:02

## 2021-02-22 NOTE — POST-PROCEDURE NOTE
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  Assistants: None     Sedation: None     Estimated Blood Loss (EBL): Trace      Urine Output (UOP): N/A (short procedure)     IVF: N/A (short procedure)     Findings: Small volume ascites     Specimens: 4500 mL serous fluid.      Complications: No immediate     Disposition: Recovery. Stable

## 2021-02-22 NOTE — NURSING NOTE
Mr Flaco here for paracentesis today, placed on cardiac monitors, vital signs stable. Images taken and reviewed by Dr. Harper. A total volume of 4500 mL of fluid was removed, no labs ordered. Patient tolerated well, vitals remained stable throughout procedure. Report called to RAVINDRA and patient returned to unit via stretcher.

## 2021-02-22 NOTE — NURSING NOTE
Pt discharge from ir dept s/p paracentesis.  Pt tolerated procedure without complications.  Discharge instructions reviewed with patient and spouse both verbalized understanding.  Pt transported to exit via wheelchair per CNA.

## 2021-02-23 ENCOUNTER — TELEPHONE (OUTPATIENT)
Dept: INFUSION THERAPY | Facility: HOSPITAL | Age: 86
End: 2021-02-23

## 2021-02-24 DIAGNOSIS — I48.20 CHRONIC ATRIAL FIBRILLATION (HCC): ICD-10-CM

## 2021-02-24 NOTE — TELEPHONE ENCOUNTER
Dose increased to 5 mg - pt has been tolerating, needs to fill at pharmacy to meet out of pocket expense to qualify for patient assistance.  Lab Results   Component Value Date    GLUCOSE 119 (H) 01/08/2021    CALCIUM 9.0 01/08/2021     01/08/2021    K 4.6 01/08/2021    CO2 27.3 01/08/2021     01/08/2021    BUN 27 (H) 01/08/2021    CREATININE 1.24 01/08/2021    EGFRIFNONA 55 (L) 01/08/2021    BCR 21.8 01/08/2021    ANIONGAP 10.7 01/08/2021

## 2021-03-24 ENCOUNTER — LAB (OUTPATIENT)
Dept: LAB | Facility: HOSPITAL | Age: 86
End: 2021-03-24

## 2021-03-24 DIAGNOSIS — I50.812 CHRONIC RIGHT HEART FAILURE (HCC): ICD-10-CM

## 2021-03-24 LAB
ALBUMIN SERPL-MCNC: 3.6 G/DL (ref 3.5–5.2)
ALBUMIN/GLOB SERPL: 1.4 G/DL
ALP SERPL-CCNC: 80 U/L (ref 39–117)
ALT SERPL W P-5'-P-CCNC: 9 U/L (ref 1–41)
ANION GAP SERPL CALCULATED.3IONS-SCNC: 6.2 MMOL/L (ref 5–15)
AST SERPL-CCNC: 23 U/L (ref 1–40)
BASOPHILS # BLD AUTO: 0.04 10*3/MM3 (ref 0–0.2)
BASOPHILS NFR BLD AUTO: 0.7 % (ref 0–1.5)
BILIRUB SERPL-MCNC: 0.6 MG/DL (ref 0–1.2)
BUN SERPL-MCNC: 26 MG/DL (ref 8–23)
BUN/CREAT SERPL: 20.5 (ref 7–25)
CALCIUM SPEC-SCNC: 8.6 MG/DL (ref 8.6–10.5)
CHLORIDE SERPL-SCNC: 102 MMOL/L (ref 98–107)
CO2 SERPL-SCNC: 29.8 MMOL/L (ref 22–29)
CREAT SERPL-MCNC: 1.27 MG/DL (ref 0.76–1.27)
DEPRECATED RDW RBC AUTO: 45.4 FL (ref 37–54)
EOSINOPHIL # BLD AUTO: 0.08 10*3/MM3 (ref 0–0.4)
EOSINOPHIL NFR BLD AUTO: 1.5 % (ref 0.3–6.2)
ERYTHROCYTE [DISTWIDTH] IN BLOOD BY AUTOMATED COUNT: 13.4 % (ref 12.3–15.4)
GFR SERPL CREATININE-BSD FRML MDRD: 54 ML/MIN/1.73
GLOBULIN UR ELPH-MCNC: 2.5 GM/DL
GLUCOSE SERPL-MCNC: 102 MG/DL (ref 65–99)
HCT VFR BLD AUTO: 38.4 % (ref 37.5–51)
HGB BLD-MCNC: 12.9 G/DL (ref 13–17.7)
IMM GRANULOCYTES # BLD AUTO: 0.01 10*3/MM3 (ref 0–0.05)
IMM GRANULOCYTES NFR BLD AUTO: 0.2 % (ref 0–0.5)
LYMPHOCYTES # BLD AUTO: 0.75 10*3/MM3 (ref 0.7–3.1)
LYMPHOCYTES NFR BLD AUTO: 13.6 % (ref 19.6–45.3)
MCH RBC QN AUTO: 31.5 PG (ref 26.6–33)
MCHC RBC AUTO-ENTMCNC: 33.6 G/DL (ref 31.5–35.7)
MCV RBC AUTO: 93.9 FL (ref 79–97)
MONOCYTES # BLD AUTO: 0.56 10*3/MM3 (ref 0.1–0.9)
MONOCYTES NFR BLD AUTO: 10.2 % (ref 5–12)
NEUTROPHILS NFR BLD AUTO: 4.07 10*3/MM3 (ref 1.7–7)
NEUTROPHILS NFR BLD AUTO: 73.8 % (ref 42.7–76)
NRBC BLD AUTO-RTO: 0 /100 WBC (ref 0–0.2)
PLATELET # BLD AUTO: 125 10*3/MM3 (ref 140–450)
PMV BLD AUTO: 11 FL (ref 6–12)
POTASSIUM SERPL-SCNC: 4.5 MMOL/L (ref 3.5–5.2)
PROT SERPL-MCNC: 6.1 G/DL (ref 6–8.5)
RBC # BLD AUTO: 4.09 10*6/MM3 (ref 4.14–5.8)
SODIUM SERPL-SCNC: 138 MMOL/L (ref 136–145)
WBC # BLD AUTO: 5.51 10*3/MM3 (ref 3.4–10.8)

## 2021-03-24 PROCEDURE — 80053 COMPREHEN METABOLIC PANEL: CPT

## 2021-03-24 PROCEDURE — 85025 COMPLETE CBC W/AUTO DIFF WBC: CPT

## 2021-03-24 PROCEDURE — 36415 COLL VENOUS BLD VENIPUNCTURE: CPT

## 2021-03-26 ENCOUNTER — HOSPITAL ENCOUNTER (OUTPATIENT)
Dept: INTERVENTIONAL RADIOLOGY/VASCULAR | Facility: HOSPITAL | Age: 86
Discharge: HOME | End: 2021-03-26
Admitting: RADIOLOGY

## 2021-03-26 VITALS
WEIGHT: 212.4 LBS | HEART RATE: 57 BPM | BODY MASS INDEX: 29.64 KG/M2 | DIASTOLIC BLOOD PRESSURE: 53 MMHG | RESPIRATION RATE: 18 BRPM | TEMPERATURE: 97.7 F | SYSTOLIC BLOOD PRESSURE: 108 MMHG | OXYGEN SATURATION: 94 %

## 2021-03-26 DIAGNOSIS — K74.60 CIRRHOSIS OF LIVER WITH ASCITES, UNSPECIFIED HEPATIC CIRRHOSIS TYPE (HCC): ICD-10-CM

## 2021-03-26 DIAGNOSIS — R18.8 CIRRHOSIS OF LIVER WITH ASCITES, UNSPECIFIED HEPATIC CIRRHOSIS TYPE (HCC): ICD-10-CM

## 2021-03-26 LAB
APTT PPP: 37 SECONDS (ref 24–37)
INR PPP: 1.27 (ref 0.85–1.16)
PLATELET # BLD AUTO: 131 10*3/MM3 (ref 140–450)
PROTHROMBIN TIME: 15.6 SECONDS (ref 11.5–14)

## 2021-03-26 PROCEDURE — 85610 PROTHROMBIN TIME: CPT | Performed by: RADIOLOGY

## 2021-03-26 PROCEDURE — 76942 ECHO GUIDE FOR BIOPSY: CPT

## 2021-03-26 PROCEDURE — C1729 CATH, DRAINAGE: HCPCS

## 2021-03-26 PROCEDURE — 85049 AUTOMATED PLATELET COUNT: CPT | Performed by: RADIOLOGY

## 2021-03-26 PROCEDURE — 85730 THROMBOPLASTIN TIME PARTIAL: CPT | Performed by: RADIOLOGY

## 2021-03-26 RX ORDER — SODIUM CHLORIDE 0.9 % (FLUSH) 0.9 %
3 SYRINGE (ML) INJECTION EVERY 12 HOURS SCHEDULED
Status: DISCONTINUED | OUTPATIENT
Start: 2021-03-26 | End: 2021-03-27 | Stop reason: HOSPADM

## 2021-03-26 RX ORDER — LIDOCAINE HYDROCHLORIDE 10 MG/ML
10 INJECTION, SOLUTION EPIDURAL; INFILTRATION; INTRACAUDAL; PERINEURAL ONCE
Status: COMPLETED | OUTPATIENT
Start: 2021-03-26 | End: 2021-03-26

## 2021-03-26 RX ORDER — SODIUM CHLORIDE 0.9 % (FLUSH) 0.9 %
10 SYRINGE (ML) INJECTION AS NEEDED
Status: DISCONTINUED | OUTPATIENT
Start: 2021-03-26 | End: 2021-03-27 | Stop reason: HOSPADM

## 2021-03-26 RX ADMIN — LIDOCAINE HYDROCHLORIDE 14 ML: 10 INJECTION, SOLUTION EPIDURAL; INFILTRATION; INTRACAUDAL; PERINEURAL at 09:18

## 2021-03-26 NOTE — POST-PROCEDURE NOTE
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  Assistants: None     Sedation: None     Estimated Blood Loss (EBL): Trace      Urine Output (UOP): N/A (short procedure)     IVF: N/A (short procedure)     Findings: Small volume ascites     Specimens: 6000 mL serous fluid.      Complications: No immediate     Disposition: Recovery. Stable

## 2021-03-26 NOTE — NURSING NOTE
Patient placed on cardiac monitors, vitals stable. Images taken and reviewed by . A total volume of 6 L serous fluid was removed from the left side of the peritoneum. Patient tolerated well. Report to RAVINDRA.

## 2021-03-26 NOTE — H&P
History and Physical      Chief complaint Ascites    Subjective     Patient is a 87 y.o. male presents with symptomatic ascites related to cirrhosis    Review of Systems   Not reviewed    History  Past Medical History:   Diagnosis Date   • A-fib (CMS/HCC)    • Acute inferior myocardial infarction (CMS/HCC)     Acute inferior STEMI with RV infarct features, January 2009.    • CAD (coronary artery disease)    • Dyslipidemia    • Goiter     History of “goiter.”   • Hyperlipidemia    • Hypertension    • Nephrolithiasis    • Type 2 diabetes mellitus (CMS/HCC)      Past Surgical History:   Procedure Laterality Date   • BRONCHOSCOPY N/A 4/12/2019    Procedure: BRONCHOSCOPY WITH THORACENTESIS;  Surgeon: Marciano Higginbotham MD;  Location:  [facility];  Service: Pulmonary   • CARDIAC CATHETERIZATION Bilateral 1/30/2019    Procedure: Right and Left Heart Cath;  Surgeon: Efrem Posadas MD;  Location:  [facility] CATH INVASIVE LOCATION;  Service: Cardiology   • CARDIAC CATHETERIZATION     • CHOLECYSTECTOMY     • CORONARY ANGIOPLASTY WITH STENT PLACEMENT  01/09/2009    Sirolimus eluting stents to the RCA, 01/09/2009.    • HERNIA REPAIR      Hernia repair x 2.    • PARACENTESIS  06/17/2019   • UMBILICAL HERNIA REPAIR N/A 7/26/2020    Procedure: UMBILICAL HERNIA REPAIR;  Surgeon: Maribell Her MD;  Location:  [facility];  Service: General;  Laterality: N/A;     Family History   Problem Relation Age of Onset   • Dementia Mother    • Stroke Mother    • Heart disease Father    • Heart attack Father    • Aneurysm Father    • Cancer Sister    • No Known Problems Brother    • No Known Problems Sister    • Heart disease Brother         CABG   • Hypertension Daughter    • Hypertension Son    • Colon cancer Neg Hx    • Colon polyps Neg Hx      Social History     Tobacco Use   • Smoking status: Former Smoker     Packs/day: 2.50     Years: 28.00     Pack years: 70.00     Types: Cigarettes     Quit date: 5/16/1983     Years  since quittin.8   • Smokeless tobacco: Never Used   Substance Use Topics   • Alcohol use: No   • Drug use: No     (Not in a hospital admission)    Allergies:  Bee venom and Sulfamethoxazole-trimethoprim    Objective     Vital Signs  Temp:  [97.7 °F (36.5 °C)] 97.7 °F (36.5 °C)  Heart Rate:  [77-88] 87  Resp:  [18-20] 20  BP: ()/(50-85) 123/85    Physical Exam:    No exam performed today,    Results Review:    I reviewed the patient's new clinical results.  I reviewed the patient's new imaging results and agree with the interpretation.    Assessment/Plan       * No active hospital problems. *      Cirrhosis   Recurrent symptomatic ascites    Planned image guided paracentesis    Elijah Harper MD  21  10:32 EDT

## 2021-03-29 ENCOUNTER — TELEPHONE (OUTPATIENT)
Dept: INFUSION THERAPY | Facility: HOSPITAL | Age: 86
End: 2021-03-29

## 2021-04-15 ENCOUNTER — OFFICE VISIT (OUTPATIENT)
Dept: FAMILY MEDICINE CLINIC | Facility: CLINIC | Age: 86
End: 2021-04-15

## 2021-04-15 VITALS
DIASTOLIC BLOOD PRESSURE: 62 MMHG | SYSTOLIC BLOOD PRESSURE: 108 MMHG | OXYGEN SATURATION: 96 % | HEART RATE: 53 BPM | WEIGHT: 210 LBS | HEIGHT: 71 IN | BODY MASS INDEX: 29.4 KG/M2

## 2021-04-15 DIAGNOSIS — S93.622A SPRAIN OF TARSOMETATARSAL LIGAMENT OF LEFT FOOT, INITIAL ENCOUNTER: ICD-10-CM

## 2021-04-15 DIAGNOSIS — M79.672 LEFT FOOT PAIN: Primary | ICD-10-CM

## 2021-04-15 PROCEDURE — 99213 OFFICE O/P EST LOW 20 MIN: CPT | Performed by: INTERNAL MEDICINE

## 2021-04-15 NOTE — PROGRESS NOTES
"Jordi Sam  1934  [id number]  Patient Care Team:  Jasper Avery MD as PCP - General (Internal Medicine)  Efrem Posadas MD as Consulting Physician (Cardiology)  Jordi Law MD as Consulting Physician (Urology)  Chapin Villagran MD as Consulting Physician (Otolaryngology)  Gen Laurent MD as Consulting Physician (Gastroenterology)    Jordi Sam is a 87 y.o. male here today for follow up.     This patient is accompanied by their self who contributes to the history of their care.    Chief Complaint:    Chief Complaint   Patient presents with   • Foot Pain     doesn't recall a injury feels like he twisted it but doesn't remember. a little better today but is swollen and feels like a nerve is pulled         History of Present Illness:  I have reviewed and/or updated the patient's past medical, past surgical, family, social history, problem list and allergies as appropriate.   He noted upon awakening foot pain approximately 2 days ago.  Is on the top of his foot.  There is been no fevers or chills.  He indicates his foot as well.  No recollection of trauma, twisting, pain is sharp and burning in nature.  Hurts to bear weight.  Has tried some ice.  Tylenol without any help.  No prior history of gout.       Review of Systems:    Review of Systems   Constitutional: Negative for fatigue and fever.   Respiratory: Negative.    Cardiovascular: Negative.    Musculoskeletal: Positive for gait problem.        Left midfoot pain and swelling   Skin: Negative.    Neurological: Negative for numbness.       Vitals:    04/15/21 1544   BP: 108/62   Pulse: 53   SpO2: 96%   Weight: 95.3 kg (210 lb)   Height: 180.3 cm (70.98\")     Body mass index is 29.31 kg/m².      Current Outpatient Medications:   •  apixaban (ELIQUIS) 5 MG tablet tablet, Take 1 tablet by mouth 2 (Two) Times a Day., Disp: 60 tablet, Rfl: 6  •  carvedilol (COREG) 6.25 MG tablet, Take 1 tablet by mouth 2 (Two) Times a Day With " Meals., Disp: 60 tablet, Rfl: 2  •  folic acid (FOLVITE) 400 MCG tablet, Take 400 mcg by mouth Daily., Disp: , Rfl:   •  furosemide (LASIX) 40 MG tablet, Take 1 tablet by mouth twice daily, Disp: 180 tablet, Rfl: 1  •  magnesium oxide (MAG-OX) 400 MG tablet, Take 1 tablet by mouth Daily., Disp: 30 tablet, Rfl: 6  •  nitroglycerin (NITROSTAT) 0.4 MG SL tablet, DISSOLVE ONE TABLET UNDER THE TONGUE EVERY 5 MINUTES AS NEEDED FOR CHEST PAIN.  DO NOT EXCEED A TOTAL OF 3 DOSES IN 15 MINUTES, Disp: 25 tablet, Rfl: 3  •  simvastatin (ZOCOR) 40 MG tablet, Take 1 tablet by mouth once daily, Disp: 90 tablet, Rfl: 1  •  spironolactone (ALDACTONE) 50 MG tablet, Take 1 tablet by mouth Daily., Disp: 90 tablet, Rfl: 2  •  tamsulosin (FLOMAX) 0.4 MG capsule 24 hr capsule, Take 1 capsule by mouth Daily., Disp: , Rfl:     Physical Exam:    Physical Exam  Constitutional:       Appearance: Normal appearance.   Eyes:      General:         Right eye: No discharge.      Extraocular Movements: Extraocular movements intact.   Cardiovascular:      Rate and Rhythm: Normal rate. Rhythm irregular.      Pulses: Normal pulses.   Musculoskeletal:      Right lower leg: Edema present.      Left lower leg: Edema present.      Comments: He cannot bear weight on his left foot.  There is tenderness in the Lisfranc region.  Pain is worse with forefoot inversion and eversion.  He has limited range of motion secondary to pain with dorsiflexion.  No erythema.  No bruising.  Crepitus.  No rash.   Neurological:      Mental Status: He is alert.         Procedures    Results Review:    None    Assessment/Plan:  This is an 87-year-old gentleman with cirrhosis secondary to chronic right heart failure who presents with left pain in the Lisfranc region.  Differential diagnosis includes insufficiency fracture, sprain of the Lisfranc region, gout.  I requested uric acid C-reactive protein CBC as well as a BMP.  I will place referral to podiatry.  He was placed in a  walking boot with immediate relief.  X-rays have been requested as well.  He was instructed to elevate the foot heel weightbearing only.  Ice 20 minutes on 20 minutes off.  Follow-up will be based on x-rays labs and subspecialty referral  Problem List Items Addressed This Visit     None      Visit Diagnoses     Left foot pain    -  Primary    Relevant Orders     Walking Boot, Pneumatic & / or Vacuum    XR Foot 3+ View Left    Basic metabolic panel    Uric acid    C-reactive protein    Sprain of tarsometatarsal ligament of left foot, initial encounter        Relevant Orders     Walking Boot, Pneumatic & / or Vacuum          Plan of care reviewed with patient at the conclusion of today's visit. Education was provided regarding diagnosis and management.  Patient verbalizes understanding of and agreement with management plan.    Return for Next scheduled follow up.    Jasper Avery MD    Please note that portions of this note may have been completed with a voice recognition program. Efforts were made to edit the dictations, but occasionally words are mistranscribed.

## 2021-04-16 ENCOUNTER — HOSPITAL ENCOUNTER (OUTPATIENT)
Dept: GENERAL RADIOLOGY | Facility: HOSPITAL | Age: 86
Discharge: HOME | End: 2021-04-16

## 2021-04-16 ENCOUNTER — TELEPHONE (OUTPATIENT)
Dept: FAMILY MEDICINE CLINIC | Facility: CLINIC | Age: 86
End: 2021-04-16

## 2021-04-16 ENCOUNTER — LAB (OUTPATIENT)
Dept: LAB | Facility: HOSPITAL | Age: 86
End: 2021-04-16

## 2021-04-16 DIAGNOSIS — M79.672 LEFT FOOT PAIN: ICD-10-CM

## 2021-04-16 DIAGNOSIS — I50.812 CHRONIC RIGHT HEART FAILURE (HCC): ICD-10-CM

## 2021-04-16 LAB
ALBUMIN SERPL-MCNC: 3.8 G/DL (ref 3.5–5.2)
ALBUMIN/GLOB SERPL: 1.5 G/DL
ALP SERPL-CCNC: 82 U/L (ref 39–117)
ALT SERPL W P-5'-P-CCNC: 13 U/L (ref 1–41)
ANION GAP SERPL CALCULATED.3IONS-SCNC: 10.8 MMOL/L (ref 5–15)
AST SERPL-CCNC: 19 U/L (ref 1–40)
BASOPHILS # BLD AUTO: 0.04 10*3/MM3 (ref 0–0.2)
BASOPHILS NFR BLD AUTO: 0.5 % (ref 0–1.5)
BILIRUB SERPL-MCNC: 1 MG/DL (ref 0–1.2)
BUN SERPL-MCNC: 30 MG/DL (ref 8–23)
BUN/CREAT SERPL: 22.7 (ref 7–25)
CALCIUM SPEC-SCNC: 9.5 MG/DL (ref 8.6–10.5)
CHLORIDE SERPL-SCNC: 102 MMOL/L (ref 98–107)
CO2 SERPL-SCNC: 26.2 MMOL/L (ref 22–29)
CREAT SERPL-MCNC: 1.32 MG/DL (ref 0.76–1.27)
CRP SERPL-MCNC: 3.24 MG/DL (ref 0–0.5)
DEPRECATED RDW RBC AUTO: 45.6 FL (ref 37–54)
EOSINOPHIL # BLD AUTO: 0.1 10*3/MM3 (ref 0–0.4)
EOSINOPHIL NFR BLD AUTO: 1.3 % (ref 0.3–6.2)
ERYTHROCYTE [DISTWIDTH] IN BLOOD BY AUTOMATED COUNT: 13.6 % (ref 12.3–15.4)
GFR SERPL CREATININE-BSD FRML MDRD: 51 ML/MIN/1.73
GLOBULIN UR ELPH-MCNC: 2.6 GM/DL
GLUCOSE SERPL-MCNC: 121 MG/DL (ref 65–99)
HCT VFR BLD AUTO: 41.4 % (ref 37.5–51)
HGB BLD-MCNC: 13.8 G/DL (ref 13–17.7)
IMM GRANULOCYTES # BLD AUTO: 0.03 10*3/MM3 (ref 0–0.05)
IMM GRANULOCYTES NFR BLD AUTO: 0.4 % (ref 0–0.5)
LYMPHOCYTES # BLD AUTO: 0.78 10*3/MM3 (ref 0.7–3.1)
LYMPHOCYTES NFR BLD AUTO: 10.4 % (ref 19.6–45.3)
MCH RBC QN AUTO: 31.1 PG (ref 26.6–33)
MCHC RBC AUTO-ENTMCNC: 33.3 G/DL (ref 31.5–35.7)
MCV RBC AUTO: 93.2 FL (ref 79–97)
MONOCYTES # BLD AUTO: 0.62 10*3/MM3 (ref 0.1–0.9)
MONOCYTES NFR BLD AUTO: 8.3 % (ref 5–12)
NEUTROPHILS NFR BLD AUTO: 5.91 10*3/MM3 (ref 1.7–7)
NEUTROPHILS NFR BLD AUTO: 79.1 % (ref 42.7–76)
NRBC BLD AUTO-RTO: 0 /100 WBC (ref 0–0.2)
PLATELET # BLD AUTO: 127 10*3/MM3 (ref 140–450)
PMV BLD AUTO: 10.8 FL (ref 6–12)
POTASSIUM SERPL-SCNC: 5.3 MMOL/L (ref 3.5–5.2)
PROT SERPL-MCNC: 6.4 G/DL (ref 6–8.5)
RBC # BLD AUTO: 4.44 10*6/MM3 (ref 4.14–5.8)
SODIUM SERPL-SCNC: 139 MMOL/L (ref 136–145)
URATE SERPL-MCNC: 9.1 MG/DL (ref 3.4–7)
WBC # BLD AUTO: 7.48 10*3/MM3 (ref 3.4–10.8)

## 2021-04-16 PROCEDURE — 86140 C-REACTIVE PROTEIN: CPT

## 2021-04-16 PROCEDURE — 73630 X-RAY EXAM OF FOOT: CPT

## 2021-04-16 PROCEDURE — 36415 COLL VENOUS BLD VENIPUNCTURE: CPT

## 2021-04-16 PROCEDURE — 84550 ASSAY OF BLOOD/URIC ACID: CPT

## 2021-04-16 PROCEDURE — 80053 COMPREHEN METABOLIC PANEL: CPT

## 2021-04-16 PROCEDURE — 85025 COMPLETE CBC W/AUTO DIFF WBC: CPT

## 2021-04-16 NOTE — TELEPHONE ENCOUNTER
Caller: Jordi Sam    Relationship: Self    Best call back number: [phone]    What is the best time to reach you: ANYTIME    Who are you requesting to speak with (clinical staff, provider,  specific staff member): CLINICAL       What was the call regarding: PATIENT STATES HE WANTED TO LET  KNOW THAT HE HAD HIS BLOOD WORK DONE AND A X-RAY ON  HIS LEFT FOOT AND HE WOULD LIKE TO DISCUSS THE RESULTS WITH  WHEN HE RECEIVES THEM    THIS WAS DONE 04/16/2021 AT [facility]    Do you require a callback: YES, ONCE  RECEIVES THE RESULTS

## 2021-04-19 ENCOUNTER — TELEPHONE (OUTPATIENT)
Dept: FAMILY MEDICINE CLINIC | Facility: CLINIC | Age: 86
End: 2021-04-19

## 2021-04-19 DIAGNOSIS — M79.672 LEFT FOOT PAIN: Primary | ICD-10-CM

## 2021-04-19 RX ORDER — HYDROCODONE BITARTRATE AND ACETAMINOPHEN 5; 325 MG/1; MG/1
1 TABLET ORAL EVERY 6 HOURS PRN
Qty: 40 TABLET | Refills: 0 | Status: SHIPPED | OUTPATIENT
Start: 2021-04-19 | End: 2022-01-21 | Stop reason: SDDI

## 2021-04-19 RX ORDER — METHYLPREDNISOLONE 4 MG/1
TABLET ORAL
Qty: 21 TABLET | Refills: 0 | Status: SHIPPED | OUTPATIENT
Start: 2021-04-19 | End: 2021-04-24

## 2021-04-19 NOTE — PROGRESS NOTES
Phone follow-up.  Notified Mr. Sam and his wife elevated uric acid levels and the fact that we called in Medrol Dosepak.  Notified that x-ray was negative for fracture and he is awaiting a podiatry referral.  This is likely gout.  He is to notify the office of his foot or leg becomes red or if he becomes febrile.

## 2021-04-19 NOTE — TELEPHONE ENCOUNTER
Caller: Melly Sam    Relationship: Emergency Contact    Best call back number: [phone]    Medication needed:   MEDICATION FOR PAIN, SHE STATED IT WAS DISCUSSED AT APPT TODAY AND PHARMACY HAS NOT RECEIVED SCRIPT  When do you need the refill by: ASAP    What additional details did the patient provide when requesting the medication:     Does the patient have less than a 3 day supply:  [] Yes  [] No    What is the patient's preferred pharmacy:    [address] [phone] [facility] [phone]

## 2021-04-21 DIAGNOSIS — R18.8 CIRRHOSIS OF LIVER WITH ASCITES, UNSPECIFIED HEPATIC CIRRHOSIS TYPE (HCC): Primary | ICD-10-CM

## 2021-04-21 DIAGNOSIS — K74.60 CIRRHOSIS OF LIVER WITH ASCITES, UNSPECIFIED HEPATIC CIRRHOSIS TYPE (HCC): Primary | ICD-10-CM

## 2021-04-28 NOTE — PROGRESS NOTES
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  Periodic paracentesis followed by GI.  Chronic atrial fibrillation.  Cannot tolerate ACE, ARB, Arni due to hypotension and worsening kidney function.  Currently on spironolactone and furosemide.    Requiring paracentesis approximately once every 4 weeks.    Pt reports he has trouble at times with memory and weakness.  Denies CP, pressure, palpitations, dizziness, syncope.  Increased abdominal fullness and dyspnea.  Scheduled for paracentesis tomorrow.  Reports having left arm numbness for 2 minutes yesterday with shoulder soreness.  No other symptoms.  No recurrence.  Patient states he has been riding his lawnmower within the last week with upper back discomfort.  He has been off his Eliquis for paracentesis scheduled tomorrow.    Objective     Vital Signs:   Vitals:    04/29/21 1051   BP: 119/60   BP Location: Left arm   Patient Position: Sitting   Cuff Size: Adult   Pulse: 65   Resp: 18   Temp: 97 °F (36.1 °C)   TempSrc: Temporal   SpO2: 97%   Weight: 93.5 kg (206 lb 2 oz)   Height: 180.3 cm (71\")     Body mass index is 28.75 kg/m².  Physical Exam  Constitutional:       General: He is not in acute distress.     Appearance: Normal appearance. He is ill-appearing.   Cardiovascular:      Rate and Rhythm: Normal rate and regular rhythm.      Pulses:           Radial pulses are 2+ on the right side.        Dorsalis pedis pulses are 2+ on the right side.        Posterior tibial pulses are 2+ on the right side.      Heart sounds: Normal heart " sounds.   Pulmonary:      Effort: Pulmonary effort is normal.      Breath sounds: Examination of the right-lower field reveals decreased breath sounds. Examination of the left-lower field reveals decreased breath sounds. Decreased breath sounds present.   Abdominal:      General: There is distension.      Tenderness: There is abdominal tenderness.   Musculoskeletal:      Right lower leg: No edema.      Left lower leg: No edema.   Skin:     General: Skin is warm and dry.   Neurological:      Mental Status: He is alert.   Psychiatric:         Mood and Affect: Mood normal.         Behavior: Behavior is cooperative.              Result Review  Data Reviewed:{ Labs  Result Review  Imaging  Med Tab  Media :23}       Lab Results   Component Value Date    WBC 7.48 04/16/2021    HGB 13.8 04/16/2021    HCT 41.4 04/16/2021    MCV 93.2 04/16/2021     (L) 04/16/2021     Lab Results   Component Value Date    GLUCOSE 121 (H) 04/16/2021    BUN 30 (H) 04/16/2021    CREATININE 1.32 (H) 04/16/2021    EGFRIFNONA 51 (L) 04/16/2021    BCR 22.7 04/16/2021    K 5.3 (H) 04/16/2021    CO2 26.2 04/16/2021    CALCIUM 9.5 04/16/2021    ALBUMIN 3.80 04/16/2021    AST 19 04/16/2021    ALT 13 04/16/2021       Assessment and Plan {CC Problem List  Visit Diagnosis  ROS  Review (Popup)  Health Maintenance  Quality  BestPractice  Medications  SmartSets  SnapShot Encounters  Media :23}   1. Chronic right-sided heart failure (CMS/HCC)  With abdominal ascites scheduled for paracentesis tomorrow  Continue spironolactone, Lasix    - Comprehensive Metabolic Panel; Future    2. Chronic atrial fibrillation (CMS/HCC)  Heart rate controlled on carvedilol.  Stroke prevention low-dose Eliquis    Reported paresthesia in the left arm yesterday short duration.  If symptoms return or worsen.  Also discussed other signs and symptoms of stroke and when to present to the emergency room.    Encouraged to resume Eliquis as soon as possible.  3.  Coronary arteriosclerosis in native artery  Last heart catheterization 2019.  RCA  with collaterals    Discussed cardiac risk factors.  If symptoms worsen will discuss with Dr. Posadas if ischemic evaluation would be recommended.    4. Stage 3 chronic kidney disease, unspecified whether stage 3a or 3b CKD (CMS/HCC)    - Comprehensive Metabolic Panel; 1 month    5. Advanced care planning  Pt reports having an advanced directive, but not on file.  Encouraged patient to bring in copy tomorrow with paracentesis.  Stressed importance of having records on file so his wishes can be followed.      Keep f/u with GI, PCP, and Dr. Posadas as scheduled.  F/u [facility] 6 months or sooner if needed.           Follow Up {Instructions Charge Capture  Follow-up Communications :23}   Return in about 6 months (around 10/29/2021) for Office visit, HF.    Patient was given instructions and counseling regarding his condition or for health maintenance advice. Please see specific information pulled into the AVS if appropriate.  Patient was instructed to call the [facility] with any questions, concerns, or worsening symptoms.    *Please note that portions of this note were completed with a voice recognition program. Efforts were made to edit the dictations, but occasionally words are mistranscribed.

## 2021-04-29 ENCOUNTER — OFFICE VISIT (OUTPATIENT)
Dept: CARDIOLOGY | Facility: HOSPITAL | Age: 86
End: 2021-04-29

## 2021-04-29 VITALS
OXYGEN SATURATION: 97 % | RESPIRATION RATE: 18 BRPM | HEIGHT: 71 IN | TEMPERATURE: 97 F | DIASTOLIC BLOOD PRESSURE: 60 MMHG | SYSTOLIC BLOOD PRESSURE: 119 MMHG | WEIGHT: 206.13 LBS | BODY MASS INDEX: 28.86 KG/M2 | HEART RATE: 65 BPM

## 2021-04-29 DIAGNOSIS — I50.812 CHRONIC RIGHT-SIDED HEART FAILURE (HCC): Primary | ICD-10-CM

## 2021-04-29 DIAGNOSIS — Z71.89 ADVANCED CARE PLANNING/COUNSELING DISCUSSION: ICD-10-CM

## 2021-04-29 DIAGNOSIS — I48.20 CHRONIC ATRIAL FIBRILLATION (HCC): ICD-10-CM

## 2021-04-29 DIAGNOSIS — N18.30 STAGE 3 CHRONIC KIDNEY DISEASE, UNSPECIFIED WHETHER STAGE 3A OR 3B CKD (HCC): ICD-10-CM

## 2021-04-29 DIAGNOSIS — I25.10 CORONARY ARTERIOSCLEROSIS IN NATIVE ARTERY: ICD-10-CM

## 2021-04-29 PROCEDURE — 99214 OFFICE O/P EST MOD 30 MIN: CPT | Performed by: NURSE PRACTITIONER

## 2021-04-30 ENCOUNTER — HOSPITAL ENCOUNTER (OUTPATIENT)
Dept: CT IMAGING | Facility: HOSPITAL | Age: 86
Discharge: HOME | End: 2021-04-30
Admitting: NURSE PRACTITIONER

## 2021-04-30 VITALS
DIASTOLIC BLOOD PRESSURE: 57 MMHG | SYSTOLIC BLOOD PRESSURE: 99 MMHG | RESPIRATION RATE: 16 BRPM | HEART RATE: 65 BPM | OXYGEN SATURATION: 92 %

## 2021-04-30 DIAGNOSIS — K74.60 CIRRHOSIS OF LIVER WITH ASCITES, UNSPECIFIED HEPATIC CIRRHOSIS TYPE (HCC): ICD-10-CM

## 2021-04-30 DIAGNOSIS — R18.8 CIRRHOSIS OF LIVER WITH ASCITES, UNSPECIFIED HEPATIC CIRRHOSIS TYPE (HCC): ICD-10-CM

## 2021-04-30 PROCEDURE — 25010000003 LIDOCAINE 1 % SOLUTION: Performed by: NURSE PRACTITIONER

## 2021-04-30 PROCEDURE — C1729 CATH, DRAINAGE: HCPCS

## 2021-04-30 PROCEDURE — 75989 ABSCESS DRAINAGE UNDER X-RAY: CPT

## 2021-04-30 RX ORDER — LIDOCAINE HYDROCHLORIDE 10 MG/ML
20 INJECTION, SOLUTION INFILTRATION; PERINEURAL ONCE
Status: COMPLETED | OUTPATIENT
Start: 2021-04-30 | End: 2021-04-30

## 2021-04-30 RX ORDER — SODIUM CHLORIDE 0.9 % (FLUSH) 0.9 %
10 SYRINGE (ML) INJECTION AS NEEDED
Status: DISCONTINUED | OUTPATIENT
Start: 2021-04-30 | End: 2021-05-01 | Stop reason: HOSPADM

## 2021-04-30 RX ORDER — SODIUM CHLORIDE 0.9 % (FLUSH) 0.9 %
3 SYRINGE (ML) INJECTION EVERY 12 HOURS SCHEDULED
Status: DISCONTINUED | OUTPATIENT
Start: 2021-04-30 | End: 2021-05-01 | Stop reason: HOSPADM

## 2021-04-30 RX ADMIN — LIDOCAINE HYDROCHLORIDE 20 ML: 10 INJECTION, SOLUTION INFILTRATION; PERINEURAL at 11:58

## 2021-05-03 ENCOUNTER — TELEPHONE (OUTPATIENT)
Dept: INFUSION THERAPY | Facility: HOSPITAL | Age: 86
End: 2021-05-03

## 2021-05-04 RX ORDER — CARVEDILOL 6.25 MG/1
TABLET ORAL
Qty: 60 TABLET | Refills: 0 | Status: SHIPPED | OUTPATIENT
Start: 2021-05-04 | End: 2021-05-24 | Stop reason: SDUPTHER

## 2021-05-07 ENCOUNTER — APPOINTMENT (OUTPATIENT)
Dept: CT IMAGING | Facility: HOSPITAL | Age: 86
End: 2021-05-07

## 2021-05-18 ENCOUNTER — OFFICE VISIT (OUTPATIENT)
Dept: FAMILY MEDICINE CLINIC | Facility: CLINIC | Age: 86
End: 2021-05-18

## 2021-05-18 VITALS
OXYGEN SATURATION: 99 % | DIASTOLIC BLOOD PRESSURE: 60 MMHG | HEIGHT: 71 IN | BODY MASS INDEX: 28.84 KG/M2 | HEART RATE: 65 BPM | WEIGHT: 206 LBS | SYSTOLIC BLOOD PRESSURE: 122 MMHG

## 2021-05-18 DIAGNOSIS — E78.2 MIXED HYPERLIPIDEMIA: ICD-10-CM

## 2021-05-18 DIAGNOSIS — R73.03 PRE-DIABETES: ICD-10-CM

## 2021-05-18 DIAGNOSIS — I10 ESSENTIAL HYPERTENSION: Primary | ICD-10-CM

## 2021-05-18 PROCEDURE — 99213 OFFICE O/P EST LOW 20 MIN: CPT | Performed by: INTERNAL MEDICINE

## 2021-05-18 NOTE — PROGRESS NOTES
"Jordi Sam  1934  [id number]  Patient Care Team:  Jasper Avery MD as PCP - General (Internal Medicine)  Efrem Posadas MD as Consulting Physician (Cardiology)  Jordi Law MD as Consulting Physician (Urology)  Chapin Villagran MD as Consulting Physician (Otolaryngology)  Gen Laurent MD as Consulting Physician (Gastroenterology)    Jordi Sam is a 87 y.o. male here today for follow up.     This patient is accompanied by their self who contributes to the history of their care.    Chief Complaint:    Chief Complaint   Patient presents with   • Toe Pain     rt great toe had a ingrown toe nail that was worked on yesterday by the foot doctor         History of Present Illness:  I have reviewed and/or updated the patient's past medical, past surgical, family, social history, problem list and allergies as appropriate.         Review of Systems:    Review of Systems   Constitutional: Positive for fatigue.   Respiratory: Positive for shortness of breath.    Cardiovascular: Negative for leg swelling.   Endocrine: Negative for polydipsia and polyuria.   Genitourinary: Negative.        Vitals:    05/18/21 1138   BP: 122/60   Pulse: 65   SpO2: 99%   Weight: 93.4 kg (206 lb)   Height: 180.3 cm (71\")     Body mass index is 28.73 kg/m².      Current Outpatient Medications:   •  apixaban (ELIQUIS) 2.5 MG tablet tablet, Take 2.5 mg by mouth 2 (Two) Times a Day., Disp: , Rfl:   •  carvedilol (COREG) 6.25 MG tablet, TAKE 1 TABLET BY MOUTH TWICE DAILY WITH MEALS, Disp: 60 tablet, Rfl: 0  •  folic acid (FOLVITE) 400 MCG tablet, Take 400 mcg by mouth Daily., Disp: , Rfl:   •  furosemide (LASIX) 40 MG tablet, Take 1 tablet by mouth twice daily, Disp: 180 tablet, Rfl: 1  •  HYDROcodone-acetaminophen (Norco) 5-325 MG per tablet, Take 1 tablet by mouth Every 6 (Six) Hours As Needed (foot pain)., Disp: 40 tablet, Rfl: 0  •  magnesium oxide (MAG-OX) 400 MG tablet, Take 1 tablet by mouth Daily., " Disp: 30 tablet, Rfl: 6  •  nitroglycerin (NITROSTAT) 0.4 MG SL tablet, DISSOLVE ONE TABLET UNDER THE TONGUE EVERY 5 MINUTES AS NEEDED FOR CHEST PAIN.  DO NOT EXCEED A TOTAL OF 3 DOSES IN 15 MINUTES, Disp: 25 tablet, Rfl: 3  •  simvastatin (ZOCOR) 40 MG tablet, Take 1 tablet by mouth once daily, Disp: 90 tablet, Rfl: 1  •  spironolactone (ALDACTONE) 50 MG tablet, Take 1 tablet by mouth Daily., Disp: 90 tablet, Rfl: 2  •  tamsulosin (FLOMAX) 0.4 MG capsule 24 hr capsule, Take 1 capsule by mouth Daily., Disp: , Rfl:     Physical Exam:    Physical Exam  Vitals and nursing note reviewed.   Constitutional:       General: He is not in acute distress.     Appearance: He is well-developed. He is not diaphoretic.   HENT:      Head: Normocephalic and atraumatic.      Right Ear: External ear normal.      Left Ear: External ear normal.      Mouth/Throat:      Pharynx: No oropharyngeal exudate.   Eyes:      General: No scleral icterus.        Right eye: No discharge.      Conjunctiva/sclera: Conjunctivae normal.   Neck:      Thyroid: No thyromegaly.      Vascular: No JVD.      Trachea: No tracheal deviation.   Cardiovascular:      Rate and Rhythm: Normal rate and regular rhythm.      Heart sounds: Normal heart sounds.      Comments: PMI nondisplaced  Pulmonary:      Effort: Pulmonary effort is normal.      Breath sounds: Normal breath sounds. No wheezing or rales.   Abdominal:      General: Bowel sounds are normal.      Palpations: Abdomen is soft.      Tenderness: There is no abdominal tenderness. There is no guarding or rebound.   Musculoskeletal:      Cervical back: Normal range of motion and neck supple.   Lymphadenopathy:      Cervical: No cervical adenopathy.   Skin:     General: Skin is warm and dry.      Capillary Refill: Capillary refill takes less than 2 seconds.      Coloration: Skin is not pale.      Findings: No rash.   Neurological:      Mental Status: He is alert and oriented to person, place, and time.       Motor: No abnormal muscle tone.      Coordination: Coordination normal.   Psychiatric:         Judgment: Judgment normal.         Procedures    Results Review:    I reviewed the patient's new clinical results.    Assessment/Plan:   Foot pain resolved. Suspect gout. Observation for now  Problem List Items Addressed This Visit        Cardiac and Vasculature    Mixed hyperlipidemia    Relevant Medications    simvastatin (ZOCOR) 40 MG tablet    Essential hypertension - Primary    Relevant Medications    furosemide (LASIX) 40 MG tablet    spironolactone (ALDACTONE) 50 MG tablet    carvedilol (COREG) 6.25 MG tablet       Endocrine and Metabolic    Pre-diabetes          Plan of care reviewed with patient at the conclusion of today's visit. Education was provided regarding diagnosis and management.  Patient verbalizes understanding of and agreement with management plan.    Return in about 3 months (around 8/18/2021).    Jasper Avery MD    Please note that portions of this note may have been completed with a voice recognition program. Efforts were made to edit the dictations, but occasionally words are mistranscribed.

## 2021-05-24 ENCOUNTER — LAB (OUTPATIENT)
Dept: LAB | Facility: HOSPITAL | Age: 86
End: 2021-05-24

## 2021-05-24 DIAGNOSIS — I50.812 CHRONIC RIGHT HEART FAILURE (HCC): ICD-10-CM

## 2021-05-24 DIAGNOSIS — N18.30 STAGE 3 CHRONIC KIDNEY DISEASE, UNSPECIFIED WHETHER STAGE 3A OR 3B CKD (HCC): ICD-10-CM

## 2021-05-24 DIAGNOSIS — I50.812 CHRONIC RIGHT-SIDED HEART FAILURE (HCC): ICD-10-CM

## 2021-05-24 LAB
ALBUMIN SERPL-MCNC: 3.4 G/DL (ref 3.5–5.2)
ALBUMIN/GLOB SERPL: 1.3 G/DL
ALP SERPL-CCNC: 87 U/L (ref 39–117)
ALT SERPL W P-5'-P-CCNC: 12 U/L (ref 1–41)
ANION GAP SERPL CALCULATED.3IONS-SCNC: 9.6 MMOL/L (ref 5–15)
AST SERPL-CCNC: 19 U/L (ref 1–40)
BASOPHILS # BLD AUTO: 0.03 10*3/MM3 (ref 0–0.2)
BASOPHILS NFR BLD AUTO: 0.6 % (ref 0–1.5)
BILIRUB SERPL-MCNC: 0.6 MG/DL (ref 0–1.2)
BUN SERPL-MCNC: 27 MG/DL (ref 8–23)
BUN/CREAT SERPL: 19.7 (ref 7–25)
CALCIUM SPEC-SCNC: 8.8 MG/DL (ref 8.6–10.5)
CHLORIDE SERPL-SCNC: 103 MMOL/L (ref 98–107)
CO2 SERPL-SCNC: 25.4 MMOL/L (ref 22–29)
CREAT SERPL-MCNC: 1.37 MG/DL (ref 0.76–1.27)
DEPRECATED RDW RBC AUTO: 48.3 FL (ref 37–54)
EOSINOPHIL # BLD AUTO: 0.07 10*3/MM3 (ref 0–0.4)
EOSINOPHIL NFR BLD AUTO: 1.3 % (ref 0.3–6.2)
ERYTHROCYTE [DISTWIDTH] IN BLOOD BY AUTOMATED COUNT: 14.2 % (ref 12.3–15.4)
GFR SERPL CREATININE-BSD FRML MDRD: 49 ML/MIN/1.73
GLOBULIN UR ELPH-MCNC: 2.7 GM/DL
GLUCOSE SERPL-MCNC: 113 MG/DL (ref 65–99)
HCT VFR BLD AUTO: 38.7 % (ref 37.5–51)
HGB BLD-MCNC: 12.7 G/DL (ref 13–17.7)
IMM GRANULOCYTES # BLD AUTO: 0.02 10*3/MM3 (ref 0–0.05)
IMM GRANULOCYTES NFR BLD AUTO: 0.4 % (ref 0–0.5)
LYMPHOCYTES # BLD AUTO: 0.72 10*3/MM3 (ref 0.7–3.1)
LYMPHOCYTES NFR BLD AUTO: 13.8 % (ref 19.6–45.3)
MCH RBC QN AUTO: 30.8 PG (ref 26.6–33)
MCHC RBC AUTO-ENTMCNC: 32.8 G/DL (ref 31.5–35.7)
MCV RBC AUTO: 93.7 FL (ref 79–97)
MONOCYTES # BLD AUTO: 0.51 10*3/MM3 (ref 0.1–0.9)
MONOCYTES NFR BLD AUTO: 9.8 % (ref 5–12)
NEUTROPHILS NFR BLD AUTO: 3.87 10*3/MM3 (ref 1.7–7)
NEUTROPHILS NFR BLD AUTO: 74.1 % (ref 42.7–76)
NRBC BLD AUTO-RTO: 0 /100 WBC (ref 0–0.2)
PLATELET # BLD AUTO: 118 10*3/MM3 (ref 140–450)
PMV BLD AUTO: 11.2 FL (ref 6–12)
POTASSIUM SERPL-SCNC: 4.5 MMOL/L (ref 3.5–5.2)
PROT SERPL-MCNC: 6.1 G/DL (ref 6–8.5)
RBC # BLD AUTO: 4.13 10*6/MM3 (ref 4.14–5.8)
SODIUM SERPL-SCNC: 138 MMOL/L (ref 136–145)
WBC # BLD AUTO: 5.22 10*3/MM3 (ref 3.4–10.8)

## 2021-05-24 PROCEDURE — 85025 COMPLETE CBC W/AUTO DIFF WBC: CPT

## 2021-05-24 PROCEDURE — 80053 COMPREHEN METABOLIC PANEL: CPT

## 2021-05-24 PROCEDURE — 36415 COLL VENOUS BLD VENIPUNCTURE: CPT

## 2021-05-24 RX ORDER — CARVEDILOL 6.25 MG/1
TABLET ORAL
Qty: 60 TABLET | Refills: 0 | Status: SHIPPED | OUTPATIENT
Start: 2021-05-24 | End: 2021-06-14

## 2021-05-28 ENCOUNTER — HOSPITAL ENCOUNTER (OUTPATIENT)
Dept: INTERVENTIONAL RADIOLOGY/VASCULAR | Facility: HOSPITAL | Age: 86
Discharge: HOME | End: 2021-05-28
Admitting: NURSE PRACTITIONER

## 2021-05-28 VITALS
BODY MASS INDEX: 29.79 KG/M2 | OXYGEN SATURATION: 98 % | SYSTOLIC BLOOD PRESSURE: 111 MMHG | HEIGHT: 71 IN | HEART RATE: 59 BPM | DIASTOLIC BLOOD PRESSURE: 93 MMHG | WEIGHT: 212.8 LBS | RESPIRATION RATE: 16 BRPM | TEMPERATURE: 97.8 F

## 2021-05-28 DIAGNOSIS — K74.60 CIRRHOSIS OF LIVER WITH ASCITES, UNSPECIFIED HEPATIC CIRRHOSIS TYPE (HCC): ICD-10-CM

## 2021-05-28 DIAGNOSIS — R18.8 CIRRHOSIS OF LIVER WITH ASCITES, UNSPECIFIED HEPATIC CIRRHOSIS TYPE (HCC): ICD-10-CM

## 2021-05-28 PROCEDURE — 25010000003 LIDOCAINE 1 % SOLUTION: Performed by: RADIOLOGY

## 2021-05-28 PROCEDURE — 76942 ECHO GUIDE FOR BIOPSY: CPT

## 2021-05-28 PROCEDURE — C1729 CATH, DRAINAGE: HCPCS

## 2021-05-28 PROCEDURE — 49083 ABD PARACENTESIS W/IMAGING: CPT | Performed by: RADIOLOGY

## 2021-05-28 RX ORDER — LIDOCAINE HYDROCHLORIDE 10 MG/ML
10 INJECTION, SOLUTION INFILTRATION; PERINEURAL ONCE
Status: COMPLETED | OUTPATIENT
Start: 2021-05-28 | End: 2021-05-28

## 2021-05-28 RX ADMIN — LIDOCAINE HYDROCHLORIDE 10 ML: 10 INJECTION, SOLUTION INFILTRATION; PERINEURAL at 11:00

## 2021-05-28 NOTE — NURSING NOTE
Patient placed on cardiac monitors, vitals stable. Time out complete completed; A total volume of 3.8 L serous fluid from the peritoneum. Patient tolerated well. Report to IRR.   
no

## 2021-05-28 NOTE — POST-PROCEDURE NOTE
Interventional Radiology Operative Note    Date: 05/28/21     Time: 18:55 EDT     Pre-op Diagnosis: Large ascites  Post-op Diagnosis: same    Procedure: US guided paracentesis    Surgeon: Corrina Bryson MD   Assistants: None    Sedation: None.    Estimated Blood Loss (EBL): Trace     Urine Output (UOP): N/A (short procedure)    IVF: N/A (short procedure)    Findings: Large ascites    Specimens: 3800 mL yellow ascitic fluid    Complications: None.    Disposition: Patient to recovery.

## 2021-06-01 ENCOUNTER — TELEPHONE (OUTPATIENT)
Dept: INFUSION THERAPY | Facility: HOSPITAL | Age: 86
End: 2021-06-01

## 2021-06-04 ENCOUNTER — TELEPHONE (OUTPATIENT)
Dept: FAMILY MEDICINE CLINIC | Facility: CLINIC | Age: 86
End: 2021-06-04

## 2021-06-04 RX ORDER — METHYLPREDNISOLONE 4 MG/1
TABLET ORAL
Qty: 21 TABLET | Refills: 0 | Status: SHIPPED | OUTPATIENT
Start: 2021-06-04 | End: 2021-06-09

## 2021-06-04 RX ORDER — COLCHICINE 0.6 MG/1
0.6 TABLET ORAL DAILY
Qty: 30 TABLET | Refills: 6 | Status: SHIPPED | OUTPATIENT
Start: 2021-06-04 | End: 2022-03-25

## 2021-06-04 NOTE — TELEPHONE ENCOUNTER
Caller: Jordi Sam    Relationship: Self    Best call back number: [phone]    What medication are you requesting: SOMETHING FOR GOUT      Have you had these symptoms before:    [x] Yes  [] No    Have you been treated for these symptoms before:   [x] Yes  [] No    If a prescription is needed, what is your preferred pharmacy and phone number:      [facility]

## 2021-06-04 NOTE — TELEPHONE ENCOUNTER
Trey at [facility] called back    He called regarding a severe drug interaction notice with the colchicine. Trey states that the colchicine may cause long term kidney problems due to the patient taking it with Carvedilol and Simvastatin. Trey is asking if the patient can be switched from Colchicine to Allopurinol. Trey states that there are no drug interactions with the Allopurinol. Please advise.     Trey stated that patient attempted to  the medication however Trey told him he would call him after speaking with his pcp first    Please advise asap     Trey @ [facility]  [phone]

## 2021-06-04 NOTE — TELEPHONE ENCOUNTER
Trey at [facility] in [facility] called regarding a severe drug interaction notice with the colchicine. Trey states that the colchicine may cause long term kidney problems due to the patient taking it with Carvedilol and Simvastatin. Trey is asking if the patient can be switched from Colchicine to Allopurinol. Trey states that there are no drug interactions with the Allopurinol. Please advise.     Trey @ [facility]  [phone]

## 2021-06-14 DIAGNOSIS — E78.2 MIXED HYPERLIPIDEMIA: ICD-10-CM

## 2021-06-14 RX ORDER — CARVEDILOL 6.25 MG/1
TABLET ORAL
Qty: 60 TABLET | Refills: 0 | Status: SHIPPED | OUTPATIENT
Start: 2021-06-14 | End: 2021-07-02 | Stop reason: SDUPTHER

## 2021-06-14 RX ORDER — FUROSEMIDE 40 MG/1
TABLET ORAL
Qty: 180 TABLET | Refills: 0 | Status: SHIPPED | OUTPATIENT
Start: 2021-06-14 | End: 2021-09-27

## 2021-06-14 RX ORDER — SIMVASTATIN 40 MG
TABLET ORAL
Qty: 90 TABLET | Refills: 0 | Status: SHIPPED | OUTPATIENT
Start: 2021-06-14 | End: 2021-09-14

## 2021-06-14 NOTE — TELEPHONE ENCOUNTER
Lab Results   Component Value Date    GLUCOSE 113 (H) 05/24/2021    CALCIUM 8.8 05/24/2021     05/24/2021    K 4.5 05/24/2021    CO2 25.4 05/24/2021     05/24/2021    BUN 27 (H) 05/24/2021    CREATININE 1.37 (H) 05/24/2021    EGFRIFNONA 49 (L) 05/24/2021    BCR 19.7 05/24/2021    ANIONGAP 9.6 05/24/2021    Next appt 7/8/2021

## 2021-06-17 ENCOUNTER — TELEPHONE (OUTPATIENT)
Dept: FAMILY MEDICINE CLINIC | Facility: CLINIC | Age: 86
End: 2021-06-17

## 2021-06-22 ENCOUNTER — OFFICE VISIT (OUTPATIENT)
Dept: GASTROENTEROLOGY | Facility: CLINIC | Age: 86
End: 2021-06-22

## 2021-06-22 VITALS
OXYGEN SATURATION: 99 % | SYSTOLIC BLOOD PRESSURE: 110 MMHG | HEART RATE: 80 BPM | BODY MASS INDEX: 28.66 KG/M2 | RESPIRATION RATE: 18 BRPM | HEIGHT: 71 IN | WEIGHT: 204.7 LBS | DIASTOLIC BLOOD PRESSURE: 59 MMHG

## 2021-06-22 DIAGNOSIS — K74.60 CIRRHOSIS OF LIVER WITH ASCITES, UNSPECIFIED HEPATIC CIRRHOSIS TYPE (HCC): Primary | ICD-10-CM

## 2021-06-22 DIAGNOSIS — R18.8 CIRRHOSIS OF LIVER WITH ASCITES, UNSPECIFIED HEPATIC CIRRHOSIS TYPE (HCC): Primary | ICD-10-CM

## 2021-06-22 PROCEDURE — 99214 OFFICE O/P EST MOD 30 MIN: CPT | Performed by: NURSE PRACTITIONER

## 2021-06-25 ENCOUNTER — LAB (OUTPATIENT)
Dept: LAB | Facility: HOSPITAL | Age: 86
End: 2021-06-25

## 2021-06-25 DIAGNOSIS — I50.812 CHRONIC RIGHT HEART FAILURE (HCC): ICD-10-CM

## 2021-06-25 LAB
ALBUMIN SERPL-MCNC: 3.9 G/DL (ref 3.5–5.2)
ALBUMIN/GLOB SERPL: 1.9 G/DL
ALP SERPL-CCNC: 89 U/L (ref 39–117)
ALT SERPL W P-5'-P-CCNC: 12 U/L (ref 1–41)
ANION GAP SERPL CALCULATED.3IONS-SCNC: 10.8 MMOL/L (ref 5–15)
AST SERPL-CCNC: 20 U/L (ref 1–40)
BASOPHILS # BLD AUTO: 0.03 10*3/MM3 (ref 0–0.2)
BASOPHILS NFR BLD AUTO: 0.6 % (ref 0–1.5)
BILIRUB SERPL-MCNC: 0.5 MG/DL (ref 0–1.2)
BUN SERPL-MCNC: 29 MG/DL (ref 8–23)
BUN/CREAT SERPL: 19.2 (ref 7–25)
CALCIUM SPEC-SCNC: 8.7 MG/DL (ref 8.6–10.5)
CHLORIDE SERPL-SCNC: 100 MMOL/L (ref 98–107)
CO2 SERPL-SCNC: 28.2 MMOL/L (ref 22–29)
CREAT SERPL-MCNC: 1.51 MG/DL (ref 0.76–1.27)
DEPRECATED RDW RBC AUTO: 49.2 FL (ref 37–54)
EOSINOPHIL # BLD AUTO: 0.12 10*3/MM3 (ref 0–0.4)
EOSINOPHIL NFR BLD AUTO: 2.5 % (ref 0.3–6.2)
ERYTHROCYTE [DISTWIDTH] IN BLOOD BY AUTOMATED COUNT: 14.5 % (ref 12.3–15.4)
GFR SERPL CREATININE-BSD FRML MDRD: 44 ML/MIN/1.73
GLOBULIN UR ELPH-MCNC: 2.1 GM/DL
GLUCOSE SERPL-MCNC: 199 MG/DL (ref 65–99)
HCT VFR BLD AUTO: 38.8 % (ref 37.5–51)
HGB BLD-MCNC: 12.8 G/DL (ref 13–17.7)
IMM GRANULOCYTES # BLD AUTO: 0.02 10*3/MM3 (ref 0–0.05)
IMM GRANULOCYTES NFR BLD AUTO: 0.4 % (ref 0–0.5)
LYMPHOCYTES # BLD AUTO: 0.71 10*3/MM3 (ref 0.7–3.1)
LYMPHOCYTES NFR BLD AUTO: 14.5 % (ref 19.6–45.3)
MCH RBC QN AUTO: 30.5 PG (ref 26.6–33)
MCHC RBC AUTO-ENTMCNC: 33 G/DL (ref 31.5–35.7)
MCV RBC AUTO: 92.6 FL (ref 79–97)
MONOCYTES # BLD AUTO: 0.32 10*3/MM3 (ref 0.1–0.9)
MONOCYTES NFR BLD AUTO: 6.6 % (ref 5–12)
NEUTROPHILS NFR BLD AUTO: 3.68 10*3/MM3 (ref 1.7–7)
NEUTROPHILS NFR BLD AUTO: 75.4 % (ref 42.7–76)
NRBC BLD AUTO-RTO: 0 /100 WBC (ref 0–0.2)
PLATELET # BLD AUTO: 99 10*3/MM3 (ref 140–450)
PMV BLD AUTO: 11.5 FL (ref 6–12)
POTASSIUM SERPL-SCNC: 4.7 MMOL/L (ref 3.5–5.2)
PROT SERPL-MCNC: 6 G/DL (ref 6–8.5)
RBC # BLD AUTO: 4.19 10*6/MM3 (ref 4.14–5.8)
SODIUM SERPL-SCNC: 139 MMOL/L (ref 136–145)
WBC # BLD AUTO: 4.88 10*3/MM3 (ref 3.4–10.8)

## 2021-06-25 PROCEDURE — 85025 COMPLETE CBC W/AUTO DIFF WBC: CPT

## 2021-06-25 PROCEDURE — 36415 COLL VENOUS BLD VENIPUNCTURE: CPT

## 2021-06-25 PROCEDURE — 80053 COMPREHEN METABOLIC PANEL: CPT

## 2021-06-28 ENCOUNTER — HOSPITAL ENCOUNTER (OUTPATIENT)
Dept: INTERVENTIONAL RADIOLOGY/VASCULAR | Facility: HOSPITAL | Age: 86
Discharge: HOME | End: 2021-06-28
Admitting: NURSE PRACTITIONER

## 2021-06-28 VITALS
BODY MASS INDEX: 28.87 KG/M2 | WEIGHT: 206.2 LBS | TEMPERATURE: 97.4 F | SYSTOLIC BLOOD PRESSURE: 100 MMHG | DIASTOLIC BLOOD PRESSURE: 50 MMHG | OXYGEN SATURATION: 95 % | HEART RATE: 52 BPM | RESPIRATION RATE: 20 BRPM | HEIGHT: 71 IN

## 2021-06-28 DIAGNOSIS — R18.8 CIRRHOSIS OF LIVER WITH ASCITES, UNSPECIFIED HEPATIC CIRRHOSIS TYPE (HCC): ICD-10-CM

## 2021-06-28 DIAGNOSIS — K74.60 CIRRHOSIS OF LIVER WITH ASCITES, UNSPECIFIED HEPATIC CIRRHOSIS TYPE (HCC): ICD-10-CM

## 2021-06-28 PROCEDURE — 25010000003 LIDOCAINE 1 % SOLUTION: Performed by: RADIOLOGY

## 2021-06-28 PROCEDURE — 76942 ECHO GUIDE FOR BIOPSY: CPT

## 2021-06-28 PROCEDURE — 49083 ABD PARACENTESIS W/IMAGING: CPT | Performed by: RADIOLOGY

## 2021-06-28 PROCEDURE — C1729 CATH, DRAINAGE: HCPCS

## 2021-06-28 RX ORDER — LIDOCAINE HYDROCHLORIDE 10 MG/ML
10 INJECTION, SOLUTION INFILTRATION; PERINEURAL ONCE
Status: COMPLETED | OUTPATIENT
Start: 2021-06-28 | End: 2021-06-28

## 2021-06-28 RX ADMIN — LIDOCAINE HYDROCHLORIDE 10 ML: 10 INJECTION, SOLUTION INFILTRATION; PERINEURAL at 11:56

## 2021-06-28 NOTE — POST-PROCEDURE NOTE
Interventional Radiology Operative Note    Date: 06/28/21     Time: 18:06 EDT     Pre-op Diagnosis: Large ascites  Post-op Diagnosis: same    Procedure: US guided paracentesis    Surgeon: Corrina Bryson MD   Assistants: None    Sedation: None.    Estimated Blood Loss (EBL): Trace     Urine Output (UOP): N/A (short procedure)    IVF: N/A (short procedure)    Findings: Large ascites    Specimens: 2700 mL yellow ascitic fluid    Complications: None.     Disposition: Patient to recovery.

## 2021-06-28 NOTE — PROGRESS NOTES
GASTROENTEROLOGY OFFICE NOTE  Jordi Sam  [id number]  1934    CARE TEAM  Patient Care Team:  Jasper Avery MD as PCP - General (Internal Medicine)  Efrem Posadas MD as Consulting Physician (Cardiology)  Jordi Law MD as Consulting Physician (Urology)  Chapin Villagran MD as Consulting Physician (Otolaryngology)  Gen Laurent MD as Consulting Physician (Gastroenterology)    Referring Provider: Jasper Avery MD    Chief Complaint   Patient presents with   • Cirrhosis        HISTORY OF PRESENT ILLNESS:  Mr. 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     He is currently taking Lasix 40 mg twice daily and Aldactone 50 mg daily as directed by cardiology.  He has had problems with acute kidney injury related to diuretics.  He continues to require paracentesis about every 4 weeks.     He denies any evidence of GI bleeding.  He has not had surveillance for esophageal or gastric varices as the risk versus benefit is greater for EGD and he does currently take Coreg secondary to cardiac history.     He denies any changes in state of consciousness, intellectual function, personality or behavior has no history of hepatic encephalopathy.     Last abdominal imaging was an ultrasound in February 2020 that showed cirrhotic appearance of the liver with no underlying mass or lesion.     He continues to feel better than he has in a long time.  Overall he seems to be doing very well.    PAST MEDICAL HISTORY  Past Medical History:   Diagnosis Date   • A-fib (CMS/HCC)    • Acute inferior myocardial infarction (CMS/HCC)     Acute inferior STEMI with RV infarct features, January 2009.    • CAD (coronary artery disease)    • Dyslipidemia    •  Goiter     History of “goiter.”   • Hyperlipidemia    • Hypertension    • Nephrolithiasis    • Type 2 diabetes mellitus (CMS/HCC)         PAST SURGICAL HISTORY  Past Surgical History:   Procedure Laterality Date   • BRONCHOSCOPY N/A 4/12/2019    Procedure: BRONCHOSCOPY WITH THORACENTESIS;  Surgeon: Marciano Higginbotham MD;  Location:  [facility];  Service: Pulmonary   • CARDIAC CATHETERIZATION Bilateral 1/30/2019    Procedure: Right and Left Heart Cath;  Surgeon: Efrem Posadas MD;  Location:  [facility];  Service: Cardiology   • CARDIAC CATHETERIZATION     • CHOLECYSTECTOMY     • CORONARY ANGIOPLASTY WITH STENT PLACEMENT  01/09/2009    Sirolimus eluting stents to the RCA, 01/09/2009.    • HERNIA REPAIR      Hernia repair x 2.    • PARACENTESIS  06/17/2019    multiple   • UMBILICAL HERNIA REPAIR N/A 7/26/2020    Procedure: UMBILICAL HERNIA REPAIR;  Surgeon: Maribell Her MD;  Location:  [facility];  Service: General;  Laterality: N/A;        MEDICATIONS:    Current Outpatient Medications:   •  apixaban (ELIQUIS) 2.5 MG tablet tablet, Take 2.5 mg by mouth 2 (Two) Times a Day., Disp: , Rfl:   •  carvedilol (COREG) 6.25 MG tablet, TAKE 1 TABLET BY MOUTH TWICE DAILY WITH MEALS, Disp: 60 tablet, Rfl: 0  •  colchicine 0.6 MG tablet, Take 1 tablet by mouth Daily., Disp: 30 tablet, Rfl: 6  •  folic acid (FOLVITE) 400 MCG tablet, Take 400 mcg by mouth Daily., Disp: , Rfl:   •  furosemide (LASIX) 40 MG tablet, Take 1 tablet by mouth twice daily, Disp: 180 tablet, Rfl: 0  •  HYDROcodone-acetaminophen (Norco) 5-325 MG per tablet, Take 1 tablet by mouth Every 6 (Six) Hours As Needed (foot pain)., Disp: 40 tablet, Rfl: 0  •  magnesium oxide (MAG-OX) 400 MG tablet, Take 1 tablet by mouth Daily., Disp: 30 tablet, Rfl: 6  •  nitroglycerin (NITROSTAT) 0.4 MG SL tablet, DISSOLVE ONE TABLET UNDER THE TONGUE EVERY 5 MINUTES AS NEEDED FOR CHEST PAIN.  DO NOT EXCEED A TOTAL OF 3 DOSES IN 15 MINUTES,  "Disp: 25 tablet, Rfl: 3  •  simvastatin (ZOCOR) 40 MG tablet, Take 1 tablet by mouth once daily, Disp: 90 tablet, Rfl: 0  •  spironolactone (ALDACTONE) 50 MG tablet, Take 1 tablet by mouth Daily., Disp: 90 tablet, Rfl: 2  •  tamsulosin (FLOMAX) 0.4 MG capsule 24 hr capsule, Take 1 capsule by mouth Daily., Disp: , Rfl:     ALLERGIES  Allergies   Allergen Reactions   • Bee Venom Swelling   • Sulfamethoxazole-Trimethoprim Other (See Comments)     Pt unaware       FAMILY HISTORY:  Family History   Problem Relation Age of Onset   • Dementia Mother    • Stroke Mother    • Heart disease Father    • Heart attack Father    • Aneurysm Father    • Cancer Sister    • No Known Problems Brother    • No Known Problems Sister    • Heart disease Brother         CABG   • Hypertension Daughter    • Hypertension Son    • Colon cancer Neg Hx    • Colon polyps Neg Hx        SOCIAL HISTORY  Social History     Socioeconomic History   • Marital status:      Spouse name: Melly   • Number of children: 4   • Years of education: Not on file   • Highest education level: Not on file   Tobacco Use   • Smoking status: Former Smoker     Packs/day: 2.50     Years: 28.00     Pack years: 70.00     Types: Cigarettes     Quit date: 1983     Years since quittin.1   • Smokeless tobacco: Never Used   Substance and Sexual Activity   • Alcohol use: No   • Drug use: No   • Sexual activity: Defer       REVIEW OF SYSTEMS  Review of Systems   Constitutional: Negative for activity change, appetite change, chills, diaphoresis, fatigue, fever, unexpected weight gain and unexpected weight loss.   HENT: Negative for trouble swallowing and voice change.    Gastrointestinal: Negative for abdominal distention, abdominal pain, anal bleeding, blood in stool, constipation, diarrhea, nausea, rectal pain, vomiting, GERD and indigestion.       PHYSICAL EXAM   /59   Pulse 80   Resp 18   Ht 180.3 cm (70.98\")   Wt 92.9 kg (204 lb 11.2 oz)   SpO2 99%   " BMI 28.56 kg/m²   Physical Exam  Vitals and nursing note reviewed.   Constitutional:       Appearance: Normal appearance. He is well-developed.   HENT:      Head: Normocephalic and atraumatic.      Nose: Nose normal.      Mouth/Throat:      Mouth: Mucous membranes are moist.      Pharynx: Oropharynx is clear.   Eyes:      Pupils: Pupils are equal, round, and reactive to light.   Cardiovascular:      Rate and Rhythm: Normal rate and regular rhythm.   Pulmonary:      Effort: Pulmonary effort is normal.      Breath sounds: Normal breath sounds. No wheezing or rales.   Abdominal:      General: Bowel sounds are normal.      Palpations: Abdomen is soft. There is no mass.      Tenderness: There is no abdominal tenderness. There is no guarding or rebound.      Hernia: No hernia is present.   Musculoskeletal:         General: Normal range of motion.      Cervical back: Normal range of motion and neck supple.      Right lower le+ Edema present.      Left lower le+ Edema present.      Right ankle: Swelling present.      Left ankle: Swelling present.   Skin:     General: Skin is warm and dry.   Neurological:      Mental Status: He is alert and oriented to person, place, and time.      Cranial Nerves: No cranial nerve deficit.   Psychiatric:         Behavior: Behavior normal.         Judgment: Judgment normal.           Results Review:  EXAMINATION: US LIVER-     INDICATION: K74.60-Unspecified cirrhosis of liver; R18.8-Other ascites;  upper abdominal pain.     TECHNIQUE: Sonographic imaging was obtained of the right upper quadrant  in both the sagittal and transverse planes.     COMPARISON: None.     FINDINGS: Pancreas is not well seen. The liver is nodular in its border.  There is fluid surrounding the liver. Findings suggesting cirrhosis.  Gallbladder is surgically absent. The bladder fossa is unremarkable. The  common bile duct measures 5 mm. There is no underlying mass or lesion  seen within the liver. No  intrahepatic biliary ductal dilatation. The  right kidney measures in length from pole to pole 9.1 cm. No solid  cortical mass or renal cortical cyst is seen within the right kidney.   No hydronephrosis or nephrolithiasis.     IMPRESSION:  Free fluid in the right upper quadrant. Cirrhotic appearance  of the liver with no underlying mass or lesion. The remainder of the  right upper quadrant ultrasound is unremarkable.      D:  02/10/2021  E:  02/10/2021     This report was finalized on 2/11/2021 8:12 AM by Dr. Jacki Bautista MD.    ASSESSMENT / PLAN  1. End stage liver disease  -Not likely transplant candidate due to age and comorbidity   -MELDNa 11 (3/24/21 labs)     2. Cirrhosis, decompensated by ascites  -cirrhosis secondary to cardiac dysfunction  - no evidence of hepatic encephalopathy     # Ascites/Pedal edema  Plan:  -Continue Lasix 40 mg BID-as directed by cardiology  -Continue Spironolactone 50 mg daily-as directed by cardiology  -Paracentesis prn  -Low-sodium 2 g/day diet     # Surveillance for Esophageal varices needed  Plan:  -Defer EGD as risk > benefit  -Continue Coreg as directed by cardiology     # Hepatocellular carcinoma surveillance  - Abdominal imaging every six months  Plan:  -US liver in august     # Nutrition  Plan:  -High protein diet  -Low sodium diet 2 gm/day  -Avoid alcohol, avoid NSAIDS    Return in about 6 months (around 12/22/2021).    I discussed the patients findings and my recommendations with patient    MAKAYLA Jimenez

## 2021-06-28 NOTE — NURSING NOTE
Patient placed on cardiac monitors, vitals stable. Images taken and reviewed by Dr. Bryson. A total volume of 2.7 L serous fluid removed from the peritoneum. Patient tolerated well. Report to RN in IRR.

## 2021-06-29 ENCOUNTER — TELEPHONE (OUTPATIENT)
Dept: INFUSION THERAPY | Facility: HOSPITAL | Age: 86
End: 2021-06-29

## 2021-06-29 ENCOUNTER — TELEPHONE (OUTPATIENT)
Dept: FAMILY MEDICINE CLINIC | Facility: CLINIC | Age: 86
End: 2021-06-29

## 2021-07-02 ENCOUNTER — TELEPHONE (OUTPATIENT)
Dept: FAMILY MEDICINE CLINIC | Facility: CLINIC | Age: 86
End: 2021-07-02

## 2021-07-02 RX ORDER — CARVEDILOL 6.25 MG/1
6.25 TABLET ORAL 2 TIMES DAILY WITH MEALS
Qty: 60 TABLET | Refills: 0 | Status: CANCELLED | OUTPATIENT
Start: 2021-07-02

## 2021-07-02 RX ORDER — CARVEDILOL 6.25 MG/1
6.25 TABLET ORAL 2 TIMES DAILY WITH MEALS
Qty: 60 TABLET | Refills: 1 | Status: SHIPPED | OUTPATIENT
Start: 2021-07-02 | End: 2021-08-31

## 2021-07-07 ENCOUNTER — HOSPITAL ENCOUNTER (OUTPATIENT)
Dept: ULTRASOUND IMAGING | Facility: HOSPITAL | Age: 86
Discharge: HOME | End: 2021-07-07
Admitting: NURSE PRACTITIONER

## 2021-07-07 DIAGNOSIS — K74.60 CIRRHOSIS OF LIVER WITH ASCITES, UNSPECIFIED HEPATIC CIRRHOSIS TYPE (HCC): ICD-10-CM

## 2021-07-07 DIAGNOSIS — R18.8 CIRRHOSIS OF LIVER WITH ASCITES, UNSPECIFIED HEPATIC CIRRHOSIS TYPE (HCC): ICD-10-CM

## 2021-07-07 PROCEDURE — 76705 ECHO EXAM OF ABDOMEN: CPT

## 2021-07-20 ENCOUNTER — OFFICE VISIT (OUTPATIENT)
Dept: CARDIOLOGY | Facility: CLINIC | Age: 86
End: 2021-07-20

## 2021-07-20 VITALS
HEART RATE: 65 BPM | DIASTOLIC BLOOD PRESSURE: 52 MMHG | BODY MASS INDEX: 28.84 KG/M2 | HEIGHT: 71 IN | SYSTOLIC BLOOD PRESSURE: 123 MMHG | OXYGEN SATURATION: 98 % | WEIGHT: 206 LBS

## 2021-07-20 DIAGNOSIS — E78.2 MIXED HYPERLIPIDEMIA: ICD-10-CM

## 2021-07-20 DIAGNOSIS — I48.20 CHRONIC ATRIAL FIBRILLATION (HCC): ICD-10-CM

## 2021-07-20 DIAGNOSIS — I10 ESSENTIAL HYPERTENSION: ICD-10-CM

## 2021-07-20 DIAGNOSIS — I25.10 CORONARY ARTERIOSCLEROSIS IN NATIVE ARTERY: ICD-10-CM

## 2021-07-20 DIAGNOSIS — I50.812 CHRONIC RIGHT HEART FAILURE (HCC): Primary | ICD-10-CM

## 2021-07-20 PROCEDURE — 99214 OFFICE O/P EST MOD 30 MIN: CPT | Performed by: INTERNAL MEDICINE

## 2021-07-20 NOTE — PROGRESS NOTES
OFFICE FOLLOW UP     Date of Encounter:2021     Name: Jordi Sam  : 1934  Address: [facility] [address] [facility] [address]    PCP: Jasper Avery MD  [address] [facility] [address]    Jordi Sam is a 87 y.o. male.      Chief Complaint: Follow up of CAD, Afib, Right SHF, HTN    Problem List:   1. Coronary artery disease:  a. Acute inferior STEMI with RV infarct features, 2009.   b. Normal LV function, single vessel  RCA disease.     c. Sirolimus eluting stents to the RCA, 2009.   d. Pericarditis, resolved.   e. LHC, 2012 (for recurrent angina).   i.  Normal LV function.  ii.  of RCA with failed intervention.  f. Stress MPS 2018: Normal   g. LHC 2019: single vessel CAD with  of RCA, normal LVSF   2. 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 Echo 2019: marked enlargement of the right heart, biatrial enlargement, LVSF is normal, large left pleural effusion, severe TR, RVSP 30mmHg  b. Cardiac Cath (): Normal LV, Normal PAP, RV enlargement and decreased RVSF  c. Thoracentesis and Paracentesis March and May 2019  d. PRN therapeutic paracentesis ongoing  3. New onset atrial fibrillation 2019  a. Asymptomatic  b. CHADsVASC= 6, on Eliquis   4. Hypertension.   5. Dyslipidemia.  6. Diabetes mellitus, type 2.  A1C 6.1 (2016)  7. Nephrolithiasis.   8. History of “goiter.”  9. Thrombocytopenia  10. Incarcerated umbilical hernia with repair, 2020 (Dr. ZURITA)  1. Recurrence, Summer 2021  11. Cirrhosis, recent evaluation by [facility]  a. Paracentesis with findings consistent with cardiac ascites, Spring 2019  12. Surgical history:  a. Cholecystectomy.  b. Hernia repair x 2.     Allergies:  Allergies   Allergen Reactions   • Bee Venom Swelling   • Sulfamethoxazole-Trimethoprim Other (See Comments)     Pt unaware       Current Medications:  Current Outpatient Medications   Medication Instructions   •  "apixaban (ELIQUIS) 2.5 mg, Oral, 2 Times Daily   • carvedilol (COREG) 6.25 mg, Oral, 2 Times Daily With Meals   • colchicine 0.6 mg, Oral, Daily   • folic acid (FOLVITE) 400 mcg, Oral, Daily   • furosemide (LASIX) 40 MG tablet Take 1 tablet by mouth twice daily   • HYDROcodone-acetaminophen (Norco) 5-325 MG per tablet 1 tablet, Oral, Every 6 Hours PRN   • magnesium oxide (MAG-OX) 400 mg, Oral, Daily   • nitroglycerin (NITROSTAT) 0.4 MG SL tablet DISSOLVE ONE TABLET UNDER THE TONGUE EVERY 5 MINUTES AS NEEDED FOR CHEST PAIN.  DO NOT EXCEED A TOTAL OF 3 DOSES IN 15 MINUTES   • simvastatin (ZOCOR) 40 MG tablet Take 1 tablet by mouth once daily   • spironolactone (ALDACTONE) 50 mg, Oral, Daily   • tamsulosin (FLOMAX) 0.4 MG capsule 24 hr capsule 1 capsule, Oral, Daily        History of Present Illness:       Jordi Sam returns for scheduled follow up today. He and his wife have been vaccinated. He has done well this year and has routine therapeutic paracentesis. He is compliant with medications listed above. He denies symptoms of angina. He has ascites and LE edema but no exertional dyspnea. He reports his umbilical hernia has recurred and he has not followed up with surgery. He denies GI symptoms at this time.     The following portions of the patient's history were reviewed and updated as appropriate: allergies, current medications and problem list.    ROS: Pertinent positives as listed in the HPI.  All other systems reviewed and negative.    Objective:    Vitals:    07/20/21 1059 07/20/21 1100   BP: 110/53 123/52   BP Location: Left arm Left arm   Patient Position: Sitting Standing   Cuff Size: Adult Adult   Pulse: 56 65   SpO2: 98%    Weight: 93.4 kg (206 lb)    Height: 180.3 cm (71\")      Body mass index is 28.73 kg/m².    Physical Exam:  GENERAL: Alert, cooperative, in no acute distress.   HEENT: Normocephalic, no adenopathy, no jugular venous distention  HEART: No discrete PMI is noted. Regular rhythm, normal " rate, and no murmurs, gallops, or rubs.   LUNGS: Clear to auscultation bilaterally. No wheezing, rales or ronchi.  ABDOMEN: Soft, bowel sounds present, non-tender. Ascites present.  NEUROLOGIC: No focal abnormalities involving strength or sensation are noted.   EXTREMITIES: No clubbing, cyanosis noted. 2+ left 3+ right     Diagnostic Data:    Lab Results   Component Value Date    WBC 4.88 06/25/2021    HGB 12.8 (L) 06/25/2021    HCT 38.8 06/25/2021    MCV 92.6 06/25/2021    PLT 99 (L) 06/25/2021      Lab Results   Component Value Date    GLUCOSE 199 (H) 06/25/2021    BUN 29 (H) 06/25/2021    CREATININE 1.51 (H) 06/25/2021    EGFRIFNONA 44 (L) 06/25/2021    BCR 19.2 06/25/2021    K 4.7 06/25/2021    CO2 28.2 06/25/2021    CALCIUM 8.7 06/25/2021    ALBUMIN 3.90 06/25/2021    AST 20 06/25/2021    ALT 12 06/25/2021      Lab Results   Component Value Date    CHOL 99 07/27/2020    CHLPL 123 05/16/2016    TRIG 56 07/27/2020    HDL 39 (L) 07/27/2020    LDL 49 07/27/2020      Procedures    Assessment and Plan:   1.  CAD:  No reported angina. NO aspirin at this time due to bleeding risk.   2.  Right sided HF: NYHA Class II HF, continue routine paracentesis as needed. Continue BMT (carvedilol, furosemide, and spironolactone)  3.  HLD:  Continue statin at current dose.   4. Umbilical hernia: recurrence and currently asymptomatic. We have asked him to contact Dr. ZURITA's office to arrange follow up.   5.  PAF: Normal rate and rhythm today. Continue Eliquis and carvedilol.    I will see Jordi Sam back in 6 months or sooner on an as needed basis.    Scribed for Efrem Posadas MD by Shahla Jacobsen RN. 07/20/2021 11:59EDT.               EMR Dragon/Transcription Disclaimer:  Much of this encounter note is an electronic transcription/translation of spoken language to printed text.  The electronic translation of spoken language may permit erroneous, or at times, nonsensical words or phrases to be inadvertently transcribed.   Although I have reviewed the note for such errors, some may still exist.

## 2021-08-09 ENCOUNTER — TRANSCRIBE ORDERS (OUTPATIENT)
Dept: ADMINISTRATIVE | Facility: HOSPITAL | Age: 86
End: 2021-08-09

## 2021-08-09 DIAGNOSIS — K43.2 INCISIONAL HERNIA, WITHOUT OBSTRUCTION OR GANGRENE: Primary | ICD-10-CM

## 2021-08-12 ENCOUNTER — HOSPITAL ENCOUNTER (OUTPATIENT)
Dept: ULTRASOUND IMAGING | Facility: HOSPITAL | Age: 86
Discharge: HOME | End: 2021-08-12
Admitting: NURSE PRACTITIONER

## 2021-08-12 VITALS
BODY MASS INDEX: 28.98 KG/M2 | RESPIRATION RATE: 18 BRPM | WEIGHT: 207 LBS | DIASTOLIC BLOOD PRESSURE: 56 MMHG | TEMPERATURE: 96.9 F | OXYGEN SATURATION: 94 % | HEIGHT: 71 IN | HEART RATE: 57 BPM | SYSTOLIC BLOOD PRESSURE: 114 MMHG

## 2021-08-12 DIAGNOSIS — R18.8 CIRRHOSIS OF LIVER WITH ASCITES, UNSPECIFIED HEPATIC CIRRHOSIS TYPE (HCC): ICD-10-CM

## 2021-08-12 DIAGNOSIS — K74.60 CIRRHOSIS OF LIVER WITH ASCITES, UNSPECIFIED HEPATIC CIRRHOSIS TYPE (HCC): ICD-10-CM

## 2021-08-12 LAB
APTT PPP: 35 SECONDS (ref 22–39)
HGB BLD-MCNC: 13.7 G/DL (ref 13–17.7)
INR PPP: 1.13 (ref 0.85–1.16)
PLATELET # BLD AUTO: 120 10*3/MM3 (ref 140–450)
PROTHROMBIN TIME: 14.1 SECONDS (ref 11.4–14.4)

## 2021-08-12 PROCEDURE — 85730 THROMBOPLASTIN TIME PARTIAL: CPT | Performed by: RADIOLOGY

## 2021-08-12 PROCEDURE — 85018 HEMOGLOBIN: CPT | Performed by: RADIOLOGY

## 2021-08-12 PROCEDURE — 76942 ECHO GUIDE FOR BIOPSY: CPT

## 2021-08-12 PROCEDURE — 85610 PROTHROMBIN TIME: CPT | Performed by: RADIOLOGY

## 2021-08-12 PROCEDURE — C1729 CATH, DRAINAGE: HCPCS

## 2021-08-12 PROCEDURE — 85049 AUTOMATED PLATELET COUNT: CPT | Performed by: RADIOLOGY

## 2021-08-12 RX ORDER — LIDOCAINE HYDROCHLORIDE 10 MG/ML
10 INJECTION, SOLUTION EPIDURAL; INFILTRATION; INTRACAUDAL; PERINEURAL ONCE
Status: COMPLETED | OUTPATIENT
Start: 2021-08-12 | End: 2021-08-12

## 2021-08-12 RX ADMIN — LIDOCAINE HYDROCHLORIDE 10 ML: 10 INJECTION, SOLUTION EPIDURAL; INFILTRATION; INTRACAUDAL; PERINEURAL at 10:14

## 2021-08-12 NOTE — NURSING NOTE
Ultrasound guided paracentesis performed by Dr Bartholomew.  2 liters yellow fluid removed.  No labs ordered on fluid.  Pt tolerated well.  Report called to RAVINDRA.

## 2021-08-13 ENCOUNTER — TELEPHONE (OUTPATIENT)
Dept: INFUSION THERAPY | Facility: HOSPITAL | Age: 86
End: 2021-08-13

## 2021-08-17 ENCOUNTER — HOSPITAL ENCOUNTER (OUTPATIENT)
Dept: CT IMAGING | Facility: HOSPITAL | Age: 86
Discharge: HOME | End: 2021-08-17
Admitting: SURGERY

## 2021-08-17 DIAGNOSIS — K43.2 INCISIONAL HERNIA, WITHOUT OBSTRUCTION OR GANGRENE: ICD-10-CM

## 2021-08-17 PROCEDURE — 74176 CT ABD & PELVIS W/O CONTRAST: CPT

## 2021-08-18 ENCOUNTER — OFFICE VISIT (OUTPATIENT)
Dept: FAMILY MEDICINE CLINIC | Facility: CLINIC | Age: 86
End: 2021-08-18

## 2021-08-18 VITALS
SYSTOLIC BLOOD PRESSURE: 118 MMHG | DIASTOLIC BLOOD PRESSURE: 70 MMHG | WEIGHT: 206.2 LBS | BODY MASS INDEX: 28.87 KG/M2 | OXYGEN SATURATION: 92 % | HEIGHT: 71 IN | HEART RATE: 87 BPM

## 2021-08-18 DIAGNOSIS — L57.0 ACTINIC KERATOSIS: Primary | ICD-10-CM

## 2021-08-18 DIAGNOSIS — K72.10 END STAGE LIVER DISEASE (HCC): ICD-10-CM

## 2021-08-18 DIAGNOSIS — I10 ESSENTIAL HYPERTENSION: ICD-10-CM

## 2021-08-18 DIAGNOSIS — K42.9 UMBILICAL HERNIA WITHOUT OBSTRUCTION AND WITHOUT GANGRENE: ICD-10-CM

## 2021-08-18 PROCEDURE — 99214 OFFICE O/P EST MOD 30 MIN: CPT | Performed by: INTERNAL MEDICINE

## 2021-08-18 NOTE — ASSESSMENT & PLAN NOTE
Continue abdominal binder and observation.  I agree as not take good surgical candidate secondary to his recurrent ascites.

## 2021-08-18 NOTE — PROGRESS NOTES
"Jordi Sam  1934  [id number]  Patient Care Team:  Jasper Avery MD as PCP - General (Internal Medicine)  Efrem Posadas MD as Consulting Physician (Cardiology)  Jordi Law MD as Consulting Physician (Urology)  Chapin Villagran MD as Consulting Physician (Otolaryngology)  Gen Laurent MD as Consulting Physician (Gastroenterology)    Jordi Sam is a 87 y.o. male here today for follow up.     This patient is accompanied by their self who contributes to the history of their care.    Chief Complaint:    Chief Complaint   Patient presents with   • Annual Exam     CT scan done yesterday.         History of Present Illness:  I have reviewed and/or updated the patient's past medical, past surgical, family, social history, problem list and allergies as appropriate.   He has a history of umbilical hernia and at this point giving him fits.  He had a CT done yesterday and is seen surgery however is deemed not a candidate secondary to his recurrent ascites and inability to heal.  He still getting periodic paracenteses.  He denies any chest pain shortness of breath orthopnea or PND.  He has some hard nodular lesions on his right cheek as well as forearms they would like looked okay.  He continues to take Zocor denies any myalgias or dark-colored urine.       Review of Systems   Constitutional: Negative for unexpected weight gain and unexpected weight loss.   Respiratory: Negative for cough and shortness of breath.    Gastrointestinal: Positive for abdominal distention and abdominal pain.   Endocrine: Negative.    Genitourinary: Negative.    Skin: Positive for skin lesions.   Neurological: Negative.    Hematological: Negative.        Vitals:    08/18/21 1118   BP: 118/70   Pulse: 87   SpO2: 92%   Weight: 93.5 kg (206 lb 3.2 oz)   Height: 180.3 cm (70.98\")   PainSc: 0-No pain     Body mass index is 28.77 kg/m².    Physical Exam  Vitals and nursing note reviewed.   Constitutional:       " General: He is not in acute distress.     Appearance: Normal appearance. He is well-developed. He is not diaphoretic.   HENT:      Head: Normocephalic and atraumatic.      Right Ear: External ear normal.      Left Ear: External ear normal.      Mouth/Throat:      Pharynx: No oropharyngeal exudate.   Eyes:      General: No scleral icterus.        Right eye: No discharge.      Extraocular Movements: Extraocular movements intact.      Conjunctiva/sclera: Conjunctivae normal.      Pupils: Pupils are equal, round, and reactive to light.   Neck:      Thyroid: No thyromegaly.      Vascular: No JVD.      Trachea: No tracheal deviation.   Cardiovascular:      Rate and Rhythm: Normal rate and regular rhythm.      Heart sounds: Normal heart sounds.      Comments: PMI nondisplaced  Pulmonary:      Effort: Pulmonary effort is normal.      Breath sounds: Normal breath sounds. No wheezing or rales.   Abdominal:      General: Bowel sounds are normal.      Palpations: Abdomen is soft.      Tenderness: There is no abdominal tenderness. There is no guarding or rebound.      Comments: Abdominal binder is in place.  There is a palpable rather large umbilical hernia that is nontender today.   Musculoskeletal:      Cervical back: Normal range of motion and neck supple.      Comments: Normal gait   Lymphadenopathy:      Cervical: No cervical adenopathy.   Skin:     General: Skin is warm and dry.      Capillary Refill: Capillary refill takes less than 2 seconds.      Coloration: Skin is not pale.      Findings: No rash.      Comments: Numerous actinic keratoses on the right side of his face as well as right upper extremity.   Neurological:      Mental Status: He is alert and oriented to person, place, and time.      Motor: No abnormal muscle tone.      Coordination: Coordination normal.   Psychiatric:         Behavior: Behavior normal.         Judgment: Judgment normal.         Procedures    Results Review:    I reviewed the patient's new  clinical results.    Assessment/Plan:     Problem List Items Addressed This Visit        Cardiac and Vasculature    Essential hypertension    Relevant Medications    spironolactone (ALDACTONE) 50 MG tablet    furosemide (LASIX) 40 MG tablet    carvedilol (COREG) 6.25 MG tablet       Gastrointestinal Abdominal     End stage liver disease (CMS/HCC)    Umbilical hernia    Current Assessment & Plan     Continue abdominal binder and observation.  I agree as not take good surgical candidate secondary to his recurrent ascites.           Other Visit Diagnoses     Actinic keratosis    -  Primary    Relevant Orders    Ambulatory Referral to Dermatology          Plan of care reviewed with patient at the conclusion of today's visit. Education was provided regarding diagnosis and management.  Patient verbalizes understanding of and agreement with management plan.    Return in about 4 years (around 8/18/2025).    Jasper Avery MD    Please note that portions of this note may have been completed with a voice recognition program. Efforts were made to edit the dictations, but occasionally words are mistranscribed.

## 2021-08-31 RX ORDER — CARVEDILOL 6.25 MG/1
TABLET ORAL
Qty: 60 TABLET | Refills: 0 | Status: SHIPPED | OUTPATIENT
Start: 2021-08-31 | End: 2021-09-28

## 2021-08-31 NOTE — TELEPHONE ENCOUNTER
Adult Medicine Teaching Service / Internal Medicine Teaching Service (IMTS/AMTS)  Discharge Summary   Patient: Jorge Godfrey Jr. Discharge Date: 1/8/2018 [facility] Hospital: [facility]   YOB: 1947 Admission Date: 1/7/2018 [facility] Intern: Gen Evans MD   Medical Record Number: [id number] Admission Length: 1 Days [facility] Team Color: BLUE   Admitting Physician: Nina Rasheed MD Discharge Physician: Nina Rasheed MD Outpatient Primary Care Provider: Saleem Coppola MD     Admission Information     Admission Diagnoses:   Cough [R05]  Benign essential hypertension [I10]  Hypokalemia [E87.6]  Heart palpitations [R00.2]  Near syncope [R55]  Type 2 diabetes mellitus without complication, unspecified long term insulin use status (CMS/HCC) [E11.9] Primary Discharge Diagnoses:   Bradycardia  Heart palpitations  1st-degree AV block Secondary Discharge Diagnoses:   Patient Active Problem List   Diagnosis   • Gastric ulcer   • Diverticulitis of colon (without mention of hemorrhage)   • Hemorrhage of rectum and anus   • Type II or unspecified type diabetes mellitus without mention of complication, not stated as uncontrolled   • Benign essential hypertension   • Symptomatic bradycardia, asymptomatic   • Cirrhosis (CMS/HCC)   • CL (cholelithiasis)   • Gastritis   • Gastric polyp   • Normal left ventricular function, EF WNL per stress echo 12/23/2016        LACE Score: 0-10 Low to Moderate Risk  30 day Readmission: No  PPQ Completed: NA  RCA Completed: NA  D/C Appt. Scheduled in EPIC: Yes Admission Condition: Good    Discharged Condition: Good    Disposition: Home     Hospital Course   Mr. Katherine Irizarry Is a 70-year-old  male with significant past medical history of type 2 diabetes, hypertension, and anxiety who initially presented to the emergency department on 1/7/18 for palpitations.  The patient described one episode of palpitations that started at 0800 the morning of admission while he  Rx Refill Note  Requested Prescriptions     Pending Prescriptions Disp Refills   • carvedilol (COREG) 6.25 MG tablet [Pharmacy Med Name: Carvedilol 6.25 MG Oral Tablet] 60 tablet 0     Sig: TAKE 1 TABLET BY MOUTH TWICE DAILY WITH MEALS      Last office visit with prescribing clinician: 8/18/2021      Next office visit with prescribing clinician: 2/16/2022            Yong Sarmiento MA  08/31/21, 10:55 EDT   was watching television.  The episode last for 15 minutes and had no associated chest pain, nausea/vomiting, lightheadedness, syncope, shortness of breath, or changes in vision.  He mentioned a dry cough that he mentioned appeared to be consistent for one year.  He had not had an episode of similar heart palpitations in the past.  The patient did express some chest pain that would come in go, radiate horizontally across his chest, be mild in pain severity, and occur at rest and during exercise.  He is able to ambulate about 2 blocks before becoming short of breath.  The patient denied abdominal pain, headache, constipation/diarrhea, numbness/paresthesias/weakness.    Emergency department workup was significant for O2 saturation 88% that stabilized to 96% on 2 L nasal canula.  The patient was placed on telemetry and weaned off oxygen overnight.  He continued to maintain O2 saturation > 95% on room air.  Telemetry revealed sinus bradycardia while the patient was sleeping, and ECG demonstrated 1st-degree atrioventricular block.  The patient underwent cardiac echo that demonstrated LVEF 60% without valve abnormalities.  He was scheduled an appointment to follow-up with electrophysiology clinic on January 26th.  Due to his sinus bradycardia, the patient was ordered an event cardiac monitored and scheduled for pickup at [facility] for the day after discharge (1/9/18 at 0900).  The patient remained asymptomatic with no further palpitations, shortness of breath, or chest pain throughout admission.      Diagnostic Studies/Surgical Procedures:   Cardiac echo (1/8/17)  LVEF = 60 %. No regional wall motion abnormalities. Global longitudinal strain -11 %. Normal diastolic function.  Normal right ventricular size. Low normal right ventricular systolic function.  No significant valve abnormalities.  No pericardial effusion.  Mildly dilated ascending aorta.    X-ray chest (1/7/18)  Mild bibasilar interstitial  prominence, which could be related to areas of  atelectasis and/or scarring.    Consultants:    Physical Exam     Visit Vitals  /62 (BP Location: [facility], Patient Position: Semi-Goldberg's)   Pulse 52   Temp 98 °F (36.7 °C) (Oral)   Resp 20   Ht 5' 11\" (1.803 m)   Wt 113.2 kg   SpO2 95%   BMI 34.81 kg/m²     Physical Exam   Constitutional: He is oriented to person, place, and time and well-developed, well-nourished, and in no distress. No distress.   HENT:   Head: Normocephalic and atraumatic.   Eyes: Conjunctivae and EOM are normal. Pupils are equal, round, and reactive to light.   Cardiovascular: Normal rate, regular rhythm and normal heart sounds.    No murmur heard.  Pulmonary/Chest: Effort normal and breath sounds normal. No respiratory distress.   Abdominal: Soft. He exhibits no distension. There is no tenderness.   Musculoskeletal: He exhibits no edema or tenderness.   Neurological: He is alert and oriented to person, place, and time. No cranial nerve deficit. Coordination normal.   Skin: Skin is warm and dry. He is not diaphoretic.   Psychiatric: Mood and affect normal.     Wt Readings from Last 1 Encounters:   01/07/18 113.2 kg       Recommendations - Instructions - Follow-up   ED Advisories (Hx of violent behavior/ AMA/ Multiple hospital visits- if YES, then route note to ED Case Manager: [email or web address]): NA    Outpatient Follow-up Diagnostics/Recommendations (advised post discharge diagnostic studies/ INR range/ DVT/ Stent hx/ Coagulopathies/ etc.): NA  Diet: Cardiac Diet  Activity:  Activity as Tolerated Wound Care: None Needed     Follow-up Providers/Appointments:  China Joyner MD  [address]  [facility]  [facility] [id number] [phone]    On 1/26/2018  1 pm    Adam Colbert MD  [address]  [facility]  [facility] [address]  [phone]    Go on 1/9/2018  9:00 am for cardia event monitor.      Medications        Summary of your Discharge Medications      Take  these Medications      Details   amLODIPine 5 MG tablet  Commonly known as:  NORVASC   Take 5 mg by mouth daily. 1/8/2018 Confirmed with [facility] (last filled 11/27/17)     aspirin 81 MG EC tablet  Commonly known as:  ECOTRIN   Take 81 mg by mouth daily. 1/8/2018 Confirmed with [facility] (last fill 12/21/17)     atorvastatin 40 MG tablet  Commonly known as:  LIPITOR   Take 40 mg by mouth daily. 1/8/2018 Confirmed with [facility] (last fill 12/28/17)     BREO ELLIPTA 100-25 MCG/INH inhaler   Generic drug:  fluticasone-vilanterol  Inhale 1 puff into the lungs once daily. 1/8/2018 Confirmed with [facility] (last fill 12/14/17)     DIAZepam 5 MG tablet  Commonly known as:  VALIUM   Take 5 mg by mouth 3 times daily as needed for Anxiety. 1/8/2018 Confirmed with [facility] (last fill 12/26/17)     fenofibrate 145 MG tablet  Commonly known as:  TRICOR   Take 145 mg by mouth daily. 1/8/2018 Confirmed with [facility] (last filled 11/27/17)     ferrous sulfate 325 (65 FE) MG tablet   Take 325 mg by mouth 2 times daily. 1/8/2018 Confirmed with [facility] (last filled 1/3/18)     finasteride 5 MG tablet  Commonly known as:  PROSCAR   Take 5 mg by mouth daily. 1/8/2018 Confirmed with [facility] (last fill 12/28/17)     folic acid 1 MG tablet  Commonly known as:  FOLATE   Take 1 mg by mouth daily. 1/8/2018 Confirmed with [facility] (last filled 11/8/17)     losartan 25 MG tablet  Commonly known as:  COZAAR   Take 1 tablet by mouth daily.     mirtazapine 15 MG tablet  Commonly known as:  REMERON   Take 15 mg by mouth nightly. 1/8/2018 Confirmed with [facility] (last fill 12/26/17)     mometasone 50 MCG/ACT nasal spray  Commonly known as:  NASONEX   Spray 2 sprays in each nostril daily. 1/8/2018 Confirmed with [facility] (last filled 10/23/17)     polyethylene glycol packet  Commonly known as:  GLYCOLAX, MIRALAX   Take 17 g by mouth daily. 1/8/2018 Confirmed with [facility] (last  fill 12/28/17)     PROAIR  (90 Base) MCG/ACT inhaler   Generic drug:  albuterol  Inhale 2 puffs into the lungs four times daily. 1/8/2018 Confirmed with [facility]     tamsulosin 0.4 MG Cap  Commonly known as:  FLOMAX   Take 0.4 mg by mouth daily after a meal. 1/8/2018 Confirmed with [facility] (last filled 12/14/17)     traZODone 50 MG tablet  Commonly known as:  DESYREL   Take  mg by mouth every 8 hours. Take with food.            CMS Best Practices - MI - HF - STROKE   NA  ____________________________  Gen Evans MD PGY-1   1/8/2018 1:40 PM  Pager: [phone]    This patient's case was discussed with attending physician, Dr. Nina Rasheed MD. Please see below or additional note for addendum.     Attending Addendum:   I've seen, examined and discussed the patient in detail with the resident. I agree with all of the components of his Discharge Summary.        AMTS/IMTS DC Summary; Dept. of Internal Medicine IMTS/ AMTS. Rev. 5.8; 9/23/15.   Support/ Technical Difficulties: [email or web address]

## 2021-09-13 DIAGNOSIS — I50.812 CHRONIC RIGHT HEART FAILURE (HCC): ICD-10-CM

## 2021-09-13 DIAGNOSIS — E78.2 MIXED HYPERLIPIDEMIA: ICD-10-CM

## 2021-09-14 RX ORDER — SPIRONOLACTONE 50 MG/1
TABLET, FILM COATED ORAL
Qty: 90 TABLET | Refills: 3 | Status: SHIPPED | OUTPATIENT
Start: 2021-09-14 | End: 2022-09-20

## 2021-09-14 RX ORDER — SIMVASTATIN 40 MG
TABLET ORAL
Qty: 90 TABLET | Refills: 0 | Status: SHIPPED | OUTPATIENT
Start: 2021-09-14 | End: 2021-12-06

## 2021-09-14 NOTE — TELEPHONE ENCOUNTER
Rx Refill Note  Requested Prescriptions     Pending Prescriptions Disp Refills   • simvastatin (ZOCOR) 40 MG tablet [Pharmacy Med Name: Simvastatin 40 MG Oral Tablet] 90 tablet 0     Sig: Take 1 tablet by mouth once daily      Last office visit with prescribing clinician: 8/18/2021      Next office visit with prescribing clinician: 2/16/2022       {TIP  Please add Last Relevant Lab Date if appropriate: Lipase and Lipid Panel Last drawn on 07/26/2020    Tabitha Sanford LPN  09/14/21, 11:04 EDT   Last Lipid&lipase drawn in July 2020; Continue with refill? Please advise, thanks

## 2021-09-21 ENCOUNTER — LAB (OUTPATIENT)
Dept: LAB | Facility: HOSPITAL | Age: 86
End: 2021-09-21

## 2021-09-21 DIAGNOSIS — I50.812 CHRONIC RIGHT HEART FAILURE (HCC): ICD-10-CM

## 2021-09-21 LAB
ALBUMIN SERPL-MCNC: 4 G/DL (ref 3.5–5.2)
ALBUMIN/GLOB SERPL: 1.6 G/DL
ALP SERPL-CCNC: 90 U/L (ref 39–117)
ALT SERPL W P-5'-P-CCNC: 12 U/L (ref 1–41)
ANION GAP SERPL CALCULATED.3IONS-SCNC: 7.9 MMOL/L (ref 5–15)
AST SERPL-CCNC: 22 U/L (ref 1–40)
BASOPHILS # BLD AUTO: 0.04 10*3/MM3 (ref 0–0.2)
BASOPHILS NFR BLD AUTO: 0.7 % (ref 0–1.5)
BILIRUB SERPL-MCNC: 0.7 MG/DL (ref 0–1.2)
BUN SERPL-MCNC: 31 MG/DL (ref 8–23)
BUN/CREAT SERPL: 22.3 (ref 7–25)
CALCIUM SPEC-SCNC: 9.1 MG/DL (ref 8.6–10.5)
CHLORIDE SERPL-SCNC: 101 MMOL/L (ref 98–107)
CO2 SERPL-SCNC: 29.1 MMOL/L (ref 22–29)
CREAT SERPL-MCNC: 1.39 MG/DL (ref 0.76–1.27)
DEPRECATED RDW RBC AUTO: 47.8 FL (ref 37–54)
EOSINOPHIL # BLD AUTO: 0.12 10*3/MM3 (ref 0–0.4)
EOSINOPHIL NFR BLD AUTO: 2.2 % (ref 0.3–6.2)
ERYTHROCYTE [DISTWIDTH] IN BLOOD BY AUTOMATED COUNT: 13.9 % (ref 12.3–15.4)
GFR SERPL CREATININE-BSD FRML MDRD: 48 ML/MIN/1.73
GLOBULIN UR ELPH-MCNC: 2.5 GM/DL
GLUCOSE SERPL-MCNC: 116 MG/DL (ref 65–99)
HCT VFR BLD AUTO: 39.2 % (ref 37.5–51)
HGB BLD-MCNC: 12.9 G/DL (ref 13–17.7)
IMM GRANULOCYTES # BLD AUTO: 0.02 10*3/MM3 (ref 0–0.05)
IMM GRANULOCYTES NFR BLD AUTO: 0.4 % (ref 0–0.5)
LYMPHOCYTES # BLD AUTO: 0.71 10*3/MM3 (ref 0.7–3.1)
LYMPHOCYTES NFR BLD AUTO: 12.9 % (ref 19.6–45.3)
MCH RBC QN AUTO: 30.9 PG (ref 26.6–33)
MCHC RBC AUTO-ENTMCNC: 32.9 G/DL (ref 31.5–35.7)
MCV RBC AUTO: 94 FL (ref 79–97)
MONOCYTES # BLD AUTO: 0.51 10*3/MM3 (ref 0.1–0.9)
MONOCYTES NFR BLD AUTO: 9.2 % (ref 5–12)
NEUTROPHILS NFR BLD AUTO: 4.12 10*3/MM3 (ref 1.7–7)
NEUTROPHILS NFR BLD AUTO: 74.6 % (ref 42.7–76)
NRBC BLD AUTO-RTO: 0 /100 WBC (ref 0–0.2)
PLATELET # BLD AUTO: 126 10*3/MM3 (ref 140–450)
PMV BLD AUTO: 11.1 FL (ref 6–12)
POTASSIUM SERPL-SCNC: 4.6 MMOL/L (ref 3.5–5.2)
PROT SERPL-MCNC: 6.5 G/DL (ref 6–8.5)
RBC # BLD AUTO: 4.17 10*6/MM3 (ref 4.14–5.8)
SODIUM SERPL-SCNC: 138 MMOL/L (ref 136–145)
WBC # BLD AUTO: 5.52 10*3/MM3 (ref 3.4–10.8)

## 2021-09-21 PROCEDURE — 36415 COLL VENOUS BLD VENIPUNCTURE: CPT

## 2021-09-21 PROCEDURE — 85025 COMPLETE CBC W/AUTO DIFF WBC: CPT

## 2021-09-21 PROCEDURE — 80053 COMPREHEN METABOLIC PANEL: CPT

## 2021-09-24 ENCOUNTER — HOSPITAL ENCOUNTER (OUTPATIENT)
Dept: ULTRASOUND IMAGING | Facility: HOSPITAL | Age: 86
Discharge: HOME | End: 2021-09-24
Admitting: RADIOLOGY

## 2021-09-24 VITALS
RESPIRATION RATE: 16 BRPM | WEIGHT: 201 LBS | TEMPERATURE: 97 F | HEART RATE: 56 BPM | OXYGEN SATURATION: 97 % | SYSTOLIC BLOOD PRESSURE: 112 MMHG | HEIGHT: 70 IN | BODY MASS INDEX: 28.77 KG/M2 | DIASTOLIC BLOOD PRESSURE: 52 MMHG

## 2021-09-24 DIAGNOSIS — K74.60 CIRRHOSIS OF LIVER WITH ASCITES, UNSPECIFIED HEPATIC CIRRHOSIS TYPE (HCC): ICD-10-CM

## 2021-09-24 DIAGNOSIS — R18.8 CIRRHOSIS OF LIVER WITH ASCITES, UNSPECIFIED HEPATIC CIRRHOSIS TYPE (HCC): ICD-10-CM

## 2021-09-24 LAB
INR PPP: 1.18 (ref 0.85–1.16)
PROTHROMBIN TIME: 14.6 SECONDS (ref 11.4–14.4)

## 2021-09-24 PROCEDURE — 85610 PROTHROMBIN TIME: CPT | Performed by: RADIOLOGY

## 2021-09-24 PROCEDURE — C1729 CATH, DRAINAGE: HCPCS

## 2021-09-24 PROCEDURE — 76942 ECHO GUIDE FOR BIOPSY: CPT

## 2021-09-24 RX ORDER — LIDOCAINE HYDROCHLORIDE 10 MG/ML
5 INJECTION, SOLUTION EPIDURAL; INFILTRATION; INTRACAUDAL; PERINEURAL ONCE
Status: COMPLETED | OUTPATIENT
Start: 2021-09-24 | End: 2021-09-24

## 2021-09-24 RX ORDER — SODIUM CHLORIDE 0.9 % (FLUSH) 0.9 %
10 SYRINGE (ML) INJECTION AS NEEDED
Status: DISCONTINUED | OUTPATIENT
Start: 2021-09-24 | End: 2021-09-25 | Stop reason: HOSPADM

## 2021-09-24 RX ORDER — SODIUM CHLORIDE 0.9 % (FLUSH) 0.9 %
3 SYRINGE (ML) INJECTION EVERY 12 HOURS SCHEDULED
Status: DISCONTINUED | OUTPATIENT
Start: 2021-09-24 | End: 2021-09-25 | Stop reason: HOSPADM

## 2021-09-24 RX ADMIN — LIDOCAINE HYDROCHLORIDE 5 ML: 10 INJECTION, SOLUTION EPIDURAL; INFILTRATION; INTRACAUDAL; PERINEURAL at 11:45

## 2021-09-24 NOTE — NURSING NOTE
Patient placed on cardiac monitors, vitals stable. Images taken and reviewed with Dr. Heck. A total volume of 1.9 L serous fluid removed the peritoneum. Patient tolerated well. Report to IRR.

## 2021-09-24 NOTE — NURSING NOTE
Pt discharged from ir dept s/p paracentesis.  Pt tolerated procedure without complications.  Discharge instructions reviewed with patient and spouse both verbalized understanding. Pt transported to exit via wheelchair per nurse.

## 2021-09-24 NOTE — PRE-PROCEDURE NOTE
[facility]   Vascular Interventional Radiology  History & Physicial    Patient Name:Jordi Sam  : 1934  MRN: [id number]  Primary Care Physician: Jasper Avery MD  Date of admission: 2021    Subjective   Subjective     Chief Complaint: ascites.    History of Present Illness   Jordi Sam is a 87 y.o. male referred to IR as in the chief complaint.    Review of Systems      Personal History     Past Medical History:   Diagnosis Date   • A-fib (CMS/HCC)    • Acute inferior myocardial infarction (CMS/HCC)     Acute inferior STEMI with RV infarct features, 2009.    • CAD (coronary artery disease)    • Dyslipidemia    • Goiter     History of “goiter.”   • Hernia, umbilical    • Hyperlipidemia    • Hypertension    • Nephrolithiasis    • Type 2 diabetes mellitus (CMS/HCC)    • Umbilical hernia     Pt stated has recently returned. Seeing Dr Kiersten Nova and Dr Jasper Avery        Past Surgical History:   Procedure Laterality Date   • BRONCHOSCOPY N/A 2019    Procedure: BRONCHOSCOPY WITH THORACENTESIS;  Surgeon: Marciano Higginbotham MD;  Location:  [facility];  Service: Pulmonary   • CARDIAC CATHETERIZATION Bilateral 2019    Procedure: Right and Left Heart Cath;  Surgeon: Efrem Posadas MD;  Location:  [facility]ATH INVASIVE LOCATION;  Service: Cardiology   • CARDIAC CATHETERIZATION     • CHOLECYSTECTOMY     • CORONARY ANGIOPLASTY WITH STENT PLACEMENT  2009    Sirolimus eluting stents to the RCA, 2009.    • HERNIA REPAIR      Hernia repair x 2.    • PARACENTESIS  2019    multiple   • UMBILICAL HERNIA REPAIR N/A 2020    Procedure: UMBILICAL HERNIA REPAIR;  Surgeon: Maribell Her MD;  Location: [facility] OR;  Service: General;  Laterality: N/A;       Family History: His family history includes Aneurysm in his father; Cancer in his sister; Dementia in his mother; Heart attack in his father; Heart disease in his brother and father;  Hypertension in his daughter and son; No Known Problems in his brother and sister; Stroke in his mother.     Social History: He  reports that he quit smoking about 38 years ago. His smoking use included cigarettes. He has a 70.00 pack-year smoking history. He has never used smokeless tobacco. He reports that he does not drink alcohol and does not use drugs.    Home Medications:  HYDROcodone-acetaminophen, apixaban, carvedilol, colchicine, folic acid, furosemide, magnesium oxide, nitroglycerin, simvastatin, spironolactone, and tamsulosin    Allergies:  He is allergic to bee venom and sulfamethoxazole-trimethoprim.    Objective    Objective     Vitals:    Temp:  [97 °F (36.1 °C)] 97 °F (36.1 °C)  Heart Rate:  [56-63] 56  Resp:  [16-20] 20  BP: (99)/(45) 99/45    Physical Exam     Result Review    Result Review:  I have personally reviewed the results from the time of this admission to 9/24/2021 11:10 EDT and agree with these findings:  [x]  Laboratory  []  Microbiology  []  Radiology  []  EKG/Telemetry   []  Cardiology/Vascular   []  Pathology  []  Old records  []  Other:  Most notable findings include: As noted in the above reports.    Assessment/Plan   Assessment / Plan     Brief Patient Summary:  Jordi Sam is a 87 y.o. male referred to the IR service with above problem.    Active Hospital Problems:  There are no active hospital problems to display for this patient.    Plan:   USG paracentesis.  DVT prophylaxis:  No DVT prophylaxis order currently exists.      David Heck MD  Vascular Interventional Radiology  09/24/21   11:10 AM EDT

## 2021-09-24 NOTE — POST-PROCEDURE NOTE
Vascular Interventional Radiology  Procedure Note    Date: 09/24/21      Time: 11:26 EDT     Pre-op Diagnosis: Ascites     Post-op Diagnosis: Ascites    Procedure: US guided Paracentesis. Via LQ. See report for details.    Volume removed: See report.    Surgeon: David Heck MD     Assistants: MIKAYLA    Sedation: None    Estimated Blood Loss (EBL): 0 cc    IVF: NA    Findings: Above    Specimens: See report.    Complications: See report.    Disposition: IR recovery.    David Heck MD    Vascular Interventional Radiology

## 2021-09-27 ENCOUNTER — TELEPHONE (OUTPATIENT)
Dept: INFUSION THERAPY | Facility: HOSPITAL | Age: 86
End: 2021-09-27

## 2021-09-27 RX ORDER — FUROSEMIDE 40 MG/1
TABLET ORAL
Qty: 180 TABLET | Refills: 0 | Status: SHIPPED | OUTPATIENT
Start: 2021-09-27 | End: 2021-10-29

## 2021-09-27 NOTE — TELEPHONE ENCOUNTER
Lab Results   Component Value Date    GLUCOSE 116 (H) 09/21/2021    BUN 31 (H) 09/21/2021    CREATININE 1.39 (H) 09/21/2021    EGFRIFNONA 48 (L) 09/21/2021    BCR 22.3 09/21/2021    K 4.6 09/21/2021    CO2 29.1 (H) 09/21/2021    CALCIUM 9.1 09/21/2021    ALBUMIN 4.00 09/21/2021    AST 22 09/21/2021    ALT 12 09/21/2021    follow up with AISSATOU Weber APRN 10/29/2021

## 2021-09-28 RX ORDER — CARVEDILOL 6.25 MG/1
TABLET ORAL
Qty: 60 TABLET | Refills: 0 | Status: SHIPPED | OUTPATIENT
Start: 2021-09-28 | End: 2021-10-26 | Stop reason: SDUPTHER

## 2021-09-28 NOTE — TELEPHONE ENCOUNTER
Rx Refill Note  Requested Prescriptions     Pending Prescriptions Disp Refills   • carvedilol (COREG) 6.25 MG tablet [Pharmacy Med Name: Carvedilol 6.25 MG Oral Tablet] 60 tablet 0     Sig: TAKE 1 TABLET BY MOUTH TWICE DAILY WITH MEALS      Last office visit with prescribing clinician: 8/18/2021      Next office visit with prescribing clinician: 2/16/2022            Yong Sarmiento MA  09/28/21, 13:30 EDT

## 2021-10-25 ENCOUNTER — PREP FOR SURGERY (OUTPATIENT)
Dept: OTHER | Facility: HOSPITAL | Age: 86
End: 2021-10-25

## 2021-10-25 ENCOUNTER — HOSPITAL ENCOUNTER (OUTPATIENT)
Dept: ULTRASOUND IMAGING | Facility: HOSPITAL | Age: 86
Discharge: HOME | End: 2021-10-25
Admitting: NURSE PRACTITIONER

## 2021-10-25 VITALS
DIASTOLIC BLOOD PRESSURE: 58 MMHG | BODY MASS INDEX: 29.46 KG/M2 | HEART RATE: 57 BPM | OXYGEN SATURATION: 97 % | TEMPERATURE: 96.8 F | HEIGHT: 71 IN | RESPIRATION RATE: 18 BRPM | SYSTOLIC BLOOD PRESSURE: 115 MMHG | WEIGHT: 210.4 LBS

## 2021-10-25 DIAGNOSIS — K74.60 CIRRHOSIS OF LIVER WITH ASCITES, UNSPECIFIED HEPATIC CIRRHOSIS TYPE (HCC): ICD-10-CM

## 2021-10-25 DIAGNOSIS — R18.8 CIRRHOSIS OF LIVER WITH ASCITES, UNSPECIFIED HEPATIC CIRRHOSIS TYPE (HCC): Primary | ICD-10-CM

## 2021-10-25 DIAGNOSIS — K74.60 CIRRHOSIS OF LIVER WITH ASCITES, UNSPECIFIED HEPATIC CIRRHOSIS TYPE (HCC): Primary | ICD-10-CM

## 2021-10-25 DIAGNOSIS — R18.8 CIRRHOSIS OF LIVER WITH ASCITES, UNSPECIFIED HEPATIC CIRRHOSIS TYPE (HCC): ICD-10-CM

## 2021-10-25 LAB
APPEARANCE FLD: CLEAR
COLOR FLD: YELLOW
LYMPHOCYTES NFR FLD MANUAL: 65 %
MACROPHAGE FLUID: 18 %
MESOTHL CELL NFR FLD MANUAL: 10 %
MONOCYTES NFR FLD: 4 %
NEUTROPHILS NFR FLD MANUAL: 3 %
RBC # FLD AUTO: <2000 /MM3
WBC # FLD AUTO: 366 /MM3

## 2021-10-25 PROCEDURE — 89051 BODY FLUID CELL COUNT: CPT | Performed by: NURSE PRACTITIONER

## 2021-10-25 PROCEDURE — 76942 ECHO GUIDE FOR BIOPSY: CPT

## 2021-10-25 RX ORDER — ALBUMIN (HUMAN) 12.5 G/50ML
12.5 SOLUTION INTRAVENOUS ONCE
Status: CANCELLED | OUTPATIENT
Start: 2021-10-25 | End: 2021-10-25

## 2021-10-25 RX ORDER — LIDOCAINE HYDROCHLORIDE 10 MG/ML
0.5 INJECTION, SOLUTION EPIDURAL; INFILTRATION; INTRACAUDAL; PERINEURAL ONCE
Status: COMPLETED | OUTPATIENT
Start: 2021-10-25 | End: 2021-10-25

## 2021-10-25 RX ADMIN — LIDOCAINE HYDROCHLORIDE 0.5 ML: 10 INJECTION, SOLUTION EPIDURAL; INFILTRATION; INTRACAUDAL; PERINEURAL at 11:11

## 2021-10-25 NOTE — POST-PROCEDURE NOTE
Vascular Interventional Radiology  Procedure Note    Date: 10/25/21     Time: 10:54 EDT     Pre-op Diagnosis: ESLD, CHF  Post-op Diagnosis: same    Procedure: US guided paracentesis    Surgeon: Buddy Encarnacion MD     Assistants: none    Sedation: none    Estimated Blood Loss (EBL): min     Urine Output (UOP): NR    IVF: NR    Findings: clear yellow ascites sent for requested studies    Specimens: see final report mL yellow ascitic fluid    Complications: none immediate    Disposition: home    Buddy Encarnacion MD    Vascular Interventional Radiology  [phone] cell    10/25/21   10:54 AM EDT

## 2021-10-25 NOTE — NURSING NOTE
Image guided paracentesis performed by Buddy Austin MD [facility].Two liters serous fluid removed from peritoneum. Lab collected and sent.  Patient tolerated well. Report called to nurse.

## 2021-10-26 ENCOUNTER — TELEPHONE (OUTPATIENT)
Dept: INFUSION THERAPY | Facility: HOSPITAL | Age: 86
End: 2021-10-26

## 2021-10-26 RX ORDER — CARVEDILOL 6.25 MG/1
TABLET ORAL
Qty: 60 TABLET | Refills: 0 | Status: SHIPPED | OUTPATIENT
Start: 2021-10-26 | End: 2021-11-23

## 2021-10-29 ENCOUNTER — OFFICE VISIT (OUTPATIENT)
Dept: CARDIOLOGY | Facility: HOSPITAL | Age: 86
End: 2021-10-29

## 2021-10-29 ENCOUNTER — HOSPITAL ENCOUNTER (OUTPATIENT)
Dept: CARDIOLOGY | Facility: HOSPITAL | Age: 86
Discharge: HOME | End: 2021-10-29

## 2021-10-29 VITALS
TEMPERATURE: 96.1 F | BODY MASS INDEX: 29.97 KG/M2 | SYSTOLIC BLOOD PRESSURE: 135 MMHG | WEIGHT: 209.38 LBS | HEIGHT: 70 IN | RESPIRATION RATE: 16 BRPM | HEART RATE: 60 BPM | DIASTOLIC BLOOD PRESSURE: 59 MMHG | OXYGEN SATURATION: 97 %

## 2021-10-29 DIAGNOSIS — I25.810 CORONARY ARTERY DISEASE INVOLVING CORONARY BYPASS GRAFT OF NATIVE HEART WITHOUT ANGINA PECTORIS: ICD-10-CM

## 2021-10-29 DIAGNOSIS — K74.60 CIRRHOSIS OF LIVER WITH ASCITES, UNSPECIFIED HEPATIC CIRRHOSIS TYPE (HCC): ICD-10-CM

## 2021-10-29 DIAGNOSIS — N18.30 STAGE 3 CHRONIC KIDNEY DISEASE, UNSPECIFIED WHETHER STAGE 3A OR 3B CKD (HCC): ICD-10-CM

## 2021-10-29 DIAGNOSIS — I48.20 CHRONIC ATRIAL FIBRILLATION (HCC): ICD-10-CM

## 2021-10-29 DIAGNOSIS — I10 ESSENTIAL HYPERTENSION: ICD-10-CM

## 2021-10-29 DIAGNOSIS — I50.812 CHRONIC RIGHT HEART FAILURE (HCC): Primary | ICD-10-CM

## 2021-10-29 DIAGNOSIS — I50.812 CHRONIC RIGHT HEART FAILURE (HCC): ICD-10-CM

## 2021-10-29 DIAGNOSIS — R18.8 CIRRHOSIS OF LIVER WITH ASCITES, UNSPECIFIED HEPATIC CIRRHOSIS TYPE (HCC): ICD-10-CM

## 2021-10-29 LAB
QT INTERVAL: 480 MS
QTC INTERVAL: 463 MS

## 2021-10-29 PROCEDURE — 99214 OFFICE O/P EST MOD 30 MIN: CPT | Performed by: NURSE PRACTITIONER

## 2021-10-29 PROCEDURE — 93010 ELECTROCARDIOGRAM REPORT: CPT | Performed by: INTERNAL MEDICINE

## 2021-10-29 PROCEDURE — 93005 ELECTROCARDIOGRAM TRACING: CPT | Performed by: NURSE PRACTITIONER

## 2021-10-29 RX ORDER — FUROSEMIDE 40 MG/1
TABLET ORAL
Qty: 180 TABLET | Refills: 3 | Status: SHIPPED | OUTPATIENT
Start: 2021-10-29 | End: 2022-11-10

## 2021-10-29 NOTE — PATIENT INSTRUCTIONS
Increase Lasix 40 mg 1 tab in the morning, take 1/2 tab (20 mg) in the afternoon.      Repeat lab work iin 2 weeks.

## 2021-10-29 NOTE — PROGRESS NOTES
"[facility] Heart and Vascular    Chief Complaint  Congestive Heart Failure and Follow-up    Subjective    History of Present Illness {CC  Problem List  Visit  Diagnosis   Encounters  Notes  Medications  Labs  Result Review Imaging  Media :23}     Jordi Sam presents to [facility] CARDIOLOGY for   History of Present Illness   87-year-old male with CAD, right-sided heart failure with cirrhosis and ascites, periodic paracentesis followed by GI, chronic atrial fibrillation.  Has been unable to tolerate ACE, ARB, Arni due to hypotension and kidney function.  Currently on spironolactone and furosemide.    Paracentesis occurring approximately every 4 weeks.  PT feels that he is getting less out then before.  He may try to go a little longer next time.      PT reports otherwise stable.  No recent ED visits or hospitalization.      No CP, pressure, dizziness, syncope, palpitations. Baseline dyspnea.        Objective     Vital Signs:   Vitals:    10/29/21 1326   BP: 135/59   BP Location: Left arm   Patient Position: Sitting   Cuff Size: Adult   Pulse: 60   Resp: 16   Temp: 96.1 °F (35.6 °C)   TempSrc: Temporal   SpO2: 97%   Weight: 95 kg (209 lb 6 oz)   Height: 177.8 cm (70\")     Body mass index is 30.04 kg/m².  Physical Exam  Vitals reviewed.   Constitutional:       General: He is not in acute distress.     Appearance: Normal appearance.   Cardiovascular:      Rate and Rhythm: Normal rate and regular rhythm.      Pulses:           Radial pulses are 2+ on the right side.        Dorsalis pedis pulses are 2+ on the right side.        Posterior tibial pulses are 2+ on the right side.      Heart sounds: Normal heart sounds.   Pulmonary:      Effort: Pulmonary effort is normal.      Breath sounds: Normal breath sounds.   Abdominal:      Tenderness: There is no abdominal tenderness.      Hernia: A hernia is present. Hernia is present in the umbilical area.      " Comments: Abdominal Firmness     Musculoskeletal:      Right lower leg: No edema.      Left lower leg: No edema.   Skin:     General: Skin is warm and dry.   Neurological:      Mental Status: He is alert.   Psychiatric:         Mood and Affect: Mood normal.         Behavior: Behavior is cooperative.              Result Review  Data Reviewed:{ Labs  Result Review  Imaging  Med Tab  Media :23}     Lab Results   Component Value Date    WBC 5.52 09/21/2021    HGB 12.9 (L) 09/21/2021    HCT 39.2 09/21/2021    MCV 94.0 09/21/2021     (L) 09/21/2021     Lab Results   Component Value Date    GLUCOSE 116 (H) 09/21/2021    CALCIUM 9.1 09/21/2021     09/21/2021    K 4.6 09/21/2021    CO2 29.1 (H) 09/21/2021     09/21/2021    BUN 31 (H) 09/21/2021    CREATININE 1.39 (H) 09/21/2021    EGFRIFNONA 48 (L) 09/21/2021    BCR 22.3 09/21/2021    ANIONGAP 7.9 09/21/2021     Lab Results   Component Value Date    TSH 3.582 12/27/2018       Assessment and Plan {CC Problem List  Visit Diagnosis  ROS  Review (Popup)  Health Maintenance  Quality  BestPractice  Medications  SmartSets  SnapShot Encounters  Media :23}   1. Chronic right heart failure (HCC)  Continue aldactone    Increase Lasix  Bmp 2 weeks    - ECG 12 Lead; Future  - furosemide (LASIX) 40 MG tablet; Lasix 40 mg 1 tab in morning, 1/2 tab in the afternoon. And as needed for weight gain, edema and dypnea.  Dispense: 180 tablet; Refill: 3  - Basic Metabolic Panel; Future    2. Coronary artery disease involving coronary bypass graft of native heart without angina pectoris  No cP or pressure  Zocor,   - ECG 12 Lead; Future    3. Essential hypertension  Coreg, aldactone,     4. Cirrhosis of liver with ascites, unspecified hepatic cirrhosis type (HCC)  PRN paracentesis, for symptom management    - furosemide (LASIX) 40 MG tablet; Lasix 40 mg 1 tab in morning, 1/2 tab in the afternoon. And as needed for weight gain, edema and dypnea.  Dispense: 180  tablet; Refill: 3    5. Stage 3 chronic kidney disease, unspecified whether stage 3a or 3b CKD (HCC)    - Basic Metabolic Panel; Future    6. Chronic atrial fibrillation (HCC)  EKG showing bradycardia today.  Stable.  Continue to monitor.  Appears asymptomatic.  - ECG 12 Lead; A. fib 56 bpm  Currently on beta-blocker    Eliquis low-dose for stroke prevention.              Follow Up {Instructions Charge Capture  Follow-up Communications :23}   Return in about 6 months (around 4/29/2022) for HF, Office visit.    Patient was given instructions and counseling regarding his condition or for health maintenance advice. Please see specific information pulled into the AVS if appropriate.  Patient was instructed to call the [facility] with any questions, concerns, or worsening symptoms.    *Please note that portions of this note were completed with a voice recognition program. Efforts were made to edit the dictations, but occasionally words are mistranscribed.

## 2021-11-01 ENCOUNTER — PREP FOR SURGERY (OUTPATIENT)
Dept: OTHER | Facility: HOSPITAL | Age: 86
End: 2021-11-01

## 2021-11-16 ENCOUNTER — HOSPITAL ENCOUNTER (OUTPATIENT)
Dept: ULTRASOUND IMAGING | Facility: HOSPITAL | Age: 86
Discharge: HOME | End: 2021-11-16
Admitting: NURSE PRACTITIONER

## 2021-11-16 VITALS
BODY MASS INDEX: 30.56 KG/M2 | TEMPERATURE: 96.8 F | HEART RATE: 58 BPM | RESPIRATION RATE: 16 BRPM | SYSTOLIC BLOOD PRESSURE: 114 MMHG | OXYGEN SATURATION: 96 % | WEIGHT: 213 LBS | DIASTOLIC BLOOD PRESSURE: 55 MMHG

## 2021-11-16 DIAGNOSIS — K74.60 CIRRHOSIS OF LIVER WITH ASCITES, UNSPECIFIED HEPATIC CIRRHOSIS TYPE (HCC): ICD-10-CM

## 2021-11-16 DIAGNOSIS — R18.8 CIRRHOSIS OF LIVER WITH ASCITES, UNSPECIFIED HEPATIC CIRRHOSIS TYPE (HCC): ICD-10-CM

## 2021-11-16 LAB
BASOPHILS # BLD AUTO: 0.03 10*3/MM3 (ref 0–0.2)
BASOPHILS NFR BLD AUTO: 0.5 % (ref 0–1.5)
DEPRECATED RDW RBC AUTO: 52.8 FL (ref 37–54)
EOSINOPHIL # BLD AUTO: 0.11 10*3/MM3 (ref 0–0.4)
EOSINOPHIL NFR BLD AUTO: 1.9 % (ref 0.3–6.2)
ERYTHROCYTE [DISTWIDTH] IN BLOOD BY AUTOMATED COUNT: 14.6 % (ref 12.3–15.4)
HCT VFR BLD AUTO: 39.6 % (ref 37.5–51)
HGB BLD-MCNC: 12.9 G/DL (ref 13–17.7)
IMM GRANULOCYTES # BLD AUTO: 0.02 10*3/MM3 (ref 0–0.05)
IMM GRANULOCYTES NFR BLD AUTO: 0.4 % (ref 0–0.5)
INR PPP: 1.24 (ref 0.85–1.16)
LYMPHOCYTES # BLD AUTO: 0.68 10*3/MM3 (ref 0.7–3.1)
LYMPHOCYTES NFR BLD AUTO: 12 % (ref 19.6–45.3)
MCH RBC QN AUTO: 32 PG (ref 26.6–33)
MCHC RBC AUTO-ENTMCNC: 32.6 G/DL (ref 31.5–35.7)
MCV RBC AUTO: 98.3 FL (ref 79–97)
MONOCYTES # BLD AUTO: 0.58 10*3/MM3 (ref 0.1–0.9)
MONOCYTES NFR BLD AUTO: 10.2 % (ref 5–12)
NEUTROPHILS NFR BLD AUTO: 4.26 10*3/MM3 (ref 1.7–7)
NEUTROPHILS NFR BLD AUTO: 75 % (ref 42.7–76)
NRBC BLD AUTO-RTO: 0 /100 WBC (ref 0–0.2)
PLATELET # BLD AUTO: 104 10*3/MM3 (ref 140–450)
PMV BLD AUTO: 11.1 FL (ref 6–12)
PROTHROMBIN TIME: 15.2 SECONDS (ref 11.4–14.4)
RBC # BLD AUTO: 4.03 10*6/MM3 (ref 4.14–5.8)
WBC # BLD AUTO: 5.68 10*3/MM3 (ref 3.4–10.8)

## 2021-11-16 PROCEDURE — C1729 CATH, DRAINAGE: HCPCS

## 2021-11-16 PROCEDURE — 85025 COMPLETE CBC W/AUTO DIFF WBC: CPT | Performed by: RADIOLOGY

## 2021-11-16 PROCEDURE — 76942 ECHO GUIDE FOR BIOPSY: CPT

## 2021-11-16 PROCEDURE — 85610 PROTHROMBIN TIME: CPT | Performed by: RADIOLOGY

## 2021-11-16 RX ORDER — LIDOCAINE HYDROCHLORIDE 10 MG/ML
10 INJECTION, SOLUTION EPIDURAL; INFILTRATION; INTRACAUDAL; PERINEURAL ONCE
Status: COMPLETED | OUTPATIENT
Start: 2021-11-16 | End: 2021-11-16

## 2021-11-16 RX ORDER — SODIUM CHLORIDE 0.9 % (FLUSH) 0.9 %
3 SYRINGE (ML) INJECTION EVERY 12 HOURS SCHEDULED
Status: DISCONTINUED | OUTPATIENT
Start: 2021-11-16 | End: 2021-11-17 | Stop reason: HOSPADM

## 2021-11-16 RX ORDER — ALBUMIN (HUMAN) 12.5 G/50ML
12.5 SOLUTION INTRAVENOUS ONCE
Status: DISCONTINUED | OUTPATIENT
Start: 2021-11-16 | End: 2021-11-17 | Stop reason: HOSPADM

## 2021-11-16 RX ORDER — SODIUM CHLORIDE 0.9 % (FLUSH) 0.9 %
10 SYRINGE (ML) INJECTION AS NEEDED
Status: DISCONTINUED | OUTPATIENT
Start: 2021-11-16 | End: 2021-11-17 | Stop reason: HOSPADM

## 2021-11-16 RX ADMIN — LIDOCAINE HYDROCHLORIDE 10 ML: 10 INJECTION, SOLUTION EPIDURAL; INFILTRATION; INTRACAUDAL; PERINEURAL at 14:10

## 2021-11-16 NOTE — POST-PROCEDURE NOTE
Vascular Interventional Radiology  Procedure Note    Date: 11/16/21      Time: 14:17 EST     Pre-op Diagnosis: Ascites     Post-op Diagnosis: Ascites    Procedure: US guided Paracentesis. Via LQ. See report for details.    Volume removed: See report.    Surgeon: David Heck MD     Assistants: NA    Sedation: None    Estimated Blood Loss (EBL): 0 cc    IVF: NA    Findings: Above    Specimens: See report.    Complications: See report.    Disposition: IR recovery.    David Heck MD    Vascular Interventional Radiology

## 2021-11-16 NOTE — PRE-PROCEDURE NOTE
[facility]   Vascular Interventional Radiology  History & Physicial    Patient Name:Jordi Sam  : 1934  MRN: [id number]    Primary Care Physician: Jasper Avery MD    Referring Physician: [id number] Stringer     Date of admission: 2021    Subjective   Subjective     Reason for Consult: USG paracentesis.    History of Present Illness   Jordi Sam is a 87 y.o. male referred to IR as noted above.      Active Hospital Problems:  There are no active hospital problems to display for this patient.      Personal History     Past Medical History:   Diagnosis Date   • A-fib (HCC)    • Acute inferior myocardial infarction (HCC)     Acute inferior STEMI with RV infarct features, 2009.    • CAD (coronary artery disease)    • Dyslipidemia    • Goiter     History of “goiter.”   • Hernia, umbilical    • Hyperlipidemia    • Hypertension    • Nephrolithiasis    • Type 2 diabetes mellitus (HCC)    • Umbilical hernia     Pt stated has recently returned. Seeing Dr Kiersten Nova and Dr Jasper Avery        Past Surgical History:   Procedure Laterality Date   • BRONCHOSCOPY N/A 2019    Procedure: BRONCHOSCOPY WITH THORACENTESIS;  Surgeon: Marciano Higginbotham MD;  Location:  [facility];  Service: Pulmonary   • CARDIAC CATHETERIZATION Bilateral 2019    Procedure: Right and Left Heart Cath;  Surgeon: Efrem Posadas MD;  Location:  [facility]ATH INVASIVE LOCATION;  Service: Cardiology   • CARDIAC CATHETERIZATION     • CHOLECYSTECTOMY     • CORONARY ANGIOPLASTY WITH STENT PLACEMENT  2009    Sirolimus eluting stents to the RCA, 2009.    • HERNIA REPAIR      Hernia repair x 2.    • PARACENTESIS  2019    multiple   • UMBILICAL HERNIA REPAIR N/A 2020    Procedure: UMBILICAL HERNIA REPAIR;  Surgeon: Maribell Her MD;  Location:  [facility] OR;  Service: General;  Laterality: N/A;       Family History: His family history includes Aneurysm in his father;  Cancer in his sister; Dementia in his mother; Heart attack in his father; Heart disease in his brother and father; Hypertension in his daughter and son; No Known Problems in his brother and sister; Stroke in his mother.     Social History: He  reports that he quit smoking about 38 years ago. His smoking use included cigarettes. He has a 70.00 pack-year smoking history. He has never used smokeless tobacco. He reports that he does not drink alcohol and does not use drugs.    Home Medications:  HYDROcodone-acetaminophen, apixaban, carvedilol, colchicine, folic acid, furosemide, magnesium oxide, nitroglycerin, simvastatin, spironolactone, and tamsulosin    Current Medications:  •  albumin human  •  sodium chloride  •  sodium chloride         Allergies:  He is allergic to bee venom and sulfamethoxazole-trimethoprim.    Review of Systems    Objective    Objective     Vitals:      Visit Vitals  Wt 96.6 kg (213 lb)   BMI 30.56 kg/m²        Physical Exam    A&Ox3. Able to communicate  No Apparent Distress  Average physique    Result Review      I have personally reviewed the results from the time of this admission to 11/16/2021 13:28 EST and agree with these findings.  [x]  Laboratory  []  Microbiology  [x]  Radiology  []  EKG/Telemetry   []  Cardiology/Vascular   []  Pathology  []  Old records  []  Other:    Most notable findings include: As noted:    Results from last 7 days   Lab Units 11/16/21  1224   INR  1.24*   HEMOGLOBIN g/dL 12.9*   HEMATOCRIT % 39.6   PLATELETS 10*3/mm3 104*       CrCl cannot be calculated (Patient's most recent lab result is older than the maximum 30 days allowed.). No results found for: CREATININE    Assessment/Plan   Assessment / Plan     Brief Patient Summary:  Jordi Sam is a 87 y.o. male referred to the IR service with above problem.    Plan:   As above.    David Heck MD   Vascular Interventional Radiology  11/16/21   1:28 PM EST

## 2021-11-17 ENCOUNTER — TELEPHONE (OUTPATIENT)
Dept: INFUSION THERAPY | Facility: HOSPITAL | Age: 86
End: 2021-11-17

## 2021-11-23 RX ORDER — CARVEDILOL 6.25 MG/1
TABLET ORAL
Qty: 60 TABLET | Refills: 0 | Status: SHIPPED | OUTPATIENT
Start: 2021-11-23 | End: 2022-01-04

## 2021-11-23 NOTE — TELEPHONE ENCOUNTER
Rx Refill Note  Requested Prescriptions     Pending Prescriptions Disp Refills   • carvedilol (COREG) 6.25 MG tablet [Pharmacy Med Name: Carvedilol 6.25 MG Oral Tablet] 60 tablet 0     Sig: TAKE 1 TABLET BY MOUTH TWICE DAILY WITH MEALS      Last office visit with prescribing clinician: 8/18/2021      Next office visit with prescribing clinician: 2/16/2022            Amy Montez MA  11/23/21, 14:21 EST

## 2021-12-06 DIAGNOSIS — E78.2 MIXED HYPERLIPIDEMIA: ICD-10-CM

## 2021-12-06 RX ORDER — SIMVASTATIN 40 MG
TABLET ORAL
Qty: 90 TABLET | Refills: 0 | Status: SHIPPED | OUTPATIENT
Start: 2021-12-06 | End: 2022-02-16 | Stop reason: SDUPTHER

## 2021-12-06 NOTE — TELEPHONE ENCOUNTER
Rx Refill Note  Requested Prescriptions     Pending Prescriptions Disp Refills   • simvastatin (ZOCOR) 40 MG tablet [Pharmacy Med Name: Simvastatin 40 MG Oral Tablet] 90 tablet 0     Sig: Take 1 tablet by mouth once daily      Last office visit with prescribing clinician: 8/18/2021      Next office visit with prescribing clinician: 2/16/2022   {}  Ophelia Monaco MA  12/06/21, 12:59 EST     Last refill: 9/14/21    Labs: CMP performed 9/21/21

## 2021-12-06 NOTE — TELEPHONE ENCOUNTER
Rx Refill Note  Requested Prescriptions     Pending Prescriptions Disp Refills   • simvastatin (ZOCOR) 40 MG tablet [Pharmacy Med Name: Simvastatin 40 MG Oral Tablet] 90 tablet 0     Sig: Take 1 tablet by mouth once daily      Last office visit with prescribing clinician: 8/18/2021      Next office visit with prescribing clinician: 2/16/2022     Ophelia Monaco MA  12/06/21, 13:08 EST     Last refill: 9/14/21    Labs: CMP performed 9/21/21    The combination of a statin with colchicine may result in increased potential for myopathy. Do you want to refill?

## 2021-12-10 ENCOUNTER — TELEPHONE (OUTPATIENT)
Dept: CARDIOLOGY | Facility: CLINIC | Age: 86
End: 2021-12-10

## 2021-12-10 NOTE — TELEPHONE ENCOUNTER
Wife called to report pt has been sleeping a lot the last few days. Reported BP readings 119/59, 127/58 with HR 68-72. She had held carvedilol for a few doses and he seemed to perk up a little. Last paracentesis 1 liter was removed. He denies unusual shortness of breath and chest pain.     She was advised to continue current medications and check BP/HR several times a week. They will bring record to FU with [facility] next month.

## 2021-12-22 ENCOUNTER — LAB (OUTPATIENT)
Dept: LAB | Facility: HOSPITAL | Age: 86
End: 2021-12-22

## 2021-12-22 ENCOUNTER — OFFICE VISIT (OUTPATIENT)
Dept: GASTROENTEROLOGY | Facility: CLINIC | Age: 86
End: 2021-12-22

## 2021-12-22 VITALS
BODY MASS INDEX: 31.39 KG/M2 | HEART RATE: 60 BPM | TEMPERATURE: 96.9 F | DIASTOLIC BLOOD PRESSURE: 52 MMHG | WEIGHT: 218.8 LBS | SYSTOLIC BLOOD PRESSURE: 135 MMHG

## 2021-12-22 DIAGNOSIS — R18.8 CIRRHOSIS OF LIVER WITH ASCITES, UNSPECIFIED HEPATIC CIRRHOSIS TYPE (HCC): Primary | ICD-10-CM

## 2021-12-22 DIAGNOSIS — K74.60 CIRRHOSIS OF LIVER WITH ASCITES, UNSPECIFIED HEPATIC CIRRHOSIS TYPE (HCC): Primary | ICD-10-CM

## 2021-12-22 DIAGNOSIS — R18.8 CIRRHOSIS OF LIVER WITH ASCITES, UNSPECIFIED HEPATIC CIRRHOSIS TYPE (HCC): ICD-10-CM

## 2021-12-22 DIAGNOSIS — K74.60 CIRRHOSIS OF LIVER WITH ASCITES, UNSPECIFIED HEPATIC CIRRHOSIS TYPE (HCC): ICD-10-CM

## 2021-12-22 LAB
ALBUMIN SERPL-MCNC: 4 G/DL (ref 3.5–5.2)
ALBUMIN/GLOB SERPL: 1.7 G/DL
ALP SERPL-CCNC: 97 U/L (ref 39–117)
ALT SERPL W P-5'-P-CCNC: 9 U/L (ref 1–41)
ANION GAP SERPL CALCULATED.3IONS-SCNC: 10.7 MMOL/L (ref 5–15)
AST SERPL-CCNC: 16 U/L (ref 1–40)
BASOPHILS # BLD AUTO: 0.04 10*3/MM3 (ref 0–0.2)
BASOPHILS NFR BLD AUTO: 0.7 % (ref 0–1.5)
BILIRUB SERPL-MCNC: 0.8 MG/DL (ref 0–1.2)
BUN SERPL-MCNC: 24 MG/DL (ref 8–23)
BUN/CREAT SERPL: 18 (ref 7–25)
CALCIUM SPEC-SCNC: 9.2 MG/DL (ref 8.6–10.5)
CHLORIDE SERPL-SCNC: 99 MMOL/L (ref 98–107)
CO2 SERPL-SCNC: 27.3 MMOL/L (ref 22–29)
CREAT SERPL-MCNC: 1.33 MG/DL (ref 0.76–1.27)
DEPRECATED RDW RBC AUTO: 47 FL (ref 37–54)
EOSINOPHIL # BLD AUTO: 0.1 10*3/MM3 (ref 0–0.4)
EOSINOPHIL NFR BLD AUTO: 1.8 % (ref 0.3–6.2)
ERYTHROCYTE [DISTWIDTH] IN BLOOD BY AUTOMATED COUNT: 13.3 % (ref 12.3–15.4)
GFR SERPL CREATININE-BSD FRML MDRD: 51 ML/MIN/1.73
GLOBULIN UR ELPH-MCNC: 2.4 GM/DL
GLUCOSE SERPL-MCNC: 106 MG/DL (ref 65–99)
HCT VFR BLD AUTO: 40.6 % (ref 37.5–51)
HGB BLD-MCNC: 13.1 G/DL (ref 13–17.7)
IMM GRANULOCYTES # BLD AUTO: 0.01 10*3/MM3 (ref 0–0.05)
IMM GRANULOCYTES NFR BLD AUTO: 0.2 % (ref 0–0.5)
INR PPP: 1.32 (ref 0.85–1.16)
LYMPHOCYTES # BLD AUTO: 0.76 10*3/MM3 (ref 0.7–3.1)
LYMPHOCYTES NFR BLD AUTO: 13.5 % (ref 19.6–45.3)
MCH RBC QN AUTO: 31.5 PG (ref 26.6–33)
MCHC RBC AUTO-ENTMCNC: 32.3 G/DL (ref 31.5–35.7)
MCV RBC AUTO: 97.6 FL (ref 79–97)
MONOCYTES # BLD AUTO: 0.49 10*3/MM3 (ref 0.1–0.9)
MONOCYTES NFR BLD AUTO: 8.7 % (ref 5–12)
NEUTROPHILS NFR BLD AUTO: 4.25 10*3/MM3 (ref 1.7–7)
NEUTROPHILS NFR BLD AUTO: 75.1 % (ref 42.7–76)
NRBC BLD AUTO-RTO: 0 /100 WBC (ref 0–0.2)
PLATELET # BLD AUTO: 130 10*3/MM3 (ref 140–450)
PMV BLD AUTO: 10.9 FL (ref 6–12)
POTASSIUM SERPL-SCNC: 4.4 MMOL/L (ref 3.5–5.2)
PROT SERPL-MCNC: 6.4 G/DL (ref 6–8.5)
PROTHROMBIN TIME: 16 SECONDS (ref 11.4–14.4)
RBC # BLD AUTO: 4.16 10*6/MM3 (ref 4.14–5.8)
SODIUM SERPL-SCNC: 137 MMOL/L (ref 136–145)
WBC NRBC COR # BLD: 5.65 10*3/MM3 (ref 3.4–10.8)

## 2021-12-22 PROCEDURE — 85025 COMPLETE CBC W/AUTO DIFF WBC: CPT

## 2021-12-22 PROCEDURE — 99214 OFFICE O/P EST MOD 30 MIN: CPT | Performed by: NURSE PRACTITIONER

## 2021-12-22 PROCEDURE — 82105 ALPHA-FETOPROTEIN SERUM: CPT

## 2021-12-22 PROCEDURE — 85610 PROTHROMBIN TIME: CPT

## 2021-12-22 PROCEDURE — 80053 COMPREHEN METABOLIC PANEL: CPT

## 2021-12-22 NOTE — PROGRESS NOTES
GASTROENTEROLOGY OFFICE NOTE  Jordi Sam  [id number]  1934    CARE TEAM  Patient Care Team:  Jasper Avery MD as PCP - General (Internal Medicine)  Efrem Posadas MD as Consulting Physician (Cardiology)  Jordi Law MD as Consulting Physician (Urology)  Chapin Villagran MD as Consulting Physician (Otolaryngology)  Gen Laurent MD as Consulting Physician (Gastroenterology)    Referring Provider: Jasper Avery MD    Chief Complaint   Patient presents with   • Hepatic Disease   • Abdominal Pain   • Constipation   • Diarrhea        HISTORY OF PRESENT ILLNESS:  Mr. 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     He is currently taking Lasix 40 mg in the am and 20 mg in the pm and Aldactone 50 mg daily as directed by cardiology.  He seems to be doing better in regards to ascites, his last paracentesis was on November 16 and only 1.5 L was removed.  He does not feel as if the fluid is coming back as quickly as it has in the past.  He has a new abdominal wall hernia that he is wearing a belt to secure.     He denies any evidence of GI bleeding.  He has not had surveillance for esophageal or gastric varices as the risk versus benefit is greater for EGD and he does currently take Coreg secondary to cardiac history.     He denies any changes in state of consciousness, intellectual function, personality or behavior has no history of hepatic encephalopathy.     He  tells me again today that he continues to feel better than he has in a long time.  Overall he feels like he is doing very well.    PAST MEDICAL HISTORY  Past Medical History:   Diagnosis Date   • A-fib (HCC)    • Acute inferior myocardial infarction (HCC)     Acute inferior  STEMI with RV infarct features, January 2009.    • CAD (coronary artery disease)    • Dyslipidemia    • Goiter     History of “goiter.”   • Hernia, umbilical    • Hyperlipidemia    • Hypertension    • Nephrolithiasis    • Type 2 diabetes mellitus (HCC)    • Umbilical hernia     Pt stated has recently returned. Seeing Dr Kiersten Nova and Dr Jasper Avery 8-2021        PAST SURGICAL HISTORY  Past Surgical History:   Procedure Laterality Date   • BRONCHOSCOPY N/A 4/12/2019    Procedure: BRONCHOSCOPY WITH THORACENTESIS;  Surgeon: Marciano Higginbotham MD;  Location:  [facility];  Service: Pulmonary   • CARDIAC CATHETERIZATION Bilateral 1/30/2019    Procedure: Right and Left Heart Cath;  Surgeon: Efrem Posadas MD;  Location:  [facility];  Service: Cardiology   • CARDIAC CATHETERIZATION     • CHOLECYSTECTOMY     • CORONARY ANGIOPLASTY WITH STENT PLACEMENT  01/09/2009    Sirolimus eluting stents to the RCA, 01/09/2009.    • HERNIA REPAIR      Hernia repair x 2.    • PARACENTESIS  06/17/2019    multiple   • UMBILICAL HERNIA REPAIR N/A 7/26/2020    Procedure: UMBILICAL HERNIA REPAIR;  Surgeon: Maribell Her MD;  Location:  [facility];  Service: General;  Laterality: N/A;        MEDICATIONS:    Current Outpatient Medications:   •  apixaban (ELIQUIS) 2.5 MG tablet tablet, Take 2.5 mg by mouth 2 (Two) Times a Day. Last dose 3 days ago. From 11-16-21, Disp: , Rfl:   •  carvedilol (COREG) 6.25 MG tablet, TAKE 1 TABLET BY MOUTH TWICE DAILY WITH MEALS, Disp: 60 tablet, Rfl: 0  •  folic acid (FOLVITE) 400 MCG tablet, Take 400 mcg by mouth Daily., Disp: , Rfl:   •  furosemide (LASIX) 40 MG tablet, Lasix 40 mg 1 tab in morning, 1/2 tab in the afternoon. And as needed for weight gain, edema and dypnea., Disp: 180 tablet, Rfl: 3  •  nitroglycerin (NITROSTAT) 0.4 MG SL tablet, DISSOLVE ONE TABLET UNDER THE TONGUE EVERY 5 MINUTES AS NEEDED FOR CHEST PAIN.  DO NOT EXCEED A TOTAL OF 3 DOSES IN 15  MINUTES, Disp: 25 tablet, Rfl: 3  •  simvastatin (ZOCOR) 40 MG tablet, Take 1 tablet by mouth once daily, Disp: 90 tablet, Rfl: 0  •  spironolactone (ALDACTONE) 50 MG tablet, Take 1 tablet by mouth once daily, Disp: 90 tablet, Rfl: 3  •  tamsulosin (FLOMAX) 0.4 MG capsule 24 hr capsule, Take 1 capsule by mouth Daily., Disp: , Rfl:   •  colchicine 0.6 MG tablet, Take 1 tablet by mouth Daily., Disp: 30 tablet, Rfl: 6  •  HYDROcodone-acetaminophen (Norco) 5-325 MG per tablet, Take 1 tablet by mouth Every 6 (Six) Hours As Needed (foot pain)., Disp: 40 tablet, Rfl: 0  •  magnesium oxide (MAG-OX) 400 MG tablet, Take 1 tablet by mouth Daily., Disp: 30 tablet, Rfl: 6    ALLERGIES  Allergies   Allergen Reactions   • Bee Venom Swelling   • Sulfamethoxazole-Trimethoprim Other (See Comments)     Pt unaware       FAMILY HISTORY:  Family History   Problem Relation Age of Onset   • Dementia Mother    • Stroke Mother    • Heart disease Father    • Heart attack Father    • Aneurysm Father    • Cancer Sister    • No Known Problems Brother    • No Known Problems Sister    • Heart disease Brother         CABG   • Hypertension Daughter    • Hypertension Son    • Colon cancer Neg Hx    • Colon polyps Neg Hx        SOCIAL HISTORY  Social History     Socioeconomic History   • Marital status:      Spouse name: Melly   • Number of children: 4   Tobacco Use   • Smoking status: Former Smoker     Packs/day: 2.50     Years: 28.00     Pack years: 70.00     Types: Cigarettes     Quit date: 1983     Years since quittin.6   • Smokeless tobacco: Never Used   Vaping Use   • Vaping Use: Never used   Substance and Sexual Activity   • Alcohol use: No   • Drug use: No   • Sexual activity: Defer           PHYSICAL EXAM   /52 (BP Location: Left arm, Patient Position: Sitting, Cuff Size: Adult)   Pulse 60   Temp 96.9 °F (36.1 °C) (Temporal)   Wt 99.2 kg (218 lb 12.8 oz)   BMI 31.39 kg/m²   Physical Exam  Vitals and nursing note  reviewed.   Constitutional:       Appearance: Normal appearance. He is well-developed.   HENT:      Head: Normocephalic and atraumatic.      Nose: Nose normal.      Mouth/Throat:      Mouth: Mucous membranes are moist.      Pharynx: Oropharynx is clear.   Eyes:      Pupils: Pupils are equal, round, and reactive to light.   Cardiovascular:      Rate and Rhythm: Normal rate and regular rhythm.   Pulmonary:      Effort: Pulmonary effort is normal.      Breath sounds: Normal breath sounds. No wheezing or rales.   Abdominal:      General: Bowel sounds are normal.      Palpations: Abdomen is soft. There is no mass.      Tenderness: There is no abdominal tenderness. There is no guarding or rebound.      Hernia: A hernia is present.   Musculoskeletal:         General: Normal range of motion.      Cervical back: Normal range of motion and neck supple.      Right lower le+ Pitting Edema present.      Left lower le+ Pitting Edema present.   Skin:     General: Skin is warm and dry.   Neurological:      Mental Status: He is alert and oriented to person, place, and time.      Cranial Nerves: No cranial nerve deficit.   Psychiatric:         Behavior: Behavior normal.         Judgment: Judgment normal.           Results Review:  EXAMINATION: CT ABDOMEN/PELVIS WO CONTRAST-2021:      INDICATION: K43.2; K43.2-Incisional hernia without obstruction or  gangrene, re-evaluate hernia.     TECHNIQUE: Multiple axial CT imaging was obtained of the abdomen and  pelvis without the administration of intravenous contrast.     The radiation dose reduction device was turned on for each scan per the  ALARA (As Low as Reasonably Achievable) protocol.     COMPARISON: 2021.     FINDINGS:      ABDOMEN:  There is a small pleural effusion identified on the left with  rounded area of atelectatic change seen within the left lung base which  is stable and unchanged when compared to the prior study. The right lung  is clear. There is fluid  seen surrounding the liver and spleen as well  as within the paracolic gutters bilaterally. The liver is homogeneous.  The spleen is unremarkable. The pancreas is unremarkable. Surgical clips  in the right upper quadrant from prior cholecystectomy. There is an  incisional ventral abdominal wall hernia identified near the umbilicus  extending both superiorly and inferiorly containing fluid. There is a  loop of bowel as well seen within the hernia with no signs of  strangulation. The abdominal portions of the gastrointestinal tract are  within normal limits. The pancreas is homogeneous. Vascular  calcification seen throughout the abdominal aorta and iliac vessels.  Kidneys and adrenal glands are within normal limits. Diverticulosis with  no evidence of diverticulitis.     PELVIS: The pelvic organs are unremarkable. The pelvic portions of the  gastrointestinal tract reveal diverticulosis with no evidence of  diverticulitis. Incomplete distention of the bladder with mild wall  thickening. There is a small amount of free fluid. Small bilateral  inguinal hernias containing fluid and fat, left greater than right.  Degenerative changes seen within the spine bilaterally.     IMPRESSION:  There is ascitic fluid seen throughout the abdomen and  pelvis with ventral abdominal wall hernia identified both above and  below the umbilicus containing fluid and fat as well as a small loop of  bowel with no signs of strangulation. There are bilateral inguinal  hernias identified both containing fat and minimal fluid on the left. No  acute intra-abdominal or pelvic abnormality is identified with cirrhotic  appearance again seen of the liver and stable rounded atelectatic  changes seen at the left lung base with small left pleural effusion.    ASSESSMENT / PLAN  1. End stage liver disease  -Not likely transplant candidate due to age and comorbidity      2. Cirrhosis, decompensated by ascites  -cirrhosis secondary to cardiac dysfunction  -  no evidence of hepatic encephalopathy     # Ascites/Pedal edema  Plan:  -Continue Lasix -as directed by cardiology  -Continue Spironolactone 50 mg daily-as directed by cardiology  -Paracentesis prn  -Low-sodium 2 g/day diet  -CBC, CMP, PT/INR, AFP marker     # Surveillance for Esophageal varices needed  Plan:  -Defer EGD as risk > benefit  -Continue Coreg as directed by cardiology     # Hepatocellular carcinoma surveillance  - Abdominal imaging every six months  Plan:  -US liver in February 2022     # Nutrition  Plan:  -High protein diet  -Low sodium diet 2 gm/day  -Avoid alcohol, avoid NSAIDS    Return in about 6 months (around 6/22/2022).    I discussed the patients findings and my recommendations with patient    Nilton Jade, APRN

## 2021-12-23 LAB — ALPHA-FETOPROTEIN: 3.22 NG/ML (ref 0–8.3)

## 2022-01-04 ENCOUNTER — HOSPITAL ENCOUNTER (OUTPATIENT)
Dept: ULTRASOUND IMAGING | Facility: HOSPITAL | Age: 87
Discharge: HOME | End: 2022-01-04
Admitting: NURSE PRACTITIONER

## 2022-01-04 DIAGNOSIS — R18.8 CIRRHOSIS OF LIVER WITH ASCITES, UNSPECIFIED HEPATIC CIRRHOSIS TYPE: ICD-10-CM

## 2022-01-04 DIAGNOSIS — K74.60 CIRRHOSIS OF LIVER WITH ASCITES, UNSPECIFIED HEPATIC CIRRHOSIS TYPE: ICD-10-CM

## 2022-01-04 PROCEDURE — 76705 ECHO EXAM OF ABDOMEN: CPT

## 2022-01-04 RX ORDER — CARVEDILOL 6.25 MG/1
TABLET ORAL
Qty: 60 TABLET | Refills: 0 | Status: SHIPPED | OUTPATIENT
Start: 2022-01-04 | End: 2022-01-21 | Stop reason: SDUPTHER

## 2022-01-04 NOTE — TELEPHONE ENCOUNTER
Rx Refill Note  Requested Prescriptions     Pending Prescriptions Disp Refills   • carvedilol (COREG) 6.25 MG tablet [Pharmacy Med Name: Carvedilol 6.25 MG Oral Tablet] 60 tablet 0     Sig: TAKE 1 TABLET BY MOUTH TWICE DAILY WITH MEALS      Last office visit with prescribing clinician: 8/18/2021      Next office visit with prescribing clinician: 2/16/2022     Steph Zavala MA  01/04/22, 08:43 EST     Last fill: 11/23/2021  *med warning

## 2022-01-20 NOTE — PROGRESS NOTES
[facility] Cardiology  Office Progress Note  Jordi Sam  1934  [address] [facility] [address]       Visit Date: 01/21/22    PCP: Jasper Avery MD  [address]  [facility], [facility] within [facility] [address]    IDENTIFICATION: A 87 y.o. male Retired  for [email or web address]. Previous [employer] Serv[employer]eman stationed in [place]/traveled to many different countries.    Previous [place] patient    PROBLEM LIST:   1. Coronary artery disease:  a. Acute inferior STEMI with RV infarct features, January 2009.   b. Normal LV function, single vessel  RCA disease.     c. Sirolimus eluting stents to the RCA, 01/09/2009.   d. Pericarditis, resolved.   e. LHC, 01/13/2012 (for recurrent angina).   i.  Normal LV function.  ii.  of RCA with failed intervention.  f. Stress MPS 7/27/2018: Normal   g. LHC 01/2019: single vessel CAD with  of RCA, normal LVSF   2. 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 Echo 1/4/2019: marked enlargement of the right heart, biatrial enlargement, LVSF is normal, large left pleural effusion, severe TR, RVSP 30mmHg  b. Cardiac Cath (1/19): Normal LV, Normal PAP, RV enlargement and decreased RVSF  c. Thoracentesis and Paracentesis March and May 2019  d. PRN therapeutic paracentesis ongoing  3. Chronic atrial fibrillation onset December 2019  a. Asymptomatic  b. CHADsVASC= 6, on Eliquis   4. Hypertension.   5. Dyslipidemia.  6. Diabetes mellitus, type 2.  A1C 6.1 (8/2016)  7. CKD stage III  8. Nephrolithiasis.   9. History of “goiter.”  10. Thrombocytopenia  11. Incarcerated umbilical hernia with repair, 7/2020 (Dr. ZURITA)  1. Recurrence, Summer 2021  12. Cirrhosis, recent evaluation by [facility] Hep[facility]ology  a. Paracentesis with findings consistent with cardiac ascites, Spring 2019  b. 8/21 and 9/21 paracentesis per   13. Surgical history:  a. Cholecystectomy.  b. Hernia repair x 2.       CC:   Chief Complaint   Patient presents with   • Atrial  Fibrillation   • Hypertension   • Hyperlipidemia   • STEMI     2009       Allergies  Allergies   Allergen Reactions   • Bee Venom Swelling   • Sulfamethoxazole-Trimethoprim Other (See Comments)     Pt unaware       Current Medications    Current Outpatient Medications:   •  apixaban (ELIQUIS) 2.5 MG tablet tablet, Take 2.5 mg by mouth 2 (Two) Times a Day. Last dose 3 days ago. From 11-16-21, Disp: , Rfl:   •  carvedilol (COREG) 6.25 MG tablet, TAKE 1 TABLET BY MOUTH TWICE DAILY WITH MEALS, Disp: 60 tablet, Rfl: 0  •  colchicine 0.6 MG tablet, Take 1 tablet by mouth Daily., Disp: 30 tablet, Rfl: 6  •  folic acid (FOLVITE) 400 MCG tablet, Take 400 mcg by mouth Daily., Disp: , Rfl:   •  furosemide (LASIX) 40 MG tablet, Lasix 40 mg 1 tab in morning, 1/2 tab in the afternoon. And as needed for weight gain, edema and dypnea., Disp: 180 tablet, Rfl: 3  •  nitroglycerin (NITROSTAT) 0.4 MG SL tablet, DISSOLVE ONE TABLET UNDER THE TONGUE EVERY 5 MINUTES AS NEEDED FOR CHEST PAIN.  DO NOT EXCEED A TOTAL OF 3 DOSES IN 15 MINUTES, Disp: 25 tablet, Rfl: 3  •  simvastatin (ZOCOR) 40 MG tablet, Take 1 tablet by mouth once daily, Disp: 90 tablet, Rfl: 0  •  spironolactone (ALDACTONE) 50 MG tablet, Take 1 tablet by mouth once daily, Disp: 90 tablet, Rfl: 3  •  tamsulosin (FLOMAX) 0.4 MG capsule 24 hr capsule, Take 1 capsule by mouth Daily., Disp: , Rfl:   •  HYDROcodone-acetaminophen (Norco) 5-325 MG per tablet, Take 1 tablet by mouth Every 6 (Six) Hours As Needed (foot pain)., Disp: 40 tablet, Rfl: 0  •  magnesium oxide (MAG-OX) 400 MG tablet, Take 1 tablet by mouth Daily., Disp: 30 tablet, Rfl: 6      History of Present Illness   Jordi Sam is a 87 y.o. year old male here for follow up.    Today, the patient states that he has been experiencing this intermittent very short limited (1 to 2 seconds in duration), nonexertional, sharp stabbing chest pains that occur while he is sitting in his chair.  He states that these do not  "happen frequently nor are they associated with any other significant cardiovascular symptoms such as shortness of breath, tachypalpitations, nausea, nor diaphoresis.  Patient states that this chest pain is different from the chest pain that he felt prior to his STEMI back in 2019.  History today reveals that the patient has not had a recurrence of his angina that he appreciated prior to his STEMI.    Regarding his other cardiovascular signs or symptoms, the patient states that he has not experienced any limitations from a cardiovascular standpoint to his daily activities.    History today also reveals that the patient has a decently controlled fluid status at this time.  He states that he has not experienced any significant interval change in his shortness of breath/BALL, any cough/wheeze, nor any significant worsening of his lower extremity edema (patient reports that he has chronic lower extremity edema that is decently controlled with current medical regimen).  Today, the patient states that he has not required a paracentesis since October or November 2021.    Pt denies any chest pain, dyspnea, dyspnea on exertion, orthopnea, PND, palpitations, lower extremity edema, or claudication.      OBJECTIVE:  Vitals:    01/21/22 1331   BP: 100/62   BP Location: Left arm   Patient Position: Sitting   Pulse: 70   SpO2: 97%   Weight: 95.7 kg (211 lb)   Height: 177.8 cm (70\")     Body mass index is 30.28 kg/m².    Vitals and nursing note reviewed.   Constitutional:       General: Awake. Not in acute distress.     Appearance: Well-developed and not in distress.   Eyes:      General: No scleral icterus.     Conjunctiva/sclera: Conjunctivae normal.      Pupils: Pupils are equal, round, and reactive to light.   HENT:      Head: Normocephalic and atraumatic.      Nose: Nose normal.    Mouth/Throat:      Pharynx: Uvula midline.   Neck:      Thyroid: No thyromegaly.      Vascular: No carotid bruit.      Trachea: No tracheal " deviation.      Lymphadenopathy: No cervical adenopathy.   Pulmonary:      Effort: Pulmonary effort is normal.      Breath sounds: Normal breath sounds. No wheezing. No rhonchi. No rales.   Cardiovascular:      Normal rate. Regular rhythm. Normal S1. Normal S2.      Murmurs: There is no murmur.      No gallop. No click. No rub.   Edema:     Peripheral edema present.     Pretibial: bilateral 2+ pitting edema of the pretibial area.     Ankle: bilateral 1+ pitting edema of the ankle.  Abdominal:      General: Abdomen is protuberant. Bowel sounds are normal.      Palpations: Abdomen is soft. There is no abdominal mass.      Tenderness: There is no abdominal tenderness. There is no guarding.      Comments: Appreciated large hernia in left middle quadrant.   Musculoskeletal:         General: No tenderness.      Extremities: No clubbing present.     Cervical back: Normal range of motion and neck supple. Skin:     General: Skin is warm and dry. There is no cyanosis.      Findings: No erythema or rash.   Neurological:      Mental Status: Alert, oriented to person, place, and time and oriented to person, place and time.   Psychiatric:         Attention and Perception: Attention normal.         Mood and Affect: Mood normal.         Speech: Speech normal.         Behavior: Behavior normal. Behavior is cooperative.         Diagnostic Data:  Procedures      ASSESSMENT:   Diagnosis Plan   1. Chronic right heart failure (HCC)     2. Coronary arteriosclerosis in native artery     3. Mixed hyperlipidemia     4. Essential hypertension     5. Chronic atrial fibrillation (HCC)     6. Chronic liver failure without hepatic coma (HCC)         PLAN:  1.  Right heart failure -patients' current fluid status acceptable at this time.  Patient's last creatinine stable.  Continue current medical regimen.  2.  CAD -patient today reporting very atypical anginal chest pain.  Patient denies any recurrence of his previous angina associated with his  STEMI.  Will defer ischemic evaluation at this time.  3.  Hyperlipidemia -continue statin therapy.  4.  Hypertension-patient's in clinic blood pressure appreciated 100/62.  Patient shows provider recent blood pressure logs and all acceptable.  Goal <130/80.  5.  Chronic atrial fibrillation - patient denies any recent episodes of tachypalpitations, tachyarrhythmia, and tachycardia.  Patient currently anticoagulated on Eliquis 2.5.  Patient was counseled on risks of esophageal varices and bleeding.  Patient was instructed to present to [facility]/hospital if he were to notice black tarry stools/signs or symptoms of GI bleeding.   6.  Chronic liver failure -currently being followed by [facility].     Scribe: Vin Amaya PA-C for Dr. Larry Sin M.D. [facility]    Larry Sin MD, [facility]

## 2022-01-21 ENCOUNTER — OFFICE VISIT (OUTPATIENT)
Dept: CARDIOLOGY | Facility: CLINIC | Age: 87
End: 2022-01-21

## 2022-01-21 VITALS
HEART RATE: 70 BPM | OXYGEN SATURATION: 97 % | SYSTOLIC BLOOD PRESSURE: 100 MMHG | BODY MASS INDEX: 30.21 KG/M2 | DIASTOLIC BLOOD PRESSURE: 62 MMHG | WEIGHT: 211 LBS | HEIGHT: 70 IN

## 2022-01-21 DIAGNOSIS — I10 ESSENTIAL HYPERTENSION: ICD-10-CM

## 2022-01-21 DIAGNOSIS — I25.10 CORONARY ARTERIOSCLEROSIS IN NATIVE ARTERY: ICD-10-CM

## 2022-01-21 DIAGNOSIS — E78.2 MIXED HYPERLIPIDEMIA: ICD-10-CM

## 2022-01-21 DIAGNOSIS — I50.812 CHRONIC RIGHT HEART FAILURE: Primary | ICD-10-CM

## 2022-01-21 DIAGNOSIS — K72.10 CHRONIC LIVER FAILURE WITHOUT HEPATIC COMA: ICD-10-CM

## 2022-01-21 DIAGNOSIS — I48.20 CHRONIC ATRIAL FIBRILLATION: ICD-10-CM

## 2022-01-21 PROCEDURE — 99214 OFFICE O/P EST MOD 30 MIN: CPT | Performed by: INTERNAL MEDICINE

## 2022-01-21 RX ORDER — CARVEDILOL 6.25 MG/1
6.25 TABLET ORAL 2 TIMES DAILY WITH MEALS
Qty: 60 TABLET | Refills: 0 | Status: SHIPPED | OUTPATIENT
Start: 2022-01-21 | End: 2022-02-16 | Stop reason: SDUPTHER

## 2022-02-16 ENCOUNTER — OFFICE VISIT (OUTPATIENT)
Dept: FAMILY MEDICINE CLINIC | Facility: CLINIC | Age: 87
End: 2022-02-16

## 2022-02-16 VITALS
HEIGHT: 70 IN | WEIGHT: 216.6 LBS | OXYGEN SATURATION: 98 % | BODY MASS INDEX: 31.01 KG/M2 | DIASTOLIC BLOOD PRESSURE: 68 MMHG | SYSTOLIC BLOOD PRESSURE: 118 MMHG | HEART RATE: 63 BPM

## 2022-02-16 DIAGNOSIS — K42.9 UMBILICAL HERNIA WITHOUT OBSTRUCTION AND WITHOUT GANGRENE: ICD-10-CM

## 2022-02-16 DIAGNOSIS — E78.2 MIXED HYPERLIPIDEMIA: ICD-10-CM

## 2022-02-16 DIAGNOSIS — Z23 FLU VACCINE NEED: Primary | ICD-10-CM

## 2022-02-16 DIAGNOSIS — E78.5 DYSLIPIDEMIA: ICD-10-CM

## 2022-02-16 DIAGNOSIS — N40.1 BENIGN PROSTATIC HYPERPLASIA WITH NOCTURIA: ICD-10-CM

## 2022-02-16 DIAGNOSIS — R35.1 BENIGN PROSTATIC HYPERPLASIA WITH NOCTURIA: ICD-10-CM

## 2022-02-16 DIAGNOSIS — R73.03 PRE-DIABETES: ICD-10-CM

## 2022-02-16 DIAGNOSIS — I10 ESSENTIAL HYPERTENSION: ICD-10-CM

## 2022-02-16 DIAGNOSIS — R25.2 MUSCLE CRAMPS: ICD-10-CM

## 2022-02-16 DIAGNOSIS — N18.31 STAGE 3A CHRONIC KIDNEY DISEASE: ICD-10-CM

## 2022-02-16 DIAGNOSIS — I50.812 CHRONIC RIGHT HEART FAILURE: ICD-10-CM

## 2022-02-16 PROBLEM — N18.30 STAGE 3 CHRONIC KIDNEY DISEASE: Status: RESOLVED | Noted: 2019-07-26 | Resolved: 2022-02-16

## 2022-02-16 PROCEDURE — 90662 IIV NO PRSV INCREASED AG IM: CPT | Performed by: INTERNAL MEDICINE

## 2022-02-16 PROCEDURE — G0008 ADMIN INFLUENZA VIRUS VAC: HCPCS | Performed by: INTERNAL MEDICINE

## 2022-02-16 PROCEDURE — 99214 OFFICE O/P EST MOD 30 MIN: CPT | Performed by: INTERNAL MEDICINE

## 2022-02-16 PROCEDURE — 82043 UR ALBUMIN QUANTITATIVE: CPT | Performed by: INTERNAL MEDICINE

## 2022-02-16 RX ORDER — TERAZOSIN 2 MG/1
2 CAPSULE ORAL NIGHTLY
Qty: 90 CAPSULE | Refills: 3 | Status: SHIPPED | OUTPATIENT
Start: 2022-02-16

## 2022-02-16 RX ORDER — CARVEDILOL 6.25 MG/1
6.25 TABLET ORAL 2 TIMES DAILY WITH MEALS
Qty: 180 TABLET | Refills: 3 | Status: SHIPPED | OUTPATIENT
Start: 2022-02-16 | End: 2022-03-11

## 2022-02-16 RX ORDER — SIMVASTATIN 40 MG
40 TABLET ORAL DAILY
Qty: 90 TABLET | Refills: 32 | Status: SHIPPED | OUTPATIENT
Start: 2022-02-16 | End: 2023-04-04

## 2022-02-16 RX ORDER — MAGNESIUM OXIDE 400 MG/1
400 TABLET ORAL DAILY
Qty: 30 TABLET | Refills: 3 | Status: SHIPPED | OUTPATIENT
Start: 2022-02-16 | End: 2022-03-25

## 2022-02-16 NOTE — PROGRESS NOTES
Jordi Sam  1934  [id number]  Patient Care Team:  Jasper Avery MD as PCP - General (Internal Medicine)  Efrem Posadas MD as Consulting Physician (Cardiology)  Jordi Law MD as Consulting Physician (Urology)  Chapin Villagran MD as Consulting Physician (Otolaryngology)  Gen Laurent MD as Consulting Physician (Gastroenterology)    Jordi Sam is a 88 y.o. male here today for follow up.     This patient is accompanied by their self who contributes to the history of their care.    Chief Complaint:    Chief Complaint   Patient presents with   • Hypertension     needs TD and lipid panel.         History of Present Illness:  I have reviewed and/or updated the patient's past medical, past surgical, family, social history, problem list and allergies as appropriate.     Mr. 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     He continues  Lasix 40 mg in the am and 20 mg in the pm and Aldactone 50 mg daily.  Not required paracentesis since October 2021..    He does not feel as if the fluid is coming back as quickly as it has in the past.    He continues to use a hernia belt secondary to abdominal wall hernia .  There is occasional discomfort but no nausea no vomiting.  No change in his bowels.    He denies any e melena or dark-colored stools or blood in his stool.  Hhe does currently take Coreg secondary to cardiac history.     He denies any effusion, tremor intellectual function, personality or behavior. has no prior history of hepatic encephalopathy.     He  tells me again today that he continues to feel better than he has in a long time.  Overall he feels like he is doing very well.  He would however like to try different medication for BPH.  He has had nocturia and hesitancy in  "the past however Flomax is cost prohibitive.     He used to take simvastatin for his dyslipidemia and denies any myalgias however you have been occasional       Review of Systems   Constitutional: Negative.    HENT: Negative.    Eyes: Negative.    Respiratory: Negative.    Cardiovascular: Negative.    Gastrointestinal: Positive for abdominal distention.   Endocrine: Negative.    Genitourinary: Positive for nocturia.   Musculoskeletal: Positive for arthralgias and myalgias.   Skin: Negative.    Neurological: Negative.    Psychiatric/Behavioral: Negative.        Vitals:    02/16/22 1231   BP: 118/68   Pulse: 63   SpO2: 98%   Weight: 98.2 kg (216 lb 9.6 oz)   Height: 177.8 cm (70\")     Body mass index is 31.08 kg/m².    Physical Exam  Vitals and nursing note reviewed.   Constitutional:       General: He is not in acute distress.     Appearance: Normal appearance. He is well-developed. He is not diaphoretic.   HENT:      Head: Normocephalic and atraumatic.      Right Ear: External ear normal.      Left Ear: External ear normal.      Mouth/Throat:      Pharynx: No oropharyngeal exudate.   Eyes:      General: No scleral icterus.        Right eye: No discharge.         Left eye: No discharge.      Extraocular Movements: Extraocular movements intact.      Conjunctiva/sclera: Conjunctivae normal.   Neck:      Thyroid: No thyromegaly.      Vascular: No JVD.      Trachea: No tracheal deviation.   Cardiovascular:      Rate and Rhythm: Normal rate and regular rhythm.      Heart sounds: Normal heart sounds.      Comments: PMI nondisplaced  Pulmonary:      Effort: Pulmonary effort is normal.      Breath sounds: Normal breath sounds. No wheezing or rales.   Abdominal:      General: Bowel sounds are normal.      Palpations: Abdomen is soft.      Tenderness: There is no abdominal tenderness. There is no guarding or rebound.      Comments: Hernia belt is in place.   Musculoskeletal:      Cervical back: Normal range of motion and neck " supple.      Comments: Normal gait   Lymphadenopathy:      Cervical: No cervical adenopathy.   Skin:     General: Skin is warm and dry.      Capillary Refill: Capillary refill takes less than 2 seconds.      Coloration: Skin is not pale.      Findings: No rash.   Neurological:      General: No focal deficit present.      Mental Status: He is alert and oriented to person, place, and time.      Motor: No abnormal muscle tone.      Coordination: Coordination normal.   Psychiatric:         Judgment: Judgment normal.         Procedures    Results Review:    I reviewed the patient's new clinical results.    Assessment/Plan:     Problem List Items Addressed This Visit        Cardiac and Vasculature    Essential hypertension    Relevant Medications    spironolactone (ALDACTONE) 50 MG tablet    furosemide (LASIX) 40 MG tablet    terazosin (HYTRIN) 2 MG capsule    carvedilol (COREG) 6.25 MG tablet    RESOLVED: Mixed hyperlipidemia    Relevant Medications    simvastatin (ZOCOR) 40 MG tablet    Other Relevant Orders    Lipid panel    Comprehensive Metabolic Panel    RESOLVED: Dyslipidemia       Endocrine and Metabolic    Pre-diabetes    Relevant Orders    MicroAlbumin, Urine, Random - Urine, Clean Catch    Hemoglobin A1c       Gastrointestinal Abdominal     Umbilical hernia       Genitourinary and Reproductive     Stage 3 chronic kidney disease (HCC)    Relevant Medications    spironolactone (ALDACTONE) 50 MG tablet    furosemide (LASIX) 40 MG tablet    Benign prostatic hyperplasia with nocturia    Overview     Will change tpo terazosin 2 mg hs. Counselled on potential for 1st dose affect and to take position chages slowly            Symptoms and Signs    Muscle cramps    Current Assessment & Plan     Stretching exercises for sciatica provided.  Recommend magnesium oxide 400 mg at bedtime.            Other    Chronic right heart failure (HCC)    Relevant Medications    nitroglycerin (NITROSTAT) 0.4 MG SL tablet     spironolactone (ALDACTONE) 50 MG tablet    furosemide (LASIX) 40 MG tablet    carvedilol (COREG) 6.25 MG tablet      Other Visit Diagnoses     Flu vaccine need    -  Primary    Relevant Orders    Fluzone High-Dose 65+yrs (7112-7067) (Completed)          Plan of care reviewed with patient at the conclusion of today's visit. Education was provided regarding diagnosis and management.  Patient verbalizes understanding of and agreement with management plan.    Return for Medicare Wellness, Next scheduled follow up.    Jasper Avery MD      Please note than portions of this note were completed wt a Voice Recognition Program

## 2022-02-16 NOTE — PATIENT INSTRUCTIONS
Sciatica Rehab  Ask your health care provider which exercises are safe for you. Do exercises exactly as told by your health care provider and adjust them as directed. It is normal to feel mild stretching, pulling, tightness, or discomfort as you do these exercises. Stop right away if you feel sudden pain or your pain gets worse. Do not begin these exercises until told by your health care provider.  Stretching and range-of-motion exercises  These exercises warm up your muscles and joints and improve the movement and flexibility of your hips and back. These exercises also help to relieve pain, numbness, and tingling.  Sciatic nerve glide  1. Sit in a chair with your head facing down toward your chest. Place your hands behind your back. Let your shoulders slump forward.  2. Slowly straighten one of your legs while you tilt your head back as if you are looking toward the ceiling. Only straighten your leg as far as you can without making your symptoms worse.  3. Hold this position for __________ seconds.  4. Slowly return to the starting position.  5. Repeat with your other leg.  Repeat __________ times. Complete this exercise __________ times a day.  Knee to chest with hip adduction and internal rotation    1. Lie on your back on a firm surface with both legs straight.  2. Bend one of your knees and move it up toward your chest until you feel a gentle stretch in your lower back and buttock. Then, move your knee toward the shoulder that is on the opposite side from your leg. This is hip adduction and internal rotation.  ? Hold your leg in this position by holding on to the front of your knee.  3. Hold this position for __________ seconds.  4. Slowly return to the starting position.  5. Repeat with your other leg.  Repeat __________ times. Complete this exercise __________ times a day.  Prone extension on elbows    1. Lie on your abdomen on a firm surface. A bed may be too soft for this exercise.  2. Prop yourself up on  your elbows.  3. Use your arms to help lift your chest up until you feel a gentle stretch in your abdomen and your lower back.  ? This will place some of your body weight on your elbows. If this is uncomfortable, try stacking pillows under your chest.  ? Your hips should stay down, against the surface that you are lying on. Keep your hip and back muscles relaxed.  4. Hold this position for __________ seconds.  5. Slowly relax your upper body and return to the starting position.  Repeat __________ times. Complete this exercise __________ times a day.  Strengthening exercises  These exercises build strength and endurance in your back. Endurance is the ability to use your muscles for a long time, even after they get tired.  Pelvic tilt  This exercise strengthens the muscles that lie deep in the abdomen.  1. Lie on your back on a firm surface. Bend your knees and keep your feet flat on the floor.  2. Tense your abdominal muscles. Tip your pelvis up toward the ceiling and flatten your lower back into the floor.  ? To help with this exercise, you may place a small towel under your lower back and try to push your back into the towel.  3. Hold this position for __________ seconds.  4. Let your muscles relax completely before you repeat this exercise.  Repeat __________ times. Complete this exercise __________ times a day.  Alternating arm and leg raises    1. Get on your hands and knees on a firm surface. If you are on a hard floor, you may want to use padding, such as an exercise mat, to cushion your knees.  2. Line up your arms and legs. Your hands should be directly below your shoulders, and your knees should be directly below your hips.  3. Lift your left leg behind you. At the same time, raise your right arm and straighten it in front of you.  ? Do not lift your leg higher than your hip.  ? Do not lift your arm higher than your shoulder.  ? Keep your abdominal and back muscles tight.  ? Keep your hips facing the  ground.  ? Do not arch your back.  ? Keep your balance carefully, and do not hold your breath.  4. Hold this position for __________ seconds.  5. Slowly return to the starting position.  6. Repeat with your right leg and your left arm.  Repeat __________ times. Complete this exercise __________ times a day.  Posture and body mechanics  Good posture and healthy body mechanics can help to relieve stress in your body's tissues and joints. Body mechanics refers to the movements and positions of your body while you do your daily activities. Posture is part of body mechanics. Good posture means:  · Your spine is in its natural S-curve position (neutral).  · Your shoulders are pulled back slightly.  · Your head is not tipped forward.  Follow these guidelines to improve your posture and body mechanics in your everyday activities.  Standing    · When standing, keep your spine neutral and your feet about hip width apart. Keep a slight bend in your knees. Your ears, shoulders, and hips should line up.  · When you do a task in which you  one place for a long time, place one foot up on a stable object that is 2-4 inches (5-10 cm) high, such as a footstool. This helps keep your spine neutral.    Sitting    · When sitting, keep your spine neutral and keep your feet flat on the floor. Use a footrest, if necessary, and keep your thighs parallel to the floor. Avoid rounding your shoulders, and avoid tilting your head forward.  · When working at a desk or a computer, keep your desk at a height where your hands are slightly lower than your elbows. Slide your chair under your desk so you are close enough to maintain good posture.  · When working at a computer, place your monitor at a height where you are looking straight ahead and you do not have to tilt your head forward or downward to look at the screen.    Resting  · When lying down and resting, avoid positions that are most painful for you.  · If you have pain with  activities such as sitting, bending, stooping, or squatting, lie in a position in which your body does not bend very much. For example, avoid curling up on your side with your arms and knees near your chest (fetal position).  · If you have pain with activities such as standing for a long time or reaching with your arms, lie with your spine in a neutral position and bend your knees slightly. Try the following positions:  ? Lying on your side with a pillow between your knees.  ? Lying on your back with a pillow under your knees.  Lifting    · When lifting objects, keep your feet at least shoulder width apart and tighten your abdominal muscles.  · Bend your knees and hips and keep your spine neutral. It is important to lift using the strength of your legs, not your back. Do not lock your knees straight out.  · Always ask for help to lift heavy or awkward objects.    This information is not intended to replace advice given to you by your health care provider. Make sure you discuss any questions you have with your health care provider.  Document Revised: 04/10/2020 Document Reviewed: 01/09/2020  [email or web address] Patient Education © 2021 [employer]    Sciatica Rehab  Ask your health care provider which exercises are safe for you. Do exercises exactly as told by your health care provider and adjust them as directed. It is normal to feel mild stretching, pulling, tightness, or discomfort as you do these exercises. Stop right away if you feel sudden pain or your pain gets worse. Do not begin these exercises until told by your health care provider.  Stretching and range-of-motion exercises  These exercises warm up your muscles and joints and improve the movement and flexibility of your hips and back. These exercises also help to relieve pain, numbness, and tingling.  Sciatic nerve glide  6. Sit in a chair with your head facing down toward your chest. Place your hands behind your back. Let your shoulders slump forward.  7. Slowly  straighten one of your legs while you tilt your head back as if you are looking toward the ceiling. Only straighten your leg as far as you can without making your symptoms worse.  8. Hold this position for __________ seconds.  9. Slowly return to the starting position.  10. Repeat with your other leg.  Repeat __________ times. Complete this exercise __________ times a day.  Knee to chest with hip adduction and internal rotation    6. Lie on your back on a firm surface with both legs straight.  7. Bend one of your knees and move it up toward your chest until you feel a gentle stretch in your lower back and buttock. Then, move your knee toward the shoulder that is on the opposite side from your leg. This is hip adduction and internal rotation.  ? Hold your leg in this position by holding on to the front of your knee.  8. Hold this position for __________ seconds.  9. Slowly return to the starting position.  10. Repeat with your other leg.  Repeat __________ times. Complete this exercise __________ times a day.  Prone extension on elbows    6. Lie on your abdomen on a firm surface. A bed may be too soft for this exercise.  7. Prop yourself up on your elbows.  8. Use your arms to help lift your chest up until you feel a gentle stretch in your abdomen and your lower back.  ? This will place some of your body weight on your elbows. If this is uncomfortable, try stacking pillows under your chest.  ? Your hips should stay down, against the surface that you are lying on. Keep your hip and back muscles relaxed.  9. Hold this position for __________ seconds.  10. Slowly relax your upper body and return to the starting position.  Repeat __________ times. Complete this exercise __________ times a day.  Strengthening exercises  These exercises build strength and endurance in your back. Endurance is the ability to use your muscles for a long time, even after they get tired.  Pelvic tilt  This exercise strengthens the muscles that  lie deep in the abdomen.  5. Lie on your back on a firm surface. Bend your knees and keep your feet flat on the floor.  6. Tense your abdominal muscles. Tip your pelvis up toward the ceiling and flatten your lower back into the floor.  ? To help with this exercise, you may place a small towel under your lower back and try to push your back into the towel.  7. Hold this position for __________ seconds.  8. Let your muscles relax completely before you repeat this exercise.  Repeat __________ times. Complete this exercise __________ times a day.  Alternating arm and leg raises    7. Get on your hands and knees on a firm surface. If you are on a hard floor, you may want to use padding, such as an exercise mat, to cushion your knees.  8. Line up your arms and legs. Your hands should be directly below your shoulders, and your knees should be directly below your hips.  9. Lift your left leg behind you. At the same time, raise your right arm and straighten it in front of you.  ? Do not lift your leg higher than your hip.  ? Do not lift your arm higher than your shoulder.  ? Keep your abdominal and back muscles tight.  ? Keep your hips facing the ground.  ? Do not arch your back.  ? Keep your balance carefully, and do not hold your breath.  10. Hold this position for __________ seconds.  11. Slowly return to the starting position.  12. Repeat with your right leg and your left arm.  Repeat __________ times. Complete this exercise __________ times a day.  Posture and body mechanics  Good posture and healthy body mechanics can help to relieve stress in your body's tissues and joints. Body mechanics refers to the movements and positions of your body while you do your daily activities. Posture is part of body mechanics. Good posture means:  · Your spine is in its natural S-curve position (neutral).  · Your shoulders are pulled back slightly.  · Your head is not tipped forward.  Follow these guidelines to improve your posture and  body mechanics in your everyday activities.  Standing    · When standing, keep your spine neutral and your feet about hip width apart. Keep a slight bend in your knees. Your ears, shoulders, and hips should line up.  · When you do a task in which you  one place for a long time, place one foot up on a stable object that is 2-4 inches (5-10 cm) high, such as a footstool. This helps keep your spine neutral.    Sitting    · When sitting, keep your spine neutral and keep your feet flat on the floor. Use a footrest, if necessary, and keep your thighs parallel to the floor. Avoid rounding your shoulders, and avoid tilting your head forward.  · When working at a desk or a computer, keep your desk at a height where your hands are slightly lower than your elbows. Slide your chair under your desk so you are close enough to maintain good posture.  · When working at a computer, place your monitor at a height where you are looking straight ahead and you do not have to tilt your head forward or downward to look at the screen.    Resting  · When lying down and resting, avoid positions that are most painful for you.  · If you have pain with activities such as sitting, bending, stooping, or squatting, lie in a position in which your body does not bend very much. For example, avoid curling up on your side with your arms and knees near your chest (fetal position).  · If you have pain with activities such as standing for a long time or reaching with your arms, lie with your spine in a neutral position and bend your knees slightly. Try the following positions:  ? Lying on your side with a pillow between your knees.  ? Lying on your back with a pillow under your knees.  Lifting    · When lifting objects, keep your feet at least shoulder width apart and tighten your abdominal muscles.  · Bend your knees and hips and keep your spine neutral. It is important to lift using the strength of your legs, not your back. Do not lock your  knees straight out.  · Always ask for help to lift heavy or awkward objects.    This information is not intended to replace advice given to you by your health care provider. Make sure you discuss any questions you have with your health care provider.  Document Revised: 04/10/2020 Document Reviewed: 01/09/2020  [employer] Patient Education © 2021 [employer]

## 2022-02-17 LAB — ALBUMIN UR-MCNC: 5.5 MG/DL

## 2022-02-25 ENCOUNTER — OFFICE VISIT (OUTPATIENT)
Dept: FAMILY MEDICINE CLINIC | Facility: CLINIC | Age: 87
End: 2022-02-25

## 2022-02-25 VITALS
OXYGEN SATURATION: 96 % | HEART RATE: 54 BPM | TEMPERATURE: 97.5 F | WEIGHT: 213.2 LBS | DIASTOLIC BLOOD PRESSURE: 60 MMHG | SYSTOLIC BLOOD PRESSURE: 105 MMHG | HEIGHT: 70 IN | BODY MASS INDEX: 30.52 KG/M2

## 2022-02-25 DIAGNOSIS — K42.9 UMBILICAL HERNIA WITHOUT OBSTRUCTION AND WITHOUT GANGRENE: ICD-10-CM

## 2022-02-25 DIAGNOSIS — I10 ESSENTIAL HYPERTENSION: ICD-10-CM

## 2022-02-25 DIAGNOSIS — N18.31 STAGE 3A CHRONIC KIDNEY DISEASE: ICD-10-CM

## 2022-02-25 DIAGNOSIS — R73.03 PRE-DIABETES: Primary | ICD-10-CM

## 2022-02-25 LAB
EXPIRATION DATE: NORMAL
HBA1C MFR BLD: 6.4 %
Lab: NORMAL

## 2022-02-25 PROCEDURE — 1170F FXNL STATUS ASSESSED: CPT | Performed by: INTERNAL MEDICINE

## 2022-02-25 PROCEDURE — 1159F MED LIST DOCD IN RCRD: CPT | Performed by: INTERNAL MEDICINE

## 2022-02-25 PROCEDURE — 3044F HG A1C LEVEL LT 7.0%: CPT | Performed by: INTERNAL MEDICINE

## 2022-02-25 PROCEDURE — 83036 HEMOGLOBIN GLYCOSYLATED A1C: CPT | Performed by: INTERNAL MEDICINE

## 2022-02-25 PROCEDURE — G0439 PPPS, SUBSEQ VISIT: HCPCS | Performed by: INTERNAL MEDICINE

## 2022-02-25 NOTE — ASSESSMENT & PLAN NOTE
Hypertension is improving with treatment.  Continue current treatment regimen.  Dietary sodium restriction.  Regular aerobic exercise.  Ambulatory blood pressure monitoring.  Blood pressure will be reassessed at the next regular appointment.

## 2022-02-25 NOTE — PROGRESS NOTES
The ABCs of the Annual Wellness Visit  Subsequent Medicare Wellness Visit    Chief Complaint   Patient presents with   • Medicare Wellness-subsequent     Yearly   • Hypertension     Follow up    • Chronic Kidney Disease     Stage 3      Subjective    History of Present Illness:  Jordi Sam is a 88 y.o. male who presents for a Subsequent Medicare Wellness Visit.     Mr. 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     He continues on Lasix 40 mg in the am and 20 mg in the pm and Aldactone 50 mg daily.  Not required paracentesis since October 2021..    He does not feel as if the fluid is coming back as quickly as it has in the past.    He continues to use   hernia belt secondary to abdominal wall hernia .  There is occasional discomfort but no nausea no vomiting.  No change in his bowels.       He denies any confusion, tremor intellectual function, personality or behavior. has no prior history of hepatic encephalopathy.     He  tells me   today that he continues to feel better than he has in a long time.  Overall he feels like he is doing very well.   Not started the Terazosin yet.He used to take simvastatin for his dyslipidemia and denies any myalgias       The following portions of the patient's history were reviewed and   updated as appropriate:   He  has a past medical history of A-fib (HCC), Acute inferior myocardial infarction (HCC), CAD (coronary artery disease), Dyslipidemia, Goiter, Hernia, umbilical, Hyperlipidemia, Hypertension, Nephrolithiasis, Type 2 diabetes mellitus (HCC), and Umbilical hernia.  He does not have any pertinent problems on file.  He  has a past surgical history that includes Cholecystectomy; Hernia repair; Coronary angioplasty with stent (01/09/2009); Cardiac  catheterization (Bilateral, 1/30/2019); Cardiac catheterization; Bronchoscopy (N/A, 4/12/2019); Paracentesis (06/17/2019); and Umbilical hernia repair (N/A, 7/26/2020).  His family history includes Aneurysm in his father; Cancer in his sister; Dementia in his mother; Heart attack in his father; Heart disease in his brother and father; Hypertension in his daughter and son; No Known Problems in his brother and sister; Stroke in his mother.  He  reports that he quit smoking about 38 years ago. His smoking use included cigarettes. He has a 70.00 pack-year smoking history. He has never used smokeless tobacco. He reports that he does not drink alcohol and does not use drugs.  Current Outpatient Medications   Medication Sig Dispense Refill   • apixaban (ELIQUIS) 2.5 MG tablet tablet Take 2.5 mg by mouth 2 (Two) Times a Day. Last dose 3 days ago. From 11-16-21     • carvedilol (COREG) 6.25 MG tablet Take 1 tablet by mouth 2 (Two) Times a Day With Meals. 180 tablet 3   • colchicine 0.6 MG tablet Take 1 tablet by mouth Daily. 30 tablet 6   • folic acid (FOLVITE) 400 MCG tablet Take 400 mcg by mouth Daily.     • furosemide (LASIX) 40 MG tablet Lasix 40 mg 1 tab in morning, 1/2 tab in the afternoon. And as needed for weight gain, edema and dypnea. 180 tablet 3   • magnesium oxide (MAG-OX) 400 MG tablet Take 1 tablet by mouth Daily. 30 tablet 3   • nitroglycerin (NITROSTAT) 0.4 MG SL tablet DISSOLVE ONE TABLET UNDER THE TONGUE EVERY 5 MINUTES AS NEEDED FOR CHEST PAIN.  DO NOT EXCEED A TOTAL OF 3 DOSES IN 15 MINUTES 25 tablet 3   • simvastatin (ZOCOR) 40 MG tablet Take 1 tablet by mouth Daily. 90 tablet 32   • spironolactone (ALDACTONE) 50 MG tablet Take 1 tablet by mouth once daily 90 tablet 3   • Tdap (BOOSTRIX) 5-2.5-18.5 LF-MCG/0.5 injection Inject 0.5 mL into the appropriate muscle as directed by prescriber 1 (One) Time for 1 dose. 0.5 mL 0   • terazosin (HYTRIN) 2 MG capsule Take 1 capsule by mouth Every Night. 90 capsule 3    • Zoster Vac Recomb Adjuvanted 50 MCG/0.5ML reconstituted suspension Inject 0.5 mL into the appropriate muscle as directed by prescriber 1 (One) Time for 1 dose. 1 each 0     No current facility-administered medications for this visit.     Current Outpatient Medications on File Prior to Visit   Medication Sig   • apixaban (ELIQUIS) 2.5 MG tablet tablet Take 2.5 mg by mouth 2 (Two) Times a Day. Last dose 3 days ago. From 11-16-21   • carvedilol (COREG) 6.25 MG tablet Take 1 tablet by mouth 2 (Two) Times a Day With Meals.   • colchicine 0.6 MG tablet Take 1 tablet by mouth Daily.   • folic acid (FOLVITE) 400 MCG tablet Take 400 mcg by mouth Daily.   • furosemide (LASIX) 40 MG tablet Lasix 40 mg 1 tab in morning, 1/2 tab in the afternoon. And as needed for weight gain, edema and dypnea.   • magnesium oxide (MAG-OX) 400 MG tablet Take 1 tablet by mouth Daily.   • nitroglycerin (NITROSTAT) 0.4 MG SL tablet DISSOLVE ONE TABLET UNDER THE TONGUE EVERY 5 MINUTES AS NEEDED FOR CHEST PAIN.  DO NOT EXCEED A TOTAL OF 3 DOSES IN 15 MINUTES   • simvastatin (ZOCOR) 40 MG tablet Take 1 tablet by mouth Daily.   • spironolactone (ALDACTONE) 50 MG tablet Take 1 tablet by mouth once daily   • terazosin (HYTRIN) 2 MG capsule Take 1 capsule by mouth Every Night.     No current facility-administered medications on file prior to visit.     He is allergic to bee venom and sulfamethoxazole-trimethoprim..    Compared to one year ago, the patient feels his physical   health is better.    Compared to one year ago, the patient feels his mental   health is better.    Recent Hospitalizations:  He was not admitted to the hospital during the last year.       Current Medical Providers:  Patient Care Team:  Jasper Avery MD as PCP - General (Internal Medicine)  Efrem Posadas MD as Consulting Physician (Cardiology)  Jordi Law MD as Consulting Physician (Urology)  hCapin Villagran MD as Consulting Physician  (Otolaryngology)  Gen Laurent MD as Consulting Physician (Gastroenterology)    Outpatient Medications Prior to Visit   Medication Sig Dispense Refill   • apixaban (ELIQUIS) 2.5 MG tablet tablet Take 2.5 mg by mouth 2 (Two) Times a Day. Last dose 3 days ago. From 11-16-21     • carvedilol (COREG) 6.25 MG tablet Take 1 tablet by mouth 2 (Two) Times a Day With Meals. 180 tablet 3   • colchicine 0.6 MG tablet Take 1 tablet by mouth Daily. 30 tablet 6   • folic acid (FOLVITE) 400 MCG tablet Take 400 mcg by mouth Daily.     • furosemide (LASIX) 40 MG tablet Lasix 40 mg 1 tab in morning, 1/2 tab in the afternoon. And as needed for weight gain, edema and dypnea. 180 tablet 3   • magnesium oxide (MAG-OX) 400 MG tablet Take 1 tablet by mouth Daily. 30 tablet 3   • nitroglycerin (NITROSTAT) 0.4 MG SL tablet DISSOLVE ONE TABLET UNDER THE TONGUE EVERY 5 MINUTES AS NEEDED FOR CHEST PAIN.  DO NOT EXCEED A TOTAL OF 3 DOSES IN 15 MINUTES 25 tablet 3   • simvastatin (ZOCOR) 40 MG tablet Take 1 tablet by mouth Daily. 90 tablet 32   • spironolactone (ALDACTONE) 50 MG tablet Take 1 tablet by mouth once daily 90 tablet 3   • terazosin (HYTRIN) 2 MG capsule Take 1 capsule by mouth Every Night. 90 capsule 3     No facility-administered medications prior to visit.       No opioid medication identified on active medication list. I have reviewed chart for other potential  high risk medication/s and harmful drug interactions in the elderly.          Aspirin is not on active medication list.  Aspirin use is not indicated based on review of current medical condition/s. Risk of harm outweighs potential benefits.  .    Patient Active Problem List   Diagnosis   • Coronary arteriosclerosis in native artery   • Panlobular emphysema (HCC)   • Functional diarrhea   • Essential hypertension   • Nephrolithiasis   • Chronic atrial fibrillation (HCC)   • Shortness of breath   • Pleural effusion   • Chronic right heart failure (HCC)   •  "Other ascites   • Cirrhosis of liver with ascites (HCC)   • Stage 3 chronic kidney disease (HCC)   • End stage liver disease (HCC)   • S/P umbilical hernia repair, follow-up exam   • Constipation   • Muscle cramps   • Right hip pain   • Hearing loss of right ear due to cerumen impaction   • Pre-diabetes   • Umbilical hernia   • Benign prostatic hyperplasia with nocturia   • Muscle cramps     Advance Care Planning  Advance Directive is not on file.  ACP discussion was held with the patient during this visit. Patient has an advance directive (not in EMR), copy requested.    Review of Systems   Constitutional: Negative.    HENT: Negative.    Eyes: Negative.    Respiratory: Negative.    Cardiovascular: Negative.    Gastrointestinal: Negative.    Skin: Negative.    Neurological: Negative.    Hematological: Negative.    Psychiatric/Behavioral: Negative.         Objective    Vitals:    02/25/22 1552   BP: 105/60   Pulse: 54   Temp: 97.5 °F (36.4 °C)   SpO2: 96%   Weight: 96.7 kg (213 lb 3.2 oz)   Height: 177.8 cm (70\")     BMI Readings from Last 1 Encounters:   02/25/22 30.59 kg/m²   BMI is above normal parameters. Recommendations include: exercise counseling    Does the patient have evidence of cognitive impairment? No    Physical Exam  Vitals and nursing note reviewed.   Constitutional:       General: He is not in acute distress.     Appearance: He is well-developed. He is not diaphoretic.   HENT:      Head: Normocephalic and atraumatic.      Right Ear: External ear normal.      Left Ear: External ear normal.      Mouth/Throat:      Mouth: Mucous membranes are moist.      Pharynx: Oropharynx is clear. No oropharyngeal exudate.   Eyes:      General: No scleral icterus.        Right eye: No discharge.      Conjunctiva/sclera: Conjunctivae normal.   Neck:      Thyroid: No thyromegaly.      Vascular: No JVD.      Trachea: No tracheal deviation.   Cardiovascular:      Rate and Rhythm: Normal rate and regular rhythm.      " Heart sounds: Normal heart sounds.      Comments: PMI nondisplaced  Pulmonary:      Effort: Pulmonary effort is normal.      Breath sounds: Normal breath sounds. No wheezing or rales.   Abdominal:      General: Bowel sounds are normal.      Palpations: Abdomen is soft.      Tenderness: There is no abdominal tenderness. There is no guarding or rebound.   Musculoskeletal:      Cervical back: Normal range of motion and neck supple.      Comments: Antalgic gait   Lymphadenopathy:      Cervical: No cervical adenopathy.   Skin:     General: Skin is warm and dry.      Capillary Refill: Capillary refill takes less than 2 seconds.      Coloration: Skin is not pale.      Findings: No rash.   Neurological:      Mental Status: He is alert and oriented to person, place, and time.      Motor: No abnormal muscle tone.      Coordination: Coordination normal.   Psychiatric:         Mood and Affect: Mood normal.         Behavior: Behavior normal.         Judgment: Judgment normal.       Lab Results   Component Value Date    HGBA1C 6.4 2022            HEALTH RISK ASSESSMENT    Smoking Status:  Social History     Tobacco Use   Smoking Status Former Smoker   • Packs/day: 2.50   • Years: 28.00   • Pack years: 70.00   • Types: Cigarettes   • Quit date: 1983   • Years since quittin.8   Smokeless Tobacco Never Used     Alcohol Consumption:  Social History     Substance and Sexual Activity   Alcohol Use No     Fall Risk Screen:    STEADI Fall Risk Assessment was completed, and patient is at LOW risk for falls.Assessment completed on:2022    Depression Screening:  PHQ-2/PHQ-9 Depression Screening 2022   Little interest or pleasure in doing things 0   Feeling down, depressed, or hopeless 0   Total Score 0       Health Habits and Functional and Cognitive Screening:  Functional & Cognitive Status 2022   Do you have difficulty preparing food and eating? No   Do you have difficulty bathing yourself, getting dressed  or grooming yourself? No   Do you have difficulty using the toilet? No   Do you have difficulty moving around from place to place? No   Do you have trouble with steps or getting out of a bed or a chair? No   Current Diet Well Balanced Diet   Dental Exam Up to date   Eye Exam Up to date   Exercise (times per week) 0 times per week   Current Exercises Include No Regular Exercise   Current Exercise Activities Include -   Do you need help using the phone?  No   Are you deaf or do you have serious difficulty hearing?  No   Do you need help with transportation? No   Do you need help shopping? No   Do you need help preparing meals?  No   Do you need help with housework?  No   Do you need help with laundry? No   Do you need help taking your medications? No   Do you need help managing money? No   Do you ever drive or ride in a car without wearing a seat belt? -   Have you felt unusual stress, anger or loneliness in the last month? No   Who do you live with? Spouse   If you need help, do you have trouble finding someone available to you? No   Have you been bothered in the last four weeks by sexual problems? No   Do you have difficulty concentrating, remembering or making decisions? No       Age-appropriate Screening Schedule:  Refer to the list below for future screening recommendations based on patient's age, sex and/or medical conditions. Orders for these recommended tests are listed in the plan section. The patient has been provided with a written plan.    Health Maintenance   Topic Date Due   • LIPID PANEL  07/27/2021   • DIABETIC EYE EXAM  12/28/2022 (Originally 10/31/2019)   • ZOSTER VACCINE (2 of 2) 04/14/2022   • HEMOGLOBIN A1C  08/25/2022   • URINE MICROALBUMIN  02/16/2023   • TDAP/TD VACCINES (2 - Td or Tdap) 02/18/2032   • INFLUENZA VACCINE  Completed              Assessment/Plan   CMS Preventative Services Quick Reference  Risk Factors Identified During Encounter  Cardiovascular Disease  Dementia/Memory    Depression/Dysphoria  Fall Risk-High or Moderate  Immunizations Discussed/Encouraged (specific Immunizations; Tdap and Shingrix vaccinations were just provided last week  The above risks/problems have been discussed with the patient.  Follow up actions/plans if indicated are seen below in the Assessment/Plan Section.  Pertinent information has been shared with the patient in the After Visit Summary.    Diagnoses and all orders for this visit:    1. Pre-diabetes (Primary)  -     POC Glycosylated Hemoglobin (Hb A1C)    2. Essential hypertension  Assessment & Plan:  Hypertension is improving with treatment.  Continue current treatment regimen.  Dietary sodium restriction.  Regular aerobic exercise.  Ambulatory blood pressure monitoring.  Blood pressure will be reassessed at the next regular appointment.      3. Stage 3a chronic kidney disease (HCC)    4. Umbilical hernia without obstruction and without gangrene    Other orders  -     Zoster Vac Recomb Adjuvanted 50 MCG/0.5ML reconstituted suspension; Inject 0.5 mL into the appropriate muscle as directed by prescriber 1 (One) Time for 1 dose.  Dispense: 1 each; Refill: 0  -     Tdap (BOOSTRIX) 5-2.5-18.5 LF-MCG/0.5 injection; Inject 0.5 mL into the appropriate muscle as directed by prescriber 1 (One) Time for 1 dose.  Dispense: 0.5 mL; Refill: 0      Follow Up:   Return in about 6 months (around 8/25/2022) for htn.     An After Visit Summary and PPPS were made available to the patient.

## 2022-02-28 ENCOUNTER — LAB (OUTPATIENT)
Dept: LAB | Facility: HOSPITAL | Age: 87
End: 2022-02-28

## 2022-02-28 DIAGNOSIS — R73.03 PRE-DIABETES: ICD-10-CM

## 2022-02-28 DIAGNOSIS — E78.2 MIXED HYPERLIPIDEMIA: ICD-10-CM

## 2022-02-28 LAB — HBA1C MFR BLD: 6.5 % (ref 4.8–5.6)

## 2022-02-28 PROCEDURE — 36415 COLL VENOUS BLD VENIPUNCTURE: CPT

## 2022-02-28 PROCEDURE — 80053 COMPREHEN METABOLIC PANEL: CPT

## 2022-02-28 PROCEDURE — 83036 HEMOGLOBIN GLYCOSYLATED A1C: CPT

## 2022-02-28 PROCEDURE — 80061 LIPID PANEL: CPT

## 2022-03-01 LAB
ALBUMIN SERPL-MCNC: 4.1 G/DL (ref 3.5–5.2)
ALBUMIN/GLOB SERPL: 2 G/DL
ALP SERPL-CCNC: 82 U/L (ref 39–117)
ALT SERPL W P-5'-P-CCNC: 7 U/L (ref 1–41)
ANION GAP SERPL CALCULATED.3IONS-SCNC: 10.9 MMOL/L (ref 5–15)
AST SERPL-CCNC: 20 U/L (ref 1–40)
BILIRUB SERPL-MCNC: 0.8 MG/DL (ref 0–1.2)
BUN SERPL-MCNC: 22 MG/DL (ref 8–23)
BUN/CREAT SERPL: 15.7 (ref 7–25)
CALCIUM SPEC-SCNC: 9.1 MG/DL (ref 8.6–10.5)
CHLORIDE SERPL-SCNC: 103 MMOL/L (ref 98–107)
CHOLEST SERPL-MCNC: 92 MG/DL (ref 0–200)
CO2 SERPL-SCNC: 26.1 MMOL/L (ref 22–29)
CREAT SERPL-MCNC: 1.4 MG/DL (ref 0.76–1.27)
EGFRCR SERPLBLD CKD-EPI 2021: 48.3 ML/MIN/1.73
GLOBULIN UR ELPH-MCNC: 2.1 GM/DL
GLUCOSE SERPL-MCNC: 157 MG/DL (ref 65–99)
HDLC SERPL-MCNC: 40 MG/DL (ref 40–60)
LDLC SERPL CALC-MCNC: 36 MG/DL (ref 0–100)
LDLC/HDLC SERPL: 0.93 {RATIO}
POTASSIUM SERPL-SCNC: 4.8 MMOL/L (ref 3.5–5.2)
PROT SERPL-MCNC: 6.2 G/DL (ref 6–8.5)
SODIUM SERPL-SCNC: 140 MMOL/L (ref 136–145)
TRIGL SERPL-MCNC: 74 MG/DL (ref 0–150)
VLDLC SERPL-MCNC: 16 MG/DL (ref 5–40)

## 2022-03-07 ENCOUNTER — TELEPHONE (OUTPATIENT)
Dept: FAMILY MEDICINE CLINIC | Facility: CLINIC | Age: 87
End: 2022-03-07

## 2022-03-07 NOTE — TELEPHONE ENCOUNTER
Caller: Melly Sam    Relationship: Emergency Contact    Best call back number: [phone]    What form or medical record are you requesting: LAB RESULTS    Who is requesting this form or medical record from you: PATIENT    How would you like to receive the form or medical records (pick-up, mail, fax): MAIL  [address]   [facility] [id number]    Timeframe paperwork needed: N/A    Additional notes: PATIENT WOULD LIKE RESULTS FROM LAST LAB WORK SENT TO HIS ADDRESS ABOVE

## 2022-03-11 RX ORDER — CARVEDILOL 6.25 MG/1
TABLET ORAL
Qty: 60 TABLET | Refills: 10 | Status: SHIPPED | OUTPATIENT
Start: 2022-03-11 | End: 2023-02-13

## 2022-03-22 ENCOUNTER — HOSPITAL ENCOUNTER (OUTPATIENT)
Dept: ULTRASOUND IMAGING | Facility: HOSPITAL | Age: 87
Discharge: HOME | End: 2022-03-22
Admitting: NURSE PRACTITIONER

## 2022-03-22 VITALS
TEMPERATURE: 96.8 F | OXYGEN SATURATION: 93 % | HEART RATE: 53 BPM | BODY MASS INDEX: 30.59 KG/M2 | DIASTOLIC BLOOD PRESSURE: 52 MMHG | HEIGHT: 70 IN | SYSTOLIC BLOOD PRESSURE: 118 MMHG | RESPIRATION RATE: 20 BRPM

## 2022-03-22 DIAGNOSIS — K74.60 CIRRHOSIS OF LIVER WITH ASCITES, UNSPECIFIED HEPATIC CIRRHOSIS TYPE: ICD-10-CM

## 2022-03-22 DIAGNOSIS — R18.8 CIRRHOSIS OF LIVER WITH ASCITES, UNSPECIFIED HEPATIC CIRRHOSIS TYPE: ICD-10-CM

## 2022-03-22 LAB
ANION GAP SERPL CALCULATED.3IONS-SCNC: 8 MMOL/L (ref 5–15)
BASOPHILS # BLD AUTO: 0.04 10*3/MM3 (ref 0–0.2)
BASOPHILS NFR BLD AUTO: 0.8 % (ref 0–1.5)
BUN SERPL-MCNC: 37 MG/DL (ref 8–23)
BUN/CREAT SERPL: 26.2 (ref 7–25)
CALCIUM SPEC-SCNC: 9.1 MG/DL (ref 8.6–10.5)
CHLORIDE SERPL-SCNC: 103 MMOL/L (ref 98–107)
CO2 SERPL-SCNC: 26 MMOL/L (ref 22–29)
CREAT SERPL-MCNC: 1.41 MG/DL (ref 0.76–1.27)
DEPRECATED RDW RBC AUTO: 53.9 FL (ref 37–54)
EGFRCR SERPLBLD CKD-EPI 2021: 47.9 ML/MIN/1.73
EOSINOPHIL # BLD AUTO: 0.08 10*3/MM3 (ref 0–0.4)
EOSINOPHIL NFR BLD AUTO: 1.6 % (ref 0.3–6.2)
ERYTHROCYTE [DISTWIDTH] IN BLOOD BY AUTOMATED COUNT: 14.7 % (ref 12.3–15.4)
GLUCOSE SERPL-MCNC: 112 MG/DL (ref 65–99)
HCT VFR BLD AUTO: 38.2 % (ref 37.5–51)
HGB BLD-MCNC: 12 G/DL (ref 13–17.7)
IMM GRANULOCYTES # BLD AUTO: 0.02 10*3/MM3 (ref 0–0.05)
IMM GRANULOCYTES NFR BLD AUTO: 0.4 % (ref 0–0.5)
INR PPP: 1.39 (ref 0.85–1.16)
LYMPHOCYTES # BLD AUTO: 0.71 10*3/MM3 (ref 0.7–3.1)
LYMPHOCYTES NFR BLD AUTO: 14.1 % (ref 19.6–45.3)
MCH RBC QN AUTO: 31.2 PG (ref 26.6–33)
MCHC RBC AUTO-ENTMCNC: 31.4 G/DL (ref 31.5–35.7)
MCV RBC AUTO: 99.2 FL (ref 79–97)
MONOCYTES # BLD AUTO: 0.48 10*3/MM3 (ref 0.1–0.9)
MONOCYTES NFR BLD AUTO: 9.5 % (ref 5–12)
NEUTROPHILS NFR BLD AUTO: 3.7 10*3/MM3 (ref 1.7–7)
NEUTROPHILS NFR BLD AUTO: 73.6 % (ref 42.7–76)
NRBC BLD AUTO-RTO: 0 /100 WBC (ref 0–0.2)
PLATELET # BLD AUTO: 100 10*3/MM3 (ref 140–450)
PMV BLD AUTO: 10.9 FL (ref 6–12)
POTASSIUM SERPL-SCNC: 4.6 MMOL/L (ref 3.5–5.2)
PROTHROMBIN TIME: 16.6 SECONDS (ref 11.4–14.4)
RBC # BLD AUTO: 3.85 10*6/MM3 (ref 4.14–5.8)
SODIUM SERPL-SCNC: 137 MMOL/L (ref 136–145)
WBC NRBC COR # BLD: 5.03 10*3/MM3 (ref 3.4–10.8)

## 2022-03-22 PROCEDURE — 76942 ECHO GUIDE FOR BIOPSY: CPT

## 2022-03-22 PROCEDURE — C1729 CATH, DRAINAGE: HCPCS

## 2022-03-22 PROCEDURE — 85610 PROTHROMBIN TIME: CPT | Performed by: RADIOLOGY

## 2022-03-22 PROCEDURE — 80048 BASIC METABOLIC PNL TOTAL CA: CPT | Performed by: RADIOLOGY

## 2022-03-22 PROCEDURE — 85025 COMPLETE CBC W/AUTO DIFF WBC: CPT | Performed by: RADIOLOGY

## 2022-03-22 RX ORDER — LIDOCAINE HYDROCHLORIDE 10 MG/ML
10 INJECTION, SOLUTION EPIDURAL; INFILTRATION; INTRACAUDAL; PERINEURAL ONCE
Status: COMPLETED | OUTPATIENT
Start: 2022-03-22 | End: 2022-03-22

## 2022-03-22 RX ORDER — SODIUM CHLORIDE 0.9 % (FLUSH) 0.9 %
3 SYRINGE (ML) INJECTION EVERY 12 HOURS SCHEDULED
Status: DISCONTINUED | OUTPATIENT
Start: 2022-03-22 | End: 2022-03-24 | Stop reason: HOSPADM

## 2022-03-22 RX ORDER — SODIUM CHLORIDE 0.9 % (FLUSH) 0.9 %
10 SYRINGE (ML) INJECTION AS NEEDED
Status: DISCONTINUED | OUTPATIENT
Start: 2022-03-22 | End: 2022-03-24 | Stop reason: HOSPADM

## 2022-03-22 RX ADMIN — LIDOCAINE HYDROCHLORIDE 8 ML: 10 INJECTION, SOLUTION EPIDURAL; INFILTRATION; INTRACAUDAL; PERINEURAL at 15:58

## 2022-03-22 NOTE — NURSING NOTE
Pt discharged from ir dept s/p paracentesis. Pt tolerated procedure without complications. Dressing to site remained clean, dry and intact at time of discharge, site without evidence of hematoma. Discharge instructions reviewed with patient and spouse both verbalized understanding. Pt transported to exit via wheelchair per CNA.

## 2022-03-22 NOTE — PRE-PROCEDURE NOTE
[facility]   Vascular Interventional Radiology  History & Physicial    Patient Name:Jordi Sam    : 1934    MRN: [id number]    Primary Care Physician: Jasper Avery MD    Referring Physician: MAKAYLA Stringer     Date of admission: 3/22/2022    Subjective     Reason for Consult: Para    History of Present Illness     Jordi Sam is a 88 y.o. male referred to IR as noted above.      Active Hospital Problems:  There are no active hospital problems to display for this patient.      Personal History     Past Medical History:   Diagnosis Date   • A-fib (HCC)    • Acute inferior myocardial infarction (HCC)     Acute inferior STEMI with RV infarct features, 2009.    • CAD (coronary artery disease)    • Dyslipidemia    • Goiter     History of “goiter.”   • Hernia, umbilical    • Hyperlipidemia    • Hypertension    • Nephrolithiasis    • Type 2 diabetes mellitus (HCC)    • Umbilical hernia     Pt stated has recently returned. Seeing Dr Kiersten Nova and Dr Jasper Avery        Past Surgical History:   Procedure Laterality Date   • BRONCHOSCOPY N/A 2019    Procedure: BRONCHOSCOPY WITH THORACENTESIS;  Surgeon: Marciano Higginbotham MD;  Location:  [facility];  Service: Pulmonary   • CARDIAC CATHETERIZATION Bilateral 2019    Procedure: Right and Left Heart Cath;  Surgeon: Efrem Posadas MD;  Location:  [facility];  Service: Cardiology   • CARDIAC CATHETERIZATION     • CHOLECYSTECTOMY     • CORONARY ANGIOPLASTY WITH STENT PLACEMENT  2009    Sirolimus eluting stents to the RCA, 2009.    • HERNIA REPAIR      Hernia repair x 2.    • PARACENTESIS  2019    multiple   • UMBILICAL HERNIA REPAIR N/A 2020    Procedure: UMBILICAL HERNIA REPAIR;  Surgeon: Maribell Her MD;  Location:  [facility] OR;  Service: General;  Laterality: N/A;       Family History: His family history includes Aneurysm in his father; Cancer in his sister;  "Dementia in his mother; Heart attack in his father; Heart disease in his brother and father; Hypertension in his daughter and son; No Known Problems in his brother and sister; Stroke in his mother.     Social History: He  reports that he quit smoking about 38 years ago. His smoking use included cigarettes. He has a 70.00 pack-year smoking history. He has never used smokeless tobacco. He reports that he does not drink alcohol and does not use drugs.    Home Medications:  apixaban, carvedilol, colchicine, folic acid, furosemide, magnesium oxide, nitroglycerin, simvastatin, spironolactone, and terazosin    Current Medications:  •  sodium chloride  •  sodium chloride     Allergies:  He is allergic to bee venom and sulfamethoxazole-trimethoprim.    Review of Systems    Objective     Visit Vitals  /58   Temp 96.8 °F (36 °C)   Resp 16   Ht 177.8 cm (70\")   SpO2 92%   BMI 30.59 kg/m²        Physical Exam    A&Ox3. Able to communicate  No Apparent Distress  Average physique      Result Review      I have personally reviewed the results from the time of this admission to 3/22/2022 14:59 EDT and agree with these findings.  [x]  Laboratory  []  Microbiology  [x]  Radiology  []  EKG/Telemetry   []  Cardiology/Vascular   []  Pathology  []  Old records  []  Other:    Most notable findings include: As noted:    Results from last 7 days   Lab Units 03/22/22  1327   INR  1.39*   HEMOGLOBIN g/dL 12.0*   HEMATOCRIT % 38.2   PLATELETS 10*3/mm3 100*       Estimated Creatinine Clearance: 42.3 mL/min (A) (by C-G formula based on SCr of 1.41 mg/dL (H)).   Creatinine   Date Value Ref Range Status   03/22/2022 1.41 (H) 0.76 - 1.27 mg/dL Final       COVID19   Date Value Ref Range Status   07/26/2020 Not Detected Not Detected - Ref. Range Final        No results found for: PREGTESTUR, PREGSERUM, HCG, HCGQUANT       Assessment / Plan     Jordi Sam is a 88 y.o. male referred to the IR service with above problem.    Plan:   As " above.    David Heck MD   Vascular Interventional Radiology  03/22/22   2:59 PM EDT

## 2022-03-22 NOTE — NURSING NOTE
US guided paracentesis performed by MD Heck. 3100 MLS removed from peritoneum. Patient tolerated well. Report called to nurse.

## 2022-03-23 ENCOUNTER — TELEPHONE (OUTPATIENT)
Dept: INFUSION THERAPY | Facility: HOSPITAL | Age: 87
End: 2022-03-23

## 2022-03-25 ENCOUNTER — HOSPITAL ENCOUNTER (EMERGENCY)
Facility: HOSPITAL | Age: 87
Discharge: HOME | End: 2022-03-25
Attending: EMERGENCY MEDICINE | Admitting: EMERGENCY MEDICINE

## 2022-03-25 ENCOUNTER — OFFICE VISIT (OUTPATIENT)
Dept: FAMILY MEDICINE CLINIC | Facility: CLINIC | Age: 87
End: 2022-03-25

## 2022-03-25 ENCOUNTER — APPOINTMENT (OUTPATIENT)
Dept: GENERAL RADIOLOGY | Facility: HOSPITAL | Age: 87
End: 2022-03-25

## 2022-03-25 VITALS
OXYGEN SATURATION: 96 % | HEART RATE: 61 BPM | DIASTOLIC BLOOD PRESSURE: 41 MMHG | BODY MASS INDEX: 31.4 KG/M2 | SYSTOLIC BLOOD PRESSURE: 101 MMHG | RESPIRATION RATE: 20 BRPM | HEIGHT: 69 IN | WEIGHT: 212 LBS | TEMPERATURE: 98.2 F

## 2022-03-25 VITALS
DIASTOLIC BLOOD PRESSURE: 62 MMHG | BODY MASS INDEX: 30.38 KG/M2 | HEART RATE: 88 BPM | SYSTOLIC BLOOD PRESSURE: 112 MMHG | WEIGHT: 212.2 LBS | OXYGEN SATURATION: 100 % | HEIGHT: 70 IN

## 2022-03-25 DIAGNOSIS — J18.9 PNEUMONIA OF LEFT LOWER LOBE DUE TO INFECTIOUS ORGANISM: ICD-10-CM

## 2022-03-25 DIAGNOSIS — K72.10 END STAGE LIVER DISEASE: ICD-10-CM

## 2022-03-25 DIAGNOSIS — R05.9 COUGH: Primary | ICD-10-CM

## 2022-03-25 DIAGNOSIS — J06.9 UPPER RESPIRATORY TRACT INFECTION, UNSPECIFIED TYPE: Primary | ICD-10-CM

## 2022-03-25 DIAGNOSIS — I50.812 CHRONIC RIGHT HEART FAILURE: ICD-10-CM

## 2022-03-25 DIAGNOSIS — J40 BRONCHITIS: ICD-10-CM

## 2022-03-25 LAB
ALBUMIN SERPL-MCNC: 4.2 G/DL (ref 3.5–5.2)
ALBUMIN/GLOB SERPL: 1.7 G/DL
ALP SERPL-CCNC: 91 U/L (ref 39–117)
ALT SERPL W P-5'-P-CCNC: 9 U/L (ref 1–41)
ANION GAP SERPL CALCULATED.3IONS-SCNC: 11 MMOL/L (ref 5–15)
AST SERPL-CCNC: 20 U/L (ref 1–40)
B PARAPERT DNA SPEC QL NAA+PROBE: NOT DETECTED
B PERT DNA SPEC QL NAA+PROBE: NOT DETECTED
BASOPHILS # BLD AUTO: 0.04 10*3/MM3 (ref 0–0.2)
BASOPHILS NFR BLD AUTO: 0.5 % (ref 0–1.5)
BILIRUB SERPL-MCNC: 1.6 MG/DL (ref 0–1.2)
BUN SERPL-MCNC: 25 MG/DL (ref 8–23)
BUN/CREAT SERPL: 19.5 (ref 7–25)
C PNEUM DNA NPH QL NAA+NON-PROBE: NOT DETECTED
CALCIUM SPEC-SCNC: 9.1 MG/DL (ref 8.6–10.5)
CHLORIDE SERPL-SCNC: 101 MMOL/L (ref 98–107)
CO2 SERPL-SCNC: 26 MMOL/L (ref 22–29)
CREAT SERPL-MCNC: 1.28 MG/DL (ref 0.76–1.27)
DEPRECATED RDW RBC AUTO: 52.3 FL (ref 37–54)
EGFRCR SERPLBLD CKD-EPI 2021: 53.8 ML/MIN/1.73
EOSINOPHIL # BLD AUTO: 0.04 10*3/MM3 (ref 0–0.4)
EOSINOPHIL NFR BLD AUTO: 0.5 % (ref 0.3–6.2)
ERYTHROCYTE [DISTWIDTH] IN BLOOD BY AUTOMATED COUNT: 14.7 % (ref 12.3–15.4)
FLUAV SUBTYP SPEC NAA+PROBE: NOT DETECTED
FLUBV RNA ISLT QL NAA+PROBE: NOT DETECTED
GLOBULIN UR ELPH-MCNC: 2.5 GM/DL
GLUCOSE SERPL-MCNC: 124 MG/DL (ref 65–99)
HADV DNA SPEC NAA+PROBE: NOT DETECTED
HCOV 229E RNA SPEC QL NAA+PROBE: NOT DETECTED
HCOV HKU1 RNA SPEC QL NAA+PROBE: NOT DETECTED
HCOV NL63 RNA SPEC QL NAA+PROBE: NOT DETECTED
HCOV OC43 RNA SPEC QL NAA+PROBE: NOT DETECTED
HCT VFR BLD AUTO: 41.5 % (ref 37.5–51)
HGB BLD-MCNC: 13.3 G/DL (ref 13–17.7)
HMPV RNA NPH QL NAA+NON-PROBE: NOT DETECTED
HOLD SPECIMEN: NORMAL
HPIV1 RNA ISLT QL NAA+PROBE: NOT DETECTED
HPIV2 RNA SPEC QL NAA+PROBE: NOT DETECTED
HPIV3 RNA NPH QL NAA+PROBE: NOT DETECTED
HPIV4 P GENE NPH QL NAA+PROBE: NOT DETECTED
IMM GRANULOCYTES # BLD AUTO: 0.05 10*3/MM3 (ref 0–0.05)
IMM GRANULOCYTES NFR BLD AUTO: 0.6 % (ref 0–0.5)
LYMPHOCYTES # BLD AUTO: 0.6 10*3/MM3 (ref 0.7–3.1)
LYMPHOCYTES NFR BLD AUTO: 7 % (ref 19.6–45.3)
M PNEUMO IGG SER IA-ACNC: NOT DETECTED
MCH RBC QN AUTO: 31 PG (ref 26.6–33)
MCHC RBC AUTO-ENTMCNC: 32 G/DL (ref 31.5–35.7)
MCV RBC AUTO: 96.7 FL (ref 79–97)
MONOCYTES # BLD AUTO: 0.72 10*3/MM3 (ref 0.1–0.9)
MONOCYTES NFR BLD AUTO: 8.4 % (ref 5–12)
NEUTROPHILS NFR BLD AUTO: 7.17 10*3/MM3 (ref 1.7–7)
NEUTROPHILS NFR BLD AUTO: 83 % (ref 42.7–76)
NRBC BLD AUTO-RTO: 0 /100 WBC (ref 0–0.2)
NT-PROBNP SERPL-MCNC: 3350 PG/ML (ref 0–1800)
PLATELET # BLD AUTO: 121 10*3/MM3 (ref 140–450)
PMV BLD AUTO: 10.9 FL (ref 6–12)
POTASSIUM SERPL-SCNC: 4.9 MMOL/L (ref 3.5–5.2)
PROT SERPL-MCNC: 6.7 G/DL (ref 6–8.5)
RBC # BLD AUTO: 4.29 10*6/MM3 (ref 4.14–5.8)
RHINOVIRUS RNA SPEC NAA+PROBE: NOT DETECTED
RSV RNA NPH QL NAA+NON-PROBE: NOT DETECTED
SARS-COV-2 RNA NPH QL NAA+NON-PROBE: NOT DETECTED
SODIUM SERPL-SCNC: 138 MMOL/L (ref 136–145)
TROPONIN T SERPL-MCNC: 0.01 NG/ML (ref 0–0.03)
WBC NRBC COR # BLD: 8.62 10*3/MM3 (ref 3.4–10.8)
WHOLE BLOOD HOLD SPECIMEN: NORMAL
WHOLE BLOOD HOLD SPECIMEN: NORMAL

## 2022-03-25 PROCEDURE — 85025 COMPLETE CBC W/AUTO DIFF WBC: CPT

## 2022-03-25 PROCEDURE — 99284 EMERGENCY DEPT VISIT MOD MDM: CPT

## 2022-03-25 PROCEDURE — U0003 INFECTIOUS AGENT DETECTION BY NUCLEIC ACID (DNA OR RNA); SEVERE ACUTE RESPIRATORY SYNDROME CORONAVIRUS 2 (SARS-COV-2) (CORONAVIRUS DISEASE [COVID-19]), AMPLIFIED PROBE TECHNIQUE, MAKING USE OF HIGH THROUGHPUT TECHNOLOGIES AS DESCRIBED BY CMS-2020-01-R: HCPCS | Performed by: INTERNAL MEDICINE

## 2022-03-25 PROCEDURE — 83880 ASSAY OF NATRIURETIC PEPTIDE: CPT

## 2022-03-25 PROCEDURE — 99214 OFFICE O/P EST MOD 30 MIN: CPT | Performed by: INTERNAL MEDICINE

## 2022-03-25 PROCEDURE — 80053 COMPREHEN METABOLIC PANEL: CPT

## 2022-03-25 PROCEDURE — 0202U NFCT DS 22 TRGT SARS-COV-2: CPT | Performed by: NURSE PRACTITIONER

## 2022-03-25 PROCEDURE — 71045 X-RAY EXAM CHEST 1 VIEW: CPT

## 2022-03-25 PROCEDURE — 84484 ASSAY OF TROPONIN QUANT: CPT

## 2022-03-25 PROCEDURE — 93005 ELECTROCARDIOGRAM TRACING: CPT | Performed by: EMERGENCY MEDICINE

## 2022-03-25 RX ORDER — AZITHROMYCIN 250 MG/1
TABLET, FILM COATED ORAL
Qty: 6 TABLET | Refills: 0 | Status: SHIPPED | OUTPATIENT
Start: 2022-03-25 | End: 2022-05-10

## 2022-03-25 RX ORDER — SODIUM CHLORIDE 0.9 % (FLUSH) 0.9 %
10 SYRINGE (ML) INJECTION AS NEEDED
Status: DISCONTINUED | OUTPATIENT
Start: 2022-03-25 | End: 2022-03-25 | Stop reason: HOSPADM

## 2022-03-25 RX ORDER — METHYLPREDNISOLONE 4 MG/1
TABLET ORAL
Qty: 21 TABLET | Refills: 0 | Status: SHIPPED | OUTPATIENT
Start: 2022-03-25 | End: 2022-03-28

## 2022-03-25 NOTE — DISCHARGE INSTRUCTIONS
Home to rest.  Maintain your very best hydration and nutrition.  Medications been forwarded to your personal pharmacy.  Return to the emergency department as needed for worsening symptoms or concerns.  Follow-up with Dr. Rainey next week to monitor your recovery.  Thank you

## 2022-03-25 NOTE — ED PROVIDER NOTES
" EMERGENCY DEPARTMENT ENCOUNTER    Pt Name: Jordi Sam  MRN: [id number]  Pt :   1934  Room Number:    Date of encounter:  3/25/2022  PCP: Jasper Avery MD  ED Provider: MAKAYLA Pérez    Historian: patient      HPI:  Chief Complaint: cold sx and cough        Context: Jordi Sam is a 88 y.o. male who presents to the ED c/o \"clogged up nose and cough for 2 days\".  Patient states that he could not talk effectively last night.  He saw his primary care provider today who was concerned that he may have pneumonia.  Mr. Sam has no history of pneumonia or history of COPD.  He is not oxygen dependent.  He complains of mild sore throat.  He is eating, drinking, and voiding well.  He denies any shortness of breath    Patient has past medical history of atrial fibrillation for which she is anticoagulated.  He has history of coronary artery disease, hypertension and hyperlipidemia and type 2 diabetes.    View of systems is negative for fever or chills or recent illness.  Positive for sore throat rhinorrhea and unproductive cough.  Negative for chest pain or shortness of breath.  GI systems are negative.  No profound weakness, dizziness or syncope.  No neurosensory complaint or focal weakness      PAST MEDICAL HISTORY  Past Medical History:   Diagnosis Date   • A-fib (HCC)    • Acute inferior myocardial infarction (HCC)     Acute inferior STEMI with RV infarct features, 2009.    • CAD (coronary artery disease)    • Dyslipidemia    • Goiter     History of “goiter.”   • Hernia, umbilical    • Hyperlipidemia    • Hypertension    • Nephrolithiasis    • Type 2 diabetes mellitus (HCC)    • Umbilical hernia     Pt stated has recently returned. Seeing Dr Kiersten Nova and Dr Jasper Avery          PAST SURGICAL HISTORY  Past Surgical History:   Procedure Laterality Date   • BRONCHOSCOPY N/A 2019    Procedure: BRONCHOSCOPY WITH THORACENTESIS;  Surgeon: Marciano Higginbotham MD;  " Location:  [facility];  Service: Pulmonary   • CARDIAC CATHETERIZATION Bilateral 2019    Procedure: Right and Left Heart Cath;  Surgeon: Efrem Posadas MD;  Location:  [facility];  Service: Cardiology   • CARDIAC CATHETERIZATION     • CHOLECYSTECTOMY     • CORONARY ANGIOPLASTY WITH STENT PLACEMENT  2009    Sirolimus eluting stents to the RCA, 2009.    • HERNIA REPAIR      Hernia repair x 2.    • PARACENTESIS  2019    multiple   • UMBILICAL HERNIA REPAIR N/A 2020    Procedure: UMBILICAL HERNIA REPAIR;  Surgeon: Maribell Her MD;  Location:  [facility];  Service: General;  Laterality: N/A;         FAMILY HISTORY  Family History   Problem Relation Age of Onset   • Dementia Mother    • Stroke Mother    • Heart disease Father    • Heart attack Father    • Aneurysm Father    • Cancer Sister    • No Known Problems Brother    • No Known Problems Sister    • Heart disease Brother         CABG   • Hypertension Daughter    • Hypertension Son    • Colon cancer Neg Hx    • Colon polyps Neg Hx          SOCIAL HISTORY  Social History     Socioeconomic History   • Marital status:      Spouse name: Melly   • Number of children: 4   Tobacco Use   • Smoking status: Former Smoker     Packs/day: 2.50     Years: 28.00     Pack years: 70.00     Types: Cigarettes     Quit date: 1983     Years since quittin.8   • Smokeless tobacco: Never Used   Vaping Use   • Vaping Use: Never used   Substance and Sexual Activity   • Alcohol use: No   • Drug use: No   • Sexual activity: Defer         ALLERGIES  Bee venom and Sulfamethoxazole-trimethoprim        REVIEW OF SYSTEMS  Review of Systems     All systems reviewed and negative except for those discussed in HPI.       PHYSICAL EXAM    I have reviewed the triage vital signs and nursing notes.    ED Triage Vitals [22 1212]   Temp Heart Rate Resp BP SpO2   98.2 °F (36.8 °C) 58 18 130/78 94 %      Temp src Heart Rate Source  Patient Position BP Location FiO2 (%)   -- Monitor Sitting Left arm --       Physical Exam  GENERAL:   Appears in no acute distress.  His vital signs are normal.  HENT: Nares patent.  Posterior pharynx is benign.  EYES: No scleral icterus.  CV: Regular rhythm, regular rate.  No tachycardia.  No peripheral edema  RESPIRATORY: Normal effort.  No audible wheezes, rales or rhonchi.  ABDOMEN: Soft, nontender  MUSCULOSKELETAL: No deformities.   NEURO: Alert, moves all extremities, follows commands.  No neurosensory deficit or focal weakness   SKIN: Warm, dry, no rash visualized.        LAB RESULTS  Recent Results (from the past 24 hour(s))   Comprehensive Metabolic Panel    Collection Time: 03/25/22 12:23 PM    Specimen: Blood   Result Value Ref Range    Glucose 124 (H) 65 - 99 mg/dL    BUN 25 (H) 8 - 23 mg/dL    Creatinine 1.28 (H) 0.76 - 1.27 mg/dL    Sodium 138 136 - 145 mmol/L    Potassium 4.9 3.5 - 5.2 mmol/L    Chloride 101 98 - 107 mmol/L    CO2 26.0 22.0 - 29.0 mmol/L    Calcium 9.1 8.6 - 10.5 mg/dL    Total Protein 6.7 6.0 - 8.5 g/dL    Albumin 4.20 3.50 - 5.20 g/dL    ALT (SGPT) 9 1 - 41 U/L    AST (SGOT) 20 1 - 40 U/L    Alkaline Phosphatase 91 39 - 117 U/L    Total Bilirubin 1.6 (H) 0.0 - 1.2 mg/dL    Globulin 2.5 gm/dL    A/G Ratio 1.7 g/dL    BUN/Creatinine Ratio 19.5 7.0 - 25.0    Anion Gap 11.0 5.0 - 15.0 mmol/L    eGFR 53.8 (L) >60.0 mL/min/1.73   BNP    Collection Time: 03/25/22 12:23 PM    Specimen: Blood   Result Value Ref Range    proBNP 3,350.0 (H) 0.0 - 1,800.0 pg/mL   Troponin    Collection Time: 03/25/22 12:23 PM    Specimen: Blood   Result Value Ref Range    Troponin T 0.012 0.000 - 0.030 ng/mL   Green Top (Gel)    Collection Time: 03/25/22 12:23 PM   Result Value Ref Range    Extra Tube Hold for add-ons.    Lavender Top    Collection Time: 03/25/22 12:23 PM   Result Value Ref Range    Extra Tube hold for add-on    Gold Top - SST    Collection Time: 03/25/22 12:23 PM   Result Value Ref Range     Extra Tube Hold for add-ons.    Gray Top    Collection Time: 03/25/22 12:23 PM   Result Value Ref Range    Extra Tube Hold for add-ons.    Light Blue Top    Collection Time: 03/25/22 12:23 PM   Result Value Ref Range    Extra Tube hold for add-on    CBC Auto Differential    Collection Time: 03/25/22 12:23 PM    Specimen: Blood   Result Value Ref Range    WBC 8.62 3.40 - 10.80 10*3/mm3    RBC 4.29 4.14 - 5.80 10*6/mm3    Hemoglobin 13.3 13.0 - 17.7 g/dL    Hematocrit 41.5 37.5 - 51.0 %    MCV 96.7 79.0 - 97.0 fL    MCH 31.0 26.6 - 33.0 pg    MCHC 32.0 31.5 - 35.7 g/dL    RDW 14.7 12.3 - 15.4 %    RDW-SD 52.3 37.0 - 54.0 fl    MPV 10.9 6.0 - 12.0 fL    Platelets 121 (L) 140 - 450 10*3/mm3    Neutrophil % 83.0 (H) 42.7 - 76.0 %    Lymphocyte % 7.0 (L) 19.6 - 45.3 %    Monocyte % 8.4 5.0 - 12.0 %    Eosinophil % 0.5 0.3 - 6.2 %    Basophil % 0.5 0.0 - 1.5 %    Immature Grans % 0.6 (H) 0.0 - 0.5 %    Neutrophils, Absolute 7.17 (H) 1.70 - 7.00 10*3/mm3    Lymphocytes, Absolute 0.60 (L) 0.70 - 3.10 10*3/mm3    Monocytes, Absolute 0.72 0.10 - 0.90 10*3/mm3    Eosinophils, Absolute 0.04 0.00 - 0.40 10*3/mm3    Basophils, Absolute 0.04 0.00 - 0.20 10*3/mm3    Immature Grans, Absolute 0.05 0.00 - 0.05 10*3/mm3    nRBC 0.0 0.0 - 0.2 /100 WBC   Respiratory Panel PCR w/COVID-19(SARS-CoV-2) [facility] In-House, NP Swab in [facility], 3-4 HR TAT - Swab, Nasopharynx    Collection Time: 03/25/22  2:32 PM    Specimen: Nasopharynx; Swab   Result Value Ref Range    ADENOVIRUS, PCR Not Detected Not Detected    Coronavirus 229E Not Detected Not Detected    Coronavirus HKU1 Not Detected Not Detected    Coronavirus NL63 Not Detected Not Detected    Coronavirus OC43 Not Detected Not Detected    COVID19 Not Detected Not Detected - Ref. Range    Human Metapneumovirus Not Detected Not Detected    Human Rhinovirus/Enterovirus Not Detected Not Detected    Influenza A PCR Not Detected Not Detected    Influenza B PCR Not Detected Not  Detected    Parainfluenza Virus 1 Not Detected Not Detected    Parainfluenza Virus 2 Not Detected Not Detected    Parainfluenza Virus 3 Not Detected Not Detected    Parainfluenza Virus 4 Not Detected Not Detected    RSV, PCR Not Detected Not Detected    Bordetella pertussis pcr Not Detected Not Detected    Bordetella parapertussis PCR Not Detected Not Detected    Chlamydophila pneumoniae PCR Not Detected Not Detected    Mycoplasma pneumo by PCR Not Detected Not Detected       If labs were ordered, I independently reviewed the results.        RADIOLOGY  XR Chest 1 View    Result Date: 3/25/2022   DATE OF EXAM: 3/25/2022 1:12 PM  PROCEDURE: XR CHEST 1 VW-  INDICATIONS: SOA triage protocol  COMPARISON: 08/07/2019, 05/08/2019, 04/03/2019  TECHNIQUE: [Portable chest radiograph]  FINDINGS: A chronic left pleural effusion is again noted. No new infiltrates are seen throughout the lungs. The cardiac silhouette and mediastinum are stable. No definitive acute osseous abnormalities are observed.      Evidence for a chronic left pleural effusion which appears to be stable compared to multiple prior studies. No definitive new infiltrates are identified.  This report was finalized on 3/25/2022 1:23 PM by Lv Smith MD.           PROCEDURES    Procedures    ECG 12 Lead   Preliminary Result             MEDICATIONS GIVEN IN ER    Medications   sodium chloride 0.9 % flush 10 mL (has no administration in time range)             ED Course as of 03/25/22 1640   Fri Mar 25, 2022   1638 Mr. Sam has a clear chest x-ray.  On room air, his oxygen saturations have been in the high 90s.  I am not suspicious for pulmonary embolus due to his vital signs as well as the fact that he is on Xarelto with good compliance.  His labs are reassuring and his respiratory panel is negative for any viral etiology.  Will initiate a Z-Rc and steroids.  He is very eager to be discharged and I think this is reasonable.  He agrees to follow-up with his  primary care provider next week and return to the emergency department as needed for worsening symptoms or concerns. [MS]      ED Course User Index  [MS] Jesica Nielsen Rosalva, MAKAYLA           AS OF 16:40 EDT VITALS:    BP - 101/41  HR - 61  TEMP - 98.2 °F (36.8 °C)  O2 SATS - 96%                  DIAGNOSIS  Final diagnoses:   Upper respiratory tract infection, unspecified type   Bronchitis         DISPOSITION    DISCHARGE    Patient discharged in stable condition.    Reviewed implications of results, diagnosis, meds, responsibility to follow up, warning signs and symptoms of possible worsening, potential complications and reasons to return to ER.    Patient/Family voiced understanding of above instructions.    Discussed plan for discharge, as there is no emergent indication for admission.  Pt/family is agreeable and understands need for follow up and possible repeat testing.  Pt/family is aware that discharge does not mean that nothing is wrong but that it indicates no emergency is currently present that requires admission and they must continue care with follow-up as given below or with a physician of their choice.     FOLLOW-UP  Jasper Avery MD  [address] [place], [place] [id number] [phone]    Schedule an appointment as soon as possible for a visit in 2 days  If symptoms worsen         Medication List      New Prescriptions    azithromycin 250 MG tablet  Commonly known as: ZITHROMAX  Take 2 tablets the first day, then 1 tablet daily for 4 days.     methylPREDNISolone 4 MG dose pack  Commonly known as: MEDROL  Take as directed on package instructions.           Where to Get Your Medications      These medications were sent to [address] [facility] - [phone]  - [phone]   [address] [facility] [address]    Phone: [phone]   · azithromycin 250 MG tablet  · methylPREDNISolone 4 MG dose pack                  Jesica Nielsen  Rosalva, APRN  03/27/22 1714

## 2022-03-25 NOTE — PROGRESS NOTES
"Jordi Sam  1934  [id number]  Patient Care Team:  Jasper Avery MD as PCP - General (Internal Medicine)  Efrem Posadas MD as Consulting Physician (Cardiology)  Jordi Law MD as Consulting Physician (Urology)  Chapin Villagran MD as Consulting Physician (Otolaryngology)  Gen Laurent MD as Consulting Physician (Gastroenterology)    Jordi Sam is a 88 y.o. male here today for follow up.     This patient is accompanied by their self who contributes to the history of their care.    Chief Complaint:    Chief Complaint   Patient presents with   • Cough   • Anorexia   • Nasal Congestion   • Sore Throat         History of Present Illness:  I have reviewed and/or updated the patient's past medical, past surgical, family, social history, problem list and allergies as appropriate.     Reports decreased appetite x one day.  He has been coughing all week, worsening yesterday.  Denies fevers or chills.  There has been hoarseness.  Denies ill contact  Cough with thick mucus. He just returned from [place] this past weekend  Current with covid vaccinee.  There has been SOA worse with activity, mucus is \"yellow oysterish\"- denies hemotysis  + Sore throat,. Occasional wheezing. Has been hoarse.  Has pain with inspiration.     Review of Systems   Constitutional: Positive for appetite change and fatigue.   HENT: Positive for sore throat.    Eyes: Negative.    Respiratory: Positive for cough and shortness of breath.    Cardiovascular: Positive for chest pain.        With inspiration   Gastrointestinal: Negative.    Neurological: Positive for weakness.       Vitals:    03/25/22 1016 03/25/22 1127   BP: 112/62    Pulse: (!) 130 88   SpO2: 100%    Weight: 96.3 kg (212 lb 3.2 oz)    Height: 177.8 cm (70\")      Body mass index is 30.45 kg/m².    Physical Exam  Vitals and nursing note reviewed.   Constitutional:       General: He is not in acute distress.     Appearance: He is well-developed. He " is ill-appearing. He is not diaphoretic.   HENT:      Head: Normocephalic and atraumatic.      Right Ear: Tympanic membrane and external ear normal.      Left Ear: Tympanic membrane and external ear normal.      Mouth/Throat:      Pharynx: No oropharyngeal exudate.   Eyes:      General: No scleral icterus.        Right eye: No discharge.      Conjunctiva/sclera: Conjunctivae normal.   Neck:      Thyroid: No thyromegaly.      Vascular: No JVD.      Trachea: No tracheal deviation.   Cardiovascular:      Rate and Rhythm: Regular rhythm. Tachycardia present.      Heart sounds: Normal heart sounds.      Comments: PMI nondisplaced  Pulmonary:      Effort: Pulmonary effort is normal.      Breath sounds: Rales present. No wheezing.      Comments: Significant left-sided Rales.  Bilateral dullness to percussion bilaterally.  Abdominal:      General: Bowel sounds are normal.      Palpations: Abdomen is soft.      Tenderness: There is no abdominal tenderness. There is no guarding or rebound.      Comments: Abdominal hernia reducible   Musculoskeletal:      Cervical back: Normal range of motion and neck supple.   Lymphadenopathy:      Cervical: No cervical adenopathy.   Skin:     General: Skin is warm and dry.      Capillary Refill: Capillary refill takes less than 2 seconds.      Coloration: Skin is not pale.      Findings: No rash.   Neurological:      Mental Status: He is alert and oriented to person, place, and time.      Motor: No abnormal muscle tone.      Coordination: Coordination normal.   Psychiatric:         Judgment: Judgment normal.         Procedures    Results Review:    None    Assessment/Plan:  This 88-year-old gentleman presents with clinical community-acquired pneumonia, he however had traveled to [place] for an anniversary function were numerous people run mask.  Covid test has been taken.  I am concerned about the left-sided bronchial bacterial pneumonia.  Given his comorbidities of cirrhosis chronic right  heart failure recurrent pleural effusions recommended evaluation at the hospital.  He and his wife concur.  Discussed case with Dr. Elena of the emergency room  Problem List Items Addressed This Visit        Gastrointestinal Abdominal     End stage liver disease (HCC)       Pulmonary and Pneumonias    Pneumonia of left lower lobe due to infectious organism       Other    Chronic right heart failure (HCC)    Relevant Medications    nitroglycerin (NITROSTAT) 0.4 MG SL tablet    spironolactone (ALDACTONE) 50 MG tablet    furosemide (LASIX) 40 MG tablet    carvedilol (COREG) 6.25 MG tablet      Other Visit Diagnoses     Cough    -  Primary    Relevant Orders    COVID-19,[facility] ROUTINE, NP/OP SWAB IN TRANSPORT MEDIA OR ESWAB 72 HR TAT - Swab, Anterior nasal          Plan of care reviewed with patient at the conclusion of today's visit. Education was provided regarding diagnosis and management.  Patient verbalizes understanding of and agreement with management plan.    No follow-ups on file.    Jasper Avery MD      Please note than portions of this note were completed wt a Voice Recognition Program

## 2022-03-27 LAB
LABCORP SARS-COV-2, NAA 2 DAY TAT: NORMAL
QT INTERVAL: 460 MS
QTC INTERVAL: 489 MS
SARS-COV-2 RNA RESP QL NAA+PROBE: NOT DETECTED

## 2022-03-28 ENCOUNTER — OFFICE VISIT (OUTPATIENT)
Dept: FAMILY MEDICINE CLINIC | Facility: CLINIC | Age: 87
End: 2022-03-28

## 2022-03-28 VITALS
OXYGEN SATURATION: 98 % | BODY MASS INDEX: 32.38 KG/M2 | SYSTOLIC BLOOD PRESSURE: 108 MMHG | HEART RATE: 62 BPM | HEIGHT: 69 IN | WEIGHT: 218.6 LBS | DIASTOLIC BLOOD PRESSURE: 48 MMHG

## 2022-03-28 DIAGNOSIS — R06.02 SHORTNESS OF BREATH: ICD-10-CM

## 2022-03-28 DIAGNOSIS — I10 ESSENTIAL HYPERTENSION: ICD-10-CM

## 2022-03-28 DIAGNOSIS — N18.31 STAGE 3A CHRONIC KIDNEY DISEASE: Primary | ICD-10-CM

## 2022-03-28 DIAGNOSIS — J18.9 PNEUMONIA OF LEFT LOWER LOBE DUE TO INFECTIOUS ORGANISM: ICD-10-CM

## 2022-03-28 PROCEDURE — 99214 OFFICE O/P EST MOD 30 MIN: CPT | Performed by: INTERNAL MEDICINE

## 2022-03-28 NOTE — PROGRESS NOTES
Jordi Sam  1934  [id number]  Patient Care Team:  Jasper Avery MD as PCP - General (Internal Medicine)  Efrem Posadas MD as Consulting Physician (Cardiology)  Jordi Law MD as Consulting Physician (Urology)  Chapin Villagran MD as Consulting Physician (Otolaryngology)  Gen Laurent MD as Consulting Physician (Gastroenterology)    Jordi Sam is a 88 y.o. male here today for follow up.     This patient is accompanied by their self who contributes to the history of their care.    Chief Complaint:    Chief Complaint   Patient presents with   • Hospital Follow Up Visit         History of Present Illness:  I have reviewed and/or updated the patient's past medical, past surgical, family, social history, problem list and allergies as appropriate.     He was seen last week.  I sent him to the emergency room with concerns over clinical left-sided pneumonia.  Chest x-ray was interpreted as clear.  No white count.  He was given azithromycin and sent home.  SOA and cough markedly improved. No further wheezing. Still with some hoarseness. Denies chest pain. Continuesw to to take his azithro.  Denies any lower extremity edema.  He denies any dyspnea on exertion.  Feels his energy level is markedly improved.  Denies any syncope, palpitations or dizziness.         Review of Systems   Constitutional: Negative for chills, fatigue, fever, unexpected weight gain and unexpected weight loss.   HENT: Negative for ear pain, postnasal drip, sinus pressure and sore throat.    Eyes: Negative for blurred vision, double vision and visual disturbance.   Respiratory: Negative for cough, shortness of breath and wheezing.    Cardiovascular: Negative for chest pain, palpitations and leg swelling.   Gastrointestinal: Negative for abdominal pain, blood in stool, diarrhea, nausea and vomiting.   Endocrine: Negative for cold intolerance, heat intolerance, polydipsia and polyuria.   Genitourinary: Negative  "for dysuria, flank pain and hematuria.   Musculoskeletal: Negative for arthralgias and joint swelling.   Skin: Negative for dry skin and rash.   Neurological: Negative for weakness, numbness and headache.   Psychiatric/Behavioral: Negative for self-injury and depressed mood.       Vitals:    03/28/22 1240 03/28/22 1301   BP: 108/48    Pulse: (!) 48 62   SpO2: 98%    Weight: 99.2 kg (218 lb 9.6 oz)    Height: 175.3 cm (69.02\")      Body mass index is 32.27 kg/m².    Physical Exam  Vitals and nursing note reviewed.   Constitutional:       General: He is not in acute distress.     Appearance: He is well-developed. He is not diaphoretic.   HENT:      Head: Normocephalic and atraumatic.      Right Ear: External ear normal.      Left Ear: External ear normal.      Mouth/Throat:      Pharynx: No oropharyngeal exudate.   Eyes:      General: No scleral icterus.        Right eye: No discharge.      Conjunctiva/sclera: Conjunctivae normal.   Neck:      Thyroid: No thyromegaly.      Vascular: No JVD.      Trachea: No tracheal deviation.   Cardiovascular:      Rate and Rhythm: Normal rate and regular rhythm.      Heart sounds: Normal heart sounds.      Comments: PMI nondisplaced  Pulmonary:      Effort: Pulmonary effort is normal.      Breath sounds: Normal breath sounds. No wheezing or rales.   Abdominal:      General: Bowel sounds are normal.      Palpations: Abdomen is soft.      Tenderness: There is no abdominal tenderness. There is no guarding or rebound.   Musculoskeletal:      Cervical back: Normal range of motion and neck supple.      Comments: Normal gait   Lymphadenopathy:      Cervical: No cervical adenopathy.   Skin:     General: Skin is warm and dry.      Capillary Refill: Capillary refill takes less than 2 seconds.      Coloration: Skin is not pale.      Findings: No rash.   Neurological:      Mental Status: He is alert and oriented to person, place, and time.      Motor: No abnormal muscle tone.      Coordination: " Coordination normal.   Psychiatric:         Judgment: Judgment normal.         Procedures    Results Review:     DATE OF EXAM:   3/25/2022 1:12 PM     PROCEDURE:   XR CHEST 1 VW-     INDICATIONS:   SOA triage protocol     COMPARISON:  08/07/2019, 05/08/2019, 04/03/2019     TECHNIQUE:   [Portable chest radiograph]     FINDINGS:   A chronic left pleural effusion is again noted. No new infiltrates are  seen throughout the lungs. The cardiac silhouette and mediastinum are  stable. No definitive acute osseous abnormalities are observed.      IMPRESSION:  Evidence for a chronic left pleural effusion which appears to be stable  compared to multiple prior studies. No definitive new infiltrates are  identified.      4 wk ago   Glucose   65 - 99 mg/dL 124 High     BUN   8 - 23 mg/dL 25 High     Creatinine   0.76 - 1.27 mg/dL 1.28 High     Sodium   136 - 145 mmol/L 138    Potassium   3.5 - 5.2 mmol/L 4.9    Chloride   98 - 107 mmol/L 101    CO2   22.0 - 29.0 mmol/L 26.0    Calcium   8.6 - 10.5 mg/dL 9.1    Total Protein   6.0 - 8.5 g/dL 6.7    Albumin   3.50 - 5.20 g/dL 4.20    ALT (SGPT)   1 - 41 U/L 9    AST (SGOT)   1 - 40 U/L 20    Alkaline Phosphatase   39 - 117 U/L 91    Total Bilirubin   0.0 - 1.2 mg/dL 1.6 High     Globulin   gm/dL 2.5    Comment: Calculated Result      Notes    Component   Ref Range & Units 3 d ago   (3/25/22)   WBC   3.40 - 10.80 10*3/mm3 8.62    RBC   4.14 - 5.80 10*6/mm3 4.29    Hemoglobin   13.0 - 17.7 g/dL 13.3    Hematocrit   37.5 - 51.0 % 41.5    MCV   79.0 - 97.0 fL 96.7    MCH   26.6 - 33.0 pg 31.0    MCHC   31.5 - 35.7 g/dL 32.0    RDW   12.3 - 15.4 % 14.7    RDW-SD   37.0 - 54.0 fl 52.3    MPV   6.0 - 12.0 fL 10.9    Platelets   140 - 450 10*3/mm3 121 Low     Neutrophil %   42.7 - 76.0 % 83.0 High     Lymphocyte %   19.6 - 45.3 % 7.0 Low     Monocyte %   5.0 - 12.0 % 8.4    Eosinophil %   0.3 - 6.2 % 0.5             None    Assessment/Plan:     Problem List Items Addressed This Visit         Cardiac and Vasculature    Essential hypertension    Current Assessment & Plan     Hypertension is improving with treatment.  Continue current treatment regimen.  Dietary sodium restriction.  Regular aerobic exercise.  Blood pressure will be reassessed at the next regular appointment.           Relevant Medications    spironolactone (ALDACTONE) 50 MG tablet    furosemide (LASIX) 40 MG tablet    terazosin (HYTRIN) 2 MG capsule    carvedilol (COREG) 6.25 MG tablet       Genitourinary and Reproductive     Stage 3 chronic kidney disease (HCC) - Primary    Current Assessment & Plan     Renal condition is improving with treatment.  Continue current treatment regimen.  Fluid restriction.  Weight loss.  Regular aerobic exercise.  Continue current medications.  Renal condition will be reassessed in 3 months   .           Relevant Medications    spironolactone (ALDACTONE) 50 MG tablet    furosemide (LASIX) 40 MG tablet       Pulmonary and Pneumonias    Shortness of breath    Pneumonia of left lower lobe due to infectious organism    Current Assessment & Plan     Rales and symptoms of improved.  Complete azithromycin.  No supplemental oxygen as needed.           Relevant Medications    azithromycin (ZITHROMAX) 250 MG tablet          Plan of care reviewed with patient at the conclusion of today's visit. Education was provided regarding diagnosis and management.  Patient verbalizes understanding of and agreement with management plan.    Return in about 4 months (around 7/28/2022).    Jasper Avery MD      Please note than portions of this note were completed [facility] a Voice Recognition Program

## 2022-03-28 NOTE — ASSESSMENT & PLAN NOTE
Renal condition is improving with treatment.  Continue current treatment regimen.  Fluid restriction.  Weight loss.  Regular aerobic exercise.  Continue current medications.  Renal condition will be reassessed in 3 months   .

## 2022-05-03 ENCOUNTER — OFFICE VISIT (OUTPATIENT)
Dept: CARDIOLOGY | Facility: HOSPITAL | Age: 87
End: 2022-05-03

## 2022-05-03 VITALS
RESPIRATION RATE: 18 BRPM | TEMPERATURE: 97 F | SYSTOLIC BLOOD PRESSURE: 108 MMHG | HEIGHT: 69 IN | OXYGEN SATURATION: 97 % | DIASTOLIC BLOOD PRESSURE: 53 MMHG | HEART RATE: 70 BPM | WEIGHT: 221.25 LBS | BODY MASS INDEX: 32.77 KG/M2

## 2022-05-03 DIAGNOSIS — K74.60 CIRRHOSIS OF LIVER WITH ASCITES, UNSPECIFIED HEPATIC CIRRHOSIS TYPE: ICD-10-CM

## 2022-05-03 DIAGNOSIS — R60.0 BILATERAL LEG EDEMA: ICD-10-CM

## 2022-05-03 DIAGNOSIS — I50.812 CHRONIC RIGHT HEART FAILURE: Primary | ICD-10-CM

## 2022-05-03 DIAGNOSIS — R18.8 CIRRHOSIS OF LIVER WITH ASCITES, UNSPECIFIED HEPATIC CIRRHOSIS TYPE: ICD-10-CM

## 2022-05-03 DIAGNOSIS — I25.810 CORONARY ARTERY DISEASE INVOLVING CORONARY BYPASS GRAFT OF NATIVE HEART WITHOUT ANGINA PECTORIS: ICD-10-CM

## 2022-05-03 DIAGNOSIS — I10 ESSENTIAL HYPERTENSION: ICD-10-CM

## 2022-05-03 PROCEDURE — 99214 OFFICE O/P EST MOD 30 MIN: CPT | Performed by: NURSE PRACTITIONER

## 2022-05-03 NOTE — PROGRESS NOTES
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  Baseline dyspnea. No CP or pressure.   Last paracentesis 3/2022. NO dizzness, near syncope, syncope.  Fells 2 weeks over the curb.  NO concerns. Left arm bruised and abrasion.  Went to ED at [facility] for eval.      Pt with abdominal hernia.  Wearing abdominal belt/support.      Continued edema.  NOt wearing compression stockings.      Objective     Vital Signs:   Vitals:    05/03/22 1053   BP: 108/53   BP Location: Left arm   Patient Position: Sitting   Cuff Size: Adult   Pulse: 70   Resp: 18   Temp: 97 °F (36.1 °C)   TempSrc: Temporal   SpO2: 97%   Weight: 100 kg (221 lb 4 oz)   Height: 175.3 cm (69.02\")     Body mass index is 32.65 kg/m².  Physical Exam  Vitals reviewed.   Constitutional:       General: He is not in acute distress.     Appearance: Normal appearance.   Cardiovascular:      Rate and Rhythm: Normal rate and regular rhythm.      Pulses:           Radial pulses are 2+ on the right side.        Dorsalis pedis pulses are 2+ on the right side.        Posterior tibial pulses are 2+ on the right side.      Heart sounds: Normal heart sounds.   Pulmonary:      Effort: Pulmonary effort is normal.      Breath sounds: Normal breath sounds.   Abdominal:      Palpations: Abdomen is " soft.      Tenderness: There is no abdominal tenderness.      Hernia: A hernia is present.   Musculoskeletal:      Right lower leg: Edema ( bilaterally 2+) present.      Left lower leg: Edema present.   Skin:     General: Skin is warm and dry.   Neurological:      Mental Status: He is alert.   Psychiatric:         Mood and Affect: Mood normal.         Behavior: Behavior is cooperative.              Result Review  Data Reviewed:{ Labs  Result Review  Imaging  Med Tab  Media :23}     EKG 3/27/2022: Atrial fibrillation 68 bpm, right bundle branch block    Left heart catheterization 2019: RCA disease with  with collaterals, right heart failure    Echocardiogram 2019: EF 60%, mild MR, moderate TR PFO.    Lab Results   Component Value Date    WBC 8.62 03/25/2022    HGB 13.3 03/25/2022    HCT 41.5 03/25/2022    MCV 96.7 03/25/2022     (L) 03/25/2022     Lab Results   Component Value Date    GLUCOSE 124 (H) 03/25/2022    BUN 25 (H) 03/25/2022    CREATININE 1.28 (H) 03/25/2022    EGFRIFNONA 51 (L) 12/22/2021    BCR 19.5 03/25/2022    K 4.9 03/25/2022    CO2 26.0 03/25/2022    CALCIUM 9.1 03/25/2022    ALBUMIN 4.20 03/25/2022    AST 20 03/25/2022    ALT 9 03/25/2022     Lab Results   Component Value Date    TROPONINT 0.012 03/25/2022     proBNP 3/25/2022: 3350              Assessment and Plan {CC Problem List  Visit Diagnosis  ROS  Review (Popup)  Health Maintenance  Quality  BestPractice  Medications  SmartSets  SnapShot Encounters  Media :23}   1. Chronic right heart failure (HCC)  Stable   Continue coreg, lasix, aldactone    2. Coronary artery disease involving coronary bypass graft of native heart without angina pectoris  No CP or pressure  statin    3. Essential hypertension  No dizziness, near syncope syncope    4. Cirrhosis of liver with ascites, unspecified hepatic cirrhosis type (HCC)  Last paracentesis March 2020.  Abdomen soft.  No dyspnea    5. Bilateral leg edema  Encouraged compression  stockings.          Follow Up {Instructions Charge Capture  Follow-up Communications :23}   Return in about 6 months (around 11/3/2022) for Office visit, HF.    Patient was given instructions and counseling regarding his condition or for health maintenance advice. Please see specific information pulled into the AVS if appropriate.  Patient was instructed to call the [facility] with any questions, concerns, or worsening symptoms.

## 2022-05-10 ENCOUNTER — OFFICE VISIT (OUTPATIENT)
Dept: FAMILY MEDICINE CLINIC | Facility: CLINIC | Age: 87
End: 2022-05-10

## 2022-05-10 VITALS
HEART RATE: 73 BPM | HEIGHT: 69 IN | TEMPERATURE: 98.6 F | WEIGHT: 220.6 LBS | DIASTOLIC BLOOD PRESSURE: 72 MMHG | BODY MASS INDEX: 32.67 KG/M2 | OXYGEN SATURATION: 99 % | SYSTOLIC BLOOD PRESSURE: 120 MMHG

## 2022-05-10 DIAGNOSIS — K74.60 CIRRHOSIS OF LIVER WITH ASCITES, UNSPECIFIED HEPATIC CIRRHOSIS TYPE: ICD-10-CM

## 2022-05-10 DIAGNOSIS — J90 PLEURAL EFFUSION: ICD-10-CM

## 2022-05-10 DIAGNOSIS — J40 BRONCHITIS: Primary | ICD-10-CM

## 2022-05-10 DIAGNOSIS — R18.8 CIRRHOSIS OF LIVER WITH ASCITES, UNSPECIFIED HEPATIC CIRRHOSIS TYPE: ICD-10-CM

## 2022-05-10 DIAGNOSIS — I50.812 CHRONIC RIGHT HEART FAILURE: ICD-10-CM

## 2022-05-10 LAB
EXPIRATION DATE: NORMAL
FLUAV AG UPPER RESP QL IA.RAPID: NOT DETECTED
FLUBV AG UPPER RESP QL IA.RAPID: NOT DETECTED
INTERNAL CONTROL: NORMAL
Lab: NORMAL
SARS-COV-2 AG UPPER RESP QL IA.RAPID: NOT DETECTED

## 2022-05-10 PROCEDURE — 99214 OFFICE O/P EST MOD 30 MIN: CPT | Performed by: PHYSICIAN ASSISTANT

## 2022-05-10 PROCEDURE — 87428 SARSCOV & INF VIR A&B AG IA: CPT | Performed by: PHYSICIAN ASSISTANT

## 2022-05-10 RX ORDER — CEFDINIR 300 MG/1
300 CAPSULE ORAL 2 TIMES DAILY
Qty: 20 CAPSULE | Refills: 0 | Status: SHIPPED | OUTPATIENT
Start: 2022-05-10 | End: 2022-05-20

## 2022-05-10 NOTE — PROGRESS NOTES
Chief Complaint   Patient presents with   • URI   • Cough   • Nasal Congestion   • Wheezing   • Abdominal Pain       HPI      Jordi Sam is a 88 y.o. male who presents for URI, Cough, Nasal Congestion, Wheezing, and Abdominal Pain.    Reports productive cough with yellow sputum, exertional dyspnea, nasal drainage, sore throat that has been ongoing since March.  Treated with Azithromycin in March and this helped but cough never resolved.    Patient has multiple health issues including liver cirrhosis with ascites, right heart failure, chronic right pleural effusion and large abdominal hernia.    Wife is present at appointment.    Past Medical History:   Diagnosis Date   • A-fib (HCC)    • Acute inferior myocardial infarction (HCC)     Acute inferior STEMI with RV infarct features, January 2009.    • CAD (coronary artery disease)    • Dyslipidemia    • Goiter     History of “goiter.”   • Hernia, umbilical    • Hyperlipidemia    • Hypertension    • Nephrolithiasis    • Type 2 diabetes mellitus (HCC)    • Umbilical hernia     Pt stated has recently returned. Seeing Dr Kiersten Nova and Dr Jasper Avery 8-2021       Past Surgical History:   Procedure Laterality Date   • BRONCHOSCOPY N/A 4/12/2019    Procedure: BRONCHOSCOPY WITH THORACENTESIS;  Surgeon: Marciano Higginbotham MD;  Location:  [facility];  Service: Pulmonary   • CARDIAC CATHETERIZATION Bilateral 1/30/2019    Procedure: Right and Left Heart Cath;  Surgeon: Efrem Posadas MD;  Location:  [facility];  Service: Cardiology   • CARDIAC CATHETERIZATION     • CHOLECYSTECTOMY     • CORONARY ANGIOPLASTY WITH STENT PLACEMENT  01/09/2009    Sirolimus eluting stents to the RCA, 01/09/2009.    • HERNIA REPAIR      Hernia repair x 2.    • PARACENTESIS  06/17/2019    multiple   • UMBILICAL HERNIA REPAIR N/A 7/26/2020    Procedure: UMBILICAL HERNIA REPAIR;  Surgeon: Maribell Her MD;  Location:  [facility];  Service: General;   "Laterality: N/A;       Family History   Problem Relation Age of Onset   • Dementia Mother    • Stroke Mother    • Heart disease Father    • Heart attack Father    • Aneurysm Father    • Cancer Sister    • No Known Problems Brother    • No Known Problems Sister    • Heart disease Brother         CABG   • Hypertension Daughter    • Hypertension Son    • Colon cancer Neg Hx    • Colon polyps Neg Hx        Social History     Socioeconomic History   • Marital status:      Spouse name: Melly   • Number of children: 4   Tobacco Use   • Smoking status: Former Smoker     Packs/day: 2.50     Years: 28.00     Pack years: 70.00     Types: Cigarettes     Quit date: 1983     Years since quittin.0   • Smokeless tobacco: Never Used   Vaping Use   • Vaping Use: Never used   Substance and Sexual Activity   • Alcohol use: No   • Drug use: No   • Sexual activity: Defer       Allergies   Allergen Reactions   • Bee Venom Swelling   • Sulfamethoxazole-Trimethoprim Other (See Comments)     Pt unaware       ROS    Review of Systems   Constitutional: Positive for fatigue. Negative for chills and fever.   HENT: Positive for congestion, postnasal drip, rhinorrhea and sore throat. Negative for ear pain and sinus pressure.    Respiratory: Positive for cough and shortness of breath. Negative for chest tightness and wheezing.    Cardiovascular: Negative for chest pain, palpitations and leg swelling.   Gastrointestinal: Positive for abdominal pain and diarrhea.   Musculoskeletal: Negative for myalgias.   Neurological: Negative for dizziness and headache.       Vitals:    05/10/22 1437   BP: 120/72   BP Location: Left arm   Patient Position: Sitting   Cuff Size: Large Adult   Pulse: 73   Temp: 98.6 °F (37 °C)   SpO2: 99%   Weight: 100 kg (220 lb 9.6 oz)   Height: 175.3 cm (69.02\")   PainSc: 0-No pain     Body mass index is 32.56 kg/m².    Current Outpatient Medications on File Prior to Visit   Medication Sig Dispense Refill   • " apixaban (ELIQUIS) 2.5 MG tablet tablet Take 2.5 mg by mouth 2 (Two) Times a Day. Last dose 3 days ago. From 11-16-21     • carvedilol (COREG) 6.25 MG tablet TAKE 1 TABLET BY MOUTH TWICE DAILY WITH MEALS 60 tablet 10   • folic acid (FOLVITE) 400 MCG tablet Take 400 mcg by mouth Daily.     • furosemide (LASIX) 40 MG tablet Lasix 40 mg 1 tab in morning, 1/2 tab in the afternoon. And as needed for weight gain, edema and dypnea. 180 tablet 3   • nitroglycerin (NITROSTAT) 0.4 MG SL tablet DISSOLVE ONE TABLET UNDER THE TONGUE EVERY 5 MINUTES AS NEEDED FOR CHEST PAIN.  DO NOT EXCEED A TOTAL OF 3 DOSES IN 15 MINUTES 25 tablet 3   • simvastatin (ZOCOR) 40 MG tablet Take 1 tablet by mouth Daily. 90 tablet 32   • spironolactone (ALDACTONE) 50 MG tablet Take 1 tablet by mouth once daily 90 tablet 3   • terazosin (HYTRIN) 2 MG capsule Take 1 capsule by mouth Every Night. 90 capsule 3   • [DISCONTINUED] azithromycin (ZITHROMAX) 250 MG tablet Take 2 tablets the first day, then 1 tablet daily for 4 days. 6 tablet 0     No current facility-administered medications on file prior to visit.       Results for orders placed or performed during the hospital encounter of 03/25/22   Respiratory Panel PCR w/COVID-19(SARS-CoV-2) [facility] In-House, NP Swab in [facility], 3-4 HR TAT - Swab, Nasopharynx    Specimen: Nasopharynx; Swab   Result Value Ref Range    ADENOVIRUS, PCR Not Detected Not Detected    Coronavirus 229E Not Detected Not Detected    Coronavirus HKU1 Not Detected Not Detected    Coronavirus NL63 Not Detected Not Detected    Coronavirus OC43 Not Detected Not Detected    COVID19 Not Detected Not Detected - Ref. Range    Human Metapneumovirus Not Detected Not Detected    Human Rhinovirus/Enterovirus Not Detected Not Detected    Influenza A PCR Not Detected Not Detected    Influenza B PCR Not Detected Not Detected    Parainfluenza Virus 1 Not Detected Not Detected    Parainfluenza Virus 2 Not Detected Not Detected     Parainfluenza Virus 3 Not Detected Not Detected    Parainfluenza Virus 4 Not Detected Not Detected    RSV, PCR Not Detected Not Detected    Bordetella pertussis pcr Not Detected Not Detected    Bordetella parapertussis PCR Not Detected Not Detected    Chlamydophila pneumoniae PCR Not Detected Not Detected    Mycoplasma pneumo by PCR Not Detected Not Detected   Comprehensive Metabolic Panel    Specimen: Blood   Result Value Ref Range    Glucose 124 (H) 65 - 99 mg/dL    BUN 25 (H) 8 - 23 mg/dL    Creatinine 1.28 (H) 0.76 - 1.27 mg/dL    Sodium 138 136 - 145 mmol/L    Potassium 4.9 3.5 - 5.2 mmol/L    Chloride 101 98 - 107 mmol/L    CO2 26.0 22.0 - 29.0 mmol/L    Calcium 9.1 8.6 - 10.5 mg/dL    Total Protein 6.7 6.0 - 8.5 g/dL    Albumin 4.20 3.50 - 5.20 g/dL    ALT (SGPT) 9 1 - 41 U/L    AST (SGOT) 20 1 - 40 U/L    Alkaline Phosphatase 91 39 - 117 U/L    Total Bilirubin 1.6 (H) 0.0 - 1.2 mg/dL    Globulin 2.5 gm/dL    A/G Ratio 1.7 g/dL    BUN/Creatinine Ratio 19.5 7.0 - 25.0    Anion Gap 11.0 5.0 - 15.0 mmol/L    eGFR 53.8 (L) >60.0 mL/min/1.73   BNP    Specimen: Blood   Result Value Ref Range    proBNP 3,350.0 (H) 0.0 - 1,800.0 pg/mL   Troponin    Specimen: Blood   Result Value Ref Range    Troponin T 0.012 0.000 - 0.030 ng/mL   CBC Auto Differential    Specimen: Blood   Result Value Ref Range    WBC 8.62 3.40 - 10.80 10*3/mm3    RBC 4.29 4.14 - 5.80 10*6/mm3    Hemoglobin 13.3 13.0 - 17.7 g/dL    Hematocrit 41.5 37.5 - 51.0 %    MCV 96.7 79.0 - 97.0 fL    MCH 31.0 26.6 - 33.0 pg    MCHC 32.0 31.5 - 35.7 g/dL    RDW 14.7 12.3 - 15.4 %    RDW-SD 52.3 37.0 - 54.0 fl    MPV 10.9 6.0 - 12.0 fL    Platelets 121 (L) 140 - 450 10*3/mm3    Neutrophil % 83.0 (H) 42.7 - 76.0 %    Lymphocyte % 7.0 (L) 19.6 - 45.3 %    Monocyte % 8.4 5.0 - 12.0 %    Eosinophil % 0.5 0.3 - 6.2 %    Basophil % 0.5 0.0 - 1.5 %    Immature Grans % 0.6 (H) 0.0 - 0.5 %    Neutrophils, Absolute 7.17 (H) 1.70 - 7.00 10*3/mm3    Lymphocytes, Absolute  0.60 (L) 0.70 - 3.10 10*3/mm3    Monocytes, Absolute 0.72 0.10 - 0.90 10*3/mm3    Eosinophils, Absolute 0.04 0.00 - 0.40 10*3/mm3    Basophils, Absolute 0.04 0.00 - 0.20 10*3/mm3    Immature Grans, Absolute 0.05 0.00 - 0.05 10*3/mm3    nRBC 0.0 0.0 - 0.2 /100 WBC   ECG 12 Lead   Result Value Ref Range    QT Interval 460 ms    QTC Interval 489 ms   Green Top (Gel)   Result Value Ref Range    Extra Tube Hold for add-ons.    Lavender Top   Result Value Ref Range    Extra Tube hold for add-on    Gold Top - SST   Result Value Ref Range    Extra Tube Hold for add-ons.    Gray Top   Result Value Ref Range    Extra Tube Hold for add-ons.    Light Blue Top   Result Value Ref Range    Extra Tube hold for add-on        PE    Physical Exam  Vitals reviewed.   Constitutional:       General: He is not in acute distress.     Appearance: Normal appearance. He is well-developed. He is obese. He is not ill-appearing or diaphoretic.   HENT:      Head: Normocephalic and atraumatic.      Nose: Rhinorrhea present.      Mouth/Throat:      Pharynx: Posterior oropharyngeal erythema present.   Eyes:      Extraocular Movements: Extraocular movements intact.      Conjunctiva/sclera: Conjunctivae normal.   Cardiovascular:      Rate and Rhythm: Normal rate and regular rhythm.      Heart sounds: Murmur heard.   Pulmonary:      Effort: Pulmonary effort is normal.      Breath sounds: Normal breath sounds.   Musculoskeletal:         General: Normal range of motion.      Cervical back: Normal range of motion.      Right lower leg: No edema.      Left lower leg: No edema.   Skin:     General: Skin is warm.      Findings: No erythema or rash.   Neurological:      General: No focal deficit present.      Mental Status: He is alert.   Psychiatric:         Attention and Perception: He is attentive.         Mood and Affect: Mood normal.         Speech: Speech normal.         Behavior: Behavior normal. Behavior is cooperative.         Thought Content:  Thought content normal.         Judgment: Judgment normal.          A/P          Diagnoses and all orders for this visit:    1. Bronchitis (Primary)  -     POCT SARS-CoV-2 Antigen JITENDRA + Flu  -     cefdinir (OMNICEF) 300 MG capsule; Take 1 capsule by mouth 2 (Two) Times a Day for 10 days.  Dispense: 20 capsule; Refill: 0    2. Chronic right heart failure (HCC)    3. Cirrhosis of liver with ascites, unspecified hepatic cirrhosis type (HCC)    4. Pleural effusion    COVID and flu test are both negative.  Patient reports ongoing productive cough with yellow sputum since March.  He had some improvement after taking azithromycin but it never resolved.  Will cover and treat with cefdinir for 10 days.  Reviewed his vital signs which are all stable.  His pulse ox is 99% on room air.  He has multiple comorbidities that may be contributing to his cough.  Discussed returning in 2 weeks to see Dr. Avery.  If his cough is persistent that he would likely benefit from CT chest imaging or at least a chest x-ray.  His last CT chest was by Dr. Higginbotham in 2019.  He has no significant swelling in his ankles or weight change.    Plan of care reviewed with patient at the conclusion of today's visit. Education was provided regarding diagnosis, management and any prescribed or recommended OTC medications.  Patient verbalizes understanding of and agreement with management plan.    Return in about 2 weeks (around 5/24/2022).     Gabbi Valderrama PA-C

## 2022-05-24 ENCOUNTER — OFFICE VISIT (OUTPATIENT)
Dept: FAMILY MEDICINE CLINIC | Facility: CLINIC | Age: 87
End: 2022-05-24

## 2022-05-24 VITALS
DIASTOLIC BLOOD PRESSURE: 72 MMHG | HEIGHT: 69 IN | BODY MASS INDEX: 32.64 KG/M2 | SYSTOLIC BLOOD PRESSURE: 124 MMHG | HEART RATE: 78 BPM | WEIGHT: 220.4 LBS | OXYGEN SATURATION: 96 %

## 2022-05-24 DIAGNOSIS — R05.9 COUGH: ICD-10-CM

## 2022-05-24 DIAGNOSIS — I10 ESSENTIAL HYPERTENSION: ICD-10-CM

## 2022-05-24 DIAGNOSIS — R60.0 LOCALIZED EDEMA: Primary | ICD-10-CM

## 2022-05-24 PROCEDURE — 99214 OFFICE O/P EST MOD 30 MIN: CPT | Performed by: INTERNAL MEDICINE

## 2022-05-24 NOTE — PROGRESS NOTES
Jordi Sam  1934  [id number]  Patient Care Team:  Jasper Avery MD as PCP - General (Internal Medicine)  Efrem Posadas MD as Consulting Physician (Cardiology)  Jordi Law MD as Consulting Physician (Urology)  Chapin Villagran MD as Consulting Physician (Otolaryngology)  Gen Laurent MD as Consulting Physician (Gastroenterology)    Jordi Sam is a 88 y.o. male here today for follow up.     This patient is accompanied by their self who contributes to the history of their care.    Chief Complaint:    Chief Complaint   Patient presents with   • Cough     Pt states he is doing much better, still has a slight cough.    • URI     Follow up         History of Present Illness:  I have reviewed and/or updated the patient's past medical, past surgical, family, social history, problem list and allergies as appropriate.     Cough has resolved nicely since being on prolonged omnicef. Denies cough, hemoptysis or SOA.  Still with LE swelling. He does not like to wear his compression stockings. Watches salt intake and still taking lasix. He does not sleep prone 2/2 to muscle cramps    Review of Systems   Constitutional: Negative for chills, fatigue, fever, unexpected weight gain and unexpected weight loss.   HENT: Negative for ear pain, postnasal drip, sinus pressure and sore throat.    Eyes: Negative.  Negative for blurred vision, double vision and visual disturbance.   Respiratory: Negative for cough, shortness of breath and wheezing.    Cardiovascular: Positive for leg swelling. Negative for chest pain and palpitations.   Gastrointestinal: Negative for abdominal pain, blood in stool, diarrhea, nausea and vomiting.   Endocrine: Negative for cold intolerance, heat intolerance, polydipsia, polyphagia and polyuria.   Genitourinary: Negative for dysuria, flank pain and hematuria.   Musculoskeletal: Negative.  Negative for arthralgias and joint swelling.   Skin: Negative.  Negative for  "dry skin and rash.   Neurological: Negative for weakness, numbness and headache.   Psychiatric/Behavioral: Negative for self-injury, suicidal ideas and depressed mood.       Vitals:    05/24/22 1044   BP: 124/72   Pulse: 78   SpO2: 96%   Weight: 100 kg (220 lb 6.4 oz)   Height: 175.3 cm (69.02\")     Body mass index is 32.53 kg/m².    Physical Exam  Vitals and nursing note reviewed.   Constitutional:       General: He is not in acute distress.     Appearance: He is well-developed. He is not diaphoretic.   HENT:      Head: Normocephalic and atraumatic.      Right Ear: External ear normal.      Left Ear: External ear normal.      Mouth/Throat:      Pharynx: No oropharyngeal exudate.   Eyes:      General: No scleral icterus.        Right eye: No discharge.      Conjunctiva/sclera: Conjunctivae normal.   Neck:      Thyroid: No thyromegaly.      Vascular: No JVD.      Trachea: No tracheal deviation.   Cardiovascular:      Rate and Rhythm: Normal rate and regular rhythm.      Heart sounds: Normal heart sounds.      Comments: PMI nondisplaced. 2+ edema  Pulmonary:      Effort: Pulmonary effort is normal.      Breath sounds: Normal breath sounds. No wheezing or rales.   Abdominal:      General: Bowel sounds are normal.      Palpations: Abdomen is soft.      Tenderness: There is no abdominal tenderness. There is no guarding or rebound.   Musculoskeletal:      Cervical back: Normal range of motion and neck supple.      Comments: cane   Lymphadenopathy:      Cervical: No cervical adenopathy.   Skin:     General: Skin is warm and dry.      Capillary Refill: Capillary refill takes less than 2 seconds.      Coloration: Skin is not pale.      Findings: No rash.   Neurological:      Mental Status: He is alert and oriented to person, place, and time.      Motor: No abnormal muscle tone.      Coordination: Coordination normal.   Psychiatric:         Judgment: Judgment normal.         Procedures    Results " Review:    None    Assessment/Plan:     Problem List Items Addressed This Visit        Cardiac and Vasculature    Essential hypertension    Relevant Medications    spironolactone (ALDACTONE) 50 MG tablet    furosemide (LASIX) 40 MG tablet    terazosin (HYTRIN) 2 MG capsule    carvedilol (COREG) 6.25 MG tablet       Pulmonary and Pneumonias    Cough      Other Visit Diagnoses     Localized edema    -  Primary    Relevant Orders    Ambulatory Referral to Occupational Therapy      Conitnue Lasix. Rcoomened increased compliance with compression stockings. Will try occupation therapy.     Plan of care reviewed with patient at the conclusion of today's visit. Education was provided regarding diagnosis and management.  Patient verbalizes understanding of and agreement with management plan.    Return in about 3 months (around 8/24/2022) for edema/chf htn.    Jasper Avery MD      Please note than portions of this note were completed [facility] a Voice Recognition Program

## 2022-06-18 ENCOUNTER — HOSPITAL ENCOUNTER (EMERGENCY)
Facility: HOSPITAL | Age: 87
Discharge: HOME | End: 2022-06-18
Attending: EMERGENCY MEDICINE | Admitting: EMERGENCY MEDICINE

## 2022-06-18 VITALS
SYSTOLIC BLOOD PRESSURE: 111 MMHG | HEIGHT: 71 IN | TEMPERATURE: 97.8 F | WEIGHT: 212 LBS | RESPIRATION RATE: 18 BRPM | DIASTOLIC BLOOD PRESSURE: 57 MMHG | BODY MASS INDEX: 29.68 KG/M2 | HEART RATE: 69 BPM | OXYGEN SATURATION: 97 %

## 2022-06-18 DIAGNOSIS — Z86.79 HISTORY OF ATRIAL FIBRILLATION: ICD-10-CM

## 2022-06-18 DIAGNOSIS — D64.9 ANEMIA, UNSPECIFIED TYPE: ICD-10-CM

## 2022-06-18 DIAGNOSIS — N18.9 CHRONIC KIDNEY DISEASE, UNSPECIFIED CKD STAGE: ICD-10-CM

## 2022-06-18 DIAGNOSIS — R31.9 HEMATURIA, UNSPECIFIED TYPE: Primary | ICD-10-CM

## 2022-06-18 LAB
ALBUMIN SERPL-MCNC: 3.4 G/DL (ref 3.5–5.2)
ALBUMIN/GLOB SERPL: 1.5 G/DL
ALP SERPL-CCNC: 77 U/L (ref 39–117)
ALT SERPL W P-5'-P-CCNC: 6 U/L (ref 1–41)
ANION GAP SERPL CALCULATED.3IONS-SCNC: 9 MMOL/L (ref 5–15)
AST SERPL-CCNC: 16 U/L (ref 1–40)
BACTERIA UR QL AUTO: ABNORMAL /HPF
BASOPHILS # BLD AUTO: 0.03 10*3/MM3 (ref 0–0.2)
BASOPHILS NFR BLD AUTO: 0.3 % (ref 0–1.5)
BILIRUB SERPL-MCNC: 1.2 MG/DL (ref 0–1.2)
BILIRUB UR QL STRIP: ABNORMAL
BUN SERPL-MCNC: 23 MG/DL (ref 8–23)
BUN/CREAT SERPL: 15.1 (ref 7–25)
CALCIUM SPEC-SCNC: 8.8 MG/DL (ref 8.6–10.5)
CHLORIDE SERPL-SCNC: 98 MMOL/L (ref 98–107)
CLARITY UR: ABNORMAL
CO2 SERPL-SCNC: 25 MMOL/L (ref 22–29)
COLOR UR: ABNORMAL
CREAT SERPL-MCNC: 1.52 MG/DL (ref 0.76–1.27)
DEPRECATED RDW RBC AUTO: 54.2 FL (ref 37–54)
EGFRCR SERPLBLD CKD-EPI 2021: 43.8 ML/MIN/1.73
EOSINOPHIL # BLD AUTO: 0.06 10*3/MM3 (ref 0–0.4)
EOSINOPHIL NFR BLD AUTO: 0.6 % (ref 0.3–6.2)
ERYTHROCYTE [DISTWIDTH] IN BLOOD BY AUTOMATED COUNT: 15 % (ref 12.3–15.4)
GLOBULIN UR ELPH-MCNC: 2.3 GM/DL
GLUCOSE SERPL-MCNC: 137 MG/DL (ref 65–99)
GLUCOSE UR STRIP-MCNC: NEGATIVE MG/DL
HCT VFR BLD AUTO: 36 % (ref 37.5–51)
HGB BLD-MCNC: 11.8 G/DL (ref 13–17.7)
HGB UR QL STRIP.AUTO: ABNORMAL
HYALINE CASTS UR QL AUTO: ABNORMAL /LPF
IMM GRANULOCYTES # BLD AUTO: 0.04 10*3/MM3 (ref 0–0.05)
IMM GRANULOCYTES NFR BLD AUTO: 0.4 % (ref 0–0.5)
INR PPP: 1.62 (ref 0.84–1.13)
KETONES UR QL STRIP: NEGATIVE
LEUKOCYTE ESTERASE UR QL STRIP.AUTO: ABNORMAL
LYMPHOCYTES # BLD AUTO: 0.66 10*3/MM3 (ref 0.7–3.1)
LYMPHOCYTES NFR BLD AUTO: 6.7 % (ref 19.6–45.3)
MCH RBC QN AUTO: 32.2 PG (ref 26.6–33)
MCHC RBC AUTO-ENTMCNC: 32.8 G/DL (ref 31.5–35.7)
MCV RBC AUTO: 98.1 FL (ref 79–97)
MONOCYTES # BLD AUTO: 0.65 10*3/MM3 (ref 0.1–0.9)
MONOCYTES NFR BLD AUTO: 6.6 % (ref 5–12)
NEUTROPHILS NFR BLD AUTO: 8.37 10*3/MM3 (ref 1.7–7)
NEUTROPHILS NFR BLD AUTO: 85.4 % (ref 42.7–76)
NITRITE UR QL STRIP: POSITIVE
NRBC BLD AUTO-RTO: 0 /100 WBC (ref 0–0.2)
PH UR STRIP.AUTO: <=5 [PH] (ref 5–8)
PLATELET # BLD AUTO: 102 10*3/MM3 (ref 140–450)
PMV BLD AUTO: 10.5 FL (ref 6–12)
POTASSIUM SERPL-SCNC: 4.4 MMOL/L (ref 3.5–5.2)
PROT SERPL-MCNC: 5.7 G/DL (ref 6–8.5)
PROT UR QL STRIP: ABNORMAL
PROTHROMBIN TIME: 19.2 SECONDS (ref 11.4–14.4)
RBC # BLD AUTO: 3.67 10*6/MM3 (ref 4.14–5.8)
RBC # UR STRIP: ABNORMAL /HPF
REF LAB TEST METHOD: ABNORMAL
SODIUM SERPL-SCNC: 132 MMOL/L (ref 136–145)
SP GR UR STRIP: 1.01 (ref 1–1.03)
SQUAMOUS #/AREA URNS HPF: ABNORMAL /HPF
UROBILINOGEN UR QL STRIP: ABNORMAL
WBC # UR STRIP: ABNORMAL /HPF
WBC NRBC COR # BLD: 9.81 10*3/MM3 (ref 3.4–10.8)

## 2022-06-18 PROCEDURE — 87077 CULTURE AEROBIC IDENTIFY: CPT | Performed by: EMERGENCY MEDICINE

## 2022-06-18 PROCEDURE — 80053 COMPREHEN METABOLIC PANEL: CPT | Performed by: EMERGENCY MEDICINE

## 2022-06-18 PROCEDURE — 85610 PROTHROMBIN TIME: CPT | Performed by: EMERGENCY MEDICINE

## 2022-06-18 PROCEDURE — 99283 EMERGENCY DEPT VISIT LOW MDM: CPT

## 2022-06-18 PROCEDURE — 87186 SC STD MICRODIL/AGAR DIL: CPT | Performed by: EMERGENCY MEDICINE

## 2022-06-18 PROCEDURE — 85025 COMPLETE CBC W/AUTO DIFF WBC: CPT | Performed by: EMERGENCY MEDICINE

## 2022-06-18 PROCEDURE — 87086 URINE CULTURE/COLONY COUNT: CPT | Performed by: EMERGENCY MEDICINE

## 2022-06-18 PROCEDURE — 81001 URINALYSIS AUTO W/SCOPE: CPT | Performed by: EMERGENCY MEDICINE

## 2022-06-18 RX ORDER — SODIUM CHLORIDE 0.9 % (FLUSH) 0.9 %
10 SYRINGE (ML) INJECTION AS NEEDED
Status: DISCONTINUED | OUTPATIENT
Start: 2022-06-18 | End: 2022-06-18 | Stop reason: HOSPADM

## 2022-06-18 NOTE — ED PROVIDER NOTES
Subjective   88-year-old male presents for evaluation of painless hematuria.  Of note, the patient is anticoagulated with Eliquis for atrial fibrillation.  This morning at around 10 AM he went to urinate and noted gross hematuria.  This happened a second time later in the afternoon so he came to the emergency department to be evaluated.  He denies any accompanying abdominal pain or flank pain.  He denies any fevers, chills, night sweats, or other constitutional symptoms.  No unintentional weight loss.  Given his hematuria, he came to the emergency department for evaluation.          Review of Systems   Genitourinary: Positive for hematuria.   All other systems reviewed and are negative.      Past Medical History:   Diagnosis Date   • A-fib (HCC)    • Acute inferior myocardial infarction (HCC)     Acute inferior STEMI with RV infarct features, January 2009.    • CAD (coronary artery disease)    • Dyslipidemia    • Goiter     History of “goiter.”   • Hernia, umbilical    • Hyperlipidemia    • Hypertension    • Nephrolithiasis    • Type 2 diabetes mellitus (HCC)    • Umbilical hernia     Pt stated has recently returned. Seeing Dr Kiersten Nova and Dr Jasper Avery 8-2021       Allergies   Allergen Reactions   • Bee Venom Swelling   • Sulfamethoxazole-Trimethoprim Other (See Comments)     Pt unaware       Past Surgical History:   Procedure Laterality Date   • BRONCHOSCOPY N/A 4/12/2019    Procedure: BRONCHOSCOPY WITH THORACENTESIS;  Surgeon: Marciano Higginbotham MD;  Location:  [facility];  Service: Pulmonary   • CARDIAC CATHETERIZATION Bilateral 1/30/2019    Procedure: Right and Left Heart Cath;  Surgeon: Efrem Posadas MD;  Location:  [facility];  Service: Cardiology   • CARDIAC CATHETERIZATION     • CHOLECYSTECTOMY     • CORONARY ANGIOPLASTY WITH STENT PLACEMENT  01/09/2009    Sirolimus eluting stents to the RCA, 01/09/2009.    • HERNIA REPAIR      Hernia repair x 2.    • PARACENTESIS   2019    multiple   • UMBILICAL HERNIA REPAIR N/A 2020    Procedure: UMBILICAL HERNIA REPAIR;  Surgeon: Maribell Her MD;  Location: [facility];  Service: General;  Laterality: N/A;       Family History   Problem Relation Age of Onset   • Dementia Mother    • Stroke Mother    • Heart disease Father    • Heart attack Father    • Aneurysm Father    • Cancer Sister    • No Known Problems Brother    • No Known Problems Sister    • Heart disease Brother         CABG   • Hypertension Daughter    • Hypertension Son    • Colon cancer Neg Hx    • Colon polyps Neg Hx        Social History     Socioeconomic History   • Marital status:      Spouse name: Melly   • Number of children: 4   Tobacco Use   • Smoking status: Former Smoker     Packs/day: 2.50     Years: 28.00     Pack years: 70.00     Types: Cigarettes     Quit date: 1983     Years since quittin.1   • Smokeless tobacco: Never Used   Vaping Use   • Vaping Use: Never used   Substance and Sexual Activity   • Alcohol use: No   • Drug use: No   • Sexual activity: Defer           Objective   Physical Exam  Vitals and nursing note reviewed.   Constitutional:       General: He is not in acute distress.     Appearance: He is well-developed. He is not diaphoretic.      Comments: Nontoxic-appearing elderly male   HENT:      Head: Normocephalic and atraumatic.   Neck:      Vascular: No JVD.   Cardiovascular:      Rate and Rhythm: Normal rate and regular rhythm.      Heart sounds: Normal heart sounds. No murmur heard.    No friction rub. No gallop.   Pulmonary:      Effort: Pulmonary effort is normal. No respiratory distress.      Breath sounds: Normal breath sounds. No wheezing or rales.   Abdominal:      General: Bowel sounds are normal. There is no distension.      Palpations: Abdomen is soft. There is no mass.      Tenderness: There is no abdominal tenderness. There is no guarding.      Comments: No focal abdominal tenderness, no peritoneal  signs, no pain out of proportion to exam   Genitourinary:     Comments: No CVA tenderness present    No blood at urethral meatus  Musculoskeletal:         General: Normal range of motion.      Cervical back: Normal range of motion.   Skin:     General: Skin is warm and dry.      Coloration: Skin is not pale.      Findings: No erythema or rash.   Neurological:      Mental Status: He is alert and oriented to person, place, and time.   Psychiatric:         Mood and Affect: Mood normal.         Thought Content: Thought content normal.         Judgment: Judgment normal.         Procedures           ED Course  ED Course as of 06/18/22 1502   Sat Jun 18, 2022   1251 88-year-old male presents for evaluation of painless hematuria this morning.  Of note, the patient is anticoagulated with Eliquis for atrial fibrillation.  This morning at around 10 AM he urinated and noted hematuria.  This happened a second time earlier this afternoon so he came to the emergency department to be evaluated.  No similar episodes before in the past.  He is voiding spontaneously without difficulty.  On arrival to the ED, the patient is nontoxic-appearing.  Nonsurgical abdomen.  We will obtain labs, and I will perform a bedside ultrasound, and we will reassess following initial interventions. [DD]   1316 I performed a bedside ultrasound, and there is no echogenic material within the patient's urinary bladder to suggest clot.  No obvious mass noted.  No obvious mass noted either kidney.  No evidence of hydronephrosis noted. [DD]   1400 Labs are bland/at baseline. [DD]   1432 Patient reassured.  He is voiding spontaneously without difficulty.  Patient referred to Dr. Campo of urology.  He will call Monday morning for earliest available appointment for his painless hematuria.  Agreeable with plan and given appropriate strict return precautions. [DD]      ED Course User Index  [DD] Lv Soares MD                                          Recent Results (from the past 24 hour(s))   Comprehensive Metabolic Panel    Collection Time: 06/18/22  1:21 PM    Specimen: Blood   Result Value Ref Range    Glucose 137 (H) 65 - 99 mg/dL    BUN 23 8 - 23 mg/dL    Creatinine 1.52 (H) 0.76 - 1.27 mg/dL    Sodium 132 (L) 136 - 145 mmol/L    Potassium 4.4 3.5 - 5.2 mmol/L    Chloride 98 98 - 107 mmol/L    CO2 25.0 22.0 - 29.0 mmol/L    Calcium 8.8 8.6 - 10.5 mg/dL    Total Protein 5.7 (L) 6.0 - 8.5 g/dL    Albumin 3.40 (L) 3.50 - 5.20 g/dL    ALT (SGPT) 6 1 - 41 U/L    AST (SGOT) 16 1 - 40 U/L    Alkaline Phosphatase 77 39 - 117 U/L    Total Bilirubin 1.2 0.0 - 1.2 mg/dL    Globulin 2.3 gm/dL    A/G Ratio 1.5 g/dL    BUN/Creatinine Ratio 15.1 7.0 - 25.0    Anion Gap 9.0 5.0 - 15.0 mmol/L    eGFR 43.8 (L) >60.0 mL/min/1.73   Protime-INR    Collection Time: 06/18/22  1:21 PM    Specimen: Blood   Result Value Ref Range    Protime 19.2 (H) 11.4 - 14.4 Seconds    INR 1.62 (H) 0.84 - 1.13   CBC Auto Differential    Collection Time: 06/18/22  1:21 PM    Specimen: Blood   Result Value Ref Range    WBC 9.81 3.40 - 10.80 10*3/mm3    RBC 3.67 (L) 4.14 - 5.80 10*6/mm3    Hemoglobin 11.8 (L) 13.0 - 17.7 g/dL    Hematocrit 36.0 (L) 37.5 - 51.0 %    MCV 98.1 (H) 79.0 - 97.0 fL    MCH 32.2 26.6 - 33.0 pg    MCHC 32.8 31.5 - 35.7 g/dL    RDW 15.0 12.3 - 15.4 %    RDW-SD 54.2 (H) 37.0 - 54.0 fl    MPV 10.5 6.0 - 12.0 fL    Platelets 102 (L) 140 - 450 10*3/mm3    Neutrophil % 85.4 (H) 42.7 - 76.0 %    Lymphocyte % 6.7 (L) 19.6 - 45.3 %    Monocyte % 6.6 5.0 - 12.0 %    Eosinophil % 0.6 0.3 - 6.2 %    Basophil % 0.3 0.0 - 1.5 %    Immature Grans % 0.4 0.0 - 0.5 %    Neutrophils, Absolute 8.37 (H) 1.70 - 7.00 10*3/mm3    Lymphocytes, Absolute 0.66 (L) 0.70 - 3.10 10*3/mm3    Monocytes, Absolute 0.65 0.10 - 0.90 10*3/mm3    Eosinophils, Absolute 0.06 0.00 - 0.40 10*3/mm3    Basophils, Absolute 0.03 0.00 - 0.20 10*3/mm3    Immature Grans, Absolute 0.04 0.00 - 0.05 10*3/mm3    nRBC 0.0 0.0  "- 0.2 /100 WBC   Urinalysis With Culture If Indicated - Urine, Clean Catch    Collection Time: 06/18/22  2:07 PM    Specimen: Urine, Clean Catch   Result Value Ref Range    Color, UA Red (A) Yellow, Straw    Appearance, UA Turbid (A) Clear    pH, UA <=5.0 5.0 - 8.0    Specific Gravity, UA 1.015 1.001 - 1.030    Glucose, UA Negative Negative    Ketones, UA Negative Negative    Bilirubin, UA Moderate (2+) (A) Negative    Blood, UA Large (3+) (A) Negative    Protein,  mg/dL (2+) (A) Negative    Leuk Esterase, UA Large (3+) (A) Negative    Nitrite, UA Positive (A) Negative    Urobilinogen, UA 0.2 E.U./dL 0.2 - 1.0 E.U./dL   Urinalysis, Microscopic Only - Urine, Clean Catch    Collection Time: 06/18/22  2:07 PM    Specimen: Urine, Clean Catch   Result Value Ref Range    RBC, UA Too Numerous to Count (A) None Seen, 0-2 /HPF    WBC, UA Too Numerous to Count (A) None Seen, 0-2 /HPF    Bacteria, UA None Seen None Seen, Trace /HPF    Squamous Epithelial Cells, UA 0-2 None Seen, 0-2 /HPF    Hyaline Casts, UA Unable to determine due to loaded field 0 - 6 /LPF    Methodology Manual Light Microscopy      Note: In addition to lab results from this visit, the labs listed above may include labs taken at another facility or during a different encounter within the last 24 hours. Please correlate lab times with ED admission and discharge times for further clarification of the services performed during this visit.    No orders to display     Vitals:    06/18/22 1248 06/18/22 1439   BP: 119/61 106/49   BP Location: Left arm    Patient Position: Sitting    Pulse: 69    Resp: 18    Temp: 97.8 °F (36.6 °C)    TempSrc: Oral    SpO2: 95% 96%   Weight: 96.2 kg (212 lb)    Height: 180.3 cm (71\")      Medications   sodium chloride 0.9 % flush 10 mL (has no administration in time range)     ECG/EMG Results (last 24 hours)     ** No results found for the last 24 hours. **        No orders to display               MDM    Final diagnoses: "   Hematuria, unspecified type   Chronic kidney disease, unspecified CKD stage   History of atrial fibrillation   Anemia, unspecified type       ED Disposition  ED Disposition     ED Disposition   Discharge    Condition   Stable    Comment   --             Anjel Campo MD  [address] [address]  [phone]    In 1 week           Medication List      No changes were made to your prescriptions during this visit.          Lv Soares MD  06/18/22 3965

## 2022-06-20 LAB — BACTERIA SPEC AEROBE CULT: ABNORMAL

## 2022-06-21 ENCOUNTER — TELEPHONE (OUTPATIENT)
Dept: EMERGENCY DEPT | Facility: HOSPITAL | Age: 87
End: 2022-06-21

## 2022-06-27 ENCOUNTER — OFFICE VISIT (OUTPATIENT)
Dept: GASTROENTEROLOGY | Facility: CLINIC | Age: 87
End: 2022-06-27

## 2022-06-27 ENCOUNTER — PREP FOR SURGERY (OUTPATIENT)
Dept: OTHER | Facility: HOSPITAL | Age: 87
End: 2022-06-27

## 2022-06-27 VITALS
HEART RATE: 66 BPM | TEMPERATURE: 97.7 F | DIASTOLIC BLOOD PRESSURE: 45 MMHG | SYSTOLIC BLOOD PRESSURE: 124 MMHG | BODY MASS INDEX: 32.36 KG/M2 | WEIGHT: 232 LBS

## 2022-06-27 DIAGNOSIS — K74.60 CIRRHOSIS OF LIVER WITH ASCITES, UNSPECIFIED HEPATIC CIRRHOSIS TYPE: Primary | ICD-10-CM

## 2022-06-27 DIAGNOSIS — R18.8 CIRRHOSIS OF LIVER WITH ASCITES, UNSPECIFIED HEPATIC CIRRHOSIS TYPE: Primary | ICD-10-CM

## 2022-06-27 PROCEDURE — 99214 OFFICE O/P EST MOD 30 MIN: CPT | Performed by: NURSE PRACTITIONER

## 2022-06-27 RX ORDER — ALBUMIN (HUMAN) 12.5 G/50ML
12.5 SOLUTION INTRAVENOUS ONCE
Status: CANCELLED | OUTPATIENT
Start: 2022-06-27 | End: 2022-06-27

## 2022-06-27 RX ORDER — CEFDINIR 300 MG/1
300 CAPSULE ORAL 2 TIMES DAILY
COMMUNITY
Start: 2022-06-21 | End: 2022-07-21

## 2022-06-27 NOTE — PROGRESS NOTES
GASTROENTEROLOGY OFFICE NOTE  Jordi Sam  [id number]  1934    CARE TEAM  Patient Care Team:  Jasper Avery MD as PCP - General (Internal Medicine)  Efrem Posadas MD as Consulting Physician (Cardiology)  Jordi Law MD as Consulting Physician (Urology)  Chapin Villagran MD as Consulting Physician (Otolaryngology)  Gen Laurent MD as Consulting Physician (Gastroenterology)    Referring Provider: Jasper Avery MD    Chief Complaint   Patient presents with   • Hepatic Disease        HISTORY OF PRESENT ILLNESS:  Mr. 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    He is currently taking Lasix 40 mg in the morning and 20 mg in the evening and Aldactone 50 mg daily as directed by cardiology.  He has not required a paracentesis since March.  He has a little distention today and feels like he needs a paracentesis soon.  He is having troubles with lower extremity edema.  This resolved significantly when he props his feet up.  He is not wearing compression stockings as ordered.  He is following a 2 g sodium diet.    He denies any evidence of GI bleeding.  He has not had a surveillance EGD for esophageal or gastric varices as the risk versus benefit is greater for EGD.  He is currently taking Coreg secondary to cardiac history.    He denies any changes in state of consciousness, intellectual function, personality or behavior and has no history of hepatic encephalopathy.    PAST MEDICAL HISTORY  Past Medical History:   Diagnosis Date   • A-fib (HCC)    • Acute inferior myocardial infarction (HCC)     Acute inferior STEMI with RV infarct features, January 2009.    • CAD (coronary artery disease)    • Dyslipidemia    • Goiter     History of “goiter.”   • Hernia,  umbilical    • Hyperlipidemia    • Hypertension    • Nephrolithiasis    • Type 2 diabetes mellitus (HCC)    • Umbilical hernia     Pt stated has recently returned. Seeing Dr Kiersten Nova and Dr Jasper Avery 8-2021        PAST SURGICAL HISTORY  Past Surgical History:   Procedure Laterality Date   • BRONCHOSCOPY N/A 4/12/2019    Procedure: BRONCHOSCOPY WITH THORACENTESIS;  Surgeon: Marciano Higginbotham MD;  Location:  [facility];  Service: Pulmonary   • CARDIAC CATHETERIZATION Bilateral 1/30/2019    Procedure: Right and Left Heart Cath;  Surgeon: Efrem Posadas MD;  Location:  [facility];  Service: Cardiology   • CARDIAC CATHETERIZATION     • CHOLECYSTECTOMY     • CORONARY ANGIOPLASTY WITH STENT PLACEMENT  01/09/2009    Sirolimus eluting stents to the RCA, 01/09/2009.    • HERNIA REPAIR      Hernia repair x 2.    • PARACENTESIS  06/17/2019    multiple   • UMBILICAL HERNIA REPAIR N/A 7/26/2020    Procedure: UMBILICAL HERNIA REPAIR;  Surgeon: Maribell Her MD;  Location:  [facility];  Service: General;  Laterality: N/A;        MEDICATIONS:    Current Outpatient Medications:   •  apixaban (ELIQUIS) 2.5 MG tablet tablet, Take 2.5 mg by mouth 2 (Two) Times a Day. Last dose 3 days ago. From 11-16-21, Disp: , Rfl:   •  carvedilol (COREG) 6.25 MG tablet, TAKE 1 TABLET BY MOUTH TWICE DAILY WITH MEALS, Disp: 60 tablet, Rfl: 10  •  folic acid (FOLVITE) 400 MCG tablet, Take 400 mcg by mouth Daily., Disp: , Rfl:   •  furosemide (LASIX) 40 MG tablet, Lasix 40 mg 1 tab in morning, 1/2 tab in the afternoon. And as needed for weight gain, edema and dypnea., Disp: 180 tablet, Rfl: 3  •  nitroglycerin (NITROSTAT) 0.4 MG SL tablet, DISSOLVE ONE TABLET UNDER THE TONGUE EVERY 5 MINUTES AS NEEDED FOR CHEST PAIN.  DO NOT EXCEED A TOTAL OF 3 DOSES IN 15 MINUTES, Disp: 25 tablet, Rfl: 3  •  simvastatin (ZOCOR) 40 MG tablet, Take 1 tablet by mouth Daily., Disp: 90 tablet, Rfl: 32  •  spironolactone  (ALDACTONE) 50 MG tablet, Take 1 tablet by mouth once daily, Disp: 90 tablet, Rfl: 3  •  terazosin (HYTRIN) 2 MG capsule, Take 1 capsule by mouth Every Night., Disp: 90 capsule, Rfl: 3  •  cefdinir (OMNICEF) 300 MG capsule, Take 300 mg by mouth 2 (Two) Times a Day., Disp: , Rfl:     ALLERGIES  Allergies   Allergen Reactions   • Bee Venom Swelling   • Sulfamethoxazole-Trimethoprim Other (See Comments)     Pt unaware       FAMILY HISTORY:  Family History   Problem Relation Age of Onset   • Dementia Mother    • Stroke Mother    • Heart disease Father    • Heart attack Father    • Aneurysm Father    • Cancer Sister    • No Known Problems Brother    • No Known Problems Sister    • Heart disease Brother         CABG   • Hypertension Daughter    • Hypertension Son    • Colon cancer Neg Hx    • Colon polyps Neg Hx        SOCIAL HISTORY  Social History     Socioeconomic History   • Marital status:      Spouse name: Melly   • Number of children: 4   Tobacco Use   • Smoking status: Former Smoker     Packs/day: 2.50     Years: 28.00     Pack years: 70.00     Types: Cigarettes     Quit date: 1983     Years since quittin.1   • Smokeless tobacco: Never Used   Vaping Use   • Vaping Use: Never used   Substance and Sexual Activity   • Alcohol use: No   • Drug use: No   • Sexual activity: Defer           PHYSICAL EXAM   /45 (BP Location: Left arm, Patient Position: Sitting, Cuff Size: Adult)   Pulse 66   Temp 97.7 °F (36.5 °C) (Temporal)   Wt 105 kg (232 lb)   BMI 32.36 kg/m²   Physical Exam  Vitals and nursing note reviewed.   Constitutional:       Appearance: Normal appearance. He is well-developed.   HENT:      Head: Normocephalic and atraumatic.      Nose: Nose normal.      Mouth/Throat:      Mouth: Mucous membranes are moist.      Pharynx: Oropharynx is clear.   Eyes:      Pupils: Pupils are equal, round, and reactive to light.   Cardiovascular:      Rate and Rhythm: Normal rate and regular rhythm.    Pulmonary:      Effort: Pulmonary effort is normal.      Breath sounds: Normal breath sounds. No wheezing or rales.   Abdominal:      General: Bowel sounds are normal. There is distension.      Palpations: Abdomen is soft. There is no mass.      Tenderness: There is no abdominal tenderness. There is no guarding or rebound.      Hernia: A hernia is present.   Musculoskeletal:         General: Normal range of motion.      Cervical back: Normal range of motion and neck supple.      Right lower leg: 3+ Edema present.      Left lower leg: 3+ Edema present.   Skin:     General: Skin is warm and dry.   Neurological:      Mental Status: He is alert and oriented to person, place, and time.      Cranial Nerves: No cranial nerve deficit.   Psychiatric:         Behavior: Behavior normal.         Judgment: Judgment normal.       Results Review:  EXAMINATION: US LIVER-     INDICATION: HCC surveillance; K74.60-Unspecified cirrhosis of liver;  R18.8-Other ascites.      TECHNIQUE: Ultrasound right upper quadrant abdomen and liver.     COMPARISON: None.     FINDINGS: Visualized portions of the pancreas is within normal limits.     Liver demonstrates coarsened echotexture with nodular contours of  atrophic appearance cirrhotic hepatic morphology without focal liver  lesion, however, adjacent perihepatic ascites.     Gallbladder is surgically absent without abnormality of the gallbladder  fossa.     Common bile duct measures 3 mm in diameter.     Right kidney measures 9.2 cm in length without evidence of  hydronephrosis, contour deforming mass or obvious calculi.     IMPRESSION:  Cirrhotic hepatic morphology without focal underlying mass  lesion, however, perihepatic ascites is noted.     D:  01/04/2022  E:  01/04/2022         This report was finalized on 1/4/2022 12:28 PM by Dr. Joe Thakkar.      Latest Reference Range & Units 06/18/22 13:21   Glucose 65 - 99 mg/dL 137 (H)   Sodium 136 - 145 mmol/L 132 (L)   Potassium 3.5 - 5.2  mmol/L 4.4 [1]   CO2 22.0 - 29.0 mmol/L 25.0   Chloride 98 - 107 mmol/L 98   Anion Gap 5.0 - 15.0 mmol/L 9.0   Creatinine 0.76 - 1.27 mg/dL 1.52 (H)   BUN 8 - 23 mg/dL 23   BUN/Creatinine Ratio 7.0 - 25.0  15.1   Calcium 8.6 - 10.5 mg/dL 8.8   EGFR >60.0 mL/min/1.73 43.8 (L) [2]   Alkaline Phosphatase 39 - 117 U/L 77   Total Protein 6.0 - 8.5 g/dL 5.7 (L)   ALT (SGPT) 1 - 41 U/L 6   AST (SGOT) 1 - 40 U/L 16   Total Bilirubin 0.0 - 1.2 mg/dL 1.2   Albumin 3.50 - 5.20 g/dL 3.40 (L)   Globulin gm/dL 2.3 [3]   A/G Ratio g/dL 1.5   Protime 11.4 - 14.4 Seconds 19.2 (H)   INR 0.84 - 1.13  1.62 (H)   WBC 3.40 - 10.80 10*3/mm3 9.81   RBC 4.14 - 5.80 10*6/mm3 3.67 (L)   Hemoglobin 13.0 - 17.7 g/dL 11.8 (L)   Hematocrit 37.5 - 51.0 % 36.0 (L)   RDW 12.3 - 15.4 % 15.0   MCV 79.0 - 97.0 fL 98.1 (H)   MCH 26.6 - 33.0 pg 32.2   MCHC 31.5 - 35.7 g/dL 32.8   MPV 6.0 - 12.0 fL 10.5   Platelets 140 - 450 10*3/mm3 102 (L)   RDW-SD 37.0 - 54.0 fl 54.2 (H)       ASSESSMENT / PLAN  1. End stage liver disease  -Not likely transplant candidate due to age and comorbidity      2. Cirrhosis, decompensated by ascites  -cirrhosis secondary to cardiac dysfunction  - no evidence of hepatic encephalopathy     # Ascites/Pedal edema  Plan:  -Continue Lasix -as directed by cardiology  -Continue Spironolactone 50 mg daily-as directed by cardiology  -Paracentesis prn  -Low-sodium 2 g/day diet  -CBC, CMP, PT/INR (done in ED 6/18 and reviewed)     # Surveillance for Esophageal varices needed  Plan:  -Defer EGD as risk > benefit  -Continue Coreg as directed by cardiology     # Hepatocellular carcinoma surveillance  - Abdominal imaging every six months  Plan:  -US abdomen complete     # Nutrition  Plan:  -High protein diet  -Low sodium diet 2 gm/day  -Avoid alcohol, avoid NSAIDS       Return in about 6 months (around 12/27/2022).    I discussed the patients findings and my recommendations with patient    Nilton Jade, APRN

## 2022-06-28 ENCOUNTER — TELEPHONE (OUTPATIENT)
Dept: INFUSION THERAPY | Facility: HOSPITAL | Age: 87
End: 2022-06-28

## 2022-06-30 ENCOUNTER — OFFICE VISIT (OUTPATIENT)
Dept: UROLOGY | Facility: CLINIC | Age: 87
End: 2022-06-30

## 2022-06-30 VITALS
BODY MASS INDEX: 32.48 KG/M2 | WEIGHT: 232 LBS | DIASTOLIC BLOOD PRESSURE: 78 MMHG | OXYGEN SATURATION: 95 % | SYSTOLIC BLOOD PRESSURE: 146 MMHG | HEIGHT: 71 IN | HEART RATE: 70 BPM

## 2022-06-30 DIAGNOSIS — R31.0 GROSS HEMATURIA: Primary | ICD-10-CM

## 2022-06-30 LAB
BILIRUB BLD-MCNC: NEGATIVE MG/DL
CLARITY, POC: CLEAR
COLOR UR: YELLOW
EXPIRATION DATE: NORMAL
GLUCOSE UR STRIP-MCNC: NEGATIVE MG/DL
KETONES UR QL: NEGATIVE
LEUKOCYTE EST, POC: NEGATIVE
Lab: NORMAL
NITRITE UR-MCNC: NEGATIVE MG/ML
PH UR: 6 [PH] (ref 5–8)
PROT UR STRIP-MCNC: NEGATIVE MG/DL
RBC # UR STRIP: NEGATIVE /UL
SP GR UR: 1.02 (ref 1–1.03)
UROBILINOGEN UR QL: NORMAL

## 2022-06-30 PROCEDURE — 99204 OFFICE O/P NEW MOD 45 MIN: CPT | Performed by: STUDENT IN AN ORGANIZED HEALTH CARE EDUCATION/TRAINING PROGRAM

## 2022-06-30 PROCEDURE — 81003 URINALYSIS AUTO W/O SCOPE: CPT | Performed by: STUDENT IN AN ORGANIZED HEALTH CARE EDUCATION/TRAINING PROGRAM

## 2022-06-30 NOTE — PROGRESS NOTES
Office Visit New Urology      Patient Name: Jordi Sam  : 1934   MRN: [id number]     Chief Complaint:    Chief Complaint   Patient presents with   • Chronic Kidney Disease   • gross hematuria       Referring Provider: Lv Soares MD    History of Present Illness: Jordi Sam is a 88 y.o. male who has an extensive past medical history including CAD, right systolic heart failure, prior MI, end stage liver disease with cirrhosis, HTN, type DMII, nephrolithiasis, stage III CKD, chronic atrial fibrillation on Eliquis, presents to Urology today for evaluation of gross hematuria.     Patient cirrhosis is thought related to his heart failure.  For his cirrhosis, undergoes outpatient paracentesis every few months with gastroenterology here at [facility].    History of nephrolithiasis in , passing a kidney stone and noticed it then. Previously followed with Dr. Jordi Law of [facility] urology group.  Patient was last seen by Dr. Law in 2021.  He is on terazosin for BPH.  He states he thinks he underwent a ureteroscopy and stone removal remotely for kidney stones. No prior urologic records available.     Previous 28 pack year smoker, quit in .  He has a family history of prostate cancer in his brother, he has no other  malignancy history.    Patient has a massive umbilical hernia or ventral abdominal wall hernia which is nonoperative secondary to his frail medical status and age.    On Tamsulosin previously and couldn't afford it, so was transitioned to Terazosin.  Reports no significant urinary symptoms, IPSS score is 4.    Previous abdominal imaging performed 2021 demonstrate small atrophic kidneys with a small hyperdense cyst on the left kidney, no hydronephrosis or nephrolithiasis noted, prostate does not appear to be significantly large.  Unfortunately when he went to the emergency room recently, no abdominal imaging was obtained.  He denies any flank pain,  denies fevers or chills.    Urine culture obtained on 6- was positive for E. coli, this was treated with outpatient antibiotic.     CMP 6- demonstrates creatinine 1.5 with a corresponding GFR 44.      Subjective      Review of System: Review of Systems   Constitutional: Negative for chills, fatigue, fever and unexpected weight change.   HENT: Negative for sore throat.    Eyes: Negative for visual disturbance.   Respiratory: Negative for cough, chest tightness and shortness of breath.    Cardiovascular: Negative for chest pain and leg swelling.   Gastrointestinal: Negative for blood in stool, constipation, diarrhea, nausea, rectal pain and vomiting.   Genitourinary: Positive for dysuria and hematuria. Negative for decreased urine volume, difficulty urinating, enuresis, flank pain, frequency, genital sores and urgency.   Musculoskeletal: Negative for back pain and joint swelling.   Skin: Negative for rash and wound.   Neurological: Negative for seizures, speech difficulty, weakness and headaches.   Psychiatric/Behavioral: Negative for confusion, sleep disturbance and suicidal ideas. The patient is not nervous/anxious.       I have reviewed the ROS documented by my clinical staff, I have updated appropriately and I agree. Anjel Campo MD    Past Medical History:   Past Medical History:   Diagnosis Date   • A-fib (HCC)    • Acute inferior myocardial infarction (HCC)     Acute inferior STEMI with RV infarct features, January 2009.    • CAD (coronary artery disease)    • Dyslipidemia    • Goiter     History of “goiter.”   • Hernia, umbilical    • Hyperlipidemia    • Hypertension    • Nephrolithiasis    • Type 2 diabetes mellitus (HCC)    • Umbilical hernia     Pt stated has recently returned. Seeing Dr Kiersten Nova and Dr Jasper Avery 8-2021       Past Surgical History:   Past Surgical History:   Procedure Laterality Date   • BRONCHOSCOPY N/A 4/12/2019    Procedure: BRONCHOSCOPY WITH THORACENTESIS;   Surgeon: Marciano Higginbotham MD;  Location:  [facility];  Service: Pulmonary   • CARDIAC CATHETERIZATION Bilateral 2019    Procedure: Right and Left Heart Cath;  Surgeon: Efrem Posadas MD;  Location:  [facility];  Service: Cardiology   • CARDIAC CATHETERIZATION     • CHOLECYSTECTOMY     • CORONARY ANGIOPLASTY WITH STENT PLACEMENT  2009    Sirolimus eluting stents to the RCA, 2009.    • HERNIA REPAIR      Hernia repair x 2.    • PARACENTESIS  2019    multiple   • UMBILICAL HERNIA REPAIR N/A 2020    Procedure: UMBILICAL HERNIA REPAIR;  Surgeon: Maribell Her MD;  Location:  [facility];  Service: General;  Laterality: N/A;       Family History:   Family History   Problem Relation Age of Onset   • Dementia Mother    • Stroke Mother    • Heart disease Father    • Heart attack Father    • Aneurysm Father    • Cancer Sister    • No Known Problems Brother    • No Known Problems Sister    • Heart disease Brother         CABG   • Hypertension Daughter    • Hypertension Son    • Colon cancer Neg Hx    • Colon polyps Neg Hx        Social History:   Social History     Socioeconomic History   • Marital status:      Spouse name: Melly   • Number of children: 4   Tobacco Use   • Smoking status: Former Smoker     Packs/day: 2.50     Years: 28.00     Pack years: 70.00     Types: Cigarettes     Quit date: 1983     Years since quittin.1   • Smokeless tobacco: Never Used   Vaping Use   • Vaping Use: Never used   Substance and Sexual Activity   • Alcohol use: No   • Drug use: No   • Sexual activity: Not Currently       Medications:     Current Outpatient Medications:   •  apixaban (ELIQUIS) 2.5 MG tablet tablet, Take 2.5 mg by mouth 2 (Two) Times a Day. Last dose 3 days ago. From 21, Disp: , Rfl:   •  carvedilol (COREG) 6.25 MG tablet, TAKE 1 TABLET BY MOUTH TWICE DAILY WITH MEALS, Disp: 60 tablet, Rfl: 10  •  cefdinir (OMNICEF) 300 MG capsule,  Take 300 mg by mouth 2 (Two) Times a Day., Disp: , Rfl:   •  folic acid (FOLVITE) 400 MCG tablet, Take 400 mcg by mouth Daily., Disp: , Rfl:   •  furosemide (LASIX) 40 MG tablet, Lasix 40 mg 1 tab in morning, 1/2 tab in the afternoon. And as needed for weight gain, edema and dypnea., Disp: 180 tablet, Rfl: 3  •  nitroglycerin (NITROSTAT) 0.4 MG SL tablet, DISSOLVE ONE TABLET UNDER THE TONGUE EVERY 5 MINUTES AS NEEDED FOR CHEST PAIN.  DO NOT EXCEED A TOTAL OF 3 DOSES IN 15 MINUTES, Disp: 25 tablet, Rfl: 3  •  simvastatin (ZOCOR) 40 MG tablet, Take 1 tablet by mouth Daily., Disp: 90 tablet, Rfl: 32  •  spironolactone (ALDACTONE) 50 MG tablet, Take 1 tablet by mouth once daily, Disp: 90 tablet, Rfl: 3  •  terazosin (HYTRIN) 2 MG capsule, Take 1 capsule by mouth Every Night., Disp: 90 capsule, Rfl: 3    Allergies:   Allergies   Allergen Reactions   • Bee Venom Swelling   • Sulfamethoxazole-Trimethoprim Other (See Comments)     Pt unaware       IPSS Questionnaire (AUA-7):  Over the past month…    1)  Incomplete Emptying:       How often have you had a sensation of not emptying you had the sensation of not emptying your bladder completely after you finished urinating?  0 - Not at all   2)  Frequency:       How often have you had the urinate again less than two hours after you finished urinating?  3 - About half the time   3)  Intermittency:       How often have you found you stopped and started again several times when you urinated?   0 - Not at all   4) Urgency:      How often have you found it difficult to postpone urination?  0 - Not at all   5) Weak Stream:      How often have you had a weak urinary stream?  0 - Not at all   6) Straining:       How often have you had to push or strain to begin urination?  0 - Not at all   7) Nocturia:      How many times did you most typically get up to urinate from the time you went to bed at night until the time you got up in the morning?  1 - 1 time   Total Score:  4   The  "International Prostate Symptom Score (IPSS) is used to screen, diagnose, track symptoms of benign prostatic hyperplasia (BPH).   0-7 (Mild Symptoms) 8-19 (Moderate) 20-35 (Severe)   Quality of Life (QoL):  If you were to spend the rest of your life with your urinary condition just the way it is now, how would you feel about that? 2-Mostly Satisfied   Urine Leakage (Incontinence) 0-No Leakage       Sexual Health Inventory for Men (MARTHA)   Over the past 6 months:     1. How do you rate your confidence that you could get and keep an erection?  0 - No sexual activity    2. When you had erections with sexual   stimulation, how often were your erections hard enough for penetration (entering your partner)?  0 - No Sexual Activity    3. During sexual intercourse, how often were you able to maintain your erection after you had penetrated (entered) your partner?  0 - Did not attempt intercourse   4. During sexual intercourse, how difficult was it to maintain your erection to completion of intercourse?  0 - Did not attempt intercourse   5. When you attempted sexual intercourse, how often was it satisfactory for you?  0 - Did not attempt intercourse    Total Score: 0   The Sexual Health Inventory for Men further classifies ED severity with the following breakpoints:   1-7 (Severe ED) 8-11 (Moderate ED) 12-16 (Mild to Moderate ED) 17-21 (Mild ED)           Objective     Physical Exam:   Vital Signs:   Vitals:    06/30/22 0831   BP: 146/78   Pulse: 70   SpO2: 95%   Weight: 105 kg (232 lb)   Height: 180.3 cm (71\")     Body mass index is 32.36 kg/m².     Physical Exam  Vitals and nursing note reviewed.   Constitutional:       Appearance: Normal appearance.   HENT:      Head: Normocephalic and atraumatic.      Mouth/Throat:      Mouth: Mucous membranes are moist.      Pharynx: Oropharynx is clear.   Eyes:      Extraocular Movements: Extraocular movements intact.      Conjunctiva/sclera: Conjunctivae normal.   Cardiovascular:      " Rate and Rhythm: Normal rate and regular rhythm.   Pulmonary:      Effort: Pulmonary effort is normal. No respiratory distress.   Abdominal:      Palpations: Abdomen is soft.      Tenderness: There is no abdominal tenderness. There is no right CVA tenderness or left CVA tenderness.      Hernia: A hernia is present.      Comments: Massive abdominal wall hernia   Genitourinary:     Comments: Circumcised phallus, orthotopic meatus, bilaterally descended testicles without masses, or lesions.     [place]: Deferred per patient  Musculoskeletal:         General: Normal range of motion.      Cervical back: Normal range of motion.   Skin:     General: Skin is warm and dry.   Neurological:      General: No focal deficit present.      Mental Status: He is alert and oriented to person, place, and time.   Psychiatric:         Mood and Affect: Mood normal.         Behavior: Behavior normal.         Labs:   Brief Urine Lab Results  (Last result in the past 365 days)      Color   Clarity   Blood   Leuk Est   Nitrite   Protein   CREAT   Urine HCG        06/30/22 0917 Yellow   Clear   Negative   Negative   Negative   Negative                 Urine Culture    Urine Culture 6/18/22   Urine Culture >100,000 CFU/mL Escherichia coli (A)   (A) Abnormal value               Lab Results   Component Value Date    GLUCOSE 137 (H) 06/18/2022    CALCIUM 8.8 06/18/2022     (L) 06/18/2022    K 4.4 06/18/2022    CO2 25.0 06/18/2022    CL 98 06/18/2022    BUN 23 06/18/2022    CREATININE 1.52 (H) 06/18/2022    EGFRIFNONA 51 (L) 12/22/2021    BCR 15.1 06/18/2022    ANIONGAP 9.0 06/18/2022       Lab Results   Component Value Date    WBC 9.81 06/18/2022    HGB 11.8 (L) 06/18/2022    HCT 36.0 (L) 06/18/2022    MCV 98.1 (H) 06/18/2022     (L) 06/18/2022       Images:   XR Chest 1 View    Result Date: 3/25/2022  Evidence for a chronic left pleural effusion which appears to be stable compared to multiple prior studies. No definitive new infiltrates  are identified.  This report was finalized on 3/25/2022 1:23 PM by Lv Smith MD.      US Paracentesis    Result Date: 3/22/2022  Impression:                                                              Successful ultrasound-guided paracentesis using a Right lower quadrant access with recovery of 3.1 liters of fluid as described above.  Thank you for the opportunity to assist in the care of your patient.  This report was finalized on 3/22/2022 4:08 PM by David Heck MD.        Measures:   Tobacco:   Jordi Sam  reports that he quit smoking about 39 years ago. His smoking use included cigarettes. He has a 70.00 pack-year smoking history. He has never used smokeless tobacco.         Assessment / Plan      Assessment/Plan:   Jordi Sam is a 88 y.o. male who presented today for gross hematuria, recent urinary tract infection.  Minimal IPSS score, on terazosin for presumed BPH.  Patient and his wife are both relatively poor historians in regards to his urinary symptoms and recent urologic follow-up.  Previously following with [facility] urology group, the emergency department referred him to me.  He was offered follow-up back with the [facility] urology group.  He would like to continue following with me.  We will plan for cystoscopy and CT urogram for complete evaluation of his gross hematuria, given he is on Eliquis he could have had a urinary tract infection resulting in bleeding.  Given his smoking history we need to rule out  malignancy.  Patient is amenable to this plan.  We discussed risk benefits and alternatives to flexible cystoscopy.  I have asked him to hold his Eliquis for a few days before the scope procedure to prevent hematuria.    Diagnoses and all orders for this visit:    1. Gross hematuria (Primary)    -CT urogram and flexible cystoscopy next available  -Hold Eliquis 3 days prior    -     CT Abdomen Pelvis With & Without Contrast; Future  -     POC Urinalysis Dipstick,  Automated         Follow Up:   Return in about 2 weeks (around 7/14/2022), or Flex cysto in clinic 7/14/22, for Flex cysto in clinic 7/14/22, Follow Up after Imaging.    I spent approximately 45 minutes providing clinical care for this patient; including review of patient's chart and provider documentation, face to face time spent with patient in examination room (obtaining history, performing physical exam, discussing diagnosis and management options), placing orders, and completing patient documentation.     Anjel Campo MD  [facility] [place]

## 2022-07-05 ENCOUNTER — HOSPITAL ENCOUNTER (OUTPATIENT)
Dept: ULTRASOUND IMAGING | Facility: HOSPITAL | Age: 87
Discharge: HOME | End: 2022-07-05
Admitting: NURSE PRACTITIONER

## 2022-07-05 VITALS
OXYGEN SATURATION: 97 % | SYSTOLIC BLOOD PRESSURE: 99 MMHG | RESPIRATION RATE: 20 BRPM | DIASTOLIC BLOOD PRESSURE: 59 MMHG | HEART RATE: 51 BPM | TEMPERATURE: 96.1 F

## 2022-07-05 DIAGNOSIS — K74.60 CIRRHOSIS OF LIVER WITH ASCITES, UNSPECIFIED HEPATIC CIRRHOSIS TYPE: ICD-10-CM

## 2022-07-05 DIAGNOSIS — R18.8 CIRRHOSIS OF LIVER WITH ASCITES, UNSPECIFIED HEPATIC CIRRHOSIS TYPE: ICD-10-CM

## 2022-07-05 LAB
ALBUMIN FLD-MCNC: 2.1 G/DL
APPEARANCE FLD: CLEAR
COLOR FLD: YELLOW
LYMPHOCYTES NFR FLD MANUAL: 19 %
MACROPHAGE FLUID: 80 %
NEUTROPHILS NFR FLD MANUAL: 1 %
RBC # FLD AUTO: <2000 /MM3
WBC # FLD AUTO: 134 /MM3

## 2022-07-05 PROCEDURE — P9047 ALBUMIN (HUMAN), 25%, 50ML: HCPCS | Performed by: NURSE PRACTITIONER

## 2022-07-05 PROCEDURE — 82042 OTHER SOURCE ALBUMIN QUAN EA: CPT | Performed by: NURSE PRACTITIONER

## 2022-07-05 PROCEDURE — C1729 CATH, DRAINAGE: HCPCS

## 2022-07-05 PROCEDURE — 76942 ECHO GUIDE FOR BIOPSY: CPT

## 2022-07-05 PROCEDURE — 25010000002 ALBUMIN HUMAN 25% PER 50 ML: Performed by: NURSE PRACTITIONER

## 2022-07-05 PROCEDURE — 89051 BODY FLUID CELL COUNT: CPT | Performed by: NURSE PRACTITIONER

## 2022-07-05 RX ORDER — SODIUM CHLORIDE 0.9 % (FLUSH) 0.9 %
3 SYRINGE (ML) INJECTION EVERY 12 HOURS SCHEDULED
Status: DISCONTINUED | OUTPATIENT
Start: 2022-07-05 | End: 2022-07-06 | Stop reason: HOSPADM

## 2022-07-05 RX ORDER — LIDOCAINE HYDROCHLORIDE 10 MG/ML
10 INJECTION, SOLUTION EPIDURAL; INFILTRATION; INTRACAUDAL; PERINEURAL ONCE
Status: COMPLETED | OUTPATIENT
Start: 2022-07-05 | End: 2022-07-05

## 2022-07-05 RX ORDER — SODIUM CHLORIDE 0.9 % (FLUSH) 0.9 %
10 SYRINGE (ML) INJECTION AS NEEDED
Status: DISCONTINUED | OUTPATIENT
Start: 2022-07-05 | End: 2022-07-06 | Stop reason: HOSPADM

## 2022-07-05 RX ORDER — ALBUMIN (HUMAN) 12.5 G/50ML
12.5 SOLUTION INTRAVENOUS ONCE
Status: COMPLETED | OUTPATIENT
Start: 2022-07-05 | End: 2022-07-05

## 2022-07-05 RX ADMIN — LIDOCAINE HYDROCHLORIDE 5 ML: 10 INJECTION, SOLUTION EPIDURAL; INFILTRATION; INTRACAUDAL; PERINEURAL at 13:20

## 2022-07-05 RX ADMIN — ALBUMIN HUMAN 12.5 G: 0.25 SOLUTION INTRAVENOUS at 14:09

## 2022-07-05 NOTE — NURSING NOTE
Image guided paracentesis performed by MD Heck. 5500 MLS removed from peritoneum. Patient tolerated well. Report called to nurse. speciman to lab,bordered gauze applied

## 2022-07-05 NOTE — PRE-PROCEDURE NOTE
[facility]   Vascular Interventional Radiology  History & Physicial    Patient Name:Jordi Sam    : 1934    MRN: [id number]    Primary Care Physician: Jasper Avery MD    Referring Physician: MAKAYLA Stringer     Date of admission: 2022    Subjective     Reason for Consult: Para    History of Present Illness     Jordi Sam is a 88 y.o. male referred to IR as noted above.      Active Hospital Problems:  There are no active hospital problems to display for this patient.      Personal History     Past Medical History:   Diagnosis Date   • A-fib (HCC)    • Acute inferior myocardial infarction (HCC)     Acute inferior STEMI with RV infarct features, 2009.    • CAD (coronary artery disease)    • Dyslipidemia    • Goiter     History of “goiter.”   • Hernia, umbilical    • Hyperlipidemia    • Hypertension    • Nephrolithiasis    • Type 2 diabetes mellitus (HCC)    • Umbilical hernia     Pt stated has recently returned. Seeing Dr Kiersten Nova and Dr Jasper Avery        Past Surgical History:   Procedure Laterality Date   • BRONCHOSCOPY N/A 2019    Procedure: BRONCHOSCOPY WITH THORACENTESIS;  Surgeon: Marciano Higginbotham MD;  Location:  [facility];  Service: Pulmonary   • CARDIAC CATHETERIZATION Bilateral 2019    Procedure: Right and Left Heart Cath;  Surgeon: Efrem Posadas MD;  Location:  [facility];  Service: Cardiology   • CARDIAC CATHETERIZATION     • CHOLECYSTECTOMY     • CORONARY ANGIOPLASTY WITH STENT PLACEMENT  2009    Sirolimus eluting stents to the RCA, 2009.    • HERNIA REPAIR      Hernia repair x 2.    • PARACENTESIS  2019    multiple   • UMBILICAL HERNIA REPAIR N/A 2020    Procedure: UMBILICAL HERNIA REPAIR;  Surgeon: Maribell Her MD;  Location:  [facility] OR;  Service: General;  Laterality: N/A;       Family History: His family history includes Aneurysm in his father; Cancer in his sister;  Dementia in his mother; Heart attack in his father; Heart disease in his brother and father; Hypertension in his daughter and son; No Known Problems in his brother and sister; Stroke in his mother.     Social History: He  reports that he quit smoking about 39 years ago. His smoking use included cigarettes. He has a 70.00 pack-year smoking history. He has never used smokeless tobacco. He reports that he does not drink alcohol and does not use drugs.    Home Medications:  apixaban, carvedilol, cefdinir, folic acid, furosemide, nitroglycerin, simvastatin, spironolactone, and terazosin    Current Medications:  •  lidocaine PF 1%  •  sodium chloride  •  sodium chloride     Allergies:  He is allergic to bee venom and sulfamethoxazole-trimethoprim.    Review of Systems    Objective     Visit Vitals  /69 (BP Location: Left arm)   Pulse 58   Temp 96.1 °F (35.6 °C) (Tympanic)   Resp 18   SpO2 96%        Physical Exam    A&Ox3.   Able to communicate  No Apparent Distress  Average physique        Result Review      I have personally reviewed the results from the time of this admission to 7/5/2022 13:24 EDT and agree with these findings.  [x]  Laboratory  []  Microbiology  [x]  Radiology  []  EKG/Telemetry   []  Cardiology/Vascular   []  Pathology  []  Old records  []  Other:    Most notable findings include: As noted:          Estimated Creatinine Clearance: 41.4 mL/min (A) (by C-G formula based on SCr of 1.52 mg/dL (H)). No results found for: CREATININE    COVID19   Date Value Ref Range Status   03/25/2022 Not Detected Not Detected - Ref. Range Final        No results found for: PREGTESTUR, PREGSERUM, HCG, HCGQUANT     ASA SCALE ASSESSMENT (applicable if sedation planned):        MALLAMPATI CLASSIFICATION (applicable if sedation planned):       Assessment / Plan     Jordi Sam is a 88 y.o. male referred to the IR service with above problem.    Plan:   As above.    David Heck MD   Vascular Interventional  Radiology  07/05/22   1:24 PM EDT

## 2022-07-06 ENCOUNTER — TELEPHONE (OUTPATIENT)
Dept: INFUSION THERAPY | Facility: HOSPITAL | Age: 87
End: 2022-07-06

## 2022-07-13 ENCOUNTER — HOSPITAL ENCOUNTER (OUTPATIENT)
Dept: CT IMAGING | Facility: HOSPITAL | Age: 87
Discharge: HOME | End: 2022-07-13
Admitting: STUDENT IN AN ORGANIZED HEALTH CARE EDUCATION/TRAINING PROGRAM

## 2022-07-13 DIAGNOSIS — R31.0 GROSS HEMATURIA: ICD-10-CM

## 2022-07-13 PROCEDURE — 0 IOPAMIDOL PER 1 ML: Performed by: STUDENT IN AN ORGANIZED HEALTH CARE EDUCATION/TRAINING PROGRAM

## 2022-07-13 PROCEDURE — 74178 CT ABD&PLV WO CNTR FLWD CNTR: CPT

## 2022-07-13 RX ADMIN — IOPAMIDOL 150 ML: 755 INJECTION, SOLUTION INTRAVENOUS at 10:50

## 2022-07-14 ENCOUNTER — PROCEDURE VISIT (OUTPATIENT)
Dept: UROLOGY | Facility: CLINIC | Age: 87
End: 2022-07-14

## 2022-07-14 VITALS — WEIGHT: 232 LBS | HEIGHT: 71 IN | BODY MASS INDEX: 32.48 KG/M2

## 2022-07-14 DIAGNOSIS — R31.0 GROSS HEMATURIA: ICD-10-CM

## 2022-07-14 PROCEDURE — 52000 CYSTOURETHROSCOPY: CPT | Performed by: STUDENT IN AN ORGANIZED HEALTH CARE EDUCATION/TRAINING PROGRAM

## 2022-07-14 NOTE — PROGRESS NOTES
Preprocedure diagnosis  Gross hematuria    Postprocedure diagnosis  Gross hematuria    Procedure  Flexible Cystourethroscopy    Attending surgeon  Anjel Campo MD    Anesthesia  2% lidocaine jelly intraurethrally    Complications  None    Indications  88 y.o. male undergoing a flexible cystoscopy for the above mentioned indications.  Informed consent was obtained.      Findings  The penile urethra was narrowed. The distal bulbar urethra had an estimated 10-12 Fr annular urethral stricture unable to be bypassed by the cystoscope.  The patient did not tolerate this portion of the procedure and elected to discontinue cystoscopy.    The prostatic urethra unable to be evaluated.     Bladder unable to be evaluated.     Procedure  The patient was placed in supine position and prepped and draped in sterile fashion with lidocaine jelly instill per the urethra for anesthesia 5 minutes prior to procedure start.  A brief timeout was performed including available nursing staff and the patient.      The 16 Fr digital flexible cystoscope was lubricated and gently advanced into the urethral meatus.     The penile urethra appeared mildly stenotic.  The patient experienced some pain with initial scope passage through the penile urethra.      I advanced to the distal bulbar urethra where there was a 10-12 Mongolian annular urethral stricture, the patient has significant discomfort at this time and the cystoscope was unable to bypass this area of narrowing.  The patient elected to discontinue flexible cystoscopy secondary to discomfort.    The cystoscope was then gently withdrawn while visualizing the urethra and the procedure was then terminated.  The patient tolerated the procedure well.      Follow Up:     Patient's CT urogram has no obvious concerning findings except for a thickened bladder and multiple bladder diverticuli indicative of chronic bladder outlet obstruction.  I discussed with the patient that in order to  "completely evaluate his bladder and complete his hematuria work-up he will require flexible cystoscopy or rigid cystoscopy under anesthesia with urethral stricture dilation in order to enter the bladder with plans to leave beck catheter in post operatively for some time.      The patient states he \"is 88 and wants to die peacefully at some point, not after surgery\" and with his liver failure would like to avoid any general anesthesia at this time.  He states his gross hematuria has resolved and he believes likely secondary to recent infection.  I do not disagree with the patient, he is a high risk operative candidate.  We also discussed the possibility of spinal epidural anesthesia.  The patient would like to avoid any further surgical intervention for work-up of his gross hematuria after discussion of risks, benefits and alternatives.    At this time we will continue to monitor his urinary symptoms, we discussed given his urethral stricture that he is at risk of incomplete bladder emptying recurrent UTI.  We will see him back in 3 months for close follow-up.     Anjel Campo MD    "

## 2022-07-18 ENCOUNTER — APPOINTMENT (OUTPATIENT)
Dept: CT IMAGING | Facility: HOSPITAL | Age: 87
End: 2022-07-18

## 2022-07-21 ENCOUNTER — OFFICE VISIT (OUTPATIENT)
Dept: FAMILY MEDICINE CLINIC | Facility: CLINIC | Age: 87
End: 2022-07-21

## 2022-07-21 ENCOUNTER — HOSPITAL ENCOUNTER (OUTPATIENT)
Dept: GENERAL RADIOLOGY | Facility: HOSPITAL | Age: 87
Discharge: HOME | End: 2022-07-21
Admitting: NURSE PRACTITIONER

## 2022-07-21 VITALS
WEIGHT: 227 LBS | TEMPERATURE: 97.6 F | HEART RATE: 54 BPM | SYSTOLIC BLOOD PRESSURE: 130 MMHG | OXYGEN SATURATION: 100 % | HEIGHT: 71 IN | DIASTOLIC BLOOD PRESSURE: 78 MMHG | BODY MASS INDEX: 31.78 KG/M2

## 2022-07-21 DIAGNOSIS — L03.115 CELLULITIS OF RIGHT LOWER EXTREMITY: Primary | ICD-10-CM

## 2022-07-21 DIAGNOSIS — R30.0 DYSURIA: ICD-10-CM

## 2022-07-21 DIAGNOSIS — S89.91XA RIGHT LEG INJURY, INITIAL ENCOUNTER: ICD-10-CM

## 2022-07-21 LAB
BILIRUB BLD-MCNC: NEGATIVE MG/DL
CLARITY, POC: ABNORMAL
COLOR UR: ABNORMAL
EXPIRATION DATE: ABNORMAL
GLUCOSE UR STRIP-MCNC: NEGATIVE MG/DL
KETONES UR QL: NEGATIVE
LEUKOCYTE EST, POC: ABNORMAL
Lab: ABNORMAL
NITRITE UR-MCNC: POSITIVE MG/ML
PH UR: 6 [PH] (ref 5–8)
PROT UR STRIP-MCNC: ABNORMAL MG/DL
RBC # UR STRIP: ABNORMAL /UL
SP GR UR: 1.01 (ref 1–1.03)
UROBILINOGEN UR QL: NORMAL

## 2022-07-21 PROCEDURE — 73590 X-RAY EXAM OF LOWER LEG: CPT

## 2022-07-21 PROCEDURE — 99214 OFFICE O/P EST MOD 30 MIN: CPT | Performed by: NURSE PRACTITIONER

## 2022-07-21 PROCEDURE — 87186 SC STD MICRODIL/AGAR DIL: CPT | Performed by: NURSE PRACTITIONER

## 2022-07-21 PROCEDURE — 87077 CULTURE AEROBIC IDENTIFY: CPT | Performed by: NURSE PRACTITIONER

## 2022-07-21 PROCEDURE — 81003 URINALYSIS AUTO W/O SCOPE: CPT | Performed by: NURSE PRACTITIONER

## 2022-07-21 PROCEDURE — 87086 URINE CULTURE/COLONY COUNT: CPT | Performed by: NURSE PRACTITIONER

## 2022-07-21 RX ORDER — CEPHALEXIN 500 MG/1
500 CAPSULE ORAL 2 TIMES DAILY
Qty: 14 CAPSULE | Refills: 0 | Status: SHIPPED | OUTPATIENT
Start: 2022-07-21 | End: 2022-07-28

## 2022-07-21 NOTE — PROGRESS NOTES
"Chief Complaint  Fall (Pt states he had a fall about a week ago./) and Leg Swelling (Pt states he's had some swelling in his right leg.)    Subjective        Jordi Sam presents to [facility]  Pt is here today to be evaluated due to an injury that happened 7 days ago. He fell and hit his right shin on a wood foot stool. He is now experiencing redness and swelling of the extremity. He initially had some bruising. Wife states she has been using ice packs on injury. Mild pain with weight bearing. They would like to get an x-ray to make sure there is no fracture.    Pt also reports he is having urinary symptoms. He has history of frequent uti's.    Objective   Vital Signs:  /78   Pulse 54   Temp 97.6 °F (36.4 °C)   Ht 180.3 cm (71\")   Wt 103 kg (227 lb)   SpO2 100%   BMI 31.66 kg/m²   Estimated body mass index is 31.66 kg/m² as calculated from the following:    Height as of this encounter: 180.3 cm (71\").    Weight as of this encounter: 103 kg (227 lb).        Physical Exam  Vitals reviewed.   Constitutional:       Appearance: Normal appearance.   HENT:      Nose: Nose normal.      Mouth/Throat:      Mouth: Mucous membranes are moist.   Cardiovascular:      Rate and Rhythm: Normal rate and regular rhythm.      Heart sounds: Normal heart sounds.   Pulmonary:      Effort: Pulmonary effort is normal.      Breath sounds: Normal breath sounds.   Musculoskeletal:         General: Swelling, tenderness and signs of injury present.        Legs:    Skin:     General: Skin is warm.   Neurological:      Mental Status: He is alert and oriented to person, place, and time.   Psychiatric:         Mood and Affect: Mood normal.         Behavior: Behavior normal.         Thought Content: Thought content normal.        Result Review :                Assessment and Plan   Diagnoses and all orders for this visit:    1. Cellulitis of right lower extremity (Primary)  -     cephalexin (Keflex) 500 " MG capsule; Take 1 capsule by mouth 2 (Two) Times a Day for 7 days.  Dispense: 14 capsule; Refill: 0    2. Right leg injury, initial encounter  -     XR Tibia Fibula 2 View Right (In Office)    3. Dysuria  -     cephalexin (Keflex) 500 MG capsule; Take 1 capsule by mouth 2 (Two) Times a Day for 7 days.  Dispense: 14 capsule; Refill: 0  -     POC Urinalysis Dipstick, Automated  -     Urine Culture - Urine, Urine, Clean Catch; Future  -     Urine Culture - Urine, Urine, Clean Catch             Follow Up   Return in about 1 week (around 7/28/2022) for Recheck.  Patient was given instructions and counseling regarding his condition or for health maintenance advice. Please see specific information pulled into the AVS if appropriate.

## 2022-07-23 LAB — BACTERIA SPEC AEROBE CULT: ABNORMAL

## 2022-07-25 ENCOUNTER — APPOINTMENT (OUTPATIENT)
Dept: ULTRASOUND IMAGING | Facility: HOSPITAL | Age: 87
End: 2022-07-25

## 2022-07-25 ENCOUNTER — HOSPITAL ENCOUNTER (OUTPATIENT)
Dept: ULTRASOUND IMAGING | Facility: HOSPITAL | Age: 87
Discharge: HOME | End: 2022-07-25
Admitting: NURSE PRACTITIONER

## 2022-07-25 DIAGNOSIS — K74.60 CIRRHOSIS OF LIVER WITH ASCITES, UNSPECIFIED HEPATIC CIRRHOSIS TYPE: ICD-10-CM

## 2022-07-25 DIAGNOSIS — R18.8 CIRRHOSIS OF LIVER WITH ASCITES, UNSPECIFIED HEPATIC CIRRHOSIS TYPE: ICD-10-CM

## 2022-07-25 PROCEDURE — 76700 US EXAM ABDOM COMPLETE: CPT

## 2022-07-26 ENCOUNTER — HOSPITAL ENCOUNTER (EMERGENCY)
Facility: HOSPITAL | Age: 87
Discharge: HOME | End: 2022-07-26
Attending: EMERGENCY MEDICINE | Admitting: EMERGENCY MEDICINE

## 2022-07-26 ENCOUNTER — APPOINTMENT (OUTPATIENT)
Dept: CARDIOLOGY | Facility: HOSPITAL | Age: 87
End: 2022-07-26

## 2022-07-26 VITALS
TEMPERATURE: 97.4 F | HEIGHT: 71 IN | WEIGHT: 231.48 LBS | OXYGEN SATURATION: 98 % | SYSTOLIC BLOOD PRESSURE: 117 MMHG | BODY MASS INDEX: 32.41 KG/M2 | HEART RATE: 58 BPM | RESPIRATION RATE: 18 BRPM | DIASTOLIC BLOOD PRESSURE: 60 MMHG

## 2022-07-26 DIAGNOSIS — S80.11XA HEMATOMA OF RIGHT LOWER EXTREMITY, INITIAL ENCOUNTER: Primary | ICD-10-CM

## 2022-07-26 DIAGNOSIS — I87.2 VENOUS STASIS DERMATITIS OF RIGHT LOWER EXTREMITY: ICD-10-CM

## 2022-07-26 DIAGNOSIS — Z02.89 REFERRED BY HEALTH CARE PROFESSIONAL: ICD-10-CM

## 2022-07-26 DIAGNOSIS — R60.0 BILATERAL EDEMA OF LOWER EXTREMITY: ICD-10-CM

## 2022-07-26 LAB
ALBUMIN SERPL-MCNC: 3.7 G/DL (ref 3.5–5.2)
ALBUMIN/GLOB SERPL: 1.3 G/DL
ALP SERPL-CCNC: 93 U/L (ref 39–117)
ALT SERPL W P-5'-P-CCNC: 13 U/L (ref 1–41)
ANION GAP SERPL CALCULATED.3IONS-SCNC: 11 MMOL/L (ref 5–15)
AST SERPL-CCNC: 30 U/L (ref 1–40)
BACTERIA UR QL AUTO: ABNORMAL /HPF
BASOPHILS # BLD AUTO: 0.04 10*3/MM3 (ref 0–0.2)
BASOPHILS NFR BLD AUTO: 0.6 % (ref 0–1.5)
BH CV LOWER VASCULAR LEFT COMMON FEMORAL AUGMENT: NORMAL
BH CV LOWER VASCULAR LEFT COMMON FEMORAL COMPRESS: NORMAL
BH CV LOWER VASCULAR LEFT COMMON FEMORAL PHASIC: NORMAL
BH CV LOWER VASCULAR LEFT COMMON FEMORAL SPONT: NORMAL
BH CV LOWER VASCULAR RIGHT COMMON FEMORAL COMPRESS: NORMAL
BH CV LOWER VASCULAR RIGHT COMMON FEMORAL PHASIC: NORMAL
BH CV LOWER VASCULAR RIGHT COMMON FEMORAL SPONT: NORMAL
BH CV LOWER VASCULAR RIGHT DISTAL FEMORAL COMPRESS: NORMAL
BH CV LOWER VASCULAR RIGHT GASTRONEMIUS COMPRESS: NORMAL
BH CV LOWER VASCULAR RIGHT GREATER SAPH AK COMPRESS: NORMAL
BH CV LOWER VASCULAR RIGHT GREATER SAPH BK COMPRESS: NORMAL
BH CV LOWER VASCULAR RIGHT LESSER SAPH COMPRESS: NORMAL
BH CV LOWER VASCULAR RIGHT MID FEMORAL AUGMENT: NORMAL
BH CV LOWER VASCULAR RIGHT MID FEMORAL COMPRESS: NORMAL
BH CV LOWER VASCULAR RIGHT MID FEMORAL PHASIC: NORMAL
BH CV LOWER VASCULAR RIGHT MID FEMORAL SPONT: NORMAL
BH CV LOWER VASCULAR RIGHT PERONEAL COMPRESS: NORMAL
BH CV LOWER VASCULAR RIGHT POPLITEAL AUGMENT: NORMAL
BH CV LOWER VASCULAR RIGHT POPLITEAL COMPRESS: NORMAL
BH CV LOWER VASCULAR RIGHT POPLITEAL PHASIC: NORMAL
BH CV LOWER VASCULAR RIGHT POPLITEAL SPONT: NORMAL
BH CV LOWER VASCULAR RIGHT POSTERIOR TIBIAL COMPRESS: NORMAL
BH CV LOWER VASCULAR RIGHT PROFUNDA FEMORAL COMPRESS: NORMAL
BH CV LOWER VASCULAR RIGHT PROXIMAL FEMORAL COMPRESS: NORMAL
BH CV LOWER VASCULAR RIGHT SAPHENOFEMORAL JUNCTION COMPRESS: NORMAL
BILIRUB SERPL-MCNC: 0.9 MG/DL (ref 0–1.2)
BILIRUB UR QL STRIP: NEGATIVE
BUN SERPL-MCNC: 17 MG/DL (ref 8–23)
BUN/CREAT SERPL: 12.5 (ref 7–25)
CALCIUM SPEC-SCNC: 9.1 MG/DL (ref 8.6–10.5)
CHLORIDE SERPL-SCNC: 97 MMOL/L (ref 98–107)
CLARITY UR: CLEAR
CO2 SERPL-SCNC: 25 MMOL/L (ref 22–29)
COLOR UR: YELLOW
CREAT SERPL-MCNC: 1.36 MG/DL (ref 0.76–1.27)
D-LACTATE SERPL-SCNC: 1.6 MMOL/L (ref 0.5–2)
DEPRECATED RDW RBC AUTO: 53.1 FL (ref 37–54)
EGFRCR SERPLBLD CKD-EPI 2021: 50.1 ML/MIN/1.73
EOSINOPHIL # BLD AUTO: 0.13 10*3/MM3 (ref 0–0.4)
EOSINOPHIL NFR BLD AUTO: 2 % (ref 0.3–6.2)
ERYTHROCYTE [DISTWIDTH] IN BLOOD BY AUTOMATED COUNT: 14.7 % (ref 12.3–15.4)
GLOBULIN UR ELPH-MCNC: 2.8 GM/DL
GLUCOSE SERPL-MCNC: 108 MG/DL (ref 65–99)
GLUCOSE UR STRIP-MCNC: NEGATIVE MG/DL
HCT VFR BLD AUTO: 38.9 % (ref 37.5–51)
HGB BLD-MCNC: 12.8 G/DL (ref 13–17.7)
HGB UR QL STRIP.AUTO: NEGATIVE
HOLD SPECIMEN: NORMAL
HYALINE CASTS UR QL AUTO: ABNORMAL /LPF
IMM GRANULOCYTES # BLD AUTO: 0.03 10*3/MM3 (ref 0–0.05)
IMM GRANULOCYTES NFR BLD AUTO: 0.5 % (ref 0–0.5)
KETONES UR QL STRIP: NEGATIVE
LEUKOCYTE ESTERASE UR QL STRIP.AUTO: ABNORMAL
LYMPHOCYTES # BLD AUTO: 0.81 10*3/MM3 (ref 0.7–3.1)
LYMPHOCYTES NFR BLD AUTO: 12.4 % (ref 19.6–45.3)
MAXIMAL PREDICTED HEART RATE: 132 BPM
MCH RBC QN AUTO: 31.9 PG (ref 26.6–33)
MCHC RBC AUTO-ENTMCNC: 32.9 G/DL (ref 31.5–35.7)
MCV RBC AUTO: 97 FL (ref 79–97)
MONOCYTES # BLD AUTO: 0.56 10*3/MM3 (ref 0.1–0.9)
MONOCYTES NFR BLD AUTO: 8.6 % (ref 5–12)
NEUTROPHILS NFR BLD AUTO: 4.96 10*3/MM3 (ref 1.7–7)
NEUTROPHILS NFR BLD AUTO: 75.9 % (ref 42.7–76)
NITRITE UR QL STRIP: NEGATIVE
NRBC BLD AUTO-RTO: 0 /100 WBC (ref 0–0.2)
PH UR STRIP.AUTO: 5.5 [PH] (ref 5–8)
PLATELET # BLD AUTO: 153 10*3/MM3 (ref 140–450)
PMV BLD AUTO: 9.9 FL (ref 6–12)
POTASSIUM SERPL-SCNC: 4.6 MMOL/L (ref 3.5–5.2)
PROCALCITONIN SERPL-MCNC: 0.05 NG/ML (ref 0–0.25)
PROT SERPL-MCNC: 6.5 G/DL (ref 6–8.5)
PROT UR QL STRIP: ABNORMAL
RBC # BLD AUTO: 4.01 10*6/MM3 (ref 4.14–5.8)
RBC # UR STRIP: ABNORMAL /HPF
REF LAB TEST METHOD: ABNORMAL
SODIUM SERPL-SCNC: 133 MMOL/L (ref 136–145)
SP GR UR STRIP: 1.02 (ref 1–1.03)
SQUAMOUS #/AREA URNS HPF: ABNORMAL /HPF
STRESS TARGET HR: 112 BPM
UROBILINOGEN UR QL STRIP: ABNORMAL
WBC # UR STRIP: ABNORMAL /HPF
WBC NRBC COR # BLD: 6.53 10*3/MM3 (ref 3.4–10.8)
WHOLE BLOOD HOLD COAG: NORMAL
WHOLE BLOOD HOLD SPECIMEN: NORMAL

## 2022-07-26 PROCEDURE — 85025 COMPLETE CBC W/AUTO DIFF WBC: CPT | Performed by: EMERGENCY MEDICINE

## 2022-07-26 PROCEDURE — 80053 COMPREHEN METABOLIC PANEL: CPT | Performed by: EMERGENCY MEDICINE

## 2022-07-26 PROCEDURE — 81001 URINALYSIS AUTO W/SCOPE: CPT | Performed by: EMERGENCY MEDICINE

## 2022-07-26 PROCEDURE — 84145 PROCALCITONIN (PCT): CPT | Performed by: EMERGENCY MEDICINE

## 2022-07-26 PROCEDURE — 93971 EXTREMITY STUDY: CPT

## 2022-07-26 PROCEDURE — 99283 EMERGENCY DEPT VISIT LOW MDM: CPT

## 2022-07-26 PROCEDURE — 83605 ASSAY OF LACTIC ACID: CPT | Performed by: EMERGENCY MEDICINE

## 2022-07-26 PROCEDURE — 36415 COLL VENOUS BLD VENIPUNCTURE: CPT

## 2022-07-26 PROCEDURE — 93971 EXTREMITY STUDY: CPT | Performed by: INTERNAL MEDICINE

## 2022-07-26 RX ORDER — SODIUM CHLORIDE 0.9 % (FLUSH) 0.9 %
10 SYRINGE (ML) INJECTION AS NEEDED
Status: DISCONTINUED | OUTPATIENT
Start: 2022-07-26 | End: 2022-07-26 | Stop reason: HOSPADM

## 2022-07-29 ENCOUNTER — OFFICE VISIT (OUTPATIENT)
Dept: FAMILY MEDICINE CLINIC | Facility: CLINIC | Age: 87
End: 2022-07-29

## 2022-07-29 VITALS
WEIGHT: 224.8 LBS | HEIGHT: 71 IN | OXYGEN SATURATION: 97 % | BODY MASS INDEX: 31.47 KG/M2 | HEART RATE: 55 BPM | SYSTOLIC BLOOD PRESSURE: 118 MMHG | DIASTOLIC BLOOD PRESSURE: 64 MMHG

## 2022-07-29 DIAGNOSIS — Z79.01 CHRONIC ANTICOAGULATION: ICD-10-CM

## 2022-07-29 DIAGNOSIS — R30.0 DYSURIA: ICD-10-CM

## 2022-07-29 DIAGNOSIS — T14.8XXA HEMATOMA: Primary | ICD-10-CM

## 2022-07-29 DIAGNOSIS — I10 ESSENTIAL HYPERTENSION: ICD-10-CM

## 2022-07-29 DIAGNOSIS — Z51.81 MEDICATION MONITORING ENCOUNTER: ICD-10-CM

## 2022-07-29 PROBLEM — N39.0 UTI (URINARY TRACT INFECTION): Status: ACTIVE | Noted: 2022-07-29

## 2022-07-29 PROCEDURE — 99214 OFFICE O/P EST MOD 30 MIN: CPT | Performed by: INTERNAL MEDICINE

## 2022-07-29 RX ORDER — HYDROCODONE BITARTRATE AND ACETAMINOPHEN 5; 325 MG/1; MG/1
1 TABLET ORAL EVERY 8 HOURS PRN
Qty: 24 TABLET | Refills: 0 | Status: SHIPPED | OUTPATIENT
Start: 2022-07-29 | End: 2022-08-19 | Stop reason: SDUPTHER

## 2022-07-29 NOTE — PROGRESS NOTES
Jordi Sam  1934  [id number]  Patient Care Team:  Jasper Avery MD as PCP - General (Internal Medicine)  Efrem Posadas MD as Consulting Physician (Cardiology)  Jordi Law MD as Consulting Physician (Urology)  Chapin Villagran MD as Consulting Physician (Otolaryngology)  Gen Laurent MD as Consulting Physician (Gastroenterology)    Jordi Sam is a 88 y.o. male here today for follow up.     This patient is accompanied by their self who contributes to the history of their care.    Chief Complaint:    Chief Complaint   Patient presents with   • Hospital Follow Up Visit     Fell and hurt his right leg        History of Present Illness:  I have reviewed and/or updated the patient's past medical, past surgical, family, social history, problem list and allergies as appropriate.     Mauri is here to follow-up on ER visit, he urgent treatment visit.  On 7/14/2022 he fell out of bed while napping.  He sustained a blow to his right leg with a spontaneous swelling.  He was seen here and treated for cellulitis with Keflex.  He also had urinary tract infection with dysuria pyuria noted on UA.  Culture grew E. coli which was sensitive.  As urinary symptoms have resolved however he still having swelling.  In ER DVT evaluation was negative.  He continues to take Lasix 1/2 tablets 40 mg daily.  Pain is worse when he is upright.  He continues to take his Eliquis.  He indicates the hematoma is fairly stable its not increased.  No fevers or chills.  Unfortunately his pain is 7 out of 10.  He has not had any pain medication.  Blood pressures have been running good at home denies any orthostasis.    Review of Systems   Constitutional: Negative.    HENT: Negative.    Eyes: Negative.    Cardiovascular: Positive for leg swelling.   Genitourinary: Negative.    Musculoskeletal: Negative.    Skin: Positive for skin lesions.   Neurological: Negative.    Hematological: Bruises/bleeds easily.  "  Psychiatric/Behavioral: Negative.        Vitals:    07/29/22 1408   BP: 118/64   Pulse: 55   SpO2: 97%   Weight: 102 kg (224 lb 12.8 oz)   Height: 180.3 cm (70.98\")     Body mass index is 31.37 kg/m².    Physical Exam  Vitals and nursing note reviewed.   Constitutional:       General: He is not in acute distress.     Appearance: He is well-developed. He is not diaphoretic.   HENT:      Head: Normocephalic and atraumatic.      Right Ear: External ear normal.      Left Ear: External ear normal.      Mouth/Throat:      Pharynx: No oropharyngeal exudate.   Eyes:      General: No scleral icterus.        Right eye: No discharge.      Conjunctiva/sclera: Conjunctivae normal.   Neck:      Thyroid: No thyromegaly.      Vascular: No JVD.      Trachea: No tracheal deviation.   Cardiovascular:      Rate and Rhythm: Normal rate and regular rhythm.      Heart sounds: Normal heart sounds.      Comments: PMI nondisplaced  Pulmonary:      Effort: Pulmonary effort is normal.      Breath sounds: Normal breath sounds. No wheezing or rales.   Abdominal:      General: Bowel sounds are normal.      Palpations: Abdomen is soft.      Tenderness: There is no abdominal tenderness. There is no guarding or rebound.   Musculoskeletal:      Cervical back: Normal range of motion and neck supple.      Comments: Antalgic gait.  Ambulates with cane.  He has approximately 9 x 5 cm oval swelling right anterior shin.  No warmth.  There is some dependent rubor.  Pulsations.  No significant ecchymoses.  Is tender to palpation.  No crepitus.  No drainage.   Lymphadenopathy:      Cervical: No cervical adenopathy.   Skin:     General: Skin is warm and dry.      Capillary Refill: Capillary refill takes less than 2 seconds.      Coloration: Skin is not pale.      Findings: No rash.   Neurological:      Mental Status: He is alert and oriented to person, place, and time.      Motor: No abnormal muscle tone.      Coordination: Coordination normal. "   Psychiatric:         Judgment: Judgment normal.         Procedures    Results Review:    I reviewed the patient's new clinical results.  Venous duplex as below  · Normal right lower extremity venous duplex scan.  · Incidental large medial, anterior, and superior right calf avascular mass noted suggestive of possible hematoma measuring 6.7 cm x 1.7 cm x 4.3 cm with clinical correlation and consideration of adjunctive imaging recommended.      Assessment/Plan:  Delightful 88-year-old gentleman who has a traumatic right distal lower extremity hematoma.  It appears stable and improving however significantly painful.  He does not appear toxic has not been febrile.  I recommended wrapping it tightly with an Ace bandage continue to elevate above head.  We discussed potentially holding his Eliquis however this far out and the fact that it is improving, we decided risk of stroke would be greater.  He will continue his Lasix.  Recommended ice 20 minutes on 20 minutes off.  Secondary to the severe pain I recommended Norco 5 every 8 hours as needed #24.  He will be reevaluated in 2 weeks.  If he develops fevers or chills, drainage or worsening swelling or pain he should seek emergent care.  I think the risk at this point for surgical intervention would predispose to significant limb threatening infection and should be avoided.    Or regarding his urinary tract infection dysuria this is improved.  I have added a culture to urine and lab for test of cure.  Problem List Items Addressed This Visit        Cardiac and Vasculature    Essential hypertension    Relevant Medications    spironolactone (ALDACTONE) 50 MG tablet    furosemide (LASIX) 40 MG tablet    terazosin (HYTRIN) 2 MG capsule    carvedilol (COREG) 6.25 MG tablet       Coag and Thromboembolic    Chronic anticoagulation      Other Visit Diagnoses     Hematoma    -  Primary    Relevant Medications    HYDROcodone-acetaminophen (Norco) 5-325 MG per tablet    Other Relevant  Orders    Urine Culture - Urine, Urine, Clean Catch    Dysuria        Relevant Orders    Urine Culture - Urine, Urine, Clean Catch          Plan of care reviewed with patient at the conclusion of today's visit. Education was provided regarding diagnosis and management.  Patient verbalizes understanding of and agreement with management plan.    Return in about 2 weeks (around 8/12/2022) for hematoma.    Jasper Avery MD      Please note than portions of this note were completed wt a Voice Recognition Program

## 2022-08-03 LAB
DRUGS UR: NORMAL
Lab: NORMAL

## 2022-08-08 ENCOUNTER — TELEPHONE (OUTPATIENT)
Dept: FAMILY MEDICINE CLINIC | Facility: CLINIC | Age: 87
End: 2022-08-08

## 2022-08-08 ENCOUNTER — CLINICAL SUPPORT (OUTPATIENT)
Dept: FAMILY MEDICINE CLINIC | Facility: CLINIC | Age: 87
End: 2022-08-08

## 2022-08-08 DIAGNOSIS — R30.0 DYSURIA: ICD-10-CM

## 2022-08-08 DIAGNOSIS — R30.0 DYSURIA: Primary | ICD-10-CM

## 2022-08-08 LAB
BILIRUB BLD-MCNC: NEGATIVE MG/DL
CLARITY, POC: ABNORMAL
COLOR UR: YELLOW
EXPIRATION DATE: ABNORMAL
GLUCOSE UR STRIP-MCNC: NEGATIVE MG/DL
KETONES UR QL: NEGATIVE
LEUKOCYTE EST, POC: ABNORMAL
Lab: ABNORMAL
NITRITE UR-MCNC: NEGATIVE MG/ML
PH UR: 7 [PH] (ref 5–8)
PROT UR STRIP-MCNC: ABNORMAL MG/DL
RBC # UR STRIP: ABNORMAL /UL
SP GR UR: 1.01 (ref 1–1.03)
UROBILINOGEN UR QL: NORMAL

## 2022-08-08 PROCEDURE — 81003 URINALYSIS AUTO W/O SCOPE: CPT | Performed by: INTERNAL MEDICINE

## 2022-08-08 PROCEDURE — 87186 SC STD MICRODIL/AGAR DIL: CPT | Performed by: INTERNAL MEDICINE

## 2022-08-08 PROCEDURE — 87086 URINE CULTURE/COLONY COUNT: CPT | Performed by: INTERNAL MEDICINE

## 2022-08-08 PROCEDURE — 87077 CULTURE AEROBIC IDENTIFY: CPT | Performed by: INTERNAL MEDICINE

## 2022-08-08 RX ORDER — CEFDINIR 300 MG/1
300 CAPSULE ORAL 2 TIMES DAILY
Qty: 28 CAPSULE | Refills: 0 | Status: SHIPPED | OUTPATIENT
Start: 2022-08-08 | End: 2022-10-21

## 2022-08-08 NOTE — TELEPHONE ENCOUNTER
Caller: Melly Sam    Relationship to patient: Emergency Contact    Best call back number: [phone]    What is the call regarding:  PATIENT'S WIFE STATES THAT HE HAS BURNING WHEN URINATING AND WANTED TO SEE IF HE COULD COME IN AN GIVE A SPECIMEN.

## 2022-08-08 NOTE — TELEPHONE ENCOUNTER
----- Message from Jasper Avery MD sent at 8/8/2022  4:05 PM EDT -----  I sent in 2 weeks of Omnicef 1 capsule p.o. twice daily.  Urine culture pending    Pt has UTI

## 2022-08-08 NOTE — TELEPHONE ENCOUNTER
Checked status of urine culture, it was collected by MA but never received by lab. Patient is experiencing urinary symptoms. He is requesting another lab order for this to be completed

## 2022-08-09 NOTE — TELEPHONE ENCOUNTER
Contacted patient to notify. Verbalized understanding   He has received his Rx and completed first dose

## 2022-08-10 LAB — BACTERIA SPEC AEROBE CULT: ABNORMAL

## 2022-08-19 ENCOUNTER — OFFICE VISIT (OUTPATIENT)
Dept: FAMILY MEDICINE CLINIC | Facility: CLINIC | Age: 87
End: 2022-08-19

## 2022-08-19 ENCOUNTER — PREP FOR SURGERY (OUTPATIENT)
Dept: OTHER | Facility: HOSPITAL | Age: 87
End: 2022-08-19

## 2022-08-19 VITALS
WEIGHT: 219.8 LBS | BODY MASS INDEX: 30.77 KG/M2 | HEART RATE: 78 BPM | SYSTOLIC BLOOD PRESSURE: 118 MMHG | DIASTOLIC BLOOD PRESSURE: 66 MMHG | HEIGHT: 71 IN | OXYGEN SATURATION: 97 %

## 2022-08-19 DIAGNOSIS — R18.8 CIRRHOSIS OF LIVER WITH ASCITES, UNSPECIFIED HEPATIC CIRRHOSIS TYPE: ICD-10-CM

## 2022-08-19 DIAGNOSIS — H61.22 LEFT EAR IMPACTED CERUMEN: ICD-10-CM

## 2022-08-19 DIAGNOSIS — T14.8XXA HEMATOMA: ICD-10-CM

## 2022-08-19 DIAGNOSIS — N39.0 URINARY TRACT INFECTION WITHOUT HEMATURIA, SITE UNSPECIFIED: ICD-10-CM

## 2022-08-19 DIAGNOSIS — H90.12 CONDUCTIVE HEARING LOSS OF LEFT EAR, UNSPECIFIED HEARING STATUS ON CONTRALATERAL SIDE: ICD-10-CM

## 2022-08-19 DIAGNOSIS — K74.60 CIRRHOSIS OF LIVER WITH ASCITES, UNSPECIFIED HEPATIC CIRRHOSIS TYPE: ICD-10-CM

## 2022-08-19 DIAGNOSIS — R18.8 OTHER ASCITES: Primary | ICD-10-CM

## 2022-08-19 DIAGNOSIS — I10 ESSENTIAL HYPERTENSION: Primary | ICD-10-CM

## 2022-08-19 PROCEDURE — 69209 REMOVE IMPACTED EAR WAX UNI: CPT | Performed by: INTERNAL MEDICINE

## 2022-08-19 PROCEDURE — 99214 OFFICE O/P EST MOD 30 MIN: CPT | Performed by: INTERNAL MEDICINE

## 2022-08-19 RX ORDER — ALBUMIN (HUMAN) 12.5 G/50ML
12.5 SOLUTION INTRAVENOUS ONCE
Status: CANCELLED | OUTPATIENT
Start: 2022-08-19 | End: 2022-08-19

## 2022-08-19 RX ORDER — HYDROCODONE BITARTRATE AND ACETAMINOPHEN 5; 325 MG/1; MG/1
1 TABLET ORAL EVERY 8 HOURS PRN
Qty: 24 TABLET | Refills: 0 | Status: SHIPPED | OUTPATIENT
Start: 2022-08-19 | End: 2022-10-21

## 2022-08-19 NOTE — PROGRESS NOTES
"Jordi Sam  1934  [id number]  Patient Care Team:  Jasper Avery MD as PCP - General (Internal Medicine)  Efrem Posadas MD as Consulting Physician (Cardiology)  Jordi Law MD as Consulting Physician (Urology)  Chapin Villagran MD as Consulting Physician (Otolaryngology)  Gen Laurent MD as Consulting Physician (Gastroenterology)    Jordi Sam is a 88 y.o. male here today for follow up.     This patient is accompanied by their self who contributes to the history of their care.    Chief Complaint:    Chief Complaint   Patient presents with   • hematoma     Still has some bruising but getting better.          History of Present Illness:  I have reviewed and/or updated the patient's past medical, past surgical, family, social history, problem list and allergies as appropriate.     Hematoma is markedly improved on his right anterior shin.  He has resumed his Eliquis at 2.5 mg daily.  Still requiring some pain medication worse at night.    His urine is clearing.  No frequency urgency no foul odor.      He is also having difficulty hearing out of his right ear.  This is despite using a prosthesis.  No pain or vertigo.    Review of Systems   Constitutional: Negative.    HENT: Positive for hearing loss.    Eyes: Negative.    Gastrointestinal: Negative.    Genitourinary: Negative.    Musculoskeletal: Positive for myalgias.   Neurological: Negative.        Vitals:    08/19/22 1534   BP: 118/66   Pulse: 78   SpO2: 97%   Weight: 99.7 kg (219 lb 12.8 oz)   Height: 180.3 cm (70.98\")     Body mass index is 30.67 kg/m².    Physical Exam  Vitals and nursing note reviewed.   Constitutional:       General: He is not in acute distress.     Appearance: He is well-developed. He is not diaphoretic.   HENT:      Head: Normocephalic and atraumatic.      Right Ear: External ear normal. There is impacted cerumen.      Left Ear: External ear normal.      Mouth/Throat:      Pharynx: No oropharyngeal " exudate.   Eyes:      General: No scleral icterus.        Right eye: No discharge.      Conjunctiva/sclera: Conjunctivae normal.   Neck:      Thyroid: No thyromegaly.      Vascular: No JVD.      Trachea: No tracheal deviation.   Cardiovascular:      Rate and Rhythm: Normal rate and regular rhythm.      Heart sounds: Normal heart sounds.      Comments: PMI nondisplaced  Pulmonary:      Effort: Pulmonary effort is normal.      Breath sounds: Normal breath sounds. No wheezing or rales.   Abdominal:      General: Bowel sounds are normal.      Palpations: Abdomen is soft.      Tenderness: There is no abdominal tenderness. There is no guarding or rebound.   Musculoskeletal:      Cervical back: Normal range of motion and neck supple.      Comments: Normal gaiT right anterior lower leg hematoma nearly resolved.  However still visible   Lymphadenopathy:      Cervical: No cervical adenopathy.   Skin:     General: Skin is warm and dry.      Capillary Refill: Capillary refill takes less than 2 seconds.      Coloration: Skin is not pale.      Findings: No rash.   Neurological:      Mental Status: He is alert and oriented to person, place, and time.      Motor: No abnormal muscle tone.      Coordination: Coordination normal.   Psychiatric:         Mood and Affect: Mood normal.         Behavior: Behavior normal.         Judgment: Judgment normal.         Ear Cerumen Removal    Date/Time: 8/19/2022 4:36 PM  Performed by: Jasper Avery MD  Authorized by: Jasper Avery MD     Anesthesia:  Local Anesthetic: none  Location details: left ear  Patient tolerance: patient tolerated the procedure well with no immediate complications  Procedure type: irrigation         Results Review:    I reviewed the patient's new clinical results.    Assessment/Plan:     Problem List Items Addressed This Visit        Cardiac and Vasculature    Essential hypertension - Primary    Current Assessment & Plan     Hypertensionis unchanged.  Continue  current treatment regimen.  Dietary sodium restriction.  Regular aerobic exercise.  Blood pressure will be reassessed at the next regular appointment.         Relevant Medications    spironolactone (ALDACTONE) 50 MG tablet    furosemide (LASIX) 40 MG tablet    terazosin (HYTRIN) 2 MG capsule    carvedilol (COREG) 6.25 MG tablet       Genitourinary and Reproductive     UTI (urinary tract infection)    Current Assessment & Plan     Improving on prolonged cefdinir.  After urologic evaluation future if recurs quickly         Relevant Medications    cefdinir (OMNICEF) 300 MG capsule      Other Visit Diagnoses     Hematoma        Improving however still with nocturnal pain.  Refilled hydrocodone today.      Relevant Medications    HYDROcodone-acetaminophen (Norco) 5-325 MG per tablet    Conductive hearing loss of left ear, unspecified hearing status on contralateral side        Left ear impacted cerumen        Irrigation was applied with improvement          Plan of care reviewed with patient at the conclusion of today's visit. Education was provided regarding diagnosis and management.  Patient verbalizes understanding of and agreement with management plan.    Return for Next scheduled follow up.    Jasper Avery MD      Please note than portions of this note were completed wt a Voice Recognition Program

## 2022-08-19 NOTE — ASSESSMENT & PLAN NOTE
Hypertensionis unchanged.  Continue current treatment regimen.  Dietary sodium restriction.  Regular aerobic exercise.  Blood pressure will be reassessed at the next regular appointment.

## 2022-08-22 RX ORDER — SODIUM CHLORIDE 0.9 % (FLUSH) 0.9 %
3 SYRINGE (ML) INJECTION EVERY 12 HOURS SCHEDULED
Status: CANCELLED | OUTPATIENT
Start: 2022-08-22

## 2022-08-22 RX ORDER — SODIUM CHLORIDE 0.9 % (FLUSH) 0.9 %
10 SYRINGE (ML) INJECTION AS NEEDED
Status: CANCELLED | OUTPATIENT
Start: 2022-08-22

## 2022-08-23 ENCOUNTER — HOSPITAL ENCOUNTER (OUTPATIENT)
Dept: ULTRASOUND IMAGING | Facility: HOSPITAL | Age: 87
Discharge: HOME | End: 2022-08-23
Admitting: RADIOLOGY

## 2022-08-23 VITALS
DIASTOLIC BLOOD PRESSURE: 59 MMHG | BODY MASS INDEX: 30.27 KG/M2 | RESPIRATION RATE: 20 BRPM | HEART RATE: 56 BPM | HEIGHT: 71 IN | TEMPERATURE: 96.8 F | SYSTOLIC BLOOD PRESSURE: 112 MMHG | WEIGHT: 216.2 LBS | OXYGEN SATURATION: 98 %

## 2022-08-23 DIAGNOSIS — R18.8 OTHER ASCITES: ICD-10-CM

## 2022-08-23 LAB
ALBUMIN FLD-MCNC: 1.9 G/DL
APPEARANCE FLD: ABNORMAL
COLOR FLD: YELLOW
LYMPHOCYTES NFR FLD MANUAL: 14 %
MACROPHAGE FLUID: 72 %
MESOTHL CELL NFR FLD MANUAL: 1 %
MONOCYTES NFR FLD: 13 %
RBC # FLD AUTO: <2000 /MM3
WBC # FLD AUTO: 126 /MM3

## 2022-08-23 PROCEDURE — C1729 CATH, DRAINAGE: HCPCS

## 2022-08-23 PROCEDURE — 89051 BODY FLUID CELL COUNT: CPT | Performed by: NURSE PRACTITIONER

## 2022-08-23 PROCEDURE — 76942 ECHO GUIDE FOR BIOPSY: CPT

## 2022-08-23 PROCEDURE — 82042 OTHER SOURCE ALBUMIN QUAN EA: CPT | Performed by: NURSE PRACTITIONER

## 2022-08-23 PROCEDURE — P9047 ALBUMIN (HUMAN), 25%, 50ML: HCPCS | Performed by: NURSE PRACTITIONER

## 2022-08-23 PROCEDURE — 96365 THER/PROPH/DIAG IV INF INIT: CPT

## 2022-08-23 PROCEDURE — 25010000002 ALBUMIN HUMAN 25% PER 50 ML: Performed by: NURSE PRACTITIONER

## 2022-08-23 RX ORDER — ALBUMIN (HUMAN) 12.5 G/50ML
12.5 SOLUTION INTRAVENOUS ONCE
Status: COMPLETED | OUTPATIENT
Start: 2022-08-23 | End: 2022-08-23

## 2022-08-23 RX ORDER — LIDOCAINE HYDROCHLORIDE 10 MG/ML
10 INJECTION, SOLUTION EPIDURAL; INFILTRATION; INTRACAUDAL; PERINEURAL ONCE
Status: COMPLETED | OUTPATIENT
Start: 2022-08-23 | End: 2022-08-23

## 2022-08-23 RX ADMIN — LIDOCAINE HYDROCHLORIDE 8 ML: 10 INJECTION, SOLUTION EPIDURAL; INFILTRATION; INTRACAUDAL; PERINEURAL at 12:40

## 2022-08-23 RX ADMIN — ALBUMIN HUMAN 12.5 G: 0.25 SOLUTION INTRAVENOUS at 13:45

## 2022-08-23 NOTE — NURSING NOTE
Pt discharged from ir dept s/p paracentesis. PT tolerated procedure without complications. Discharge instructions reviewed with patient and wife, both verbalized being familiar with procedure. Pt transported to exit via wheelchair.

## 2022-08-23 NOTE — NURSING NOTE
US guided paracentesis performed by Dr Heck.  6550 ml of fluid removed from peritoneum. Patient tolerated well. Report called to RAVINDRA.

## 2022-08-23 NOTE — PRE-PROCEDURE NOTE
[facility]   Vascular Interventional Radiology  History & Physicial    Patient Name:Jordi Sam    : 1934    MRN: [id number]    Primary Care Physician: Jasper Avery MD    Referring Physician: MAKAYLA Stringer     Date of admission: 2022    Subjective     Reason for Consult: Para    History of Present Illness     Jordi Sam is a 88 y.o. male referred to IR as noted above.      Active Hospital Problems:  There are no active hospital problems to display for this patient.      Personal History     Past Medical History:   Diagnosis Date   • A-fib (HCC)    • Acute inferior myocardial infarction (HCC)     Acute inferior STEMI with RV infarct features, 2009.    • CAD (coronary artery disease)    • Dyslipidemia    • Goiter     History of “goiter.”   • Hernia, umbilical    • Hyperlipidemia    • Hypertension    • Nephrolithiasis    • Type 2 diabetes mellitus (HCC)    • Umbilical hernia     Pt stated has recently returned. Seeing Dr Kiersten Nova and Dr Jasper Avery        Past Surgical History:   Procedure Laterality Date   • BRONCHOSCOPY N/A 2019    Procedure: BRONCHOSCOPY WITH THORACENTESIS;  Surgeon: Marciano Higginbotham MD;  Location:  [facility];  Service: Pulmonary   • CARDIAC CATHETERIZATION Bilateral 2019    Procedure: Right and Left Heart Cath;  Surgeon: Efrem Posadas MD;  Location:  [facility];  Service: Cardiology   • CARDIAC CATHETERIZATION     • CHOLECYSTECTOMY     • CORONARY ANGIOPLASTY WITH STENT PLACEMENT  2009    Sirolimus eluting stents to the RCA, 2009.    • HERNIA REPAIR      Hernia repair x 2.    • PARACENTESIS  2019    multiple   • UMBILICAL HERNIA REPAIR N/A 2020    Procedure: UMBILICAL HERNIA REPAIR;  Surgeon: Maribell Her MD;  Location:  [facility];  Service: General;  Laterality: N/A;       Family History: His family history includes Aneurysm in his father; Cancer in his sister;  "Dementia in his mother; Heart attack in his father; Heart disease in his brother and father; Hypertension in his daughter and son; No Known Problems in his brother and sister; Stroke in his mother.     Social History: He  reports that he quit smoking about 39 years ago. His smoking use included cigarettes. He has a 70.00 pack-year smoking history. He has never used smokeless tobacco. He reports that he does not drink alcohol and does not use drugs.    Home Medications:  HYDROcodone-acetaminophen, apixaban, carvedilol, cefdinir, folic acid, furosemide, nitroglycerin, simvastatin, spironolactone, and terazosin    Current Medications:  No current facility-administered medications for this encounter.     Allergies:  He is allergic to bee venom and sulfamethoxazole-trimethoprim.    Review of Systems    Objective     Visit Vitals  /58 (BP Location: Right arm)   Pulse 52   Temp 96.8 °F (36 °C) (Tympanic)   Resp 20   Ht 180.3 cm (71\")   Wt 98.1 kg (216 lb 3.2 oz)   SpO2 98%   BMI 30.15 kg/m²        Physical Exam    A&Ox3.   Able to communicate  No Apparent Distress  Average physique       Result Review      I have personally reviewed the results from the time of this admission to 8/23/2022 12:32 EDT and agree with these findings.  [x]  Laboratory  []  Microbiology  [x]  Radiology  []  EKG/Telemetry   []  Cardiology/Vascular   []  Pathology  []  Old records  []  Other:    Most notable findings include: As noted:          Estimated Creatinine Clearance: 44.8 mL/min (A) (by C-G formula based on SCr of 1.36 mg/dL (H)). No results found for: CREATININE    COVID19   Date Value Ref Range Status   03/25/2022 Not Detected Not Detected - Ref. Range Final        No results found for: PREGTESTUR, PREGSERUM, HCG, HCGQUANT     ASA SCALE ASSESSMENT (applicable if sedation planned):        MALLAMPATI CLASSIFICATION (applicable if sedation planned):       Assessment / Plan     Jordi Sam is a 88 y.o. male referred to the IR " service with above problem.    Plan:   As above.    David Heck MD   Vascular Interventional Radiology  08/23/22   12:32 PM EDT

## 2022-08-24 ENCOUNTER — TELEPHONE (OUTPATIENT)
Dept: INFUSION THERAPY | Facility: HOSPITAL | Age: 87
End: 2022-08-24

## 2022-08-25 ENCOUNTER — TELEPHONE (OUTPATIENT)
Dept: FAMILY MEDICINE CLINIC | Facility: CLINIC | Age: 87
End: 2022-08-25

## 2022-08-25 NOTE — TELEPHONE ENCOUNTER
Patient's wife called.  He tested positive today.  He has bad cough.  No shortness of breath, fever or chills.  She would like to see if he can get Paxlovid.  Discussed going to ER if symptoms worsen or change.  Will let Dr. Avery know and he can decide if Paxlovid is a good option.

## 2022-08-26 NOTE — TELEPHONE ENCOUNTER
If he accepts and consents that this is emergency authorization use only and not FDA approved.  He also will need to hold his Eliquis while on this medication.  If he accepts and consents, hannah sent

## 2022-08-26 NOTE — TELEPHONE ENCOUNTER
WIFE OF PATIENT HAS CALLED THIS MORNING REQUESTING IF PATIENT CAN HAVE MEDICATION CALLED INTO PHARMACY TO HELP TREAT.  WIFE IS REQUESTING A CALL FROM PCP IF POSSIBLE    CALL BACK NUMBER [phone]

## 2022-09-02 ENCOUNTER — HOSPITAL ENCOUNTER (OUTPATIENT)
Dept: GENERAL RADIOLOGY | Facility: HOSPITAL | Age: 87
Discharge: HOME | End: 2022-09-02
Admitting: INTERNAL MEDICINE

## 2022-09-02 ENCOUNTER — OFFICE VISIT (OUTPATIENT)
Dept: FAMILY MEDICINE CLINIC | Facility: CLINIC | Age: 87
End: 2022-09-02

## 2022-09-02 VITALS
OXYGEN SATURATION: 97 % | BODY MASS INDEX: 28.59 KG/M2 | WEIGHT: 204.2 LBS | SYSTOLIC BLOOD PRESSURE: 122 MMHG | HEART RATE: 92 BPM | DIASTOLIC BLOOD PRESSURE: 68 MMHG | HEIGHT: 71 IN

## 2022-09-02 DIAGNOSIS — U07.1 COVID-19 VIRUS INFECTION: Primary | ICD-10-CM

## 2022-09-02 DIAGNOSIS — R05.9 COUGH: ICD-10-CM

## 2022-09-02 PROCEDURE — 71046 X-RAY EXAM CHEST 2 VIEWS: CPT

## 2022-09-02 PROCEDURE — 99214 OFFICE O/P EST MOD 30 MIN: CPT | Performed by: INTERNAL MEDICINE

## 2022-09-02 RX ORDER — ALBUTEROL SULFATE 90 UG/1
2 AEROSOL, METERED RESPIRATORY (INHALATION) EVERY 4 HOURS PRN
Qty: 18 G | Refills: 0 | Status: SHIPPED | OUTPATIENT
Start: 2022-09-02 | End: 2022-11-10 | Stop reason: SDUPTHER

## 2022-09-02 RX ORDER — DOXYCYCLINE HYCLATE 100 MG/1
100 CAPSULE ORAL 2 TIMES DAILY
Qty: 20 CAPSULE | Refills: 0 | Status: SHIPPED | OUTPATIENT
Start: 2022-09-02 | End: 2022-11-10 | Stop reason: SDUPTHER

## 2022-09-02 NOTE — PROGRESS NOTES
"Jordi Sam  1934  [id number]  Patient Care Team:  Jasper Avery MD as PCP - General (Internal Medicine)  Efrem Posadas MD as Consulting Physician (Cardiology)  Jordi Law MD as Consulting Physician (Urology)  Chapin Villagran MD as Consulting Physician (Otolaryngology)  Gen Laurent MD as Consulting Physician (Gastroenterology)    Jordi Sma is a 88 y.o. male here today for follow up.     This patient is accompanied by their self who contributes to the history of their care.    Chief Complaint:    Chief Complaint   Patient presents with   • Cough     Productive cough. Has a deep cough         History of Present Illness:  I have reviewed and/or updated the patient's past medical, past surgical, family, social history, problem list and allergies as appropriate.     He was dx with covid 8/25 and was treated with paxlovid. He will occasionally cough up \"oyster\". There has been wheezing. There has been a little SOA, worse with exertion after 150 ft ( this is chronic). He has NOT taken anything OTC.  He completed omnicef for UTI about 2 weeks ago.     Review of Systems   Constitutional: Positive for fatigue.   HENT: Negative.    Eyes: Negative.    Respiratory: Positive for cough.    Cardiovascular: Positive for leg swelling. Negative for chest pain and palpitations.   Gastrointestinal: Positive for abdominal distention.   Endocrine: Negative.    Neurological: Negative.        Vitals:    09/02/22 1423   BP: 122/68   Pulse: 92   SpO2: 97%   Weight: 92.6 kg (204 lb 3.2 oz)   Height: 180.3 cm (70.98\")     Body mass index is 28.49 kg/m².    Physical Exam  Vitals and nursing note reviewed.   Constitutional:       General: He is not in acute distress.     Appearance: He is well-developed. He is not diaphoretic.   HENT:      Head: Normocephalic and atraumatic.      Right Ear: External ear normal.      Left Ear: External ear normal.      Mouth/Throat:      Pharynx: No oropharyngeal " exudate.   Eyes:      General: No scleral icterus.        Right eye: No discharge.      Conjunctiva/sclera: Conjunctivae normal.   Neck:      Thyroid: No thyromegaly.      Vascular: No JVD.      Trachea: No tracheal deviation.   Cardiovascular:      Rate and Rhythm: Normal rate and regular rhythm.      Heart sounds: Normal heart sounds.      Comments: PMI nondisplaced  Pulmonary:      Effort: Pulmonary effort is normal. No respiratory distress.      Breath sounds: Normal breath sounds. No wheezing or rales.      Comments: There is diminished breath sounds in the bases.  No dullness to percussion noted.  Abdominal:      General: Bowel sounds are normal.      Palpations: Abdomen is soft.      Tenderness: There is no abdominal tenderness. There is no guarding or rebound.   Musculoskeletal:         General: Swelling present.      Cervical back: Normal range of motion and neck supple.      Right lower leg: Edema present.      Left lower leg: Edema present.      Comments: Antalgic gait.  He walks with the assistance of a cane   Lymphadenopathy:      Cervical: No cervical adenopathy.   Skin:     General: Skin is warm and dry.      Capillary Refill: Capillary refill takes less than 2 seconds.      Coloration: Skin is not pale.      Findings: No rash.   Neurological:      Mental Status: He is alert and oriented to person, place, and time.      Motor: No abnormal muscle tone.      Coordination: Coordination normal.   Psychiatric:         Judgment: Judgment normal.         Procedures    Results Review:    None    Assessment/Plan:     Problem List Items Addressed This Visit        Pulmonary and Pneumonias    Cough    Relevant Orders    XR Chest PA & Lateral (Completed)       Other    COVID-19 virus infection - Primary      Jordi recently had COVID.  He now has a significant cough that is exacerbating his abdominal hernia.  Cough is productive.  He may be developing a secondary bronchitis.  I recommended an x-ray given his  frail state as well as doxycycline x10 days, Ventolin Tussionex 1 teaspoon twice daily.  Should he develop worsening shortness of breath he should proceed to the emergency room.  Encouraging his O2 sats are adequate.    Plan of care reviewed with patient at the conclusion of today's visit. Education was provided regarding diagnosis and management.  Patient verbalizes understanding of and agreement with management plan.    Return for Next scheduled follow up.    Jasper Avery MD      Please note than portions of this note were completed wt a Voice Recognition Program

## 2022-09-02 NOTE — PROGRESS NOTES
Possible tiny left-sided pneumonia.  Take his antibiotics to completion.  If he develops worsening shortness of breath or fevers he should go to the emergency room.

## 2022-09-18 DIAGNOSIS — I50.812 CHRONIC RIGHT HEART FAILURE: ICD-10-CM

## 2022-09-20 RX ORDER — SPIRONOLACTONE 50 MG/1
TABLET, FILM COATED ORAL
Qty: 90 TABLET | Refills: 2 | Status: SHIPPED | OUTPATIENT
Start: 2022-09-20

## 2022-09-23 ENCOUNTER — PREP FOR SURGERY (OUTPATIENT)
Dept: OTHER | Facility: HOSPITAL | Age: 87
End: 2022-09-23

## 2022-09-23 DIAGNOSIS — R18.8 CIRRHOSIS OF LIVER WITH ASCITES, UNSPECIFIED HEPATIC CIRRHOSIS TYPE: Primary | ICD-10-CM

## 2022-09-23 DIAGNOSIS — K74.60 CIRRHOSIS OF LIVER WITH ASCITES, UNSPECIFIED HEPATIC CIRRHOSIS TYPE: Primary | ICD-10-CM

## 2022-09-23 RX ORDER — ALBUMIN (HUMAN) 12.5 G/50ML
12.5 SOLUTION INTRAVENOUS ONCE
Status: CANCELLED | OUTPATIENT
Start: 2022-09-23 | End: 2022-09-23

## 2022-09-27 ENCOUNTER — HOSPITAL ENCOUNTER (OUTPATIENT)
Dept: ULTRASOUND IMAGING | Facility: HOSPITAL | Age: 87
Discharge: HOME | End: 2022-09-27
Admitting: NURSE PRACTITIONER

## 2022-09-27 VITALS
HEART RATE: 53 BPM | RESPIRATION RATE: 16 BRPM | SYSTOLIC BLOOD PRESSURE: 98 MMHG | OXYGEN SATURATION: 99 % | DIASTOLIC BLOOD PRESSURE: 60 MMHG

## 2022-09-27 DIAGNOSIS — K74.60 CIRRHOSIS OF LIVER WITH ASCITES, UNSPECIFIED HEPATIC CIRRHOSIS TYPE: ICD-10-CM

## 2022-09-27 DIAGNOSIS — R18.8 CIRRHOSIS OF LIVER WITH ASCITES, UNSPECIFIED HEPATIC CIRRHOSIS TYPE: ICD-10-CM

## 2022-09-27 LAB
ALBUMIN FLD-MCNC: 1.9 G/DL
APPEARANCE FLD: CLEAR
COLOR FLD: YELLOW
LYMPHOCYTES NFR FLD MANUAL: 24 %
MACROPHAGE FLUID: 44 %
MESOTHL CELL NFR FLD MANUAL: 1 %
MONOCYTES NFR FLD: 26 %
NEUTROPHILS NFR FLD MANUAL: 5 %
RBC # FLD AUTO: 2000 /MM3
WBC # FLD AUTO: 175 /MM3

## 2022-09-27 PROCEDURE — C1729 CATH, DRAINAGE: HCPCS

## 2022-09-27 PROCEDURE — P9047 ALBUMIN (HUMAN), 25%, 50ML: HCPCS | Performed by: NURSE PRACTITIONER

## 2022-09-27 PROCEDURE — 25010000002 ALBUMIN HUMAN 25% PER 50 ML: Performed by: NURSE PRACTITIONER

## 2022-09-27 PROCEDURE — 89051 BODY FLUID CELL COUNT: CPT | Performed by: NURSE PRACTITIONER

## 2022-09-27 PROCEDURE — 76942 ECHO GUIDE FOR BIOPSY: CPT

## 2022-09-27 PROCEDURE — 82042 OTHER SOURCE ALBUMIN QUAN EA: CPT | Performed by: NURSE PRACTITIONER

## 2022-09-27 RX ORDER — LIDOCAINE HYDROCHLORIDE 10 MG/ML
5 INJECTION, SOLUTION EPIDURAL; INFILTRATION; INTRACAUDAL; PERINEURAL ONCE
Status: COMPLETED | OUTPATIENT
Start: 2022-09-27 | End: 2022-09-27

## 2022-09-27 RX ORDER — ALBUMIN (HUMAN) 12.5 G/50ML
12.5 SOLUTION INTRAVENOUS ONCE
Status: COMPLETED | OUTPATIENT
Start: 2022-09-27 | End: 2022-09-27

## 2022-09-27 RX ADMIN — LIDOCAINE HYDROCHLORIDE 5 ML: 10 INJECTION, SOLUTION EPIDURAL; INFILTRATION; INTRACAUDAL; PERINEURAL at 12:21

## 2022-09-27 RX ADMIN — ALBUMIN HUMAN 12.5 G: 0.25 SOLUTION INTRAVENOUS at 13:58

## 2022-09-27 NOTE — NURSING NOTE
US guided paracentesis performed by Dr Heck.  5 Liters tea colored fluid removed from peritoneum. Patient tolerated well. Report called to RAVINDRA.

## 2022-09-27 NOTE — PRE-PROCEDURE NOTE
[facility]   Vascular Interventional Radiology  History & Physicial    Patient Name:Jordi Sam    : 1934    MRN: [id number]    Primary Care Physician: Jasper Avery MD    Referring Physician: MAKAYLA Stringer     Date of admission: 2022    Subjective     Reason for Consult: Para    History of Present Illness     Jordi Sam is a 88 y.o. male referred to IR as noted above.      Active Hospital Problems:  There are no active hospital problems to display for this patient.      Personal History     Past Medical History:   Diagnosis Date   • A-fib (HCC)    • Acute inferior myocardial infarction (HCC)     Acute inferior STEMI with RV infarct features, 2009.    • CAD (coronary artery disease)    • COVID-19 2022   • Dyslipidemia    • Goiter     History of “goiter.”   • Hernia, umbilical    • Hyperlipidemia    • Hypertension    • Nephrolithiasis    • Type 2 diabetes mellitus (HCC)    • Umbilical hernia     Pt stated has recently returned. Seeing Dr Kiersten Nova and Dr Jasper Avery        Past Surgical History:   Procedure Laterality Date   • BRONCHOSCOPY N/A 2019    Procedure: BRONCHOSCOPY WITH THORACENTESIS;  Surgeon: Marciano Higginbotham MD;  Location:  [facility];  Service: Pulmonary   • CARDIAC CATHETERIZATION Bilateral 2019    Procedure: Right and Left Heart Cath;  Surgeon: Efrem Posadas MD;  Location:  [facility];  Service: Cardiology   • CARDIAC CATHETERIZATION     • CHOLECYSTECTOMY     • CORONARY ANGIOPLASTY WITH STENT PLACEMENT  2009    Sirolimus eluting stents to the RCA, 2009.    • HERNIA REPAIR      Hernia repair x 2.    • PARACENTESIS  2019    multiple   • UMBILICAL HERNIA REPAIR N/A 2020    Procedure: UMBILICAL HERNIA REPAIR;  Surgeon: Maribell Her MD;  Location:  [facility];  Service: General;  Laterality: N/A;       Family History: His family history includes Aneurysm in his father;  Cancer in his sister; Dementia in his mother; Heart attack in his father; Heart disease in his brother and father; Hypertension in his daughter and son; No Known Problems in his brother and sister; Stroke in his mother.     Social History: He  reports that he quit smoking about 39 years ago. His smoking use included cigarettes. He has a 70.00 pack-year smoking history. He has never used smokeless tobacco. He reports that he does not drink alcohol and does not use drugs.    Home Medications:  HYDROcod Polst-CPM Polst ER, HYDROcodone-acetaminophen, albuterol sulfate HFA, apixaban, carvedilol, cefdinir, doxycycline, folic acid, furosemide, nitroglycerin, simvastatin, spironolactone, and terazosin    Current Medications:  No current facility-administered medications for this encounter.     Allergies:  He is allergic to bee venom and sulfamethoxazole-trimethoprim.    Review of Systems    Objective     There were no vitals taken for this visit.     Physical Exam    A&Ox3.   Able to communicate  No Apparent Distress  Average physique       Result Review      I have personally reviewed the results from the time of this admission to 9/27/2022 12:17 EDT and agree with these findings.  [x]  Laboratory  []  Microbiology  [x]  Radiology  []  EKG/Telemetry   []  Cardiology/Vascular   []  Pathology  []  Old records  []  Other:    Most notable findings include: As noted:          CrCl cannot be calculated (Patient's most recent lab result is older than the maximum 30 days allowed.). No results found for: CREATININE    COVID19   Date Value Ref Range Status   03/25/2022 Not Detected Not Detected - Ref. Range Final        No results found for: PREGTESTUR, PREGSERUM, HCG, HCGQUANT     ASA SCALE ASSESSMENT (applicable if sedation planned):        MALLAMPATI CLASSIFICATION (applicable if sedation planned):       Assessment / Plan     Jordi Sam is a 88 y.o. male referred to the IR service with above problem.    Plan:   As  above.    David Heck MD   Vascular Interventional Radiology  09/27/22   12:17 PM EDT

## 2022-09-29 ENCOUNTER — TELEPHONE (OUTPATIENT)
Dept: INFUSION THERAPY | Facility: HOSPITAL | Age: 87
End: 2022-09-29

## 2022-10-05 ENCOUNTER — PREP FOR SURGERY (OUTPATIENT)
Dept: OTHER | Facility: HOSPITAL | Age: 87
End: 2022-10-05

## 2022-10-05 DIAGNOSIS — R18.8 OTHER ASCITES: Primary | ICD-10-CM

## 2022-10-10 RX ORDER — ALBUMIN (HUMAN) 12.5 G/50ML
12.5 SOLUTION INTRAVENOUS ONCE
Status: CANCELLED | OUTPATIENT
Start: 2022-10-10 | End: 2022-10-10

## 2022-10-11 ENCOUNTER — APPOINTMENT (OUTPATIENT)
Dept: ULTRASOUND IMAGING | Facility: HOSPITAL | Age: 87
End: 2022-10-11

## 2022-10-21 ENCOUNTER — TELEPHONE (OUTPATIENT)
Dept: FAMILY MEDICINE CLINIC | Facility: CLINIC | Age: 87
End: 2022-10-21

## 2022-10-21 ENCOUNTER — OFFICE VISIT (OUTPATIENT)
Dept: FAMILY MEDICINE CLINIC | Facility: CLINIC | Age: 87
End: 2022-10-21

## 2022-10-21 VITALS
HEART RATE: 50 BPM | BODY MASS INDEX: 29.96 KG/M2 | WEIGHT: 214 LBS | OXYGEN SATURATION: 99 % | DIASTOLIC BLOOD PRESSURE: 60 MMHG | SYSTOLIC BLOOD PRESSURE: 100 MMHG | HEIGHT: 71 IN

## 2022-10-21 DIAGNOSIS — R30.9 PAINFUL URINATION: Primary | ICD-10-CM

## 2022-10-21 LAB
BILIRUB BLD-MCNC: NEGATIVE MG/DL
CLARITY, POC: ABNORMAL
COLOR UR: YELLOW
EXPIRATION DATE: ABNORMAL
GLUCOSE UR STRIP-MCNC: NEGATIVE MG/DL
KETONES UR QL: NEGATIVE
LEUKOCYTE EST, POC: ABNORMAL
Lab: ABNORMAL
NITRITE UR-MCNC: NEGATIVE MG/ML
PH UR: 6 [PH] (ref 5–8)
PROT UR STRIP-MCNC: ABNORMAL MG/DL
RBC # UR STRIP: ABNORMAL /UL
SP GR UR: 1.01 (ref 1–1.03)
UROBILINOGEN UR QL: NORMAL

## 2022-10-21 PROCEDURE — 81003 URINALYSIS AUTO W/O SCOPE: CPT | Performed by: STUDENT IN AN ORGANIZED HEALTH CARE EDUCATION/TRAINING PROGRAM

## 2022-10-21 PROCEDURE — 87086 URINE CULTURE/COLONY COUNT: CPT | Performed by: STUDENT IN AN ORGANIZED HEALTH CARE EDUCATION/TRAINING PROGRAM

## 2022-10-21 PROCEDURE — 87186 SC STD MICRODIL/AGAR DIL: CPT | Performed by: STUDENT IN AN ORGANIZED HEALTH CARE EDUCATION/TRAINING PROGRAM

## 2022-10-21 PROCEDURE — 87077 CULTURE AEROBIC IDENTIFY: CPT | Performed by: STUDENT IN AN ORGANIZED HEALTH CARE EDUCATION/TRAINING PROGRAM

## 2022-10-21 PROCEDURE — 99213 OFFICE O/P EST LOW 20 MIN: CPT | Performed by: STUDENT IN AN ORGANIZED HEALTH CARE EDUCATION/TRAINING PROGRAM

## 2022-10-21 RX ORDER — CEFDINIR 300 MG/1
300 CAPSULE ORAL 2 TIMES DAILY
Qty: 14 CAPSULE | Refills: 0 | Status: SHIPPED | OUTPATIENT
Start: 2022-10-21 | End: 2022-11-04

## 2022-10-21 NOTE — TELEPHONE ENCOUNTER
Caller: Jordi Sam    Relationship: Self    Best call back number: [phone]    What is the best time to reach you: ASAP    Who are you requesting to speak with (clinical staff, provider,  specific staff member): DR GARNER OR HIS NURSE       What was the call regarding: PATIENT IS HAVING BURNING WHEN URINATING AND WANTS TO SPEAK TO DR. GARNER. WILL SEE ANDER TODAY AT 2 PM    Do you require a callback: YES

## 2022-10-21 NOTE — PROGRESS NOTES
Established Office Visit      Patient Name: Jordi Sam  : 1934   MRN: [id number]   Care Team: Patient Care Team:  Jasper Avery MD as PCP - General (Internal Medicine)  Efrem Posadas MD as Consulting Physician (Cardiology)  Jordi Law MD as Consulting Physician (Urology)  Chapin Villagran MD as Consulting Physician (Otolaryngology)  Gen Laurent MD as Consulting Physician (Gastroenterology)    Chief Complaint:    Chief Complaint   Patient presents with   • Difficulty Urinating     Burning x2 days,        History of Present Illness: Jordi Sam is a 88 y.o. male with history of AF, HTN, CAD, T2DM, cirrhosis, CKD, BPH,  who is here today to discuss dysuria.    Patient reports dysuria for 2 days.   Denies fever, confusion, nausea, vomiting, hematuria, frequency, retention or hesitancy.   Feels similar to prior UTI. He had UTI 2-3 months ago which resolved completely with antibiotics.        Subjective      Review of Systems:   Review of Systems - See HPI    I have reviewed and the following portions of the patient's history were updated as appropriate: past family history, past medical history, past social history, past surgical history and problem list.    Medications:     Current Outpatient Medications:   •  albuterol sulfate  (90 Base) MCG/ACT inhaler, Inhale 2 puffs Every 4 (Four) Hours As Needed for Wheezing., Disp: 18 g, Rfl: 0  •  apixaban (ELIQUIS) 2.5 MG tablet tablet, Take 2.5 mg by mouth 2 (Two) Times a Day. Last dose 3 days ago. From 21, Disp: , Rfl:   •  carvedilol (COREG) 6.25 MG tablet, TAKE 1 TABLET BY MOUTH TWICE DAILY WITH MEALS, Disp: 60 tablet, Rfl: 10  •  doxycycline (VIBRAMYCIN) 100 MG capsule, Take 1 capsule by mouth 2 (Two) Times a Day., Disp: 20 capsule, Rfl: 0  •  folic acid (FOLVITE) 400 MCG tablet, Take 400 mcg by mouth Daily., Disp: , Rfl:   •  furosemide (LASIX) 40 MG tablet, Lasix 40 mg 1 tab in morning, 1/2 tab in the  "afternoon. And as needed for weight gain, edema and dypnea., Disp: 180 tablet, Rfl: 3  •  HYDROcod Polst-CPM Polst ER (Tussionex Pennkinetic ER) 10-8 MG/5ML ER suspension, Take 5 mL by mouth Every 12 (Twelve) Hours As Needed for Cough., Disp: 100 mL, Rfl: 0  •  nitroglycerin (NITROSTAT) 0.4 MG SL tablet, DISSOLVE ONE TABLET UNDER THE TONGUE EVERY 5 MINUTES AS NEEDED FOR CHEST PAIN.  DO NOT EXCEED A TOTAL OF 3 DOSES IN 15 MINUTES, Disp: 25 tablet, Rfl: 3  •  simvastatin (ZOCOR) 40 MG tablet, Take 1 tablet by mouth Daily., Disp: 90 tablet, Rfl: 32  •  spironolactone (ALDACTONE) 50 MG tablet, Take 1 tablet by mouth once daily, Disp: 90 tablet, Rfl: 2  •  terazosin (HYTRIN) 2 MG capsule, Take 1 capsule by mouth Every Night., Disp: 90 capsule, Rfl: 3  •  cefdinir (OMNICEF) 300 MG capsule, Take 1 capsule by mouth 2 (Two) Times a Day., Disp: 14 capsule, Rfl: 0    Allergies:   Allergies   Allergen Reactions   • Bee Venom Swelling   • Sulfamethoxazole-Trimethoprim Other (See Comments)     Pt unaware       Objective     Physical Exam:  Vital Signs:   Vitals:    10/21/22 1349   BP: 100/60   Pulse: 50   SpO2: 99%   Weight: 97.1 kg (214 lb)   Height: 180.3 cm (70.98\")     Body mass index is 29.86 kg/m².     Physical Exam  Vitals reviewed.   Constitutional:       Appearance: Normal appearance. He is not ill-appearing.   Cardiovascular:      Rate and Rhythm: Bradycardia present.      Comments: HR 56 on recheck  Pulmonary:      Effort: Pulmonary effort is normal. No respiratory distress.   Skin:     General: Skin is warm and dry.   Neurological:      Mental Status: He is alert and oriented to person, place, and time. Mental status is at baseline.   Psychiatric:         Mood and Affect: Mood normal.         Behavior: Behavior normal.         Judgment: Judgment normal.         Assessment / Plan      Assessment/Plan:   Problems Addressed This Visit  Diagnoses and all orders for this visit:    1. Painful urination (Primary)  -     POC " Urinalysis Dipstick, Automated  -     Urine Culture - Urine, Urine, Clean Catch; Future  -     cefdinir (OMNICEF) 300 MG capsule; Take 1 capsule by mouth 2 (Two) Times a Day.  Dispense: 14 capsule; Refill: 0    UA with +blood and LE. Will treat for UTI with cefdinir 500mg BID x 7 days. Culture pending. Advised him to notify our office if symptoms worsen or do not improve. Recommended adequate hydration. He expressed understanding and agreeable with plan.     He was treated for UTI 2-3 months ago with cefdinir, responded well. Reviewed culture results- e coli (resistant to bactrim, ampicillin)      Plan of care reviewed with patient at the conclusion of today's visit. Education was provided regarding diagnosis and management.  Patient verbalizes understanding of and agreement with management plan.    Follow Up: as needed       DO BIJAN Linn RD  [facility]  [address]  [facility] [id number]  Fax [phone]  Phone [phone]

## 2022-10-21 NOTE — TELEPHONE ENCOUNTER
Advised patient that PCP is out of office today. He verbalized understanding and will keep his appt today

## 2022-10-23 LAB — BACTERIA SPEC AEROBE CULT: ABNORMAL

## 2022-10-27 ENCOUNTER — OFFICE VISIT (OUTPATIENT)
Dept: UROLOGY | Facility: CLINIC | Age: 87
End: 2022-10-27

## 2022-10-27 VITALS — HEIGHT: 71 IN | BODY MASS INDEX: 29.96 KG/M2 | WEIGHT: 214 LBS

## 2022-10-27 DIAGNOSIS — R31.0 GROSS HEMATURIA: ICD-10-CM

## 2022-10-27 DIAGNOSIS — N39.0 RECURRENT UTI: Primary | ICD-10-CM

## 2022-10-27 DIAGNOSIS — N35.912 STRICTURE OF BULBOUS URETHRA IN MALE, UNSPECIFIED STRICTURE TYPE: ICD-10-CM

## 2022-10-27 PROCEDURE — 81003 URINALYSIS AUTO W/O SCOPE: CPT | Performed by: STUDENT IN AN ORGANIZED HEALTH CARE EDUCATION/TRAINING PROGRAM

## 2022-10-27 PROCEDURE — 99213 OFFICE O/P EST LOW 20 MIN: CPT | Performed by: STUDENT IN AN ORGANIZED HEALTH CARE EDUCATION/TRAINING PROGRAM

## 2022-10-27 PROCEDURE — 51798 US URINE CAPACITY MEASURE: CPT | Performed by: STUDENT IN AN ORGANIZED HEALTH CARE EDUCATION/TRAINING PROGRAM

## 2022-10-27 NOTE — PROGRESS NOTES
Follow Up Office Visit      Patient Name: Jordi Sam  : 1934   MRN: [id number]     Chief Complaint:    Chief Complaint   Patient presents with   • Gross Hematuria       Referring Provider: No ref. provider found    History of Present Illness: Jordi Sam is a 88 y.o. male who has an extensive past medical history including CAD, right systolic heart failure, prior MI, end stage liver disease with cirrhosis, HTN, type DMII, nephrolithiasis, stage III CKD, chronic atrial fibrillation on Eliquis, presents for follow up. Patient has a massive umbilical hernia or ventral abdominal wall hernia which is nonoperative secondary to his frail medical status and age.     The patient was originally evaluated in 2022.  At that time he was referred for gross hematuria.  He underwent work-up with CT urogram which demonstrated decompensated liver cirrhosis with moderate ascites, large umbilical hernia, bladder wall thickening, bladder wall diverticuli, normal-appearing kidneys and ureters    He was taken for flexible cystoscopy in the clinic 2022.  At that time the patient was noted to have a distal bulbar urethral stricture roughly 10 [place].  He was unable to tolerate the procedure and I was unable to evaluate the patient's prostate or bladder.  He deferred urethral stricture dilation at that time.  Discussed with the patient at that time that he would likely require operative intervention for urethral stricture dilation and full assessment of his bladder and urethra given his history of gross hematuria.  The patient stated that he would like to avoid surgery secondary to his age and medical comorbidities, the patient is a very high risk operative candidate on anticoagulation with multiple medical comorbidities and this was not an unreasonable decision by the patient at that time.    He was most recently evaluated by his primary care provider last week with dysuria for 2 days.  Urinalysis and culture  were checked, urine culture was positive for E. coli.  He was treated with cefdinir.  His urinalysis today is completely negative.  He denies fevers or chills at home.  Denies flank pain.    In regards to his urinary stream, he states he believes he has a fairly preserved strength of stream.   He states he denies struggling to urinate or straining to empty.    His PVR is 119 ml.  Mild to moderate incomplete bladder emptying.    Patient states he would like to continue to avoid surgical intervention.    Patient has multiple urinary tract infections with E. Coli.  These are largely likely driven by incomplete bladder emptying secondary to his urethral stricture of unclear etiology.    Urine culture June 2022 E. coli  Urine culture July 2022 E. Coli  Urine culture August 2022 E. Coli  Urine culture 10/21/2022 E. coli    Subjective      Review of System: Review of Systems   Constitutional: Negative for chills, fatigue, fever and unexpected weight change.   HENT: Negative for sore throat.    Eyes: Negative for visual disturbance.   Respiratory: Negative for cough, chest tightness and shortness of breath.    Cardiovascular: Negative for chest pain and leg swelling.   Gastrointestinal: Negative for blood in stool, constipation, diarrhea, nausea, rectal pain and vomiting.   Genitourinary: Negative for decreased urine volume, difficulty urinating, dysuria, enuresis, flank pain, frequency, genital sores, hematuria and urgency.   Musculoskeletal: Negative for back pain and joint swelling.   Skin: Negative for rash and wound.   Neurological: Negative for seizures, speech difficulty, weakness and headaches.   Psychiatric/Behavioral: Negative for confusion, sleep disturbance and suicidal ideas. The patient is not nervous/anxious.       I have reviewed the ROS documented by my clinical staff, I have updated appropriately and I agree. Anjel Campo MD    I have reviewed and the following portions of the patient's history were  updated as appropriate: past family history, past medical history, past social history, past surgical history and problem list.    Medications:     Current Outpatient Medications:   •  albuterol sulfate  (90 Base) MCG/ACT inhaler, Inhale 2 puffs Every 4 (Four) Hours As Needed for Wheezing., Disp: 18 g, Rfl: 0  •  apixaban (ELIQUIS) 2.5 MG tablet tablet, Take 2.5 mg by mouth 2 (Two) Times a Day. Last dose 3 days ago. From 11-16-21, Disp: , Rfl:   •  carvedilol (COREG) 6.25 MG tablet, TAKE 1 TABLET BY MOUTH TWICE DAILY WITH MEALS, Disp: 60 tablet, Rfl: 10  •  cefdinir (OMNICEF) 300 MG capsule, Take 1 capsule by mouth 2 (Two) Times a Day., Disp: 14 capsule, Rfl: 0  •  doxycycline (VIBRAMYCIN) 100 MG capsule, Take 1 capsule by mouth 2 (Two) Times a Day., Disp: 20 capsule, Rfl: 0  •  folic acid (FOLVITE) 400 MCG tablet, Take 400 mcg by mouth Daily., Disp: , Rfl:   •  furosemide (LASIX) 40 MG tablet, Lasix 40 mg 1 tab in morning, 1/2 tab in the afternoon. And as needed for weight gain, edema and dypnea., Disp: 180 tablet, Rfl: 3  •  HYDROcod Polst-CPM Polst ER (Tussionex Pennkinetic ER) 10-8 MG/5ML ER suspension, Take 5 mL by mouth Every 12 (Twelve) Hours As Needed for Cough., Disp: 100 mL, Rfl: 0  •  nitroglycerin (NITROSTAT) 0.4 MG SL tablet, DISSOLVE ONE TABLET UNDER THE TONGUE EVERY 5 MINUTES AS NEEDED FOR CHEST PAIN.  DO NOT EXCEED A TOTAL OF 3 DOSES IN 15 MINUTES, Disp: 25 tablet, Rfl: 3  •  simvastatin (ZOCOR) 40 MG tablet, Take 1 tablet by mouth Daily., Disp: 90 tablet, Rfl: 32  •  spironolactone (ALDACTONE) 50 MG tablet, Take 1 tablet by mouth once daily, Disp: 90 tablet, Rfl: 2  •  terazosin (HYTRIN) 2 MG capsule, Take 1 capsule by mouth Every Night., Disp: 90 capsule, Rfl: 3    Allergies:   Allergies   Allergen Reactions   • Bee Venom Swelling   • Sulfamethoxazole-Trimethoprim Other (See Comments)     Pt unaware       IPSS Questionnaire (AUA-7):  Over the past month…    1)  Incomplete Emptying:       How  often have you had a sensation of not emptying you had the sensation of not emptying your bladder completely after you finished urinating?  1 - Less than 1 time in 5   2)  Frequency:       How often have you had the urinate again less than two hours after you finished urinating?  0 - Not at all   3)  Intermittency:       How often have you found you stopped and started again several times when you urinated?   0 - Not at all   4) Urgency:      How often have you found it difficult to postpone urination?  1 - Less than 1 time in 5   5) Weak Stream:      How often have you had a weak urinary stream?  0 - Not at all   6) Straining:       How often have you had to push or strain to begin urination?  0 - Not at all   7) Nocturia:      How many times did you most typically get up to urinate from the time you went to bed at night until the time you got up in the morning?  1 - 1 time   Total Score:  3   The International Prostate Symptom Score (IPSS) is used to screen, diagnose, track symptoms of benign prostatic hyperplasia (BPH).   0-7 (Mild Symptoms) 8-19 (Moderate) 20-35 (Severe)   Quality of Life (QoL):  If you were to spend the rest of your life with your urinary condition just the way it is now, how would you feel about that? 0-Delighted   Urine Leakage (Incontinence) 0-No Leakage     Sexual Health Inventory for Men (MARTHA)   Over the past 6 months:     1. How do you rate your confidence that you could get and keep an erection?  0 - No sexual activity    2. When you had erections with sexual  stimulation, how often were your erections hard enough for penetration (entering your partner)?  0 - No Sexual Activity    3. During sexual intercourse, how often were you able to maintain your erection after you had penetrated (entered) your partner?  0 - Did not attempt intercourse   4. During sexual intercourse, how difficult was it to maintain your erection to completion of intercourse?  0 - Did not attempt intercourse   5.  "When you attempted sexual intercourse, how often was it satisfactory for you?  0 - Did not attempt intercourse    Total Score: 0   The Sexual Health Inventory for Men further classifies ED severity with the following breakpoints:   1-7 (Severe ED) 8-11 (Moderate ED) 12-16 (Mild to Moderate ED) 17-21 (Mild ED)      Post void residual bladder scan:   119 mL    Objective     Physical Exam:   Vital Signs:   Vitals:    10/27/22 0948   Weight: 97.1 kg (214 lb)   Height: 180.3 cm (70.98\")     Body mass index is 29.86 kg/m².     Physical Exam  Constitutional:       Comments: Frail, elderly   HENT:      Head: Normocephalic and atraumatic.      Nose: Nose normal.   Eyes:      Extraocular Movements: Extraocular movements intact.      Conjunctiva/sclera: Conjunctivae normal.      Pupils: Pupils are equal, round, and reactive to light.   Abdominal:      Comments: Large midline ventral hernia   Genitourinary:     Comments: Deferred  Musculoskeletal:         General: Normal range of motion.      Cervical back: Normal range of motion and neck supple.   Skin:     General: Skin is warm and dry.      Findings: No lesion or rash.   Neurological:      General: No focal deficit present.      Mental Status: He is alert and oriented to person, place, and time. Mental status is at baseline.   Psychiatric:         Mood and Affect: Mood normal.         Behavior: Behavior normal.         Labs:   Brief Urine Lab Results  (Last result in the past 365 days)      Color   Clarity   Blood   Leuk Est   Nitrite   Protein   CREAT   Urine HCG        10/27/22 1005 Yellow   Clear   Negative   Negative   Negative   Negative                 Urine Culture    Urine Culture 7/21/22 8/8/22 10/21/22   Urine Culture >100,000 CFU/mL Escherichia coli (A) >100,000 CFU/mL Escherichia coli (A) >100,000 CFU/mL Escherichia coli (A)   (A) Abnormal value               Lab Results   Component Value Date    GLUCOSE 108 (H) 07/26/2022    CALCIUM 9.1 07/26/2022     (L) " 07/26/2022    K 4.6 07/26/2022    CO2 25.0 07/26/2022    CL 97 (L) 07/26/2022    BUN 17 07/26/2022    CREATININE 1.36 (H) 07/26/2022    EGFRIFNONA 51 (L) 12/22/2021    BCR 12.5 07/26/2022    ANIONGAP 11.0 07/26/2022       Lab Results   Component Value Date    WBC 6.53 07/26/2022    HGB 12.8 (L) 07/26/2022    HCT 38.9 07/26/2022    MCV 97.0 07/26/2022     07/26/2022       Images:   CT Abdomen Pelvis With & Without Contrast    Result Date: 7/14/2022  Suboptimal assessment of the decompressed bladder. There is greater than expected circumferential wall thickening of the urinary bladder with bladder trabeculations and at least one small diverticulum. Correlate with urinalysis to exclude cystitis. Essentially normal appearance of the kidneys and ureters without renal or ureteral findings to explain reported hematuria.  Decompensated liver cirrhosis with small to moderate ascites, the sterility of which cannot be ascertained on imaging alone, however without evidence of discrete peritoneal enhancement.  Interval enlargement of ventral/umbilical hernia containing unremarkable appearing small bowel loop and ascitic fluid.  Additional chronic and incidental findings as detailed above.  This report was finalized on 7/14/2022 10:54 AM by Flaco Campo MD.      XR Tibia Fibula 2 View Right (In Office)    Result Date: 7/21/2022   1. Osteopenia otherwise negative tibia and fibula. 2. Advanced osteoarthritis of the right knee. 3. Extensive small vessel calcifications  This report was finalized on 7/21/2022 3:07 PM by Vin Martinez MD.      US Abdomen Complete    Result Date: 7/25/2022  Liver cirrhosis with small to moderate amount of abdominopelvic ascites.  This report was finalized on 7/25/2022 10:50 AM by Flaco Campo MD.      US Paracentesis    Result Date: 9/27/2022  Impression:                                                              Successful ultrasound-guided paracentesis using a Right lower quadrant access  with recovery of 5 liters of fluid as described above.  Thank you for the opportunity to assist in the care of your patient.  This report was finalized on 9/27/2022 1:51 PM by David Heck MD.      US Paracentesis    Result Date: 8/23/2022  Impression:                                                              Successful ultrasound-guided paracentesis using a Right lower quadrant access with recovery of 6.55 liters of fluid as described above.  Thank you for the opportunity to assist in the care of your patient.  This report was finalized on 8/23/2022 2:22 PM by David Heck MD.      US Paracentesis    Result Date: 7/5/2022  Impression:                                                              Successful ultrasound-guided paracentesis using a Right lower quadrant access with recovery of 5.5 liters of fluid as described above.  Thank you for the opportunity to assist in the care of your patient.  This report was finalized on 7/5/2022 2:05 PM by David Heck MD.      XR Chest PA & Lateral    Result Date: 9/2/2022  1.  Cardiomegaly with small left pleural effusion. 2.  Left basilar opacity, which could reflect atelectasis or pneumonia.  This report was finalized on 9/2/2022 3:45 PM by Lv Belle MD.        Measures:   Tobacco:   Jordi Sam  reports that he quit smoking about 39 years ago. His smoking use included cigarettes. He has a 70.00 pack-year smoking history. He has never used smokeless tobacco..  Assessment / Plan      Assessment/Plan:   88 y.o. male who presented today for follow up of gross hematuria and bulbar urethral stricture.  The patient is a very complicated operative candidate, high risk.  Discussed with him he would require operative intervention for cystoscopy and urethral stricture dilation for complete assessment of his urinary tract given his history gross hematuria.  He refuses surgery.  At this time I am unable to assess source of gross hematuria but suspect likely related  to chronic cystitis or infection secondary to bladder wall thickening and diverticuli noted on his recent CT urogram.  Recent UTI diagnosed last week and he is on antibiotics with clear urinalysis at this time.  Because the patient refused surgery there is nothing I can offer him in terms of treatment for his urinary tract symptoms, gross hematuria work-up, or history of recurrent UTI.  He is not a candidate for long-term suppressive antibiotics given his liver failure.  He can follow-up as an as-needed basis.  Discussed the importance of timed and double voiding every 2-3 hours to ensure he is emptying, increase his water intake to flush out his urinary tract to prevent infection.  This is his only option for UTI prevention.  If he continues to have recurrent UTI he can be referred back to me for consideration of operative intervention on his urethral stricture.    Diagnoses and all orders for this visit:    1. Recurrent UTI (Primary)  -Likely driven by urethral stricture and incomplete bladder emptying, he is not willing to consider operative intervention at this time  -Not a candidate for long-term suppressive antibiotics secondary to liver failure and underlying emphysema    2. Gross hematuria  -Unable to be evaluated secondary to patient refusal for surgery, high risk operative candidate, likely associated with recurrent cystitis exacerbated by Eliquis use  -Largely negative CT urogram    -     POC Urinalysis Dipstick, Automated    3. Stricture of bulbous urethra in male, unspecified stricture type  -Roughly 10 French bulbar urethral stricture, unable to perform flexible cystoscopy in July, patient refuses operative intervention for this secondary to fear of anesthesia and his overall medical comorbidities, he does have end-stage liver failure and this is not unreasonable by the patient, he will need to be closely monitored         Follow Up:   Return if symptoms worsen or fail to improve.    I spent  approximately 20 minutes providing clinical care for this patient; including review of patient's chart and provider documentation, face to face time spent with patient in examination room (obtaining history, performing physical exam, discussing diagnosis and management options), placing orders, and completing patient documentation.     Anjel Campo MD  [facility] [place] [facility]

## 2022-11-01 ENCOUNTER — LAB (OUTPATIENT)
Dept: LAB | Facility: HOSPITAL | Age: 87
End: 2022-11-01

## 2022-11-01 ENCOUNTER — OFFICE VISIT (OUTPATIENT)
Dept: CARDIOLOGY | Facility: HOSPITAL | Age: 87
End: 2022-11-01

## 2022-11-01 VITALS
TEMPERATURE: 97 F | BODY MASS INDEX: 30.91 KG/M2 | WEIGHT: 220.8 LBS | RESPIRATION RATE: 18 BRPM | SYSTOLIC BLOOD PRESSURE: 125 MMHG | HEIGHT: 71 IN | OXYGEN SATURATION: 96 % | DIASTOLIC BLOOD PRESSURE: 61 MMHG | HEART RATE: 64 BPM

## 2022-11-01 DIAGNOSIS — K74.60 CIRRHOSIS OF LIVER WITH ASCITES, UNSPECIFIED HEPATIC CIRRHOSIS TYPE: ICD-10-CM

## 2022-11-01 DIAGNOSIS — I10 ESSENTIAL HYPERTENSION: ICD-10-CM

## 2022-11-01 DIAGNOSIS — N18.30 STAGE 3 CHRONIC KIDNEY DISEASE, UNSPECIFIED WHETHER STAGE 3A OR 3B CKD: ICD-10-CM

## 2022-11-01 DIAGNOSIS — R18.8 CIRRHOSIS OF LIVER WITH ASCITES, UNSPECIFIED HEPATIC CIRRHOSIS TYPE: ICD-10-CM

## 2022-11-01 DIAGNOSIS — I25.10 CORONARY ARTERY DISEASE INVOLVING NATIVE HEART WITHOUT ANGINA PECTORIS, UNSPECIFIED VESSEL OR LESION TYPE: ICD-10-CM

## 2022-11-01 DIAGNOSIS — I50.812 CHRONIC RIGHT HEART FAILURE: Primary | ICD-10-CM

## 2022-11-01 DIAGNOSIS — I50.812 CHRONIC RIGHT HEART FAILURE: ICD-10-CM

## 2022-11-01 DIAGNOSIS — I48.20 CHRONIC ATRIAL FIBRILLATION: ICD-10-CM

## 2022-11-01 LAB
BASOPHILS # BLD AUTO: 0.03 10*3/MM3 (ref 0–0.2)
BASOPHILS NFR BLD AUTO: 0.6 % (ref 0–1.5)
DEPRECATED RDW RBC AUTO: 48.6 FL (ref 37–54)
EOSINOPHIL # BLD AUTO: 0.04 10*3/MM3 (ref 0–0.4)
EOSINOPHIL NFR BLD AUTO: 0.8 % (ref 0.3–6.2)
ERYTHROCYTE [DISTWIDTH] IN BLOOD BY AUTOMATED COUNT: 13.5 % (ref 12.3–15.4)
HCT VFR BLD AUTO: 38.4 % (ref 37.5–51)
HGB BLD-MCNC: 12.2 G/DL (ref 13–17.7)
LYMPHOCYTES # BLD AUTO: 0.59 10*3/MM3 (ref 0.7–3.1)
LYMPHOCYTES NFR BLD AUTO: 11.1 % (ref 19.6–45.3)
MCH RBC QN AUTO: 31 PG (ref 26.6–33)
MCHC RBC AUTO-ENTMCNC: 31.8 G/DL (ref 31.5–35.7)
MCV RBC AUTO: 97.7 FL (ref 79–97)
MONOCYTES # BLD AUTO: 0.43 10*3/MM3 (ref 0.1–0.9)
MONOCYTES NFR BLD AUTO: 8.1 % (ref 5–12)
NEUTROPHILS NFR BLD AUTO: 4.19 10*3/MM3 (ref 1.7–7)
NEUTROPHILS NFR BLD AUTO: 79 % (ref 42.7–76)
PLATELET # BLD AUTO: 127 10*3/MM3 (ref 140–450)
PMV BLD AUTO: 10.3 FL (ref 6–12)
RBC # BLD AUTO: 3.93 10*6/MM3 (ref 4.14–5.8)
WBC NRBC COR # BLD: 5.3 10*3/MM3 (ref 3.4–10.8)

## 2022-11-01 PROCEDURE — 80053 COMPREHEN METABOLIC PANEL: CPT

## 2022-11-01 PROCEDURE — 36415 COLL VENOUS BLD VENIPUNCTURE: CPT

## 2022-11-01 PROCEDURE — 85025 COMPLETE CBC W/AUTO DIFF WBC: CPT

## 2022-11-01 PROCEDURE — 99214 OFFICE O/P EST MOD 30 MIN: CPT | Performed by: NURSE PRACTITIONER

## 2022-11-01 NOTE — PROGRESS NOTES
"[facility], [facility]    Chief Complaint  Congestive Heart Failure    Subjective    History of Present Illness {CC  Problem List  Visit  Diagnosis   Encounters  Notes  Medications  Labs  Result Review Imaging  Media :23}     Jordi Sam presents to [facility] for   History of Present Illness     88-year-old male with CAD, right-sided heart failure, cirrhosis and ascites, periodic paracentesis followed by GI, chronic atrial fibrillation.    Unable to tolerate ACE, ARB, Arni due to hypotension and kidney function.  Currently on spironolactone and furosemide.    Patient with ultrasound guided paracentesis 9/27/2022: 5 L removed.    Pt is scheduled for repeat next week.    States he is at  Baseline.  NO CP or pressure.  Intermittent dyspnea, mild with exertion.  No dizziness, near syncope, syncope.  Moderate edema.  Not wearing compression stockings.  No recent falls.  Walks with cane.     Objective     Vital Signs:   Vitals:    11/01/22 1049   BP: 125/61   BP Location: Left arm   Patient Position: Sitting   Cuff Size: Adult   Pulse: 64   Resp: 18   Temp: 97 °F (36.1 °C)   TempSrc: Temporal   SpO2: 96%   Weight: 100 kg (220 lb 12.8 oz)   Height: 180.3 cm (71\")     Body mass index is 30.8 kg/m².  Physical Exam  Vitals reviewed.   Constitutional:       General: He is not in acute distress.     Appearance: Normal appearance. He is obese.   Cardiovascular:      Rate and Rhythm: Normal rate and regular rhythm.      Pulses:           Radial pulses are 2+ on the right side.        Dorsalis pedis pulses are 2+ on the right side.        Posterior tibial pulses are 2+ on the right side.      Heart sounds: Normal heart sounds.   Pulmonary:      Effort: Pulmonary effort is normal.      Breath sounds: Normal breath sounds.   Abdominal:      General: There is distension.      Tenderness: There is no abdominal tenderness.      Comments: abdominal " hernia prominent, soft   Musculoskeletal:      Right lower leg: Edema (2+ bilateral edema) present.      Left lower leg: Edema present.   Skin:     General: Skin is warm and dry.   Neurological:      Mental Status: He is alert.   Psychiatric:         Mood and Affect: Mood normal.         Behavior: Behavior is cooperative.              Result Review  Data Reviewed:{ Labs  Result Review  Imaging  Med Tab  Media :23}   Lab Results   Component Value Date    GLUCOSE 108 (H) 07/26/2022    BUN 17 07/26/2022    CREATININE 1.36 (H) 07/26/2022    EGFRIFNONA 51 (L) 12/22/2021    BCR 12.5 07/26/2022    K 4.6 07/26/2022    CO2 25.0 07/26/2022    CALCIUM 9.1 07/26/2022    ALBUMIN 3.70 07/26/2022    AST 30 07/26/2022    ALT 13 07/26/2022     Lab Results   Component Value Date    WBC 6.53 07/26/2022    HGB 12.8 (L) 07/26/2022    HCT 38.9 07/26/2022    MCV 97.0 07/26/2022     07/26/2022                       Assessment and Plan {CC Problem List  Visit Diagnosis  ROS  Review (Popup)  Health Maintenance  Quality  BestPractice  Medications  SmartSets  SnapShot Encounters  Media :23}   1. Chronic right heart failure (HCC)  Lasix, aldactone    - Comprehensive Metabolic Panel; Standing  - CBC & Differential; Standing    2. Cirrhosis of liver with ascites, unspecified hepatic cirrhosis type (HCC)  Paracentesis managed by GI and scheduled next week  - Comprehensive Metabolic Panel; Standing  - CBC & Differential; Standing    3. Coronary artery disease involving native heart without angina pectoris, unspecified vessel or lesion type  No chest pain or pressure  No recent use of nitroglycerin  Statin  4. Chronic atrial fibrillation (HCC)  Beta-blocker, heart rate controlled    Eliquis.  No signs and symptoms of bleeding.    5. Essential hypertension  Blood pressure controlled.  No signs and symptoms of hypotension reported  Carvedilol, spironolactone, Terazosin    6. Stage 3 chronic kidney disease, unspecified whether  stage 3a or 3b CKD (HCC)    - Comprehensive Metabolic Panel; Standing  - CBC & Differential; Standing    Contacted [facility] scheduling to schedule a follow-up with Dr. Sin in 6 months.  Patient follow-up in the heart valve center in 1 year or sooner if needed.    NO advanced directive on file.  Offered info.          Follow Up {Instructions Charge Capture  Follow-up Communications :23}   Return in about 1 year (around 11/1/2023) for HF, Office visit.    Patient was given instructions and counseling regarding his condition or for health maintenance advice. Please see specific information pulled into the AVS if appropriate.  Patient was instructed to call the Heart and Valve Center with any questions, concerns, or worsening symptoms.

## 2022-11-02 ENCOUNTER — TELEPHONE (OUTPATIENT)
Dept: CARDIOLOGY | Facility: HOSPITAL | Age: 87
End: 2022-11-02

## 2022-11-02 DIAGNOSIS — N18.30 STAGE 3 CHRONIC KIDNEY DISEASE, UNSPECIFIED WHETHER STAGE 3A OR 3B CKD: Primary | ICD-10-CM

## 2022-11-02 LAB
ALBUMIN SERPL-MCNC: 3.7 G/DL (ref 3.5–5.2)
ALBUMIN/GLOB SERPL: 1.4 G/DL
ALP SERPL-CCNC: 92 U/L (ref 39–117)
ALT SERPL W P-5'-P-CCNC: 9 U/L (ref 1–41)
ANION GAP SERPL CALCULATED.3IONS-SCNC: 11.3 MMOL/L (ref 5–15)
AST SERPL-CCNC: 24 U/L (ref 1–40)
BILIRUB SERPL-MCNC: 0.6 MG/DL (ref 0–1.2)
BUN SERPL-MCNC: 22 MG/DL (ref 8–23)
BUN/CREAT SERPL: 14.1 (ref 7–25)
CALCIUM SPEC-SCNC: 9 MG/DL (ref 8.6–10.5)
CHLORIDE SERPL-SCNC: 100 MMOL/L (ref 98–107)
CO2 SERPL-SCNC: 27.7 MMOL/L (ref 22–29)
CREAT SERPL-MCNC: 1.56 MG/DL (ref 0.76–1.27)
EGFRCR SERPLBLD CKD-EPI 2021: 42.5 ML/MIN/1.73
GLOBULIN UR ELPH-MCNC: 2.6 GM/DL
GLUCOSE SERPL-MCNC: 109 MG/DL (ref 65–99)
POTASSIUM SERPL-SCNC: 4.5 MMOL/L (ref 3.5–5.2)
PROT SERPL-MCNC: 6.3 G/DL (ref 6–8.5)
SODIUM SERPL-SCNC: 139 MMOL/L (ref 136–145)

## 2022-11-02 NOTE — PROGRESS NOTES
Call patient.  His creatinine is elevated from labs 3 months ago.  That is not unusual for his creatinine to go up right before he has his paracentesis which is scheduled this week.  I would like patient to repeat his BMP to check his kidney function in 2 weeks. No change in medications at this time.

## 2022-11-03 ENCOUNTER — HOSPITAL ENCOUNTER (OUTPATIENT)
Dept: ULTRASOUND IMAGING | Facility: HOSPITAL | Age: 87
Discharge: HOME | End: 2022-11-03
Admitting: RADIOLOGY

## 2022-11-03 VITALS
HEART RATE: 68 BPM | TEMPERATURE: 97.2 F | WEIGHT: 217.6 LBS | BODY MASS INDEX: 30.46 KG/M2 | DIASTOLIC BLOOD PRESSURE: 41 MMHG | HEIGHT: 71 IN | RESPIRATION RATE: 20 BRPM | OXYGEN SATURATION: 94 % | SYSTOLIC BLOOD PRESSURE: 115 MMHG

## 2022-11-03 DIAGNOSIS — R18.8 OTHER ASCITES: ICD-10-CM

## 2022-11-03 LAB
ALBUMIN FLD-MCNC: 2 G/DL
APPEARANCE FLD: CLEAR
COLOR FLD: YELLOW
INR PPP: 1.14 (ref 0.84–1.13)
LYMPHOCYTES NFR FLD MANUAL: 18 %
MACROPHAGE FLUID: 59 %
MONOCYTES NFR FLD: 20 %
NEUTROPHILS NFR FLD MANUAL: 3 %
PROTHROMBIN TIME: 14.5 SECONDS (ref 11.4–14.4)
RBC # FLD AUTO: <2000 /MM3
WBC # FLD AUTO: 178 /MM3

## 2022-11-03 PROCEDURE — 25010000002 ALBUMIN HUMAN 25% PER 50 ML: Performed by: NURSE PRACTITIONER

## 2022-11-03 PROCEDURE — 76942 ECHO GUIDE FOR BIOPSY: CPT

## 2022-11-03 PROCEDURE — 82042 OTHER SOURCE ALBUMIN QUAN EA: CPT | Performed by: NURSE PRACTITIONER

## 2022-11-03 PROCEDURE — P9047 ALBUMIN (HUMAN), 25%, 50ML: HCPCS | Performed by: NURSE PRACTITIONER

## 2022-11-03 PROCEDURE — 89051 BODY FLUID CELL COUNT: CPT | Performed by: NURSE PRACTITIONER

## 2022-11-03 PROCEDURE — C1729 CATH, DRAINAGE: HCPCS

## 2022-11-03 PROCEDURE — 85610 PROTHROMBIN TIME: CPT | Performed by: RADIOLOGY

## 2022-11-03 RX ORDER — ALBUMIN (HUMAN) 12.5 G/50ML
12.5 SOLUTION INTRAVENOUS ONCE
Status: COMPLETED | OUTPATIENT
Start: 2022-11-03 | End: 2022-11-03

## 2022-11-03 RX ORDER — SODIUM CHLORIDE 0.9 % (FLUSH) 0.9 %
10 SYRINGE (ML) INJECTION AS NEEDED
Status: DISCONTINUED | OUTPATIENT
Start: 2022-11-03 | End: 2022-11-04 | Stop reason: HOSPADM

## 2022-11-03 RX ORDER — LIDOCAINE HYDROCHLORIDE 10 MG/ML
5 INJECTION, SOLUTION EPIDURAL; INFILTRATION; INTRACAUDAL; PERINEURAL ONCE
Status: COMPLETED | OUTPATIENT
Start: 2022-11-03 | End: 2022-11-03

## 2022-11-03 RX ORDER — SODIUM CHLORIDE 0.9 % (FLUSH) 0.9 %
3 SYRINGE (ML) INJECTION EVERY 12 HOURS SCHEDULED
Status: DISCONTINUED | OUTPATIENT
Start: 2022-11-03 | End: 2022-11-04 | Stop reason: HOSPADM

## 2022-11-03 RX ADMIN — LIDOCAINE HYDROCHLORIDE 5 ML: 10 INJECTION, SOLUTION EPIDURAL; INFILTRATION; INTRACAUDAL; PERINEURAL at 13:18

## 2022-11-03 RX ADMIN — ALBUMIN (HUMAN) 6.25 G: 0.25 INJECTION, SOLUTION INTRAVENOUS at 14:04

## 2022-11-03 NOTE — NURSING NOTE
Pt discharged from ir dept s/p paracentesis. Pt tolerated procedure without complications. Pt had a congested cough while here today.  Encouraged patient that if his cough does not improve or if it gets worse that he should contact his PCP. Discharge instructions reviewed with patient who verbalized being familiar with post paracentesis care. Pt transported to exit via wheelchair to meet with his son who is picking him up today.

## 2022-11-03 NOTE — PRE-PROCEDURE NOTE
[facility]   Vascular Interventional Radiology  History & Physicial    Patient Name:Jordi Sam    : 1934    MRN: [id number]    Primary Care Physician: Jasper Avery MD    Referring Physician: MAKAYLA Stringer     Date of admission: 11/3/2022    Subjective     Reason for Consult: Para    History of Present Illness     Jordi Sam is a 88 y.o. male referred to IR as noted above.      Active Hospital Problems:  There are no active hospital problems to display for this patient.      Personal History     Past Medical History:   Diagnosis Date   • A-fib (HCC)    • Acute inferior myocardial infarction (HCC)     Acute inferior STEMI with RV infarct features, 2009.    • CAD (coronary artery disease)    • COVID-19 2022   • Dyslipidemia    • Goiter     History of “goiter.”   • Hernia, umbilical    • Hyperlipidemia    • Hypertension    • Nephrolithiasis    • Type 2 diabetes mellitus (HCC)    • Umbilical hernia     Pt stated has recently returned. Seeing Dr Kiersten Nova and Dr Jasper Avery        Past Surgical History:   Procedure Laterality Date   • BRONCHOSCOPY N/A 2019    Procedure: BRONCHOSCOPY WITH THORACENTESIS;  Surgeon: Marciano Higginbotham MD;  Location:  [facility] ENDOSCOPY;  Service: Pulmonary   • CARDIAC CATHETERIZATION Bilateral 2019    Procedure: Right and Left Heart Cath;  Surgeon: Efrem Posadas MD;  Location:  [facility] CATH INVASIVE LOCATION;  Service: Cardiology   • CARDIAC CATHETERIZATION     • CHOLECYSTECTOMY     • CORONARY ANGIOPLASTY WITH STENT PLACEMENT  2009    Sirolimus eluting stents to the RCA, 2009.    • HERNIA REPAIR      Hernia repair x 2.    • PARACENTESIS  2019    multiple   • UMBILICAL HERNIA REPAIR N/A 2020    Procedure: UMBILICAL HERNIA REPAIR;  Surgeon: Maribell Her MD;  Location:  [facility] OR;  Service: General;  Laterality: N/A;       Family History: His family history includes Aneurysm in his father;  "Cancer in his sister; Dementia in his mother; Heart attack in his father; Heart disease in his brother and father; Hypertension in his daughter and son; No Known Problems in his brother and sister; Stroke in his mother.     Social History: He  reports that he quit smoking about 39 years ago. His smoking use included cigarettes. He has a 70.00 pack-year smoking history. He has never used smokeless tobacco. He reports that he does not drink alcohol and does not use drugs.    Home Medications:  HYDROcod Polst-CPM Polst ER, albuterol sulfate HFA, apixaban, carvedilol, cefdinir, doxycycline, folic acid, furosemide, nitroglycerin, simvastatin, spironolactone, and terazosin    Current Medications:  •  sodium chloride  •  sodium chloride     Allergies:  He is allergic to bee venom and sulfamethoxazole-trimethoprim.    Review of Systems    IR Procedure pertinent significant findings are mentioned in the PMH and PSH above.    Information from this source was also reviewed: Progress Notes by Diogo Weber APRN (11/01/2022 10:45)       Objective     Visit Vitals  /49 (BP Location: Left arm, Patient Position: Lying)   Pulse 69   Temp 97.2 °F (36.2 °C) (Temporal)   Resp 20   Ht 180.3 cm (71\")   Wt 98.7 kg (217 lb 9.6 oz)   SpO2 97%   BMI 30.35 kg/m²        Physical Exam    A&Ox3.   Able to communicate  No Apparent Distress  Average physique   CVS: Regular rate and rhythm  Respiratory: Non labored breathing. No signs of respiratory compromise.    Information from this source was also reviewed: Progress Notes by Diogo Weber APRN (11/01/2022 10:45)       Result Review      I have personally reviewed the results from the time of this admission to 11/3/2022 13:13 EDT and agree with these findings.  [x]  Laboratory  []  Microbiology  [x]  Radiology  []  EKG/Telemetry   []  Cardiology/Vascular   []  Pathology  []  Old records  []  Other:    Most notable findings include: As noted:    Results from last 7 days   Lab Units " 11/03/22  1153 11/01/22  1155   INR  1.14*  --    HEMOGLOBIN g/dL  --  12.2*   HEMATOCRIT %  --  38.4   PLATELETS 10*3/mm3  --  127*       Estimated Creatinine Clearance: 39.2 mL/min (A) (by C-G formula based on SCr of 1.56 mg/dL (H)).   Creatinine   Date Value Ref Range Status   11/01/2022 1.56 (H) 0.76 - 1.27 mg/dL Final       COVID19   Date Value Ref Range Status   03/25/2022 Not Detected Not Detected - Ref. Range Final        No results found for: PREGTESTUR, PREGSERUM, HCG, HCGQUANT     ASA SCALE ASSESSMENT (applicable ONLY if sedation planned):        MALLAMPATI CLASSIFICATION (applicable ONLY if sedation planned):       Assessment / Plan     Jordi Sam is a 88 y.o. male referred to the IR service with above problem.    Plan:   As above.    Notice: The note was created before the performance of the procedure. It might have been left in the pending status and signed off after the procedure. The time stamp on the note may be misleading.    David Heck MD   Vascular Interventional Radiology  11/03/22   1:13 PM EDT

## 2022-11-04 ENCOUNTER — TELEPHONE (OUTPATIENT)
Dept: INFUSION THERAPY | Facility: HOSPITAL | Age: 87
End: 2022-11-04

## 2022-11-04 ENCOUNTER — APPOINTMENT (OUTPATIENT)
Dept: GENERAL RADIOLOGY | Facility: HOSPITAL | Age: 87
End: 2022-11-04

## 2022-11-04 ENCOUNTER — HOSPITAL ENCOUNTER (EMERGENCY)
Facility: HOSPITAL | Age: 87
Discharge: HOME | End: 2022-11-04
Attending: EMERGENCY MEDICINE | Admitting: EMERGENCY MEDICINE

## 2022-11-04 VITALS
HEART RATE: 73 BPM | RESPIRATION RATE: 14 BRPM | WEIGHT: 217 LBS | HEIGHT: 71 IN | OXYGEN SATURATION: 96 % | SYSTOLIC BLOOD PRESSURE: 108 MMHG | BODY MASS INDEX: 30.38 KG/M2 | DIASTOLIC BLOOD PRESSURE: 80 MMHG | TEMPERATURE: 97.8 F

## 2022-11-04 DIAGNOSIS — Z98.890 STATUS POST ABDOMINAL PARACENTESIS: ICD-10-CM

## 2022-11-04 DIAGNOSIS — K43.9 ABDOMINAL WALL HERNIA: ICD-10-CM

## 2022-11-04 DIAGNOSIS — J11.1 INFLUENZA: ICD-10-CM

## 2022-11-04 DIAGNOSIS — R79.89 ELEVATED SERUM CREATININE: Primary | ICD-10-CM

## 2022-11-04 DIAGNOSIS — I95.9 HYPOTENSIVE EPISODE: ICD-10-CM

## 2022-11-04 LAB
ALBUMIN SERPL-MCNC: 3.5 G/DL (ref 3.5–5.2)
ALBUMIN/GLOB SERPL: 1.3 G/DL
ALP SERPL-CCNC: 75 U/L (ref 39–117)
ALT SERPL W P-5'-P-CCNC: 8 U/L (ref 1–41)
ANION GAP SERPL CALCULATED.3IONS-SCNC: 12 MMOL/L (ref 5–15)
AST SERPL-CCNC: 25 U/L (ref 1–40)
BASOPHILS # BLD AUTO: 0.02 10*3/MM3 (ref 0–0.2)
BASOPHILS NFR BLD AUTO: 0.4 % (ref 0–1.5)
BILIRUB SERPL-MCNC: 1 MG/DL (ref 0–1.2)
BILIRUB UR QL STRIP: NEGATIVE
BUN SERPL-MCNC: 26 MG/DL (ref 8–23)
BUN/CREAT SERPL: 15.1 (ref 7–25)
CALCIUM SPEC-SCNC: 8.7 MG/DL (ref 8.6–10.5)
CHLORIDE SERPL-SCNC: 100 MMOL/L (ref 98–107)
CLARITY UR: CLEAR
CO2 SERPL-SCNC: 24 MMOL/L (ref 22–29)
COLOR UR: YELLOW
CREAT SERPL-MCNC: 1.72 MG/DL (ref 0.76–1.27)
DEPRECATED RDW RBC AUTO: 52.8 FL (ref 37–54)
EGFRCR SERPLBLD CKD-EPI 2021: 37.8 ML/MIN/1.73
EOSINOPHIL # BLD AUTO: 0.01 10*3/MM3 (ref 0–0.4)
EOSINOPHIL NFR BLD AUTO: 0.2 % (ref 0.3–6.2)
ERYTHROCYTE [DISTWIDTH] IN BLOOD BY AUTOMATED COUNT: 14.6 % (ref 12.3–15.4)
GLOBULIN UR ELPH-MCNC: 2.6 GM/DL
GLUCOSE SERPL-MCNC: 133 MG/DL (ref 65–99)
GLUCOSE UR STRIP-MCNC: NEGATIVE MG/DL
HCT VFR BLD AUTO: 39.4 % (ref 37.5–51)
HGB BLD-MCNC: 12.8 G/DL (ref 13–17.7)
HGB UR QL STRIP.AUTO: NEGATIVE
HOLD SPECIMEN: NORMAL
IMM GRANULOCYTES # BLD AUTO: 0.01 10*3/MM3 (ref 0–0.05)
IMM GRANULOCYTES NFR BLD AUTO: 0.2 % (ref 0–0.5)
KETONES UR QL STRIP: NEGATIVE
LEUKOCYTE ESTERASE UR QL STRIP.AUTO: NEGATIVE
LYMPHOCYTES # BLD AUTO: 0.33 10*3/MM3 (ref 0.7–3.1)
LYMPHOCYTES NFR BLD AUTO: 6 % (ref 19.6–45.3)
MCH RBC QN AUTO: 31.9 PG (ref 26.6–33)
MCHC RBC AUTO-ENTMCNC: 32.5 G/DL (ref 31.5–35.7)
MCV RBC AUTO: 98.3 FL (ref 79–97)
MONOCYTES # BLD AUTO: 0.53 10*3/MM3 (ref 0.1–0.9)
MONOCYTES NFR BLD AUTO: 9.6 % (ref 5–12)
NEUTROPHILS NFR BLD AUTO: 4.61 10*3/MM3 (ref 1.7–7)
NEUTROPHILS NFR BLD AUTO: 83.6 % (ref 42.7–76)
NITRITE UR QL STRIP: NEGATIVE
NRBC BLD AUTO-RTO: 0 /100 WBC (ref 0–0.2)
PH UR STRIP.AUTO: <=5 [PH] (ref 5–8)
PLATELET # BLD AUTO: 107 10*3/MM3 (ref 140–450)
PMV BLD AUTO: 10.8 FL (ref 6–12)
POTASSIUM SERPL-SCNC: 4.5 MMOL/L (ref 3.5–5.2)
PROT SERPL-MCNC: 6.1 G/DL (ref 6–8.5)
PROT UR QL STRIP: ABNORMAL
RBC # BLD AUTO: 4.01 10*6/MM3 (ref 4.14–5.8)
SODIUM SERPL-SCNC: 136 MMOL/L (ref 136–145)
SP GR UR STRIP: 1.02 (ref 1–1.03)
UROBILINOGEN UR QL STRIP: ABNORMAL
WBC NRBC COR # BLD: 5.51 10*3/MM3 (ref 3.4–10.8)
WHOLE BLOOD HOLD COAG: NORMAL
WHOLE BLOOD HOLD SPECIMEN: NORMAL

## 2022-11-04 PROCEDURE — 81003 URINALYSIS AUTO W/O SCOPE: CPT | Performed by: EMERGENCY MEDICINE

## 2022-11-04 PROCEDURE — 71045 X-RAY EXAM CHEST 1 VIEW: CPT

## 2022-11-04 PROCEDURE — 99284 EMERGENCY DEPT VISIT MOD MDM: CPT

## 2022-11-04 PROCEDURE — 80053 COMPREHEN METABOLIC PANEL: CPT | Performed by: EMERGENCY MEDICINE

## 2022-11-04 PROCEDURE — 85025 COMPLETE CBC W/AUTO DIFF WBC: CPT | Performed by: EMERGENCY MEDICINE

## 2022-11-04 RX ORDER — SODIUM CHLORIDE 0.9 % (FLUSH) 0.9 %
10 SYRINGE (ML) INJECTION AS NEEDED
Status: DISCONTINUED | OUTPATIENT
Start: 2022-11-04 | End: 2022-11-05 | Stop reason: HOSPADM

## 2022-11-04 RX ORDER — BENZONATATE 100 MG/1
100 CAPSULE ORAL 3 TIMES DAILY PRN
Qty: 8 CAPSULE | Refills: 0 | Status: SHIPPED | OUTPATIENT
Start: 2022-11-04 | End: 2023-02-08

## 2022-11-04 RX ORDER — BENZONATATE 100 MG/1
100 CAPSULE ORAL ONCE
Status: COMPLETED | OUTPATIENT
Start: 2022-11-04 | End: 2022-11-04

## 2022-11-04 RX ADMIN — BENZONATATE 100 MG: 100 CAPSULE ORAL at 23:08

## 2022-11-04 RX ADMIN — SODIUM CHLORIDE 500 ML: 9 INJECTION, SOLUTION INTRAVENOUS at 21:02

## 2022-11-04 NOTE — ED PROVIDER NOTES
Subjective   History of Present Illness  Patient is a pleasant 88-year-old male who presents with abdominal discomfort, cough, fatigue, and episode of hypotension.  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  For the last 1 week he has had a fatigue and cough.  Yesterday he had a scheduled paracentesis performed, which she has approximately every 5 weeks.  He normally is tired after the paracentesis but today he was more tired than usual and due to this persistent cough he went to his primary care provider.  At his primary care provider's office he was diagnosed with the flu and found to be slightly hypotensive, prompting his visit to the emergency department.    Patient states that the weakness is not his primary concern is he is oftentimes weak after the paracentesis.  His primary concern is the cough and the pain the cough is causing to his anterior abdominal wall hernia.  Patient has a large anterior abdominal wall hernia which has been coming more more tender throughout the week with the coughing.  He had a CT recently which did not show any incarcerated bowel loops or other complications and given the size of the hernia incarceration is unlikely.    Patient admits to fever, again consistent with a flu and occasional nausea.  Denies vomiting or diarrhea.  Denies abdominal pain other than the hernia location.        Review of Systems   All other systems reviewed and are negative.      Past Medical History:   Diagnosis Date   • A-fib (HCC)    • Acute inferior myocardial infarction (HCC)     Acute inferior STEMI with RV infarct features, January 2009.    • CAD (coronary artery disease)    • COVID-19 08/25/2022   • Dyslipidemia    • Goiter     History of “goiter.”   • Hernia, umbilical    • Hyperlipidemia    • Hypertension    • Nephrolithiasis    • Type 2 diabetes mellitus (HCC)    • Umbilical hernia     Pt stated has recently returned. Seeing Dr Kiersten Nova and Dr Jasper Avery 8-2021       Allergies    Allergen Reactions   • Bee Venom Swelling   • Sulfamethoxazole-Trimethoprim Other (See Comments)     Pt unaware       Past Surgical History:   Procedure Laterality Date   • BRONCHOSCOPY N/A 2019    Procedure: BRONCHOSCOPY WITH THORACENTESIS;  Surgeon: Marciano Higginbotham MD;  Location:  [facility]OSCOPY;  Service: Pulmonary   • CARDIAC CATHETERIZATION Bilateral 2019    Procedure: Right and Left Heart Cath;  Surgeon: Efrem Posadas MD;  Location: [facility] CATH INVASIVE LOC[facility];  Service: Cardiology   • CARDIAC CATHETERIZATION     • CHOLECYSTECTOMY     • CORONARY ANGIOPLASTY WITH STENT PLACEMENT  2009    Sirolimus eluting stents to the RCA, 2009.    • HERNIA REPAIR      Hernia repair x 2.    • PARACENTESIS  2019    multiple   • UMBILICAL HERNIA REPAIR N/A 2020    Procedure: UMBILICAL HERNIA REPAIR;  Surgeon: Maribell Her MD;  Location:  [facility] OR;  Service: General;  Laterality: N/A;       Family History   Problem Relation Age of Onset   • Dementia Mother    • Stroke Mother    • Heart disease Father    • Heart attack Father    • Aneurysm Father    • Cancer Sister    • No Known Problems Brother    • No Known Problems Sister    • Heart disease Brother         CABG   • Hypertension Daughter    • Hypertension Son    • Colon cancer Neg Hx    • Colon polyps Neg Hx        Social History     Socioeconomic History   • Marital status:      Spouse name: Melly   • Number of children: 4   Tobacco Use   • Smoking status: Former     Packs/day: 2.50     Years: 28.00     Pack years: 70.00     Types: Cigarettes     Quit date: 1983     Years since quittin.5   • Smokeless tobacco: Never   Vaping Use   • Vaping Use: Never used   Substance and Sexual Activity   • Alcohol use: No   • Drug use: No   • Sexual activity: Not Currently           Objective   Physical Exam  Vitals and nursing note reviewed.   Constitutional:       Appearance: Normal appearance. He is  ill-appearing (chronically).   HENT:      Head: Normocephalic and atraumatic.      Mouth/Throat:      Mouth: Mucous membranes are dry.   Eyes:      Extraocular Movements: Extraocular movements intact.      Pupils: Pupils are equal, round, and reactive to light.   Cardiovascular:      Rate and Rhythm: Normal rate and regular rhythm.      Pulses: Normal pulses.      Heart sounds: Normal heart sounds.   Pulmonary:      Effort: Pulmonary effort is normal.      Breath sounds: Normal breath sounds.   Abdominal:      General: Abdomen is flat. Bowel sounds are normal.      Palpations: Abdomen is soft.   Musculoskeletal:         General: No swelling, deformity or signs of injury. Normal range of motion.      Cervical back: Normal range of motion and neck supple. No rigidity or tenderness.   Skin:     General: Skin is warm and dry.      Capillary Refill: Capillary refill takes less than 2 seconds.   Neurological:      General: No focal deficit present.      Mental Status: He is alert and oriented to person, place, and time. Mental status is at baseline.   Psychiatric:         Behavior: Behavior normal.         Procedures           ED Course  ED Course as of 11/05/22 1304   Fri Nov 04, 2022   8172 Patient's work-up is reassuring.  His creatinine is slightly elevated from his baseline.  I suspect this is a combination of the past 1 week that he has had with the flu in combination with the paracentesis was performed yesterday.  He is likely volume depleted.  I given him a 500 mL bolus here in the emergency department.  He is starting to get over the flu now and will be able to drink water at home to his wife.  Oral hydration.  They have reliable outpatient follow-up and will follow up early next week for recheck of his creatinine level.  No infectious process appreciated on the work-up today.  Urinalysis is still pending.  Patient has ambulated without difficulty.  His orthostatics are benign and he and wife are both comfortable  with discharge at this time. [CP]      ED Course User Index  [CP] Boris Winkler DO      Recent Results (from the past 24 hour(s))   Covid-19 + Flu A&B AG, Verpaul [id number]    Collection Time: 11/04/22  2:41 PM    Specimen: Swab   Result Value Ref Range    COVID19 Not Detected     Influenza A Antigen JITENDRA Detected (A)     Influenza B Antigen JITENDRA Not Detected     Internal Control Passed     Lot Number [id number]     Expiration Date 03/10/2023    Comprehensive Metabolic Panel    Collection Time: 11/04/22  3:53 PM    Specimen: Blood   Result Value Ref Range    Glucose 133 (H) 65 - 99 mg/dL    BUN 26 (H) 8 - 23 mg/dL    Creatinine 1.72 (H) 0.76 - 1.27 mg/dL    Sodium 136 136 - 145 mmol/L    Potassium 4.5 3.5 - 5.2 mmol/L    Chloride 100 98 - 107 mmol/L    CO2 24.0 22.0 - 29.0 mmol/L    Calcium 8.7 8.6 - 10.5 mg/dL    Total Protein 6.1 6.0 - 8.5 g/dL    Albumin 3.50 3.50 - 5.20 g/dL    ALT (SGPT) 8 1 - 41 U/L    AST (SGOT) 25 1 - 40 U/L    Alkaline Phosphatase 75 39 - 117 U/L    Total Bilirubin 1.0 0.0 - 1.2 mg/dL    Globulin 2.6 gm/dL    A/G Ratio 1.3 g/dL    BUN/Creatinine Ratio 15.1 7.0 - 25.0    Anion Gap 12.0 5.0 - 15.0 mmol/L    eGFR 37.8 (L) >60.0 mL/min/1.73   Green Top (Gel)    Collection Time: 11/04/22  3:53 PM   Result Value Ref Range    Extra Tube Hold for add-ons.    Lavender Top    Collection Time: 11/04/22  3:53 PM   Result Value Ref Range    Extra Tube hold for add-on    Gold Top - SST    Collection Time: 11/04/22  3:53 PM   Result Value Ref Range    Extra Tube Hold for add-ons.    Gray Top    Collection Time: 11/04/22  3:53 PM   Result Value Ref Range    Extra Tube Hold for add-ons.    Light Blue Top    Collection Time: 11/04/22  3:53 PM   Result Value Ref Range    Extra Tube Hold for add-ons.    CBC Auto Differential    Collection Time: 11/04/22  3:53 PM    Specimen: Blood   Result Value Ref Range    WBC 5.51 3.40 - 10.80 10*3/mm3    RBC 4.01 (L) 4.14 - 5.80 10*6/mm3    Hemoglobin 12.8 (L) 13.0 - 17.7 g/dL     Hematocrit 39.4 37.5 - 51.0 %    MCV 98.3 (H) 79.0 - 97.0 fL    MCH 31.9 26.6 - 33.0 pg    MCHC 32.5 31.5 - 35.7 g/dL    RDW 14.6 12.3 - 15.4 %    RDW-SD 52.8 37.0 - 54.0 fl    MPV 10.8 6.0 - 12.0 fL    Platelets 107 (L) 140 - 450 10*3/mm3    Neutrophil % 83.6 (H) 42.7 - 76.0 %    Lymphocyte % 6.0 (L) 19.6 - 45.3 %    Monocyte % 9.6 5.0 - 12.0 %    Eosinophil % 0.2 (L) 0.3 - 6.2 %    Basophil % 0.4 0.0 - 1.5 %    Immature Grans % 0.2 0.0 - 0.5 %    Neutrophils, Absolute 4.61 1.70 - 7.00 10*3/mm3    Lymphocytes, Absolute 0.33 (L) 0.70 - 3.10 10*3/mm3    Monocytes, Absolute 0.53 0.10 - 0.90 10*3/mm3    Eosinophils, Absolute 0.01 0.00 - 0.40 10*3/mm3    Basophils, Absolute 0.02 0.00 - 0.20 10*3/mm3    Immature Grans, Absolute 0.01 0.00 - 0.05 10*3/mm3    nRBC 0.0 0.0 - 0.2 /100 WBC   Urinalysis With Microscopic If Indicated (No Culture) - Urine, Clean Catch    Collection Time: 11/04/22 10:12 PM    Specimen: Urine, Clean Catch   Result Value Ref Range    Color, UA Yellow Yellow, Straw    Appearance, UA Clear Clear    pH, UA <=5.0 5.0 - 8.0    Specific Gravity, UA 1.019 1.001 - 1.030    Glucose, UA Negative Negative    Ketones, UA Negative Negative    Bilirubin, UA Negative Negative    Blood, UA Negative Negative    Protein, UA Trace (A) Negative    Leuk Esterase, UA Negative Negative    Nitrite, UA Negative Negative    Urobilinogen, UA 1.0 E.U./dL 0.2 - 1.0 E.U./dL     Note: In addition to lab results from this visit, the labs listed above may include labs taken at another facility or during a different encounter within the last 24 hours. Please correlate lab times with ED admission and discharge times for further clarification of the services performed during this visit.    XR Chest 1 View   Final Result   Small to moderate-sized left-sided pleural effusion, chronic, similar as   compared to previous examinations.       This report was finalized on 11/4/2022 4:10 PM by Ann Krause MD.            Vitals:    11/04/22  2202 11/04/22 2203 11/04/22 2205 11/04/22 2230   BP: 125/57 114/62 121/96 108/80   BP Location:       Patient Position: Lying Sitting Standing    Pulse: 65 66 73    Resp:       Temp:       TempSrc:       SpO2:    96%   Weight:       Height:         Medications   sodium chloride 0.9 % bolus 500 mL (0 mL Intravenous Stopped 11/4/22 2215)   benzonatate (TESSALON) capsule 100 mg (100 mg Oral Given 11/4/22 2308)     ECG/EMG Results (last 24 hours)     ** No results found for the last 24 hours. **        No orders to display                                            MDM    Final diagnoses:   Elevated serum creatinine   Influenza   Status post abdominal paracentesis   Hypotensive episode   Abdominal wall hernia       ED Disposition  ED Disposition     ED Disposition   Discharge    Condition   Stable    Comment   --           Recent Results (from the past 24 hour(s))   Covid-19 + Flu A&B AG, Veritor [id number]    Collection Time: 11/04/22  2:41 PM    Specimen: Swab   Result Value Ref Range    COVID19 Not Detected     Influenza A Antigen JITENDRA Detected (A)     Influenza B Antigen JITENDRA Not Detected     Internal Control Passed     Lot Number [id number]     Expiration Date 03/10/2023    Comprehensive Metabolic Panel    Collection Time: 11/04/22  3:53 PM    Specimen: Blood   Result Value Ref Range    Glucose 133 (H) 65 - 99 mg/dL    BUN 26 (H) 8 - 23 mg/dL    Creatinine 1.72 (H) 0.76 - 1.27 mg/dL    Sodium 136 136 - 145 mmol/L    Potassium 4.5 3.5 - 5.2 mmol/L    Chloride 100 98 - 107 mmol/L    CO2 24.0 22.0 - 29.0 mmol/L    Calcium 8.7 8.6 - 10.5 mg/dL    Total Protein 6.1 6.0 - 8.5 g/dL    Albumin 3.50 3.50 - 5.20 g/dL    ALT (SGPT) 8 1 - 41 U/L    AST (SGOT) 25 1 - 40 U/L    Alkaline Phosphatase 75 39 - 117 U/L    Total Bilirubin 1.0 0.0 - 1.2 mg/dL    Globulin 2.6 gm/dL    A/G Ratio 1.3 g/dL    BUN/Creatinine Ratio 15.1 7.0 - 25.0    Anion Gap 12.0 5.0 - 15.0 mmol/L    eGFR 37.8 (L) >60.0 mL/min/1.73   Green Top (Gel)     Collection Time: 11/04/22  3:53 PM   Result Value Ref Range    Extra Tube Hold for add-ons.    Lavender Top    Collection Time: 11/04/22  3:53 PM   Result Value Ref Range    Extra Tube hold for add-on    Gold Top - SST    Collection Time: 11/04/22  3:53 PM   Result Value Ref Range    Extra Tube Hold for add-ons.    Gray Top    Collection Time: 11/04/22  3:53 PM   Result Value Ref Range    Extra Tube Hold for add-ons.    Light Blue Top    Collection Time: 11/04/22  3:53 PM   Result Value Ref Range    Extra Tube Hold for add-ons.    CBC Auto Differential    Collection Time: 11/04/22  3:53 PM    Specimen: Blood   Result Value Ref Range    WBC 5.51 3.40 - 10.80 10*3/mm3    RBC 4.01 (L) 4.14 - 5.80 10*6/mm3    Hemoglobin 12.8 (L) 13.0 - 17.7 g/dL    Hematocrit 39.4 37.5 - 51.0 %    MCV 98.3 (H) 79.0 - 97.0 fL    MCH 31.9 26.6 - 33.0 pg    MCHC 32.5 31.5 - 35.7 g/dL    RDW 14.6 12.3 - 15.4 %    RDW-SD 52.8 37.0 - 54.0 fl    MPV 10.8 6.0 - 12.0 fL    Platelets 107 (L) 140 - 450 10*3/mm3    Neutrophil % 83.6 (H) 42.7 - 76.0 %    Lymphocyte % 6.0 (L) 19.6 - 45.3 %    Monocyte % 9.6 5.0 - 12.0 %    Eosinophil % 0.2 (L) 0.3 - 6.2 %    Basophil % 0.4 0.0 - 1.5 %    Immature Grans % 0.2 0.0 - 0.5 %    Neutrophils, Absolute 4.61 1.70 - 7.00 10*3/mm3    Lymphocytes, Absolute 0.33 (L) 0.70 - 3.10 10*3/mm3    Monocytes, Absolute 0.53 0.10 - 0.90 10*3/mm3    Eosinophils, Absolute 0.01 0.00 - 0.40 10*3/mm3    Basophils, Absolute 0.02 0.00 - 0.20 10*3/mm3    Immature Grans, Absolute 0.01 0.00 - 0.05 10*3/mm3    nRBC 0.0 0.0 - 0.2 /100 WBC   Urinalysis With Microscopic If Indicated (No Culture) - Urine, Clean Catch    Collection Time: 11/04/22 10:12 PM    Specimen: Urine, Clean Catch   Result Value Ref Range    Color, UA Yellow Yellow, Straw    Appearance, UA Clear Clear    pH, UA <=5.0 5.0 - 8.0    Specific Gravity, UA 1.019 1.001 - 1.030    Glucose, UA Negative Negative    Ketones, UA Negative Negative    Bilirubin, UA Negative  Negative    Blood, UA Negative Negative    Protein, UA Trace (A) Negative    Leuk Esterase, UA Negative Negative    Nitrite, UA Negative Negative    Urobilinogen, UA 1.0 E.U./dL 0.2 - 1.0 E.U./dL     Note: In addition to lab results from this visit, the labs listed above may include labs taken at another facility or during a different encounter within the last 24 hours. Please correlate lab times with ED admission and discharge times for further clarification of the services performed during this visit.    XR Chest 1 View   Final Result   Small to moderate-sized left-sided pleural effusion, chronic, similar as   compared to previous examinations.       This report was finalized on 11/4/2022 4:10 PM by Ann Krause MD.            Vitals:    11/04/22 2202 11/04/22 2203 11/04/22 2205 11/04/22 2230   BP: 125/57 114/62 121/96 108/80   BP Location:       Patient Position: Lying Sitting Standing    Pulse: 65 66 73    Resp:       Temp:       TempSrc:       SpO2:    96%   Weight:       Height:         Medications   sodium chloride 0.9 % bolus 500 mL (0 mL Intravenous Stopped 11/4/22 2215)   benzonatate (TESSALON) capsule 100 mg (100 mg Oral Given 11/4/22 2308)     ECG/EMG Results (last 24 hours)     ** No results found for the last 24 hours. **        No orders to display            Boris Winkler DO  11/05/22 0386

## 2022-11-05 ENCOUNTER — TELEPHONE (OUTPATIENT)
Dept: FAMILY MEDICINE CLINIC | Facility: CLINIC | Age: 87
End: 2022-11-05

## 2022-11-05 DIAGNOSIS — J10.1 INFLUENZA A: Primary | ICD-10-CM

## 2022-11-05 RX ORDER — OSELTAMIVIR PHOSPHATE 6 MG/ML
30 FOR SUSPENSION ORAL 2 TIMES DAILY
Qty: 50 ML | Refills: 0 | Status: SHIPPED | OUTPATIENT
Start: 2022-11-05 | End: 2022-11-10

## 2022-11-05 NOTE — TELEPHONE ENCOUNTER
After hours call from patient's wife, patient seen in UC yesterday diagnosed with Flu, sent to ER due to hypotension, given fluids and released. Wife is at pharmacy to  tamiflu, no prescription sent in. I sent in scrip for lower Tamiflu dose 30 mg BID due to CKD; calculated crcl 41.7

## 2022-11-07 ENCOUNTER — TELEPHONE (OUTPATIENT)
Dept: CARDIOLOGY | Facility: HOSPITAL | Age: 87
End: 2022-11-07

## 2022-11-07 NOTE — TELEPHONE ENCOUNTER
----- Message from MAKAYLA Hooper sent at 11/2/2022  9:45 AM EDT -----  Call patient.  His creatinine is elevated from labs 3 months ago.  That is not unusual for his creatinine to go up right before he has his paracentesis which is scheduled this week.  I would like patient to repeat his BMP to check his kidney function in 2 weeks. No change in medications at this time.

## 2022-11-10 ENCOUNTER — LAB (OUTPATIENT)
Dept: LAB | Facility: HOSPITAL | Age: 87
End: 2022-11-10

## 2022-11-10 ENCOUNTER — HOSPITAL ENCOUNTER (OUTPATIENT)
Dept: GENERAL RADIOLOGY | Facility: HOSPITAL | Age: 87
Discharge: HOME | End: 2022-11-10

## 2022-11-10 ENCOUNTER — TELEPHONE (OUTPATIENT)
Dept: FAMILY MEDICINE CLINIC | Facility: CLINIC | Age: 87
End: 2022-11-10

## 2022-11-10 ENCOUNTER — OFFICE VISIT (OUTPATIENT)
Dept: FAMILY MEDICINE CLINIC | Facility: CLINIC | Age: 87
End: 2022-11-10

## 2022-11-10 VITALS
DIASTOLIC BLOOD PRESSURE: 86 MMHG | HEIGHT: 71 IN | BODY MASS INDEX: 28.22 KG/M2 | WEIGHT: 201.6 LBS | HEART RATE: 50 BPM | SYSTOLIC BLOOD PRESSURE: 112 MMHG | OXYGEN SATURATION: 97 %

## 2022-11-10 DIAGNOSIS — I50.812 CHRONIC RIGHT HEART FAILURE: ICD-10-CM

## 2022-11-10 DIAGNOSIS — N18.31 STAGE 3A CHRONIC KIDNEY DISEASE: ICD-10-CM

## 2022-11-10 DIAGNOSIS — K74.60 CIRRHOSIS OF LIVER WITH ASCITES, UNSPECIFIED HEPATIC CIRRHOSIS TYPE: ICD-10-CM

## 2022-11-10 DIAGNOSIS — R73.03 PRE-DIABETES: ICD-10-CM

## 2022-11-10 DIAGNOSIS — N17.9 AKI (ACUTE KIDNEY INJURY): ICD-10-CM

## 2022-11-10 DIAGNOSIS — R73.03 PRE-DIABETES: Primary | ICD-10-CM

## 2022-11-10 DIAGNOSIS — J40 BRONCHITIS: ICD-10-CM

## 2022-11-10 DIAGNOSIS — J10.1 INFLUENZA A: ICD-10-CM

## 2022-11-10 DIAGNOSIS — N18.30 STAGE 3 CHRONIC KIDNEY DISEASE, UNSPECIFIED WHETHER STAGE 3A OR 3B CKD: ICD-10-CM

## 2022-11-10 DIAGNOSIS — R05.2 SUBACUTE COUGH: ICD-10-CM

## 2022-11-10 DIAGNOSIS — R06.02 SHORTNESS OF BREATH: ICD-10-CM

## 2022-11-10 DIAGNOSIS — R18.8 CIRRHOSIS OF LIVER WITH ASCITES, UNSPECIFIED HEPATIC CIRRHOSIS TYPE: ICD-10-CM

## 2022-11-10 LAB
EXPIRATION DATE: ABNORMAL
HBA1C MFR BLD: 6.5 %
Lab: ABNORMAL

## 2022-11-10 PROCEDURE — 85025 COMPLETE CBC W/AUTO DIFF WBC: CPT

## 2022-11-10 PROCEDURE — 80053 COMPREHEN METABOLIC PANEL: CPT

## 2022-11-10 PROCEDURE — 83036 HEMOGLOBIN GLYCOSYLATED A1C: CPT | Performed by: INTERNAL MEDICINE

## 2022-11-10 PROCEDURE — 3044F HG A1C LEVEL LT 7.0%: CPT | Performed by: INTERNAL MEDICINE

## 2022-11-10 PROCEDURE — 71046 X-RAY EXAM CHEST 2 VIEWS: CPT

## 2022-11-10 PROCEDURE — 36415 COLL VENOUS BLD VENIPUNCTURE: CPT

## 2022-11-10 PROCEDURE — 99214 OFFICE O/P EST MOD 30 MIN: CPT | Performed by: INTERNAL MEDICINE

## 2022-11-10 RX ORDER — DOXYCYCLINE HYCLATE 100 MG/1
100 CAPSULE ORAL 2 TIMES DAILY
Qty: 20 CAPSULE | Refills: 0 | Status: SHIPPED | OUTPATIENT
Start: 2022-11-10 | End: 2023-02-08

## 2022-11-10 RX ORDER — FUROSEMIDE 40 MG/1
TABLET ORAL
Qty: 180 TABLET | Refills: 3 | Status: SHIPPED | OUTPATIENT
Start: 2022-11-10

## 2022-11-10 RX ORDER — CEFDINIR 300 MG/1
300 CAPSULE ORAL 2 TIMES DAILY
Qty: 20 CAPSULE | Refills: 0 | Status: SHIPPED | OUTPATIENT
Start: 2022-11-10 | End: 2023-02-08

## 2022-11-10 RX ORDER — ALBUTEROL SULFATE 90 UG/1
2 AEROSOL, METERED RESPIRATORY (INHALATION) EVERY 4 HOURS PRN
Qty: 18 G | Refills: 0 | Status: SHIPPED | OUTPATIENT
Start: 2022-11-10

## 2022-11-10 NOTE — TELEPHONE ENCOUNTER
Caller: [facility] [address] [facility] [address] [facility] [phone] [facility] [phone]     Relationship: Pharmacy      What medications are you currently taking:   Current Outpatient Medications on File Prior to Visit   Medication Sig Dispense Refill   • albuterol sulfate  (90 Base) MCG/ACT inhaler Inhale 2 puffs Every 4 (Four) Hours As Needed for Wheezing. 18 g 0   • apixaban (ELIQUIS) 2.5 MG tablet tablet Take 2.5 mg by mouth 2 (Two) Times a Day. Last dose 3 days ago. From 11-16-21     • benzonatate (TESSALON) 100 MG capsule Take 1 capsule by mouth 3 (Three) Times a Day As Needed for Cough. 8 capsule 0   • carvedilol (COREG) 6.25 MG tablet TAKE 1 TABLET BY MOUTH TWICE DAILY WITH MEALS 60 tablet 10   • cefdinir (OMNICEF) 300 MG capsule Take 1 capsule by mouth 2 (Two) Times a Day. 20 capsule 0   • doxycycline (VIBRAMYCIN) 100 MG capsule Take 1 capsule by mouth 2 (Two) Times a Day. 20 capsule 0   • folic acid (FOLVITE) 400 MCG tablet Take 400 mcg by mouth Daily.     • furosemide (LASIX) 40 MG tablet TAKE 1 TABLET BY MOUTH ONCE DAILY IN THE MORNING, 1/2 TABLET IN THE AFTERNOON AND AS NEEDED FOR WEIGHT GAIN, EDEMA AND DYPNEA 180 tablet 3   • HYDROcod Polst-CPM Polst ER (Tussionex Pennkinetic ER) 10-8 MG/5ML ER suspension Take 5 mL by mouth Every 12 (Twelve) Hours As Needed for Cough. 100 mL 0   • nitroglycerin (NITROSTAT) 0.4 MG SL tablet DISSOLVE ONE TABLET UNDER THE TONGUE EVERY 5 MINUTES AS NEEDED FOR CHEST PAIN.  DO NOT EXCEED A TOTAL OF 3 DOSES IN 15 MINUTES 25 tablet 3   • oseltamivir (TAMIFLU) 6 MG/ML suspension Take 5 mL by mouth 2 (Two) Times a Day for 5 days. 50 mL 0   • simvastatin (ZOCOR) 40 MG tablet Take 1 tablet by mouth Daily. 90 tablet 32   • spironolactone (ALDACTONE) 50 MG tablet Take 1 tablet by mouth once daily 90 tablet 2   • terazosin (HYTRIN) 2 MG capsule Take 1 capsule by mouth Every Night. 90 capsule 3   • [DISCONTINUED] albuterol sulfate  (90  Base) MCG/ACT inhaler Inhale 2 puffs Every 4 (Four) Hours As Needed for Wheezing. 18 g 0   • [DISCONTINUED] doxycycline (VIBRAMYCIN) 100 MG capsule Take 1 capsule by mouth 2 (Two) Times a Day. 20 capsule 0   • [DISCONTINUED] furosemide (LASIX) 40 MG tablet Lasix 40 mg 1 tab in morning, 1/2 tab in the afternoon. And as needed for weight gain, edema and dypnea. 180 tablet 3   • [DISCONTINUED] HYDROcod Polst-CPM Polst ER (Tussionex Pennkinetic ER) 10-8 MG/5ML ER suspension Take 5 mL by mouth Every 12 (Twelve) Hours As Needed for Cough. 100 mL 0     No current facility-administered medications on file prior to visit.          Which medication are you concerned about: CEFDINIR AND DOXYCYCLINE    Who prescribed you this medication: DR GARNER    What are your concerns: PHARMACY STATES THESE MEDICATIONS ARE PRETTY STRONG ANTIBIOTICS AND WANTED TO MAKE SURE DR GARNER WANTED PATIENT TO TAKE THEM BOTH TOGETHER? PLEASE ADVISE AND CALL [facility] BACK

## 2022-11-10 NOTE — PROGRESS NOTES
Jordi Sam  1934  [id number]  Patient Care Team:  Jasper Avery MD as PCP - General (Internal Medicine)  Efrem Posadas MD as Consulting Physician (Cardiology)  Jordi Law MD as Consulting Physician (Urology)  Chapin Villagran MD as Consulting Physician (Otolaryngology)  Gen Laurent MD as Consulting Physician (Gastroenterology)    Jordi Sam is a 88 y.o. male here today for follow up.     This patient is accompanied by their self who contributes to the history of their care.    Chief Complaint:    Chief Complaint   Patient presents with   • Cough   • Transitional Care Management   • Nasal Congestion         History of Present Illness:  I have reviewed and/or updated the patient's past medical, past surgical, family, social history, problem list and allergies as appropriate.     Jordi was seen in the emergency room for abdominal discomfort lightheadedness.  He was found to be hypotensive, slightly elevated creatinine.  He had been coughing which irritated his large inoperable abdominal hernia.  He was started on Tamiflu.  He.  Is here to follow-up this visit.  In addition to that he had just had a large volume paracentesis the day before.      He still still has a bad cough with yellow sptum. There has been no hemptysis. There has been some wheezing an SOA.  The cough hurts his belly. There isn no nausea or vomiting.  He did have some diarrhea while on Tamiflu.  He does not have an inhaler.  No orthopnea no PND.  Denies any recent fevers or chills.  No chest pain.  He completed his Tamiflu.  Denies any s chest pain or pressure.  No sore throat.    Review of Systems   Constitutional: Positive for fatigue.   HENT: Positive for postnasal drip.    Eyes: Negative.    Respiratory: Positive for cough and shortness of breath.    Gastrointestinal: Positive for abdominal distention.       Vitals:    11/10/22 1308   BP: 112/86   Pulse: 50   SpO2: 97%   Weight: 91.4 kg (201 lb  "9.6 oz)   Height: 180.3 cm (70.98\")     Body mass index is 28.13 kg/m².    Physical Exam  Vitals and nursing note reviewed.   Constitutional:       General: He is not in acute distress.     Appearance: Normal appearance. He is well-developed. He is not diaphoretic.   HENT:      Head: Normocephalic and atraumatic.      Right Ear: External ear normal.      Left Ear: External ear normal.      Mouth/Throat:      Pharynx: No oropharyngeal exudate.   Eyes:      General: No scleral icterus.        Right eye: No discharge.      Conjunctiva/sclera: Conjunctivae normal.   Neck:      Thyroid: No thyromegaly.      Vascular: No JVD.      Trachea: No tracheal deviation.   Cardiovascular:      Rate and Rhythm: Normal rate and regular rhythm.      Heart sounds: Normal heart sounds.      Comments: PMI nondisplaced  Pulmonary:      Effort: Pulmonary effort is normal. No respiratory distress.      Breath sounds: Rhonchi present. No wheezing or rales.   Abdominal:      General: Bowel sounds are normal.      Palpations: Abdomen is soft.      Tenderness: There is no abdominal tenderness. There is no guarding or rebound.   Musculoskeletal:      Cervical back: Normal range of motion and neck supple.      Comments: Antalgic gait.  Ambulates with cane   Lymphadenopathy:      Cervical: No cervical adenopathy.   Skin:     General: Skin is warm and dry.      Capillary Refill: Capillary refill takes less than 2 seconds.      Coloration: Skin is not pale.      Findings: No rash.   Neurological:      Mental Status: He is alert and oriented to person, place, and time.      Motor: No abnormal muscle tone.      Coordination: Coordination normal.   Psychiatric:         Judgment: Judgment normal.         Procedures    Results Review:    I reviewed the patient's new clinical results.  Chest x-ray with chronic pleural effusions was noted from the emergency room.  Labs were reviewed as well from recent ER visit.    Assessment/Plan:  Chronically ill " gentleman with history of advanced heart disease, right heart failure congestive hepatopathy chronic kidney disease stage III.  He presented 1 day after paracentesis for hypotension.  He received a fluid bolus.  He was started on Tamiflu which he is completed.    Clinically I suspect he has bronchitis possibly mucopurulent.  His O2 sats are fine.  Have requested a chest x-ray as well as a BMP and CBC.  I placed him on Omnicef as well as doxycycline for 10 days.  Refilled albuterol.    He develops worsening shortness of breath he should present to the emergency room otherwise I like to see how he is doing in 7 days.          Problem List Items Addressed This Visit        Endocrine and Metabolic    Pre-diabetes - Primary    Current Assessment & Plan     A1c stable at 6.5.  Continue current plan         Relevant Orders    Basic metabolic panel    CBC (No Diff)       Genitourinary and Reproductive     Stage 3 chronic kidney disease (HCC)    Relevant Medications    spironolactone (ALDACTONE) 50 MG tablet    furosemide (LASIX) 40 MG tablet       Pulmonary and Pneumonias    Cough    Relevant Medications    HYDROcod Polst-CPM Polst ER (Tussionex Pennkinetic ER) 10-8 MG/5ML ER suspension    Other Relevant Orders    XR Chest PA & Lateral    Shortness of breath       Other    Influenza A    Relevant Medications    oseltamivir (TAMIFLU) 6 MG/ML suspension   Other Visit Diagnoses     MELVI (acute kidney injury) (HCC)        Relevant Orders    Basic metabolic panel    XR Chest PA & Lateral    Bronchitis        Relevant Medications    albuterol sulfate  (90 Base) MCG/ACT inhaler    HYDROcod Polst-CPM Polst ER (Tussionex Pennkinetic ER) 10-8 MG/5ML ER suspension          Plan of care reviewed with patient at the conclusion of today's visit. Education was provided regarding diagnosis and management.  Patient verbalizes understanding of and agreement with management plan.    Return in about 1 week (around 11/17/2022).    Jasper  DEYSI Avery MD      Please note than portions of this note were completed wt a Voice Recognition Program

## 2022-11-11 LAB
ALBUMIN SERPL-MCNC: 3.5 G/DL (ref 3.5–5.2)
ALBUMIN/GLOB SERPL: 1.5 G/DL
ALP SERPL-CCNC: 79 U/L (ref 39–117)
ALT SERPL W P-5'-P-CCNC: 9 U/L (ref 1–41)
ANION GAP SERPL CALCULATED.3IONS-SCNC: 8.2 MMOL/L (ref 5–15)
AST SERPL-CCNC: 26 U/L (ref 1–40)
BASOPHILS # BLD AUTO: 0.02 10*3/MM3 (ref 0–0.2)
BASOPHILS NFR BLD AUTO: 0.4 % (ref 0–1.5)
BILIRUB SERPL-MCNC: 0.6 MG/DL (ref 0–1.2)
BUN SERPL-MCNC: 20 MG/DL (ref 8–23)
BUN/CREAT SERPL: 15.9 (ref 7–25)
CALCIUM SPEC-SCNC: 8.6 MG/DL (ref 8.6–10.5)
CHLORIDE SERPL-SCNC: 99 MMOL/L (ref 98–107)
CO2 SERPL-SCNC: 27.8 MMOL/L (ref 22–29)
CREAT SERPL-MCNC: 1.26 MG/DL (ref 0.76–1.27)
DEPRECATED RDW RBC AUTO: 48.6 FL (ref 37–54)
EGFRCR SERPLBLD CKD-EPI 2021: 54.9 ML/MIN/1.73
EOSINOPHIL # BLD AUTO: 0.05 10*3/MM3 (ref 0–0.4)
EOSINOPHIL NFR BLD AUTO: 0.9 % (ref 0.3–6.2)
ERYTHROCYTE [DISTWIDTH] IN BLOOD BY AUTOMATED COUNT: 13.8 % (ref 12.3–15.4)
GLOBULIN UR ELPH-MCNC: 2.4 GM/DL
GLUCOSE SERPL-MCNC: 80 MG/DL (ref 65–99)
HCT VFR BLD AUTO: 39.5 % (ref 37.5–51)
HGB BLD-MCNC: 13.1 G/DL (ref 13–17.7)
LYMPHOCYTES # BLD AUTO: 0.81 10*3/MM3 (ref 0.7–3.1)
LYMPHOCYTES NFR BLD AUTO: 15.4 % (ref 19.6–45.3)
MCH RBC QN AUTO: 31.8 PG (ref 26.6–33)
MCHC RBC AUTO-ENTMCNC: 33.2 G/DL (ref 31.5–35.7)
MCV RBC AUTO: 95.9 FL (ref 79–97)
MONOCYTES # BLD AUTO: 0.52 10*3/MM3 (ref 0.1–0.9)
MONOCYTES NFR BLD AUTO: 9.9 % (ref 5–12)
NEUTROPHILS NFR BLD AUTO: 3.86 10*3/MM3 (ref 1.7–7)
NEUTROPHILS NFR BLD AUTO: 73.2 % (ref 42.7–76)
PLATELET # BLD AUTO: 111 10*3/MM3 (ref 140–450)
PMV BLD AUTO: 11.3 FL (ref 6–12)
POTASSIUM SERPL-SCNC: 4.8 MMOL/L (ref 3.5–5.2)
PROT SERPL-MCNC: 5.9 G/DL (ref 6–8.5)
RBC # BLD AUTO: 4.12 10*6/MM3 (ref 4.14–5.8)
SODIUM SERPL-SCNC: 135 MMOL/L (ref 136–145)
WBC NRBC COR # BLD: 5.27 10*3/MM3 (ref 3.4–10.8)

## 2022-11-11 NOTE — TELEPHONE ENCOUNTER
Pharmacy notified that PCP intended to send both medications in for the pt. Pharmacists verbalized understanding and did not have any additional questions at this time.

## 2022-11-11 NOTE — PROGRESS NOTES
Your labs have been reviewed.  Your creatinine is back down to normal with an improved filtering function compared to labs 1 week ago.  We will continue to monitor.

## 2022-11-18 ENCOUNTER — TELEPHONE (OUTPATIENT)
Dept: FAMILY MEDICINE CLINIC | Facility: CLINIC | Age: 87
End: 2022-11-18

## 2022-11-18 ENCOUNTER — OFFICE VISIT (OUTPATIENT)
Dept: FAMILY MEDICINE CLINIC | Facility: CLINIC | Age: 87
End: 2022-11-18

## 2022-11-18 VITALS
BODY MASS INDEX: 28.76 KG/M2 | DIASTOLIC BLOOD PRESSURE: 82 MMHG | OXYGEN SATURATION: 93 % | SYSTOLIC BLOOD PRESSURE: 116 MMHG | HEIGHT: 71 IN | WEIGHT: 205.4 LBS | HEART RATE: 63 BPM

## 2022-11-18 DIAGNOSIS — J10.1 INFLUENZA A: ICD-10-CM

## 2022-11-18 DIAGNOSIS — R05.2 SUBACUTE COUGH: Primary | ICD-10-CM

## 2022-11-18 PROCEDURE — 99213 OFFICE O/P EST LOW 20 MIN: CPT | Performed by: INTERNAL MEDICINE

## 2022-11-18 RX ORDER — PROMETHAZINE HYDROCHLORIDE AND CODEINE PHOSPHATE 6.25; 1 MG/5ML; MG/5ML
5 SOLUTION ORAL EVERY 4 HOURS PRN
Qty: 100 ML | Refills: 0 | Status: SHIPPED | OUTPATIENT
Start: 2022-11-18 | End: 2023-02-23

## 2022-11-18 NOTE — PROGRESS NOTES
"Jordi Sam  1934  [id number]  Patient Care Team:  Jasper Avery MD as PCP - General (Internal Medicine)  Efrem Posadas MD as Consulting Physician (Cardiology)  Jordi Law MD as Consulting Physician (Urology)  Chapin Villagran MD as Consulting Physician (Otolaryngology)  Gen Laurent MD as Consulting Physician (Gastroenterology)    Jordi Sam is a 88 y.o. male here today for follow up.     This patient is accompanied by their wifewho contributes to the history of their care.    Chief Complaint:    Chief Complaint   Patient presents with   • Cough     F/U Is doing a better        History of Present Illness:  I have reviewed and/or updated the patient's past medical, past surgical, family, social history, problem list and allergies as appropriate.     This is a 1 week follow-up for Jordi.  He was diagnosed with influenza A.  He looked pretty peaked at ER follow-up.  There was concern about pneumonia however x-ray did not show this.  He was placed on cefdinir and doxycycline.  He is overall feeling much better.  He has 2 3 day antibiotics remaing. His SOA is better. Cough less productive. Denies hemptysis. His wheezing has improved with inhaler.  There is no diarrhea.  His energy level is improving.  Still has some abdominal pain from his large hernia with cough.  No nausea or vomiting.  Review of Systems   Constitutional: Negative.    Eyes: Negative.    Respiratory: Positive for cough.         Wheezing is improved   Cardiovascular: Positive for leg swelling.   Gastrointestinal: Positive for abdominal distention.       Vitals:    11/18/22 0916   BP: 116/82   Pulse: 63   SpO2: 93%   Weight: 93.2 kg (205 lb 6.4 oz)   Height: 180.3 cm (70.98\")     Body mass index is 28.66 kg/m².    Physical Exam  Vitals and nursing note reviewed.   Constitutional:       General: He is not in acute distress.     Appearance: He is well-developed. He is not diaphoretic.   HENT:      Head: " Normocephalic and atraumatic.      Right Ear: External ear normal.      Left Ear: External ear normal.      Mouth/Throat:      Pharynx: No oropharyngeal exudate.   Eyes:      General: No scleral icterus.        Right eye: No discharge.      Conjunctiva/sclera: Conjunctivae normal.   Neck:      Thyroid: No thyromegaly.      Vascular: No JVD.      Trachea: No tracheal deviation.   Cardiovascular:      Rate and Rhythm: Normal rate and regular rhythm.      Heart sounds: Normal heart sounds.      Comments: PMI nondisplaced  Pulmonary:      Effort: Pulmonary effort is normal. No respiratory distress.      Breath sounds: Normal breath sounds. No wheezing or rales.   Abdominal:      General: Bowel sounds are normal. There is no distension.      Palpations: Abdomen is soft.      Tenderness: There is no abdominal tenderness. There is no rebound.      Comments: Hernia belt in place.   Musculoskeletal:      Cervical back: Normal range of motion and neck supple.      Comments: Antalgic gait, ambulates with cane   Lymphadenopathy:      Cervical: No cervical adenopathy.   Skin:     General: Skin is warm and dry.      Capillary Refill: Capillary refill takes less than 2 seconds.      Coloration: Skin is not pale.      Findings: No rash.   Neurological:      Mental Status: He is alert and oriented to person, place, and time.      Motor: No abnormal muscle tone.      Coordination: Coordination normal.   Psychiatric:         Judgment: Judgment normal.         Procedures    Results Review:    I reviewed the patient's new clinical results.    Assessment/Plan:     Problem List Items Addressed This Visit        Pulmonary and Pneumonias    Cough - Primary    Relevant Medications    HYDROcod Polst-CPM Polst ER (Tussionex Pennkinetic ER) 10-8 MG/5ML ER suspension    promethazine-codeine (PHENERGAN with CODEINE) 6.25-10 MG/5ML solution       Other    Influenza A   Jordi is doing much better.  I suspect he had an influenza and bronchitis.   He will complete his antibiotics.  I have changed his cough medicine to Phenergan with codeine as cough is exacerbating his hernia pain.  He will follow-up in 3 months.    Plan of care reviewed with patient at the conclusion of today's visit. Education was provided regarding diagnosis and management.  Patient verbalizes understanding of and agreement with management plan.    Return in about 3 months (around 2/18/2023) for thn/dm/.    Jasper Avery MD      Please note than portions of this note were completed wt a Voice Recognition Program

## 2022-11-18 NOTE — TELEPHONE ENCOUNTER
Pharmacy Name:  [facility]    Pharmacy representative name: JIM    Pharmacy representative phone number: [phone]    What medication are you calling in regards to:  promethazine-codeine (PHENERGAN with CODEINE) 6.25-10 MG/5ML solution    What question does the pharmacy have:  THIS PHARMACY NO LONGER FILLS  PRESCRIPTIONS FOR THIS MEDICATION    Who is the provider that prescribed the medication: DR GARNER    Additional notes:

## 2022-11-29 NOTE — PROGRESS NOTES
Patient called and stated he was very sob with any kind of exertion.  It was almost 4pm and I told him to head to the ER. He said he would.   Negative

## 2022-12-02 ENCOUNTER — PREP FOR SURGERY (OUTPATIENT)
Dept: OTHER | Facility: HOSPITAL | Age: 87
End: 2022-12-02

## 2022-12-02 DIAGNOSIS — K74.60 CIRRHOSIS OF LIVER WITH ASCITES, UNSPECIFIED HEPATIC CIRRHOSIS TYPE: Primary | ICD-10-CM

## 2022-12-02 DIAGNOSIS — R18.8 CIRRHOSIS OF LIVER WITH ASCITES, UNSPECIFIED HEPATIC CIRRHOSIS TYPE: Primary | ICD-10-CM

## 2022-12-02 RX ORDER — ALBUMIN (HUMAN) 12.5 G/50ML
12.5 SOLUTION INTRAVENOUS ONCE
Status: CANCELLED | OUTPATIENT
Start: 2022-12-02 | End: 2022-12-02

## 2022-12-20 ENCOUNTER — HOSPITAL ENCOUNTER (OUTPATIENT)
Dept: ULTRASOUND IMAGING | Facility: HOSPITAL | Age: 87
Discharge: HOME | End: 2022-12-20
Admitting: RADIOLOGY

## 2022-12-20 VITALS
SYSTOLIC BLOOD PRESSURE: 116 MMHG | OXYGEN SATURATION: 98 % | HEART RATE: 56 BPM | BODY MASS INDEX: 29.5 KG/M2 | RESPIRATION RATE: 16 BRPM | DIASTOLIC BLOOD PRESSURE: 64 MMHG | TEMPERATURE: 97.8 F | WEIGHT: 211.4 LBS

## 2022-12-20 DIAGNOSIS — R18.8 CIRRHOSIS OF LIVER WITH ASCITES, UNSPECIFIED HEPATIC CIRRHOSIS TYPE: ICD-10-CM

## 2022-12-20 DIAGNOSIS — K74.60 CIRRHOSIS OF LIVER WITH ASCITES, UNSPECIFIED HEPATIC CIRRHOSIS TYPE: ICD-10-CM

## 2022-12-20 LAB
ALBUMIN FLD-MCNC: 2 G/DL
APPEARANCE FLD: CLEAR
BASOPHILS # BLD AUTO: 0.04 10*3/MM3 (ref 0–0.2)
BASOPHILS NFR BLD AUTO: 0.8 % (ref 0–1.5)
COLOR FLD: YELLOW
DEPRECATED RDW RBC AUTO: 53.6 FL (ref 37–54)
EOSINOPHIL # BLD AUTO: 0.1 10*3/MM3 (ref 0–0.4)
EOSINOPHIL NFR BLD AUTO: 1.9 % (ref 0.3–6.2)
ERYTHROCYTE [DISTWIDTH] IN BLOOD BY AUTOMATED COUNT: 15.2 % (ref 12.3–15.4)
GLUCOSE BLDC GLUCOMTR-MCNC: 102 MG/DL (ref 70–130)
HCT VFR BLD AUTO: 36.3 % (ref 37.5–51)
HGB BLD-MCNC: 11.6 G/DL (ref 13–17.7)
IMM GRANULOCYTES # BLD AUTO: 0.01 10*3/MM3 (ref 0–0.05)
IMM GRANULOCYTES NFR BLD AUTO: 0.2 % (ref 0–0.5)
INR PPP: 1.2 (ref 0.84–1.13)
LYMPHOCYTES # BLD AUTO: 0.6 10*3/MM3 (ref 0.7–3.1)
LYMPHOCYTES NFR BLD AUTO: 11.5 % (ref 19.6–45.3)
LYMPHOCYTES NFR FLD MANUAL: 19 %
MACROPHAGE FLUID: 75 %
MCH RBC QN AUTO: 31.1 PG (ref 26.6–33)
MCHC RBC AUTO-ENTMCNC: 32 G/DL (ref 31.5–35.7)
MCV RBC AUTO: 97.3 FL (ref 79–97)
MESOTHL CELL NFR FLD MANUAL: 2 %
MONOCYTES # BLD AUTO: 0.4 10*3/MM3 (ref 0.1–0.9)
MONOCYTES NFR BLD AUTO: 7.7 % (ref 5–12)
NEUTROPHILS NFR BLD AUTO: 4.06 10*3/MM3 (ref 1.7–7)
NEUTROPHILS NFR BLD AUTO: 77.9 % (ref 42.7–76)
NEUTROPHILS NFR FLD MANUAL: 4 %
NRBC BLD AUTO-RTO: 0 /100 WBC (ref 0–0.2)
PLATELET # BLD AUTO: 140 10*3/MM3 (ref 140–450)
PMV BLD AUTO: 11 FL (ref 6–12)
PROTHROMBIN TIME: 15.1 SECONDS (ref 11.4–14.4)
RBC # BLD AUTO: 3.73 10*6/MM3 (ref 4.14–5.8)
RBC # FLD AUTO: <2000 /MM3
WBC # FLD AUTO: 227 /MM3
WBC NRBC COR # BLD: 5.21 10*3/MM3 (ref 3.4–10.8)

## 2022-12-20 PROCEDURE — 76942 ECHO GUIDE FOR BIOPSY: CPT

## 2022-12-20 PROCEDURE — 82042 OTHER SOURCE ALBUMIN QUAN EA: CPT | Performed by: NURSE PRACTITIONER

## 2022-12-20 PROCEDURE — 85610 PROTHROMBIN TIME: CPT | Performed by: RADIOLOGY

## 2022-12-20 PROCEDURE — 89051 BODY FLUID CELL COUNT: CPT | Performed by: NURSE PRACTITIONER

## 2022-12-20 PROCEDURE — C1729 CATH, DRAINAGE: HCPCS

## 2022-12-20 PROCEDURE — 82962 GLUCOSE BLOOD TEST: CPT

## 2022-12-20 PROCEDURE — 85025 COMPLETE CBC W/AUTO DIFF WBC: CPT | Performed by: RADIOLOGY

## 2022-12-20 RX ORDER — LIDOCAINE HYDROCHLORIDE 10 MG/ML
5 INJECTION, SOLUTION EPIDURAL; INFILTRATION; INTRACAUDAL; PERINEURAL ONCE
Status: DISCONTINUED | OUTPATIENT
Start: 2022-12-20 | End: 2022-12-21 | Stop reason: HOSPADM

## 2022-12-20 RX ORDER — ALBUMIN (HUMAN) 12.5 G/50ML
12.5 SOLUTION INTRAVENOUS ONCE
Status: DISCONTINUED | OUTPATIENT
Start: 2022-12-20 | End: 2022-12-21 | Stop reason: HOSPADM

## 2022-12-20 NOTE — NURSING NOTE
US guided paracentesis performed by Dr Roberts.  3 Liters of fluid removed from peritoneum. Patient tolerated well. Report called to RAVINDRA.

## 2022-12-21 ENCOUNTER — TELEPHONE (OUTPATIENT)
Dept: INFUSION THERAPY | Facility: HOSPITAL | Age: 87
End: 2022-12-21

## 2023-01-13 ENCOUNTER — PREP FOR SURGERY (OUTPATIENT)
Dept: OTHER | Facility: HOSPITAL | Age: 88
End: 2023-01-13
Payer: MEDICARE

## 2023-01-13 DIAGNOSIS — K74.60 CIRRHOSIS OF LIVER WITH ASCITES, UNSPECIFIED HEPATIC CIRRHOSIS TYPE: Primary | ICD-10-CM

## 2023-01-13 DIAGNOSIS — R18.8 OTHER ASCITES: ICD-10-CM

## 2023-01-13 DIAGNOSIS — R18.8 CIRRHOSIS OF LIVER WITH ASCITES, UNSPECIFIED HEPATIC CIRRHOSIS TYPE: Primary | ICD-10-CM

## 2023-01-13 RX ORDER — ALBUMIN (HUMAN) 12.5 G/50ML
12.5 SOLUTION INTRAVENOUS ONCE
Status: CANCELLED | OUTPATIENT
Start: 2023-01-13 | End: 2023-01-13

## 2023-01-17 ENCOUNTER — OFFICE VISIT (OUTPATIENT)
Dept: GASTROENTEROLOGY | Facility: CLINIC | Age: 88
End: 2023-01-17
Payer: MEDICARE

## 2023-01-17 VITALS
TEMPERATURE: 96.9 F | BODY MASS INDEX: 32.17 KG/M2 | DIASTOLIC BLOOD PRESSURE: 87 MMHG | SYSTOLIC BLOOD PRESSURE: 111 MMHG | HEIGHT: 71 IN | WEIGHT: 229.8 LBS | HEART RATE: 67 BPM

## 2023-01-17 DIAGNOSIS — R18.8 CIRRHOSIS OF LIVER WITH ASCITES, UNSPECIFIED HEPATIC CIRRHOSIS TYPE: Primary | ICD-10-CM

## 2023-01-17 DIAGNOSIS — K74.60 CIRRHOSIS OF LIVER WITH ASCITES, UNSPECIFIED HEPATIC CIRRHOSIS TYPE: Primary | ICD-10-CM

## 2023-01-17 PROCEDURE — 99214 OFFICE O/P EST MOD 30 MIN: CPT | Performed by: NURSE PRACTITIONER

## 2023-01-17 NOTE — PROGRESS NOTES
GASTROENTEROLOGY OFFICE NOTE  Jordi Sam  [id number]  1934    CARE TEAM  Patient Care Team:  Jasper Avery MD as PCP - General (Internal Medicine)  Efrem Posadas MD as Consulting Physician (Cardiology)  Jordi Law MD as Consulting Physician (Urology)  Chapin Villagran MD as Consulting Physician (Otolaryngology)  Gen Laurent MD as Consulting Physician (Gastroenterology)    Referring Provider: Jasper Avery MD    Chief Complaint   Patient presents with   • Hepatic Disease        HISTORY OF PRESENT ILLNESS:  Mr. 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    He continues on Lasix 40 mg in the morning and 20 mg in the evening and Aldactone 50 mg daily as directed by cardiology.  His last paracentesis was on December 20, he is scheduled for another paracentesis next week.  He is requiring a paracentesis about every 6 weeks.  He is following a 2 g sodium diet.    He denies any evidence of GI bleeding.  He has not had a surveillance EGD for esophageal or gastric varices as the risk versus the benefit is greater for EGD.  He is currently taking Coreg secondary to his cardiac history.    He denies any changes in his state of consciousness, intellectual function, personality or behavior and has no previous history of hepatic encephalopathy.    PAST MEDICAL HISTORY  Past Medical History:   Diagnosis Date   • A-fib (HCC)    • Acute inferior myocardial infarction (HCC)     Acute inferior STEMI with RV infarct features, January 2009.    • CAD (coronary artery disease)    • COVID-19 08/25/2022   • Dyslipidemia    • Goiter     History of “goiter.”   • Hernia, umbilical    • Hyperlipidemia    • Hypertension    • Nephrolithiasis    • Type 2 diabetes mellitus  (HCC)    • Umbilical hernia     Pt stated has recently returned. Seeing Dr Kiersten Nova and Dr Jasper Avery 8-2021        PAST SURGICAL HISTORY  Past Surgical History:   Procedure Laterality Date   • BRONCHOSCOPY N/A 4/12/2019    Procedure: BRONCHOSCOPY WITH THORACENTESIS;  Surgeon: Marciano Higginbotham MD;  Location:  [facility];  Service: Pulmonary   • CARDIAC CATHETERIZATION Bilateral 1/30/2019    Procedure: Right and Left Heart Cath;  Surgeon: Efrem Posadas MD;  Location:  [facility];  Service: Cardiology   • CARDIAC CATHETERIZATION     • CHOLECYSTECTOMY     • CORONARY ANGIOPLASTY WITH STENT PLACEMENT  01/09/2009    Sirolimus eluting stents to the RCA, 01/09/2009.    • HERNIA REPAIR      Hernia repair x 2.    • PARACENTESIS  06/17/2019    multiple   • UMBILICAL HERNIA REPAIR N/A 7/26/2020    Procedure: UMBILICAL HERNIA REPAIR;  Surgeon: Maribell Her MD;  Location:  [facility];  Service: General;  Laterality: N/A;        MEDICATIONS:    Current Outpatient Medications:   •  albuterol sulfate  (90 Base) MCG/ACT inhaler, Inhale 2 puffs Every 4 (Four) Hours As Needed for Wheezing., Disp: 18 g, Rfl: 0  •  apixaban (ELIQUIS) 2.5 MG tablet tablet, Take 2.5 mg by mouth 2 (Two) Times a Day. Last dose 3 days ago. From 11-16-21, Disp: , Rfl:   •  benzonatate (TESSALON) 100 MG capsule, Take 1 capsule by mouth 3 (Three) Times a Day As Needed for Cough., Disp: 8 capsule, Rfl: 0  •  carvedilol (COREG) 6.25 MG tablet, TAKE 1 TABLET BY MOUTH TWICE DAILY WITH MEALS, Disp: 60 tablet, Rfl: 10  •  cefdinir (OMNICEF) 300 MG capsule, Take 1 capsule by mouth 2 (Two) Times a Day., Disp: 20 capsule, Rfl: 0  •  folic acid (FOLVITE) 400 MCG tablet, Take 400 mcg by mouth Daily., Disp: , Rfl:   •  furosemide (LASIX) 40 MG tablet, TAKE 1 TABLET BY MOUTH ONCE DAILY IN THE MORNING, 1/2 TABLET IN THE AFTERNOON AND AS NEEDED FOR WEIGHT GAIN, EDEMA AND DYPNEA, Disp: 180 tablet, Rfl: 3  •  HYDROcod  Polst-CPM Polst ER (Tussionex Pennkinetic ER) 10-8 MG/5ML ER suspension, Take 5 mL by mouth Every 12 (Twelve) Hours As Needed for Cough., Disp: 100 mL, Rfl: 0  •  nitroglycerin (NITROSTAT) 0.4 MG SL tablet, DISSOLVE ONE TABLET UNDER THE TONGUE EVERY 5 MINUTES AS NEEDED FOR CHEST PAIN.  DO NOT EXCEED A TOTAL OF 3 DOSES IN 15 MINUTES, Disp: 25 tablet, Rfl: 3  •  promethazine-codeine (PHENERGAN with CODEINE) 6.25-10 MG/5ML solution, Take 5 mL by mouth Every 4 (Four) Hours As Needed for Cough., Disp: 100 mL, Rfl: 0  •  simvastatin (ZOCOR) 40 MG tablet, Take 1 tablet by mouth Daily., Disp: 90 tablet, Rfl: 32  •  spironolactone (ALDACTONE) 50 MG tablet, Take 1 tablet by mouth once daily, Disp: 90 tablet, Rfl: 2  •  terazosin (HYTRIN) 2 MG capsule, Take 1 capsule by mouth Every Night., Disp: 90 capsule, Rfl: 3  •  doxycycline (VIBRAMYCIN) 100 MG capsule, Take 1 capsule by mouth 2 (Two) Times a Day., Disp: 20 capsule, Rfl: 0    ALLERGIES  Allergies   Allergen Reactions   • Bee Venom Swelling   • Sulfamethoxazole-Trimethoprim Other (See Comments)     Pt unaware       FAMILY HISTORY:  Family History   Problem Relation Age of Onset   • Dementia Mother    • Stroke Mother    • Heart disease Father    • Heart attack Father    • Aneurysm Father    • Cancer Sister    • No Known Problems Brother    • No Known Problems Sister    • Heart disease Brother         CABG   • Hypertension Daughter    • Hypertension Son    • Colon cancer Neg Hx    • Colon polyps Neg Hx        SOCIAL HISTORY  Social History     Socioeconomic History   • Marital status:      Spouse name: Melly   • Number of children: 4   Tobacco Use   • Smoking status: Former     Packs/day: 2.50     Years: 28.00     Pack years: 70.00     Types: Cigarettes     Quit date: 1983     Years since quittin.7   • Smokeless tobacco: Never   Vaping Use   • Vaping Use: Never used   Substance and Sexual Activity   • Alcohol use: No   • Drug use: No   • Sexual activity: Not  "Currently         PHYSICAL EXAM   /87 (BP Location: Right arm, Patient Position: Sitting, Cuff Size: Adult)   Pulse 67   Temp 96.9 °F (36.1 °C) (Temporal)   Ht 180.3 cm (70.98\")   Wt 104 kg (229 lb 12.8 oz)   BMI 32.07 kg/m²   Physical Exam  Constitutional:       Appearance: Normal appearance.   HENT:      Head: Normocephalic and atraumatic.   Pulmonary:      Effort: Pulmonary effort is normal.   Abdominal:      General: There is distension.      Hernia: A hernia is present.   Musculoskeletal:      Right lower le+ Pitting Edema present.      Left lower le+ Pitting Edema present.   Neurological:      Mental Status: He is alert and oriented to person, place, and time.   Psychiatric:         Mood and Affect: Mood normal.         Thought Content: Thought content normal.           Results Review:  DATE OF EXAM: 2022 10:11 AM     PROCEDURE: CT ABDOMEN PELVIS W WO CONTRAST-     INDICATIONS: gross hematuria workup; R31.0-Gross hematuria     COMPARISON: CT abdomen and pelvis 2021     TECHNIQUE: Routine transaxial slices were obtained through the abdomen  and pelvis without the administration of intravenous contrast.  Additional scanning through the abdomen and pelvis was performed using  split bolus technique following the intravenous administration of 150 mL  of Isovue 370. Reconstructed coronal and sagittal images were also  obtained. Automated exposure control and iterative construction methods  were used.     The radiation dose reduction device was turned on for each scan per the  ALARA (As Low as Reasonably Achievable) protocol.     FINDINGS:  Unremarkable noncontrast appearance of the kidneys, without evidence of  nephrolithiasis. No evidence of ureteral stone. Following contrast, the  kidneys demonstrate symmetric enhancement and excretion. A couple small  simple appearing left renal cysts are noted. No solid or suspicious  renal cortical lesion is identified. There is no discrete or " suspicious  filling defect within the bilateral renal collecting systems, noting  that large segments of the ureters are nonopacified owing to ureteral  peristalsis. Suboptimal assessment of the urinary bladder owing to  decompressed state. There is suggestion of abnormal diffuse bladder wall  thickening greater than expected for decompressed state, with tiny  left-sided bladder diverticulum (series 901 image 83). Bladder  trabeculations are noted. The prostate measures 4.9 cm in maximal  transverse dimension. Unremarkable appearing seminal vesicles. Normal  appearance of the adrenal glands.     Partially imaged lower thorax demonstrates coronary artery stents and  enlarged heart. There is a small left pleural effusion with left basilar  atelectasis with stable appearance of left lower lobe round atelectasis.  The right lung base appears grossly clear. Redemonstration of  decompensated liver cirrhosis with shrunken and nodular liver  morphology, hypertrophied caudate lobe, and small to moderate volume  ascites. No focal hepatic lesion. The gallbladder is surgically absent.  Unremarkable appearance of the bile ducts. Unremarkable appearance of  the spleen, top normal in size. There is fatty infiltration of the  pancreas without pancreatic ductal dilatation. There is sigmoid colonic  diverticulosis without evidence of diverticulitis. There is  circumferential wall thickening of the distal esophagus, nonspecific and  possibly sequelae of GERD. Otherwise unremarkable appearance of the GI  tract. There has been interval enlargement of ventral/umbilical hernia  containing unremarkable appearing small bowel loop, fat, and ascitic  fluid. The hernia sac measures 6.5 x 17.5 x 11.3 cm (AP X TV X CC). No  evidence of bowel strain granulation/obstruction. Redemonstration of  bilateral inguinal hernias containing fat and ascitic fluid on the left  there is severe atherosclerosis of the abdominal aorta which appears  fusiform and  ectatic. No definite peritoneal enhancement. No  lymphadenopathy. No acute osseous findings. Moderate degenerative disc  disease in the lumbar spine.     IMPRESSION:  Suboptimal assessment of the decompressed bladder. There is greater than  expected circumferential wall thickening of the urinary bladder with  bladder trabeculations and at least one small diverticulum. Correlate  with urinalysis to exclude cystitis. Essentially normal appearance of  the kidneys and ureters without renal or ureteral findings to explain  reported hematuria.     Decompensated liver cirrhosis with small to moderate ascites, the  sterility of which cannot be ascertained on imaging alone, however  without evidence of discrete peritoneal enhancement.     Interval enlargement of ventral/umbilical hernia containing unremarkable  appearing small bowel loop and ascitic fluid.     Additional chronic and incidental findings as detailed above.     This report was finalized on 7/14/2022 10:54 AM by Flaco Campo MD    ASSESSMENT / PLAN  Diagnoses and all orders for this visit:    1. Cirrhosis of liver with ascites, unspecified hepatic cirrhosis type (HCC) (Primary)  -     AFP Tumor Marker; Future  -     CBC & Differential; Future  -     Comprehensive Metabolic Panel; Future  -     Protime-INR; Future        Return in about 6 months (around 7/17/2023).    I discussed the patients findings and my recommendations with patient    MAKAYLA Jimenez

## 2023-01-25 ENCOUNTER — HOSPITAL ENCOUNTER (OUTPATIENT)
Dept: CT IMAGING | Facility: HOSPITAL | Age: 88
Discharge: HOME | End: 2023-01-25
Admitting: NURSE PRACTITIONER
Payer: MEDICARE

## 2023-01-25 VITALS
RESPIRATION RATE: 18 BRPM | OXYGEN SATURATION: 95 % | HEART RATE: 73 BPM | SYSTOLIC BLOOD PRESSURE: 117 MMHG | HEIGHT: 70 IN | DIASTOLIC BLOOD PRESSURE: 69 MMHG | WEIGHT: 229 LBS | BODY MASS INDEX: 32.78 KG/M2 | TEMPERATURE: 97.7 F

## 2023-01-25 DIAGNOSIS — R18.8 CIRRHOSIS OF LIVER WITH ASCITES, UNSPECIFIED HEPATIC CIRRHOSIS TYPE: ICD-10-CM

## 2023-01-25 DIAGNOSIS — K74.60 CIRRHOSIS OF LIVER WITH ASCITES, UNSPECIFIED HEPATIC CIRRHOSIS TYPE: ICD-10-CM

## 2023-01-25 LAB
ALBUMIN FLD-MCNC: 2 G/DL
ANION GAP SERPL CALCULATED.3IONS-SCNC: 10 MMOL/L (ref 5–15)
APPEARANCE FLD: ABNORMAL
BASOPHILS # BLD AUTO: 0.03 10*3/MM3 (ref 0–0.2)
BASOPHILS NFR BLD AUTO: 0.6 % (ref 0–1.5)
BUN SERPL-MCNC: 24 MG/DL (ref 8–23)
BUN/CREAT SERPL: 16 (ref 7–25)
CALCIUM SPEC-SCNC: 8.9 MG/DL (ref 8.6–10.5)
CHLORIDE SERPL-SCNC: 101 MMOL/L (ref 98–107)
CO2 SERPL-SCNC: 26 MMOL/L (ref 22–29)
COLOR FLD: YELLOW
CREAT SERPL-MCNC: 1.5 MG/DL (ref 0.76–1.27)
DEPRECATED RDW RBC AUTO: 51.8 FL (ref 37–54)
EGFRCR SERPLBLD CKD-EPI 2021: 44.5 ML/MIN/1.73
EOSINOPHIL # BLD AUTO: 0.1 10*3/MM3 (ref 0–0.4)
EOSINOPHIL NFR BLD AUTO: 2 % (ref 0.3–6.2)
ERYTHROCYTE [DISTWIDTH] IN BLOOD BY AUTOMATED COUNT: 14.4 % (ref 12.3–15.4)
GLUCOSE SERPL-MCNC: 125 MG/DL (ref 65–99)
HCT VFR BLD AUTO: 35.3 % (ref 37.5–51)
HGB BLD-MCNC: 11.5 G/DL (ref 13–17.7)
IMM GRANULOCYTES # BLD AUTO: 0.02 10*3/MM3 (ref 0–0.05)
IMM GRANULOCYTES NFR BLD AUTO: 0.4 % (ref 0–0.5)
INR PPP: 1.39 (ref 0.84–1.13)
LYMPHOCYTES # BLD AUTO: 0.55 10*3/MM3 (ref 0.7–3.1)
LYMPHOCYTES NFR BLD AUTO: 11.1 % (ref 19.6–45.3)
LYMPHOCYTES NFR FLD MANUAL: 9 %
MACROPHAGE FLUID: 56 %
MCH RBC QN AUTO: 31.5 PG (ref 26.6–33)
MCHC RBC AUTO-ENTMCNC: 32.6 G/DL (ref 31.5–35.7)
MCV RBC AUTO: 96.7 FL (ref 79–97)
MESOTHL CELL NFR FLD MANUAL: 3 %
MONOCYTES # BLD AUTO: 0.47 10*3/MM3 (ref 0.1–0.9)
MONOCYTES NFR BLD AUTO: 9.5 % (ref 5–12)
MONOCYTES NFR FLD: 26 %
NEUTROPHILS NFR BLD AUTO: 3.77 10*3/MM3 (ref 1.7–7)
NEUTROPHILS NFR BLD AUTO: 76.4 % (ref 42.7–76)
NEUTROPHILS NFR FLD MANUAL: 6 %
NRBC BLD AUTO-RTO: 0 /100 WBC (ref 0–0.2)
PLATELET # BLD AUTO: 116 10*3/MM3 (ref 140–450)
PMV BLD AUTO: 10.5 FL (ref 6–12)
POTASSIUM SERPL-SCNC: 4.4 MMOL/L (ref 3.5–5.2)
PROTHROMBIN TIME: 17 SECONDS (ref 11.4–14.4)
RBC # BLD AUTO: 3.65 10*6/MM3 (ref 4.14–5.8)
RBC # FLD AUTO: <2000 /MM3
SODIUM SERPL-SCNC: 137 MMOL/L (ref 136–145)
WBC # FLD AUTO: 117 /MM3
WBC NRBC COR # BLD: 4.94 10*3/MM3 (ref 3.4–10.8)

## 2023-01-25 PROCEDURE — 89051 BODY FLUID CELL COUNT: CPT | Performed by: NURSE PRACTITIONER

## 2023-01-25 PROCEDURE — 75989 ABSCESS DRAINAGE UNDER X-RAY: CPT

## 2023-01-25 PROCEDURE — 85610 PROTHROMBIN TIME: CPT | Performed by: RADIOLOGY

## 2023-01-25 PROCEDURE — 0 LIDOCAINE 1 % SOLUTION: Performed by: RADIOLOGY

## 2023-01-25 PROCEDURE — P9047 ALBUMIN (HUMAN), 25%, 50ML: HCPCS | Performed by: NURSE PRACTITIONER

## 2023-01-25 PROCEDURE — 80048 BASIC METABOLIC PNL TOTAL CA: CPT | Performed by: RADIOLOGY

## 2023-01-25 PROCEDURE — 25010000002 ALBUMIN HUMAN 25% PER 50 ML: Performed by: NURSE PRACTITIONER

## 2023-01-25 PROCEDURE — 82042 OTHER SOURCE ALBUMIN QUAN EA: CPT | Performed by: NURSE PRACTITIONER

## 2023-01-25 PROCEDURE — 85025 COMPLETE CBC W/AUTO DIFF WBC: CPT | Performed by: RADIOLOGY

## 2023-01-25 RX ORDER — ALBUMIN (HUMAN) 12.5 G/50ML
12.5 SOLUTION INTRAVENOUS ONCE
Status: COMPLETED | OUTPATIENT
Start: 2023-01-25 | End: 2023-01-25

## 2023-01-25 RX ORDER — LIDOCAINE HYDROCHLORIDE 10 MG/ML
20 INJECTION, SOLUTION INFILTRATION; PERINEURAL ONCE
Status: COMPLETED | OUTPATIENT
Start: 2023-01-25 | End: 2023-01-25

## 2023-01-25 RX ADMIN — LIDOCAINE HYDROCHLORIDE 10 ML: 10 INJECTION, SOLUTION INFILTRATION; PERINEURAL at 11:00

## 2023-01-25 RX ADMIN — ALBUMIN (HUMAN) 12.5 G: 0.25 INJECTION, SOLUTION INTRAVENOUS at 11:33

## 2023-01-25 NOTE — NURSING NOTE
CT guided paracentesis performed by Dr Heck.  6.3 Liters of fluid removed from peritoneum. Patient tolerated well. Report called to RAVINDRA.

## 2023-01-25 NOTE — PRE-PROCEDURE NOTE
[facility]   Vascular Interventional Radiology  History & Physicial    Pertinent information including components of history, physical examination, review of systems, lab and imaging data, and impression and plan from this source was also reviewed for the creation of the following pre-procedural note: Progress Notes by Nilton Jade APRN (2023 11:00)         Patient Name:Jordi Sam    : 1934    MRN: [id number]    Primary Care Physician: Jasper Avery MD    Referring Physician: MAKAYLA Stringer     Date of admission: 2023    Subjective     Reason for Consult: Para    History of Present Illness     Jordi Sam is a 88 y.o. male referred to IR as noted above.      Active Hospital Problems:  There are no active hospital problems to display for this patient.      Personal History     Past Medical History:   Diagnosis Date   • A-fib (HCC)    • Acute inferior myocardial infarction (HCC)     Acute inferior STEMI with RV infarct features, 2009.    • CAD (coronary artery disease)    • COVID-19 2022   • Dyslipidemia    • Goiter     History of “goiter.”   • Hernia, umbilical    • Hyperlipidemia    • Hypertension    • Nephrolithiasis    • Type 2 diabetes mellitus (HCC)    • Umbilical hernia     Pt stated has recently returned. Seeing Dr Kiersten Nova and Dr Jasper Avery        Past Surgical History:   Procedure Laterality Date   • BRONCHOSCOPY N/A 2019    Procedure: BRONCHOSCOPY WITH THORACENTESIS;  Surgeon: Marciano Higginbotham MD;  Location:  [facility];  Service: Pulmonary   • CARDIAC CATHETERIZATION Bilateral 2019    Procedure: Right and Left Heart Cath;  Surgeon: Efrem Posadas MD;  Location:  [facility];  Service: Cardiology   • CARDIAC CATHETERIZATION     • CHOLECYSTECTOMY     • CORONARY ANGIOPLASTY WITH STENT PLACEMENT  2009    Sirolimus eluting stents to the RCA, 2009.    • HERNIA REPAIR      Hernia  "repair x 2.    • PARACENTESIS  06/17/2019    multiple   • UMBILICAL HERNIA REPAIR N/A 7/26/2020    Procedure: UMBILICAL HERNIA REPAIR;  Surgeon: Maribell Her MD;  Location: [facility];  Service: General;  Laterality: N/A;       Family History: His family history includes Aneurysm in his father; Cancer in his sister; Dementia in his mother; Heart attack in his father; Heart disease in his brother and father; Hypertension in his daughter and son; No Known Problems in his brother and sister; Stroke in his mother.     Social History: He  reports that he quit smoking about 39 years ago. His smoking use included cigarettes. He has a 70.00 pack-year smoking history. He has never used smokeless tobacco. He reports that he does not drink alcohol and does not use drugs.    Home Medications:  HYDROcod Polst-CPM Polst ER, albuterol sulfate HFA, apixaban, benzonatate, carvedilol, cefdinir, doxycycline, folic acid, furosemide, nitroglycerin, promethazine-codeine, simvastatin, spironolactone, and terazosin    Current Medications:  •  albumin human     Allergies:  He is allergic to bee venom and sulfamethoxazole-trimethoprim.    Review of Systems    IR Procedure pertinent significant findings are mentioned in the PMH and PSH above.    Objective     Visit Vitals  /76   Pulse 70   Temp 97.7 °F (36.5 °C)   Resp 14   Ht 177.8 cm (70\")   Wt 104 kg (229 lb)   SpO2 97%   BMI 32.86 kg/m²        Physical Exam    A&Ox3.   Able to communicate  No Apparent Distress  Average physique   CVS: Regular rate and rhythm  Respiratory: Non labored breathing. No signs of respiratory compromise.    Result Review      I have personally reviewed the results from the time of this admission to 1/25/2023 10:46 EST and agree with these findings.  [x]  Laboratory  []  Microbiology  [x]  Radiology  []  EKG/Telemetry   []  Cardiology/Vascular   []  Pathology  []  Old records  []  Other:    Most notable findings include: As noted:    Results from " last 7 days   Lab Units 01/25/23  0905   INR  1.39*   HEMOGLOBIN g/dL 11.5*   HEMATOCRIT % 35.3*   PLATELETS 10*3/mm3 116*       Estimated Creatinine Clearance: 41.1 mL/min (A) (by C-G formula based on SCr of 1.5 mg/dL (H)).   Creatinine   Date Value Ref Range Status   01/25/2023 1.50 (H) 0.76 - 1.27 mg/dL Final       COVID19   Date Value Ref Range Status   11/04/2022 Not Detected  Final        No results found for: PREGTESTUR, PREGSERUM, HCG, HCGQUANT     ASA SCALE ASSESSMENT (applicable ONLY if sedation planned):        MALLAMPATI CLASSIFICATION (applicable ONLY if sedation planned):       Assessment / Plan     Jordi Sam is a 88 y.o. male referred to the IR service with above problem.    Plan:   As above.    Notice: The note was created before the performance of the procedure. It might have been left in the pending status and signed off after the procedure. The time stamp on the note may be misleading.    David Heck MD   Vascular Interventional Radiology  01/25/23   10:46 AM EST

## 2023-01-26 ENCOUNTER — TELEPHONE (OUTPATIENT)
Dept: INFUSION THERAPY | Facility: HOSPITAL | Age: 88
End: 2023-01-26
Payer: MEDICARE

## 2023-02-06 ENCOUNTER — TELEPHONE (OUTPATIENT)
Dept: FAMILY MEDICINE CLINIC | Facility: CLINIC | Age: 88
End: 2023-02-06
Payer: MEDICARE

## 2023-02-06 NOTE — TELEPHONE ENCOUNTER
Caller: Melly Sam    Relationship: Emergency Contact    Best call back number: [phone]    What medication are you requesting: FOR GOUT     What are your current symptoms: RIGHT LEG/FOOT.BIG TOE SWOLLEN     How long have you been experiencing symptoms: DAYS     Have you had these symptoms before:    [] Yes  [] No    Have you been treated for these symptoms before:   [] Yes  [] No    If a prescription is needed, what is your preferred pharmacy and phone number: [address] [phone] [facility] [phone] [facility]     Additional notes: PATIENTS WIFE IS REQUESTING MEDICATION TO HELP TREAT HIS GOUT PAIN. PLEASE ADVISE

## 2023-02-08 ENCOUNTER — OFFICE VISIT (OUTPATIENT)
Dept: FAMILY MEDICINE CLINIC | Facility: CLINIC | Age: 88
End: 2023-02-08
Payer: MEDICARE

## 2023-02-08 ENCOUNTER — TELEPHONE (OUTPATIENT)
Dept: FAMILY MEDICINE CLINIC | Facility: CLINIC | Age: 88
End: 2023-02-08
Payer: MEDICARE

## 2023-02-08 ENCOUNTER — HOSPITAL ENCOUNTER (OUTPATIENT)
Dept: CARDIOLOGY | Facility: HOSPITAL | Age: 88
Discharge: HOME | End: 2023-02-08
Admitting: STUDENT IN AN ORGANIZED HEALTH CARE EDUCATION/TRAINING PROGRAM
Payer: MEDICARE

## 2023-02-08 VITALS — WEIGHT: 224.87 LBS | HEIGHT: 70 IN | BODY MASS INDEX: 32.19 KG/M2

## 2023-02-08 VITALS
WEIGHT: 224 LBS | HEIGHT: 70 IN | SYSTOLIC BLOOD PRESSURE: 118 MMHG | OXYGEN SATURATION: 95 % | DIASTOLIC BLOOD PRESSURE: 70 MMHG | HEART RATE: 65 BPM | BODY MASS INDEX: 32.07 KG/M2

## 2023-02-08 DIAGNOSIS — M10.9 ACUTE GOUT OF FOOT, UNSPECIFIED CAUSE, UNSPECIFIED LATERALITY: Primary | ICD-10-CM

## 2023-02-08 DIAGNOSIS — M79.89 PAIN AND SWELLING OF RIGHT LOWER EXTREMITY: ICD-10-CM

## 2023-02-08 DIAGNOSIS — M79.604 PAIN AND SWELLING OF RIGHT LOWER EXTREMITY: ICD-10-CM

## 2023-02-08 DIAGNOSIS — K74.69 OTHER CIRRHOSIS OF LIVER: ICD-10-CM

## 2023-02-08 DIAGNOSIS — N18.31 STAGE 3A CHRONIC KIDNEY DISEASE: ICD-10-CM

## 2023-02-08 LAB
BH CV LOW VAS RIGHT LESSER SAPH VESSEL: 1
BH CV LOWER VASCULAR LEFT COMMON FEMORAL COMPRESS: NORMAL
BH CV LOWER VASCULAR LEFT COMMON FEMORAL PHASIC: NORMAL
BH CV LOWER VASCULAR LEFT COMMON FEMORAL SPONT: NORMAL
BH CV LOWER VASCULAR RIGHT COMMON FEMORAL COMPRESS: NORMAL
BH CV LOWER VASCULAR RIGHT COMMON FEMORAL PHASIC: NORMAL
BH CV LOWER VASCULAR RIGHT COMMON FEMORAL SPONT: NORMAL
BH CV LOWER VASCULAR RIGHT DISTAL FEMORAL AUGMENT: NORMAL
BH CV LOWER VASCULAR RIGHT DISTAL FEMORAL COMPRESS: NORMAL
BH CV LOWER VASCULAR RIGHT DISTAL FEMORAL PHASIC: NORMAL
BH CV LOWER VASCULAR RIGHT DISTAL FEMORAL SPONT: NORMAL
BH CV LOWER VASCULAR RIGHT GASTRONEMIUS COMPRESS: NORMAL
BH CV LOWER VASCULAR RIGHT GREATER SAPH AK COMPRESS: NORMAL
BH CV LOWER VASCULAR RIGHT GREATER SAPH BK COMPRESS: NORMAL
BH CV LOWER VASCULAR RIGHT LESSER SAPH COMPRESS: NORMAL
BH CV LOWER VASCULAR RIGHT LESSER SAPH THROMBUS: NORMAL
BH CV LOWER VASCULAR RIGHT MID FEMORAL AUGMENT: NORMAL
BH CV LOWER VASCULAR RIGHT MID FEMORAL COMPRESS: NORMAL
BH CV LOWER VASCULAR RIGHT MID FEMORAL PHASIC: NORMAL
BH CV LOWER VASCULAR RIGHT MID FEMORAL SPONT: NORMAL
BH CV LOWER VASCULAR RIGHT POPLITEAL AUGMENT: NORMAL
BH CV LOWER VASCULAR RIGHT POPLITEAL COMPRESS: NORMAL
BH CV LOWER VASCULAR RIGHT POPLITEAL PHASIC: NORMAL
BH CV LOWER VASCULAR RIGHT POPLITEAL SPONT: NORMAL
BH CV LOWER VASCULAR RIGHT PROFUNDA FEMORAL PHASIC: NORMAL
BH CV LOWER VASCULAR RIGHT PROFUNDA FEMORAL SPONT: NORMAL
BH CV LOWER VASCULAR RIGHT PROXIMAL FEMORAL AUGMENT: NORMAL
BH CV LOWER VASCULAR RIGHT PROXIMAL FEMORAL COMPRESS: NORMAL
BH CV LOWER VASCULAR RIGHT PROXIMAL FEMORAL PHASIC: NORMAL
BH CV LOWER VASCULAR RIGHT PROXIMAL FEMORAL SPONT: NORMAL
BH CV LOWER VASCULAR RIGHT SAPHENOFEMORAL JUNCTION COMPRESS: NORMAL
BH CV LOWER VASCULAR RIGHT SAPHENOFEMORAL JUNCTION PHASIC: NORMAL
BH CV LOWER VASCULAR RIGHT SAPHENOFEMORAL JUNCTION SPONT: NORMAL
MAXIMAL PREDICTED HEART RATE: 131 BPM
STRESS TARGET HR: 111 BPM

## 2023-02-08 PROCEDURE — 93971 EXTREMITY STUDY: CPT | Performed by: INTERNAL MEDICINE

## 2023-02-08 PROCEDURE — 99214 OFFICE O/P EST MOD 30 MIN: CPT | Performed by: STUDENT IN AN ORGANIZED HEALTH CARE EDUCATION/TRAINING PROGRAM

## 2023-02-08 PROCEDURE — 93971 EXTREMITY STUDY: CPT

## 2023-02-08 RX ORDER — PREDNISONE 20 MG/1
TABLET ORAL
Qty: 11 TABLET | Refills: 0 | Status: SHIPPED | OUTPATIENT
Start: 2023-02-08 | End: 2023-02-17

## 2023-02-08 NOTE — TELEPHONE ENCOUNTER
Attempted to contact, no answer. Left detailed voicemail relaying provider's message     Please let patient know is ultrasound did not reveal any acute blood clots. Please ask how his leg/foot is feeling since starting the steroids and let me know, thanks!  Hub can relay and document.

## 2023-02-08 NOTE — PROGRESS NOTES
Established Office Visit      Patient Name: Jordi Sam  : 1934   MRN: [id number]   Care Team: Patient Care Team:  Jasper Avery MD as PCP - General (Internal Medicine)  Efrem Posadas MD as Consulting Physician (Cardiology)  Jordi Law MD as Consulting Physician (Urology)  Chapin Villagran MD as Consulting Physician (Otolaryngology)  Gen Laurent MD as Consulting Physician (Gastroenterology)    Chief Complaint:    Chief Complaint   Patient presents with   • Gout     Flare up    • Leg Swelling     right side, from the knee down swollen    • Fatigue     Tired all the time        History of Present Illness: Jordi Sam is a 89 y.o. male with AF on AC, HTN, HLD, CKD3, nonalcoholic cirrhosis, emphysema who is here today to discus gout.      He reports 3 day history of abrupt RLE swelling, most notably around toe and ankle which has extended into entire calf. He denies fever, breakdown of skin, weeping. He reports swelling does improve with leg elevation somewhat. Pain is worse with bearing weight or applying pressure. Feels similar to previous gout flare. No trauma.     Subjective      Review of Systems:   Review of Systems - See HPI    I have reviewed and the following portions of the patient's history were updated as appropriate: past family history, past medical history, past social history, past surgical history and problem list.    Medications:     Current Outpatient Medications:   •  albuterol sulfate  (90 Base) MCG/ACT inhaler, Inhale 2 puffs Every 4 (Four) Hours As Needed for Wheezing., Disp: 18 g, Rfl: 0  •  apixaban (ELIQUIS) 2.5 MG tablet tablet, Take 2.5 mg by mouth 2 (Two) Times a Day. Last dose 3 days ago. From -, Disp: , Rfl:   •  carvedilol (COREG) 6.25 MG tablet, TAKE 1 TABLET BY MOUTH TWICE DAILY WITH MEALS, Disp: 60 tablet, Rfl: 10  •  folic acid (FOLVITE) 400 MCG tablet, Take 400 mcg by mouth Daily., Disp: , Rfl:   •  furosemide (LASIX)  "40 MG tablet, TAKE 1 TABLET BY MOUTH ONCE DAILY IN THE MORNING, 1/2 TABLET IN THE AFTERNOON AND AS NEEDED FOR WEIGHT GAIN, EDEMA AND DYPNEA, Disp: 180 tablet, Rfl: 3  •  HYDROcod Polst-CPM Polst ER (Tussionex Pennkinetic ER) 10-8 MG/5ML ER suspension, Take 5 mL by mouth Every 12 (Twelve) Hours As Needed for Cough., Disp: 100 mL, Rfl: 0  •  nitroglycerin (NITROSTAT) 0.4 MG SL tablet, DISSOLVE ONE TABLET UNDER THE TONGUE EVERY 5 MINUTES AS NEEDED FOR CHEST PAIN.  DO NOT EXCEED A TOTAL OF 3 DOSES IN 15 MINUTES, Disp: 25 tablet, Rfl: 3  •  promethazine-codeine (PHENERGAN with CODEINE) 6.25-10 MG/5ML solution, Take 5 mL by mouth Every 4 (Four) Hours As Needed for Cough., Disp: 100 mL, Rfl: 0  •  simvastatin (ZOCOR) 40 MG tablet, Take 1 tablet by mouth Daily., Disp: 90 tablet, Rfl: 32  •  spironolactone (ALDACTONE) 50 MG tablet, Take 1 tablet by mouth once daily, Disp: 90 tablet, Rfl: 2  •  terazosin (HYTRIN) 2 MG capsule, Take 1 capsule by mouth Every Night., Disp: 90 capsule, Rfl: 3  •  predniSONE (DELTASONE) 20 MG tablet, Take 2 tablets by mouth Daily for 3 days, THEN 1 tablet Daily for 3 days, THEN 0.5 tablets Daily for 3 days., Disp: 11 tablet, Rfl: 0    Allergies:   Allergies   Allergen Reactions   • Bee Venom Swelling   • Sulfamethoxazole-Trimethoprim Other (See Comments)     Pt unaware       Objective     Physical Exam:  Vital Signs:   Vitals:    02/08/23 0916   BP: 118/70   Pulse: 65   SpO2: 95%   Weight: 102 kg (224 lb)   Height: 177.8 cm (70\")     Body mass index is 32.14 kg/m².     Physical Exam  Vitals reviewed.   Constitutional:       Appearance: Normal appearance. He is obese. He is not ill-appearing.   Cardiovascular:      Rate and Rhythm: Normal rate.   Pulmonary:      Effort: Pulmonary effort is normal. No respiratory distress.   Musculoskeletal:      Comments: RLE with 2+ edema throughout entire foot, ankle, calf. Tender to palpation with warmth. No skin breakdown.    Skin:     General: Skin is warm and " dry.      Comments: Hyperpigmentation related to venous stasis dermatitis noted on bilateral shins, R>L. There are no clearly demarcated borders.    Neurological:      Mental Status: He is alert.   Psychiatric:         Mood and Affect: Mood normal.         Behavior: Behavior normal.         Judgment: Judgment normal.         Assessment / Plan      Assessment/Plan:   Problems Addressed This Visit  Diagnoses and all orders for this visit:    1. Acute gout of foot, unspecified cause, unspecified laterality (Primary)  -     predniSONE (DELTASONE) 20 MG tablet; Take 2 tablets by mouth Daily for 3 days, THEN 1 tablet Daily for 3 days, THEN 0.5 tablets Daily for 3 days.  Dispense: 11 tablet; Refill: 0    2. Stage 3a chronic kidney disease (HCC)    3. Pain and swelling of right lower extremity  -     Duplex Venous Lower Extremity - Right CAR; Future    4. Other cirrhosis of liver (HCC)      Patient presents with unilateral RLE swelling, tenderness, and erythema. Gradually worsening over the past 3 days. He is otherwise feeling well, afebrile. Reports this feeling like prior episode of gout, however I have concerns with the extent of swelling throughout the entire foot and calf. Recommend RLE duplex US to rule out DVT. Discussed likelihood is rare as he is compliant with his Eliquis but this must be ruled out.  We will treat for gout - in setting of his CKD and nonalcoholic cirrhosis, will avoid NSAIDs. Rx for prednisone taper sent. If symptoms do not show improvement within 24-48 hours, recommend he return for reevaluation. He and his wife express understanding and agreement with plan.     He has follow up with PCP, Dr. Avery in 2 weeks. Once acute flare resolves, he may repeat uric acid level and consideration of renally dosed allopurinol if indicated.     Plan of care reviewed with patient at the conclusion of today's visit. Education was provided regarding diagnosis and management.  Patient verbalizes understanding of  and agreement with management plan.    Follow Up:   No follow-ups on file.        DO BIJAN Linn RD  [facility]  [address]  [facility] [address]  Fax [phone]  Phone [phone]

## 2023-02-09 NOTE — TELEPHONE ENCOUNTER
Contacted pt & relayed results. Pt verbalized understanding and states he is doing better at this time. Pt did not have any additional questions at this time.

## 2023-02-13 RX ORDER — CARVEDILOL 6.25 MG/1
TABLET ORAL
Qty: 60 TABLET | Refills: 6 | Status: SHIPPED | OUTPATIENT
Start: 2023-02-13

## 2023-02-17 ENCOUNTER — TELEPHONE (OUTPATIENT)
Dept: FAMILY MEDICINE CLINIC | Facility: CLINIC | Age: 88
End: 2023-02-17
Payer: MEDICARE

## 2023-02-17 DIAGNOSIS — M10.9 ACUTE GOUT OF FOOT, UNSPECIFIED CAUSE, UNSPECIFIED LATERALITY: Primary | ICD-10-CM

## 2023-02-17 RX ORDER — METHYLPREDNISOLONE 4 MG/1
TABLET ORAL
Qty: 1 EACH | Refills: 0 | Status: SHIPPED | OUTPATIENT
Start: 2023-02-17 | End: 2023-02-23

## 2023-02-17 NOTE — TELEPHONE ENCOUNTER
Caller: Melly Sam    Relationship: Emergency Contact    Best call back number: [phone]    What is the best time to reach you: ANY    Who are you requesting to speak with (clinical staff, provider,  specific staff member): DR. WORTHINGTON    What was the call regarding:PATIENT FINISHED TAKING PREDNISONE. PATIENT WAS PRESCRIBED THIS BY DR. WORTHINGTON. HIS FOOT IS STILL SWELLING AND HE IS CONSTANTLY FATIGUED AND SLEEPING.  PATIENT'S WIFE IS WANTING A CALL TO DISCUSS THIS BECAUSE SHE IS CONCERNED. PLEASE CALL AND LET HER KNOW IF HE NEEDS TO  COME IN.    Do you require a callback: YES ASAP

## 2023-02-17 NOTE — TELEPHONE ENCOUNTER
Patient wife reports that after completing prednisone his swelling has returned. He will elevate his foot at time, which helps but swelling is still present.    They are unsure if it gout or if he needs to be re-evaluated?

## 2023-02-20 ENCOUNTER — PREP FOR SURGERY (OUTPATIENT)
Dept: OTHER | Facility: HOSPITAL | Age: 88
End: 2023-02-20
Payer: MEDICARE

## 2023-02-20 DIAGNOSIS — R18.8 OTHER ASCITES: Primary | ICD-10-CM

## 2023-02-20 RX ORDER — ALBUMIN (HUMAN) 12.5 G/50ML
12.5 SOLUTION INTRAVENOUS ONCE
Status: CANCELLED | OUTPATIENT
Start: 2023-02-20 | End: 2023-02-20

## 2023-02-23 ENCOUNTER — HOSPITAL ENCOUNTER (OUTPATIENT)
Dept: GENERAL RADIOLOGY | Facility: HOSPITAL | Age: 88
Discharge: HOME | End: 2023-02-23
Payer: MEDICARE

## 2023-02-23 ENCOUNTER — LAB (OUTPATIENT)
Dept: LAB | Facility: HOSPITAL | Age: 88
End: 2023-02-23
Payer: MEDICARE

## 2023-02-23 ENCOUNTER — OFFICE VISIT (OUTPATIENT)
Dept: FAMILY MEDICINE CLINIC | Facility: CLINIC | Age: 88
End: 2023-02-23
Payer: MEDICARE

## 2023-02-23 VITALS
HEART RATE: 64 BPM | BODY MASS INDEX: 30.75 KG/M2 | OXYGEN SATURATION: 98 % | SYSTOLIC BLOOD PRESSURE: 114 MMHG | WEIGHT: 214.8 LBS | HEIGHT: 70 IN | DIASTOLIC BLOOD PRESSURE: 62 MMHG

## 2023-02-23 DIAGNOSIS — G47.10 HYPERSOMNIA: ICD-10-CM

## 2023-02-23 DIAGNOSIS — R41.0 CONFUSION: ICD-10-CM

## 2023-02-23 DIAGNOSIS — M79.604 PAIN AND SWELLING OF RIGHT LOWER EXTREMITY: ICD-10-CM

## 2023-02-23 DIAGNOSIS — K74.60 CIRRHOSIS OF LIVER WITH ASCITES, UNSPECIFIED HEPATIC CIRRHOSIS TYPE: ICD-10-CM

## 2023-02-23 DIAGNOSIS — M25.471 RIGHT ANKLE EFFUSION: ICD-10-CM

## 2023-02-23 DIAGNOSIS — R73.03 PRE-DIABETES: Primary | ICD-10-CM

## 2023-02-23 DIAGNOSIS — R18.8 CIRRHOSIS OF LIVER WITH ASCITES, UNSPECIFIED HEPATIC CIRRHOSIS TYPE: ICD-10-CM

## 2023-02-23 DIAGNOSIS — M79.89 PAIN AND SWELLING OF RIGHT LOWER EXTREMITY: ICD-10-CM

## 2023-02-23 LAB
ALPHA-FETOPROTEIN: <2 NG/ML (ref 0–8.3)
AMMONIA BLD-SCNC: 19 UMOL/L (ref 16–60)
EXPIRATION DATE: ABNORMAL
HBA1C MFR BLD: 6.9 %
INR PPP: 1.33 (ref 0.84–1.13)
Lab: ABNORMAL
PROTHROMBIN TIME: 16.4 SECONDS (ref 11.4–14.4)

## 2023-02-23 PROCEDURE — 83036 HEMOGLOBIN GLYCOSYLATED A1C: CPT | Performed by: INTERNAL MEDICINE

## 2023-02-23 PROCEDURE — 3044F HG A1C LEVEL LT 7.0%: CPT | Performed by: INTERNAL MEDICINE

## 2023-02-23 PROCEDURE — 85025 COMPLETE CBC W/AUTO DIFF WBC: CPT

## 2023-02-23 PROCEDURE — 80053 COMPREHEN METABOLIC PANEL: CPT

## 2023-02-23 PROCEDURE — 86140 C-REACTIVE PROTEIN: CPT

## 2023-02-23 PROCEDURE — 84439 ASSAY OF FREE THYROXINE: CPT

## 2023-02-23 PROCEDURE — 84443 ASSAY THYROID STIM HORMONE: CPT

## 2023-02-23 PROCEDURE — 84145 PROCALCITONIN (PCT): CPT

## 2023-02-23 PROCEDURE — 73610 X-RAY EXAM OF ANKLE: CPT

## 2023-02-23 PROCEDURE — 36415 COLL VENOUS BLD VENIPUNCTURE: CPT

## 2023-02-23 PROCEDURE — 82105 ALPHA-FETOPROTEIN SERUM: CPT

## 2023-02-23 PROCEDURE — 84550 ASSAY OF BLOOD/URIC ACID: CPT

## 2023-02-23 PROCEDURE — 85610 PROTHROMBIN TIME: CPT

## 2023-02-23 PROCEDURE — 99214 OFFICE O/P EST MOD 30 MIN: CPT | Performed by: INTERNAL MEDICINE

## 2023-02-23 PROCEDURE — 82140 ASSAY OF AMMONIA: CPT

## 2023-02-23 RX ORDER — HYDROCODONE BITARTRATE AND ACETAMINOPHEN 5; 325 MG/1; MG/1
1 TABLET ORAL EVERY 6 HOURS PRN
Qty: 60 TABLET | Refills: 0 | Status: SHIPPED | OUTPATIENT
Start: 2023-02-23

## 2023-02-23 NOTE — PROGRESS NOTES
"Jordi Sam  1934  [id number]  Patient Care Team:  Jasper Avery MD as PCP - General (Internal Medicine)  Efrem Posadas MD as Consulting Physician (Cardiology)  Jordi Law MD as Consulting Physician (Urology)  Chapin Villagran MD as Consulting Physician (Otolaryngology)  Gen Laurent MD as Consulting Physician (Gastroenterology)    Jordi Sam is a 89 y.o. male here today for follow up.     This patient is accompanied by their self who contributes to the history of their care.    Chief Complaint:     Chief Complaint   Patient presents with   • Diabetes   • Hypertension   • Joint Swelling     Rt ankle has ankle swelling and pain    • Fatigue     Sleeps a lot         History of Present Illness:  I have reviewed and/or updated the patient's past medical, past surgical, family, social history, problem list and allergies as appropriate.      He was seen 2/8 for rle swelling: He reported 3 day history of abrupt RLE swelling, most notably around toe and ankle which has extended into entire calf. He denies fever, breakdown of skin, weeping. He reports swelling does improve with leg elevation somewhat. Pain is worse with bearing weight or applying pressure. Felt similar to previous gout flare. No trauma. Underwent duplex and was placed on steroids with refill.    No fevers or chills.  Pain feels burning as like \"walking on eggshells.    He finished steroid initially with marked improved swelling and pain. ( toe). Now complains of more ankll swelling. Erythema is improved. He does not wear his compression stockings.    He denies polyuria or dipsia. bP running ok at home occasionally check sugars.. Continues on lasix and aldactone at home. His somnelence has improved.     Review of Systems   Constitutional: Positive for fatigue. Negative for activity change and appetite change.   HENT: Negative.    Gastrointestinal: Positive for abdominal distention.   Endocrine: Negative.  " "  Genitourinary: Negative.    Musculoskeletal: Positive for arthralgias and joint swelling.   Neurological: Negative.    Hematological: Negative.        Vitals:    02/23/23 1048   BP: 114/62   Pulse: 64   SpO2: 98%   Weight: 97.4 kg (214 lb 12.8 oz)   Height: 177.8 cm (70\")     Body mass index is 30.82 kg/m².    Physical Exam  Vitals and nursing note reviewed.   Constitutional:       General: He is not in acute distress.     Appearance: He is well-developed. He is not diaphoretic.   HENT:      Head: Normocephalic and atraumatic.      Right Ear: External ear normal.      Left Ear: External ear normal.      Mouth/Throat:      Pharynx: No oropharyngeal exudate.   Eyes:      General: No scleral icterus.        Right eye: No discharge.      Conjunctiva/sclera: Conjunctivae normal.   Neck:      Thyroid: No thyromegaly.      Vascular: No JVD.      Trachea: No tracheal deviation.   Cardiovascular:      Rate and Rhythm: Normal rate and regular rhythm.      Heart sounds: Normal heart sounds.      Comments: PMI nondisplaced  Pulmonary:      Effort: Pulmonary effort is normal.      Breath sounds: Normal breath sounds. No wheezing or rales.   Abdominal:      General: Bowel sounds are normal.      Palpations: Abdomen is soft.      Tenderness: There is no abdominal tenderness. There is no guarding or rebound.   Musculoskeletal:      Cervical back: Normal range of motion and neck supple.      Comments: Antalgic gait.  He does have significant edema in the right lower extremity.  There is venous stasis changes and varicosities noted.  No cords.  He is exquisitely tender in his Achilles as well as medial greater than left malleolus.  Dorsum of his foot is nontender nor erythematous.  He is significantly restricted range of motion with ankle Deborah dorsi flexion inversion and eversion.   Lymphadenopathy:      Cervical: No cervical adenopathy.   Skin:     General: Skin is warm and dry.      Capillary Refill: Capillary refill takes less " than 2 seconds.      Coloration: Skin is not pale.      Findings: No rash.   Neurological:      Mental Status: He is alert and oriented to person, place, and time.      Motor: No abnormal muscle tone.      Coordination: Coordination normal.   Psychiatric:         Judgment: Judgment normal.         Procedures    Results Review:    I reviewed the patient's new clinical results.  Study Findings - Right Venous duplex ( leg)    Right sided additional findings: Limited and technically difficult exam due to RLE condition, patient pain tolerance, positioning, and patient unable to remain still for exam.    Right PTV and Peroneal Veins difficult to fully evaluate due to patient being unable to tolerate compression images.    Right SSV appears fully compressible, hyperechoic material seen within along the vein wall. Greater than 2 cm from SPJ.     All other RLE deep and superficial veins appear compressible at time of exam.     Study Impression    •     Right Small Saphenous: Chronic superficial thrombophlebitis noted.       Assessment/Plan:     Problem List Items Addressed This Visit        Endocrine and Metabolic    Pre-diabetes - Primary    Relevant Orders    POC Glycosylated Hemoglobin (Hb A1C) (Completed)   Other Visit Diagnoses     Hypersomnia        Relevant Orders    C-reactive protein    TSH Rfx On Abnormal To Free T4    Confusion        Relevant Orders    C-reactive protein    Ammonia    Comprehensive Metabolic Panel    TSH Rfx On Abnormal To Free T4    Pain and swelling of right lower extremity        Relevant Medications    HYDROcodone-acetaminophen (NORCO) 5-325 MG per tablet    Other Relevant Orders    C-reactive protein    Uric acid    Comprehensive Metabolic Panel    CBC (No Diff)    Procalcitonin    XR Ankle 3+ View Right    Right ankle effusion        Relevant Medications    HYDROcodone-acetaminophen (NORCO) 5-325 MG per tablet    Other Relevant Orders    Ambulatory Referral to Orthopedic Surgery    XR Ankle  3+ View Right      M concerned about a possible ankle effusion.  I referred him to orthopedic for consideration of arthrocentesis and further recommendations.  X-ray requested today.  He will finish his steroid taper.  Labs as above as well as pain medication Norco No. 60 as prescribed.  Hossein interrogated.  Recommended resuming his compression stockings.    Plan of care reviewed with patient at the conclusion of today's visit. Education was provided regarding diagnosis and management.  Patient verbalizes understanding of and agreement with management plan.    Return if symptoms worsen or fail to improve, for Next scheduled follow up, Medicare Wellness.    Jasper Avery MD      Please note than portions of this note were completed [facility] a Voice Recognition Program

## 2023-02-24 LAB
ALBUMIN SERPL-MCNC: 3.5 G/DL (ref 3.5–5.2)
ALBUMIN/GLOB SERPL: 1.5 G/DL
ALP SERPL-CCNC: 117 U/L (ref 39–117)
ALT SERPL W P-5'-P-CCNC: 29 U/L (ref 1–41)
ANION GAP SERPL CALCULATED.3IONS-SCNC: 11.8 MMOL/L (ref 5–15)
AST SERPL-CCNC: 33 U/L (ref 1–40)
BASOPHILS # BLD AUTO: 0.01 10*3/MM3 (ref 0–0.2)
BASOPHILS NFR BLD AUTO: 0.1 % (ref 0–1.5)
BILIRUB SERPL-MCNC: 0.5 MG/DL (ref 0–1.2)
BUN SERPL-MCNC: 21 MG/DL (ref 8–23)
BUN/CREAT SERPL: 18.1 (ref 7–25)
CALCIUM SPEC-SCNC: 8.9 MG/DL (ref 8.6–10.5)
CHLORIDE SERPL-SCNC: 95 MMOL/L (ref 98–107)
CO2 SERPL-SCNC: 31.2 MMOL/L (ref 22–29)
CREAT SERPL-MCNC: 1.16 MG/DL (ref 0.76–1.27)
CRP SERPL-MCNC: 0.3 MG/DL (ref 0–0.5)
DEPRECATED RDW RBC AUTO: 45.7 FL (ref 37–54)
EGFRCR SERPLBLD CKD-EPI 2021: 60.2 ML/MIN/1.73
EOSINOPHIL # BLD AUTO: 0.03 10*3/MM3 (ref 0–0.4)
EOSINOPHIL NFR BLD AUTO: 0.3 % (ref 0.3–6.2)
ERYTHROCYTE [DISTWIDTH] IN BLOOD BY AUTOMATED COUNT: 13.3 % (ref 12.3–15.4)
GLOBULIN UR ELPH-MCNC: 2.4 GM/DL
GLUCOSE SERPL-MCNC: 114 MG/DL (ref 65–99)
HCT VFR BLD AUTO: 36.7 % (ref 37.5–51)
HGB BLD-MCNC: 12.3 G/DL (ref 13–17.7)
IMM GRANULOCYTES # BLD AUTO: 0.07 10*3/MM3 (ref 0–0.05)
IMM GRANULOCYTES NFR BLD AUTO: 0.6 % (ref 0–0.5)
LYMPHOCYTES # BLD AUTO: 0.57 10*3/MM3 (ref 0.7–3.1)
LYMPHOCYTES NFR BLD AUTO: 4.8 % (ref 19.6–45.3)
MCH RBC QN AUTO: 31.1 PG (ref 26.6–33)
MCHC RBC AUTO-ENTMCNC: 33.5 G/DL (ref 31.5–35.7)
MCV RBC AUTO: 92.7 FL (ref 79–97)
MONOCYTES # BLD AUTO: 0.77 10*3/MM3 (ref 0.1–0.9)
MONOCYTES NFR BLD AUTO: 6.4 % (ref 5–12)
NEUTROPHILS NFR BLD AUTO: 10.55 10*3/MM3 (ref 1.7–7)
NEUTROPHILS NFR BLD AUTO: 87.8 % (ref 42.7–76)
NRBC BLD AUTO-RTO: 0 /100 WBC (ref 0–0.2)
PLATELET # BLD AUTO: 126 10*3/MM3 (ref 140–450)
PMV BLD AUTO: 11.3 FL (ref 6–12)
POTASSIUM SERPL-SCNC: 4.1 MMOL/L (ref 3.5–5.2)
PROCALCITONIN SERPL-MCNC: 0.04 NG/ML (ref 0–0.25)
PROT SERPL-MCNC: 5.9 G/DL (ref 6–8.5)
RBC # BLD AUTO: 3.96 10*6/MM3 (ref 4.14–5.8)
SODIUM SERPL-SCNC: 138 MMOL/L (ref 136–145)
T4 FREE SERPL-MCNC: 1.14 NG/DL (ref 0.93–1.7)
TSH SERPL DL<=0.05 MIU/L-ACNC: 4.59 UIU/ML (ref 0.27–4.2)
URATE SERPL-MCNC: 8.5 MG/DL (ref 3.4–7)
WBC NRBC COR # BLD: 12 10*3/MM3 (ref 3.4–10.8)

## 2023-02-24 NOTE — PROGRESS NOTES
I am pretty sure his pain is gout.  Hopefully orthopedic and afforded him some relief with injection of his ankle.  I am happy to continue steroids if he needs a refill.  Is a 5

## 2023-02-26 ENCOUNTER — HOSPITAL ENCOUNTER (OUTPATIENT)
Facility: HOSPITAL | Age: 88
Setting detail: OBSERVATION
Discharge: HOME | End: 2023-03-01
Attending: EMERGENCY MEDICINE | Admitting: INTERNAL MEDICINE
Payer: MEDICARE

## 2023-02-26 ENCOUNTER — APPOINTMENT (OUTPATIENT)
Dept: CT IMAGING | Facility: HOSPITAL | Age: 88
End: 2023-02-26
Payer: MEDICARE

## 2023-02-26 DIAGNOSIS — Z02.89 REFERRED BY HEALTH CARE PROFESSIONAL: ICD-10-CM

## 2023-02-26 DIAGNOSIS — L02.01 FACIAL ABSCESS: ICD-10-CM

## 2023-02-26 DIAGNOSIS — L03.211 FACIAL CELLULITIS: Primary | ICD-10-CM

## 2023-02-26 PROBLEM — U07.1 COVID-19 VIRUS INFECTION: Status: RESOLVED | Noted: 2022-09-02 | Resolved: 2023-02-26

## 2023-02-26 PROBLEM — J10.1 INFLUENZA A: Status: RESOLVED | Noted: 2022-11-10 | Resolved: 2023-02-26

## 2023-02-26 PROBLEM — J18.9 PNEUMONIA OF LEFT LOWER LOBE DUE TO INFECTIOUS ORGANISM: Status: RESOLVED | Noted: 2022-03-25 | Resolved: 2023-02-26

## 2023-02-26 PROBLEM — N39.0 UTI (URINARY TRACT INFECTION): Status: RESOLVED | Noted: 2022-07-29 | Resolved: 2023-02-26

## 2023-02-26 LAB
ALBUMIN SERPL-MCNC: 3.2 G/DL (ref 3.5–5.2)
ALBUMIN/GLOB SERPL: 1.3 G/DL
ALP SERPL-CCNC: 90 U/L (ref 39–117)
ALT SERPL W P-5'-P-CCNC: 20 U/L (ref 1–41)
ANION GAP SERPL CALCULATED.3IONS-SCNC: 6 MMOL/L (ref 5–15)
AST SERPL-CCNC: 21 U/L (ref 1–40)
BASOPHILS # BLD AUTO: 0.02 10*3/MM3 (ref 0–0.2)
BASOPHILS NFR BLD AUTO: 0.2 % (ref 0–1.5)
BILIRUB SERPL-MCNC: 0.9 MG/DL (ref 0–1.2)
BUN BLDA-MCNC: 24 MG/DL
BUN BLDA-MCNC: 24 MG/DL (ref 8–26)
BUN SERPL-MCNC: 22 MG/DL (ref 8–23)
BUN/CREAT SERPL: 18.2 (ref 7–25)
CA-I BLDA-SCNC: 1.17 MMOL/L (ref 1.2–1.32)
CALCIUM BLD QL: 1.17 MG/DL
CALCIUM SPEC-SCNC: 8.2 MG/DL (ref 8.6–10.5)
CHLORIDE BLDA-SCNC: 94 MMOL/L (ref 98–109)
CHLORIDE BLDA-SCNC: 94 MMOL/L (ref 98–109)
CHLORIDE SERPL-SCNC: 99 MMOL/L (ref 98–107)
CO2 BLDA-SCNC: 33 MMOL/L (ref 24–29)
CO2 SERPL-SCNC: 32 MMOL/L (ref 22–29)
CREAT BLDA-MCNC: 1.2 MG/DL
CREAT BLDA-MCNC: 1.2 MG/DL (ref 0.6–1.3)
CREAT SERPL-MCNC: 1.21 MG/DL (ref 0.76–1.27)
D-LACTATE SERPL-SCNC: 1 MMOL/L (ref 0.5–2)
DEPRECATED RDW RBC AUTO: 53 FL (ref 37–54)
EGFRCR SERPLBLD CKD-EPI 2021: 57.2 ML/MIN/1.73
EGFRCR SERPLBLD CKD-EPI 2021: 57.8 ML/MIN/1.73
EOSINOPHIL # BLD AUTO: 0.09 10*3/MM3 (ref 0–0.4)
EOSINOPHIL NFR BLD AUTO: 1 % (ref 0.3–6.2)
ERYTHROCYTE [DISTWIDTH] IN BLOOD BY AUTOMATED COUNT: 14.6 % (ref 12.3–15.4)
GLOBULIN UR ELPH-MCNC: 2.5 GM/DL
GLUCOSE BLDC GLUCOMTR-MCNC: 140 MG/DL (ref 70–130)
GLUCOSE BLDC GLUCOMTR-MCNC: 140 MG/DL (ref 70–130)
GLUCOSE SERPL-MCNC: 145 MG/DL (ref 65–99)
HBA1C MFR BLD: 6.8 % (ref 4.8–5.6)
HCT VFR BLD AUTO: 40.1 % (ref 37.5–51)
HCT VFR BLDA CALC: 39 % (ref 38–51)
HCT VFR BLDA CALC: 39 % (ref 38–51)
HGB BLD-MCNC: 12.6 G/DL (ref 13–17.7)
HGB BLDA-MCNC: 13.3 G/DL (ref 12–17)
HGB BLDA-MCNC: 13.3 G/DL (ref 12–17)
IMM GRANULOCYTES # BLD AUTO: 0.05 10*3/MM3 (ref 0–0.05)
IMM GRANULOCYTES NFR BLD AUTO: 0.5 % (ref 0–0.5)
LYMPHOCYTES # BLD AUTO: 0.59 10*3/MM3 (ref 0.7–3.1)
LYMPHOCYTES NFR BLD AUTO: 6.3 % (ref 19.6–45.3)
MCH RBC QN AUTO: 30.7 PG (ref 26.6–33)
MCHC RBC AUTO-ENTMCNC: 31.4 G/DL (ref 31.5–35.7)
MCV RBC AUTO: 97.6 FL (ref 79–97)
MONOCYTES # BLD AUTO: 0.6 10*3/MM3 (ref 0.1–0.9)
MONOCYTES NFR BLD AUTO: 6.4 % (ref 5–12)
NEUTROPHILS NFR BLD AUTO: 8.01 10*3/MM3 (ref 1.7–7)
NEUTROPHILS NFR BLD AUTO: 85.6 % (ref 42.7–76)
NRBC BLD AUTO-RTO: 0 /100 WBC (ref 0–0.2)
PLAT MORPH BLD: NORMAL
PLATELET # BLD AUTO: 132 10*3/MM3 (ref 140–450)
PMV BLD AUTO: 13.8 FL (ref 6–12)
POTASSIUM BLDA-SCNC: 4 MMOL/L (ref 3.5–4.9)
POTASSIUM BLDA-SCNC: 4 MMOL/L (ref 3.5–4.9)
POTASSIUM SERPL-SCNC: 4.1 MMOL/L (ref 3.5–5.2)
PROCALCITONIN SERPL-MCNC: 0.05 NG/ML (ref 0–0.25)
PROT SERPL-MCNC: 5.7 G/DL (ref 6–8.5)
RBC # BLD AUTO: 4.11 10*6/MM3 (ref 4.14–5.8)
RBC MORPH BLD: NORMAL
SODIUM BLD-SCNC: 136 MMOL/L (ref 138–146)
SODIUM BLD-SCNC: 136 MMOL/L (ref 138–146)
SODIUM SERPL-SCNC: 137 MMOL/L (ref 136–145)
WBC MORPH BLD: NORMAL
WBC NRBC COR # BLD: 9.36 10*3/MM3 (ref 3.4–10.8)

## 2023-02-26 PROCEDURE — 25510000001 IOPAMIDOL 61 % SOLUTION: Performed by: EMERGENCY MEDICINE

## 2023-02-26 PROCEDURE — 99284 EMERGENCY DEPT VISIT MOD MDM: CPT

## 2023-02-26 PROCEDURE — 25010000002 ENOXAPARIN PER 10 MG: Performed by: PEDIATRICS

## 2023-02-26 PROCEDURE — G0378 HOSPITAL OBSERVATION PER HR: HCPCS

## 2023-02-26 PROCEDURE — 96372 THER/PROPH/DIAG INJ SC/IM: CPT

## 2023-02-26 PROCEDURE — 83605 ASSAY OF LACTIC ACID: CPT | Performed by: NURSE PRACTITIONER

## 2023-02-26 PROCEDURE — 83036 HEMOGLOBIN GLYCOSYLATED A1C: CPT | Performed by: NURSE PRACTITIONER

## 2023-02-26 PROCEDURE — 84145 PROCALCITONIN (PCT): CPT | Performed by: NURSE PRACTITIONER

## 2023-02-26 PROCEDURE — 85014 HEMATOCRIT: CPT

## 2023-02-26 PROCEDURE — 70487 CT MAXILLOFACIAL W/DYE: CPT

## 2023-02-26 PROCEDURE — 85025 COMPLETE CBC W/AUTO DIFF WBC: CPT | Performed by: NURSE PRACTITIONER

## 2023-02-26 PROCEDURE — 80047 BASIC METABLC PNL IONIZED CA: CPT

## 2023-02-26 PROCEDURE — 80053 COMPREHEN METABOLIC PANEL: CPT | Performed by: NURSE PRACTITIONER

## 2023-02-26 PROCEDURE — 25010000002 VANCOMYCIN 10 G RECONSTITUTED SOLUTION: Performed by: PEDIATRICS

## 2023-02-26 PROCEDURE — 87040 BLOOD CULTURE FOR BACTERIA: CPT | Performed by: NURSE PRACTITIONER

## 2023-02-26 PROCEDURE — 36415 COLL VENOUS BLD VENIPUNCTURE: CPT

## 2023-02-26 PROCEDURE — 99222 1ST HOSP IP/OBS MODERATE 55: CPT | Performed by: PEDIATRICS

## 2023-02-26 PROCEDURE — 25010000002 PIPERACILLIN SOD-TAZOBACTAM PER 1 G: Performed by: NURSE PRACTITIONER

## 2023-02-26 PROCEDURE — 85007 BL SMEAR W/DIFF WBC COUNT: CPT | Performed by: NURSE PRACTITIONER

## 2023-02-26 RX ORDER — INSULIN LISPRO 100 [IU]/ML
0-7 INJECTION, SOLUTION INTRAVENOUS; SUBCUTANEOUS
Status: DISCONTINUED | OUTPATIENT
Start: 2023-02-27 | End: 2023-03-01 | Stop reason: HOSPADM

## 2023-02-26 RX ORDER — CARVEDILOL 6.25 MG/1
6.25 TABLET ORAL EVERY 12 HOURS SCHEDULED
Status: DISCONTINUED | OUTPATIENT
Start: 2023-02-26 | End: 2023-03-01 | Stop reason: HOSPADM

## 2023-02-26 RX ORDER — DEXTROSE MONOHYDRATE 25 G/50ML
25 INJECTION, SOLUTION INTRAVENOUS
Status: DISCONTINUED | OUTPATIENT
Start: 2023-02-26 | End: 2023-03-01 | Stop reason: HOSPADM

## 2023-02-26 RX ORDER — SODIUM CHLORIDE 0.9 % (FLUSH) 0.9 %
10 SYRINGE (ML) INJECTION AS NEEDED
Status: DISCONTINUED | OUTPATIENT
Start: 2023-02-26 | End: 2023-03-01 | Stop reason: HOSPADM

## 2023-02-26 RX ORDER — TERAZOSIN 2 MG/1
2 CAPSULE ORAL NIGHTLY
Status: DISCONTINUED | OUTPATIENT
Start: 2023-02-26 | End: 2023-03-01 | Stop reason: HOSPADM

## 2023-02-26 RX ORDER — HYDROCODONE BITARTRATE AND ACETAMINOPHEN 5; 325 MG/1; MG/1
1 TABLET ORAL EVERY 6 HOURS PRN
Status: DISCONTINUED | OUTPATIENT
Start: 2023-02-26 | End: 2023-03-01 | Stop reason: HOSPADM

## 2023-02-26 RX ORDER — FUROSEMIDE 40 MG/1
40 TABLET ORAL DAILY
Status: DISCONTINUED | OUTPATIENT
Start: 2023-02-27 | End: 2023-03-01 | Stop reason: HOSPADM

## 2023-02-26 RX ORDER — NICOTINE POLACRILEX 4 MG
15 LOZENGE BUCCAL
Status: DISCONTINUED | OUTPATIENT
Start: 2023-02-26 | End: 2023-03-01 | Stop reason: HOSPADM

## 2023-02-26 RX ORDER — SODIUM CHLORIDE 0.9 % (FLUSH) 0.9 %
10 SYRINGE (ML) INJECTION EVERY 12 HOURS SCHEDULED
Status: DISCONTINUED | OUTPATIENT
Start: 2023-02-26 | End: 2023-03-01 | Stop reason: HOSPADM

## 2023-02-26 RX ORDER — ATORVASTATIN CALCIUM 20 MG/1
20 TABLET, FILM COATED ORAL DAILY
Status: DISCONTINUED | OUTPATIENT
Start: 2023-02-27 | End: 2023-03-01 | Stop reason: HOSPADM

## 2023-02-26 RX ORDER — IBUPROFEN 600 MG/1
1 TABLET ORAL
Status: DISCONTINUED | OUTPATIENT
Start: 2023-02-26 | End: 2023-03-01 | Stop reason: HOSPADM

## 2023-02-26 RX ORDER — ALBUTEROL SULFATE 90 UG/1
2 AEROSOL, METERED RESPIRATORY (INHALATION) EVERY 4 HOURS PRN
Status: DISCONTINUED | OUTPATIENT
Start: 2023-02-26 | End: 2023-03-01 | Stop reason: HOSPADM

## 2023-02-26 RX ORDER — SODIUM CHLORIDE 9 MG/ML
40 INJECTION, SOLUTION INTRAVENOUS AS NEEDED
Status: DISCONTINUED | OUTPATIENT
Start: 2023-02-26 | End: 2023-03-01 | Stop reason: HOSPADM

## 2023-02-26 RX ORDER — ENOXAPARIN SODIUM 100 MG/ML
40 INJECTION SUBCUTANEOUS DAILY
Status: DISCONTINUED | OUTPATIENT
Start: 2023-02-26 | End: 2023-02-28

## 2023-02-26 RX ORDER — SPIRONOLACTONE 25 MG/1
50 TABLET ORAL DAILY
Status: DISCONTINUED | OUTPATIENT
Start: 2023-02-27 | End: 2023-03-01 | Stop reason: HOSPADM

## 2023-02-26 RX ADMIN — Medication 10 ML: at 22:28

## 2023-02-26 RX ADMIN — TAZOBACTAM SODIUM AND PIPERACILLIN SODIUM 3.38 G: 375; 3 INJECTION, SOLUTION INTRAVENOUS at 19:21

## 2023-02-26 RX ADMIN — CARVEDILOL 6.25 MG: 6.25 TABLET, FILM COATED ORAL at 22:27

## 2023-02-26 RX ADMIN — TERAZOSIN HYDROCHLORIDE 2 MG: 2 CAPSULE ORAL at 22:27

## 2023-02-26 RX ADMIN — IOPAMIDOL 90 ML: 612 INJECTION, SOLUTION INTRAVENOUS at 15:48

## 2023-02-26 RX ADMIN — VANCOMYCIN HYDROCHLORIDE 2000 MG: 10 INJECTION, POWDER, LYOPHILIZED, FOR SOLUTION INTRAVENOUS at 20:42

## 2023-02-26 RX ADMIN — ENOXAPARIN SODIUM 40 MG: 40 INJECTION SUBCUTANEOUS at 22:27

## 2023-02-27 LAB
ANION GAP SERPL CALCULATED.3IONS-SCNC: 6 MMOL/L (ref 5–15)
BASOPHILS # BLD AUTO: 0.02 10*3/MM3 (ref 0–0.2)
BASOPHILS NFR BLD AUTO: 0.2 % (ref 0–1.5)
BUN SERPL-MCNC: 21 MG/DL (ref 8–23)
BUN/CREAT SERPL: 18.4 (ref 7–25)
CALCIUM SPEC-SCNC: 8.2 MG/DL (ref 8.6–10.5)
CHLORIDE SERPL-SCNC: 101 MMOL/L (ref 98–107)
CO2 SERPL-SCNC: 31 MMOL/L (ref 22–29)
CREAT SERPL-MCNC: 1.14 MG/DL (ref 0.76–1.27)
DEPRECATED RDW RBC AUTO: 53.2 FL (ref 37–54)
EGFRCR SERPLBLD CKD-EPI 2021: 61.5 ML/MIN/1.73
EOSINOPHIL # BLD AUTO: 0.1 10*3/MM3 (ref 0–0.4)
EOSINOPHIL NFR BLD AUTO: 1.2 % (ref 0.3–6.2)
ERYTHROCYTE [DISTWIDTH] IN BLOOD BY AUTOMATED COUNT: 14.6 % (ref 12.3–15.4)
GLUCOSE BLDC GLUCOMTR-MCNC: 102 MG/DL (ref 70–130)
GLUCOSE BLDC GLUCOMTR-MCNC: 111 MG/DL (ref 70–130)
GLUCOSE BLDC GLUCOMTR-MCNC: 117 MG/DL (ref 70–130)
GLUCOSE SERPL-MCNC: 134 MG/DL (ref 65–99)
HCT VFR BLD AUTO: 39.2 % (ref 37.5–51)
HGB BLD-MCNC: 12.4 G/DL (ref 13–17.7)
IMM GRANULOCYTES # BLD AUTO: 0.05 10*3/MM3 (ref 0–0.05)
IMM GRANULOCYTES NFR BLD AUTO: 0.6 % (ref 0–0.5)
LYMPHOCYTES # BLD AUTO: 0.51 10*3/MM3 (ref 0.7–3.1)
LYMPHOCYTES NFR BLD AUTO: 6.2 % (ref 19.6–45.3)
MAGNESIUM SERPL-MCNC: 2.1 MG/DL (ref 1.6–2.4)
MCH RBC QN AUTO: 31.2 PG (ref 26.6–33)
MCHC RBC AUTO-ENTMCNC: 31.6 G/DL (ref 31.5–35.7)
MCV RBC AUTO: 98.5 FL (ref 79–97)
MONOCYTES # BLD AUTO: 0.49 10*3/MM3 (ref 0.1–0.9)
MONOCYTES NFR BLD AUTO: 6 % (ref 5–12)
MRSA DNA SPEC QL NAA+PROBE: POSITIVE
NEUTROPHILS NFR BLD AUTO: 7.01 10*3/MM3 (ref 1.7–7)
NEUTROPHILS NFR BLD AUTO: 85.8 % (ref 42.7–76)
NRBC BLD AUTO-RTO: 0 /100 WBC (ref 0–0.2)
PHOSPHATE SERPL-MCNC: 2.9 MG/DL (ref 2.5–4.5)
PLATELET # BLD AUTO: 118 10*3/MM3 (ref 140–450)
PMV BLD AUTO: 13.6 FL (ref 6–12)
POTASSIUM SERPL-SCNC: 4.2 MMOL/L (ref 3.5–5.2)
RBC # BLD AUTO: 3.98 10*6/MM3 (ref 4.14–5.8)
SODIUM SERPL-SCNC: 138 MMOL/L (ref 136–145)
WBC NRBC COR # BLD: 8.18 10*3/MM3 (ref 3.4–10.8)

## 2023-02-27 PROCEDURE — 82962 GLUCOSE BLOOD TEST: CPT

## 2023-02-27 PROCEDURE — G0378 HOSPITAL OBSERVATION PER HR: HCPCS

## 2023-02-27 PROCEDURE — 97162 PT EVAL MOD COMPLEX 30 MIN: CPT

## 2023-02-27 PROCEDURE — 25010000002 PIPERACILLIN SOD-TAZOBACTAM PER 1 G: Performed by: PEDIATRICS

## 2023-02-27 PROCEDURE — 84100 ASSAY OF PHOSPHORUS: CPT | Performed by: PEDIATRICS

## 2023-02-27 PROCEDURE — 99232 SBSQ HOSP IP/OBS MODERATE 35: CPT | Performed by: INTERNAL MEDICINE

## 2023-02-27 PROCEDURE — 25010000002 VANCOMYCIN 10 G RECONSTITUTED SOLUTION

## 2023-02-27 PROCEDURE — 83735 ASSAY OF MAGNESIUM: CPT | Performed by: PEDIATRICS

## 2023-02-27 PROCEDURE — 87641 MR-STAPH DNA AMP PROBE: CPT | Performed by: PEDIATRICS

## 2023-02-27 PROCEDURE — 85025 COMPLETE CBC W/AUTO DIFF WBC: CPT | Performed by: PEDIATRICS

## 2023-02-27 PROCEDURE — 80048 BASIC METABOLIC PNL TOTAL CA: CPT | Performed by: PEDIATRICS

## 2023-02-27 RX ADMIN — VANCOMYCIN HYDROCHLORIDE 1250 MG: 10 INJECTION, POWDER, LYOPHILIZED, FOR SOLUTION INTRAVENOUS at 21:43

## 2023-02-27 RX ADMIN — FUROSEMIDE 40 MG: 40 TABLET ORAL at 09:02

## 2023-02-27 RX ADMIN — TAZOBACTAM SODIUM AND PIPERACILLIN SODIUM 3.38 G: 375; 3 INJECTION, SOLUTION INTRAVENOUS at 02:11

## 2023-02-27 RX ADMIN — TAZOBACTAM SODIUM AND PIPERACILLIN SODIUM 3.38 G: 375; 3 INJECTION, SOLUTION INTRAVENOUS at 17:21

## 2023-02-27 RX ADMIN — CARVEDILOL 6.25 MG: 6.25 TABLET, FILM COATED ORAL at 21:31

## 2023-02-27 RX ADMIN — CARVEDILOL 6.25 MG: 6.25 TABLET, FILM COATED ORAL at 09:02

## 2023-02-27 RX ADMIN — SPIRONOLACTONE 50 MG: 25 TABLET ORAL at 09:02

## 2023-02-27 RX ADMIN — TERAZOSIN HYDROCHLORIDE 2 MG: 2 CAPSULE ORAL at 21:31

## 2023-02-27 RX ADMIN — TAZOBACTAM SODIUM AND PIPERACILLIN SODIUM 3.38 G: 375; 3 INJECTION, SOLUTION INTRAVENOUS at 09:14

## 2023-02-27 RX ADMIN — MUPIROCIN 1 APPLICATION: 20 OINTMENT TOPICAL at 21:31

## 2023-02-27 RX ADMIN — HYDROCODONE BITARTRATE AND ACETAMINOPHEN 1 TABLET: 5; 325 TABLET ORAL at 21:37

## 2023-02-27 RX ADMIN — HYDROCODONE BITARTRATE AND ACETAMINOPHEN 1 TABLET: 5; 325 TABLET ORAL at 03:25

## 2023-02-27 RX ADMIN — ATORVASTATIN CALCIUM 20 MG: 20 TABLET, FILM COATED ORAL at 09:02

## 2023-02-27 RX ADMIN — Medication 10 ML: at 21:31

## 2023-02-28 ENCOUNTER — TELEPHONE (OUTPATIENT)
Dept: FAMILY MEDICINE CLINIC | Facility: CLINIC | Age: 88
End: 2023-02-28

## 2023-02-28 LAB
ALBUMIN SERPL-MCNC: 2.9 G/DL (ref 3.5–5.2)
ALBUMIN/GLOB SERPL: 1.2 G/DL
ALP SERPL-CCNC: 72 U/L (ref 39–117)
ALT SERPL W P-5'-P-CCNC: 13 U/L (ref 1–41)
ANION GAP SERPL CALCULATED.3IONS-SCNC: 7 MMOL/L (ref 5–15)
AST SERPL-CCNC: 17 U/L (ref 1–40)
BASOPHILS # BLD AUTO: 0.02 10*3/MM3 (ref 0–0.2)
BASOPHILS NFR BLD AUTO: 0.3 % (ref 0–1.5)
BILIRUB SERPL-MCNC: 0.9 MG/DL (ref 0–1.2)
BUN SERPL-MCNC: 23 MG/DL (ref 8–23)
BUN/CREAT SERPL: 19.5 (ref 7–25)
CALCIUM SPEC-SCNC: 8.2 MG/DL (ref 8.6–10.5)
CHLORIDE SERPL-SCNC: 100 MMOL/L (ref 98–107)
CO2 SERPL-SCNC: 29 MMOL/L (ref 22–29)
CREAT SERPL-MCNC: 1.18 MG/DL (ref 0.76–1.27)
DEPRECATED RDW RBC AUTO: 52.4 FL (ref 37–54)
EGFRCR SERPLBLD CKD-EPI 2021: 59 ML/MIN/1.73
EOSINOPHIL # BLD AUTO: 0.08 10*3/MM3 (ref 0–0.4)
EOSINOPHIL NFR BLD AUTO: 1.3 % (ref 0.3–6.2)
ERYTHROCYTE [DISTWIDTH] IN BLOOD BY AUTOMATED COUNT: 14.8 % (ref 12.3–15.4)
GLOBULIN UR ELPH-MCNC: 2.4 GM/DL
GLUCOSE BLDC GLUCOMTR-MCNC: 117 MG/DL (ref 70–130)
GLUCOSE BLDC GLUCOMTR-MCNC: 155 MG/DL (ref 70–130)
GLUCOSE BLDC GLUCOMTR-MCNC: 184 MG/DL (ref 70–130)
GLUCOSE SERPL-MCNC: 123 MG/DL (ref 65–99)
HCT VFR BLD AUTO: 36.5 % (ref 37.5–51)
HGB BLD-MCNC: 11.6 G/DL (ref 13–17.7)
IMM GRANULOCYTES # BLD AUTO: 0.11 10*3/MM3 (ref 0–0.05)
IMM GRANULOCYTES NFR BLD AUTO: 1.7 % (ref 0–0.5)
LYMPHOCYTES # BLD AUTO: 0.62 10*3/MM3 (ref 0.7–3.1)
LYMPHOCYTES NFR BLD AUTO: 9.8 % (ref 19.6–45.3)
MCH RBC QN AUTO: 30.6 PG (ref 26.6–33)
MCHC RBC AUTO-ENTMCNC: 31.8 G/DL (ref 31.5–35.7)
MCV RBC AUTO: 96.3 FL (ref 79–97)
MONOCYTES # BLD AUTO: 0.55 10*3/MM3 (ref 0.1–0.9)
MONOCYTES NFR BLD AUTO: 8.7 % (ref 5–12)
NEUTROPHILS NFR BLD AUTO: 4.96 10*3/MM3 (ref 1.7–7)
NEUTROPHILS NFR BLD AUTO: 78.2 % (ref 42.7–76)
NRBC BLD AUTO-RTO: 0 /100 WBC (ref 0–0.2)
PLATELET # BLD AUTO: 100 10*3/MM3 (ref 140–450)
PMV BLD AUTO: 14.4 FL (ref 6–12)
POTASSIUM SERPL-SCNC: 4.1 MMOL/L (ref 3.5–5.2)
PROT SERPL-MCNC: 5.3 G/DL (ref 6–8.5)
RBC # BLD AUTO: 3.79 10*6/MM3 (ref 4.14–5.8)
SODIUM SERPL-SCNC: 136 MMOL/L (ref 136–145)
WBC NRBC COR # BLD: 6.34 10*3/MM3 (ref 3.4–10.8)

## 2023-02-28 PROCEDURE — G0378 HOSPITAL OBSERVATION PER HR: HCPCS

## 2023-02-28 PROCEDURE — 85025 COMPLETE CBC W/AUTO DIFF WBC: CPT | Performed by: INTERNAL MEDICINE

## 2023-02-28 PROCEDURE — 80053 COMPREHEN METABOLIC PANEL: CPT | Performed by: INTERNAL MEDICINE

## 2023-02-28 PROCEDURE — 96372 THER/PROPH/DIAG INJ SC/IM: CPT

## 2023-02-28 PROCEDURE — 25010000002 ENOXAPARIN PER 10 MG: Performed by: PEDIATRICS

## 2023-02-28 PROCEDURE — 97116 GAIT TRAINING THERAPY: CPT | Performed by: PHYSICAL THERAPIST

## 2023-02-28 PROCEDURE — 97530 THERAPEUTIC ACTIVITIES: CPT | Performed by: PHYSICAL THERAPIST

## 2023-02-28 PROCEDURE — 99232 SBSQ HOSP IP/OBS MODERATE 35: CPT | Performed by: INTERNAL MEDICINE

## 2023-02-28 PROCEDURE — 25010000002 PIPERACILLIN SOD-TAZOBACTAM PER 1 G: Performed by: PEDIATRICS

## 2023-02-28 PROCEDURE — 63710000001 INSULIN LISPRO (HUMAN) PER 5 UNITS: Performed by: PEDIATRICS

## 2023-02-28 PROCEDURE — 82962 GLUCOSE BLOOD TEST: CPT

## 2023-02-28 RX ORDER — AMOXICILLIN AND CLAVULANATE POTASSIUM 875; 125 MG/1; MG/1
1 TABLET, FILM COATED ORAL EVERY 12 HOURS SCHEDULED
Status: DISCONTINUED | OUTPATIENT
Start: 2023-02-28 | End: 2023-03-01 | Stop reason: HOSPADM

## 2023-02-28 RX ORDER — LINEZOLID 600 MG/1
600 TABLET, FILM COATED ORAL EVERY 12 HOURS SCHEDULED
Status: DISCONTINUED | OUTPATIENT
Start: 2023-02-28 | End: 2023-03-01 | Stop reason: HOSPADM

## 2023-02-28 RX ADMIN — HYDROCODONE BITARTRATE AND ACETAMINOPHEN 1 TABLET: 5; 325 TABLET ORAL at 18:11

## 2023-02-28 RX ADMIN — Medication 10 ML: at 09:18

## 2023-02-28 RX ADMIN — CARVEDILOL 6.25 MG: 6.25 TABLET, FILM COATED ORAL at 09:17

## 2023-02-28 RX ADMIN — AMOXICILLIN AND CLAVULANATE POTASSIUM 1 TABLET: 875; 125 TABLET, FILM COATED ORAL at 11:21

## 2023-02-28 RX ADMIN — APIXABAN 2.5 MG: 2.5 TABLET, FILM COATED ORAL at 21:46

## 2023-02-28 RX ADMIN — APIXABAN 2.5 MG: 2.5 TABLET, FILM COATED ORAL at 11:20

## 2023-02-28 RX ADMIN — TAZOBACTAM SODIUM AND PIPERACILLIN SODIUM 3.38 G: 375; 3 INJECTION, SOLUTION INTRAVENOUS at 09:17

## 2023-02-28 RX ADMIN — AMOXICILLIN AND CLAVULANATE POTASSIUM 1 TABLET: 875; 125 TABLET, FILM COATED ORAL at 21:46

## 2023-02-28 RX ADMIN — Medication 10 ML: at 21:46

## 2023-02-28 RX ADMIN — TAZOBACTAM SODIUM AND PIPERACILLIN SODIUM 3.38 G: 375; 3 INJECTION, SOLUTION INTRAVENOUS at 01:53

## 2023-02-28 RX ADMIN — FUROSEMIDE 40 MG: 40 TABLET ORAL at 09:17

## 2023-02-28 RX ADMIN — INSULIN LISPRO 2 UNITS: 100 INJECTION, SOLUTION INTRAVENOUS; SUBCUTANEOUS at 18:11

## 2023-02-28 RX ADMIN — MUPIROCIN 1 APPLICATION: 20 OINTMENT TOPICAL at 09:17

## 2023-02-28 RX ADMIN — INSULIN LISPRO 2 UNITS: 100 INJECTION, SOLUTION INTRAVENOUS; SUBCUTANEOUS at 11:20

## 2023-02-28 RX ADMIN — LINEZOLID 600 MG: 600 TABLET, FILM COATED ORAL at 21:46

## 2023-02-28 RX ADMIN — ATORVASTATIN CALCIUM 20 MG: 20 TABLET, FILM COATED ORAL at 09:17

## 2023-02-28 RX ADMIN — MUPIROCIN 1 APPLICATION: 20 OINTMENT TOPICAL at 21:47

## 2023-02-28 RX ADMIN — LINEZOLID 600 MG: 600 TABLET, FILM COATED ORAL at 11:20

## 2023-02-28 RX ADMIN — SPIRONOLACTONE 50 MG: 25 TABLET ORAL at 09:17

## 2023-02-28 RX ADMIN — ENOXAPARIN SODIUM 40 MG: 40 INJECTION SUBCUTANEOUS at 09:17

## 2023-02-28 NOTE — TELEPHONE ENCOUNTER
THE CALLER DAVID WOULD LIKE THE DOCTOR TO KNOW THAT THE PATIENT IS IN [facility] HE WAS ADMITTED ON Sunday     CALL [phone]

## 2023-03-01 ENCOUNTER — READMISSION MANAGEMENT (OUTPATIENT)
Dept: CALL CENTER | Facility: HOSPITAL | Age: 88
End: 2023-03-01
Payer: MEDICARE

## 2023-03-01 ENCOUNTER — APPOINTMENT (OUTPATIENT)
Dept: GENERAL RADIOLOGY | Facility: HOSPITAL | Age: 88
End: 2023-03-01
Payer: MEDICARE

## 2023-03-01 VITALS
HEART RATE: 60 BPM | OXYGEN SATURATION: 97 % | TEMPERATURE: 97.7 F | BODY MASS INDEX: 27.93 KG/M2 | RESPIRATION RATE: 18 BRPM | DIASTOLIC BLOOD PRESSURE: 69 MMHG | WEIGHT: 199.5 LBS | HEIGHT: 71 IN | SYSTOLIC BLOOD PRESSURE: 104 MMHG

## 2023-03-01 LAB
ALBUMIN SERPL-MCNC: 2.8 G/DL (ref 3.5–5.2)
ALBUMIN/GLOB SERPL: 1.2 G/DL
ALP SERPL-CCNC: 78 U/L (ref 39–117)
ALT SERPL W P-5'-P-CCNC: 14 U/L (ref 1–41)
ANION GAP SERPL CALCULATED.3IONS-SCNC: 7 MMOL/L (ref 5–15)
AST SERPL-CCNC: 18 U/L (ref 1–40)
BASOPHILS # BLD AUTO: 0.02 10*3/MM3 (ref 0–0.2)
BASOPHILS NFR BLD AUTO: 0.4 % (ref 0–1.5)
BILIRUB SERPL-MCNC: 0.7 MG/DL (ref 0–1.2)
BUN SERPL-MCNC: 23 MG/DL (ref 8–23)
BUN/CREAT SERPL: 20.5 (ref 7–25)
CALCIUM SPEC-SCNC: 7.9 MG/DL (ref 8.6–10.5)
CHLORIDE SERPL-SCNC: 98 MMOL/L (ref 98–107)
CO2 SERPL-SCNC: 27 MMOL/L (ref 22–29)
CREAT SERPL-MCNC: 1.12 MG/DL (ref 0.76–1.27)
DEPRECATED RDW RBC AUTO: 51.9 FL (ref 37–54)
EGFRCR SERPLBLD CKD-EPI 2021: 62.8 ML/MIN/1.73
EOSINOPHIL # BLD AUTO: 0.14 10*3/MM3 (ref 0–0.4)
EOSINOPHIL NFR BLD AUTO: 2.6 % (ref 0.3–6.2)
ERYTHROCYTE [DISTWIDTH] IN BLOOD BY AUTOMATED COUNT: 14.7 % (ref 12.3–15.4)
GLOBULIN UR ELPH-MCNC: 2.3 GM/DL
GLUCOSE BLDC GLUCOMTR-MCNC: 119 MG/DL (ref 70–130)
GLUCOSE BLDC GLUCOMTR-MCNC: 137 MG/DL (ref 70–130)
GLUCOSE SERPL-MCNC: 127 MG/DL (ref 65–99)
HCT VFR BLD AUTO: 35 % (ref 37.5–51)
HGB BLD-MCNC: 11.3 G/DL (ref 13–17.7)
IMM GRANULOCYTES # BLD AUTO: 0.03 10*3/MM3 (ref 0–0.05)
IMM GRANULOCYTES NFR BLD AUTO: 0.6 % (ref 0–0.5)
LYMPHOCYTES # BLD AUTO: 0.62 10*3/MM3 (ref 0.7–3.1)
LYMPHOCYTES NFR BLD AUTO: 11.5 % (ref 19.6–45.3)
MCH RBC QN AUTO: 31 PG (ref 26.6–33)
MCHC RBC AUTO-ENTMCNC: 32.3 G/DL (ref 31.5–35.7)
MCV RBC AUTO: 96.2 FL (ref 79–97)
MONOCYTES # BLD AUTO: 0.49 10*3/MM3 (ref 0.1–0.9)
MONOCYTES NFR BLD AUTO: 9.1 % (ref 5–12)
NEUTROPHILS NFR BLD AUTO: 4.1 10*3/MM3 (ref 1.7–7)
NEUTROPHILS NFR BLD AUTO: 75.8 % (ref 42.7–76)
NRBC BLD AUTO-RTO: 0 /100 WBC (ref 0–0.2)
NT-PROBNP SERPL-MCNC: 3475 PG/ML (ref 0–1800)
PLATELET # BLD AUTO: 86 10*3/MM3 (ref 140–450)
PMV BLD AUTO: 13.4 FL (ref 6–12)
POTASSIUM SERPL-SCNC: 3.8 MMOL/L (ref 3.5–5.2)
PROT SERPL-MCNC: 5.1 G/DL (ref 6–8.5)
RBC # BLD AUTO: 3.64 10*6/MM3 (ref 4.14–5.8)
SODIUM SERPL-SCNC: 132 MMOL/L (ref 136–145)
WBC NRBC COR # BLD: 5.4 10*3/MM3 (ref 3.4–10.8)

## 2023-03-01 PROCEDURE — 71045 X-RAY EXAM CHEST 1 VIEW: CPT

## 2023-03-01 PROCEDURE — 83880 ASSAY OF NATRIURETIC PEPTIDE: CPT | Performed by: INTERNAL MEDICINE

## 2023-03-01 PROCEDURE — 85025 COMPLETE CBC W/AUTO DIFF WBC: CPT | Performed by: INTERNAL MEDICINE

## 2023-03-01 PROCEDURE — 99239 HOSP IP/OBS DSCHRG MGMT >30: CPT | Performed by: INTERNAL MEDICINE

## 2023-03-01 PROCEDURE — 80053 COMPREHEN METABOLIC PANEL: CPT | Performed by: INTERNAL MEDICINE

## 2023-03-01 PROCEDURE — 82962 GLUCOSE BLOOD TEST: CPT

## 2023-03-01 PROCEDURE — G0378 HOSPITAL OBSERVATION PER HR: HCPCS

## 2023-03-01 PROCEDURE — 94640 AIRWAY INHALATION TREATMENT: CPT

## 2023-03-01 PROCEDURE — 94799 UNLISTED PULMONARY SVC/PX: CPT

## 2023-03-01 RX ORDER — ALBUTEROL SULFATE 2.5 MG/3ML
2.5 SOLUTION RESPIRATORY (INHALATION)
Status: DISCONTINUED | OUTPATIENT
Start: 2023-03-01 | End: 2023-03-01 | Stop reason: HOSPADM

## 2023-03-01 RX ORDER — LINEZOLID 600 MG/1
600 TABLET, FILM COATED ORAL EVERY 12 HOURS SCHEDULED
Qty: 14 TABLET | Refills: 0 | Status: SHIPPED | OUTPATIENT
Start: 2023-03-01 | End: 2023-03-10

## 2023-03-01 RX ADMIN — ALBUTEROL SULFATE 2.5 MG: 2.5 SOLUTION RESPIRATORY (INHALATION) at 12:39

## 2023-03-01 RX ADMIN — FUROSEMIDE 40 MG: 40 TABLET ORAL at 09:45

## 2023-03-01 RX ADMIN — ATORVASTATIN CALCIUM 20 MG: 20 TABLET, FILM COATED ORAL at 09:45

## 2023-03-01 RX ADMIN — MUPIROCIN 1 APPLICATION: 20 OINTMENT TOPICAL at 09:45

## 2023-03-01 RX ADMIN — APIXABAN 2.5 MG: 2.5 TABLET, FILM COATED ORAL at 09:45

## 2023-03-01 RX ADMIN — SPIRONOLACTONE 50 MG: 25 TABLET ORAL at 09:45

## 2023-03-01 RX ADMIN — CARVEDILOL 6.25 MG: 6.25 TABLET, FILM COATED ORAL at 09:45

## 2023-03-01 RX ADMIN — LINEZOLID 600 MG: 600 TABLET, FILM COATED ORAL at 09:45

## 2023-03-01 RX ADMIN — AMOXICILLIN AND CLAVULANATE POTASSIUM 1 TABLET: 875; 125 TABLET, FILM COATED ORAL at 09:45

## 2023-03-01 RX ADMIN — ALBUTEROL SULFATE 2.5 MG: 2.5 SOLUTION RESPIRATORY (INHALATION) at 09:13

## 2023-03-01 RX ADMIN — Medication 10 ML: at 09:45

## 2023-03-01 NOTE — PLAN OF CARE
Goal Outcome Evaluation:  Patient admitted with facial cellulitis, medications being administered as ordered.     No c/o pain this shift.     Sinus rhythm per telemetry.

## 2023-03-01 NOTE — PROGRESS NOTES
[facility] Medicine Services  PROGRESS NOTE    Patient Name: Jordi Sam  : 1934  MRN: [id number]    Date of Admission: 2023  Primary Care Physician: Jasper Avery MD    Subjective   Subjective     CC:  Facial swelling    HPI:  Facial pain better.  Wheezing this morning -- states that he is always has wheezes.  Mr Sam says his wife is better at answering questions than he is.     ROS:  Gen- denies fever  pulm - chronic wheezes per patient  CV: no chest pain  GI: no abdominal pain        Objective   Objective     Vital Signs:   Temp:  [97.6 °F (36.4 °C)-98.5 °F (36.9 °C)] 97.6 °F (36.4 °C)  Heart Rate:  [57-75] 73  Resp:  [17-18] 17  BP: (102-119)/(54-70) 113/54     Physical Exam:  Constitutional - no acute distress, nontoxic, in bed  HEENT-right nasal wound, trace surrounding edema  CV-RRR  Resp-a few scattered expiratory wheezes bilaterally  Abd-soft, nontender, nondistended, normoactive bowel sounds, low abdominal hernia -soft  Ext-1+ RLE edema  Neuro-alert with some mild confusion, speech clear, moves all extremities   Psych-normal affect   Skin- eschar right nose      Results Reviewed:  LAB RESULTS:      Lab 23  0439 23  0356 23  0308 23  1407 23  1400 23  1357 23  1211   WBC 5.40 6.34 8.18  --   --  9.36 12.00*   HEMOGLOBIN 11.3* 11.6* 12.4*  --   --  12.6* 12.3*   HEMOGLOBIN, POC  --   --   --  13.3 13.3  --   --    HEMATOCRIT 35.0* 36.5* 39.2  --   --  40.1 36.7*   HEMATOCRIT POC  --   --   --  39 39  --   --    PLATELETS 86* 100* 118*  --   --  132* 126*   NEUTROS ABS 4.10 4.96 7.01*  --   --  8.01* 10.55*   IMMATURE GRANS (ABS) 0.03 0.11* 0.05  --   --  0.05 0.07*   LYMPHS ABS 0.62* 0.62* 0.51*  --   --  0.59* 0.57*   MONOS ABS 0.49 0.55 0.49  --   --  0.60 0.77   EOS ABS 0.14 0.08 0.10  --   --  0.09 0.03   MCV 96.2 96.3 98.5*  --   --  97.6* 92.7   CRP  --   --   --   --   --   --  0.30   PROCALCITONIN  --   --   --    --   --  0.05 0.04   LACTATE  --   --   --   --   --  1.0  --    PROTIME  --   --   --   --   --   --  16.4*         Lab 03/01/23 0439 02/28/23 0356 02/27/23 0308 02/26/23  1407 02/26/23  1400 02/26/23  1357 02/23/23  1211 02/23/23  1211 02/23/23  1117   SODIUM 132* 136 138  --   --  137  --  138  --    POTASSIUM 3.8 4.1 4.2  --   --  4.1  --  4.1  --    CHLORIDE 98 100 101  --   --  99  --  95*  --    CO2 27.0 29.0 31.0*  --   --  32.0*  --  31.2*  --    ANION GAP 7.0 7.0 6.0  --   --  6.0  --  11.8  --    BUN 23 23 21  --   --  22  --  21  --    CREATININE 1.12 1.18 1.14 1.20 1.20 1.21   < > 1.16  --    EGFR 62.8 59.0* 61.5  --  57.8* 57.2*   < > 60.2  --    GLUCOSE 127* 123* 134*  --   --  145*  --  114*  --    CALCIUM 7.9* 8.2* 8.2*  --   --  8.2*  --  8.9  --    MAGNESIUM  --   --  2.1  --   --   --   --   --   --    PHOSPHORUS  --   --  2.9  --   --   --   --   --   --    HEMOGLOBIN A1C  --   --   --   --   --  6.80*  --   --  6.9   TSH  --   --   --   --   --   --   --  4.590*  --     < > = values in this interval not displayed.         Lab 03/01/23 0439 02/28/23 0356 02/26/23  1357 02/23/23  1211   TOTAL PROTEIN 5.1* 5.3* 5.7* 5.9*   ALBUMIN 2.8* 2.9* 3.2* 3.5   GLOBULIN 2.3 2.4 2.5 2.4   ALT (SGPT) 14 13 20 29   AST (SGOT) 18 17 21 33   BILIRUBIN 0.7 0.9 0.9 0.5   ALK PHOS 78 72 90 117         Lab 02/23/23  1211   PROTIME 16.4*   INR 1.33*                 Brief Urine Lab Results  (Last result in the past 365 days)      Color   Clarity   Blood   Leuk Est   Nitrite   Protein   CREAT   Urine HCG        11/04/22 2212 Yellow   Clear   Negative   Negative   Negative   Trace                 Microbiology Results Abnormal     Procedure Component Value - Date/Time    Blood Culture - Blood, Arm, Right [[id number]]  (Normal) Collected: 02/26/23 1835    Lab Status: Preliminary result Specimen: Blood from Arm, Right Updated: 02/28/23 1845     Blood Culture No growth at 2 days    Blood Culture - Blood, Arm, Right  [[id number]]  (Normal) Collected: 02/26/23 1820    Lab Status: Preliminary result Specimen: Blood from Arm, Right Updated: 02/28/23 1845     Blood Culture No growth at 2 days          No radiology results from the last 24 hrs    Results for orders placed during the hospital encounter of 01/22/19    Adult Transesophageal Echo ([facility]) W/ Cont if Necessary Per Protocol    Interpretation Summary  · Estimated EF = 60%.  · Left ventricular systolic function is normal.  · Right ventricular cavity is mildly dilated.  · Mild mitral valve regurgitation is present  · Moderate tricuspid valve regurgitation is present.  · Small patent foramen ovale present with bi-directional shunting. Saline test results are positive for right to left atrial level shunt.  · Coronary sinus appeared generous. A left arm IV was placed. No evidence of persistent left superior vena cava..  · There is a small (<1cm) pericardial effusion.      Current medications:  Scheduled Meds:albuterol, 2.5 mg, Nebulization, Q6H - RT  amoxicillin-clavulanate, 1 tablet, Oral, Q12H  apixaban, 2.5 mg, Oral, Q12H  atorvastatin, 20 mg, Oral, Daily  carvedilol, 6.25 mg, Oral, Q12H  furosemide, 40 mg, Oral, Daily  insulin lispro, 0-7 Units, Subcutaneous, TID AC  linezolid, 600 mg, Oral, Q12H  mupirocin, 1 application, Topical, Q12H  sodium chloride, 10 mL, Intravenous, Q12H  spironolactone, 50 mg, Oral, Daily  terazosin, 2 mg, Oral, Nightly      Continuous Infusions:   PRN Meds:.•  albuterol sulfate HFA  •  dextrose  •  dextrose  •  glucagon (human recombinant)  •  HYDROcodone-acetaminophen  •  [COMPLETED] Insert Peripheral IV **AND** sodium chloride  •  sodium chloride  •  sodium chloride    Assessment & Plan   Assessment & Plan     Active Hospital Problems    Diagnosis  POA   • **Facial cellulitis [L03.211]  Yes   • Stage 3 chronic kidney disease (HCC) [N18.30]  Yes   • Cirrhosis of liver with ascites (HCC) [K74.60, R18.8]  Yes   • Chronic atrial fibrillation (HCC)  [I48.20]  Yes   • Coronary arteriosclerosis in native artery [I25.10]  Yes   • Essential hypertension [I10]  Yes      Resolved Hospital Problems   No resolved problems to display.        Brief Hospital Course to date:  Jordi Sam is a 89 y.o. male with a history of diabetes, cirrhosis, CKD, atrial fibrillation on Eliquis presented with facial erythema and swelling concern for cellulitis with possible abscess noted on maxillofacial CT.    This patient's problems and plans were partially entered by my partner and updated as appropriate by me 03/01/23.        Facial Cellulitis  Possible Abscess  Hx of skin cancer s/p removal in Dec.  --Started on zosyn/vanc in the ED- transitioned to PO Linezolid and Augmentin -- asked ID to evaluate given low platelets and chronic anticoagulation.     --MRSA PCR POSITIVE   --ENT consult-- Dr. Villagran evaluated, no indication for intervention.  Added Muprocin topically.   - has followup with dermatology within 2 weeks     Cirrhosis  Recurrent ascites  Chronic thrombocytopenia  --platelet 81  --Cont lasix and aldactone  --Scheduled for a paracentesis 2nd week of March.  No emergent indications/symptoms to require one currently     DM:  -HgA1C 6.8  - SSI     Afib:  - cont coreg  - Eliquis 2.5 mg BID     RLE and foot pain/swelling:  Gout  -Chronic superficial clot, no DVT on recent duplex, but study limited by patient pain.  On dose adjusted eliquis (for afib)  -recent steroid course for presumed gout (uric acid 8.5).  -xray with diffuse edema and arthritic changes  -check probnp  -Has ortho appt scheduled for next week -- arthrocentesis?.     Chronic right sided heart failure:  CAD  -Most recent ECHO with 60% EF, R ventricle dilated with tricuspid regurg  -No evidence of decompensation     CKD stage 3a  --stable, creatinine 1.1  --dose meds appropriately    Wheezing  - Patient says this is chronic, check CXR, schedule albuterol    Hyponatremia  - sodium 132      Expected Discharge  Location and Transportation: home  Expected Discharge   Expected Discharge Date and Time     Expected Discharge Date Expected Discharge Time    Mar 1, 2023            DVT prophylaxis:  Medical DVT prophylaxis orders are present.     AM-PAC 6 Clicks Score (PT): 19 (03/01/23 2143)    CODE STATUS:   Code Status and Medical Interventions:   Ordered at: 02/26/23 1850     Code Status (Patient has no pulse and is not breathing):    CPR (Attempt to Resuscitate)     Medical Interventions (Patient has pulse or is breathing):    Full Support       Silvino Melgar MD  03/01/23

## 2023-03-01 NOTE — CASE MANAGEMENT/SOCIAL WORK
Case Management Discharge Note      Final Note: I spoke with Tanesha at the [facility] office and notified her of the orders in the CM consult. F/U appointment with [facility] is in the AVS.         Selected Continued Care - Discharged on 3/1/2023 Admission date: 2/26/2023 - Discharge disposition: Home or Self Care    Destination    No services have been selected for the patient.              Durable Medical Equipment    No services have been selected for the patient.              Dialysis/Infusion    No services have been selected for the patient.              Home Medical Care    No services have been selected for the patient.              Therapy    No services have been selected for the patient.              Community Resources    No services have been selected for the patient.              Community & DME    No services have been selected for the patient.                       Final Discharge Disposition Code: 01 - home or self-care

## 2023-03-01 NOTE — CONSULTS
"    INFECTIOUS DISEASE CONSULT/INITIAL HOSPITAL VISIT    Jrodi Sam  1934  [id number]    Date of consult: 3/1/2023    Admit date: 2/26/2023    Requesting Provider: Ramón  Evaluating physician: Lv Bernabe MD  Reason for Consultation: facial cellulitis, MRSA + nasal PCR, chronic Thrombocytopenia  Chief Complaint: facial pain and swelling      Subjective   History of present illness:  Jordi Sam is a  89 y.o.  Yr old male diabetes mellitus, A. Fib, cirrhosis, and has a recent history of having a cancerous lesion (? BCC, data deficient) removed from the right side of his nose on December 8 by dermatology. He had a debridement procedure but not a Mohs procedure. The patient presented on 2/26 with a 2 day history of facial swelling of his toes over the right side of his pain is including swelling of his right eye.  He states that his right was initially swollen shut. Since arrival, the patient has remained afebrile.  He was noted to have facial cellulitis and was evaluated by Dr. Villagran with ENT who did not think that he needed any surgical intervention but did need antibiotic treatment for his facial cellulitis. CT Maxillofacial on 2/26 showed \"edema and enhancement of soft tissue of the nose and extending in the soft tissue overlying the right and left maxilla. There is a low-density focus within the soft tissue of the right lateral aspect of the nose measuring up to 2cm. The findings may be related to residual changes secondary to the patient's ej known malignancy. Findings secondary to soft tissue cellulitis with a deceloping abscess int he soft tissue of the righht aspect of the nose could also have this appearance. No evidence of osteomyelitis. No evidence for displaced maxillofacial fracture. Bilateral orbits are intact and unremarkable.\" MRSA PCR nares was positive. Blood cultures have remained no growth. The patient was started on vancomycin and zosyn by the primary team and facial " swelling, pain, and erythema has improved per his report and his right eye is no longer swollen shut. He did initially have a WBC of 12 but this improved and was 5.4 today. He has some mild anemia with a hgb of 11.3 and some chronic thrombocytopenia that is slightly worse with plt count of 86. He does have a Bactrim allergy listed. ID has been consulted for abx recs.      Past Medical History:   Diagnosis Date   • A-fib (HCC)    • Acute inferior myocardial infarction (HCC)     Acute inferior STEMI with RV infarct features, January 2009.    • CAD (coronary artery disease)    • COVID-19 08/25/2022   • Dyslipidemia    • Goiter     History of “goiter.”   • Hernia, umbilical    • Hyperlipidemia    • Hypertension    • Nephrolithiasis    • Type 2 diabetes mellitus (HCC)    • Umbilical hernia     Pt stated has recently returned. Seeing Dr Kiersten Nova and Dr Jasper Avery 8-2021       Past Surgical History:   Procedure Laterality Date   • BRONCHOSCOPY N/A 4/12/2019    Procedure: BRONCHOSCOPY WITH THORACENTESIS;  Surgeon: Marciano Higginbotham MD;  Location:  [facility];  Service: Pulmonary   • CARDIAC CATHETERIZATION Bilateral 1/30/2019    Procedure: Right and Left Heart Cath;  Surgeon: Efrem Posadas MD;  Location:  [facility];  Service: Cardiology   • CARDIAC CATHETERIZATION     • CHOLECYSTECTOMY     • CORONARY ANGIOPLASTY WITH STENT PLACEMENT  01/09/2009    Sirolimus eluting stents to the RCA, 01/09/2009.    • HERNIA REPAIR      Hernia repair x 2.    • PARACENTESIS  06/17/2019    multiple   • UMBILICAL HERNIA REPAIR N/A 7/26/2020    Procedure: UMBILICAL HERNIA REPAIR;  Surgeon: Maribell Her MD;  Location: [facility] OR;  Service: General;  Laterality: N/A;       Pediatric History   Patient Parents   • Not on file     Other Topics Concern   • Not on file   Social History Narrative    Caffeine Intake: 2  servings per day (coffee)    Patient lives at home with his wife       family  history includes Aneurysm in his father; Cancer in his sister; Dementia in his mother; Heart attack in his father; Heart disease in his brother and father; Hypertension in his daughter and son; No Known Problems in his brother and sister; Stroke in his mother.    Allergies   Allergen Reactions   • Bee Venom Swelling   • Sulfamethoxazole-Trimethoprim Other (See Comments)     Pt unaware       Immunization History   Administered Date(s) Administered   • COVID-19 (PFIZER) PURPLE CAP 02/03/2021, 03/03/2021, 11/09/2021   • FLUAD TRI 65YR+ 11/27/2019   • FluMist 2-49yrs 01/19/2016   • Fluad Quad 65+ 11/17/2020   • Fluzone High Dose =>65 Years ([employer] ONLY) 01/19/2016, 01/10/2017, 10/24/2017, 11/01/2018   • Fluzone High-Dose 65+yrs 02/16/2022   • Influenza Quad Vaccine (Inpatient) 10/12/2010   • Pneumococcal Conjugate 13-Valent (PCV13) 01/19/2016   • Pneumococcal Polysaccharide (PPSV23) 03/19/2019   • Pneumococcal, Unspecified 01/19/2016   • Shingrix 02/17/2022   • Tdap 02/18/2022       Medication:    Current Facility-Administered Medications:   •  albuterol (PROVENTIL) nebulizer solution 0.083% 2.5 mg/3mL, 2.5 mg, Nebulization, Q6H - RT, Silvino Melgar MD, 2.5 mg at 03/01/23 0913  •  albuterol sulfate HFA (PROVENTIL HFA;VENTOLIN HFA;PROAIR HFA) inhaler 2 puff, 2 puff, Inhalation, Q4H PRN, Claudia Castro MD  •  amoxicillin-clavulanate (AUGMENTIN) 875-125 MG per tablet 1 tablet, 1 tablet, Oral, Q12H, Aziza Ogden MD, 1 tablet at 03/01/23 0945  •  apixaban (ELIQUIS) tablet 2.5 mg, 2.5 mg, Oral, Q12H, Aziza Ogden MD, 2.5 mg at 03/01/23 0945  •  atorvastatin (LIPITOR) tablet 20 mg, 20 mg, Oral, Daily, Claudia Castro MD, 20 mg at 03/01/23 0945  •  carvedilol (COREG) tablet 6.25 mg, 6.25 mg, Oral, Q12H, Claudia Castro MD, 6.25 mg at 03/01/23 0945  •  dextrose (D50W) (25 g/50 mL) IV injection 25 g, 25 g, Intravenous, Q15 Min PRN, Claudia Castro MD  •  dextrose (GLUTOSE)  oral gel 15 g, 15 g, Oral, Q15 Min PRN, Claudia Castro MD  •  furosemide (LASIX) tablet 40 mg, 40 mg, Oral, Daily, Claudia Castro MD, 40 mg at 03/01/23 0945  •  glucagon (GLUCAGEN) injection 1 mg, 1 mg, Intramuscular, Q15 Min PRN, Claudia Castro MD  •  HYDROcodone-acetaminophen (NORCO) 5-325 MG per tablet 1 tablet, 1 tablet, Oral, Q6H PRN, Claudia Castro MD, 1 tablet at 02/28/23 1811  •  Insulin Lispro (humaLOG) injection 0-7 Units, 0-7 Units, Subcutaneous, TID AC, Claudia Castro MD, 2 Units at 02/28/23 1811  •  linezolid (ZYVOX) tablet 600 mg, 600 mg, Oral, Q12H, Aziza Ogden MD, 600 mg at 03/01/23 0945  •  mupirocin (BACTROBAN) 2 % ointment 1 application, 1 application, Topical, Q12H, Chapin Villagran MD, 1 application at 03/01/23 0945  •  [COMPLETED] Insert Peripheral IV, , , Once **AND** sodium chloride 0.9 % flush 10 mL, 10 mL, Intravenous, PRN, Claudia Castro MD  •  sodium chloride 0.9 % flush 10 mL, 10 mL, Intravenous, Q12H, Claudia Castro MD, 10 mL at 03/01/23 0945  •  sodium chloride 0.9 % flush 10 mL, 10 mL, Intravenous, PRN, Claudia Castro MD  •  sodium chloride 0.9 % infusion 40 mL, 40 mL, Intravenous, PRN, Claudia Castro MD  •  spironolactone (ALDACTONE) tablet 50 mg, 50 mg, Oral, Daily, Claudia Castro MD, 50 mg at 03/01/23 0945  •  terazosin (HYTRIN) capsule 2 mg, 2 mg, Oral, Nightly, Claudia Castro MD, 2 mg at 02/27/23 2131    Please refer to the medical record for a full medication list    Review of Systems:    Constitutional-- No Fever, chills or sweats.  + fatigue.  HEENT-- No new vision, hearing or throat complaints. Right external nose with a lesion and small wound associated with his recent cancer removal procedure. Reports recent facial swelling and pain. No epistaxis or oral sores.  Denies odynophagia or dysphagia.  No odynophagia or dysphagia. No headache, photophobia or neck  "stiffness.  CV-- No chest pain, palpitation or syncope  Resp-- No SOB/cough/Hemoptysis  GI- No nausea, vomiting, or diarrhea.  No hematochezia, melena, or hematemesis. Denies jaundice or chronic liver disease.  -- No dysuria, hematuria, or flank pain.  Denies hesitancy, urgency or flank pain.  Lymph- no swollen lymph nodes in neck/axilla or groin.   Heme- No active bruising or bleeding; no Hx of DVT or PE.  MS-- no swelling or pain in the bones or joints of arms/legs.  No new back pain.  Neuro-- No acute focal weakness or numbness in the arms or legs.  No seizures.  Skin--No other generalized rashes or lesions    Physical Exam:   Vital Signs   Temp:  [97.6 °F (36.4 °C)-98.5 °F (36.9 °C)] 97.6 °F (36.4 °C)  Heart Rate:  [57-75] 69  Resp:  [17-18] 18  BP: (102-119)/(54-70) 113/54    Temp  Min: 97.6 °F (36.4 °C)  Max: 98.5 °F (36.9 °C)  BP  Min: 102/62  Max: 119/70  Pulse  Min: 57  Max: 75  Resp  Min: 17  Max: 18  SpO2  Min: 90 %  Max: 96 %    Blood pressure 113/54, pulse 69, temperature 97.6 °F (36.4 °C), temperature source Oral, resp. rate 18, height 180.3 cm (71\"), weight 90.5 kg (199 lb 8 oz), SpO2 95 %.  GENERAL: Awake and alert, in no acute distress. Appears stated age.  Frail  HEENT:  Normocephalic, atraumatic.  Right external nose with a small necrotic appearing wound with no significant drainage or fluctuance. Slight residual right facial swelling that appears to have improved and no very tender to palpation. Right eye is no longer swollen shut. No significant erythema noted at this time. Oropharynx without thrush. Dentition in good repair. No cervical adenopathy. No neck masses.  Ears externally normal, Nose externally normal.  EYES: PERRL. No conjunctival injection. No icterus. EOM full.  HEART: RRR, no murmur  LUNGS: Clear to auscultation and percussion. No respiratory distress, no use of accessory muscles.  ABDOMEN: Soft, nontender, nondistended. No appreciable HSM.  Bowel sounds normal.  GENITAL: no " Devries catheter  SKIN: no generalized rashes.  No peripheral stigmata of infective endocarditis noted.  PSYCHIATRIC: cooperative.  Appropriate mood and affect  EXT:  No cellulitic change.  NEURO: awake alert and oriented ×4.  Normal speech and cognition    Results Review:   I reviewed the patient's new clinical results.    Results from last 7 days   Lab Units 03/01/23  0439 02/28/23  0356 02/27/23  0308   WBC 10*3/mm3 5.40 6.34 8.18   HEMOGLOBIN g/dL 11.3* 11.6* 12.4*   HEMATOCRIT % 35.0* 36.5* 39.2   PLATELETS 10*3/mm3 86* 100* 118*     Results from last 7 days   Lab Units 03/01/23  0439   SODIUM mmol/L 132*   POTASSIUM mmol/L 3.8   CHLORIDE mmol/L 98   CO2 mmol/L 27.0   BUN mg/dL 23   CREATININE mg/dL 1.12   GLUCOSE mg/dL 127*   CALCIUM mg/dL 7.9*     Results from last 7 days   Lab Units 03/01/23  0439   ALK PHOS U/L 78   BILIRUBIN mg/dL 0.7   ALT (SGPT) U/L 14   AST (SGOT) U/L 18         Results from last 7 days   Lab Units 02/23/23  1211   CRP mg/dL 0.30         Results from last 7 days   Lab Units 02/26/23  1357   LACTATE mmol/L 1.0     Estimated Creatinine Clearance: 51.5 mL/min (by C-G formula based on SCr of 1.12 mg/dL).     Procalitonin Results:      Lab 02/26/23  1357 02/23/23  1211   PROCALCITONIN 0.05 0.04      Brief Urine Lab Results  (Last result in the past 365 days)      Color   Clarity   Blood   Leuk Est   Nitrite   Protein   CREAT   Urine HCG        11/04/22 2212 Yellow   Clear   Negative   Negative   Negative   Trace                No results found for: SITE, ALLENTEST, PHART, JXJ6KCL, PO2ART, ZFY9MUX, BASEEXCESS, D5IVTTHP, HGBBG, HCTABG, OXYHEMOGLOBI, METHHGBN, CARBOXYHGB, CO2CT, BAROMETRIC, MODALITY, FIO2     Microbiology:  Blood Culture   Date Value Ref Range Status   02/26/2023 No growth at 2 days  Preliminary       Blood Culture   Date Value Ref Range Status   02/26/2023 No growth at 2 days  Preliminary   (      Radiology:  Imaging Results (Last 72 Hours)     Procedure Component Value Units  Date/Time    CT Maxillofacial With Contrast [[id number]] Collected: 02/26/23 1636     Updated: 02/26/23 1644    Narrative:      CT MAXILLOFACIAL W CONT    Date of Exam: 2/26/2023 3:43 PM EST    Indication: right facial swelling. cellulitis. hx of cancer to right side of nose.    Comparison: None available.    Technique: Axial CT images were obtained from the inferior aspect of the mandible through the frontal sinuses after the uneventful intravenous administration of 90 mL Isovue-300.  Reconstructed coronal and sagittal images were also obtained. Automated   exposure control and iterative construction methods were used.    Findings:  There is edema and enhancement involving the soft tissues at the bridge of the nose, extending more to the right of midline. These findings extend partially into the soft tissues overlying the right maxilla and left maxilla. There is an area of decreased   attenuation within the soft tissues of the right anterior aspect of the nose which measures 2.0 x 0.7 cm in AP by transverse dimensions. These findings may be related to residual changes secondary to patient's known malignancy in this region. Findings   secondary to soft tissue cellulitis with a developing abscess involving soft tissues at the right anterolateral margin of the nose could have this appearance.    There is no evidence for displaced maxillofacial fracture. The nasal bones are intact. The anterior nasal spine is intact. The bilateral temporomandibular joints are intact. There is no displaced fracture of the mandible. The bilateral orbits are intact   and unremarkable. No significant focal osseous abnormalities are seen. The paranasal sinuses are clear. The ostiomeatal units are patent. The mastoid air cells are clear.        Impression:      1.Evidence for edema and enhancement involving the soft tissues of the nose and extending into the soft tissues overlying the right and left maxilla. There is a low-density focus  within the soft tissues of the right lateral aspect of the nose measuring   up to 2 cm. The findings may be related to residual changes secondary to the patient's previous known malignancy. Findings secondary to soft tissue cellulitis with a developing abscess in the soft tissues of the right aspect of the nose could also have   this appearance.  2.No evidence for osteomyelitis. No evidence for displaced maxillofacial fracture.  3.The bilateral orbits are intact and unremarkable.    Electronically Signed: Daniel Smith    2/26/2023 4:42 PM EST    Workstation ID: [id number]      I independently reviewed the patient's radiographs today    IMPRESSION:     Problems:  1. Facial cellulitis- improving. Will need to cover for MRSA given his colonization. He has chronic thrombocytopenia but we could monitor closely and send him home on linezolid x1 week today. This will cover him well for MRSA and streptococcus.  2. Recent skin cancer over his nose, suspect basal cell carcinoma. Data deficient. S/p resection but did not have Mohs procedure. Liklely contributing to cellulitis over right nose and face. Follows up with dermatology soon. ENT has evaluated and no surgical plans  3. MRSA colonization  4. Cirrhosis- ?unclear etiology. Possible GARCIA. Data deficient  5. Chronic Thrombocytopenia  6. Bactrim allergy listed  7. CAD  8. A fib, on Eliquis    RECOMMENDATIONS:    Ok for discharge home today from my standpoint with the below antibiotic plan. The patient needs to follow up with his dermatologist in the next 1-2 weeks. Will need to closely monitor his plt count on linezolid but this does not usually start affecting the plt count until after two weeks of therapy. Not that his plt count is low in the setting of his cirrhosis but is not critically low. He does have a bactrim allergy listed.     UM/HARRY:  1. Linezolid 600 mg PO BID x 1 week  2. Follow up in my clinic on 3/9/23 with repeat cbc with diff just prior to visit  3. Please  fax a copy of my orders to [facility] at [phone] and call [phone] with final discharge plans    I discussed with Dr. Melgar today    Thank you for asking me to see Jordi Sam.  Lv Bernabe MD  3/1/2023

## 2023-03-02 ENCOUNTER — TRANSITIONAL CARE MANAGEMENT TELEPHONE ENCOUNTER (OUTPATIENT)
Dept: CALL CENTER | Facility: HOSPITAL | Age: 88
End: 2023-03-02
Payer: MEDICARE

## 2023-03-02 NOTE — OUTREACH NOTE
Prep Survey    Flowsheet Row Responses   [facility] patient discharged from? [place]   Is LACE score < 7 ? No   Eligibility [facility]   Date of Admission 02/26/23   Date of Discharge 03/01/23   Discharge Disposition Home or Self Care   Discharge diagnosis Facial cellulitis   Does the patient have one of the following disease processes/diagnoses(primary or secondary)? Other   Does the patient have Home health ordered? No   Is there a DME ordered? No   Prep survey completed? Yes          Lynette ALLEN - Registered Nurse

## 2023-03-02 NOTE — OUTREACH NOTE
Call Center TCM Note    Flowsheet Row Responses   [facility] patient discharged from? [place]   Does the patient have one of the following disease processes/diagnoses(primary or secondary)? Other   TCM attempt successful? Yes   Call start time 1438   Call end time 1440   Discharge diagnosis Facial cellulitis   Is patient permission given to speak with other caregiver? Yes   Meds reviewed with patient/caregiver? Yes   Is the patient having any side effects they believe may be caused by any medication additions or changes? No   Does the patient have all medications ordered at discharge? Yes   Is the patient taking all medications as directed (includes completed medication regime)? Yes   Comments Hosp dc fu apt on 3/10/23,  New pt ortho apt on 3/3/23   Does the patient have an appointment with their PCP within 7 days of discharge? Yes   Has home health visited the patient within 72 hours of discharge? N/A   Psychosocial issues? No   Did the patient receive a copy of their discharge instructions? Yes   Nursing interventions Reviewed instructions with patient   What is the patient's perception of their health status since discharge? Same   Is the patient/caregiver able to teach back signs and symptoms related to disease process for when to call PCP? Yes   Is the patient/caregiver able to teach back signs and symptoms related to disease process for when to call 911? Yes   Is the patient/caregiver able to teach back the hierarchy of who to call/visit for symptoms/problems? PCP, Specialist, Home health nurse, Urgent Care, ED, 911 Yes   If the patient is a current smoker, are they able to teach back resources for cessation? Not a smoker   TCM call completed? Yes   Call end time 1440          Bambi Bustillo RN    3/2/2023, 14:46 EST

## 2023-03-02 NOTE — DISCHARGE SUMMARY
[facility] Medicine Services  DISCHARGE SUMMARY    Patient Name: Jordi Sam  : 1934  MRN: [id number]    Date of Admission: 2023  4:31 PM  Date of Discharge:  3/1/23  Primary Care Physician: Jasper Avery MD    Consults     Date and Time Order Name Status Description    3/1/2023  8:55 AM Inpatient Infectious Diseases Consult Completed           Hospital Course     Presenting Problem:   Facial cellulitis [L03.211]    Active Hospital Problems    Diagnosis  POA   • **Facial cellulitis [L03.211]  Yes   • Stage 3 chronic kidney disease (HCC) [N18.30]  Yes   • Cirrhosis of liver with ascites (HCC) [K74.60, R18.8]  Yes   • Chronic atrial fibrillation (HCC) [I48.20]  Yes   • Coronary arteriosclerosis in native artery [I25.10]  Yes   • Essential hypertension [I10]  Yes      Resolved Hospital Problems   No resolved problems to display.          Hospital Course:  Jordi Sam is a 89 y.o. male with a history of diabetes, cirrhosis, CKD, atrial fibrillation on Eliquis presented with facial erythema and swelling concern for cellulitis with possible abscess noted on maxillofacial CT.         Facial Cellulitis  Possible Abscess  Hx of skin cancer s/p removal in Dec.  MRSA nares PCR positive  --Patient seen by END Dr Villagran - no indication for intervention at this time.  Felt cellulitis of nose and upper lip likely secondary to poorly healing nasal defect status post malignant resection.  Consider persistent malignancy, followup with Dermatology recommended  - clinical improvement noted on empiric vancomycin and zosyn  - patient also evaluated by Infectious Disease Dr Lv Bernabe.  Transition to zyvox 600 mg PO daily x 1 week   - follow up in ID clinic on 3/9/23 with CBC/diff just prior to visit  - follow up with Dermatology in 1-2 weeks       Cirrhosis  Recurrent ascites  Chronic thrombocytopenia  --platelets 81 -- follow up CBC next week  --Cont lasix and  aldactone  --Scheduled for a paracentesis 2nd week of March.  No emergent indications/symptoms to require one currently     DM:  -HgA1C 6.8  - SSI     Afib:  - cont coreg  - Eliquis 2.5 mg BID     RLE and foot pain/swelling:  Gout  -Chronic superficial clot, no DVT on recent duplex, but study limited by patient pain.  On dose adjusted eliquis (for afib)  -recent steroid course for presumed gout (uric acid 8.5).  -extremity xray with diffuse edema and arthritic changes  -probnp 3,475, similar to prior values -- continuing current diuretics  -Has ortho appt scheduled for next week -- arthrocentesis?.     Chronic right sided heart failure:  CAD  -Most recent ECHO with 60% EF, R ventricle dilated with tricuspid regurg     CKD stage 3a  --stable, creatinine 1.1     Wheezing  - Patient says this is chronic  - CXR shows bibasilar atelectasis vs infiltrates vs mild changes of pulmonary edema  - no fever or cough, on empiric antibiotics  - renal function may limit increases in home diuretics     Hyponatremia  - sodium 132      Discharge Follow Up Recommendations for outpatient labs/diagnostics:  CBC and CMP at PCP followup    Day of Discharge     HPI:   Feels OK, no fever, denies facial pain.    Review of Systems  Gen: no fever or chills  Pulm: wheezes, no coughing  CV: no chest pain    Vital Signs:   Temp:  [97.6 °F (36.4 °C)-97.9 °F (36.6 °C)] 97.7 °F (36.5 °C)  Heart Rate:  [60-75] 60  Resp:  [15-18] 18  BP: (102-113)/(54-69) 104/69      Physical Exam:  Constitutional - no acute distress, nontoxic, in bed  HEENT-eschar right nose, mild edema of right nose and lip. No surrounding erythema  CV-RRR  Resp-a few scattered expiratory wheezes, no rhonchi  Abd-soft, nontender, nondistended, normoactive bowel sounds  Ext-No lower extremity cyanosis, clubbing or edema bilaterally  Neuro-alert, speech clear, moves all extremities   Psych-normal affect   Skin- No rash on exposed UE or LE bilaterally, facial changes as  above      Pertinent  and/or Most Recent Results     LAB RESULTS:      Lab 03/01/23  0439 02/28/23  0356 02/27/23  0308 02/26/23  1407 02/26/23  1400 02/26/23  1357 02/23/23  1211   WBC 5.40 6.34 8.18  --   --  9.36 12.00*   HEMOGLOBIN 11.3* 11.6* 12.4*  --   --  12.6* 12.3*   HEMOGLOBIN, POC  --   --   --  13.3 13.3  --   --    HEMATOCRIT 35.0* 36.5* 39.2  --   --  40.1 36.7*   HEMATOCRIT POC  --   --   --  39 39  --   --    PLATELETS 86* 100* 118*  --   --  132* 126*   NEUTROS ABS 4.10 4.96 7.01*  --   --  8.01* 10.55*   IMMATURE GRANS (ABS) 0.03 0.11* 0.05  --   --  0.05 0.07*   LYMPHS ABS 0.62* 0.62* 0.51*  --   --  0.59* 0.57*   MONOS ABS 0.49 0.55 0.49  --   --  0.60 0.77   EOS ABS 0.14 0.08 0.10  --   --  0.09 0.03   MCV 96.2 96.3 98.5*  --   --  97.6* 92.7   CRP  --   --   --   --   --   --  0.30   PROCALCITONIN  --   --   --   --   --  0.05 0.04   LACTATE  --   --   --   --   --  1.0  --    PROTIME  --   --   --   --   --   --  16.4*         Lab 03/01/23  0439 02/28/23  0356 02/27/23  0308 02/26/23  1407 02/26/23  1400 02/26/23  1357 02/23/23  1211 02/23/23  1211 02/23/23  1117   SODIUM 132* 136 138  --   --  137  --  138  --    POTASSIUM 3.8 4.1 4.2  --   --  4.1  --  4.1  --    CHLORIDE 98 100 101  --   --  99  --  95*  --    CO2 27.0 29.0 31.0*  --   --  32.0*  --  31.2*  --    ANION GAP 7.0 7.0 6.0  --   --  6.0  --  11.8  --    BUN 23 23 21  --   --  22  --  21  --    CREATININE 1.12 1.18 1.14 1.20 1.20 1.21   < > 1.16  --    EGFR 62.8 59.0* 61.5  --  57.8* 57.2*   < > 60.2  --    GLUCOSE 127* 123* 134*  --   --  145*  --  114*  --    CALCIUM 7.9* 8.2* 8.2*  --   --  8.2*  --  8.9  --    MAGNESIUM  --   --  2.1  --   --   --   --   --   --    PHOSPHORUS  --   --  2.9  --   --   --   --   --   --    HEMOGLOBIN A1C  --   --   --   --   --  6.80*  --   --  6.9   TSH  --   --   --   --   --   --   --  4.590*  --     < > = values in this interval not displayed.         Lab 03/01/23  0439 02/28/23  0356  02/26/23  1357 02/23/23  1211   TOTAL PROTEIN 5.1* 5.3* 5.7* 5.9*   ALBUMIN 2.8* 2.9* 3.2* 3.5   GLOBULIN 2.3 2.4 2.5 2.4   ALT (SGPT) 14 13 20 29   AST (SGOT) 18 17 21 33   BILIRUBIN 0.7 0.9 0.9 0.5   ALK PHOS 78 72 90 117         Lab 03/01/23  0439 02/23/23  1211   PROBNP 3,475.0*  --    PROTIME  --  16.4*   INR  --  1.33*                 Brief Urine Lab Results  (Last result in the past 365 days)      Color   Clarity   Blood   Leuk Est   Nitrite   Protein   CREAT   Urine HCG        11/04/22 2212 Yellow   Clear   Negative   Negative   Negative   Trace               Microbiology Results (last 10 days)     Procedure Component Value - Date/Time    MRSA Screen, PCR (Inpatient) - Swab, Nares [[id number]]  (Abnormal) Collected: 02/27/23 0513    Lab Status: Final result Specimen: Swab from Nares Updated: 02/27/23 0840     MRSA PCR Positive    Narrative:      The negative predictive value of this diagnostic test is high and should only be used to consider de-escalating anti-MRSA therapy. A positive result may indicate colonization with MRSA and must be correlated clinically.    Blood Culture - Blood, Arm, Right [[id number]]  (Normal) Collected: 02/26/23 1835    Lab Status: Preliminary result Specimen: Blood from Arm, Right Updated: 03/01/23 1845     Blood Culture No growth at 3 days    Blood Culture - Blood, Arm, Right [[id number]]  (Normal) Collected: 02/26/23 1820    Lab Status: Preliminary result Specimen: Blood from Arm, Right Updated: 03/01/23 1845     Blood Culture No growth at 3 days          XR Ankle 3+ View Right    Result Date: 2/24/2023  XR ANKLE 3+ VW RIGHT Date of Exam: 2/23/2023 11:59 AM EST Indication: right ankle pain. Comparison: None available. Findings: There appears to be diffuse soft tissue edema. Ankle mortise alignment appears within normal limits. There appears to be mild osteophyte formation at the tibiotalar joint and small calcifications at the medial and lateral malleoli suggesting mild  osteoarthritis and the sequelae of remote injuries. There is also mild osteophyte formation at the subtalar and midfoot joints. Small ill-defined densities are noted anterior to the tibiotalar joint and posterior to the subtalar joint which could represent intra-articular bodies. No definitive erosions are seen. There is mild enthesophyte formation at the calcaneus. There appears to be calcific atherosclerosis of the lower extremity arteries.     Impression: 1.Nonspecific diffuse soft tissue edema. 2.Findings suggestive of mild osteoarthritis at the tibiotalar, subtalar, and midfoot joints. 3. Possible small intra-articular bodies at the tibiotalar and subtalar joints. 4.Calcific atherosclerosis. Electronically Signed: Min Herrera  2/24/2023 9:55 AM EST  Workstation ID: [id number]    XR Chest 1 View    Result Date: 3/1/2023  XR CHEST 1 VW Date of Exam: 3/1/2023 10:11 AM EST Indication: wheezing. Comparison: 11/10/2022 at 1431 hours Findings: There is evidence for continued abnormal density in the left lung base suggesting atelectasis/scarring versus recurrent infiltrate. A small left pleural effusion is again seen. There may be mild atelectasis versus infiltrate in the right lower lobe. The cardiac silhouette and mediastinum are stable. No acute osseous abnormalities are seen.     Impression: 1.Evidence for chronic versus recurrent density in the left lung base. The findings may relate to chronic atelectasis/scarring. Recurrent infiltrate could have this appearance. There remains evidence for a small left pleural effusion versus pleural thickening/scarring. 2.Additional mild atelectasis versus infiltrate in the right lower lobe. This finding may indicate potential developing multifocal pneumonia. Mild changes of pulmonary edema could also be considered. Electronically Signed: Daniel Smith  3/1/2023 10:56 AM EST  Workstation ID: [id number]    CT Maxillofacial With Contrast    Result Date: 2/26/2023  CT MAXILLOFACIAL W  CONT Date of Exam: 2/26/2023 3:43 PM EST Indication: right facial swelling. cellulitis. hx of cancer to right side of nose. Comparison: None available. Technique: Axial CT images were obtained from the inferior aspect of the mandible through the frontal sinuses after the uneventful intravenous administration of 90 mL Isovue-300.  Reconstructed coronal and sagittal images were also obtained. Automated exposure control and iterative construction methods were used. Findings: There is edema and enhancement involving the soft tissues at the bridge of the nose, extending more to the right of midline. These findings extend partially into the soft tissues overlying the right maxilla and left maxilla. There is an area of decreased  attenuation within the soft tissues of the right anterior aspect of the nose which measures 2.0 x 0.7 cm in AP by transverse dimensions. These findings may be related to residual changes secondary to patient's known malignancy in this region. Findings secondary to soft tissue cellulitis with a developing abscess involving soft tissues at the right anterolateral margin of the nose could have this appearance. There is no evidence for displaced maxillofacial fracture. The nasal bones are intact. The anterior nasal spine is intact. The bilateral temporomandibular joints are intact. There is no displaced fracture of the mandible. The bilateral orbits are intact and unremarkable. No significant focal osseous abnormalities are seen. The paranasal sinuses are clear. The ostiomeatal units are patent. The mastoid air cells are clear.     1.Evidence for edema and enhancement involving the soft tissues of the nose and extending into the soft tissues overlying the right and left maxilla. There is a low-density focus within the soft tissues of the right lateral aspect of the nose measuring up to 2 cm. The findings may be related to residual changes secondary to the patient's previous known malignancy. Findings  secondary to soft tissue cellulitis with a developing abscess in the soft tissues of the right aspect of the nose could also have this appearance. 2.No evidence for osteomyelitis. No evidence for displaced maxillofacial fracture. 3.The bilateral orbits are intact and unremarkable. Electronically Signed: Daniel Smith  2/26/2023 4:42 PM EST  Workstation ID: [id number]      Results for orders placed during the hospital encounter of 02/08/23    Duplex Venous Lower Extremity - Right CAR    Interpretation Summary  •  Chronic right lower extremity superficial thrombophlebitis noted in the small saphenous.  •  No evidence of DVT.      Results for orders placed during the hospital encounter of 02/08/23    Duplex Venous Lower Extremity - Right CAR    Interpretation Summary  •  Chronic right lower extremity superficial thrombophlebitis noted in the small saphenous.  •  No evidence of DVT.      Results for orders placed during the hospital encounter of 01/22/19    Adult Transesophageal Echo ([facility]) W/ Cont if Necessary Per Protocol    Interpretation Summary  · Estimated EF = 60%.  · Left ventricular systolic function is normal.  · Right ventricular cavity is mildly dilated.  · Mild mitral valve regurgitation is present  · Moderate tricuspid valve regurgitation is present.  · Small patent foramen ovale present with bi-directional shunting. Saline test results are positive for right to left atrial level shunt.  · Coronary sinus appeared generous. A left arm IV was placed. No evidence of persistent left superior vena cava..  · There is a small (<1cm) pericardial effusion.      Plan for Follow-up of Pending Labs/Results:   Pending Labs     Order Current Status    Blood Culture - Blood, Arm, Right Preliminary result    Blood Culture - Blood, Arm, Right Preliminary result        Discharge Details        Discharge Medications      New Medications      Instructions Start Date   linezolid 600 MG tablet  Commonly known as: ZYVOX   600 mg,  Oral, Every 12 Hours Scheduled      mupirocin 2 % ointment  Commonly known as: BACTROBAN   1 application, Topical, 2 Times Daily         Continue These Medications      Instructions Start Date   albuterol sulfate  (90 Base) MCG/ACT inhaler  Commonly known as: PROVENTIL HFA;VENTOLIN HFA;PROAIR HFA   2 puffs, Inhalation, Every 4 Hours PRN      apixaban 2.5 MG tablet tablet  Commonly known as: ELIQUIS   2.5 mg, Oral, 2 Times Daily, Last dose 3 days ago. From 11-16-21      carvedilol 6.25 MG tablet  Commonly known as: COREG   TAKE 1 TABLET BY MOUTH TWICE DAILY WITH MEALS      folic acid 400 MCG tablet  Commonly known as: FOLVITE   400 mcg, Oral, Daily      furosemide 40 MG tablet  Commonly known as: LASIX   TAKE 1 TABLET BY MOUTH ONCE DAILY IN THE MORNING, 1/2 TABLET IN THE AFTERNOON AND AS NEEDED FOR WEIGHT GAIN, EDEMA AND DYPNEA      HYDROcodone-acetaminophen 5-325 MG per tablet  Commonly known as: NORCO   1 tablet, Oral, Every 6 Hours PRN      nitroglycerin 0.4 MG SL tablet  Commonly known as: NITROSTAT   DISSOLVE ONE TABLET UNDER THE TONGUE EVERY 5 MINUTES AS NEEDED FOR CHEST PAIN.  DO NOT EXCEED A TOTAL OF 3 DOSES IN 15 MINUTES      simvastatin 40 MG tablet  Commonly known as: ZOCOR   40 mg, Oral, Daily      spironolactone 50 MG tablet  Commonly known as: ALDACTONE   Take 1 tablet by mouth once daily      terazosin 2 MG capsule  Commonly known as: HYTRIN   2 mg, Oral, Nightly             Allergies   Allergen Reactions   • Bee Venom Swelling   • Sulfamethoxazole-Trimethoprim Other (See Comments)     Pt unaware         Discharge Disposition:  Home or Self Care    Diet:  Hospital:No active diet order      Activity:      Restrictions or Other Recommendations:       CODE STATUS:    Code Status and Medical Interventions:   Ordered at: 02/26/23 2906     Code Status (Patient has no pulse and is not breathing):    CPR (Attempt to Resuscitate)     Medical Interventions (Patient has pulse or is breathing):    Full  Support       Future Appointments   Date Time Provider Department Center   3/3/2023 10:00 AM Zain Joy MD [facility]   3/6/2023  9:00 AM [facility] US 2 IR [facility] US [facility]   3/10/2023  9:30 AM Jasper Avery MD [facility]   5/24/2023 11:00 AM Jasper Avery MD [facility]   7/17/2023 11:30 AM Nilton Jade APRN [id number]   11/2/2023 10:45 AM Diogo Weber APRN [facility]       Additional Instructions for the Follow-ups that You Need to Schedule     Discharge Follow-up with PCP   As directed       Currently Documented PCP:    Jasper Avery MD    PCP Phone Number:    [phone]     Follow Up Details: follow up with PCP in one week         Discharge Follow-up with Specialty: follow up with Dermatology in 1-2 weeks   As directed      Specialty: follow up with Dermatology in 1-2 weeks         Discharge Follow-up with Specialty: follow up with ID Dr Lv Bernabe in 1 week   As directed      Specialty: follow up with ID Dr Lv Bernabe in 1 week                     Silvino Melgar MD  03/01/23      Time Spent on Discharge:  I spent  40 minutes on this discharge activity which included: face-to-face encounter with the patient, reviewing the data in the system, coordination of the care with the nursing staff as well as consultants, documentation, and entering orders.

## 2023-03-03 ENCOUNTER — OFFICE VISIT (OUTPATIENT)
Dept: ORTHOPEDIC SURGERY | Facility: CLINIC | Age: 88
End: 2023-03-03
Payer: MEDICARE

## 2023-03-03 VITALS
DIASTOLIC BLOOD PRESSURE: 73 MMHG | WEIGHT: 199.52 LBS | HEIGHT: 71 IN | SYSTOLIC BLOOD PRESSURE: 116 MMHG | BODY MASS INDEX: 27.93 KG/M2

## 2023-03-03 DIAGNOSIS — M25.571 RIGHT ANKLE PAIN, UNSPECIFIED CHRONICITY: Primary | ICD-10-CM

## 2023-03-03 LAB
BACTERIA SPEC AEROBE CULT: NORMAL
BACTERIA SPEC AEROBE CULT: NORMAL

## 2023-03-03 PROCEDURE — 99204 OFFICE O/P NEW MOD 45 MIN: CPT | Performed by: ORTHOPAEDIC SURGERY

## 2023-03-03 NOTE — PROGRESS NOTES
[facility] Orthopaedic Surgery Office Visit     Office Visit       Date: 03/03/2023   Patient Name: Jordi Sam  MRN: [id number]  YOB: 1934    Referring Physician: Jasper Avery MD     Chief Complaint:   Chief Complaint   Patient presents with   • Right Ankle - Edema, Initial Evaluation, Pain       History of Present Illness: Jordi Sam is a 89 y.o. male who is here today right ankle pain.  States started the beginning of February.  States previously treated with oral steroids.  States doing ankle pumps helps his symptoms.  Walking makes his symptoms worse.  Denies previous treatment.  Has a history of congestive heart failure as well as atrial fibrillation.  Denies traumatic history.  States recently saw PCP who recommended compression stockings.    Subjective   Review of Systems: Review of Systems   Constitutional: Negative.    HENT: Negative.    Eyes: Negative.    Respiratory: Negative.    Cardiovascular: Positive for leg swelling.   Gastrointestinal: Negative.    Endocrine: Negative.    Genitourinary: Negative.    Musculoskeletal: Positive for arthralgias and joint swelling.   Skin: Negative.    Allergic/Immunologic: Negative.    Neurological: Negative.    Hematological: Negative.    Psychiatric/Behavioral: Negative.         Past Medical History:   Past Medical History:   Diagnosis Date   • A-fib (HCC)    • Acute inferior myocardial infarction (HCC)     Acute inferior STEMI with RV infarct features, January 2009.    • CAD (coronary artery disease)    • COVID-19 08/25/2022   • Dyslipidemia    • Goiter     History of “goiter.”   • Hernia, umbilical    • Hyperlipidemia    • Hypertension    • Nephrolithiasis    • Type 2 diabetes mellitus (HCC)    • Umbilical hernia     Pt stated has recently returned. Seeing Dr Kiersten Nova and Dr Jasper Avery 8-2021       Past Surgical History:   Past Surgical History:   Procedure Laterality Date   • BRONCHOSCOPY N/A  2019    Procedure: BRONCHOSCOPY WITH THORACENTESIS;  Surgeon: Marciano Higginbotham MD;  Location:  [facility];  Service: Pulmonary   • CARDIAC CATHETERIZATION Bilateral 2019    Procedure: Right and Left Heart Cath;  Surgeon: Efrem Posadas MD;  Location:  [facility];  Service: Cardiology   • CARDIAC CATHETERIZATION     • CHOLECYSTECTOMY     • CORONARY ANGIOPLASTY WITH STENT PLACEMENT  2009    Sirolimus eluting stents to the RCA, 2009.    • HERNIA REPAIR      Hernia repair x 2.    • PARACENTESIS  2019    multiple   • UMBILICAL HERNIA REPAIR N/A 2020    Procedure: UMBILICAL HERNIA REPAIR;  Surgeon: Maribell Her MD;  Location:  [facility] OR;  Service: General;  Laterality: N/A;       Family History:   Family History   Problem Relation Age of Onset   • Dementia Mother    • Stroke Mother    • Heart disease Father    • Heart attack Father    • Aneurysm Father    • Cancer Sister    • No Known Problems Brother    • No Known Problems Sister    • Heart disease Brother         CABG   • Hypertension Daughter    • Hypertension Son    • Colon cancer Neg Hx    • Colon polyps Neg Hx        Social History:   Social History     Socioeconomic History   • Marital status:      Spouse name: Melly   • Number of children: 4   Tobacco Use   • Smoking status: Former     Packs/day: 2.50     Years: 28.00     Pack years: 70.00     Types: Cigarettes     Quit date: 1983     Years since quittin.8   • Smokeless tobacco: Never   Vaping Use   • Vaping Use: Never used   Substance and Sexual Activity   • Alcohol use: No   • Drug use: No   • Sexual activity: Not Currently       Medications:   Current Outpatient Medications:   •  albuterol sulfate  (90 Base) MCG/ACT inhaler, Inhale 2 puffs Every 4 (Four) Hours As Needed for Wheezing., Disp: 18 g, Rfl: 0  •  apixaban (ELIQUIS) 2.5 MG tablet tablet, Take 2.5 mg by mouth 2 (Two) Times a Day. Last dose 3 days ago.  "From 11-16-21, Disp: , Rfl:   •  carvedilol (COREG) 6.25 MG tablet, TAKE 1 TABLET BY MOUTH TWICE DAILY WITH MEALS, Disp: 60 tablet, Rfl: 6  •  folic acid (FOLVITE) 400 MCG tablet, Take 400 mcg by mouth Daily., Disp: , Rfl:   •  furosemide (LASIX) 40 MG tablet, TAKE 1 TABLET BY MOUTH ONCE DAILY IN THE MORNING, 1/2 TABLET IN THE AFTERNOON AND AS NEEDED FOR WEIGHT GAIN, EDEMA AND DYPNEA, Disp: 180 tablet, Rfl: 3  •  HYDROcodone-acetaminophen (NORCO) 5-325 MG per tablet, Take 1 tablet by mouth Every 6 (Six) Hours As Needed for Moderate Pain., Disp: 60 tablet, Rfl: 0  •  linezolid (ZYVOX) 600 MG tablet, Take 1 tablet by mouth Every 12 (Twelve) Hours for 14 doses. Indications: Infection of the Skin and/or Soft Tissue, Disp: 14 tablet, Rfl: 0  •  mupirocin (BACTROBAN) 2 % ointment, Apply 1 application topically to the appropriate area as directed 2 (Two) Times a Day for 7 days., Disp: 14 g, Rfl: 0  •  nitroglycerin (NITROSTAT) 0.4 MG SL tablet, DISSOLVE ONE TABLET UNDER THE TONGUE EVERY 5 MINUTES AS NEEDED FOR CHEST PAIN.  DO NOT EXCEED A TOTAL OF 3 DOSES IN 15 MINUTES, Disp: 25 tablet, Rfl: 3  •  simvastatin (ZOCOR) 40 MG tablet, Take 1 tablet by mouth Daily., Disp: 90 tablet, Rfl: 32  •  spironolactone (ALDACTONE) 50 MG tablet, Take 1 tablet by mouth once daily, Disp: 90 tablet, Rfl: 2  •  terazosin (HYTRIN) 2 MG capsule, Take 1 capsule by mouth Every Night., Disp: 90 capsule, Rfl: 3    Allergies:   Allergies   Allergen Reactions   • Bee Venom Swelling   • Sulfamethoxazole-Trimethoprim Other (See Comments)     Pt unaware       I reviewed the patient's chief complaint, history of present illness, review of systems, past medical history, surgical history, family history, social history, medications and allergy list.     Objective    Vital Signs:   Vitals:    03/03/23 1025   BP: 116/73   Weight: 90.5 kg (199 lb 8.3 oz)   Height: 180.3 cm (70.98\")     Body mass index is 27.84 kg/m².    Ortho Exam:  right LE Foot and Ankle " Exam:   Normal gait pattern. Hindfoot alignment is neutral. Plantigrade foot.    There is good perfusion to the toes.   The skin is intact throughout the foot and ankle without ulceration.   There is tenderness to palpation over the mid substance of the achilles tendon.  Difficult to appreciate hypertrophy at the site of pain secondary to diffuse swelling about the ankle.  There are also skin changes consistent with chronic edema.  Pitting of edematous tissue on exam.      Results Review:   XR ANKLE 3+ VW RIGHT     Date of Exam: 2/23/2023 11:59 AM EST     Indication: right ankle pain.     Comparison: None available.     Findings:  There appears to be diffuse soft tissue edema. Ankle mortise alignment appears within normal limits. There appears to be mild osteophyte formation at the tibiotalar joint and small calcifications at the medial and lateral malleoli suggesting mild   osteoarthritis and the sequelae of remote injuries. There is also mild osteophyte formation at the subtalar and midfoot joints. Small ill-defined densities are noted anterior to the tibiotalar joint and posterior to the subtalar joint which could   represent intra-articular bodies. No definitive erosions are seen. There is mild enthesophyte formation at the calcaneus. There appears to be calcific atherosclerosis of the lower extremity arteries.     IMPRESSION:  Impression:  1.Nonspecific diffuse soft tissue edema.  2.Findings suggestive of mild osteoarthritis at the tibiotalar, subtalar, and midfoot joints.  3. Possible small intra-articular bodies at the tibiotalar and subtalar joints.  4.Calcific atherosclerosis.     Electronically Signed: Min Herrera    2/24/2023 9:55 AM EST    Workstation ID: [id number]    03/03/23 I have personally reviewed and interpreted the images from outside facility with the documented findings, no acute osseous abnormality, mild changes consistent with osteoarthritis      Assessment / Plan    Assessment/Plan:    Diagnoses and all orders for this visit:    1. Right ankle pain, unspecified chronicity (Primary)  -     Ambulatory Referral to Physical Therapy Evaluate and treat, Ortho      Discussed achilles pain/injury (a new problem with an uncertain prognosis) with patient as well as treatment options at length. Decision regarding surgical intervention considered. Patient is not a candidate due to trial of nonoperative management.  We will treat achilles pain/injury as mid-substance Achilles tendonitis which is generally thought of as an overuse syndrome or due to chronic aging change.      I explained that the Sports Medicine research literature shows that a 12 week course of physical therapy and stretching will allow 85% of patients to get back to their prior level of activity. I provided a prescription for physical therapy.  Patient to follow-up in clinic if symptoms persist or worsen.    Follow Up:   Return if symptoms worsen or fail to improve.      Zain Joy MD  [facility] Orthopedic Surgeon

## 2023-03-06 ENCOUNTER — HOSPITAL ENCOUNTER (OUTPATIENT)
Dept: ULTRASOUND IMAGING | Facility: HOSPITAL | Age: 88
Discharge: HOME | End: 2023-03-06
Admitting: RADIOLOGY
Payer: MEDICARE

## 2023-03-06 VITALS
OXYGEN SATURATION: 97 % | BODY MASS INDEX: 30.03 KG/M2 | DIASTOLIC BLOOD PRESSURE: 59 MMHG | WEIGHT: 215.2 LBS | SYSTOLIC BLOOD PRESSURE: 118 MMHG | HEART RATE: 65 BPM | TEMPERATURE: 96.9 F | RESPIRATION RATE: 20 BRPM

## 2023-03-06 DIAGNOSIS — R18.8 OTHER ASCITES: ICD-10-CM

## 2023-03-06 LAB
ALBUMIN FLD-MCNC: 1.7 G/DL
APPEARANCE FLD: ABNORMAL
COLOR FLD: YELLOW
GLUCOSE BLDC GLUCOMTR-MCNC: 127 MG/DL (ref 70–130)
LYMPHOCYTES NFR FLD MANUAL: 37 %
MACROPHAGE FLUID: 17 %
MESOTHL CELL NFR FLD MANUAL: 2 %
MONOCYTES NFR FLD: 42 %
NEUTROPHILS NFR FLD MANUAL: 2 %
RBC # FLD AUTO: <2000 /MM3
WBC # FLD AUTO: 78 /MM3

## 2023-03-06 PROCEDURE — P9047 ALBUMIN (HUMAN), 25%, 50ML: HCPCS | Performed by: NURSE PRACTITIONER

## 2023-03-06 PROCEDURE — C1729 CATH, DRAINAGE: HCPCS

## 2023-03-06 PROCEDURE — 0 LIDOCAINE 1 % SOLUTION: Performed by: NURSE PRACTITIONER

## 2023-03-06 PROCEDURE — 49083 ABD PARACENTESIS W/IMAGING: CPT | Performed by: RADIOLOGY

## 2023-03-06 PROCEDURE — 82042 OTHER SOURCE ALBUMIN QUAN EA: CPT | Performed by: NURSE PRACTITIONER

## 2023-03-06 PROCEDURE — 25010000002 ALBUMIN HUMAN 25% PER 50 ML: Performed by: NURSE PRACTITIONER

## 2023-03-06 PROCEDURE — 76942 ECHO GUIDE FOR BIOPSY: CPT

## 2023-03-06 PROCEDURE — 89051 BODY FLUID CELL COUNT: CPT | Performed by: NURSE PRACTITIONER

## 2023-03-06 PROCEDURE — 82962 GLUCOSE BLOOD TEST: CPT

## 2023-03-06 RX ORDER — ALBUMIN (HUMAN) 12.5 G/50ML
12.5 SOLUTION INTRAVENOUS ONCE
Status: COMPLETED | OUTPATIENT
Start: 2023-03-06 | End: 2023-03-06

## 2023-03-06 RX ORDER — LIDOCAINE HYDROCHLORIDE 10 MG/ML
6 INJECTION, SOLUTION INFILTRATION; PERINEURAL ONCE
Status: COMPLETED | OUTPATIENT
Start: 2023-03-06 | End: 2023-03-06

## 2023-03-06 RX ORDER — LIDOCAINE HYDROCHLORIDE 10 MG/ML
10 INJECTION, SOLUTION INFILTRATION; PERINEURAL ONCE
Status: DISCONTINUED | OUTPATIENT
Start: 2023-03-06 | End: 2023-03-07 | Stop reason: HOSPADM

## 2023-03-06 RX ADMIN — ALBUMIN (HUMAN) 12.5 G: 0.25 INJECTION, SOLUTION INTRAVENOUS at 11:52

## 2023-03-06 RX ADMIN — LIDOCAINE HYDROCHLORIDE 6 ML: 10 INJECTION, SOLUTION INFILTRATION; PERINEURAL at 11:12

## 2023-03-06 NOTE — NURSING NOTE
Image guided paracentesis performed by MD Kasper. 5250 mls of  clear ney fluid removed from peritoneum. Patient tolerated well. Report called to RN.

## 2023-03-07 ENCOUNTER — TELEPHONE (OUTPATIENT)
Dept: INFUSION THERAPY | Facility: HOSPITAL | Age: 88
End: 2023-03-07
Payer: MEDICARE

## 2023-03-09 ENCOUNTER — LAB (OUTPATIENT)
Dept: LAB | Facility: HOSPITAL | Age: 88
End: 2023-03-09
Payer: MEDICARE

## 2023-03-09 ENCOUNTER — TRANSCRIBE ORDERS (OUTPATIENT)
Dept: LAB | Facility: HOSPITAL | Age: 88
End: 2023-03-09
Payer: MEDICARE

## 2023-03-09 DIAGNOSIS — D69.59 THROMBOCYTOPENIA, SECONDARY: ICD-10-CM

## 2023-03-09 DIAGNOSIS — B95.62 CELLULITIS DUE TO MRSA: ICD-10-CM

## 2023-03-09 DIAGNOSIS — L03.211 CELLULITIS OF FACE: ICD-10-CM

## 2023-03-09 DIAGNOSIS — L02.01 ABSCESS OF FACE: ICD-10-CM

## 2023-03-09 DIAGNOSIS — L03.90 CELLULITIS DUE TO MRSA: ICD-10-CM

## 2023-03-09 DIAGNOSIS — L03.211 CELLULITIS OF FACE: Primary | ICD-10-CM

## 2023-03-09 LAB
BASOPHILS # BLD AUTO: 0.02 10*3/MM3 (ref 0–0.2)
BASOPHILS NFR BLD AUTO: 0.3 % (ref 0–1.5)
DEPRECATED RDW RBC AUTO: 51 FL (ref 37–54)
EOSINOPHIL # BLD AUTO: 0.11 10*3/MM3 (ref 0–0.4)
EOSINOPHIL NFR BLD AUTO: 1.8 % (ref 0.3–6.2)
ERYTHROCYTE [DISTWIDTH] IN BLOOD BY AUTOMATED COUNT: 14.8 % (ref 12.3–15.4)
HCT VFR BLD AUTO: 34.7 % (ref 37.5–51)
HGB BLD-MCNC: 11.2 G/DL (ref 13–17.7)
HYPOCHROMIA BLD QL: NORMAL
IMM GRANULOCYTES # BLD AUTO: 0.03 10*3/MM3 (ref 0–0.05)
IMM GRANULOCYTES NFR BLD AUTO: 0.5 % (ref 0–0.5)
LYMPHOCYTES # BLD AUTO: 0.58 10*3/MM3 (ref 0.7–3.1)
LYMPHOCYTES NFR BLD AUTO: 9.4 % (ref 19.6–45.3)
MCH RBC QN AUTO: 30.9 PG (ref 26.6–33)
MCHC RBC AUTO-ENTMCNC: 32.3 G/DL (ref 31.5–35.7)
MCV RBC AUTO: 95.6 FL (ref 79–97)
MONOCYTES # BLD AUTO: 0.27 10*3/MM3 (ref 0.1–0.9)
MONOCYTES NFR BLD AUTO: 4.4 % (ref 5–12)
NEUTROPHILS NFR BLD AUTO: 5.17 10*3/MM3 (ref 1.7–7)
NEUTROPHILS NFR BLD AUTO: 83.6 % (ref 42.7–76)
NRBC BLD AUTO-RTO: 0 /100 WBC (ref 0–0.2)
OVALOCYTES BLD QL SMEAR: NORMAL
PLATELET # BLD AUTO: 75 10*3/MM3 (ref 140–450)
PMV BLD AUTO: 14.2 FL (ref 6–12)
RBC # BLD AUTO: 3.63 10*6/MM3 (ref 4.14–5.8)
SMALL PLATELETS BLD QL SMEAR: NORMAL
WBC MORPH BLD: NORMAL
WBC NRBC COR # BLD: 6.18 10*3/MM3 (ref 3.4–10.8)

## 2023-03-09 PROCEDURE — 85007 BL SMEAR W/DIFF WBC COUNT: CPT

## 2023-03-09 PROCEDURE — 36415 COLL VENOUS BLD VENIPUNCTURE: CPT

## 2023-03-09 PROCEDURE — 85025 COMPLETE CBC W/AUTO DIFF WBC: CPT

## 2023-03-10 ENCOUNTER — READMISSION MANAGEMENT (OUTPATIENT)
Dept: CALL CENTER | Facility: HOSPITAL | Age: 88
End: 2023-03-10
Payer: MEDICARE

## 2023-03-10 ENCOUNTER — TELEPHONE (OUTPATIENT)
Dept: FAMILY MEDICINE CLINIC | Facility: CLINIC | Age: 88
End: 2023-03-10

## 2023-03-10 ENCOUNTER — OFFICE VISIT (OUTPATIENT)
Dept: FAMILY MEDICINE CLINIC | Facility: CLINIC | Age: 88
End: 2023-03-10
Payer: MEDICARE

## 2023-03-10 VITALS
WEIGHT: 203 LBS | OXYGEN SATURATION: 98 % | DIASTOLIC BLOOD PRESSURE: 62 MMHG | SYSTOLIC BLOOD PRESSURE: 106 MMHG | HEIGHT: 71 IN | HEART RATE: 78 BPM | BODY MASS INDEX: 28.42 KG/M2

## 2023-03-10 DIAGNOSIS — Z79.01 CHRONIC ANTICOAGULATION: ICD-10-CM

## 2023-03-10 DIAGNOSIS — M25.571 RIGHT ANKLE PAIN, UNSPECIFIED CHRONICITY: ICD-10-CM

## 2023-03-10 DIAGNOSIS — L03.211 FACIAL CELLULITIS: Primary | ICD-10-CM

## 2023-03-10 PROCEDURE — 99495 TRANSJ CARE MGMT MOD F2F 14D: CPT | Performed by: INTERNAL MEDICINE

## 2023-03-10 PROCEDURE — 1111F DSCHRG MED/CURRENT MED MERGE: CPT | Performed by: INTERNAL MEDICINE

## 2023-03-10 NOTE — PROGRESS NOTES
Transitional Care Follow Up Visit  Subjective     Jordi Sam is a 89 y.o. male who presents for a transitional care management visit.    Within 48 business hours after discharge our office contacted him via telephone to coordinate his care and needs.      I reviewed and discussed the details of that call along with the discharge summary, hospital problems, inpatient lab results, inpatient diagnostic studies, and consultation reports with Jordi.     Current outpatient and discharge medications have been reconciled for the patient.  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 male with a history of diabetes, cirrhosis, CKD, atrial fibrillation on Eliquis presented with facial erythema and swelling concern for cellulitis with possible abscess noted on maxillofacial CT.         Facial Cellulitis  Possible Abscess  Hx of skin cancer s/p removal in Dec.  MRSA nares PCR positive  --Patient seen by TRAMAINE Villagran - no indication for intervention at this time.  Felt cellulitis of nose and upper lip likely secondary to poorly healing nasal defect status post malignant resection.  Consider persistent malignancy, followup with Dermatology recommended  - clinical improvement noted on empiric vancomycin and zosyn  - patient also evaluated by Infectious  Disease Dr Lv Bernabe.  Transition to zyvox 600 mg PO daily x 1 week  Completed this past MOnday  - followED up in ID clinic on 3/9/23  LABS PENDING  - follows up with Dermatology  This afternoon       Cirrhosis  Recurrent ascites  Chronic thrombocytopenia  --platelets 75 --   --Cont lasix and aldactone  --S/P  Paracentesis this p[ast Monday   No emergent indications/symptoms to require one currently     DM:  -HgA1C 6.8  - SSI  CONTINUE DIET/EXERCISE     Afib:  - cont coreg  - Eliquis 2.5 mg BID     RLE and foot pain/swelling:  Gout  -Chronic superficial clot, no DVT on recent duplex, but study limited by patient pain.  On dose adjusted eliquis (for afib)  -recent steroid course for presumed gout (uric acid 8.5).  -extremity xray with diffuse edema and arthritic changes  -probnp 3,475, similar to prior values -- continuing current diuretics  - Awaits PT, contiune to elevate     Chronic right sided heart failure:  CAD  -Most recent ECHO with 60% EF, R ventricle dilated with tricuspid regurg     CKD stage 3a  --stable, creatinine 1.1     Wheezing  - Patient says this is chronic  - CXR shows bibasilar atelectasis vs infiltrates vs mild changes of pulmonary edema  - no fever or cough, on empiric antibiotics  - renal function may limit increases in home diuretics     Hyponatremia  - sodium 132     Completed antibiotics and actually saw ID yesterday. He is having no diarrhea. His facial swelling has resolved. No drainage. She has been recommended to decolonize  With hibiclens. Denies facial pain.   Denies any gingival bleeding or epistasis.  No blood in the stool or urine.  No easy bleeding or bruising  His ankle is still sore, better, has not started PT yet. Contiues on his pain medication, only requiring once daily.  His right foot swelling is slightly improved.He has not tried the compression stocking.    Appetit is improving.  Component  Ref Range & Units 1 d ago 9 d ago 10 d ago 11 d ago 12 d ago 2 wk ago 1 mo  ago   WBC  3.40 - 10.80 10*3/mm3 6.18  5.40  6.34  8.18  9.36  12.00 High   4.94    RBC  4.14 - 5.80 10*6/mm3 3.63 Low   3.64 Low   3.79 Low   3.98 Low   4.11 Low   3.96 Low   3.65 Low     Hemoglobin  13.0 - 17.7 g/dL 11.2 Low   11.3 Low   11.6 Low   12.4 Low   12.6 Low   12.3 Low   11.5 Low     Hematocrit  37.5 - 51.0 % 34.7 Low   35.0 Low   36.5 Low   39.2  40.1  36.7 Low   35.3 Low     MCV  79.0 - 97.0 fL 95.6  96.2  96.3  98.5 High   97.6 High   92.7  96.7    MCH  26.6 - 33.0 pg 30.9  31.0  30.6  31.2  30.7  31.1  31.5    MCHC  31.5 - 35.7 g/dL 32.3  32.3  31.8  31.6  31.4 Low   33.5  32.6    RDW  12.3 - 15.4 % 14.8  14.7  14.8  14.6  14.6  13.3  14.4    RDW-SD  37.0 - 54.0 fl 51.0  51.9  52.4  53.2  53.0  45.7  51.8    MPV  6.0 - 12.0 fL 14.2 High   13.4 High   14.4 High   13.6 High   13.8 High   11.3  10.5    Platelets  140 - 450 10*3/mm3 75 Low   86 Low   100 Low   118 Low   132 Low   126 Low   116 Low     Neutrophil %  42.7 - 76.0 % 83.6 High   75.8  78.2 High   85.8 High   85.6 High   87.8 High   76.4 High     Lymphocyte %  19.6 - 45.3 % 9.4 Low   11.5 Low   9.8 Low   6.2 Low   6.3 Low   4.8 Low   11.1 Low     Monocyte %  5.0 - 12.0 % 4.4 Low   9.1  8.7  6.0  6.4  6.4  9.5    Eosinophil %  0.3 - 6.2 % 1.8  2.6  1.3  1.2  1.0  0.3  2.0    Basophil %  0.0 - 1.5 % 0.3  0.4  0.3  0.2  0.2  0.1  0.6    Immature Grans %  0.0 - 0.5 % 0.5  0.6 High   1.7 High   0.6 High   0.5  0.6 High   0.4        The following portions of the patient's history were reviewed and updated as appropriate: He  has a past medical history of A-fib (HCC), Acute inferior myocardial infarction (HCC), CAD (coronary artery disease), Cellulitis (02/26/2023), COVID-19 (08/25/2022), Dyslipidemia, Goiter, Hernia, umbilical, Hyperlipidemia, Hypertension, Nephrolithiasis, Type 2 diabetes mellitus (HCC), and Umbilical hernia.  He does not have any pertinent problems on file.  He  has a past surgical history that includes Cholecystectomy; Hernia  repair; Coronary angioplasty with stent (01/09/2009); Cardiac catheterization (Bilateral, 1/30/2019); Cardiac catheterization; Bronchoscopy (N/A, 4/12/2019); Paracentesis (06/17/2019); and Umbilical hernia repair (N/A, 7/26/2020).  His family history includes Aneurysm in his father; Cancer in his sister; Dementia in his mother; Heart attack in his father; Heart disease in his brother and father; Hypertension in his daughter and son; No Known Problems in his brother and sister; Stroke in his mother.  He  reports that he quit smoking about 39 years ago. His smoking use included cigarettes. He has a 70.00 pack-year smoking history. He has never used smokeless tobacco. He reports that he does not drink alcohol and does not use drugs.  Current Outpatient Medications   Medication Sig Dispense Refill   • albuterol sulfate  (90 Base) MCG/ACT inhaler Inhale 2 puffs Every 4 (Four) Hours As Needed for Wheezing. 18 g 0   • apixaban (ELIQUIS) 2.5 MG tablet tablet Take 1 tablet by mouth 2 (Two) Times a Day. Last dose 3 days ago. From 11-16-21     • carvedilol (COREG) 6.25 MG tablet TAKE 1 TABLET BY MOUTH TWICE DAILY WITH MEALS 60 tablet 6   • folic acid (FOLVITE) 400 MCG tablet Take 1 tablet by mouth Daily.     • furosemide (LASIX) 40 MG tablet TAKE 1 TABLET BY MOUTH ONCE DAILY IN THE MORNING, 1/2 TABLET IN THE AFTERNOON AND AS NEEDED FOR WEIGHT GAIN, EDEMA AND DYPNEA 180 tablet 3   • HYDROcodone-acetaminophen (NORCO) 5-325 MG per tablet Take 1 tablet by mouth Every 6 (Six) Hours As Needed for Moderate Pain. 60 tablet 0   • nitroglycerin (NITROSTAT) 0.4 MG SL tablet DISSOLVE ONE TABLET UNDER THE TONGUE EVERY 5 MINUTES AS NEEDED FOR CHEST PAIN.  DO NOT EXCEED A TOTAL OF 3 DOSES IN 15 MINUTES 25 tablet 3   • simvastatin (ZOCOR) 40 MG tablet Take 1 tablet by mouth Daily. 90 tablet 32   • spironolactone (ALDACTONE) 50 MG tablet Take 1 tablet by mouth once daily 90 tablet 2   • terazosin (HYTRIN) 2 MG capsule Take 1 capsule by  mouth Every Night. 90 capsule 3     No current facility-administered medications for this visit.     Current Outpatient Medications on File Prior to Visit   Medication Sig   • albuterol sulfate  (90 Base) MCG/ACT inhaler Inhale 2 puffs Every 4 (Four) Hours As Needed for Wheezing.   • apixaban (ELIQUIS) 2.5 MG tablet tablet Take 1 tablet by mouth 2 (Two) Times a Day. Last dose 3 days ago. From 11-16-21   • carvedilol (COREG) 6.25 MG tablet TAKE 1 TABLET BY MOUTH TWICE DAILY WITH MEALS   • folic acid (FOLVITE) 400 MCG tablet Take 1 tablet by mouth Daily.   • furosemide (LASIX) 40 MG tablet TAKE 1 TABLET BY MOUTH ONCE DAILY IN THE MORNING, 1/2 TABLET IN THE AFTERNOON AND AS NEEDED FOR WEIGHT GAIN, EDEMA AND DYPNEA   • HYDROcodone-acetaminophen (NORCO) 5-325 MG per tablet Take 1 tablet by mouth Every 6 (Six) Hours As Needed for Moderate Pain.   • nitroglycerin (NITROSTAT) 0.4 MG SL tablet DISSOLVE ONE TABLET UNDER THE TONGUE EVERY 5 MINUTES AS NEEDED FOR CHEST PAIN.  DO NOT EXCEED A TOTAL OF 3 DOSES IN 15 MINUTES   • simvastatin (ZOCOR) 40 MG tablet Take 1 tablet by mouth Daily.   • spironolactone (ALDACTONE) 50 MG tablet Take 1 tablet by mouth once daily   • terazosin (HYTRIN) 2 MG capsule Take 1 capsule by mouth Every Night.   • [DISCONTINUED] linezolid (ZYVOX) 600 MG tablet Take 1 tablet by mouth Every 12 (Twelve) Hours for 14 doses. Indications: Infection of the Skin and/or Soft Tissue     No current facility-administered medications on file prior to visit.     He is allergic to bee venom and sulfamethoxazole-trimethoprim..    Review of Systems   Constitutional: Positive for fatigue.   Gastrointestinal: Positive for abdominal distention.   Endocrine: Negative.    Musculoskeletal: Positive for arthralgias.   Skin: Positive for rash.       Objective   Physical Exam  Vitals and nursing note reviewed.   Constitutional:       General: He is not in acute distress.     Appearance: He is well-developed. He is not  diaphoretic.   HENT:      Head: Normocephalic and atraumatic.      Right Ear: External ear normal.      Left Ear: External ear normal.      Mouth/Throat:      Pharynx: No oropharyngeal exudate.   Eyes:      General: No scleral icterus.        Right eye: No discharge.      Conjunctiva/sclera: Conjunctivae normal.   Neck:      Thyroid: No thyromegaly.      Vascular: No JVD.      Trachea: No tracheal deviation.   Cardiovascular:      Rate and Rhythm: Normal rate and regular rhythm.      Heart sounds: Normal heart sounds.      Comments: PMI nondisplaced  Pulmonary:      Effort: Pulmonary effort is normal.      Breath sounds: Normal breath sounds. No wheezing or rales.   Abdominal:      General: Bowel sounds are normal.      Palpations: Abdomen is soft.      Tenderness: There is no abdominal tenderness. There is no guarding or rebound.   Musculoskeletal:      Cervical back: Normal range of motion and neck supple.      Right lower leg: Edema present.      Comments: Antalgic gait    Lymphadenopathy:      Cervical: No cervical adenopathy.   Skin:     General: Skin is warm and dry.      Capillary Refill: Capillary refill takes less than 2 seconds.      Coloration: Skin is not pale.      Findings: No rash.   Neurological:      Mental Status: He is alert and oriented to person, place, and time.      Motor: No abnormal muscle tone.      Coordination: Coordination normal.   Psychiatric:         Judgment: Judgment normal.         Assessment & Plan   Diagnoses and all orders for this visit:    1. Facial cellulitis (Primary)    2. Chronic anticoagulation    3. Right ankle pain, unspecified chronicity      's facial cellulitis appears resolved.  He is status post complete treatment with Zyvox and will decolonize for MRSA with Hibiclens per ID.  He sees dermatology in follow-up and I encouraged him to keep this visit.  Platelets were slightly low at 75 not far off his baseline.  He is chronically thrombocytopenic secondary to his  cirrhosis.  We will repeat platelets in follow-up.  His ankle pain is felt to be secondary to arthritis as well as Achilles tendon issues.  I still recommend compression wrap which she will attend to as well as physical therapy.  Continue as [place] as needed.  He is not in need of any refills at present.  Cannot use NSAIDs secondary to thrombocytopenia and chronic anticoagulation use.    Follow-up as scheduled

## 2023-03-10 NOTE — OUTREACH NOTE
Medical Week 2 Survey    Flowsheet Row Responses   [facility] patient discharged from? [facility]   Does the patient have one of the following disease processes/diagnoses(primary or secondary)? Other   Week 2 attempt successful? No   Unsuccessful attempts Attempt 1          AJAY Marino Registered Nurse

## 2023-03-15 ENCOUNTER — READMISSION MANAGEMENT (OUTPATIENT)
Dept: CALL CENTER | Facility: HOSPITAL | Age: 88
End: 2023-03-15
Payer: MEDICARE

## 2023-03-15 NOTE — OUTREACH NOTE
Medical Week 2 Survey    Flowsheet Row Responses   [facility] patient discharged from? [facility]   Does the patient have one of the following disease processes/diagnoses(primary or secondary)? Other   Week 2 attempt successful? No   Unsuccessful attempts Attempt 2          Jojo SANTILLAN - Licensed Nurse

## 2023-03-23 ENCOUNTER — PREP FOR SURGERY (OUTPATIENT)
Dept: OTHER | Facility: HOSPITAL | Age: 88
End: 2023-03-23
Payer: MEDICARE

## 2023-03-23 DIAGNOSIS — R18.8 CIRRHOSIS OF LIVER WITH ASCITES, UNSPECIFIED HEPATIC CIRRHOSIS TYPE: Primary | ICD-10-CM

## 2023-03-23 DIAGNOSIS — K74.60 CIRRHOSIS OF LIVER WITH ASCITES, UNSPECIFIED HEPATIC CIRRHOSIS TYPE: Primary | ICD-10-CM

## 2023-03-23 RX ORDER — ALBUMIN (HUMAN) 12.5 G/50ML
12.5 SOLUTION INTRAVENOUS ONCE
Status: CANCELLED | OUTPATIENT
Start: 2023-03-23 | End: 2023-03-23

## 2023-03-24 ENCOUNTER — READMISSION MANAGEMENT (OUTPATIENT)
Dept: CALL CENTER | Facility: HOSPITAL | Age: 88
End: 2023-03-24
Payer: MEDICARE

## 2023-03-24 NOTE — OUTREACH NOTE
Medical Week 3 Survey    Flowsheet Row Responses   [facility] patient discharged from? [facility]   Does the patient have one of the following disease processes/diagnoses(primary or secondary)? Other   Week 3 attempt successful? Yes   Call start time 1603   Call end time 1607   Discharge diagnosis Facial cellulitis   Meds reviewed with patient/caregiver? Yes   Is the patient having any side effects they believe may be caused by any medication additions or changes? No   Does the patient have all medications ordered at discharge? Yes   Is the patient taking all medications as directed (includes completed medication regime)? Yes   Does the patient have a primary care provider?  Yes   Does the patient have an appointment with their PCP within 7 days of discharge? Yes   Has the patient kept scheduled appointments due by today? Yes   Has home health visited the patient within 72 hours of discharge? N/A   Psychosocial issues? No   Did the patient receive a copy of their discharge instructions? Yes   Nursing interventions Reviewed instructions with patient   What is the patient's perception of their health status since discharge? Improving   Is the patient/caregiver able to teach back signs and symptoms related to disease process for when to call PCP? Yes   Is the patient/caregiver able to teach back signs and symptoms related to disease process for when to call 911? Yes   Is the patient/caregiver able to teach back the hierarchy of who to call/visit for symptoms/problems? PCP, Specialist, Home health nurse, Urgent Care, ED, 911 Yes   Additional teach back comments free of pain, sore on nose showing signs of healing   Week 3 Call Completed? Yes          Kylee CURRY - Registered Nurse

## 2023-04-04 DIAGNOSIS — E78.2 MIXED HYPERLIPIDEMIA: ICD-10-CM

## 2023-04-04 RX ORDER — SIMVASTATIN 40 MG
TABLET ORAL
Qty: 90 TABLET | Refills: 0 | Status: SHIPPED | OUTPATIENT
Start: 2023-04-04

## 2023-04-04 NOTE — TELEPHONE ENCOUNTER
Rx Refill Note  Requested Prescriptions     Pending Prescriptions Disp Refills   • simvastatin (ZOCOR) 40 MG tablet [Pharmacy Med Name: Simvastatin 40 MG Oral Tablet] 90 tablet 0     Sig: Take 1 tablet by mouth once daily      Last office visit with prescribing clinician: 3/10/2023   Last telemedicine visit with prescribing clinician: 5/24/2023   Next office visit with prescribing clinician: 5/24/2023                         Would you like a call back once the refill request has been completed: [] Yes [] No    If the office needs to give you a call back, can they leave a voicemail: [] Yes [] No    Liseth Qureshi MA  04/04/23, 10:42 EDT

## 2023-04-05 ENCOUNTER — HOSPITAL ENCOUNTER (OUTPATIENT)
Dept: ULTRASOUND IMAGING | Facility: HOSPITAL | Age: 88
Discharge: HOME | End: 2023-04-05
Admitting: RADIOLOGY
Payer: MEDICARE

## 2023-04-05 VITALS
DIASTOLIC BLOOD PRESSURE: 85 MMHG | HEIGHT: 71 IN | RESPIRATION RATE: 20 BRPM | TEMPERATURE: 96.1 F | BODY MASS INDEX: 29.51 KG/M2 | WEIGHT: 210.8 LBS | OXYGEN SATURATION: 97 % | SYSTOLIC BLOOD PRESSURE: 105 MMHG | HEART RATE: 60 BPM

## 2023-04-05 DIAGNOSIS — K74.60 CIRRHOSIS OF LIVER WITH ASCITES, UNSPECIFIED HEPATIC CIRRHOSIS TYPE: ICD-10-CM

## 2023-04-05 DIAGNOSIS — R18.8 CIRRHOSIS OF LIVER WITH ASCITES, UNSPECIFIED HEPATIC CIRRHOSIS TYPE: ICD-10-CM

## 2023-04-05 LAB
ALBUMIN FLD-MCNC: 1.6 G/DL
APPEARANCE FLD: ABNORMAL
COLOR FLD: YELLOW
LYMPHOCYTES NFR FLD MANUAL: 35 %
MACROPHAGE FLUID: 62 %
MESOTHL CELL NFR FLD MANUAL: 1 %
MONOCYTES NFR FLD: 1 %
NEUTROPHILS NFR FLD MANUAL: 1 %
PROT FLD-MCNC: 2.5 G/DL
RBC # FLD AUTO: <2000 /MM3
WBC # FLD AUTO: 101 /MM3

## 2023-04-05 PROCEDURE — 76942 ECHO GUIDE FOR BIOPSY: CPT

## 2023-04-05 PROCEDURE — P9047 ALBUMIN (HUMAN), 25%, 50ML: HCPCS | Performed by: NURSE PRACTITIONER

## 2023-04-05 PROCEDURE — 25010000002 ALBUMIN HUMAN 25% PER 50 ML: Performed by: NURSE PRACTITIONER

## 2023-04-05 PROCEDURE — 82042 OTHER SOURCE ALBUMIN QUAN EA: CPT | Performed by: NURSE PRACTITIONER

## 2023-04-05 PROCEDURE — 84157 ASSAY OF PROTEIN OTHER: CPT | Performed by: NURSE PRACTITIONER

## 2023-04-05 PROCEDURE — 0 LIDOCAINE 1 % SOLUTION: Performed by: NURSE PRACTITIONER

## 2023-04-05 PROCEDURE — C1729 CATH, DRAINAGE: HCPCS

## 2023-04-05 PROCEDURE — 89051 BODY FLUID CELL COUNT: CPT | Performed by: NURSE PRACTITIONER

## 2023-04-05 RX ORDER — ALBUMIN (HUMAN) 12.5 G/50ML
12.5 SOLUTION INTRAVENOUS ONCE
Status: COMPLETED | OUTPATIENT
Start: 2023-04-05 | End: 2023-04-05

## 2023-04-05 RX ORDER — LIDOCAINE HYDROCHLORIDE 10 MG/ML
4 INJECTION, SOLUTION INFILTRATION; PERINEURAL ONCE
Status: COMPLETED | OUTPATIENT
Start: 2023-04-05 | End: 2023-04-05

## 2023-04-05 RX ADMIN — ALBUMIN (HUMAN) 12.5 G: 0.25 INJECTION, SOLUTION INTRAVENOUS at 13:34

## 2023-04-05 RX ADMIN — LIDOCAINE HYDROCHLORIDE 4 ML: 10 INJECTION, SOLUTION INFILTRATION; PERINEURAL at 12:07

## 2023-04-05 NOTE — NURSING NOTE
US guided paracentesis performed by Dr Heck.  5.75 Liters of fluid removed from peritoneum. Patient tolerated well. Report called to RAVINDRA.

## 2023-04-05 NOTE — PRE-PROCEDURE NOTE
[facility]   Vascular Interventional Radiology  History & Physicial    Pertinent information including components of history, physical examination, review of systems, lab and imaging data, and impression and plan from this source was also reviewed for the creation of the following pre-procedural note: Progress Notes by Jasper Avery MD (03/10/2023 09:30)         Patient Name:Jordi Sam    : 1934    MRN: [id number]    Primary Care Physician: Jasper Avery MD    Referring Physician: MAKAYLA Stringer     Date of admission: 2023    Subjective     Reason for Consult: Para    History of Present Illness     Jordi Sam is a 89 y.o. male referred to IR as noted above.      Active Hospital Problems:  There are no active hospital problems to display for this patient.      Personal History     Past Medical History:   Diagnosis Date   • A-fib    • Acute inferior myocardial infarction     Acute inferior STEMI with RV infarct features, 2009.    • CAD (coronary artery disease)    • Cellulitis 2023    Facial. - admission at [facility]. Treated with Bactrim  ointment and IV ABX   • COVID-19 2022   • Dyslipidemia    • Goiter     History of “goiter.”   • Hernia, umbilical    • Hyperlipidemia    • Hypertension    • Nephrolithiasis    • Type 2 diabetes mellitus    • Umbilical hernia     Pt stated has recently returned. Seeing Dr Kiersten Nova and Dr Jasper Avery        Past Surgical History:   Procedure Laterality Date   • BRONCHOSCOPY N/A 2019    Procedure: BRONCHOSCOPY WITH THORACENTESIS;  Surgeon: Marciano Higginbotham MD;  Location:  [facility];  Service: Pulmonary   • CARDIAC CATHETERIZATION Bilateral 2019    Procedure: Right and Left Heart Cath;  Surgeon: Efrem Posadas MD;  Location:  [facility];  Service: Cardiology   • CARDIAC CATHETERIZATION     • CHOLECYSTECTOMY     • CORONARY ANGIOPLASTY WITH STENT PLACEMENT   "01/09/2009    Sirolimus eluting stents to the RCA, 01/09/2009.    • HERNIA REPAIR      Hernia repair x 2.    • PARACENTESIS  06/17/2019    multiple   • UMBILICAL HERNIA REPAIR N/A 7/26/2020    Procedure: UMBILICAL HERNIA REPAIR;  Surgeon: Maribell Her MD;  Location: [facility];  Service: General;  Laterality: N/A;       Family History: His family history includes Aneurysm in his father; Cancer in his sister; Dementia in his mother; Heart attack in his father; Heart disease in his brother and father; Hypertension in his daughter and son; No Known Problems in his brother and sister; Stroke in his mother.     Social History: He  reports that he quit smoking about 39 years ago. His smoking use included cigarettes. He has a 70.00 pack-year smoking history. He has never used smokeless tobacco. He reports that he does not drink alcohol and does not use drugs.    Home Medications:  HYDROcodone-acetaminophen, albuterol sulfate HFA, apixaban, carvedilol, folic acid, furosemide, nitroglycerin, simvastatin, spironolactone, and terazosin    Current Medications:  No current facility-administered medications for this encounter.     Allergies:  He is allergic to bee venom and sulfamethoxazole-trimethoprim.    Review of Systems    IR Procedure pertinent significant findings are mentioned in the PMH and PSH above.    Objective     Visit Vitals  /53   Pulse 53   Temp 96.1 °F (35.6 °C) (Temporal)   Resp 20   Ht 180.3 cm (71\")   Wt 95.6 kg (210 lb 12.8 oz)   SpO2 97%   BMI 29.40 kg/m²        Physical Exam    A&Ox3.   Able to communicate  No Apparent Distress  Average physique   CVS: Regular rate and rhythm  Respiratory: Non labored breathing. No signs of respiratory compromise.    Result Review      I have personally reviewed the results from the time of this admission to 4/5/2023 12:14 EDT and agree with these findings.  [x]  Laboratory  []  Microbiology  [x]  Radiology  []  EKG/Telemetry   []  Cardiology/Vascular   []  " Pathology  []  Old records  []  Other:    Most notable findings include: As noted:          CrCl cannot be calculated (Patient's most recent lab result is older than the maximum 30 days allowed.). No results found for: CREATININE    COVID19   Date Value Ref Range Status   11/04/2022 Not Detected  Final        No results found for: PREGTESTUR, PREGSERUM, HCG, HCGQUANT     ASA SCALE ASSESSMENT (applicable ONLY if sedation planned):        MALLAMPATI CLASSIFICATION (applicable ONLY if sedation planned):       Assessment / Plan     Jordi Sam is a 89 y.o. male referred to the IR service with above problem.    Plan:   As above.    Notice: The note was created before the performance of the procedure. It might have been left in the pending status and signed off after the procedure. The time stamp on the note may be misleading.    David Heck MD   Vascular Interventional Radiology  04/05/23   12:14 PM EDT

## 2023-04-05 NOTE — POST-PROCEDURE NOTE
The following procedure was performed: para    Please see corresponding Radiology report for in detail procedural information. The Radiology report will be dictated shortly, if not done so already. Please see the IR RN note for the information regarding medicines administered if any, meghann-procedural vitals and I/O information.

## 2023-04-06 ENCOUNTER — TELEPHONE (OUTPATIENT)
Dept: INFUSION THERAPY | Facility: HOSPITAL | Age: 88
End: 2023-04-06

## 2023-04-25 ENCOUNTER — PREP FOR SURGERY (OUTPATIENT)
Dept: OTHER | Facility: HOSPITAL | Age: 88
End: 2023-04-25
Payer: MEDICARE

## 2023-04-25 DIAGNOSIS — R18.8 CIRRHOSIS OF LIVER WITH ASCITES, UNSPECIFIED HEPATIC CIRRHOSIS TYPE: Primary | ICD-10-CM

## 2023-04-25 DIAGNOSIS — K74.60 CIRRHOSIS OF LIVER WITH ASCITES, UNSPECIFIED HEPATIC CIRRHOSIS TYPE: Primary | ICD-10-CM

## 2023-04-25 RX ORDER — ALBUMIN (HUMAN) 12.5 G/50ML
12.5 SOLUTION INTRAVENOUS ONCE
OUTPATIENT
Start: 2023-04-25 | End: 2023-04-25

## 2023-05-12 ENCOUNTER — HOSPITAL ENCOUNTER (OUTPATIENT)
Dept: ULTRASOUND IMAGING | Facility: HOSPITAL | Age: 88
Discharge: HOME | End: 2023-05-12
Payer: MEDICARE

## 2023-05-12 VITALS
OXYGEN SATURATION: 97 % | WEIGHT: 220 LBS | BODY MASS INDEX: 30.8 KG/M2 | RESPIRATION RATE: 18 BRPM | HEART RATE: 65 BPM | SYSTOLIC BLOOD PRESSURE: 100 MMHG | HEIGHT: 71 IN | DIASTOLIC BLOOD PRESSURE: 57 MMHG | TEMPERATURE: 97.6 F

## 2023-05-12 DIAGNOSIS — R18.8 CIRRHOSIS OF LIVER WITH ASCITES, UNSPECIFIED HEPATIC CIRRHOSIS TYPE: ICD-10-CM

## 2023-05-12 DIAGNOSIS — K74.60 CIRRHOSIS OF LIVER WITH ASCITES, UNSPECIFIED HEPATIC CIRRHOSIS TYPE: ICD-10-CM

## 2023-05-12 LAB
ALBUMIN FLD-MCNC: 1.8 G/DL
ANION GAP SERPL CALCULATED.3IONS-SCNC: 10 MMOL/L (ref 5–15)
APPEARANCE FLD: ABNORMAL
BASOPHILS # BLD AUTO: 0.04 10*3/MM3 (ref 0–0.2)
BASOPHILS NFR BLD AUTO: 0.8 % (ref 0–1.5)
BUN SERPL-MCNC: 27 MG/DL (ref 8–23)
BUN/CREAT SERPL: 17.3 (ref 7–25)
CALCIUM SPEC-SCNC: 8.8 MG/DL (ref 8.6–10.5)
CHLORIDE SERPL-SCNC: 102 MMOL/L (ref 98–107)
CO2 SERPL-SCNC: 25 MMOL/L (ref 22–29)
COLOR FLD: YELLOW
CREAT SERPL-MCNC: 1.56 MG/DL (ref 0.76–1.27)
DEPRECATED RDW RBC AUTO: 55.8 FL (ref 37–54)
EGFRCR SERPLBLD CKD-EPI 2021: 42.2 ML/MIN/1.73
EOSINOPHIL # BLD AUTO: 0.08 10*3/MM3 (ref 0–0.4)
EOSINOPHIL NFR BLD AUTO: 1.5 % (ref 0.3–6.2)
ERYTHROCYTE [DISTWIDTH] IN BLOOD BY AUTOMATED COUNT: 15.5 % (ref 12.3–15.4)
GLUCOSE SERPL-MCNC: 120 MG/DL (ref 65–99)
HCT VFR BLD AUTO: 32.9 % (ref 37.5–51)
HGB BLD-MCNC: 10.4 G/DL (ref 13–17.7)
IMM GRANULOCYTES # BLD AUTO: 0.01 10*3/MM3 (ref 0–0.05)
IMM GRANULOCYTES NFR BLD AUTO: 0.2 % (ref 0–0.5)
INR PPP: 1.27 (ref 0.89–1.12)
LYMPHOCYTES # BLD AUTO: 0.62 10*3/MM3 (ref 0.7–3.1)
LYMPHOCYTES NFR BLD AUTO: 11.6 % (ref 19.6–45.3)
LYMPHOCYTES NFR FLD MANUAL: 18 %
MACROPHAGE FLUID: 69 %
MCH RBC QN AUTO: 31 PG (ref 26.6–33)
MCHC RBC AUTO-ENTMCNC: 31.6 G/DL (ref 31.5–35.7)
MCV RBC AUTO: 97.9 FL (ref 79–97)
MESOTHL CELL NFR FLD MANUAL: 1 %
MONOCYTES # BLD AUTO: 0.45 10*3/MM3 (ref 0.1–0.9)
MONOCYTES NFR BLD AUTO: 8.4 % (ref 5–12)
MONOCYTES NFR FLD: 7 %
NEUTROPHILS NFR BLD AUTO: 4.13 10*3/MM3 (ref 1.7–7)
NEUTROPHILS NFR BLD AUTO: 77.5 % (ref 42.7–76)
NEUTROPHILS NFR FLD MANUAL: 5 %
NRBC BLD AUTO-RTO: 0 /100 WBC (ref 0–0.2)
PLATELET # BLD AUTO: 135 10*3/MM3 (ref 140–450)
PMV BLD AUTO: 10.9 FL (ref 6–12)
POTASSIUM SERPL-SCNC: 5 MMOL/L (ref 3.5–5.2)
PROT FLD-MCNC: 2.6 G/DL
PROTHROMBIN TIME: 16 SECONDS (ref 12.2–14.5)
RBC # BLD AUTO: 3.36 10*6/MM3 (ref 4.14–5.8)
RBC # FLD AUTO: <2000 /MM3
SODIUM SERPL-SCNC: 137 MMOL/L (ref 136–145)
WBC # FLD AUTO: 92 /MM3
WBC NRBC COR # BLD: 5.33 10*3/MM3 (ref 3.4–10.8)

## 2023-05-12 PROCEDURE — 85025 COMPLETE CBC W/AUTO DIFF WBC: CPT | Performed by: RADIOLOGY

## 2023-05-12 PROCEDURE — 25010000002 ALBUMIN HUMAN 25% PER 50 ML: Performed by: NURSE PRACTITIONER

## 2023-05-12 PROCEDURE — 85610 PROTHROMBIN TIME: CPT | Performed by: RADIOLOGY

## 2023-05-12 PROCEDURE — 80048 BASIC METABOLIC PNL TOTAL CA: CPT | Performed by: RADIOLOGY

## 2023-05-12 PROCEDURE — 89051 BODY FLUID CELL COUNT: CPT | Performed by: NURSE PRACTITIONER

## 2023-05-12 PROCEDURE — 76942 ECHO GUIDE FOR BIOPSY: CPT

## 2023-05-12 PROCEDURE — 82042 OTHER SOURCE ALBUMIN QUAN EA: CPT | Performed by: NURSE PRACTITIONER

## 2023-05-12 PROCEDURE — C1729 CATH, DRAINAGE: HCPCS

## 2023-05-12 PROCEDURE — 84157 ASSAY OF PROTEIN OTHER: CPT | Performed by: NURSE PRACTITIONER

## 2023-05-12 PROCEDURE — P9047 ALBUMIN (HUMAN), 25%, 50ML: HCPCS | Performed by: NURSE PRACTITIONER

## 2023-05-12 RX ORDER — LIDOCAINE HYDROCHLORIDE 10 MG/ML
5 INJECTION, SOLUTION EPIDURAL; INFILTRATION; INTRACAUDAL; PERINEURAL ONCE
Status: DISCONTINUED | OUTPATIENT
Start: 2023-05-12 | End: 2023-05-13 | Stop reason: HOSPADM

## 2023-05-12 RX ORDER — SODIUM CHLORIDE 0.9 % (FLUSH) 0.9 %
10 SYRINGE (ML) INJECTION AS NEEDED
Status: DISCONTINUED | OUTPATIENT
Start: 2023-05-12 | End: 2023-05-13 | Stop reason: HOSPADM

## 2023-05-12 RX ORDER — ALBUMIN (HUMAN) 12.5 G/50ML
12.5 SOLUTION INTRAVENOUS ONCE
Status: COMPLETED | OUTPATIENT
Start: 2023-05-12 | End: 2023-05-12

## 2023-05-12 RX ORDER — SODIUM CHLORIDE 0.9 % (FLUSH) 0.9 %
3 SYRINGE (ML) INJECTION EVERY 12 HOURS SCHEDULED
Status: DISCONTINUED | OUTPATIENT
Start: 2023-05-12 | End: 2023-05-13 | Stop reason: HOSPADM

## 2023-05-12 RX ORDER — ALBUMIN (HUMAN) 12.5 G/50ML
SOLUTION INTRAVENOUS
Status: DISCONTINUED
Start: 2023-05-12 | End: 2023-05-13 | Stop reason: HOSPADM

## 2023-05-12 RX ADMIN — ALBUMIN (HUMAN) 18.75 G: 0.25 INJECTION, SOLUTION INTRAVENOUS at 15:17

## 2023-05-12 NOTE — PRE-PROCEDURE NOTE
[facility]   Vascular Interventional Radiology  History & Physicial    Pertinent information including components of history, physical examination, review of systems, lab and imaging data, and impression and plan from this source was also reviewed for the creation of the following pre-procedural note: Multiple repeat visits for para      Patient Name:Jordi Sam    : 1934    MRN: [id number]    Primary Care Physician: Jasper Avery MD    Referring Physician: MAKAYLA Stringer     Date of admission: 2023    Subjective     Reason for Consult: Para    History of Present Illness     Jordi Sam is a 89 y.o. male referred to IR as noted above.      Active Hospital Problems:  There are no active hospital problems to display for this patient.      Personal History     Past Medical History:   Diagnosis Date   • A-fib    • Acute inferior myocardial infarction     Acute inferior STEMI with RV infarct features, 2009.    • CAD (coronary artery disease)    • Cellulitis 2023    Facial. - admission at [facility]. Treated with Bactrim  ointment and IV ABX   • COVID-19 2022   • Dyslipidemia    • Goiter     History of “goiter.”   • Hernia, umbilical    • Hyperlipidemia    • Hypertension    • Nephrolithiasis    • Type 2 diabetes mellitus    • Umbilical hernia     Pt stated has recently returned. Seeing Dr Kiersten Nova and Dr Jasper Avery        Past Surgical History:   Procedure Laterality Date   • BRONCHOSCOPY N/A 2019    Procedure: BRONCHOSCOPY WITH THORACENTESIS;  Surgeon: Marciano Higginbotham MD;  Location:  [facility];  Service: Pulmonary   • CARDIAC CATHETERIZATION Bilateral 2019    Procedure: Right and Left Heart Cath;  Surgeon: Efrem Posadas MD;  Location:  [facility];  Service: Cardiology   • CARDIAC CATHETERIZATION     • CHOLECYSTECTOMY     • CORONARY ANGIOPLASTY WITH STENT PLACEMENT  2009    Sirolimus  "eluting stents to the RCA, 01/09/2009.    • HERNIA REPAIR      Hernia repair x 2.    • PARACENTESIS  06/17/2019    multiple   • UMBILICAL HERNIA REPAIR N/A 7/26/2020    Procedure: UMBILICAL HERNIA REPAIR;  Surgeon: Maribell Her MD;  Location: [facility];  Service: General;  Laterality: N/A;       Family History: His family history includes Aneurysm in his father; Cancer in his sister; Dementia in his mother; Heart attack in his father; Heart disease in his brother and father; Hypertension in his daughter and son; No Known Problems in his brother and sister; Stroke in his mother.     Social History: He  reports that he quit smoking about 40 years ago. His smoking use included cigarettes. He has a 70.00 pack-year smoking history. He has never used smokeless tobacco. He reports that he does not drink alcohol and does not use drugs.    Home Medications:  HYDROcodone-acetaminophen, albuterol sulfate HFA, apixaban, carvedilol, folic acid, furosemide, nitroglycerin, simvastatin, spironolactone, and terazosin    Current Medications:  •  albumin human  •  sodium chloride  •  sodium chloride     Allergies:  He is allergic to bee venom and sulfamethoxazole-trimethoprim.    Review of Systems    IR Procedure pertinent significant findings are mentioned in the PMH and PSH above.    Objective     Visit Vitals  /60 (BP Location: Left arm)   Pulse (!) 48   Temp 97.6 °F (36.4 °C) (Tympanic)   Resp 20   Ht 180.3 cm (71\")   Wt 99.8 kg (220 lb)   SpO2 97%   BMI 30.68 kg/m²        Physical Exam    A&Ox3.   Able to communicate  No Apparent Distress  Average physique   CVS: Regular rate and rhythm  Respiratory: Non labored breathing. No signs of respiratory compromise.    Result Review      I have personally reviewed the results from the time of this admission to 5/12/2023 13:52 EDT and agree with these findings.  [x]  Laboratory  []  Microbiology  [x]  Radiology  []  EKG/Telemetry   []  Cardiology/Vascular   []  " Pathology  []  Old records  []  Other:    Most notable findings include: As noted:    Results from last 7 days   Lab Units 05/12/23  1257   INR  1.27*   HEMOGLOBIN g/dL 10.4*   HEMATOCRIT % 32.9*   PLATELETS 10*3/mm3 135*       CrCl cannot be calculated (Patient's most recent lab result is older than the maximum 30 days allowed.). No results found for: CREATININE    COVID19   Date Value Ref Range Status   11/04/2022 Not Detected  Final        No results found for: PREGTESTUR, PREGSERUM, HCG, HCGQUANT     ASA SCALE ASSESSMENT (applicable ONLY if sedation planned):        MALLAMPATI CLASSIFICATION (applicable ONLY if sedation planned):       Assessment / Plan     Jordi Sam is a 89 y.o. male referred to the IR service with above problem.    Plan:   As above.    Notice: The note was created before the performance of the procedure. It might have been left in the pending status and signed off after the procedure. The time stamp on the note may be misleading.    David Heck MD   Vascular Interventional Radiology  05/12/23   1:52 PM EDT

## 2023-05-23 PROBLEM — A49.02 MRSA INFECTION: Status: ACTIVE | Noted: 2023-03-07

## 2023-05-23 PROBLEM — D69.59 THROMBOCYTOPENIA, SECONDARY: Status: ACTIVE | Noted: 2023-03-07

## 2023-05-24 ENCOUNTER — OFFICE VISIT (OUTPATIENT)
Dept: FAMILY MEDICINE CLINIC | Facility: CLINIC | Age: 88
End: 2023-05-24
Payer: MEDICARE

## 2023-05-24 ENCOUNTER — LAB (OUTPATIENT)
Dept: LAB | Facility: HOSPITAL | Age: 88
End: 2023-05-24
Payer: MEDICARE

## 2023-05-24 VITALS
DIASTOLIC BLOOD PRESSURE: 62 MMHG | SYSTOLIC BLOOD PRESSURE: 110 MMHG | BODY MASS INDEX: 31.16 KG/M2 | OXYGEN SATURATION: 99 % | HEART RATE: 76 BPM | WEIGHT: 222.6 LBS | HEIGHT: 71 IN

## 2023-05-24 DIAGNOSIS — I10 ESSENTIAL HYPERTENSION: ICD-10-CM

## 2023-05-24 DIAGNOSIS — R18.8 CIRRHOSIS OF LIVER WITH ASCITES, UNSPECIFIED HEPATIC CIRRHOSIS TYPE: ICD-10-CM

## 2023-05-24 DIAGNOSIS — R73.03 PRE-DIABETES: ICD-10-CM

## 2023-05-24 DIAGNOSIS — Z00.00 MEDICARE ANNUAL WELLNESS VISIT, SUBSEQUENT: ICD-10-CM

## 2023-05-24 DIAGNOSIS — N18.31 STAGE 3A CHRONIC KIDNEY DISEASE: ICD-10-CM

## 2023-05-24 DIAGNOSIS — R05.3 CHRONIC COUGH: ICD-10-CM

## 2023-05-24 DIAGNOSIS — K74.60 CIRRHOSIS OF LIVER WITH ASCITES, UNSPECIFIED HEPATIC CIRRHOSIS TYPE: ICD-10-CM

## 2023-05-24 DIAGNOSIS — Z09 S/P UMBILICAL HERNIA REPAIR, FOLLOW-UP EXAM: ICD-10-CM

## 2023-05-24 DIAGNOSIS — E78.2 MIXED HYPERLIPIDEMIA: Primary | ICD-10-CM

## 2023-05-24 DIAGNOSIS — Z79.01 CHRONIC ANTICOAGULATION: ICD-10-CM

## 2023-05-24 LAB
ALBUMIN UR-MCNC: 4.7 MG/DL
BASOPHILS # BLD AUTO: 0.05 10*3/MM3 (ref 0–0.2)
BASOPHILS NFR BLD AUTO: 0.9 % (ref 0–1.5)
DEPRECATED RDW RBC AUTO: 49.5 FL (ref 37–54)
EOSINOPHIL # BLD AUTO: 0.08 10*3/MM3 (ref 0–0.4)
EOSINOPHIL NFR BLD AUTO: 1.5 % (ref 0.3–6.2)
ERYTHROCYTE [DISTWIDTH] IN BLOOD BY AUTOMATED COUNT: 14 % (ref 12.3–15.4)
HCT VFR BLD AUTO: 34.5 % (ref 37.5–51)
HGB BLD-MCNC: 11.2 G/DL (ref 13–17.7)
IMM GRANULOCYTES # BLD AUTO: 0.02 10*3/MM3 (ref 0–0.05)
IMM GRANULOCYTES NFR BLD AUTO: 0.4 % (ref 0–0.5)
LYMPHOCYTES # BLD AUTO: 0.62 10*3/MM3 (ref 0.7–3.1)
LYMPHOCYTES NFR BLD AUTO: 11.5 % (ref 19.6–45.3)
MCH RBC QN AUTO: 30.9 PG (ref 26.6–33)
MCHC RBC AUTO-ENTMCNC: 32.5 G/DL (ref 31.5–35.7)
MCV RBC AUTO: 95.3 FL (ref 79–97)
MONOCYTES # BLD AUTO: 0.5 10*3/MM3 (ref 0.1–0.9)
MONOCYTES NFR BLD AUTO: 9.3 % (ref 5–12)
NEUTROPHILS NFR BLD AUTO: 4.11 10*3/MM3 (ref 1.7–7)
NEUTROPHILS NFR BLD AUTO: 76.4 % (ref 42.7–76)
NRBC BLD AUTO-RTO: 0 /100 WBC (ref 0–0.2)
PLATELET # BLD AUTO: 138 10*3/MM3 (ref 140–450)
PMV BLD AUTO: 10.7 FL (ref 6–12)
RBC # BLD AUTO: 3.62 10*6/MM3 (ref 4.14–5.8)
WBC NRBC COR # BLD: 5.38 10*3/MM3 (ref 3.4–10.8)

## 2023-05-24 PROCEDURE — 85025 COMPLETE CBC W/AUTO DIFF WBC: CPT | Performed by: INTERNAL MEDICINE

## 2023-05-24 PROCEDURE — 80061 LIPID PANEL: CPT | Performed by: INTERNAL MEDICINE

## 2023-05-24 PROCEDURE — 84439 ASSAY OF FREE THYROXINE: CPT | Performed by: INTERNAL MEDICINE

## 2023-05-24 PROCEDURE — 82043 UR ALBUMIN QUANTITATIVE: CPT | Performed by: INTERNAL MEDICINE

## 2023-05-24 PROCEDURE — 84443 ASSAY THYROID STIM HORMONE: CPT | Performed by: INTERNAL MEDICINE

## 2023-05-24 PROCEDURE — 80053 COMPREHEN METABOLIC PANEL: CPT | Performed by: INTERNAL MEDICINE
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
## 2023-05-24 NOTE — ASSESSMENT & PLAN NOTE
Suspect this is secondary to atelectasis and left pleural effusion possibly.  Does not seem to bother him a lot however in the absence of fevers chills wheezing or shortness of breath I will avoid chest x-ray at present.  Hopefully increasing Lasix will help.

## 2023-05-24 NOTE — ASSESSMENT & PLAN NOTE
Recommended increasing his Lasix to 40 mg p.o. twice daily.  Elevate legs is much as possible compression stockings.  Consider physical therapy for edema control.

## 2023-05-25 LAB
ALBUMIN SERPL-MCNC: 3.4 G/DL (ref 3.5–5.2)
ALBUMIN/GLOB SERPL: 1.5 G/DL
ALP SERPL-CCNC: 87 U/L (ref 39–117)
ALT SERPL W P-5'-P-CCNC: 11 U/L (ref 1–41)
ANION GAP SERPL CALCULATED.3IONS-SCNC: 13.2 MMOL/L (ref 5–15)
AST SERPL-CCNC: 24 U/L (ref 1–40)
BILIRUB SERPL-MCNC: 0.5 MG/DL (ref 0–1.2)
BUN SERPL-MCNC: 22 MG/DL (ref 8–23)
BUN/CREAT SERPL: 15.4 (ref 7–25)
CALCIUM SPEC-SCNC: 8.8 MG/DL (ref 8.6–10.5)
CHLORIDE SERPL-SCNC: 100 MMOL/L (ref 98–107)
CHOLEST SERPL-MCNC: 98 MG/DL (ref 0–200)
CO2 SERPL-SCNC: 24.8 MMOL/L (ref 22–29)
CREAT SERPL-MCNC: 1.43 MG/DL (ref 0.76–1.27)
EGFRCR SERPLBLD CKD-EPI 2021: 46.8 ML/MIN/1.73
GLOBULIN UR ELPH-MCNC: 2.3 GM/DL
GLUCOSE SERPL-MCNC: 102 MG/DL (ref 65–99)
HDLC SERPL-MCNC: 43 MG/DL (ref 40–60)
LDLC SERPL CALC-MCNC: 41 MG/DL (ref 0–100)
LDLC/HDLC SERPL: 1 {RATIO}
POTASSIUM SERPL-SCNC: 4.7 MMOL/L (ref 3.5–5.2)
PROT SERPL-MCNC: 5.7 G/DL (ref 6–8.5)
SODIUM SERPL-SCNC: 138 MMOL/L (ref 136–145)
T4 FREE SERPL-MCNC: 0.93 NG/DL (ref 0.93–1.7)
TRIGL SERPL-MCNC: 60 MG/DL (ref 0–150)
TSH SERPL DL<=0.05 MIU/L-ACNC: 5.09 UIU/ML (ref 0.27–4.2)
VLDLC SERPL-MCNC: 14 MG/DL (ref 5–40)

## 2023-05-26 ENCOUNTER — DOCUMENTATION (OUTPATIENT)
Dept: FAMILY MEDICINE CLINIC | Facility: CLINIC | Age: 88
End: 2023-05-26

## 2023-05-26 ENCOUNTER — TELEPHONE (OUTPATIENT)
Dept: FAMILY MEDICINE CLINIC | Facility: CLINIC | Age: 88
End: 2023-05-26

## 2023-05-26 RX ORDER — LEVOTHYROXINE SODIUM 0.03 MG/1
25 TABLET ORAL
Qty: 90 TABLET | Refills: 2 | Status: SHIPPED | OUTPATIENT
Start: 2023-05-26

## 2023-05-26 NOTE — PROGRESS NOTES
Labs stable.;however thyroid is underproducing....I have sent in a very low dose of syntheoid to be taken daily. We will recheck levels at his next revisit

## 2023-05-30 RX ORDER — TERAZOSIN 2 MG/1
CAPSULE ORAL
Qty: 90 CAPSULE | Refills: 0 | Status: SHIPPED | OUTPATIENT
Start: 2023-05-30

## 2023-05-30 NOTE — TELEPHONE ENCOUNTER
Rx Refill Note  Requested Prescriptions     Pending Prescriptions Disp Refills   • terazosin (HYTRIN) 2 MG capsule [Pharmacy Med Name: Terazosin HCl 2 MG Oral Capsule] 90 capsule 0     Sig: TAKE 1 CAPSULE BY MOUTH ONCE DAILY AT NIGHT      Last office visit with prescribing clinician: 5/24/2023   Last telemedicine visit with prescribing clinician: Visit date not found   Next office visit with prescribing clinician: 9/27/2023                         Would you like a call back once the refill request has been completed: [] Yes [] No    If the office needs to give you a call back, can they leave a voicemail: [] Yes [] No    Soraida Solomon MA  05/30/23, 09:33 EDT

## 2023-05-31 ENCOUNTER — PREP FOR SURGERY (OUTPATIENT)
Dept: OTHER | Facility: HOSPITAL | Age: 88
End: 2023-05-31

## 2023-05-31 DIAGNOSIS — R18.8 OTHER ASCITES: Primary | ICD-10-CM

## 2023-05-31 RX ORDER — ALBUMIN (HUMAN) 12.5 G/50ML
12.5 SOLUTION INTRAVENOUS ONCE
Status: CANCELLED | OUTPATIENT
Start: 2023-05-31 | End: 2023-05-31

## 2023-06-05 ENCOUNTER — HOSPITAL ENCOUNTER (OUTPATIENT)
Dept: INTERVENTIONAL RADIOLOGY/VASCULAR | Facility: HOSPITAL | Age: 88
Discharge: HOME | End: 2023-06-05
Admitting: NURSE PRACTITIONER
Payer: MEDICARE

## 2023-06-05 VITALS
HEART RATE: 65 BPM | TEMPERATURE: 97.1 F | BODY MASS INDEX: 32.24 KG/M2 | WEIGHT: 225.2 LBS | OXYGEN SATURATION: 98 % | RESPIRATION RATE: 21 BRPM | HEIGHT: 70 IN | SYSTOLIC BLOOD PRESSURE: 102 MMHG | DIASTOLIC BLOOD PRESSURE: 54 MMHG

## 2023-06-05 DIAGNOSIS — R18.8 OTHER ASCITES: ICD-10-CM

## 2023-06-05 LAB
ALBUMIN FLD-MCNC: 1.7 G/DL
APPEARANCE FLD: CLEAR
COLOR FLD: YELLOW
LYMPHOCYTES NFR FLD MANUAL: 18 %
MACROPHAGE FLUID: 73 %
MESOTHL CELL NFR FLD MANUAL: 1 %
NEUTROPHILS NFR FLD MANUAL: 8 %
PROT FLD-MCNC: 2.4 G/DL
RBC # FLD AUTO: <2000 /MM3
WBC # FLD AUTO: 87 /MM3

## 2023-06-05 PROCEDURE — 89051 BODY FLUID CELL COUNT: CPT | Performed by: NURSE PRACTITIONER

## 2023-06-05 PROCEDURE — 82042 OTHER SOURCE ALBUMIN QUAN EA: CPT | Performed by: NURSE PRACTITIONER

## 2023-06-05 PROCEDURE — 25010000002 ALBUMIN HUMAN 25% PER 50 ML: Performed by: NURSE PRACTITIONER

## 2023-06-05 PROCEDURE — C1729 CATH, DRAINAGE: HCPCS

## 2023-06-05 PROCEDURE — 76942 ECHO GUIDE FOR BIOPSY: CPT

## 2023-06-05 PROCEDURE — 84157 ASSAY OF PROTEIN OTHER: CPT | Performed by: NURSE PRACTITIONER

## 2023-06-05 PROCEDURE — P9047 ALBUMIN (HUMAN), 25%, 50ML: HCPCS | Performed by: NURSE PRACTITIONER

## 2023-06-05 RX ORDER — ALBUMIN (HUMAN) 12.5 G/50ML
12.5 SOLUTION INTRAVENOUS ONCE
Status: COMPLETED | OUTPATIENT
Start: 2023-06-05 | End: 2023-06-05

## 2023-06-05 RX ADMIN — ALBUMIN (HUMAN) 12.5 G: 0.25 INJECTION, SOLUTION INTRAVENOUS at 15:50

## 2023-06-05 NOTE — NURSING NOTE
Image guided paracentesis performed by MD Heck. 8000 mls of  straw-colored ascitic fluid removed from peritoneum. Patient tolerated well. Report called to FENG Brito. I

## 2023-06-05 NOTE — PRE-PROCEDURE NOTE
Psychiatric   Vascular Interventional Radiology  History & Physicial    Pertinent information including components of history, physical examination, review of systems, lab and imaging data, and impression and plan from this source was also reviewed for the creation of the following pre-procedural note: Pt well known to IR.  Progress Notes by Jasper Avery MD (2023 11:00)    Patient Name:Jordi Sam    : 1934    MRN: [id number]    Primary Care Physician: Jasper Avery MD    Referring Physician: MAKAYLA Stringer     Date of admission: 2023    Subjective     Reason for Consult: Para    History of Present Illness     Jordi Sam is a 89 y.o. male referred to IR as noted above.      Active Hospital Problems:  There are no active hospital problems to display for this patient.      Personal History     Past Medical History:   Diagnosis Date    A-fib     Acute inferior myocardial infarction     Acute inferior STEMI with RV infarct features, 2009.     CAD (coronary artery disease)     Cellulitis 2023    Facial. - admission at [facility]. Treated with Bactrim  ointment and IV ABX    COVID-19 2022    Dyslipidemia     Goiter     History of “goiter.”    Hernia, umbilical     Hyperlipidemia     Hypertension     Nephrolithiasis     Type 2 diabetes mellitus     Umbilical hernia     Pt stated has recently returned. Seeing Dr Kiersten Nova and Dr Jasper Avery        Past Surgical History:   Procedure Laterality Date    BRONCHOSCOPY N/A 2019    Procedure: BRONCHOSCOPY WITH THORACENTESIS;  Surgeon: Marciano Higginbotham MD;  Location:  [facility];  Service: Pulmonary    CARDIAC CATHETERIZATION Bilateral 2019    Procedure: Right and Left Heart Cath;  Surgeon: Efrem Posadas MD;  Location:  [facility];  Service: Cardiology    CARDIAC CATHETERIZATION      CHOLECYSTECTOMY      CORONARY ANGIOPLASTY WITH STENT PLACEMENT   "01/09/2009    Sirolimus eluting stents to the RCA, 01/09/2009.     HERNIA REPAIR      Hernia repair x 2.     PARACENTESIS  06/17/2019    multiple    UMBILICAL HERNIA REPAIR N/A 7/26/2020    Procedure: UMBILICAL HERNIA REPAIR;  Surgeon: Maribell Her MD;  Location: [facility];  Service: General;  Laterality: N/A;       Family History: His family history includes Aneurysm in his father; Cancer in his sister; Dementia in his mother; Heart attack in his father; Heart disease in his brother and father; Hypertension in his daughter and son; No Known Problems in his brother and sister; Stroke in his mother.     Social History: He  reports that he quit smoking about 40 years ago. His smoking use included cigarettes. He has a 70.00 pack-year smoking history. He has never used smokeless tobacco. He reports that he does not drink alcohol and does not use drugs.    Home Medications:  HYDROcodone-acetaminophen, albuterol sulfate HFA, apixaban, carvedilol, folic acid, furosemide, levothyroxine, nitroglycerin, simvastatin, spironolactone, and terazosin    Current Medications:    albumin human     Allergies:  He is allergic to bee venom and sulfamethoxazole-trimethoprim.    Review of Systems    IR Procedure pertinent significant findings are mentioned in the PMH and PSH above.    Objective     Visit Vitals  BP 92/54   Pulse 63   Temp 97.1 °F (36.2 °C)   Resp 18   Ht 177.8 cm (70\")   Wt 102 kg (225 lb 3.2 oz)   SpO2 98%   BMI 32.31 kg/m²        Physical Exam    A&Ox3.   Able to communicate  No Apparent Distress  Average physique   CVS: Regular rate and rhythm  Respiratory: Non labored breathing. No signs of respiratory compromise.    Result Review      I have personally reviewed the results from the time of this admission to 6/5/2023 14:35 EDT and agree with these findings.  [x]  Laboratory  []  Microbiology  [x]  Radiology  []  EKG/Telemetry   []  Cardiology/Vascular   []  Pathology  []  Old records  []  Other:    Most " notable findings include: As noted:          Estimated Creatinine Clearance: 41.9 mL/min (A) (by C-G formula based on SCr of 1.43 mg/dL (H)). No results found for: CREATININE    COVID19   Date Value Ref Range Status   11/04/2022 Not Detected  Final        No results found for: PREGTESTUR, PREGSERUM, HCG, HCGQUANT     ASA SCALE ASSESSMENT (applicable ONLY if sedation planned):        MALLAMPATI CLASSIFICATION (applicable ONLY if sedation planned):       Assessment / Plan     Jordi Sam is a 89 y.o. male referred to the IR service with above problem.    Plan:   As above.    Notice: The note was created before the performance of the procedure. It might have been left in the pending status and signed off after the procedure. The time stamp on the note may be misleading.    David Heck MD   Vascular Interventional Radiology  06/05/23   2:35 PM EDT

## 2023-06-06 ENCOUNTER — TELEPHONE (OUTPATIENT)
Dept: INFUSION THERAPY | Facility: HOSPITAL | Age: 88
End: 2023-06-06
Payer: MEDICARE

## 2023-07-23 PROCEDURE — 87186 SC STD MICRODIL/AGAR DIL: CPT | Performed by: FAMILY MEDICINE

## 2023-07-23 PROCEDURE — 87086 URINE CULTURE/COLONY COUNT: CPT | Performed by: FAMILY MEDICINE

## 2023-07-23 PROCEDURE — 87077 CULTURE AEROBIC IDENTIFY: CPT | Performed by: FAMILY MEDICINE

## 2023-07-25 ENCOUNTER — TELEPHONE (OUTPATIENT)
Dept: FAMILY MEDICINE CLINIC | Facility: CLINIC | Age: 88
End: 2023-07-25
Payer: MEDICARE

## 2023-07-25 ENCOUNTER — TELEPHONE (OUTPATIENT)
Dept: URGENT CARE | Facility: CLINIC | Age: 88
End: 2023-07-25
Payer: MEDICARE

## 2023-07-25 DIAGNOSIS — M10.9 ACUTE GOUT OF RIGHT HAND, UNSPECIFIED CAUSE: Primary | ICD-10-CM

## 2023-07-25 NOTE — TELEPHONE ENCOUNTER
Received call from Dr. Mattson and thoracic with [facility] who was seeing patient for inflammation and pain of the PIP joint of right middle finger.  He was seen at urgent care on 7/23 for urinary tract infection and pain in the right little finger, referred to orthopedics for evaluation for possible septic joint versus fracture versus other.    Patient seen at [facility], felt to be related to a gout flare with some joint erosion in the affected joint.  He is not currently on any uric acid lowering agents, he is on an antibiotic for urinary tract infection and a Medrol Dosepak now for gout flare.  [facility] orthopedics will see him again in about a week to check in, but given his comorbidities they wanted to reach out to make sure he had follow-up regarding gout.  His next appointment is not scheduled until August 18, advised that we will get this moved up to the next week or so with his PCP once his acute gout flare resolves so he can have a uric acid level checked and begin uric acid lowering therapy if needed.    Please make patient a follow-up visit acutely with PCP over the next 1 to 2 weeks.  He can have uric acid level checked prior to his visit, lab has been ordered.

## 2023-07-25 NOTE — TELEPHONE ENCOUNTER
Called and spoke with patient wife, Melly.   MITCH reviewed.   Advised that it was recommended to come in and see Dr Avery earlier than 8/18/2023. The patient was scheduled for August 2nd.

## 2023-08-02 ENCOUNTER — LAB (OUTPATIENT)
Dept: LAB | Facility: HOSPITAL | Age: 88
End: 2023-08-02
Payer: MEDICARE

## 2023-08-02 ENCOUNTER — TELEPHONE (OUTPATIENT)
Dept: FAMILY MEDICINE CLINIC | Facility: CLINIC | Age: 88
End: 2023-08-02

## 2023-08-02 ENCOUNTER — OFFICE VISIT (OUTPATIENT)
Dept: FAMILY MEDICINE CLINIC | Facility: CLINIC | Age: 88
End: 2023-08-02
Payer: MEDICARE

## 2023-08-02 VITALS
DIASTOLIC BLOOD PRESSURE: 76 MMHG | WEIGHT: 211.8 LBS | BODY MASS INDEX: 29.65 KG/M2 | OXYGEN SATURATION: 100 % | HEIGHT: 71 IN | SYSTOLIC BLOOD PRESSURE: 124 MMHG | HEART RATE: 85 BPM

## 2023-08-02 DIAGNOSIS — M10.9 ACUTE GOUT OF RIGHT HAND, UNSPECIFIED CAUSE: ICD-10-CM

## 2023-08-02 DIAGNOSIS — N30.01 ACUTE CYSTITIS WITH HEMATURIA: ICD-10-CM

## 2023-08-02 DIAGNOSIS — M1A.39X1 CHRONIC GOUT DUE TO RENAL IMPAIRMENT OF MULTIPLE SITES WITH TOPHUS: Primary | ICD-10-CM

## 2023-08-02 LAB
BILIRUB UR QL STRIP: NEGATIVE
CLARITY UR: CLEAR
COLOR UR: YELLOW
GLUCOSE UR STRIP-MCNC: NEGATIVE MG/DL
HGB UR QL STRIP.AUTO: NEGATIVE
KETONES UR QL STRIP: NEGATIVE
LEUKOCYTE ESTERASE UR QL STRIP.AUTO: NEGATIVE
NITRITE UR QL STRIP: NEGATIVE
PH UR STRIP.AUTO: 7 [PH] (ref 5–8)
PROT UR QL STRIP: NEGATIVE
SP GR UR STRIP: 1.01 (ref 1–1.03)
URATE SERPL-MCNC: 8.2 MG/DL (ref 3.4–7)
UROBILINOGEN UR QL STRIP: NORMAL

## 2023-08-02 PROCEDURE — 81003 URINALYSIS AUTO W/O SCOPE: CPT | Performed by: INTERNAL MEDICINE

## 2023-08-02 PROCEDURE — 99214 OFFICE O/P EST MOD 30 MIN: CPT | Performed by: INTERNAL MEDICINE

## 2023-08-02 PROCEDURE — 84550 ASSAY OF BLOOD/URIC ACID: CPT | Performed by: FAMILY MEDICINE

## 2023-08-02 RX ORDER — PREDNISONE 10 MG/1
TABLET ORAL
Qty: 21 TABLET | Refills: 0 | Status: SHIPPED | OUTPATIENT
Start: 2023-08-02 | End: 2023-08-02

## 2023-08-02 RX ORDER — PREDNISONE 10 MG/1
10 TABLET ORAL DAILY
Qty: 21 TABLET | Refills: 0 | Status: SHIPPED | OUTPATIENT
Start: 2023-08-02

## 2023-08-03 RX ORDER — ALLOPURINOL 100 MG/1
100 TABLET ORAL DAILY
Qty: 90 TABLET | Refills: 2 | Status: SHIPPED | OUTPATIENT
Start: 2023-08-03

## 2023-08-11 ENCOUNTER — HOSPITAL ENCOUNTER (OUTPATIENT)
Dept: ULTRASOUND IMAGING | Facility: HOSPITAL | Age: 88
Discharge: HOME | End: 2023-08-11
Payer: MEDICARE

## 2023-08-11 VITALS
WEIGHT: 209 LBS | RESPIRATION RATE: 20 BRPM | HEIGHT: 71 IN | BODY MASS INDEX: 29.26 KG/M2 | SYSTOLIC BLOOD PRESSURE: 98 MMHG | OXYGEN SATURATION: 97 % | DIASTOLIC BLOOD PRESSURE: 57 MMHG | HEART RATE: 69 BPM

## 2023-08-11 LAB
ALBUMIN FLD-MCNC: 1.7 G/DL
APPEARANCE FLD: ABNORMAL
COLOR FLD: YELLOW
LYMPHOCYTES NFR FLD MANUAL: 70 %
MACROPHAGE FLUID: 7 %
MESOTHL CELL NFR FLD MANUAL: 7 %
MONOCYTES NFR FLD: 13 %
NEUTROPHILS NFR FLD MANUAL: 3 %
PROT FLD-MCNC: 2.8 G/DL
RBC # FLD AUTO: <2000 /MM3
WBC # FLD AUTO: 59 /MM3

## 2023-08-11 PROCEDURE — 84157 ASSAY OF PROTEIN OTHER: CPT | Performed by: NURSE PRACTITIONER

## 2023-08-11 PROCEDURE — 0 LIDOCAINE 1 % SOLUTION: Performed by: NURSE PRACTITIONER

## 2023-08-11 PROCEDURE — 76942 ECHO GUIDE FOR BIOPSY: CPT

## 2023-08-11 PROCEDURE — 89051 BODY FLUID CELL COUNT: CPT | Performed by: NURSE PRACTITIONER

## 2023-08-11 PROCEDURE — 82042 OTHER SOURCE ALBUMIN QUAN EA: CPT | Performed by: NURSE PRACTITIONER

## 2023-08-11 RX ORDER — LIDOCAINE HYDROCHLORIDE 10 MG/ML
5 INJECTION, SOLUTION INFILTRATION; PERINEURAL ONCE
Status: COMPLETED | OUTPATIENT
Start: 2023-08-11 | End: 2023-08-11

## 2023-08-11 RX ADMIN — LIDOCAINE HYDROCHLORIDE 5 ML: 10 INJECTION, SOLUTION INFILTRATION; PERINEURAL at 12:12

## 2023-08-11 NOTE — NURSING NOTE
Image guided paracentesis performed by MD Heck. 4000 mls of  clear yellow fluid removed from peritoneum. Patient tolerated well. Report called to RN.

## 2023-08-11 NOTE — PRE-PROCEDURE NOTE
"[facility]   Vascular Interventional Radiology  History & Physicial      Patient Name:Jordi Sam    : 1934    MRN: [id number]    Primary Care Physician: Jasper Avery MD    Referring Physician: MAKAYLA Stringer     Date of admission: 2023    Subjective     Reason for Consult: Para    History of Present Illness     Jordi Sam is a 89 y.o. male referred to IR as noted above.      Active Hospital Problems:  There are no active hospital problems to display for this patient.      Personal History     Past Medical History:   Diagnosis Date    A-fib     Acute inferior myocardial infarction     Acute inferior STEMI with RV infarct features, 2009.     CAD (coronary artery disease)     Cellulitis 2023    Facial. - admission at [facility]. Treated with Bactrim  ointment and IV ABX    COVID-19 2022    Dyslipidemia     Goiter     History of "goiter."    Hernia, umbilical     Hyperlipidemia     Hypertension     Nephrolithiasis     Type 2 diabetes mellitus     Umbilical hernia     Pt stated has recently returned. Seeing Dr Kiersten Nova and Dr Jasper Avery        Past Surgical History:   Procedure Laterality Date    BRONCHOSCOPY N/A 2019    Procedure: BRONCHOSCOPY WITH THORACENTESIS;  Surgeon: Marciano Higginbotham MD;  Location: [facility] ENDOSCOPY;  Service: Pulmonary    CARDIAC CATHETERIZATION Bilateral 2019    Procedure: Right and Left Heart Cath;  Surgeon: Efrem Posadas MD;  Location: [facility] CATH INVASIVE LOCATION;  Service: Cardiology    CARDIAC CATHETERIZATION      CHOLECYSTECTOMY      CORONARY ANGIOPLASTY WITH STENT PLACEMENT  2009    Sirolimus eluting stents to the RCA, 2009.     HERNIA REPAIR      Hernia repair x 2.     PARACENTESIS  2019    multiple    UMBILICAL HERNIA REPAIR N/A 2020    Procedure: UMBILICAL HERNIA REPAIR;  Surgeon: Maribell Her MD;  Location: [facility] OR;  Service: General;  " "Laterality: N/A;       Family History: His family history includes Aneurysm in his father; Cancer in his sister; Dementia in his mother; Heart attack in his father; Heart disease in his brother and father; Hypertension in his daughter and son; No Known Problems in his brother and sister; Stroke in his mother.     Social History: He  reports that he quit smoking about 40 years ago. His smoking use included cigarettes. He has a 70.00 pack-year smoking history. He has never used smokeless tobacco. He reports that he does not drink alcohol and does not use drugs.    Home Medications:  HYDROcodone-acetaminophen, albuterol sulfate HFA, allopurinol, apixaban, folic acid, furosemide, levothyroxine, nitroglycerin, predniSONE, simvastatin, spironolactone, and terazosin    Current Medications:  No current facility-administered medications for this encounter.     Allergies:  He is allergic to bee venom and sulfamethoxazole-trimethoprim.    Review of Systems    IR Procedure pertinent significant findings are mentioned in the PMH and PSH above.    Objective     Visit Vitals  /52   Pulse 72   Resp 22   Ht 180.3 cm (71\")   Wt 94.8 kg (209 lb)   SpO2 97%   BMI 29.15 kg/mý        Physical Exam    A&Ox3.   Able to communicate  No Apparent Distress  Average physique   CVS: VS as noted. Chart reviewed. Stable for the procedure.  Respiratory: Non labored breathing. No signs of respiratory compromise.    Result Review      I have personally reviewed the results from the time of this admission to 8/11/2023 12:06 EDT and agree with these findings.  [x]  Laboratory  []  Microbiology  [x]  Radiology  []  EKG/Telemetry   []  Cardiology/Vascular   []  Pathology  []  Old records  []  Other:    Most notable findings include: As noted:          Estimated Creatinine Clearance: 42.3 mL/min (A) (by C-G formula based on SCr of 1.39 mg/dL (H)). No results found for: CREATININE    COVID19   Date Value Ref Range Status   11/04/2022 Not Detected  " Final        No results found for: PREGTESTUR, PREGSERUM, HCG, HCGQUANT     ASA SCALE ASSESSMENT (applicable ONLY if sedation planned):        MALLAMPATI CLASSIFICATION (applicable ONLY if sedation planned):       Assessment / Plan     Jordi Sam is a 89 y.o. male referred to the IR service with above problem.    Plan:   As above.    The patient took Eliquis by mistake this morning and skipped last pm. He is requesting para due to discomfort and large abd. hernia. He is willing to take the risk.    Notice: The note was created before the performance of the procedure. It might have been left in the pending status and signed off after the procedure. The time stamp on the note may be misleading.    David Heck MD   Vascular Interventional Radiology  08/11/23   12:06 PM EDT

## 2023-08-14 ENCOUNTER — TELEPHONE (OUTPATIENT)
Dept: INFUSION THERAPY | Facility: HOSPITAL | Age: 88
End: 2023-08-14
Payer: MEDICARE

## 2023-08-15 ENCOUNTER — HOSPITAL ENCOUNTER (OUTPATIENT)
Dept: ULTRASOUND IMAGING | Facility: HOSPITAL | Age: 88
Discharge: HOME | End: 2023-08-15
Admitting: NURSE PRACTITIONER
Payer: MEDICARE

## 2023-08-15 DIAGNOSIS — R18.8 CIRRHOSIS OF LIVER WITH ASCITES, UNSPECIFIED HEPATIC CIRRHOSIS TYPE: ICD-10-CM

## 2023-08-15 DIAGNOSIS — K74.60 CIRRHOSIS OF LIVER WITH ASCITES, UNSPECIFIED HEPATIC CIRRHOSIS TYPE: ICD-10-CM

## 2023-08-15 PROCEDURE — 76705 ECHO EXAM OF ABDOMEN: CPT

## 2023-08-25 RX ORDER — TERAZOSIN 2 MG/1
CAPSULE ORAL
Qty: 90 CAPSULE | Refills: 0 | Status: SHIPPED | OUTPATIENT
Start: 2023-08-25

## 2023-08-25 NOTE — TELEPHONE ENCOUNTER
Rx Refill Note  Requested Prescriptions     Pending Prescriptions Disp Refills    terazosin (HYTRIN) 2 MG capsule [Pharmacy Med Name: Terazosin HCl 2 MG Oral Capsule] 90 capsule 0     Sig: TAKE 1 CAPSULE BY MOUTH ONCE DAILY AT NIGHT      Last office visit with prescribing clinician: 8/2/2023   Last telemedicine visit with prescribing clinician: Visit date not found   Next office visit with prescribing clinician: 9/27/2023                         Would you like a call back once the refill request has been completed: [] Yes [] No    If the office needs to give you a call back, can they leave a voicemail: [] Yes [] No    Liseth Qureshi MA  08/25/23, 07:59 EDT

## 2023-08-29 ENCOUNTER — TELEPHONE (OUTPATIENT)
Dept: GASTROENTEROLOGY | Facility: CLINIC | Age: 88
End: 2023-08-29
Payer: MEDICARE

## 2023-08-29 NOTE — TELEPHONE ENCOUNTER
Caller: Melly Sam    Relationship: Emergency Contact    Best call back number: [phone]    What orders are you requesting (i.e. lab or imaging): PROCEDURE-PARACENTESIS    In what timeframe would the patient need to come in: ASAP    Where will you receive your lab/imaging services: AT [facility]    Additional notes: PLEASE CALL WIFE ONCE THE ORDERS HAVE BEEN PLACED TO SCHEDULE THIS PROCEDURE

## 2023-08-29 NOTE — TELEPHONE ENCOUNTER
I returned patient's phone call and no answer. I left patient a voice message informing him there are standing orders in for a paracentesis along with the number to [facility] to call and get this scheduled. I also instructed patient to call me back with any questions or concerns.

## 2023-09-18 ENCOUNTER — HOSPITAL ENCOUNTER (OUTPATIENT)
Dept: INTERVENTIONAL RADIOLOGY/VASCULAR | Facility: HOSPITAL | Age: 88
Discharge: HOME | End: 2023-09-18
Payer: MEDICARE

## 2023-09-18 VITALS
WEIGHT: 220 LBS | BODY MASS INDEX: 30.68 KG/M2 | RESPIRATION RATE: 20 BRPM | DIASTOLIC BLOOD PRESSURE: 56 MMHG | SYSTOLIC BLOOD PRESSURE: 106 MMHG | HEART RATE: 74 BPM | OXYGEN SATURATION: 95 % | TEMPERATURE: 97.6 F

## 2023-09-18 DIAGNOSIS — R18.8 OTHER ASCITES: ICD-10-CM

## 2023-09-18 PROCEDURE — 0 LIDOCAINE 1 % SOLUTION: Performed by: NURSE PRACTITIONER

## 2023-09-18 PROCEDURE — C1729 CATH, DRAINAGE: HCPCS

## 2023-09-18 PROCEDURE — 76942 ECHO GUIDE FOR BIOPSY: CPT

## 2023-09-18 RX ORDER — SODIUM CHLORIDE 0.9 % (FLUSH) 0.9 %
10 SYRINGE (ML) INJECTION AS NEEDED
Status: DISCONTINUED | OUTPATIENT
Start: 2023-09-18 | End: 2023-09-19 | Stop reason: HOSPADM

## 2023-09-18 RX ORDER — SODIUM CHLORIDE 9 MG/ML
40 INJECTION, SOLUTION INTRAVENOUS AS NEEDED
Status: DISCONTINUED | OUTPATIENT
Start: 2023-09-18 | End: 2023-09-19 | Stop reason: HOSPADM

## 2023-09-18 RX ORDER — SODIUM CHLORIDE 0.9 % (FLUSH) 0.9 %
10 SYRINGE (ML) INJECTION EVERY 12 HOURS SCHEDULED
Status: DISCONTINUED | OUTPATIENT
Start: 2023-09-18 | End: 2023-09-19 | Stop reason: HOSPADM

## 2023-09-18 RX ORDER — LIDOCAINE HYDROCHLORIDE 10 MG/ML
5 INJECTION, SOLUTION INFILTRATION; PERINEURAL ONCE
Status: COMPLETED | OUTPATIENT
Start: 2023-09-18 | End: 2023-09-18

## 2023-09-18 RX ADMIN — LIDOCAINE HYDROCHLORIDE 5 ML: 10 INJECTION, SOLUTION INFILTRATION; PERINEURAL at 09:40

## 2023-09-18 NOTE — PRE-PROCEDURE NOTE
[facility]   Vascular Interventional Radiology  History & Physicial        Patient Name:Jordi Sam    : 1934    MRN: [id number]    Primary Care Physician: Jasper Avery MD    Referring Physician: MAKAYLA Stringer     Date of admission: 2023    Subjective     Reason for Consult: Para    History of Present Illness     Jordi Sam is a 89 y.o. male referred to IR as noted above.      Active Hospital Problems:  There are no active hospital problems to display for this patient.      Personal History     Past Medical History:   Diagnosis Date    A-fib     Acute inferior myocardial infarction     Acute inferior STEMI with RV infarct features, 2009.     CAD (coronary artery disease)     Cellulitis 2023    Facial. - [facility]. Treated with Bactrim  ointment and IV ABX    COVID-19 2022    Dyslipidemia     Goiter     History of “goiter.”    Gout     Hernia, umbilical     Hyperlipidemia     Hypertension     Nephrolithiasis     Type 2 diabetes mellitus     Umbilical hernia     Pt stated has recently returned. Seeing Dr Kiersten Nova and Dr Jasper Avery        Past Surgical History:   Procedure Laterality Date    BRONCHOSCOPY N/A 2019    Procedure: BRONCHOSCOPY WITH THORACENTESIS;  Surgeon: Marciano Higginbotham MD;  [facility];  Service: Pulmonary    CARDIAC CATHETERIZATION Bilateral 2019    Procedure: Right and Left Heart Cath;  Surgeon: Efrem Posadas MD;  [facility];  Service: Cardiology    CARDIAC CATHETERIZATION      CHOLECYSTECTOMY      CORONARY ANGIOPLASTY WITH STENT PLACEMENT  2009    Sirolimus eluting stents to the RCA, 2009.     HERNIA REPAIR      Hernia repair x 2.     PARACENTESIS  2019    multiple    UMBILICAL HERNIA REPAIR N/A 2020    Procedure: UMBILICAL HERNIA REPAIR;  Surgeon: Maribell Her MD;  [facility];  Service: General;   Laterality: N/A;       Family History: His family history includes Aneurysm in his father; Cancer in his sister; Dementia in his mother; Heart attack in his father; Heart disease in his brother and father; Hypertension in his daughter and son; No Known Problems in his brother and sister; Stroke in his mother.     Social History: He  reports that he quit smoking about 40 years ago. His smoking use included cigarettes. He has a 70.00 pack-year smoking history. He has never used smokeless tobacco. He reports that he does not drink alcohol and does not use drugs.    Home Medications:  HYDROcodone-acetaminophen, albuterol sulfate HFA, allopurinol, apixaban, cefdinir, folic acid, furosemide, levothyroxine, nitroglycerin, predniSONE, simvastatin, spironolactone, and terazosin    Current Medications:    sodium chloride    sodium chloride    sodium chloride     Allergies:  Allergies   Allergen Reactions    Bee Venom Swelling    Sulfamethoxazole-Trimethoprim Other (See Comments)     Pt unaware       Review of Systems    IR Procedure pertinent significant findings are mentioned in the PMH and PSH above.    Objective     Visit Vitals  /67   Pulse 83   Temp 97.6 °F (36.4 °C)   Resp 20   Wt 99.8 kg (220 lb)   SpO2 97%   BMI 30.68 kg/m²        Physical Exam    A&Ox3.   Able to communicate  No Apparent Distress  Average physique   CVS: VS as noted. Chart reviewed. Stable for the procedure.  Respiratory: Non labored breathing. No signs of respiratory compromise.    Result Review      I have personally reviewed the results from the time of this admission to 9/18/2023 09:22 EDT and agree with these findings.  [x]  Laboratory  []  Microbiology  [x]  Radiology  []  EKG/Telemetry   []  Cardiology/Vascular   []  Pathology  []  Old records  []  Other:    Most notable findings include: As noted:                      CrCl cannot be calculated (Patient's most recent lab result is older than the maximum 30 days allowed.). No results  found for: CREATININE    COVID19   Date Value Ref Range Status   11/04/2022 Not Detected  Final        No results found for: PREGTESTUR, PREGSERUM, HCG, HCGQUANT     ASA SCALE ASSESSMENT (applicable ONLY if sedation planned):        MALLAMPATI CLASSIFICATION (applicable ONLY if sedation planned):       Assessment / Plan     Jordi Sam is a 89 y.o. male referred to the IR service with above problem.    Plan:   As above.    Notice: The note was created before the performance of the procedure. It might have been left in the pending status and signed off after the procedure. The time stamp on the note may be misleading.    David Heck MD   Vascular Interventional Radiology  09/18/23   9:22 AM EDT

## 2023-09-19 ENCOUNTER — TELEPHONE (OUTPATIENT)
Dept: GASTROENTEROLOGY | Facility: CLINIC | Age: 88
End: 2023-09-19
Payer: MEDICARE

## 2023-09-20 ENCOUNTER — HOSPITAL ENCOUNTER (OUTPATIENT)
Dept: RADIATION ONCOLOGY | Facility: HOSPITAL | Age: 88
Setting detail: RADIATION/ONCOLOGY SERIES
Discharge: HOME | End: 2023-09-20
Payer: MEDICARE

## 2023-09-20 ENCOUNTER — OFFICE VISIT (OUTPATIENT)
Dept: RADIATION ONCOLOGY | Facility: HOSPITAL | Age: 88
End: 2023-09-20
Payer: MEDICARE

## 2023-09-20 VITALS
WEIGHT: 212.2 LBS | HEART RATE: 90 BPM | SYSTOLIC BLOOD PRESSURE: 126 MMHG | DIASTOLIC BLOOD PRESSURE: 59 MMHG | BODY MASS INDEX: 29.6 KG/M2 | TEMPERATURE: 98.1 F | OXYGEN SATURATION: 96 % | RESPIRATION RATE: 20 BRPM

## 2023-09-20 DIAGNOSIS — C44.311 BASAL CELL CARCINOMA OF RIGHT ALA NASI: Primary | ICD-10-CM

## 2023-09-20 PROBLEM — C44.90 SKIN CANCER: Status: ACTIVE | Noted: 2023-09-20

## 2023-09-20 PROCEDURE — G0463 HOSPITAL OUTPT CLINIC VISIT: HCPCS

## 2023-09-20 NOTE — PROGRESS NOTES
CONSULTATION NOTE      :                                                          1934  DATE OF CONSULTATION:                       2023   REQUESTING PHYSICIAN:                   Ezekiel Abarca MD  REASON FOR CONSULTATION:           Basal cell carcinoma of the right nasal ala       BRIEF HISTORY:  The patient is a very pleasant 89 y.o. male  with persistent/recurrent basal cell carcinoma on the right nasal ala.  He has lifelong history of sun exposure.  2022 he underwent excisional biopsies of 4 x 5 mm pearly papular lesion on the right nasal ala which was basal cell carcinoma with ulceration.    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  Those areas have not had recurrence.  He now has recurrent lesion on the right nasal ala.  This causes some distress and discomfort to the patient but has minimal bleeding or pain.  He was offered surgical excision with larger surgical defect repair versus radiotherapy.  At the patient's age and with other comorbidities he would like to avoid any larger surgical procedures or interventions.  Patient requested referral for radiotherapy.    Allergy:   Allergies   Allergen Reactions    Bee Venom Swelling    Sulfamethoxazole-Trimethoprim Other (See Comments)     Pt unaware       Social History:   Social History     Socioeconomic History    Marital status:      Spouse name: Melly    Number of children: 4   Tobacco Use    Smoking status: Former     Packs/day: 2.50     Years: 28.00     Pack years: 70.00     Types: Cigarettes     Quit date: 1983     Years since quittin.3    Smokeless tobacco: Never   Vaping Use    Vaping Use: Never used   Substance and Sexual Activity    Alcohol use: No    Drug use: No    Sexual activity: Not Currently       Past Medical History:   Past Medical History:   Diagnosis Date    A-fib     Acute inferior  myocardial infarction     Acute inferior STEMI with RV infarct features, January 2009.     CAD (coronary artery disease)     Cellulitis 02/26/2023    Facial. Feb 26-March 1st admission at [facility]. Treated with Bactrim  ointment and IV ABX    COVID-19 08/25/2022    Dyslipidemia     Goiter     History of “goiter.”    Gout     Hernia, umbilical     Hyperlipidemia     Hypertension     GARCIA (nonalcoholic steatohepatitis)     Nephrolithiasis     Skin cancer     Skin cancer 9/20/2023    Type 2 diabetes mellitus     Umbilical hernia     Pt stated has recently returned. Seeing Dr Kiersten Nova and Dr Jasper Avery 8-2021       Family History: family history includes Aneurysm in his father; Cancer in his sister; Dementia in his mother; Heart attack in his father; Heart disease in his brother and father; Hypertension in his daughter and son; No Known Problems in his brother and sister; Stroke in his mother.     Surgical History:   Past Surgical History:   Procedure Laterality Date    BRONCHOSCOPY N/A 4/12/2019    Procedure: BRONCHOSCOPY WITH THORACENTESIS;  Surgeon: Marciano Higginbotham MD;  Location:  [facility] ENDOSCOPY;  Service: Pulmonary    CARDIAC CATHETERIZATION Bilateral 1/30/2019    Procedure: Right and Left Heart Cath;  Surgeon: Efrem Posadas MD;  Location:  [facility] CATH INVASIVE LOCATION;  Service: Cardiology    CARDIAC CATHETERIZATION      CHOLECYSTECTOMY      CORONARY ANGIOPLASTY WITH STENT PLACEMENT  01/09/2009    Sirolimus eluting stents to the RCA, 01/09/2009.     HERNIA REPAIR      Hernia repair x 2.     PARACENTESIS  06/17/2019    multiple    UMBILICAL HERNIA REPAIR N/A 7/26/2020    Procedure: UMBILICAL HERNIA REPAIR;  Surgeon: Maribell Her MD;  Location: [facility] OR;  Service: General;  Laterality: N/A;        Review of Systems:   Review of Systems   Constitutional:  Positive for fatigue.   Cardiovascular:  Positive for leg swelling and palpitations.   Hematological:  Bruises/bleeds easily.          Objective   VITAL SIGNS:   Vitals:    09/20/23 1411   BP: 126/59   Pulse: 90   Resp: 20   Temp: 98.1 °F (36.7 °C)   TempSrc: Skin   SpO2: 96%   Weight: 96.3 kg (212 lb 3.2 oz)   PainSc: 0-No pain        Karnofsky score: 70       Physical Exam:   Physical Exam  Vitals and nursing note reviewed.   Constitutional:       Appearance: He is well-developed.   HENT:      Head: Normocephalic and atraumatic.   Cardiovascular:      Rate and Rhythm: Normal rate and regular rhythm.      Heart sounds: Normal heart sounds. No murmur heard.  Pulmonary:      Effort: Pulmonary effort is normal.      Breath sounds: Normal breath sounds. No wheezing or rales.   Abdominal:      General: Bowel sounds are normal. There is no distension.      Palpations: Abdomen is soft.      Tenderness: There is no abdominal tenderness.   Musculoskeletal:         General: No tenderness. Normal range of motion.      Cervical back: Normal range of motion and neck supple.   Lymphadenopathy:      Cervical: No cervical adenopathy.      Upper Body:      Right upper body: No supraclavicular adenopathy.      Left upper body: No supraclavicular adenopathy.   Skin:     General: Skin is warm and dry.      Findings: Lesion (1 x 1.3 cm centrally ulcerated lesion on the right nasal ala with pearly raised borders, approximately 3 mm thickness.  This does not extended full-thickness into to the nasal vestibule) present.   Neurological:      Mental Status: He is alert and oriented to person, place, and time.      Sensory: No sensory deficit.   Psychiatric:         Behavior: Behavior normal.         Thought Content: Thought content normal.         Judgment: Judgment normal.            The following portions of the patient's history were reviewed and updated as appropriate: allergies, current medications, past family history, past medical history, past social history, past surgical history, and problem list.    Assessment:   Assessment      Basal cell carcinoma of the  right nasal ala, status post biopsy 12/9/2022, with enlarging recurrent lesion, still clinical stage I (T1, N0, M0).  Patient is interested in nonsurgical treatment.  This to be amenable to radiotherapy which should offer high chance of tumor control with low risk of morbidity.  This would best accomplished with hypofractionated superficial brachytherapy.  We discussed the use electronic brachytherapy on the Xoft unit.  Informed consent was obtained today.    RECOMMENDATIONS: He will return next week for simulation.    I spent a total of 45 minutes on todays visit, with more than 30 minutes in direct face to face communication, and the remainder of the time spent in reviewing the relevant history, records, available imaging, and for documentation.    Follow Up:   Return in about 1 week (around 9/27/2023) for Simulation.  Diagnoses and all orders for this visit:    1. Basal cell carcinoma of right ala nasi (Primary)    Other orders  -     SCANNED PATHOLOGY         eRnny Faustin MD

## 2023-09-27 ENCOUNTER — HOSPITAL ENCOUNTER (OUTPATIENT)
Dept: RADIATION ONCOLOGY | Facility: HOSPITAL | Age: 88
Discharge: HOME | End: 2023-09-27
Payer: MEDICARE

## 2023-09-27 ENCOUNTER — OFFICE VISIT (OUTPATIENT)
Dept: FAMILY MEDICINE CLINIC | Facility: CLINIC | Age: 88
End: 2023-09-27
Payer: MEDICARE

## 2023-09-27 VITALS
DIASTOLIC BLOOD PRESSURE: 72 MMHG | WEIGHT: 212.2 LBS | SYSTOLIC BLOOD PRESSURE: 126 MMHG | BODY MASS INDEX: 29.71 KG/M2 | OXYGEN SATURATION: 98 % | HEIGHT: 71 IN | HEART RATE: 77 BPM

## 2023-09-27 DIAGNOSIS — C44.311 BASAL CELL CARCINOMA OF RIGHT ALA NASI: ICD-10-CM

## 2023-09-27 DIAGNOSIS — K74.60 CIRRHOSIS OF LIVER WITH ASCITES, UNSPECIFIED HEPATIC CIRRHOSIS TYPE: ICD-10-CM

## 2023-09-27 DIAGNOSIS — R73.03 PRE-DIABETES: ICD-10-CM

## 2023-09-27 DIAGNOSIS — N18.32 STAGE 3B CHRONIC KIDNEY DISEASE: Primary | ICD-10-CM

## 2023-09-27 DIAGNOSIS — I10 ESSENTIAL HYPERTENSION: ICD-10-CM

## 2023-09-27 DIAGNOSIS — R18.8 CIRRHOSIS OF LIVER WITH ASCITES, UNSPECIFIED HEPATIC CIRRHOSIS TYPE: ICD-10-CM

## 2023-09-27 DIAGNOSIS — S60.552A FOREIGN BODY, HAND, SUPERFICIAL, LEFT, INITIAL ENCOUNTER: ICD-10-CM

## 2023-09-27 LAB
EXPIRATION DATE: ABNORMAL
HBA1C MFR BLD: 6.7 %
Lab: ABNORMAL

## 2023-09-27 PROCEDURE — 77334 RADIATION TREATMENT AID(S): CPT | Performed by: RADIOLOGY

## 2023-09-27 PROCEDURE — 77290 THER RAD SIMULAJ FIELD CPLX: CPT | Performed by: RADIOLOGY

## 2023-09-27 NOTE — PROGRESS NOTES
"Jordi Sam  1934  [id number]  Patient Care Team:  Jasper Avery MD as PCP - General (Internal Medicine)  Jordi Law MD as Consulting Physician (Urology)  Chapin Villagran MD as Consulting Physician (Otolaryngology)  Gen Laurent MD as Consulting Physician (Gastroenterology)  Ezekiel Abarca MD as Referring Physician (Dermatology)  Renny Faustin MD as Consulting Physician (Radiation Oncology)  Larry Sin MD as Consulting Physician (Cardiology)    Jordi Sam is a 89 y.o. male here today for follow up.     This patient is accompanied by their self who contributes to the history of their care.    Chief Complaint:    Chief Complaint   Patient presents with    Hypertension    Diabetes    Edema        History of Present Illness:  I have reviewed and/or updated the patient's past medical, past surgical, family, social history, problem list and allergies as appropriate.     He injured himself with a tree limb, was seen in [facility], placed on cefdinir. Infection cellared, but swelling persists. No drainage. It is quite tender to touch. Left webspace dig 1-2. Weight has been stable since paracentesis on September 19.  Edema persist.  Continues on Lasix 40 mg as well as Aldactone 50 mg.  No orthopnea or PND.  He is prediabetic.  Denies any polyuria or polydipsia.  His A1c today is 6.7 down from 6.8.  No recent gout flares.    Review of Systems   Constitutional:  Positive for fatigue.   Cardiovascular:  Positive for leg swelling.   Gastrointestinal:  Positive for abdominal distention.   Endocrine: Negative.    Genitourinary: Negative.    Skin:  Positive for skin lesions.   Hematological:  Bruises/bleeds easily.   Psychiatric/Behavioral: Negative.       Vitals:    09/27/23 1115   BP: 126/72   Pulse: 77   SpO2: 98%   Weight: 96.3 kg (212 lb 3.2 oz)   Height: 180.3 cm (70.98\")     Body mass index is 29.61 kg/m².    Physical Exam  Vitals and nursing note reviewed.   Constitutional:  "      General: He is not in acute distress.     Appearance: He is well-developed. He is not diaphoretic.   HENT:      Head: Normocephalic and atraumatic.      Right Ear: External ear normal.      Left Ear: External ear normal.      Mouth/Throat:      Pharynx: No oropharyngeal exudate.   Eyes:      General: No scleral icterus.        Right eye: No discharge.      Conjunctiva/sclera: Conjunctivae normal.   Neck:      Thyroid: No thyromegaly.      Vascular: No JVD.      Trachea: No tracheal deviation.   Cardiovascular:      Rate and Rhythm: Normal rate and regular rhythm.      Heart sounds: Normal heart sounds.      Comments: PMI nondisplaced  Pulmonary:      Effort: Pulmonary effort is normal.      Breath sounds: Normal breath sounds. No wheezing or rales.   Abdominal:      General: Bowel sounds are normal.      Palpations: Abdomen is soft.      Tenderness: There is no abdominal tenderness. There is no guarding or rebound.   Musculoskeletal:      Cervical back: Normal range of motion and neck supple.   Lymphadenopathy:      Cervical: No cervical adenopathy.   Skin:     General: Skin is warm and dry.      Capillary Refill: Capillary refill takes less than 2 seconds.      Coloration: Skin is not pale.      Findings: No rash.      Comments: Webspaces 1 to the left hand reveals a palpable nodular lesion, no erythema slightly pink.  No drainage.  It is tender to palpate.   Neurological:      Mental Status: He is alert and oriented to person, place, and time.      Motor: No abnormal muscle tone.      Coordination: Coordination normal.   Psychiatric:         Mood and Affect: Mood normal.         Behavior: Behavior normal.         Judgment: Judgment normal.       Procedures    Results Review:     I reviewed the patient's new clinical results.Hba1c 6.7    Assessment/Plan:    Problem List Items Addressed This Visit          Cardiac and Vasculature    Essential hypertension    Relevant Medications    furosemide (LASIX) 40 MG  tablet    spironolactone (ALDACTONE) 50 MG tablet    terazosin (HYTRIN) 2 MG capsule       Endocrine and Metabolic    Pre-diabetes    Current Assessment & Plan     A1c is maintaining stability.  Would repeat this in 6 months.  Avoid concentrated sweets and carbohydrates.  Exercise as tolerated.         Relevant Orders    POC Glycosylated Hemoglobin (Hb A1C) (Completed)       Gastrointestinal Abdominal     Cirrhosis of liver with ascites    Current Assessment & Plan     Weight stable, asymptomatic at present.  We will continue as needed paracentesis.  Continue Aldactone, continue Lasix.         Relevant Medications    furosemide (LASIX) 40 MG tablet       Genitourinary and Reproductive     Stage 3 chronic kidney disease - Primary    Relevant Medications    furosemide (LASIX) 40 MG tablet    spironolactone (ALDACTONE) 50 MG tablet       Hematology and Neoplasia    Basal cell carcinoma of right ala nasi    Current Assessment & Plan     He is initiating local radiation therapy soon.  Has outpatient appointment today.          Other Visit Diagnoses       Foreign body, hand, superficial, left, initial encounter        Relevant Orders    Ambulatory Referral to Hand Surgery (Completed)        I suspect he has a foreign body granulomatous reaction and webspace 1 and 2.  Is quite painful have referred him to hand surgery.    Plan of care reviewed with patient at the conclusion of today's visit. Education was provided regarding diagnosis and management.  Patient verbalizes understanding of and agreement with management plan.    Return in about 3 months (around 12/27/2023) for htn/ckd .    Jasper Avery MD      Please note than portions of this note were completed [facility] a Voice Recognition Program

## 2023-09-27 NOTE — ASSESSMENT & PLAN NOTE
Weight stable, asymptomatic at present.  We will continue as needed paracentesis.  Continue Aldactone, continue Lasix.

## 2023-09-27 NOTE — ASSESSMENT & PLAN NOTE
A1c is maintaining stability.  Would repeat this in 6 months.  Avoid concentrated sweets and carbohydrates.  Exercise as tolerated.

## 2023-10-10 ENCOUNTER — HOSPITAL ENCOUNTER (OUTPATIENT)
Dept: RADIATION ONCOLOGY | Facility: HOSPITAL | Age: 88
Setting detail: RADIATION/ONCOLOGY SERIES
Discharge: HOME | End: 2023-10-10
Payer: MEDICARE

## 2023-10-10 ENCOUNTER — PROCEDURE VISIT (OUTPATIENT)
Dept: RADIATION ONCOLOGY | Facility: HOSPITAL | Age: 88
End: 2023-10-10
Payer: MEDICARE

## 2023-10-10 DIAGNOSIS — C44.311 BASAL CELL CARCINOMA OF RIGHT ALA NASI: Primary | ICD-10-CM

## 2023-10-10 PROCEDURE — 0394T: CPT | Performed by: RADIOLOGY

## 2023-10-10 NOTE — PROGRESS NOTES
10/10/2023  Jordi PIRES Flaco    Procedure: Skin Brachytherapy  Diagnosis: Non-melanoma skin cancer    HDR#   1 out of a planned total of 7 treatments.    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  He has no complaints related to this lesion.  He does have new unrelated problem of right knee pain after traumatic fall out of bed.    The patient was positioned in the supine position on the treatment table.  A simple simulation was performed today confirming the correct size, position, and placement of accessory devices to hold the skin applicator.  Wax bolus was inserted into the right nare and around the surrounding tissues to reduce still on the adjacent normal structures and patient was placed in the immobilization mask.  The 3.5 cm Xoft applicator was placed using the treatment cap and centered on the right nasal ala tumor.  Our Medical Physicist performed pretreatment surveys, calibrated the treatment catheter, and confirmed correct placement and connection of the applicator to the HDR afterloader.  A dose of 6 Gy was prescribed to a depth of 3mm.  The Medical Physicist and the Radiation Oncologist remained present at the console for the entire duration of brachytherapy administration.  There were no immediate events, and the patient tolerated the procedure well with no complications.  Physics survey was negative with no residual radioactivity.  The patient was then discharged to home in good condition.  Patient will return for their next treatment on a twice weekly treatment schedule.

## 2023-10-11 ENCOUNTER — TELEPHONE (OUTPATIENT)
Dept: FAMILY MEDICINE CLINIC | Facility: CLINIC | Age: 88
End: 2023-10-11
Payer: MEDICARE

## 2023-10-11 NOTE — TELEPHONE ENCOUNTER
PATIENT FELL AND HURT HIS RIGHT KNEE AND LEFT ANKLE AND IS HAVING SOME MOBILITY ISSUES, WANTED TO COME IN TODAY AND BE SEEN BUT PCP  DIDN'T ANY AVAILABLE AND THEY WOULDN'T BE SEEN BY ANYONE ELSE. PATIENT IS ASKING THAT SINCE PROVIDER DOESN'T HAVE ANY APPOINTMENTS SO HE CAN BE SEEN IF THE PCP WILL PUT IN AN ORDER FOR AN XRAY.      PLEASE ADVISE

## 2023-10-11 NOTE — TELEPHONE ENCOUNTER
Informed patients wife MellyMITCH reviewed. She stated that she likes the [facility] location and will take the patient there to examine and receive imaging if needed.

## 2023-10-13 ENCOUNTER — PROCEDURE VISIT (OUTPATIENT)
Dept: RADIATION ONCOLOGY | Facility: HOSPITAL | Age: 88
End: 2023-10-13
Payer: MEDICARE

## 2023-10-13 ENCOUNTER — HOSPITAL ENCOUNTER (OUTPATIENT)
Dept: RADIATION ONCOLOGY | Facility: HOSPITAL | Age: 88
Discharge: HOME | End: 2023-10-13
Payer: MEDICARE

## 2023-10-13 DIAGNOSIS — C44.311 BASAL CELL CARCINOMA OF RIGHT ALA NASI: Primary | ICD-10-CM

## 2023-10-13 PROCEDURE — 0394T: CPT | Performed by: RADIOLOGY

## 2023-10-13 NOTE — PROGRESS NOTES
10/13/2023  Jordi Sam     Procedure: Skin Brachytherapy  Diagnosis: Non-melanoma skin cancer     HDR#   2 out of a planned total of 7 treatments.     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  He has no complaints related to this lesion.  He does have new unrelated problem of right knee pain after traumatic fall out of bed.    The patient was positioned in the supine position on the treatment table.  A simple simulation was performed today confirming the correct size, position, and placement of accessory devices to hold the skin applicator.  Wax bolus was inserted into the right nare and around the surrounding tissues to reduce still on the adjacent normal structures and patient was placed in the immobilization mask.  The 3.5 cm Xoft applicator was placed using the treatment cap and centered on the right nasal ala tumor.  Our Medical Physicist performed pretreatment surveys, calibrated the treatment catheter, and confirmed correct placement and connection of the applicator to the HDR afterloader.  A dose of 6 Gy was prescribed to a depth of 3mm.  The Medical Physicist and the Radiation Oncologist remained present at the console for the entire duration of brachytherapy administration.  There were no immediate events, and the patient tolerated the procedure well with no complications.  Physics survey was negative with no residual radioactivity.  The patient was then discharged to home in good condition.  Patient will return for their next treatment on a twice weekly treatment schedule.    Thank you for allowing me to be involved in the care of the patient. If you have any questions or concerns, please feel free to call or contact me at your earliest convenience.     Ryan Arrieta  Radiation Oncology

## 2023-10-17 ENCOUNTER — PROCEDURE VISIT (OUTPATIENT)
Dept: RADIATION ONCOLOGY | Facility: HOSPITAL | Age: 88
End: 2023-10-17
Payer: MEDICARE

## 2023-10-17 ENCOUNTER — HOSPITAL ENCOUNTER (OUTPATIENT)
Dept: RADIATION ONCOLOGY | Facility: HOSPITAL | Age: 88
Discharge: HOME | End: 2023-10-17
Payer: MEDICARE

## 2023-10-17 DIAGNOSIS — C44.311 BASAL CELL CARCINOMA OF RIGHT ALA NASI: Primary | ICD-10-CM

## 2023-10-17 PROCEDURE — 0394T: CPT | Performed by: RADIOLOGY

## 2023-10-17 NOTE — PROGRESS NOTES
Procedure: Skin Brachytherapy  Diagnosis: Non-melanoma skin cancer     HDR#   3 out of a planned total of 7 treatments.     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  He has no complaints related to this lesion.  He does have new unrelated problem of right knee pain after traumatic fall out of bed.    The patient was positioned in the supine position on the treatment table.  A simple simulation was performed today confirming the correct size, position, and placement of accessory devices to hold the skin applicator.  Wax bolus was inserted into the right nare and around the surrounding tissues to reduce still on the adjacent normal structures and patient was placed in the immobilization mask.  The 3.5 cm Xoft applicator was placed using the treatment cap and centered on the right nasal ala tumor.  Our Medical Physicist performed pretreatment surveys, calibrated the treatment catheter, and confirmed correct placement and connection of the applicator to the HDR afterloader.  A dose of 6 Gy was prescribed to a depth of 3mm.  The Medical Physicist and the Radiation Oncologist remained present at the console for the entire duration of brachytherapy administration.  There were no immediate events, and the patient tolerated the procedure well with no complications.  Physics survey was negative with no residual radioactivity.  The patient was then discharged to home in good condition.  Patient will return for their next treatment on a twice weekly treatment schedule.

## 2023-10-19 NOTE — PROGRESS NOTES
[facility] Cardiology  Office Progress Note  Jordi Sam  1934  [address] [facility] [address]       Visit Date: 10/20/23    PCP: Jasper Avery MD  [address]  [facility] [id number]    IDENTIFICATION: A 89 y.o. male  Retired  for [employer] Previous [employer] Serv[employer] stationed in [place]/traveled to many different countries.  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   Normal LV function, single vessel  RCA disease.     Sirolimus eluting stents to the RCA, 01/09/2009.   Pericarditis, resolved.   LHC, 01/13/2012 (for recurrent angina).    Normal LV function.   of RCA with failed intervention.  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   Dyslipidemia.  5/23 98/60/43/41  Diabetes mellitus, type 2.    8/16 A1C 6.1  9/23 A1c 6.7  CKD stage III  Nephrolithiasis.   History of “goiter.”  Thrombocytopenia  Incarcerated umbilical hernia with repair, 7/2020 (Dr. ZURITA)  Recurrence, Summer 2021  Cirrhosis, recent evaluation by [facility]  Paracentesis with findings consistent with cardiac ascites, Spring 2019  8/21 and 9/21 paracentesis per   Surgical history:  Cholecystectomy.  Hernia repair x 2.            CC:   Chief Complaint   Patient presents with    Chronic atrial fibrillation     3-Mo FU       Allergies  Allergies   Allergen Reactions    Bee Venom Swelling    Sulfamethoxazole-Trimethoprim Other (See Comments)     Pt unaware       Current Medications    Current Outpatient Medications:     allopurinol (Zyloprim) 100 MG tablet, Take 1 tablet by mouth Daily., Disp: 90 tablet, Rfl: 2    apixaban (ELIQUIS) 2.5 MG tablet tablet, Take 1 tablet by mouth 2 (Two) Times a Day. Last dose 7-6-2023, Disp: , Rfl:     folic acid (FOLVITE) 400 MCG tablet, Take 1 tablet by mouth Daily., Disp: , Rfl:     furosemide (LASIX) 40 MG tablet, TAKE 1 TABLET BY MOUTH ONCE DAILY IN THE MORNING, 1/2 TABLET IN THE AFTERNOON AND AS NEEDED FOR WEIGHT GAIN, EDEMA AND DYPNEA, Disp: 180 tablet, Rfl: 3    levothyroxine (Synthroid) 25 MCG tablet, Take 1 tablet by mouth Every Morning., Disp: 90 tablet, Rfl: 2    nitroglycerin (NITROSTAT) 0.4 MG SL tablet, DISSOLVE ONE TABLET UNDER THE TONGUE EVERY 5 MINUTES AS NEEDED FOR CHEST PAIN.  DO NOT EXCEED A TOTAL OF 3 DOSES IN 15 MINUTES, Disp: 25 tablet, Rfl: 3    simvastatin (ZOCOR) 40 MG tablet, Take 1 tablet by mouth once daily, Disp: 90 tablet, Rfl: 0    spironolactone (ALDACTONE) 50 MG tablet, Take 1 tablet by mouth once daily, Disp: 90 tablet, Rfl: 2    terazosin (HYTRIN) 2 MG capsule, TAKE 1 CAPSULE BY MOUTH ONCE DAILY AT NIGHT, Disp: 90 capsule, Rfl: 0    albuterol sulfate  (90 Base) MCG/ACT inhaler, Inhale 2 puffs Every 4 (Four) Hours As Needed for Wheezing. (Patient not taking: Reported on 10/20/2023), Disp: 18 g, Rfl: 0    HYDROcodone-acetaminophen (NORCO) 5-325 MG per tablet, Take 1 tablet by mouth Every 6 (Six) Hours As Needed for Moderate Pain. (Patient not taking: Reported on 10/20/2023), Disp: 60 tablet, Rfl: 0    predniSONE (DELTASONE) 10 MG tablet, Take 1 tablet by mouth Daily. (Patient not taking: Reported on 10/20/2023), Disp: 21 tablet, Rfl: 0      History of Present  "Illness   Jordi Sam is a 89 y.o. year old male here for follow up on right sided heart failure, CAD, chronic afib and cardiac risk factor management. The patient also has cirrhosis followed by [facility]. There was some  NSVT on Holter monitor from July ordered by the [facility].  Patient does not notice any presyncope.  He states he is just feeling old        OBJECTIVE:  Vitals:    10/20/23 1001   BP: 114/50   BP Location: Left arm   Patient Position: Sitting   Cuff Size: Adult   Pulse: 79   SpO2: 96%   Weight: 94.3 kg (207 lb 12.8 oz)   Height: 180.3 cm (71\")     Body mass index is 28.98 kg/m².    Constitutional:       Appearance: Healthy appearance. Not in distress.   Neck:      Vascular: No JVR. JVD normal.   Pulmonary:      Effort: Pulmonary effort is normal.      Breath sounds: Normal breath sounds. No wheezing. No rhonchi. No rales.   Chest:      Chest wall: Not tender to palpatation.   Cardiovascular:      PMI at left midclavicular line. Normal rate. Irregularly irregular rhythm. Normal S1. Normal S2.       Murmurs: There is no murmur.      No gallop.  No click. No rub.   Pulses:     Intact distal pulses.   Edema:     Peripheral edema absent.   Abdominal:      General: Bowel sounds are normal.      Palpations: Abdomen is soft.      Tenderness: There is no abdominal tenderness.   Musculoskeletal: Normal range of motion.         General: No tenderness. Skin:     General: Skin is warm and dry.   Neurological:      General: No focal deficit present.      Mental Status: Alert and oriented to person, place and time.         Diagnostic Data:  Procedures        ASSESSMENT:   Diagnosis Plan   1. Chronic atrial fibrillation        2. Coronary artery disease involving native coronary artery of native heart without angina pectoris        3. Primary hypertension        4. Mixed hyperlipidemia            PLAN:  NSVT in combination of chronic liver failure will initiate nonselective beta-blockade and patient will " follow his pulse rate at home via a pulse oximeter  2.  CAD -patient today reporting very atypical anginal chest pain.  Patient denies any recurrence of his previous angina associated with his STEMI.  Will defer ischemic evaluation at this time.  3.  Hyperlipidemia -continue statin therapy.  4.  Hypertension-patient's in clinic blood pressure appreciated 100/62.  Patient shows provider recent blood pressure logs and all acceptable.  Goal <130/80.  5.  Chronic atrial fibrillation - patient denies any recent episodes of tachypalpitations, tachyarrhythmia, and tachycardia.  Patient currently anticoagulated on Eliquis 2.5.  Patient was counseled on risks of esophageal varices and bleeding.  Patient was instructed to present to [facility]/hospital if he were to notice black tarry stools/signs or symptoms of GI bleeding.   6.  Chronic liver failure -currently being followed by [facility].              Larry Sin MD, [facility]

## 2023-10-20 ENCOUNTER — PROCEDURE VISIT (OUTPATIENT)
Dept: RADIATION ONCOLOGY | Facility: HOSPITAL | Age: 88
End: 2023-10-20
Payer: MEDICARE

## 2023-10-20 ENCOUNTER — OFFICE VISIT (OUTPATIENT)
Dept: CARDIOLOGY | Facility: CLINIC | Age: 88
End: 2023-10-20
Payer: MEDICARE

## 2023-10-20 ENCOUNTER — HOSPITAL ENCOUNTER (OUTPATIENT)
Dept: RADIATION ONCOLOGY | Facility: HOSPITAL | Age: 88
Discharge: HOME | End: 2023-10-20

## 2023-10-20 VITALS
BODY MASS INDEX: 29.09 KG/M2 | SYSTOLIC BLOOD PRESSURE: 114 MMHG | HEART RATE: 79 BPM | HEIGHT: 71 IN | OXYGEN SATURATION: 96 % | DIASTOLIC BLOOD PRESSURE: 50 MMHG | WEIGHT: 207.8 LBS

## 2023-10-20 DIAGNOSIS — E78.2 MIXED HYPERLIPIDEMIA: ICD-10-CM

## 2023-10-20 DIAGNOSIS — I10 PRIMARY HYPERTENSION: ICD-10-CM

## 2023-10-20 DIAGNOSIS — I25.10 CORONARY ARTERY DISEASE INVOLVING NATIVE CORONARY ARTERY OF NATIVE HEART WITHOUT ANGINA PECTORIS: ICD-10-CM

## 2023-10-20 DIAGNOSIS — I48.20 CHRONIC ATRIAL FIBRILLATION: Primary | ICD-10-CM

## 2023-10-20 DIAGNOSIS — C44.311 BASAL CELL CARCINOMA OF RIGHT ALA NASI: Primary | ICD-10-CM

## 2023-10-20 PROCEDURE — 99214 OFFICE O/P EST MOD 30 MIN: CPT | Performed by: INTERNAL MEDICINE

## 2023-10-20 PROCEDURE — 0394T: CPT | Performed by: RADIOLOGY

## 2023-10-20 RX ORDER — PROPRANOLOL HYDROCHLORIDE 20 MG/1
20 TABLET ORAL 2 TIMES DAILY
Qty: 180 TABLET | Refills: 3 | Status: SHIPPED | OUTPATIENT
Start: 2023-10-20

## 2023-10-20 NOTE — PROGRESS NOTES
Procedure: Skin Brachytherapy  Diagnosis: Non-melanoma skin cancer     HDR#   4 out of a planned total of 7 treatments.     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  He has infield skin erythema and intermittent bleeding of the treated.  His right knee pain after traumatic fall out of bed last week use slowly improving.    The patient was positioned in the supine position on the treatment table.  A simple simulation was performed today confirming the correct size, position, and placement of accessory devices to hold the skin applicator.  Wax bolus was inserted into the right nare and around the surrounding tissues to reduce still on the adjacent normal structures and patient was placed in the immobilization mask.  The 3.5 cm Xoft applicator was placed using the treatment cap and centered on the right nasal ala tumor.  Our Medical Physicist performed pretreatment surveys, calibrated the treatment catheter, and confirmed correct placement and connection of the applicator to the HDR afterloader.  A dose of 6 Gy was prescribed to a depth of 3mm.  The Medical Physicist and the Radiation Oncologist remained present at the console for the entire duration of brachytherapy administration.    There were no immediate events, and the patient tolerated the procedure well with no complications.    The patient was then discharged to home in good condition.  Patient will return for the next treatment on a twice weekly treatment schedule.  He was advised to use topical Aquaphor healing ointment over-the-counter to keep the lesion covered and to hopefully reduce irritation and bleeding.    Patient is also concerned about a painful scaly lesion on the left dorsum of the hand around the thenar eminence.  We advised him to see his dermatologist about this and if this is a similar nonmelanoma skin cancer we can subsequently address treating it with radiotherapy if it is not resected.

## 2023-10-23 ENCOUNTER — HOSPITAL ENCOUNTER (OUTPATIENT)
Dept: ULTRASOUND IMAGING | Facility: HOSPITAL | Age: 88
Discharge: HOME | End: 2023-10-23
Admitting: NURSE PRACTITIONER
Payer: MEDICARE

## 2023-10-23 VITALS
HEART RATE: 51 BPM | HEIGHT: 71 IN | WEIGHT: 245 LBS | BODY MASS INDEX: 34.3 KG/M2 | TEMPERATURE: 97.2 F | DIASTOLIC BLOOD PRESSURE: 46 MMHG | OXYGEN SATURATION: 100 % | RESPIRATION RATE: 16 BRPM | SYSTOLIC BLOOD PRESSURE: 104 MMHG

## 2023-10-23 DIAGNOSIS — R18.8 OTHER ASCITES: ICD-10-CM

## 2023-10-23 LAB
BASOPHILS # BLD AUTO: 0.04 10*3/MM3 (ref 0–0.2)
BASOPHILS NFR BLD AUTO: 0.7 % (ref 0–1.5)
DEPRECATED RDW RBC AUTO: 52.6 FL (ref 37–54)
EOSINOPHIL # BLD AUTO: 0.08 10*3/MM3 (ref 0–0.4)
EOSINOPHIL NFR BLD AUTO: 1.3 % (ref 0.3–6.2)
ERYTHROCYTE [DISTWIDTH] IN BLOOD BY AUTOMATED COUNT: 14.5 % (ref 12.3–15.4)
HCT VFR BLD AUTO: 35.8 % (ref 37.5–51)
HGB BLD-MCNC: 11.3 G/DL (ref 13–17.7)
IMM GRANULOCYTES # BLD AUTO: 0.02 10*3/MM3 (ref 0–0.05)
IMM GRANULOCYTES NFR BLD AUTO: 0.3 % (ref 0–0.5)
INR PPP: 1.15 (ref 0.89–1.12)
LYMPHOCYTES # BLD AUTO: 0.77 10*3/MM3 (ref 0.7–3.1)
LYMPHOCYTES NFR BLD AUTO: 12.8 % (ref 19.6–45.3)
MCH RBC QN AUTO: 31 PG (ref 26.6–33)
MCHC RBC AUTO-ENTMCNC: 31.6 G/DL (ref 31.5–35.7)
MCV RBC AUTO: 98.1 FL (ref 79–97)
MONOCYTES # BLD AUTO: 0.56 10*3/MM3 (ref 0.1–0.9)
MONOCYTES NFR BLD AUTO: 9.3 % (ref 5–12)
NEUTROPHILS NFR BLD AUTO: 4.54 10*3/MM3 (ref 1.7–7)
NEUTROPHILS NFR BLD AUTO: 75.6 % (ref 42.7–76)
NRBC BLD AUTO-RTO: 0 /100 WBC (ref 0–0.2)
PLATELET # BLD AUTO: 188 10*3/MM3 (ref 140–450)
PMV BLD AUTO: 9.7 FL (ref 6–12)
PROTHROMBIN TIME: 14.8 SECONDS (ref 12.2–14.5)
RBC # BLD AUTO: 3.65 10*6/MM3 (ref 4.14–5.8)
WBC NRBC COR # BLD: 6.01 10*3/MM3 (ref 3.4–10.8)

## 2023-10-23 PROCEDURE — 76942 ECHO GUIDE FOR BIOPSY: CPT

## 2023-10-23 PROCEDURE — 85025 COMPLETE CBC W/AUTO DIFF WBC: CPT | Performed by: RADIOLOGY

## 2023-10-23 PROCEDURE — 85610 PROTHROMBIN TIME: CPT | Performed by: RADIOLOGY

## 2023-10-23 RX ORDER — SODIUM CHLORIDE 9 MG/ML
40 INJECTION, SOLUTION INTRAVENOUS AS NEEDED
Status: DISCONTINUED | OUTPATIENT
Start: 2023-10-23 | End: 2023-10-24 | Stop reason: HOSPADM

## 2023-10-23 RX ORDER — SODIUM CHLORIDE 0.9 % (FLUSH) 0.9 %
10 SYRINGE (ML) INJECTION AS NEEDED
Status: DISCONTINUED | OUTPATIENT
Start: 2023-10-23 | End: 2023-10-24 | Stop reason: HOSPADM

## 2023-10-23 RX ORDER — SODIUM CHLORIDE 0.9 % (FLUSH) 0.9 %
10 SYRINGE (ML) INJECTION EVERY 12 HOURS SCHEDULED
Status: DISCONTINUED | OUTPATIENT
Start: 2023-10-23 | End: 2023-10-24 | Stop reason: HOSPADM

## 2023-10-23 RX ORDER — LIDOCAINE HYDROCHLORIDE 10 MG/ML
5 INJECTION, SOLUTION EPIDURAL; INFILTRATION; INTRACAUDAL; PERINEURAL ONCE
Status: COMPLETED | OUTPATIENT
Start: 2023-10-23 | End: 2023-10-23

## 2023-10-23 RX ADMIN — LIDOCAINE HYDROCHLORIDE 5 ML: 10 INJECTION, SOLUTION EPIDURAL; INFILTRATION; INTRACAUDAL; PERINEURAL at 12:00

## 2023-10-23 NOTE — NURSING NOTE
US guided paracentesis performed by Dr Villegas. 2.2 liters of clear, yellow fluid removed from peritoneum. Dsg to site per protocol. Patient tolerated well. Report called to RAVINDRA.

## 2023-10-24 ENCOUNTER — PROCEDURE VISIT (OUTPATIENT)
Dept: RADIATION ONCOLOGY | Facility: HOSPITAL | Age: 88
End: 2023-10-24
Payer: MEDICARE

## 2023-10-24 ENCOUNTER — HOSPITAL ENCOUNTER (OUTPATIENT)
Dept: RADIATION ONCOLOGY | Facility: HOSPITAL | Age: 88
Discharge: HOME | End: 2023-10-24

## 2023-10-24 ENCOUNTER — TELEPHONE (OUTPATIENT)
Dept: INFUSION THERAPY | Facility: HOSPITAL | Age: 88
End: 2023-10-24
Payer: MEDICARE

## 2023-10-24 DIAGNOSIS — C44.311 BASAL CELL CARCINOMA OF RIGHT ALA NASI: Primary | ICD-10-CM

## 2023-10-24 PROCEDURE — G0463 HOSPITAL OUTPT CLINIC VISIT: HCPCS

## 2023-10-24 PROCEDURE — 0394T: CPT | Performed by: RADIOLOGY

## 2023-10-24 NOTE — PROGRESS NOTES
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  He has infield skin erythema and intermittent bleeding of the treated.      The patient was positioned supine with the thermoplastic mask and wax positioning device in the right nare.  A simple simulation was performed, confirming the correct size, position, and placement of accessory devices to hold the skin applicator.      Treatment was delivered to the right nasal ala. He received 600 cGy prescribed to a depth of 3mm using a 35 mm applicator. Today was fraction 5 of 7.    Our Medical Physicist performed pretreatment surveys, calibrated the treatment catheter, and confirmed correct placement and connection of the applicator to the HDR afterloader.The Medical Physicist and the Radiation Oncologist remained present at the console for the entire duration of brachytherapy administration.  There were no immediate events, and the patient tolerated the procedure well with no complications.  Physics survey was negative with no residual radioactivity.  The patient was then discharged to home in good condition.  Patient will return for their next treatment on a twice weekly treatment schedule.

## 2023-10-26 ENCOUNTER — TELEPHONE (OUTPATIENT)
Dept: RADIATION ONCOLOGY | Facility: HOSPITAL | Age: 88
End: 2023-10-26
Payer: MEDICARE

## 2023-10-26 NOTE — TELEPHONE ENCOUNTER
Called and explained to pt we need to move his xoft treatment to 9:30 am tomorrow 10/27/2023.  Pt verbalized understanding and will be here at 9:30 am.

## 2023-10-27 ENCOUNTER — PROCEDURE VISIT (OUTPATIENT)
Dept: RADIATION ONCOLOGY | Facility: HOSPITAL | Age: 88
End: 2023-10-27
Payer: MEDICARE

## 2023-10-27 ENCOUNTER — HOSPITAL ENCOUNTER (OUTPATIENT)
Dept: RADIATION ONCOLOGY | Facility: HOSPITAL | Age: 88
Discharge: HOME | End: 2023-10-27

## 2023-10-27 DIAGNOSIS — C44.311 BASAL CELL CARCINOMA OF RIGHT ALA NASI: Primary | ICD-10-CM

## 2023-10-27 PROCEDURE — G0463 HOSPITAL OUTPT CLINIC VISIT: HCPCS

## 2023-10-27 PROCEDURE — 0394T: CPT | Performed by: RADIOLOGY

## 2023-10-27 NOTE — PROGRESS NOTES
Procedure: Skin Brachytherapy  Diagnosis: Non-melanoma skin cancer     HDR#   6 out of a planned total of 7 treatments.     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  He has infield skin erythema and intermittent bleeding of the treated.  His right knee pain after traumatic fall out of bed last week continues to improve.    The patient was positioned in the supine position on the treatment table.  A simple simulation was performed today confirming the correct size, position, and placement of accessory devices to hold the skin applicator.  Wax bolus was inserted into the right nare and around the surrounding tissues to reduce still on the adjacent normal structures and patient was placed in the immobilization mask.  The 3.5 cm Xoft applicator was placed using the treatment cap and centered on the right nasal ala tumor.  Our Medical Physicist performed pretreatment surveys, calibrated the treatment catheter, and confirmed correct placement and connection of the applicator to the HDR afterloader.  A dose of 6 Gy was prescribed to a depth of 3mm.  The Medical Physicist, nurse and the Radiation Oncologist remained present for the procedure.  There were no immediate events, and the patient tolerated the procedure well with no complications.    The patient was then discharged to home in good condition.  Patient will return for the next treatment on a twice weekly treatment schedule.  He was advised to use topical Aquaphor healing ointment over-the-counter to keep the lesion covered and to hopefully reduce irritation and bleeding.     Patient is also concerned about a painful scaly lesion on the left dorsum of the hand around the thenar eminence.  We advised him to see his dermatologist about this and if this is a similar nonmelanoma skin cancer we can subsequently address treating it with radiotherapy if it is not resected.

## 2023-10-31 ENCOUNTER — PROCEDURE VISIT (OUTPATIENT)
Dept: RADIATION ONCOLOGY | Facility: HOSPITAL | Age: 88
End: 2023-10-31
Payer: MEDICARE

## 2023-10-31 ENCOUNTER — HOSPITAL ENCOUNTER (OUTPATIENT)
Dept: RADIATION ONCOLOGY | Facility: HOSPITAL | Age: 88
Discharge: HOME | End: 2023-10-31

## 2023-10-31 DIAGNOSIS — C44.311 BASAL CELL CARCINOMA OF RIGHT ALA NASI: Primary | ICD-10-CM

## 2023-10-31 PROCEDURE — 0394T: CPT | Performed by: RADIOLOGY

## 2023-10-31 NOTE — PROGRESS NOTES
Procedure: Skin Brachytherapy  Diagnosis: Non-melanoma skin cancer     HDR#   7 out of a planned total of 7 treatments.     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  He has infield skin erythema and intermittent bleeding of the treated.    The patient was positioned in the supine position on the treatment table.  A simple simulation was performed today confirming the correct size, position, and placement of accessory devices to hold the skin applicator.  Wax bolus was inserted into the right nare and around the surrounding tissues to reduce still on the adjacent normal structures and patient was placed in the immobilization mask.  The 3.5 cm Xoft applicator was placed using the treatment cap and centered on the right nasal ala tumor.  Our Medical Physicist performed pretreatment surveys, calibrated the treatment catheter, and confirmed correct placement and connection of the applicator to the HDR afterloader.  A dose of 6 Gy was prescribed to a depth of 3mm.  The Medical Physicist, nurse and the Radiation Oncologist remained present for the procedure.  There were no immediate events, and the patient tolerated the procedure well with no complications.    The patient was then discharged to home in good condition.  Patient will return for the next treatment on a twice weekly treatment schedule.  He was advised to use topical Aquaphor healing ointment over-the-counter to keep the lesion covered and to hopefully reduce irritation and bleeding.

## 2023-11-02 ENCOUNTER — OFFICE VISIT (OUTPATIENT)
Dept: CARDIOLOGY | Facility: HOSPITAL | Age: 88
End: 2023-11-02
Payer: MEDICARE

## 2023-11-02 VITALS
SYSTOLIC BLOOD PRESSURE: 124 MMHG | TEMPERATURE: 97 F | HEIGHT: 71 IN | WEIGHT: 210.6 LBS | DIASTOLIC BLOOD PRESSURE: 54 MMHG | HEART RATE: 53 BPM | BODY MASS INDEX: 29.48 KG/M2 | RESPIRATION RATE: 18 BRPM

## 2023-11-02 DIAGNOSIS — I50.812 CHRONIC RIGHT HEART FAILURE: Primary | ICD-10-CM

## 2023-11-02 DIAGNOSIS — I25.10 CORONARY ARTERY DISEASE INVOLVING NATIVE CORONARY ARTERY OF NATIVE HEART WITHOUT ANGINA PECTORIS: ICD-10-CM

## 2023-11-02 DIAGNOSIS — I48.20 CHRONIC ATRIAL FIBRILLATION: ICD-10-CM

## 2023-11-02 DIAGNOSIS — I10 PRIMARY HYPERTENSION: ICD-10-CM

## 2023-11-02 DIAGNOSIS — N18.30 STAGE 3 CHRONIC KIDNEY DISEASE, UNSPECIFIED WHETHER STAGE 3A OR 3B CKD: ICD-10-CM

## 2023-11-02 NOTE — PROGRESS NOTES
"[facility], [facility]    Chief Complaint  Congestive Heart Failure    Subjective    History of Present Illness {CC  Problem List  Visit  Diagnosis   Encounters  Notes  Medications  Labs  Result Review Imaging  Media :23}     Jordi Sam presents to [facility] CARDIOLOGY for   History of Present Illness     89-year-old male with CAD, right-sided heart failure, cirrhosis and ascites, routine periodic paracentesis followed by GI, chronic atrial fibrillation, hypothyroidism, large left abdominal hernia (not a surgical candidate for repair)    Unable to tolerate ACE, ARB, Arni due to hypotension and kidney function.  Currently on spironolactone and furosemide.    Last abdominal paracentesis 10/23/2023.  Right lower quadrant paracentesis yielding 2.2 L of clear fluid.    Coreg changed to propranolol due to bradycardia.  Pt is tolerating.  No dizziness, near syncope, syncope.  No worsening lower extremity edema.  Recently improved.  No worsening abdominal fullness, PND.  NO CP or pressure, palpitations.  No worsening dyspnea    Just finished radiation on basal cell CA of the nose. Lesion noted on right side of nose.      No recent hospital or ED visits.  No concerns today.       Objective     Vital Signs:   Vitals:    11/02/23 1042   BP: 124/54   BP Location: Left arm   Patient Position: Sitting   Cuff Size: Adult   Pulse: 53   Resp: 18   Temp: 97 °F (36.1 °C)   TempSrc: Temporal   Weight: 95.5 kg (210 lb 9.6 oz)   Height: 180.3 cm (71\")     Body mass index is 29.37 kg/m².  Physical Exam  Vitals reviewed.   Constitutional:       General: He is not in acute distress.     Appearance: Normal appearance.   Cardiovascular:      Rate and Rhythm: Normal rate and regular rhythm. Bradycardia present. Rhythm irregular.      Pulses:           Radial pulses are 2+ on the right side and 2+ on the left side.        Dorsalis pedis pulses are 2+ on the right " side and 2+ on the left side.        Posterior tibial pulses are 2+ on the right side and 2+ on the left side.      Heart sounds: Normal heart sounds.   Pulmonary:      Effort: Pulmonary effort is normal.      Breath sounds: Normal breath sounds.   Abdominal:      Comments: Large abdominal hernia, soft.    Musculoskeletal:      Right lower leg: No edema.      Left lower leg: No edema.   Skin:     General: Skin is warm and dry.   Neurological:      Mental Status: He is alert.   Psychiatric:         Mood and Affect: Mood normal.         Behavior: Behavior is cooperative.              Result Review  Data Reviewed:{ Labs  Result Review  Imaging  Med Tab  Media :23}   7-day Holter 7/13/2023: Average heart rate 69 bpm.  A-fib burden 100%.  Nonsustained VT, longest duration 21 beats.  PVCs 4%,    RANDALL 2019: EF 60%, moderate TR, small PFO with bidirectional shunting.    Lab Results   Component Value Date    GLUCOSE 114 (H) 07/17/2023    CALCIUM 8.5 (L) 07/17/2023     07/17/2023    K 4.2 07/17/2023    CO2 27.0 07/17/2023    CL 99 07/17/2023    BUN 21 07/17/2023    CREATININE 1.39 (H) 07/17/2023    EGFR 48.5 (L) 07/17/2023    BCR 15.1 07/17/2023    ANIONGAP 10.0 07/17/2023     Lab Results   Component Value Date    TSH 4.680 (H) 07/10/2023     Lab Results   Component Value Date    WBC 6.01 10/23/2023    HGB 11.3 (L) 10/23/2023    HCT 35.8 (L) 10/23/2023    MCV 98.1 (H) 10/23/2023     10/23/2023                   Assessment and Plan {CC Problem List  Visit Diagnosis  ROS  Review (Popup)  Health Maintenance  Quality  BestPractice  Medications  SmartSets  SnapShot Encounters  Media :23}   1. Chronic right heart failure  Lasix   Aldactone    Labs stable    Intermittent paracentesis.     2. Chronic atrial fibrillation  Bradycardic today  On propranolol  No near syncope, syncope  Stable    Eliquis  Samples given  Eliquis 2.5 mg, #4. LOT:  [id number], Exp:  04/24    3. Coronary artery disease involving  native coronary artery of native heart without angina pectoris    Statin,   4. Primary hypertension  stable    5. Stage 3 chronic kidney disease, unspecified whether stage 3a or 3b CKD  stable          Follow Up {Instructions Charge Capture  Follow-up Communications :23}   Return in about 6 months (around 5/2/2024), or if symptoms worsen or fail to improve, for HF.    Patient was given instructions and counseling regarding his condition or for health maintenance advice. Please see specific information pulled into the AVS if appropriate.  Patient was instructed to call the [facility] with any questions, concerns, or worsening symptoms.

## 2023-11-02 NOTE — H&P (VIEW-ONLY)
"[facility], [facility]    Chief Complaint  Congestive Heart Failure    Subjective    History of Present Illness {CC  Problem List  Visit  Diagnosis   Encounters  Notes  Medications  Labs  Result Review Imaging  Media :23}     Jordi Sam presents to [facility] CARDIOLOGY for   History of Present Illness     89-year-old male with CAD, right-sided heart failure, cirrhosis and ascites, routine periodic paracentesis followed by GI, chronic atrial fibrillation, hypothyroidism, large left abdominal hernia (not a surgical candidate for repair)    Unable to tolerate ACE, ARB, Arni due to hypotension and kidney function.  Currently on spironolactone and furosemide.    Last abdominal paracentesis 10/23/2023.  Right lower quadrant paracentesis yielding 2.2 L of clear fluid.    Coreg changed to propranolol due to bradycardia.  Pt is tolerating.  No dizziness, near syncope, syncope.  No worsening lower extremity edema.  Recently improved.  No worsening abdominal fullness, PND.  NO CP or pressure, palpitations.  No worsening dyspnea    Just finished radiation on basal cell CA of the nose. Lesion noted on right side of nose.      No recent hospital or ED visits.  No concerns today.       Objective     Vital Signs:   Vitals:    11/02/23 1042   BP: 124/54   BP Location: Left arm   Patient Position: Sitting   Cuff Size: Adult   Pulse: 53   Resp: 18   Temp: 97 °F (36.1 °C)   TempSrc: Temporal   Weight: 95.5 kg (210 lb 9.6 oz)   Height: 180.3 cm (71\")     Body mass index is 29.37 kg/m².  Physical Exam  Vitals reviewed.   Constitutional:       General: He is not in acute distress.     Appearance: Normal appearance.   Cardiovascular:      Rate and Rhythm: Normal rate and regular rhythm. Bradycardia present. Rhythm irregular.      Pulses:           Radial pulses are 2+ on the right side and 2+ on the left side.        Dorsalis pedis pulses are 2+ on the right " side and 2+ on the left side.        Posterior tibial pulses are 2+ on the right side and 2+ on the left side.      Heart sounds: Normal heart sounds.   Pulmonary:      Effort: Pulmonary effort is normal.      Breath sounds: Normal breath sounds.   Abdominal:      Comments: Large abdominal hernia, soft.    Musculoskeletal:      Right lower leg: No edema.      Left lower leg: No edema.   Skin:     General: Skin is warm and dry.   Neurological:      Mental Status: He is alert.   Psychiatric:         Mood and Affect: Mood normal.         Behavior: Behavior is cooperative.              Result Review  Data Reviewed:{ Labs  Result Review  Imaging  Med Tab  Media :23}   7-day Holter 7/13/2023: Average heart rate 69 bpm.  A-fib burden 100%.  Nonsustained VT, longest duration 21 beats.  PVCs 4%,    RANDALL 2019: EF 60%, moderate TR, small PFO with bidirectional shunting.    Lab Results   Component Value Date    GLUCOSE 114 (H) 07/17/2023    CALCIUM 8.5 (L) 07/17/2023     07/17/2023    K 4.2 07/17/2023    CO2 27.0 07/17/2023    CL 99 07/17/2023    BUN 21 07/17/2023    CREATININE 1.39 (H) 07/17/2023    EGFR 48.5 (L) 07/17/2023    BCR 15.1 07/17/2023    ANIONGAP 10.0 07/17/2023     Lab Results   Component Value Date    TSH 4.680 (H) 07/10/2023     Lab Results   Component Value Date    WBC 6.01 10/23/2023    HGB 11.3 (L) 10/23/2023    HCT 35.8 (L) 10/23/2023    MCV 98.1 (H) 10/23/2023     10/23/2023                   Assessment and Plan {CC Problem List  Visit Diagnosis  ROS  Review (Popup)  Health Maintenance  Quality  BestPractice  Medications  SmartSets  SnapShot Encounters  Media :23}   1. Chronic right heart failure  Lasix   Aldactone    Labs stable    Intermittent paracentesis.     2. Chronic atrial fibrillation  Bradycardic today  On propranolol  No near syncope, syncope  Stable    Eliquis  Samples given  Eliquis 2.5 mg, #4. LOT:  [id number], Exp:  04/24    3. Coronary artery disease involving  native coronary artery of native heart without angina pectoris    Statin,   4. Primary hypertension  stable    5. Stage 3 chronic kidney disease, unspecified whether stage 3a or 3b CKD  stable          Follow Up {Instructions Charge Capture  Follow-up Communications :23}   Return in about 6 months (around 5/2/2024), or if symptoms worsen or fail to improve, for HF.    Patient was given instructions and counseling regarding his condition or for health maintenance advice. Please see specific information pulled into the AVS if appropriate.  Patient was instructed to call the [facility] with any questions, concerns, or worsening symptoms.

## 2023-11-06 ENCOUNTER — TELEPHONE (OUTPATIENT)
Dept: ORTHOPEDIC SURGERY | Facility: CLINIC | Age: 88
End: 2023-11-06

## 2023-11-06 NOTE — TELEPHONE ENCOUNTER
Pt had previous treatment done for a different appointment so he is not wanting to come in today. Patients wife was on the phone but I asked if they wanted to reschedule appointment and she said the patient has said he is feeling better despite previous x-ray results. Although they would like to know if he still needs to be seen. Please advise

## 2023-11-19 DIAGNOSIS — E78.2 MIXED HYPERLIPIDEMIA: ICD-10-CM

## 2023-11-20 ENCOUNTER — PREP FOR SURGERY (OUTPATIENT)
Dept: OTHER | Facility: HOSPITAL | Age: 88
End: 2023-11-20
Payer: MEDICARE

## 2023-11-20 DIAGNOSIS — R18.8 OTHER ASCITES: Primary | ICD-10-CM

## 2023-11-20 RX ORDER — TERAZOSIN 2 MG/1
CAPSULE ORAL
Qty: 90 CAPSULE | Refills: 1 | Status: SHIPPED | OUTPATIENT
Start: 2023-11-20

## 2023-11-20 RX ORDER — ALBUMIN (HUMAN) 12.5 G/50ML
12.5 SOLUTION INTRAVENOUS ONCE
Status: CANCELLED | OUTPATIENT
Start: 2023-11-20 | End: 2023-11-20

## 2023-11-20 RX ORDER — SIMVASTATIN 40 MG
TABLET ORAL
Qty: 90 TABLET | Refills: 1 | Status: SHIPPED | OUTPATIENT
Start: 2023-11-20

## 2023-11-20 NOTE — TELEPHONE ENCOUNTER
Rx Refill Note  Requested Prescriptions     Pending Prescriptions Disp Refills    terazosin (HYTRIN) 2 MG capsule [Pharmacy Med Name: Terazosin HCl 2 MG Oral Capsule] 90 capsule 0     Sig: TAKE 1 CAPSULE BY MOUTH ONCE DAILY AT NIGHT    simvastatin (ZOCOR) 40 MG tablet [Pharmacy Med Name: Simvastatin 40 MG Oral Tablet] 90 tablet 0     Sig: Take 1 tablet by mouth once daily      Last office visit with prescribing clinician: 9/27/2023     Next office visit with prescribing clinician: 12/27/2023   Susy Patton MA  11/20/23, 16:30 EST

## 2023-11-21 ENCOUNTER — HOSPITAL ENCOUNTER (OUTPATIENT)
Dept: ULTRASOUND IMAGING | Facility: HOSPITAL | Age: 88
Discharge: HOME | End: 2023-11-21
Admitting: NURSE PRACTITIONER
Payer: MEDICARE

## 2023-11-21 VITALS
BODY MASS INDEX: 30.78 KG/M2 | HEART RATE: 69 BPM | RESPIRATION RATE: 20 BRPM | OXYGEN SATURATION: 97 % | WEIGHT: 219.9 LBS | DIASTOLIC BLOOD PRESSURE: 50 MMHG | SYSTOLIC BLOOD PRESSURE: 118 MMHG | HEIGHT: 71 IN | TEMPERATURE: 97.7 F

## 2023-11-21 DIAGNOSIS — R18.8 OTHER ASCITES: ICD-10-CM

## 2023-11-21 LAB
ALBUMIN FLD-MCNC: 1.8 G/DL
APPEARANCE FLD: ABNORMAL
COLOR FLD: YELLOW
LYMPHOCYTES NFR FLD MANUAL: 8 %
MACROPHAGE FLUID: 85 %
MONOCYTES NFR FLD: 7 %
PROT FLD-MCNC: 2.8 G/DL
RBC # FLD AUTO: <2000 /MM3
WBC # FLD AUTO: 132 /MM3

## 2023-11-21 PROCEDURE — C1729 CATH, DRAINAGE: HCPCS

## 2023-11-21 PROCEDURE — 84157 ASSAY OF PROTEIN OTHER: CPT | Performed by: NURSE PRACTITIONER

## 2023-11-21 PROCEDURE — 89051 BODY FLUID CELL COUNT: CPT | Performed by: NURSE PRACTITIONER

## 2023-11-21 PROCEDURE — 76942 ECHO GUIDE FOR BIOPSY: CPT

## 2023-11-21 PROCEDURE — P9047 ALBUMIN (HUMAN), 25%, 50ML: HCPCS | Performed by: NURSE PRACTITIONER

## 2023-11-21 PROCEDURE — 82042 OTHER SOURCE ALBUMIN QUAN EA: CPT | Performed by: NURSE PRACTITIONER

## 2023-11-21 PROCEDURE — 25010000002 ALBUMIN HUMAN 25% PER 50 ML: Performed by: NURSE PRACTITIONER

## 2023-11-21 PROCEDURE — 25010000002 LIDOCAINE 1 % SOLUTION: Performed by: NURSE PRACTITIONER

## 2023-11-21 RX ORDER — LIDOCAINE HYDROCHLORIDE 10 MG/ML
5 INJECTION, SOLUTION INFILTRATION; PERINEURAL ONCE
Status: COMPLETED | OUTPATIENT
Start: 2023-11-21 | End: 2023-11-21

## 2023-11-21 RX ORDER — ALBUMIN (HUMAN) 12.5 G/50ML
12.5 SOLUTION INTRAVENOUS ONCE
Status: DISCONTINUED | OUTPATIENT
Start: 2023-11-21 | End: 2023-11-22 | Stop reason: HOSPADM

## 2023-11-21 RX ORDER — ALBUMIN (HUMAN) 12.5 G/50ML
12.5 SOLUTION INTRAVENOUS ONCE
Status: COMPLETED | OUTPATIENT
Start: 2023-11-21 | End: 2023-11-21

## 2023-11-21 RX ORDER — SODIUM CHLORIDE 0.9 % (FLUSH) 0.9 %
10 SYRINGE (ML) INJECTION AS NEEDED
Status: DISCONTINUED | OUTPATIENT
Start: 2023-11-21 | End: 2023-11-22 | Stop reason: HOSPADM

## 2023-11-21 RX ADMIN — LIDOCAINE HYDROCHLORIDE 5 ML: 10 INJECTION, SOLUTION INFILTRATION; PERINEURAL at 09:43

## 2023-11-21 RX ADMIN — ALBUMIN (HUMAN) 12.5 G: 0.25 INJECTION, SOLUTION INTRAVENOUS at 10:42

## 2023-11-21 NOTE — DISCHARGE INSTRUCTIONS
You may remove the bandage tomorrow and shower tomorrow; but you will need to avoid tub baths or any situation where your are submersed in water for the next 5 days to avoid the risk of infection.     DO NOT DRIVE ON THE DAY OF YOUR PROCEDURE.    If you have any problems or concern please contact your physician's office.

## 2023-11-21 NOTE — NURSING NOTE
US guided paracentesis performed by Dr Heck. 5.5 liters of clear, yellow fluid removed from peritoneum. Labs obtained & sent to lab per IR tech. Dsg to site per protocol. Patient tolerated well. Report called to RAVINDRA.

## 2023-12-12 ENCOUNTER — HOSPITAL ENCOUNTER (OUTPATIENT)
Dept: RADIATION ONCOLOGY | Facility: HOSPITAL | Age: 88
Setting detail: RADIATION/ONCOLOGY SERIES
Discharge: HOME | End: 2023-12-12
Payer: MEDICARE

## 2023-12-12 ENCOUNTER — OFFICE VISIT (OUTPATIENT)
Dept: RADIATION ONCOLOGY | Facility: HOSPITAL | Age: 88
End: 2023-12-12
Payer: MEDICARE

## 2023-12-12 VITALS
SYSTOLIC BLOOD PRESSURE: 150 MMHG | OXYGEN SATURATION: 96 % | WEIGHT: 224.1 LBS | RESPIRATION RATE: 16 BRPM | BODY MASS INDEX: 31.26 KG/M2 | TEMPERATURE: 97.9 F | DIASTOLIC BLOOD PRESSURE: 63 MMHG | HEART RATE: 70 BPM

## 2023-12-12 DIAGNOSIS — C44.311 BASAL CELL CARCINOMA OF RIGHT ALA NASI: Primary | ICD-10-CM

## 2023-12-12 PROCEDURE — G0463 HOSPITAL OUTPT CLINIC VISIT: HCPCS

## 2023-12-12 NOTE — PROGRESS NOTES
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 male presenting for initial follow-up visit.  He has a history of chronic sun exposure and several prior nonmelanoma skin cancers.  He has persistent/recurrent basal cell carcinoma of the right nasal ala following prior excisional biopsy.  He opted for further nonsurgical management and underwent a course of hypofractionated superficial brachytherapy.    The right nasal ala tumor received a dose of 42 Mosqueda calculated at 3 mm depth, delivered in 7 fractions of 6 Gray each, on a twice weekly schedule using the Xoft electronic brachytherapy unit.  He completed treatments 10/31/2023.  He tolerated treatment well.  He did develop some infield skin irritation with erythema and bleeding, managed with application of topical Aquaphor with improvement.  He reports bleeding subsided several weeks ago.    He denies pain within the area.  He denies epistaxis  He also mentions a painful, bleeding lesion on the top of the left hand that he is concerned about but has not yet been biopsied.        MEDICATIONS: Medication reconciliation for the patient was reviewed and confirmed in the electronic medical record.    Review of Systems   Constitutional:  Positive for fatigue.   Cardiovascular:  Positive for leg swelling and palpitations.   Hematological:  Bruises/bleeds easily.   All other systems reviewed and are negative.          KPS 70%      Physical Exam  Vitals and nursing note reviewed.   Constitutional:       General: He is not in acute distress.     Appearance: He is well-developed.   HENT:      Head: Normocephalic and atraumatic.      Ears:      Comments: Wears hearing  aids     Nose:      Comments: Within the right nare, mucous membranes appear dry, not ulcerated.  Nare is patent. Absence of nose hairs noted.  Eyes:      Conjunctiva/sclera: Conjunctivae normal.      Pupils: Pupils are equal, round, and reactive to light.   Cardiovascular:      Rate and Rhythm: Normal rate and regular rhythm.   Pulmonary:      Effort: Pulmonary effort is normal.   Abdominal:      General: There is no distension.   Musculoskeletal:         General: Normal range of motion.      Cervical back: Normal range of motion.      Comments: Ambulates with cane   Skin:     General: Skin is warm and dry.      Comments: Right nasal ala without evidence of persistent ulcerated lesion or underlying masslike effect.  Surrounding skin appears healthy and nonerythematous.    1.2 cm ulcerated lesion on the left dorsum of the hand around the thenar eminence with scant sanguinous drainage   Neurological:      Mental Status: He is alert and oriented to person, place, and time.   Psychiatric:         Behavior: Behavior normal.         Thought Content: Thought content normal.         Judgment: Judgment normal.         VITAL SIGNS:   Vitals:    12/12/23 1312   BP: 150/63   Pulse: 70   Resp: 16   Temp: 97.9 °F (36.6 °C)   TempSrc: Temporal   SpO2: 96%   Weight: 102 kg (224 lb 1.6 oz)   PainSc: 0-No pain           The following portions of the patient's history were reviewed and updated as appropriate: allergies, current medications, past family history, past medical history, past social history, past surgical history and problem list.         Diagnoses and all orders for this visit:    1. Basal cell carcinoma of right ala nasi (Primary)         IMPRESSION:  Basal cell carcinoma of the right nasal ala, status post biopsy 12/9/2022, with enlarging recurrent lesion, still clinical stage I (T1, N0, M0).  Patient is interested in nonsurgical treatment.  6 weeks status post skin electronic brachytherapy.  He tolerated treatment  well.  He developed the anticipated acute grade 1 infield radiation dermatitis which was managed with application of topical Aquaphor with improvement.  He appears to have had a complete clinical response without evidence of residual disease on exam.    We discussed use of saline nasal spray as needed within the right nare.  We discussed ongoing sun/skin precautions and need for routine dermatology evaluation.  I recommended he show his dermatologist the ulcerated lesion of the left dorsum of the hand to consider potential biopsy and if this is proven to be a similar nonmelanoma skin cancer, we can subsequently address treating it with radiotherapy if it is not resected.      RECOMMENDATIONS: Jordi Sam will continue routine dermatologic surveillance under the care of Dr. Ezekiel Abarca with follow-up scheduled 1/4/2024.  At this point, no role/indication for radiotherapy at present but we will be happy to see the patient back at any time as needed.      Return if symptoms worsen or fail to improve, for Office Visit.    MAKAYLA Montez    I spent a total of 30 minutes on today's visit, with more than 15 minutes in direct face to face communication, and the remainder of the time spent in reviewing the relevant history, records, available imaging, and for documentation.

## 2023-12-19 ENCOUNTER — TELEPHONE (OUTPATIENT)
Dept: CARDIOLOGY | Facility: HOSPITAL | Age: 88
End: 2023-12-19
Payer: MEDICARE

## 2023-12-19 NOTE — TELEPHONE ENCOUNTER
Overdue test results notification received for patient's CBC with differential ordered on 11/22/2022. Patient has had several CBC ran since this date. All results in EPIC.

## 2023-12-22 ENCOUNTER — HOSPITAL ENCOUNTER (OUTPATIENT)
Dept: ULTRASOUND IMAGING | Facility: HOSPITAL | Age: 88
Discharge: HOME | End: 2023-12-22
Payer: MEDICARE

## 2023-12-22 VITALS
BODY MASS INDEX: 31.78 KG/M2 | RESPIRATION RATE: 18 BRPM | OXYGEN SATURATION: 99 % | DIASTOLIC BLOOD PRESSURE: 76 MMHG | SYSTOLIC BLOOD PRESSURE: 96 MMHG | HEART RATE: 48 BPM | WEIGHT: 227 LBS | TEMPERATURE: 97.4 F | HEIGHT: 71 IN

## 2023-12-22 LAB — GLUCOSE BLDC GLUCOMTR-MCNC: 107 MG/DL (ref 70–130)

## 2023-12-22 PROCEDURE — 76942 ECHO GUIDE FOR BIOPSY: CPT

## 2023-12-22 PROCEDURE — 25010000002 ALBUMIN HUMAN 25% PER 50 ML: Performed by: NURSE PRACTITIONER

## 2023-12-22 PROCEDURE — P9047 ALBUMIN (HUMAN), 25%, 50ML: HCPCS | Performed by: NURSE PRACTITIONER

## 2023-12-22 PROCEDURE — 82948 REAGENT STRIP/BLOOD GLUCOSE: CPT

## 2023-12-22 RX ORDER — ALBUMIN (HUMAN) 12.5 G/50ML
25 SOLUTION INTRAVENOUS ONCE
Status: COMPLETED | OUTPATIENT
Start: 2023-12-22 | End: 2023-12-22

## 2023-12-22 RX ORDER — SODIUM CHLORIDE 0.9 % (FLUSH) 0.9 %
10 SYRINGE (ML) INJECTION AS NEEDED
Status: DISCONTINUED | OUTPATIENT
Start: 2023-12-22 | End: 2023-12-23 | Stop reason: HOSPADM

## 2023-12-22 RX ADMIN — ALBUMIN HUMAN 25 G: 0.25 SOLUTION INTRAVENOUS at 11:51

## 2023-12-22 NOTE — NURSING NOTE
Image guided paracentesis performed per Dr Martinez. 6200ml fluid drained from right lower abdomen. VSS. Dressing applied. Report given to FENG Morocho.

## 2023-12-26 ENCOUNTER — TELEPHONE (OUTPATIENT)
Dept: INFUSION THERAPY | Facility: HOSPITAL | Age: 88
End: 2023-12-26
Payer: MEDICARE

## 2023-12-27 ENCOUNTER — OFFICE VISIT (OUTPATIENT)
Dept: FAMILY MEDICINE CLINIC | Facility: CLINIC | Age: 88
End: 2023-12-27
Payer: MEDICARE

## 2023-12-27 VITALS
SYSTOLIC BLOOD PRESSURE: 98 MMHG | BODY MASS INDEX: 30.8 KG/M2 | HEIGHT: 71 IN | WEIGHT: 220 LBS | DIASTOLIC BLOOD PRESSURE: 74 MMHG | OXYGEN SATURATION: 100 % | HEART RATE: 56 BPM

## 2023-12-27 DIAGNOSIS — Z79.01 CHRONIC ANTICOAGULATION: ICD-10-CM

## 2023-12-27 DIAGNOSIS — I50.812 CHRONIC RIGHT HEART FAILURE: ICD-10-CM

## 2023-12-27 DIAGNOSIS — N18.32 STAGE 3B CHRONIC KIDNEY DISEASE: Primary | ICD-10-CM

## 2023-12-27 DIAGNOSIS — D69.59 THROMBOCYTOPENIA, SECONDARY: ICD-10-CM

## 2023-12-27 DIAGNOSIS — R05.2 SUBACUTE COUGH: ICD-10-CM

## 2023-12-27 DIAGNOSIS — K72.10 END STAGE LIVER DISEASE: ICD-10-CM

## 2023-12-27 DIAGNOSIS — I10 ESSENTIAL HYPERTENSION: ICD-10-CM

## 2023-12-27 RX ORDER — ALBUTEROL SULFATE 90 UG/1
2 AEROSOL, METERED RESPIRATORY (INHALATION) EVERY 4 HOURS PRN
Qty: 18 G | Refills: 0 | Status: SHIPPED | OUTPATIENT
Start: 2023-12-27

## 2023-12-27 RX ORDER — DOXYCYCLINE HYCLATE 100 MG/1
100 CAPSULE ORAL 2 TIMES DAILY
Qty: 28 CAPSULE | Refills: 0 | Status: SHIPPED | OUTPATIENT
Start: 2023-12-27

## 2023-12-27 RX ORDER — METHYLPREDNISOLONE 4 MG/1
TABLET ORAL
Qty: 1 EACH | Refills: 0 | Status: SHIPPED | OUTPATIENT
Start: 2023-12-27

## 2023-12-27 NOTE — ASSESSMENT & PLAN NOTE
Hypertension is improving with treatment.  Continue current treatment regimen.  Dietary sodium restriction.  Weight loss.  Regular aerobic exercise.  Continue current medications.  Ambulatory blood pressure monitoring.  Blood pressure will be reassessed in 3 months.  Borderline blood pressure, especially after paracentesis.  Slow position changes, if he becomes syncopal or presyncopal call may need to continue weight diuretic

## 2023-12-27 NOTE — PROGRESS NOTES
"Jordi Sam  1934  [id number]  Patient Care Team:  Jasper Avery MD as PCP - General (Internal Medicine)  Jordi Law MD as Consulting Physician (Urology)  Chapin Villagran MD as Consulting Physician (Otolaryngology)  Gen Laurent MD as Consulting Physician (Gastroenterology)  Ezekiel Abarca MD as Referring Physician (Dermatology)  Renny Faustin MD as Consulting Physician (Radiation Oncology)  Larry Sin MD as Consulting Physician (Cardiology)    Jordi Sam is a 89 y.o. male here today for follow up.     This patient is accompanied by their self who contributes to the history of their care.    Chief Complaint:    Chief Complaint   Patient presents with    Chronic Kidney Disease    Cough    URI        History of Present Illness:  I have reviewed and/or updated the patient's past medical, past surgical, family, social history, problem list and allergies as appropriate.     He is still undergoing instrumentation for paracentesis secondary to recurrent ascites.  Last paracentesis was 12/22/2023.  6 liter. Continues on Lasix 40 mg daily Aldactone 50 mg daily.  Blood pressures have been trending down.  Last visit his renal function stabilized with a creatinine of 1.3.  completed radiation therapy for basal cell carcinoma of the nose.    Placed him on allopurinol 100 mg daily for gout.  No recent flares.  He continues on Synthroid 25 mcg daily denies any heat or cold intolerance.    He reports cough x 3 weeks. His wife indicates his cough is productive. No chest pain. No soa or wheezing.     He does not check bp at home. Was placed on beta blocker     Right foream lesion ( upcoming derm appointment. It has been more inflamed . Hit it today causingit to ooze blood. ( Stopped.)    Review of Systems    Vitals:    12/27/23 1243   BP: 98/74   Pulse: 56   SpO2: 100%   Weight: 99.8 kg (220 lb)   Height: 180.3 cm (70.98\")     Body mass index is 30.7 kg/m².    Physical " Exam  Vitals and nursing note reviewed.   Constitutional:       General: He is not in acute distress.     Appearance: Normal appearance. He is well-developed. He is obese. He is not diaphoretic.   HENT:      Head: Normocephalic and atraumatic.      Right Ear: External ear normal.      Left Ear: External ear normal.      Mouth/Throat:      Pharynx: No oropharyngeal exudate.   Eyes:      General: No scleral icterus.        Right eye: No discharge.      Conjunctiva/sclera: Conjunctivae normal.   Neck:      Thyroid: No thyromegaly.      Vascular: No JVD.      Trachea: No tracheal deviation.   Cardiovascular:      Rate and Rhythm: Normal rate and regular rhythm.      Heart sounds: Normal heart sounds.      Comments: PMI nondisplaced  Pulmonary:      Effort: Pulmonary effort is normal.      Breath sounds: Normal breath sounds. No wheezing or rales.   Abdominal:      General: Bowel sounds are normal.      Palpations: Abdomen is soft.      Tenderness: There is no abdominal tenderness. There is no guarding or rebound.   Musculoskeletal:      Cervical back: Normal range of motion and neck supple.      Right lower leg: Edema present.      Left lower leg: Edema present.   Lymphadenopathy:      Cervical: No cervical adenopathy.   Skin:     General: Skin is warm and dry.      Capillary Refill: Capillary refill takes less than 2 seconds.      Coloration: Skin is not jaundiced or pale.      Findings: Bruising present. No rash.      Comments: Ala healed. Right forearm crusty 1 comm erthematous lesion   Neurological:      Mental Status: He is alert and oriented to person, place, and time.      Motor: No abnormal muscle tone.      Coordination: Coordination normal.   Psychiatric:         Mood and Affect: Mood normal.         Behavior: Behavior normal.         Judgment: Judgment normal.         Procedures    Results Review:    I reviewed the patient's new clinical results.    Assessment/Plan:    Problem List Items Addressed This Visit        Essential hypertension    Current Assessment & Plan     Hypertension is improving with treatment.  Continue current treatment regimen.  Dietary sodium restriction.  Weight loss.  Regular aerobic exercise.  Continue current medications.  Ambulatory blood pressure monitoring.  Blood pressure will be reassessed in 3 months.  Borderline blood pressure, especially after paracentesis.  Slow position changes, if he becomes syncopal or presyncopal call may need to continue weight diuretic         Relevant Medications    furosemide (LASIX) 40 MG tablet    spironolactone (ALDACTONE) 50 MG tablet    propranolol (INDERAL) 20 MG tablet    terazosin (HYTRIN) 2 MG capsule    Chronic right heart failure    Current Assessment & Plan     Continue lasix aldactone         Relevant Medications    nitroglycerin (NITROSTAT) 0.4 MG SL tablet    furosemide (LASIX) 40 MG tablet    spironolactone (ALDACTONE) 50 MG tablet    propranolol (INDERAL) 20 MG tablet    Stage 3 chronic kidney disease - Primary    Current Assessment & Plan     Renal condition is unchanged.  Continue current treatment regimen.  Fluid restriction.  Weight loss.  Monitor daily weight.  Renal condition will be reassessed in 3 months.         Relevant Medications    furosemide (LASIX) 40 MG tablet    spironolactone (ALDACTONE) 50 MG tablet    End stage liver disease    Chronic anticoagulation    RESOLVED: Thrombocytopenia, secondary    Relevant Medications    folic acid (FOLVITE) 400 MCG tablet    apixaban (ELIQUIS) 2.5 MG tablet tablet     Other Visit Diagnoses       Subacute cough        trial medrol, and doxy, suspect sinus            Plan of care reviewed with patient at the conclusion of today's visit. Education was provided regarding diagnosis and management.  Patient verbalizes understanding of and agreement with management plan.    Return in about 3 months (around 3/27/2024) for htn/ckd.    Jasper Avery MD      Please note than portions of this note were  completed [facility] a Voice Recognition Program

## 2024-01-08 DIAGNOSIS — K74.60 CIRRHOSIS OF LIVER WITH ASCITES, UNSPECIFIED HEPATIC CIRRHOSIS TYPE: ICD-10-CM

## 2024-01-08 DIAGNOSIS — I50.812 CHRONIC RIGHT HEART FAILURE: ICD-10-CM

## 2024-01-08 DIAGNOSIS — R18.8 CIRRHOSIS OF LIVER WITH ASCITES, UNSPECIFIED HEPATIC CIRRHOSIS TYPE: ICD-10-CM

## 2024-01-08 RX ORDER — FUROSEMIDE 40 MG/1
TABLET ORAL
Qty: 45 TABLET | Refills: 0 | Status: SHIPPED | OUTPATIENT
Start: 2024-01-08 | End: 2024-01-10 | Stop reason: SDUPTHER

## 2024-01-08 NOTE — TELEPHONE ENCOUNTER
Pt wife called needing refill of furosemide. Pt was completely out.   Pharmacy sent in prescription in for 30 days until it can be evaluated.

## 2024-01-08 NOTE — TELEPHONE ENCOUNTER
Rx Refill Note  Requested Prescriptions     Pending Prescriptions Disp Refills    furosemide (LASIX) 40 MG tablet 180 tablet 3     Sig: TAKE 1 TABLET BY MOUTH ONCE DAILY IN THE MORNING, 1/2 TABLET IN THE AFTERNOON AND AS NEEDED FOR WEIGHT GAIN, EDEMA AND DYPNEA      Last office visit with prescribing clinician: 11/2/23    Next office visit with prescribing clinician: 5/2/24        Maye Carlson, PharmD  01/08/24, 11:35 EST

## 2024-01-10 ENCOUNTER — TELEPHONE (OUTPATIENT)
Dept: CARDIOLOGY | Facility: HOSPITAL | Age: 89
End: 2024-01-10
Payer: MEDICARE

## 2024-01-10 DIAGNOSIS — R18.8 CIRRHOSIS OF LIVER WITH ASCITES, UNSPECIFIED HEPATIC CIRRHOSIS TYPE: ICD-10-CM

## 2024-01-10 DIAGNOSIS — I50.812 CHRONIC RIGHT HEART FAILURE: ICD-10-CM

## 2024-01-10 DIAGNOSIS — K74.60 CIRRHOSIS OF LIVER WITH ASCITES, UNSPECIFIED HEPATIC CIRRHOSIS TYPE: ICD-10-CM

## 2024-01-10 RX ORDER — FUROSEMIDE 40 MG/1
TABLET ORAL
Qty: 45 TABLET | Refills: 11 | Status: SHIPPED | OUTPATIENT
Start: 2024-01-10

## 2024-01-24 ENCOUNTER — HOSPITAL ENCOUNTER (OUTPATIENT)
Dept: ULTRASOUND IMAGING | Facility: HOSPITAL | Age: 89
Discharge: HOME | End: 2024-01-24
Admitting: RADIOLOGY
Payer: MEDICARE

## 2024-01-24 VITALS
HEART RATE: 70 BPM | RESPIRATION RATE: 17 BRPM | BODY MASS INDEX: 30.77 KG/M2 | OXYGEN SATURATION: 96 % | SYSTOLIC BLOOD PRESSURE: 100 MMHG | HEIGHT: 71 IN | DIASTOLIC BLOOD PRESSURE: 43 MMHG | TEMPERATURE: 98 F | WEIGHT: 219.8 LBS

## 2024-01-24 LAB
BASOPHILS # BLD AUTO: 0.05 10*3/MM3 (ref 0–0.2)
BASOPHILS NFR BLD AUTO: 1.2 % (ref 0–1.5)
DEPRECATED RDW RBC AUTO: 54.3 FL (ref 37–54)
EOSINOPHIL # BLD AUTO: 0.14 10*3/MM3 (ref 0–0.4)
EOSINOPHIL NFR BLD AUTO: 3.5 % (ref 0.3–6.2)
ERYTHROCYTE [DISTWIDTH] IN BLOOD BY AUTOMATED COUNT: 15.2 % (ref 12.3–15.4)
GLUCOSE BLDC GLUCOMTR-MCNC: 120 MG/DL (ref 70–130)
HCT VFR BLD AUTO: 35.1 % (ref 37.5–51)
HGB BLD-MCNC: 11.4 G/DL (ref 13–17.7)
IMM GRANULOCYTES # BLD AUTO: 0.01 10*3/MM3 (ref 0–0.05)
IMM GRANULOCYTES NFR BLD AUTO: 0.2 % (ref 0–0.5)
INR PPP: 1.18 (ref 0.89–1.12)
LYMPHOCYTES # BLD AUTO: 0.6 10*3/MM3 (ref 0.7–3.1)
LYMPHOCYTES NFR BLD AUTO: 14.9 % (ref 19.6–45.3)
MCH RBC QN AUTO: 31.6 PG (ref 26.6–33)
MCHC RBC AUTO-ENTMCNC: 32.5 G/DL (ref 31.5–35.7)
MCV RBC AUTO: 97.2 FL (ref 79–97)
MONOCYTES # BLD AUTO: 0.37 10*3/MM3 (ref 0.1–0.9)
MONOCYTES NFR BLD AUTO: 9.2 % (ref 5–12)
NEUTROPHILS NFR BLD AUTO: 2.85 10*3/MM3 (ref 1.7–7)
NEUTROPHILS NFR BLD AUTO: 71 % (ref 42.7–76)
NRBC BLD AUTO-RTO: 0 /100 WBC (ref 0–0.2)
PLATELET # BLD AUTO: 140 10*3/MM3 (ref 140–450)
PMV BLD AUTO: 10.5 FL (ref 6–12)
PROTHROMBIN TIME: 15.2 SECONDS (ref 12.2–14.5)
RBC # BLD AUTO: 3.61 10*6/MM3 (ref 4.14–5.8)
WBC NRBC COR # BLD AUTO: 4.02 10*3/MM3 (ref 3.4–10.8)

## 2024-01-24 PROCEDURE — 82948 REAGENT STRIP/BLOOD GLUCOSE: CPT

## 2024-01-24 PROCEDURE — 85025 COMPLETE CBC W/AUTO DIFF WBC: CPT | Performed by: RADIOLOGY

## 2024-01-24 PROCEDURE — 85610 PROTHROMBIN TIME: CPT | Performed by: RADIOLOGY

## 2024-01-24 PROCEDURE — 76942 ECHO GUIDE FOR BIOPSY: CPT

## 2024-01-24 PROCEDURE — 25010000002 LIDOCAINE 1 % SOLUTION: Performed by: RADIOLOGY

## 2024-01-24 RX ORDER — SODIUM CHLORIDE 0.9 % (FLUSH) 0.9 %
10 SYRINGE (ML) INJECTION AS NEEDED
Status: DISCONTINUED | OUTPATIENT
Start: 2024-01-24 | End: 2024-01-25 | Stop reason: HOSPADM

## 2024-01-24 RX ORDER — LIDOCAINE HYDROCHLORIDE 10 MG/ML
INJECTION, SOLUTION INFILTRATION; PERINEURAL AS NEEDED
Status: COMPLETED | OUTPATIENT
Start: 2024-01-24 | End: 2024-01-24

## 2024-01-24 RX ORDER — LIDOCAINE HYDROCHLORIDE 10 MG/ML
10 INJECTION, SOLUTION EPIDURAL; INFILTRATION; INTRACAUDAL; PERINEURAL ONCE
Status: DISCONTINUED | OUTPATIENT
Start: 2024-01-24 | End: 2024-01-25 | Stop reason: HOSPADM

## 2024-01-24 RX ADMIN — LIDOCAINE HYDROCHLORIDE 10 ML: 10 INJECTION, SOLUTION INFILTRATION; PERINEURAL at 08:46

## 2024-01-24 NOTE — NURSING NOTE
MD performed Paracentesis, 3.5L removed, pt kaun well, pt has padded tegaderm CD&I. Gave phone report to Esme WAITE RN.

## 2024-01-24 NOTE — POST-PROCEDURE NOTE
The following procedure was performed: Paracentesis    Please see corresponding Radiology report for in detail procedural information. The Radiology report will be dictated shortly, if not done so already. Please see the IR RN note for the information regarding medicines administered if any, mgehann-procedural vitals and I/O information.

## 2024-01-24 NOTE — DISCHARGE INSTRUCTIONS
You may remove the bandage tomorrow and shower; but you will need to avoid tub baths or any situation where your are submersed in water for the next 4 or 5 days to avoid the risk of infection.         If you have any problems or concern please contact your physician's office.

## 2024-01-24 NOTE — PRE-PROCEDURE NOTE
[facility]   Vascular Interventional Radiology  History & Physicial        Patient Name:Jordi Sam    : 1934    MRN: [id number]    Primary Care Physician: Jasper Avery MD    Referring Physician: MAKAYLA Stringer     Date of admission: 2024    Subjective     Reason for Consult: recurrent ascites, paracentesis    History of Present Illness     Jordi Sam is a 89 y.o. male referred to IR as noted above.      Active Hospital Problems:  There are no active hospital problems to display for this patient.      Personal History     Past Medical History:   Diagnosis Date    A-fib     Acute inferior myocardial infarction     Acute inferior STEMI with RV infarct features, 2009.     CAD (coronary artery disease)     Cellulitis 2023    Facial. - admission at [facility]. 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 Seeing Dr Kiersten Nova and Dr Jasper Avery        Past Surgical History:   Procedure Laterality Date    BRONCHOSCOPY N/A 2019    Procedure: BRONCHOSCOPY WITH THORACENTESIS;  Surgeon: Marciano Higginbotham MD;  Location:  [facility] ENDOSCOPY;  Service: Pulmonary    CARDIAC CATHETERIZATION Bilateral 2019    Procedure: Right and Left Heart Cath;  Surgeon: Efrem Posadas MD;  Location:  [facility]ASIVE [facility];  Service: Cardiology    CARDIAC CATHETERIZATION      CHOLECYSTECTOMY      CORONARY ANGIOPLASTY WITH STENT PLACEMENT  2009    Sirolimus eluting stents to the RCA, 2009.     HERNIA REPAIR      Hernia  "repair x 2.     PARACENTESIS  06/17/2019    multiple    UMBILICAL HERNIA REPAIR N/A 7/26/2020    Procedure: UMBILICAL HERNIA REPAIR;  Surgeon: Maribell Her MD;  Location: [facility];  Service: General;  Laterality: N/A;       Family History: His family history includes Aneurysm in his father; Cancer in his sister; Dementia in his mother; Heart attack in his father; Heart disease in his brother and father; Hypertension in his daughter and son; No Known Problems in his brother and sister; Stroke in his brother and mother.     Social History: He  reports that he quit smoking about 40 years ago. His smoking use included cigarettes. He has a 70.00 pack-year smoking history. He has never used smokeless tobacco. He reports that he does not drink alcohol and does not use drugs.    Home Medications:  albuterol sulfate HFA, allopurinol, apixaban, folic acid, furosemide, levothyroxine, nitroglycerin, propranolol, simvastatin, spironolactone, and terazosin    Current Medications:    lidocaine    sodium chloride     Allergies:  Allergies   Allergen Reactions    Bee Venom Swelling    Sulfamethoxazole-Trimethoprim Other (See Comments)     Pt unaware       Review of Systems    IR Procedure pertinent significant findings are mentioned in the PMH and PSH above.    Objective     Visit Vitals  /58 (BP Location: Right arm, Patient Position: Sitting)   Pulse 58   Temp 98 °F (36.7 °C) (Tympanic)   Resp 21   Ht 180.3 cm (71\")   Wt 99.7 kg (219 lb 12.8 oz)   SpO2 97%   BMI 30.66 kg/m²        Physical Exam    A&Ox3.   Able to communicate  No Apparent Distress  Average physique   CVS: VS as noted. Chart reviewed. Stable for the procedure.  Respiratory: Non labored breathing. No signs of respiratory compromise.    Result Review      I have personally reviewed the results from the time of this admission to 1/24/2024 08:57 EST and agree with these findings.  [x]  Laboratory  []  Microbiology  [x]  Radiology  []  EKG/Telemetry   []  " "Cardiology/Vascular   []  Pathology  []  Old records  []  Other:    Most notable findings include: As noted:    Results from last 7 days   Lab Units 01/24/24  0727   INR  1.18*   WBC 10*3/mm3 4.02   HEMOGLOBIN g/dL 11.4*   HEMATOCRIT % 35.1*   PLATELETS 10*3/mm3 140                   CrCl cannot be calculated (Patient's most recent lab result is older than the maximum 30 days allowed.). No results found for: \"CREATININE\"    COVID19   Date Value Ref Range Status   11/04/2022 Not Detected  Final        No results found for: \"PREGTESTUR\", \"PREGSERUM\", \"HCG\", \"HCGQUANT\"     ASA SCALE ASSESSMENT (applicable ONLY if sedation planned):        MALLAMPATI CLASSIFICATION (applicable ONLY if sedation planned):       Assessment / Plan     Jordi Sam is a 89 y.o. male referred to the IR service with above problem.    Plan:   As above.    Notice: The note was created before the performance of the procedure. It might have been left in the pending status and signed off after the procedure. The time stamp on the note may be misleading.    MAKAYLA Joy   Vascular Interventional Radiology  01/24/24   8:57 AM EST    "

## 2024-01-25 ENCOUNTER — TELEPHONE (OUTPATIENT)
Dept: INFUSION THERAPY | Facility: HOSPITAL | Age: 89
End: 2024-01-25
Payer: MEDICARE

## 2024-02-26 ENCOUNTER — PREP FOR SURGERY (OUTPATIENT)
Dept: OTHER | Facility: HOSPITAL | Age: 89
End: 2024-02-26
Payer: MEDICARE

## 2024-02-26 RX ORDER — SODIUM CHLORIDE 0.9 % (FLUSH) 0.9 %
3 SYRINGE (ML) INJECTION EVERY 12 HOURS SCHEDULED
Status: CANCELLED | OUTPATIENT
Start: 2024-02-26

## 2024-02-26 RX ORDER — SODIUM CHLORIDE 9 MG/ML
40 INJECTION, SOLUTION INTRAVENOUS AS NEEDED
Status: CANCELLED | OUTPATIENT
Start: 2024-02-26

## 2024-02-26 RX ORDER — SODIUM CHLORIDE 0.9 % (FLUSH) 0.9 %
10 SYRINGE (ML) INJECTION AS NEEDED
Status: CANCELLED | OUTPATIENT
Start: 2024-02-26

## 2024-02-27 ENCOUNTER — HOSPITAL ENCOUNTER (OUTPATIENT)
Dept: ULTRASOUND IMAGING | Facility: HOSPITAL | Age: 89
Discharge: HOME | End: 2024-02-27
Payer: MEDICARE

## 2024-02-27 VITALS
SYSTOLIC BLOOD PRESSURE: 102 MMHG | HEIGHT: 71 IN | TEMPERATURE: 97.5 F | OXYGEN SATURATION: 98 % | DIASTOLIC BLOOD PRESSURE: 46 MMHG | BODY MASS INDEX: 32.03 KG/M2 | WEIGHT: 228.8 LBS | RESPIRATION RATE: 17 BRPM | HEART RATE: 56 BPM

## 2024-02-27 PROCEDURE — 76942 ECHO GUIDE FOR BIOPSY: CPT

## 2024-02-27 PROCEDURE — 25010000002 LIDOCAINE 1 % SOLUTION: Performed by: NURSE PRACTITIONER

## 2024-02-27 RX ORDER — SODIUM CHLORIDE 0.9 % (FLUSH) 0.9 %
3 SYRINGE (ML) INJECTION EVERY 12 HOURS SCHEDULED
Status: DISCONTINUED | OUTPATIENT
Start: 2024-02-27 | End: 2024-02-28 | Stop reason: HOSPADM

## 2024-02-27 RX ORDER — LIDOCAINE HYDROCHLORIDE 10 MG/ML
10 INJECTION, SOLUTION INFILTRATION; PERINEURAL ONCE
Status: COMPLETED | OUTPATIENT
Start: 2024-02-27 | End: 2024-02-27

## 2024-02-27 RX ORDER — SODIUM CHLORIDE 9 MG/ML
40 INJECTION, SOLUTION INTRAVENOUS AS NEEDED
Status: DISCONTINUED | OUTPATIENT
Start: 2024-02-27 | End: 2024-02-28 | Stop reason: HOSPADM

## 2024-02-27 RX ORDER — SODIUM CHLORIDE 0.9 % (FLUSH) 0.9 %
10 SYRINGE (ML) INJECTION AS NEEDED
Status: DISCONTINUED | OUTPATIENT
Start: 2024-02-27 | End: 2024-02-28 | Stop reason: HOSPADM

## 2024-02-27 RX ADMIN — LIDOCAINE HYDROCHLORIDE 5 ML: 10 INJECTION, SOLUTION INFILTRATION; PERINEURAL at 11:50

## 2024-02-27 NOTE — POST-PROCEDURE NOTE
Vascular Interventional Radiology  Procedure Note    Date: 02/27/24      Time: 10:58 EST     Pre-op Diagnosis: Ascites     Post-op Diagnosis: Ascites    Procedure: US guided Paracentesis. Via RLQ. See report for details.    Volume removed: 4.6 liters.    : MAKAYLA Madera    Attending: David Heck MD     Assistants: NA    Sedation: None    Estimated Blood Loss (EBL): 0 cc    IVF: NA    Findings: Above    Specimens: See report.    Complications: See report.    Disposition: IR recovery.    MAKAYLA Madera  Vascular Interventional Radiology

## 2024-02-27 NOTE — PROGRESS NOTES
[facility]     Progress Note    Patient Name: Jordi Sam  : 1934  MRN: [id number]  Primary Care Physician:  Jasper Avery MD  Date of admission: 2024    Subjective   Subjective     Chief Complaint: ***    History of Present Illness  Patient Reports ***    Review of Systems    Objective   Objective     Vitals:        Physical Exam     Result Review    [unfilled]    Assessment & Plan   Assessment / Plan     Brief Patient Summary:  Jordi Sam is a 89 y.o. male who ***    Active Hospital Problems:  There are no active hospital problems to display for this patient.    Plan:       DVT prophylaxis:  No DVT prophylaxis order currently exists.        CODE STATUS:       Disposition:  I expect patient to be discharged ***.    Anitha Napoles RN

## 2024-02-27 NOTE — NURSING NOTE
NP performed US Paracentesis, Pt had 4600cc removed, Pt kanu well, Pt has [place] drsg at site with small marked area from placement, Report at bedside to Elvira TONEY.

## 2024-02-27 NOTE — PRE-PROCEDURE NOTE
[facility]   Vascular Interventional Radiology  History & Physicial        Patient Name:Jordi Sam    : 1934    MRN: [id number]    Primary Care Physician: Jasper Avery MD    Referring Physician: MAKAYLA Stringer     Date of admission: 2024    Subjective     Reason for Consult: Paracentesis for reoccurring ascites.    History of Present Illness     Jordi Sam is a 89 y.o. male referred to IR as noted above.      Active Hospital Problems:  There are no active hospital problems to display for this patient.      Personal History     Past Medical History:   Diagnosis Date    A-fib     Acute inferior myocardial infarction     Acute inferior STEMI with RV infarct features, 2009.     CAD (coronary artery disease)     Cellulitis 2023    Facial. - admission at [facility] [place]. 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 Seeing Dr Kiersten Nova and Dr Jasper Avery        Past Surgical History:   Procedure Laterality Date    BRONCHOSCOPY N/A 2019    Procedure: BRONCHOSCOPY WITH THORACENTESIS;  Surgeon: Marciano Higginbotham MD;  Location:  [facility];  Service: Pulmonary    CARDIAC CATHETERIZATION Bilateral 2019    Procedure: Right and Left Heart Cath;  Surgeon: Efrem Posadas MD;  Location:  [facility];  Service: Cardiology    CARDIAC CATHETERIZATION      CHOLECYSTECTOMY      CORONARY ANGIOPLASTY WITH STENT PLACEMENT  2009    Sirolimus eluting stents to the RCA, 2009.     HERNIA REPAIR      Hernia  "repair x 2.     PARACENTESIS  06/17/2019    multiple    UMBILICAL HERNIA REPAIR N/A 7/26/2020    Procedure: UMBILICAL HERNIA REPAIR;  Surgeon: Maribell Her MD;  Location: [facility];  Service: General;  Laterality: N/A;       Family History: His family history includes Aneurysm in his father; Cancer in his sister; Dementia in his mother; Heart attack in his father; Heart disease in his brother and father; Hypertension in his daughter and son; No Known Problems in his brother and sister; Stroke in his brother and mother.     Social History: He  reports that he quit smoking about 40 years ago. His smoking use included cigarettes. He has a 70.00 pack-year smoking history. He has never used smokeless tobacco. He reports that he does not drink alcohol and does not use drugs.    Home Medications:  albuterol sulfate HFA, allopurinol, apixaban, folic acid, furosemide, levothyroxine, nitroglycerin, propranolol, simvastatin, spironolactone, and terazosin    Current Medications:    sodium chloride    sodium chloride    sodium chloride     Allergies:  Allergies   Allergen Reactions    Bee Venom Swelling    Sulfamethoxazole-Trimethoprim Other (See Comments)     Pt unaware       Review of Systems    IR Procedure pertinent significant findings are mentioned in the PMH and PSH above.    Objective     Visit Vitals  /69 (BP Location: Left arm, Patient Position: Lying)   Pulse 78   Temp 97.5 °F (36.4 °C) (Tympanic)   Resp 22   Ht 180.3 cm (71\")   Wt 104 kg (228 lb 12.8 oz)   SpO2 97%   BMI 31.91 kg/m²        Physical Exam    A&Ox3.   Able to communicate  No Apparent Distress  Average physique   CVS: VS as noted. Chart reviewed. Stable for the procedure.  Respiratory: Non labored breathing. No signs of respiratory compromise.    Result Review      I have personally reviewed the results from the time of this admission to 2/27/2024 10:57 EST and agree with these findings.  [x]  Laboratory  []  Microbiology  [x]  " "Radiology  []  EKG/Telemetry   []  Cardiology/Vascular   []  Pathology  []  Old records  []  Other:    Most notable findings include: As noted:                      CrCl cannot be calculated (Patient's most recent lab result is older than the maximum 30 days allowed.). No results found for: \"CREATININE\"    COVID19   Date Value Ref Range Status   11/04/2022 Not Detected  Final        No results found for: \"PREGTESTUR\", \"PREGSERUM\", \"HCG\", \"HCGQUANT\"     ASA SCALE ASSESSMENT (applicable ONLY if sedation planned):        MALLAMPATI CLASSIFICATION (applicable ONLY if sedation planned):       Assessment / Plan     Jordi Sam is a 89 y.o. male referred to the IR service with above problem.    Plan:   As above.    Notice: The note was created before the performance of the procedure. It might have been left in the pending status and signed off after the procedure. The time stamp on the note may be misleading.    MAKAYLA Joy   Vascular Interventional Radiology  02/27/24   10:57 AM EST     "

## 2024-03-01 RX ORDER — LEVOTHYROXINE SODIUM 0.03 MG/1
25 TABLET ORAL EVERY MORNING
Qty: 90 TABLET | Refills: 0 | Status: SHIPPED | OUTPATIENT
Start: 2024-03-01

## 2024-03-12 ENCOUNTER — OFFICE VISIT (OUTPATIENT)
Dept: GASTROENTEROLOGY | Facility: CLINIC | Age: 89
End: 2024-03-12
Payer: MEDICARE

## 2024-03-12 ENCOUNTER — LAB (OUTPATIENT)
Dept: LAB | Facility: HOSPITAL | Age: 89
End: 2024-03-12
Payer: MEDICARE

## 2024-03-12 VITALS
HEART RATE: 58 BPM | TEMPERATURE: 97.1 F | DIASTOLIC BLOOD PRESSURE: 49 MMHG | BODY MASS INDEX: 31.22 KG/M2 | SYSTOLIC BLOOD PRESSURE: 106 MMHG | WEIGHT: 223 LBS | HEIGHT: 71 IN

## 2024-03-12 DIAGNOSIS — R18.8 CIRRHOSIS OF LIVER WITH ASCITES, UNSPECIFIED HEPATIC CIRRHOSIS TYPE: Primary | ICD-10-CM

## 2024-03-12 DIAGNOSIS — E78.2 MIXED HYPERLIPIDEMIA: ICD-10-CM

## 2024-03-12 DIAGNOSIS — R18.8 CIRRHOSIS OF LIVER WITH ASCITES, UNSPECIFIED HEPATIC CIRRHOSIS TYPE: ICD-10-CM

## 2024-03-12 DIAGNOSIS — K74.60 CIRRHOSIS OF LIVER WITH ASCITES, UNSPECIFIED HEPATIC CIRRHOSIS TYPE: ICD-10-CM

## 2024-03-12 DIAGNOSIS — R00.1 BRADYCARDIA: ICD-10-CM

## 2024-03-12 DIAGNOSIS — K74.60 CIRRHOSIS OF LIVER WITH ASCITES, UNSPECIFIED HEPATIC CIRRHOSIS TYPE: Primary | ICD-10-CM

## 2024-03-12 LAB
ALBUMIN SERPL-MCNC: 3.5 G/DL (ref 3.5–5.2)
ALBUMIN/GLOB SERPL: 1.6 G/DL
ALP SERPL-CCNC: 103 U/L (ref 39–117)
ALT SERPL W P-5'-P-CCNC: 12 U/L (ref 1–41)
ANION GAP SERPL CALCULATED.3IONS-SCNC: 11 MMOL/L (ref 5–15)
AST SERPL-CCNC: 20 U/L (ref 1–40)
BILIRUB SERPL-MCNC: 0.6 MG/DL (ref 0–1.2)
BUN SERPL-MCNC: 24 MG/DL (ref 8–23)
BUN/CREAT SERPL: 14.5 (ref 7–25)
CALCIUM SPEC-SCNC: 8.9 MG/DL (ref 8.2–9.6)
CHLORIDE SERPL-SCNC: 100 MMOL/L (ref 98–107)
CHOLEST SERPL-MCNC: 108 MG/DL (ref 0–200)
CO2 SERPL-SCNC: 27 MMOL/L (ref 22–29)
CREAT SERPL-MCNC: 1.65 MG/DL (ref 0.76–1.27)
EGFRCR SERPLBLD CKD-EPI 2021: 39.2 ML/MIN/1.73
GLOBULIN UR ELPH-MCNC: 2.2 GM/DL
GLUCOSE SERPL-MCNC: 105 MG/DL (ref 65–99)
HDLC SERPL-MCNC: 43 MG/DL (ref 40–60)
INR PPP: 1.4 (ref 0.89–1.12)
LDLC SERPL CALC-MCNC: 47 MG/DL (ref 0–100)
LDLC/HDLC SERPL: 1.08 {RATIO}
POTASSIUM SERPL-SCNC: 4.3 MMOL/L (ref 3.5–5.2)
PROT SERPL-MCNC: 5.7 G/DL (ref 6–8.5)
PROTHROMBIN TIME: 17.3 SECONDS (ref 12.2–14.5)
SODIUM SERPL-SCNC: 138 MMOL/L (ref 136–145)
T4 FREE SERPL-MCNC: 1.17 NG/DL (ref 0.93–1.7)
TRIGL SERPL-MCNC: 92 MG/DL (ref 0–150)
TSH SERPL DL<=0.05 MIU/L-ACNC: 4.36 UIU/ML (ref 0.27–4.2)
VLDLC SERPL-MCNC: 18 MG/DL (ref 5–40)

## 2024-03-12 PROCEDURE — 82105 ALPHA-FETOPROTEIN SERUM: CPT

## 2024-03-12 PROCEDURE — 36415 COLL VENOUS BLD VENIPUNCTURE: CPT

## 2024-03-12 PROCEDURE — 85025 COMPLETE CBC W/AUTO DIFF WBC: CPT

## 2024-03-12 PROCEDURE — 99214 OFFICE O/P EST MOD 30 MIN: CPT | Performed by: NURSE PRACTITIONER

## 2024-03-12 PROCEDURE — 80061 LIPID PANEL: CPT | Performed by: NURSE PRACTITIONER

## 2024-03-12 PROCEDURE — 84439 ASSAY OF FREE THYROXINE: CPT | Performed by: NURSE PRACTITIONER

## 2024-03-12 PROCEDURE — 80053 COMPREHEN METABOLIC PANEL: CPT

## 2024-03-12 PROCEDURE — 1159F MED LIST DOCD IN RCRD: CPT | Performed by: NURSE PRACTITIONER

## 2024-03-12 PROCEDURE — 1160F RVW MEDS BY RX/DR IN RCRD: CPT | Performed by: NURSE PRACTITIONER

## 2024-03-12 PROCEDURE — 84443 ASSAY THYROID STIM HORMONE: CPT | Performed by: NURSE PRACTITIONER

## 2024-03-12 PROCEDURE — 85610 PROTHROMBIN TIME: CPT

## 2024-03-12 NOTE — PROGRESS NOTES
GASTROENTEROLOGY OFFICE NOTE  Jordi Sam  [id number]  1934    CARE TEAM  Patient Care Team:  Jasper Avery MD as PCP - General (Internal Medicine)  Jordi Law MD as Consulting Physician (Urology)  Chapin Villagran MD as Consulting Physician (Otolaryngology)  Gen Laurent MD as Consulting Physician (Gastroenterology)  Ezekiel Abarca MD as Referring Physician (Dermatology)  Renny Faustin MD as Consulting Physician (Radiation Oncology)  Larry Sin MD as Consulting Physician (Cardiology)    Referring Provider: Jasper Avery MD    Chief Complaint   Patient presents with    Follow-up        HISTORY OF PRESENT ILLNESS:  Patient returns in follow-up with cirrhosis decompensated by ascites consistent with cardiac ascites.  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    He continues on furosemide 40 mg in the morning and 20 mg in the evening as well as Aldactone 50 mg daily as directed by cardiology.  He continues to require paracentesis about once monthly.  He has no history of SBP.    He continues on propranolol as directed by cardiology.  He denies any GI bleeding.  He has no history of hepatic encephalopathy.    Has an abdominal wall hernia that has become somewhat symptomatic, he is having pain in the area.  The hernia is protruding more and more and of course is worse as ascites accumulates.    PAST MEDICAL HISTORY  Past Medical History:   Diagnosis Date    A-fib     Acute inferior myocardial infarction     Acute inferior STEMI with RV infarct features, January 2009.     CAD (coronary artery disease)     Cellulitis 02/26/2023    Facial. Feb 26-March 1st admission at [facility]. Treated with Bactrim  ointment and IV ABX    COVID-19 08/25/2022    Dyslipidemia     Goiter     History of “goiter.”    Gout     Hernia, umbilical      History of radiation therapy 10/31/2023    Right nasal ala BCC    Hyperlipidemia     Hypertension     GARCIA (nonalcoholic steatohepatitis)     Nephrolithiasis     Skin cancer     Skin cancer 09/20/2023    basal cell with radiation    Type 2 diabetes mellitus     Umbilical hernia     Pt stated has recently returned. Seeing Dr Kiersten Nova and Dr Jasper Avery 8-2021        PAST SURGICAL HISTORY  Past Surgical History:   Procedure Laterality Date    BRONCHOSCOPY N/A 4/12/2019    Procedure: BRONCHOSCOPY WITH THORACENTESIS;  Surgeon: Marciano Higginbotham MD;  Location:  [facility];  Service: Pulmonary    CARDIAC CATHETERIZATION Bilateral 1/30/2019    Procedure: Right and Left Heart Cath;  Surgeon: Efrem Posadas MD;  Location:  [facility];  Service: Cardiology    CARDIAC CATHETERIZATION      CHOLECYSTECTOMY      CORONARY ANGIOPLASTY WITH STENT PLACEMENT  01/09/2009    Sirolimus eluting stents to the RCA, 01/09/2009.     HERNIA REPAIR      Hernia repair x 2.     PARACENTESIS  06/17/2019    multiple    UMBILICAL HERNIA REPAIR N/A 7/26/2020    Procedure: UMBILICAL HERNIA REPAIR;  Surgeon: Maribell Her MD;  Location:  [facility];  Service: General;  Laterality: N/A;        MEDICATIONS:    Current Outpatient Medications:     albuterol sulfate  (90 Base) MCG/ACT inhaler, Inhale 2 puffs Every 4 (Four) Hours As Needed for Wheezing., Disp: 18 g, Rfl: 0    allopurinol (Zyloprim) 100 MG tablet, Take 1 tablet by mouth Daily., Disp: 90 tablet, Rfl: 2    apixaban (ELIQUIS) 2.5 MG tablet tablet, Take 1 tablet by mouth 2 (Two) Times a Day. Last dose 7-6-2023, Disp: , Rfl:     folic acid (FOLVITE) 400 MCG tablet, Take 1 tablet by mouth Daily., Disp: , Rfl:     furosemide (LASIX) 40 MG tablet, TAKE 1 TABLET BY MOUTH ONCE DAILY IN THE MORNING, 1/2 TABLET IN THE AFTERNOON AND AS NEEDED FOR WEIGHT GAIN, EDEMA AND DYPNEA, Disp: 45 tablet, Rfl: 11    levothyroxine (SYNTHROID, LEVOTHROID) 25 MCG  tablet, TAKE 1 TABLET BY MOUTH ONCE DAILY IN THE MORNING, Disp: 90 tablet, Rfl: 0    nitroglycerin (NITROSTAT) 0.4 MG SL tablet, DISSOLVE ONE TABLET UNDER THE TONGUE EVERY 5 MINUTES AS NEEDED FOR CHEST PAIN.  DO NOT EXCEED A TOTAL OF 3 DOSES IN 15 MINUTES, Disp: 25 tablet, Rfl: 3    propranolol (INDERAL) 20 MG tablet, Take 1 tablet by mouth 2 (Two) Times a Day., Disp: 180 tablet, Rfl: 3    simvastatin (ZOCOR) 40 MG tablet, Take 1 tablet by mouth once daily, Disp: 90 tablet, Rfl: 1    spironolactone (ALDACTONE) 50 MG tablet, Take 1 tablet by mouth once daily, Disp: 90 tablet, Rfl: 2    terazosin (HYTRIN) 2 MG capsule, TAKE 1 CAPSULE BY MOUTH ONCE DAILY AT NIGHT, Disp: 90 capsule, Rfl: 1    ALLERGIES  Allergies   Allergen Reactions    Bee Venom Swelling    Sulfamethoxazole-Trimethoprim Other (See Comments)     Pt unaware       FAMILY HISTORY:  Family History   Problem Relation Age of Onset    Dementia Mother     Stroke Mother     Heart disease Father     Heart attack Father     Aneurysm Father     Cancer Sister     No Known Problems Sister     No Known Problems Brother     Heart disease Brother         CABG    Stroke Brother     Hypertension Daughter     Hypertension Son     Colon cancer Neg Hx     Colon polyps Neg Hx        SOCIAL HISTORY  Social History     Socioeconomic History    Marital status:      Spouse name: Melly    Number of children: 4   Tobacco Use    Smoking status: Former     Current packs/day: 0.00     Average packs/day: 2.5 packs/day for 28.0 years (70.0 ttl pk-yrs)     Types: Cigarettes     Start date: 1955     Quit date: 1983     Years since quittin.8    Smokeless tobacco: Never   Vaping Use    Vaping status: Never Used   Substance and Sexual Activity    Alcohol use: No    Drug use: No    Sexual activity: Not Currently         PHYSICAL EXAM   /49 (BP Location: Left arm, Patient Position: Sitting, Cuff Size: Adult)   Pulse 58   Temp 97.1 °F (36.2 °C) (Temporal)   Ht  "180.3 cm (71\")   Wt 101 kg (223 lb)   BMI 31.10 kg/m²   Physical Exam  Constitutional:       Appearance: Normal appearance.   HENT:      Head: Normocephalic and atraumatic.   Pulmonary:      Effort: Pulmonary effort is normal.   Abdominal:      General: There is distension.      Hernia: A hernia is present.   Neurological:      Mental Status: He is alert and oriented to person, place, and time.   Psychiatric:         Mood and Affect: Mood normal.         Thought Content: Thought content normal.           Results Review:    Narrative & Impression   US LIVER     Date of Exam: 8/15/2023 10:03 AM EDT     Indication: cirrhosis.        Comparison: No comparisons available.     Technique: Grayscale and color Doppler ultrasound evaluation of the right upper quadrant was performed.        Findings:  The liver shows diffuse coarse echotexture with nodular surface compatible with cirrhosis. No definite focal liver lesions are seen. The portal vein is patent and shows hepatopetal flow. The common bile duct measures 0.4 cm.     There is moderate to severe ascites     Cholecystectomy changes. Pancreas not well seen due to gas shadowing     The right kidney measures 9.4 cm with no hydronephrosis or nephrolithiasis     IMPRESSION:  Impression:  Cirrhosis     Moderate to severe ascites           Electronically Signed: Amrit Galindo MD    8/15/2023 3:38 PM EDT      ASSESSMENT / PLAN  Diagnoses and all orders for this visit:    1. Cirrhosis of liver with ascites, unspecified hepatic cirrhosis type (Primary)  -     AFP Tumor Marker; Future  -     CBC & Differential; Future  -     Comprehensive Metabolic Panel; Future  -     Protime-INR; Future  -     US Liver; Future    2. Bradycardia  -     T4, Free  -     TSH    3. Mixed hyperlipidemia  -     Lipid Panel       -Continue diuretics as directed by cardiology  - Continue nonselective beta-blocker as directed by cardiology and forego surveillance for varices/EGD  - Paracentesis as " needed    >> He has an upcoming appointment with his primary care provider, will draw additional lab work T4, TSH and lipid panel which will likely be needed at his follow-up appointment and extra blood draw.    Return in about 6 months (around 9/12/2024).    I discussed the patients findings and my recommendations with patient    MAKAYLA Jimenez

## 2024-03-13 ENCOUNTER — APPOINTMENT (OUTPATIENT)
Dept: CT IMAGING | Facility: HOSPITAL | Age: 89
End: 2024-03-13
Payer: MEDICARE

## 2024-03-13 ENCOUNTER — HOSPITAL ENCOUNTER (EMERGENCY)
Facility: HOSPITAL | Age: 89
Discharge: HOME | End: 2024-03-13
Attending: EMERGENCY MEDICINE | Admitting: EMERGENCY MEDICINE
Payer: MEDICARE

## 2024-03-13 VITALS
OXYGEN SATURATION: 95 % | RESPIRATION RATE: 17 BRPM | WEIGHT: 220 LBS | DIASTOLIC BLOOD PRESSURE: 57 MMHG | BODY MASS INDEX: 30.8 KG/M2 | HEIGHT: 71 IN | SYSTOLIC BLOOD PRESSURE: 120 MMHG | HEART RATE: 50 BPM | TEMPERATURE: 97.5 F

## 2024-03-13 DIAGNOSIS — S09.90XA CLOSED HEAD INJURY, INITIAL ENCOUNTER: Primary | ICD-10-CM

## 2024-03-13 LAB
ALPHA-FETOPROTEIN: 2.72 NG/ML (ref 0–8.3)
BASOPHILS # BLD AUTO: 0.06 10*3/MM3 (ref 0–0.2)
BASOPHILS NFR BLD AUTO: 0.9 % (ref 0–1.5)
DEPRECATED RDW RBC AUTO: 51.8 FL (ref 37–54)
EOSINOPHIL # BLD AUTO: 0.1 10*3/MM3 (ref 0–0.4)
EOSINOPHIL NFR BLD AUTO: 1.6 % (ref 0.3–6.2)
ERYTHROCYTE [DISTWIDTH] IN BLOOD BY AUTOMATED COUNT: 14.7 % (ref 12.3–15.4)
HCT VFR BLD AUTO: 34.7 % (ref 37.5–51)
HGB BLD-MCNC: 11.3 G/DL (ref 13–17.7)
IMM GRANULOCYTES # BLD AUTO: 0.02 10*3/MM3 (ref 0–0.05)
IMM GRANULOCYTES NFR BLD AUTO: 0.3 % (ref 0–0.5)
LYMPHOCYTES # BLD AUTO: 0.64 10*3/MM3 (ref 0.7–3.1)
LYMPHOCYTES NFR BLD AUTO: 10 % (ref 19.6–45.3)
MCH RBC QN AUTO: 31.6 PG (ref 26.6–33)
MCHC RBC AUTO-ENTMCNC: 32.6 G/DL (ref 31.5–35.7)
MCV RBC AUTO: 96.9 FL (ref 79–97)
MONOCYTES # BLD AUTO: 0.6 10*3/MM3 (ref 0.1–0.9)
MONOCYTES NFR BLD AUTO: 9.4 % (ref 5–12)
NEUTROPHILS NFR BLD AUTO: 4.98 10*3/MM3 (ref 1.7–7)
NEUTROPHILS NFR BLD AUTO: 77.8 % (ref 42.7–76)
NRBC BLD AUTO-RTO: 0 /100 WBC (ref 0–0.2)
PLATELET # BLD AUTO: 148 10*3/MM3 (ref 140–450)
PMV BLD AUTO: 10.5 FL (ref 6–12)
RBC # BLD AUTO: 3.58 10*6/MM3 (ref 4.14–5.8)
WBC NRBC COR # BLD AUTO: 6.4 10*3/MM3 (ref 3.4–10.8)

## 2024-03-13 PROCEDURE — 70450 CT HEAD/BRAIN W/O DYE: CPT

## 2024-03-13 PROCEDURE — 99284 EMERGENCY DEPT VISIT MOD MDM: CPT

## 2024-03-13 NOTE — ED PROVIDER NOTES
[place]    EMERGENCY DEPARTMENT ENCOUNTER      Pt Name: Jordi Sam  MRN: [id number]  YOB: 1934  Date of evaluation: 3/13/2024  Provider: Teddy Rainey DO    CHIEF COMPLAINT       Chief Complaint   Patient presents with    Fall     HPI  Stated Reason for Visit: Pt stated to have fallen at 1430, hit his head w/ + LOC. Pt was seen at [facility] and recieved staples for head lac however was referred to the ED for head ct because of blood thinner use History Obtained From: patient     HISTORY OF PRESENT ILLNESS  (Location/Symptom, Timing/Onset, Context/Setting, Quality, Duration, Modifying Factors, Severity.)   Jordi Sam is a 89 y.o. male who presents to the emergency department with his wife from urgent treatment center secondary to a fall which occurred on 230 this afternoon.  He did hit his head, questionable loss of consciousness.  He at the urgent treatment center he did receive treatment for his head laceration with staples, but he notes a history of atrial fibrillation and is on Eliquis at baseline.  He notes the fall was from a tripping mechanism, he had some loose stools recently without any blood.  He denies any chest pain or palpitations, no Betina weakness, numbness or tingling prior to this incident.  He denies any other acute complaints, states really is here just for a CT scan image so that he can go home.  The wife states he has been acting his normal baseline since the fall this afternoon.    Nursing notes were reviewed.      PAST MEDICAL HISTORY     Past Medical History:   Diagnosis Date    A-fib     Acute inferior myocardial infarction     Acute inferior STEMI with RV infarct features, January 2009.     CAD (coronary artery disease)     Cellulitis 02/26/2023    Facial. Feb 26-March 1st admission at [facility]. 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 Seeing Dr Kiersten Nova and Dr Jasper Avery 8-2021         SURGICAL HISTORY       Past Surgical History:   Procedure Laterality Date    BRONCHOSCOPY N/A 4/12/2019    Procedure: BRONCHOSCOPY WITH THORACENTESIS;  Surgeon: Marciano Higginbotham MD;  Location:  [facility];  Service: Pulmonary    CARDIAC CATHETERIZATION Bilateral 1/30/2019    Procedure: Right and Left Heart Cath;  Surgeon: Efrem Posadas MD;  Location:  [facility];  Service: Cardiology    CARDIAC CATHETERIZATION      CHOLECYSTECTOMY      CORONARY ANGIOPLASTY WITH STENT PLACEMENT  01/09/2009    Sirolimus eluting stents to the RCA, 01/09/2009.     HERNIA REPAIR      Hernia repair x 2.     PARACENTESIS  06/17/2019    multiple    UMBILICAL HERNIA REPAIR N/A 7/26/2020    Procedure: UMBILICAL HERNIA REPAIR;  Surgeon: Maribell Her MD;  Location:  [facility] OR;  Service: General;  Laterality: N/A;         CURRENT MEDICATIONS     No current facility-administered medications for this encounter.    Current Outpatient Medications:     albuterol sulfate  (90 Base) MCG/ACT inhaler, Inhale 2 puffs Every 4 (Four) Hours As Needed for Wheezing., Disp: 18 g, Rfl: 0    allopurinol (Zyloprim) 100 MG tablet, Take 1 tablet by mouth Daily., Disp: 90 tablet, Rfl: 2    apixaban (ELIQUIS) 2.5 MG tablet tablet, Take 1 tablet by mouth 2 (Two) Times a Day. Last dose 7-6-2023, Disp: , Rfl:     folic acid (FOLVITE) 400 MCG tablet, Take 1 tablet by mouth Daily., Disp: , Rfl:     furosemide (LASIX) 40 MG tablet, TAKE 1 TABLET BY MOUTH ONCE DAILY IN THE MORNING, 1/2 TABLET IN THE AFTERNOON AND AS NEEDED FOR WEIGHT GAIN, EDEMA AND DYPNEA, Disp: 45 tablet,  Rfl: 11    levothyroxine (SYNTHROID, LEVOTHROID) 25 MCG tablet, TAKE 1 TABLET BY MOUTH ONCE DAILY IN THE MORNING, Disp: 90 tablet, Rfl: 0    nitroglycerin (NITROSTAT) 0.4 MG SL tablet, DISSOLVE ONE TABLET UNDER THE TONGUE EVERY 5 MINUTES AS NEEDED FOR CHEST PAIN.  DO NOT EXCEED A TOTAL OF 3 DOSES IN 15 MINUTES, Disp: 25 tablet, Rfl: 3    propranolol (INDERAL) 20 MG tablet, Take 1 tablet by mouth 2 (Two) Times a Day., Disp: 180 tablet, Rfl: 3    simvastatin (ZOCOR) 40 MG tablet, Take 1 tablet by mouth once daily, Disp: 90 tablet, Rfl: 1    spironolactone (ALDACTONE) 50 MG tablet, Take 1 tablet by mouth once daily, Disp: 90 tablet, Rfl: 2    terazosin (HYTRIN) 2 MG capsule, TAKE 1 CAPSULE BY MOUTH ONCE DAILY AT NIGHT, Disp: 90 capsule, Rfl: 1    ALLERGIES     Bee venom and Sulfamethoxazole-trimethoprim    FAMILY HISTORY       Family History   Problem Relation Age of Onset    Dementia Mother     Stroke Mother     Heart disease Father     Heart attack Father     Aneurysm Father     Cancer Sister     No Known Problems Sister     No Known Problems Brother     Heart disease Brother         CABG    Stroke Brother     Hypertension Daughter     Hypertension Son     Colon cancer Neg Hx     Colon polyps Neg Hx           SOCIAL HISTORY       Social History     Socioeconomic History    Marital status:      Spouse name: Melly    Number of children: 4   Tobacco Use    Smoking status: Former     Current packs/day: 0.00     Average packs/day: 2.5 packs/day for 28.0 years (70.0 ttl pk-yrs)     Types: Cigarettes     Start date: 1955     Quit date: 1983     Years since quittin.8    Smokeless tobacco: Never   Vaping Use    Vaping status: Never Used   Substance and Sexual Activity    Alcohol use: No    Drug use: No    Sexual activity: Not Currently         PHYSICAL EXAM    (up to 7 for level 4, 8 or more for level 5)     Vitals:    24 1745   BP: 120/57   Pulse: 50   Resp: 17   Temp: 97.5 °F (36.4 °C)   SpO2: 95%  "  Weight: 99.8 kg (220 lb)   Height: 180.3 cm (71\")       Physical Exam  General : Patient is awake, alert, oriented, in no acute distress, nontoxic appearing  HEENT: Pupils are equally round, EOMI, conjunctivae clear, laceration to the superior right posterior scalp with 5 staples in place, hemostasis achieved  Neck: Neck is supple, full range of motion, trachea midline  Cardiac: Heart regular rate, rhythm, no murmurs, rubs, or gallops  Lungs: Lungs are clear to auscultation  Chest wall: There is no tenderness to palpation over the chest wall or over ribs  Abdomen: Abdomen is soft, nontender, nondistended  Musculoskeletal: No focal muscle deficits are appreciated  Neuro: Motor intact, sensory intact, level of consciousness is normal, GCS 15  Dermatology: scalp laceration as noted above, skin is warm and dry  Psych: Mentation is grossly normal, cognition is grossly normal. Affect is appropriate      DIAGNOSTIC RESULTS     EKG:  All EKGs are interpreted by the Emergency Department Physician who either signs or Co-signs this chart in the absence of a cardiologist.    No orders to display       RADIOLOGY:     [x] Radiologist's Report Reviewed:  CT Head Without Contrast   Final Result   Impression:   1. No acute intracranial abnormality. Specifically, no evidence of acute hemorrhage, mass effect or midline shift.   2. Right parietal scalp laceration without underlying calvarial fracture.   3. Left parietal subcortical white matter hypodensity likely representing sequelae of prior infarct or chronic small vessel ischemic disease.            Electronically Signed: Jonah Berger     3/13/2024 7:48 PM EDT     Workstation ID: [id number]          I ordered and independently reviewed the above noted radiographic studies.      I viewed images of CT head which showed no acute intracranial normality or underlying skull fracture per my independent interpretation.    See radiologist's dictation for official interpretation.      ED " BEDSIDE ULTRASOUND:   Performed by ED Physician - none    LABS:    I have reviewed and interpreted all of the currently available lab results from this visit (if applicable):  Results for orders placed or performed in visit on 03/12/24   AFP Tumor Marker    Specimen: Blood   Result Value Ref Range    ALPHA-FETOPROTEIN 2.72 0 - 8.3 ng/mL   Comprehensive Metabolic Panel    Specimen: Blood   Result Value Ref Range    Glucose 105 (H) 65 - 99 mg/dL    BUN 24 (H) 8 - 23 mg/dL    Creatinine 1.65 (H) 0.76 - 1.27 mg/dL    Sodium 138 136 - 145 mmol/L    Potassium 4.3 3.5 - 5.2 mmol/L    Chloride 100 98 - 107 mmol/L    CO2 27.0 22.0 - 29.0 mmol/L    Calcium 8.9 8.2 - 9.6 mg/dL    Total Protein 5.7 (L) 6.0 - 8.5 g/dL    Albumin 3.5 3.5 - 5.2 g/dL    ALT (SGPT) 12 1 - 41 U/L    AST (SGOT) 20 1 - 40 U/L    Alkaline Phosphatase 103 39 - 117 U/L    Total Bilirubin 0.6 0.0 - 1.2 mg/dL    Globulin 2.2 gm/dL    A/G Ratio 1.6 g/dL    BUN/Creatinine Ratio 14.5 7.0 - 25.0    Anion Gap 11.0 5.0 - 15.0 mmol/L    eGFR 39.2 (L) >60.0 mL/min/1.73   Protime-INR    Specimen: Blood   Result Value Ref Range    Protime 17.3 (H) 12.2 - 14.5 Seconds    INR 1.40 (H) 0.89 - 1.12   CBC Auto Differential    Specimen: Blood   Result Value Ref Range    WBC 6.40 3.40 - 10.80 10*3/mm3    RBC 3.58 (L) 4.14 - 5.80 10*6/mm3    Hemoglobin 11.3 (L) 13.0 - 17.7 g/dL    Hematocrit 34.7 (L) 37.5 - 51.0 %    MCV 96.9 79.0 - 97.0 fL    MCH 31.6 26.6 - 33.0 pg    MCHC 32.6 31.5 - 35.7 g/dL    RDW 14.7 12.3 - 15.4 %    RDW-SD 51.8 37.0 - 54.0 fl    MPV 10.5 6.0 - 12.0 fL    Platelets 148 140 - 450 10*3/mm3    Neutrophil % 77.8 (H) 42.7 - 76.0 %    Lymphocyte % 10.0 (L) 19.6 - 45.3 %    Monocyte % 9.4 5.0 - 12.0 %    Eosinophil % 1.6 0.3 - 6.2 %    Basophil % 0.9 0.0 - 1.5 %    Immature Grans % 0.3 0.0 - 0.5 %    Neutrophils, Absolute 4.98 1.70 - 7.00 10*3/mm3    Lymphocytes, Absolute 0.64 (L) 0.70 - 3.10 10*3/mm3    Monocytes, Absolute 0.60 0.10 - 0.90 10*3/mm3     Eosinophils, Absolute 0.10 0.00 - 0.40 10*3/mm3    Basophils, Absolute 0.06 0.00 - 0.20 10*3/mm3    Immature Grans, Absolute 0.02 0.00 - 0.05 10*3/mm3    nRBC 0.0 0.0 - 0.2 /100 WBC        If labs were ordered, I independently reviewed the results and considered them in treating the patient.      EMERGENCY DEPARTMENT COURSE and DIFFERENTIAL DIAGNOSIS/MDM:   Vitals:  AS OF 20:59 EDT    BP - 120/57  HR - 50  TEMP - 97.5 °F (36.4 °C)  O2 SATS - 95%      Orders placed during this visit:  Orders Placed This Encounter   Procedures    CT Head Without Contrast       All labs have been independently reviewed by me.  All radiology studies have been reviewed by me and the radiologist dictating the report.  All EKG's have been independently viewed and interpreted by me.      Discussion below represents my analysis of pertinent findings related to patient's condition, differential diagnosis, treatment plan and final disposition.    Differential diagnosis:  The differential diagnosis associated with the patient's presentation includes: Closed head injury, concussion, scalp contusion, laceration, intracranial hemorrhage    Additional sources  Discussed/ obtained information from independent historians:   [x] Spouse  [] Parent  [] Family member  [] Friend  [] EMS   [] Other:    External (non-ED) record review:   [] Inpatient record:   [] Office record:   [] Outpatient record:   [] Prior Outpatient labs:   [] Prior Outpatient radiology:   [] Primary Care record:   [] Outside ED record:   [] Other:     Patient's care impacted by:   [] Diabetes  [] Hypertension  [] CHF  [] Hyperlipidemia  [] Coronary Artery Disease   [] COPD   [] Cancer   [] Tobacco Abuse   [] Substance Abuse    [x] Other: Atrial fibrillation on chronic anticoagulation    Care significantly affected by Social Determinants of Health (housing and economic circumstances, unemployment)    [] Yes     [x] No   If yes, Patient's care significantly limited by Social  Determinants of Health including:   [] Inadequate housing   [] Low income   [] Alcoholism and drug addiction in family   [] Problems related to primary support group   [] Unemployment   [] Problems related to employment   [] Other Social Determinants of Health:       MEDICATIONS ADMINISTERED IN ED:  Medications - No data to display           This a pleasant 89-year-old male with a fall, closed head injury with scalp laceration prior to arrival.  He is anticoagulated with a history of atrial fibrillation.  CT scan of the head without any acute intracranial abnormality, no skull fracture.  The patient states in general he feels well, does not know any further workup at this time, but did discussed close head injury precautions, plan for close follow-up with PCP in a couple days, return to the ED with any worsening symptoms or further concerns in the meantime.    I had a discussion with the patient/family regarding diagnosis, diagnostic results, treatment plan, and medications.  The patient/family indicated understanding of these instructions. I encouraged the patient to return to the emergency department immediately for ANY concerns, worsening, new complaints, or if symptoms persist and unable to seek follow-up in a timely fashion.  The patient/family expressed understanding and agreement with this plan.  The patient will follow-up with their PCP in 1-2 days for reevaluation.     PROCEDURES:  Procedures    CRITICAL CARE TIME    Total Critical Care time was 0 minutes, excluding separately reportable procedures.   There was a high probability of clinically significant/life threatening deterioration in the patient's condition which required my urgent intervention.      FINAL IMPRESSION      1. Closed head injury, initial encounter          DISPOSITION/PLAN     ED Disposition       ED Disposition   Discharge    Condition   Stable    Comment   --               PATIENT REFERRED TO:  Jasper Avery MD  [address]  [facility] [address]  [phone]    In 2 days      [facility] EMERGENCY DEPARTMENT  [address]  [facility] [address] [phone]    If symptoms worsen      DISCHARGE MEDICATIONS:     Medication List        CONTINUE taking these medications      albuterol sulfate  (90 Base) MCG/ACT inhaler  Commonly known as: PROVENTIL HFA;VENTOLIN HFA;PROAIR HFA  Inhale 2 puffs Every 4 (Four) Hours As Needed for Wheezing.     allopurinol 100 MG tablet  Commonly known as: Zyloprim  Take 1 tablet by mouth Daily.     apixaban 2.5 MG tablet tablet  Commonly known as: ELIQUIS     folic acid 400 MCG tablet  Commonly known as: FOLVITE     furosemide 40 MG tablet  Commonly known as: LASIX  TAKE 1 TABLET BY MOUTH ONCE DAILY IN THE MORNING, 1/2 TABLET IN THE AFTERNOON AND AS NEEDED FOR WEIGHT GAIN, EDEMA AND DYPNEA     levothyroxine 25 MCG tablet  Commonly known as: SYNTHROID, LEVOTHROID  TAKE 1 TABLET BY MOUTH ONCE DAILY IN THE MORNING     nitroglycerin 0.4 MG SL tablet  Commonly known as: NITROSTAT  DISSOLVE ONE TABLET UNDER THE TONGUE EVERY 5 MINUTES AS NEEDED FOR CHEST PAIN.  DO NOT EXCEED A TOTAL OF 3 DOSES IN 15 MINUTES     propranolol 20 MG tablet  Commonly known as: INDERAL  Take 1 tablet by mouth 2 (Two) Times a Day.     simvastatin 40 MG tablet  Commonly known as: ZOCOR  Take 1 tablet by mouth once daily     spironolactone 50 MG tablet  Commonly known as: ALDACTONE  Take 1 tablet by mouth once daily     terazosin 2 MG capsule  Commonly known as: HYTRIN  TAKE 1 CAPSULE BY MOUTH ONCE DAILY AT NIGHT                  Comment: Please note this report has been produced using speech recognition software.      Teddy Rainey DO  Attending Emergency Physician         Teddy Rainey DO  03/14/24 [id number]

## 2024-03-15 ENCOUNTER — TELEPHONE (OUTPATIENT)
Dept: CARDIOLOGY | Facility: HOSPITAL | Age: 89
End: 2024-03-15
Payer: MEDICARE

## 2024-03-15 DIAGNOSIS — R18.8 CIRRHOSIS OF LIVER WITH ASCITES, UNSPECIFIED HEPATIC CIRRHOSIS TYPE: ICD-10-CM

## 2024-03-15 DIAGNOSIS — K74.60 CIRRHOSIS OF LIVER WITH ASCITES, UNSPECIFIED HEPATIC CIRRHOSIS TYPE: ICD-10-CM

## 2024-03-15 DIAGNOSIS — I50.812 CHRONIC RIGHT HEART FAILURE: Primary | ICD-10-CM

## 2024-03-15 DIAGNOSIS — N18.30 STAGE 3 CHRONIC KIDNEY DISEASE, UNSPECIFIED WHETHER STAGE 3A OR 3B CKD: ICD-10-CM

## 2024-03-15 NOTE — TELEPHONE ENCOUNTER
----- Message from MAKAYLA Stringer sent at 3/14/2024 12:07 PM EDT -----  I saw Mr. Sam follow-up for cirrhosis.  I am forwarding his blood work to you because his creatinine is elevated and cardiology has been managing his diuretics.  ----- Message -----  From: Lab, Background User  Sent: 3/12/2024   6:10 PM EDT  To: MAKAYLA Stringer

## 2024-03-15 NOTE — TELEPHONE ENCOUNTER
Reviewed patient's CMP completed on 3/12/2024.  Creatinine 1.6.    Baseline creatinine has been 1.3-1.5.  Historically patient's creatinine will become elevated with worsening ascites and cardiorenal syndrome.  He reports worsening abdominal distention.  Plans to schedule repeat paracentesis within the next 2 weeks.  Recommend to complete as scheduled.  Repeat lab work in approximately 2 weeks to recheck kidney function.  If continues to be elevated may need to decrease diuretics.

## 2024-03-18 ENCOUNTER — HOSPITAL ENCOUNTER (OUTPATIENT)
Dept: RADIOLOGY | Facility: CLINIC | Age: 89
Discharge: HOME | End: 2024-03-18
Payer: MEDICARE

## 2024-03-18 ENCOUNTER — OFFICE VISIT (OUTPATIENT)
Dept: FAMILY MEDICINE CLINIC | Facility: CLINIC | Age: 89
End: 2024-03-18
Payer: MEDICARE

## 2024-03-18 VITALS
SYSTOLIC BLOOD PRESSURE: 122 MMHG | HEIGHT: 71 IN | DIASTOLIC BLOOD PRESSURE: 60 MMHG | OXYGEN SATURATION: 97 % | BODY MASS INDEX: 31.36 KG/M2 | HEART RATE: 78 BPM | WEIGHT: 224 LBS

## 2024-03-18 DIAGNOSIS — M25.511 ACUTE PAIN OF RIGHT SHOULDER: Primary | ICD-10-CM

## 2024-03-18 DIAGNOSIS — M25.511 ACUTE PAIN OF RIGHT SHOULDER: ICD-10-CM

## 2024-03-18 DIAGNOSIS — S11.91XS: ICD-10-CM

## 2024-03-18 DIAGNOSIS — S01.91XS: ICD-10-CM

## 2024-03-18 PROBLEM — J90 PLEURAL EFFUSION: Status: RESOLVED | Noted: 2019-04-08 | Resolved: 2024-03-18

## 2024-03-18 PROBLEM — A49.02 MRSA INFECTION: Status: RESOLVED | Noted: 2023-03-07 | Resolved: 2024-03-18

## 2024-03-18 PROBLEM — S01.91XA LACERATION OF HEAD AND NECK: Status: ACTIVE | Noted: 2024-03-18

## 2024-03-18 PROBLEM — R25.2 MUSCLE CRAMPS: Status: RESOLVED | Noted: 2022-02-16 | Resolved: 2024-03-18

## 2024-03-18 PROBLEM — S11.91XA LACERATION OF HEAD AND NECK: Status: ACTIVE | Noted: 2024-03-18

## 2024-03-18 PROBLEM — R25.2 MUSCLE CRAMPS: Status: RESOLVED | Noted: 2021-02-17 | Resolved: 2024-03-18

## 2024-03-18 RX ORDER — DOXYCYCLINE HYCLATE 100 MG/1
100 CAPSULE ORAL DAILY
COMMUNITY
Start: 2024-03-14

## 2024-03-18 NOTE — ASSESSMENT & PLAN NOTE
Significant tenderness over right shoulder and clavicle, will obtain x-ray to rule out fracture.  Wound to lateral elbow examined, dressing replaced.  Wound is clean and dry.  Continue antibiotics as prescribed by dermatology.

## 2024-03-18 NOTE — PROGRESS NOTES
Office Note     Name: Jordi Sam    : 1934     MRN: [id number]     Chief Complaint  Hospital Follow Up Visit and Shoulder Pain (Pt states when he had his fall he messed his right shoulder up. Its bruised)    Subjective     History of Present Illness:  Jordi Sam is a 89 y.o. male who presents today for evaluation of right shoulder pain.    Patient states that 4 days ago he was standing next to his bed when he feels his socks slipped on the floor and he fell.  He states he landed on his right shoulder and his head swung back and hit the ground.  Patient did suffer a laceration to his posterior skull, and his family immediately brought him to the urgent care for evaluation.  Patient did receive multiple staples in the urgent care and then was directed to the ED for evaluation as he is on a blood thinner.  Patient's head CT completed in the ER was within normal limits, and he was directed to return to the urgent care in 14 days for staple removal.  Patient states overall his head is doing okay however he does have some numbness around the site.  Patient states though with the trauma to his head he did not have a thorough evaluation of his right shoulder.  He states that since the fall he has had pain in both his right shoulder and his anterior clavicle.  He does have tenderness when moving his shoulder or using his hand to lift himself up from a chair.  He did fall and has a significant abrasion to his right lateral upper shoulder.  He did have a rug burn at this site and a bandage has been placed.  His wife states that she change the bandage once daily and due to dryness it seems to reactivate the area and have some minor bleeding.  1 day after the fall he did have an already planned visit with his dermatologist for a skin lesion on his right lower upper extremity.  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  These were removed however the  area did have some infection so he was started on doxycycline.  He is currently day on day 3 of this prescription.  Review of Systems:   Review of Systems   Musculoskeletal:  Positive for arthralgias.   Neurological:  Positive for headache.   All other systems reviewed and are negative.      Past Medical History:   Past Medical History:   Diagnosis Date    A-fib     Acute inferior myocardial infarction     Acute inferior STEMI with RV infarct features, January 2009.     CAD (coronary artery disease)     Cellulitis 02/26/2023    Facial. Feb 26-March 1st admission at [facility]. 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 Seeing Dr Kiersten Nova and Dr Jasper Avery 8-2021       Past Surgical History:   Past Surgical History:   Procedure Laterality Date    BRONCHOSCOPY N/A 4/12/2019    Procedure: BRONCHOSCOPY WITH THORACENTESIS;  Surgeon: Marciano Higginbotham MD;  Location:  [facility];  Service: Pulmonary    CARDIAC CATHETERIZATION Bilateral 1/30/2019    Procedure: Right and Left Heart Cath;  Surgeon: Efrem Posadas MD;  Location:  [facility];  Service: Cardiology    CARDIAC CATHETERIZATION      CHOLECYSTECTOMY      CORONARY ANGIOPLASTY WITH STENT PLACEMENT  01/09/2009    Sirolimus eluting stents to the RCA, 01/09/2009.     HERNIA REPAIR      Hernia repair x 2.     PARACENTESIS  06/17/2019    multiple    UMBILICAL HERNIA REPAIR N/A 7/26/2020    Procedure: UMBILICAL HERNIA REPAIR;  Surgeon: Maribell Her MD;  Location: [facility] beds at the [facility] OR;  Service: General;  Laterality: N/A;        Family History:   Family History   Problem Relation Age of Onset    Dementia Mother     Stroke Mother     Heart disease Father     Heart attack Father     Aneurysm Father     Cancer Sister     No Known Problems Sister     No Known Problems Brother     Heart disease Brother         CABG    Stroke Brother     Hypertension Daughter     Hypertension Son     Colon cancer Neg Hx     Colon polyps Neg Hx        Social History:   Social History     Socioeconomic History    Marital status:      Spouse name: Melly    Number of children: 4   Tobacco Use    Smoking status: Former     Current packs/day: 0.00     Average packs/day: 2.5 packs/day for 28.0 years (70.0 ttl pk-yrs)     Types: Cigarettes     Start date: 1955     Quit date: 1983     Years since quittin.8    Smokeless tobacco: Never   Vaping Use    Vaping status: Never Used   Substance and Sexual Activity    Alcohol use: No    Drug use: No    Sexual activity: Not Currently       Immunizations:   Immunization History   Administered Date(s) Administered    COVID-19 (PFIZER) Purple Cap Monovalent 2021, 2021, 2021    FLUAD TRI 65YR+ 2019    FluMist 2-49yrs 2016    Fluad Quad 65+ 2020    Fluzone High Dose =>65 Years ([employer] ONLY) 2016, 01/10/2017, 10/24/2017, 2018    Fluzone High-Dose 65+yrs 2022, 2023    Influenza Quad Vaccine (Inpatient) 10/12/2010    Pneumococcal Conjugate 13-Valent (PCV13) 2016    Pneumococcal Polysaccharide (PPSV23) 2019    Pneumococcal, Unspecified 2016    Shingrix 2022    Tdap 2022        Medications:     Current Outpatient Medications:     albuterol sulfate  (90 Base) MCG/ACT inhaler, Inhale 2 puffs Every 4 (Four) Hours As Needed for Wheezing., Disp: 18 g, Rfl: 0    allopurinol (Zyloprim) 100 MG tablet, Take 1 tablet by mouth Daily., Disp: 90 tablet, Rfl: 2    apixaban (ELIQUIS) 2.5 MG tablet tablet, Take 1 tablet by mouth 2  "(Two) Times a Day. Last dose 7-6-2023, Disp: , Rfl:     doxycycline (VIBRAMYCIN) 100 MG capsule, Take 1 capsule by mouth Daily., Disp: , Rfl:     folic acid (FOLVITE) 400 MCG tablet, Take 1 tablet by mouth Daily., Disp: , Rfl:     furosemide (LASIX) 40 MG tablet, TAKE 1 TABLET BY MOUTH ONCE DAILY IN THE MORNING, 1/2 TABLET IN THE AFTERNOON AND AS NEEDED FOR WEIGHT GAIN, EDEMA AND DYPNEA, Disp: 45 tablet, Rfl: 11    levothyroxine (SYNTHROID, LEVOTHROID) 25 MCG tablet, TAKE 1 TABLET BY MOUTH ONCE DAILY IN THE MORNING, Disp: 90 tablet, Rfl: 0    mupirocin (BACTROBAN) 2 % ointment, Apply 1 Application topically to the appropriate area as directed Daily., Disp: , Rfl:     nitroglycerin (NITROSTAT) 0.4 MG SL tablet, DISSOLVE ONE TABLET UNDER THE TONGUE EVERY 5 MINUTES AS NEEDED FOR CHEST PAIN.  DO NOT EXCEED A TOTAL OF 3 DOSES IN 15 MINUTES, Disp: 25 tablet, Rfl: 3    propranolol (INDERAL) 20 MG tablet, Take 1 tablet by mouth 2 (Two) Times a Day., Disp: 180 tablet, Rfl: 3    simvastatin (ZOCOR) 40 MG tablet, Take 1 tablet by mouth once daily, Disp: 90 tablet, Rfl: 1    spironolactone (ALDACTONE) 50 MG tablet, Take 1 tablet by mouth once daily, Disp: 90 tablet, Rfl: 2    terazosin (HYTRIN) 2 MG capsule, TAKE 1 CAPSULE BY MOUTH ONCE DAILY AT NIGHT, Disp: 90 capsule, Rfl: 1    Allergies:   Allergies   Allergen Reactions    Bee Venom Swelling    Sulfamethoxazole-Trimethoprim Other (See Comments)     Pt unaware       Objective     Vital Signs  /60   Pulse 78   Ht 180.3 cm (70.98\")   Wt 102 kg (224 lb)   SpO2 97%   BMI 31.26 kg/m²   Estimated body mass index is 31.26 kg/m² as calculated from the following:    Height as of this encounter: 180.3 cm (70.98\").    Weight as of this encounter: 102 kg (224 lb).       Physical Exam  Vitals and nursing note reviewed.   Constitutional:       Appearance: Normal appearance. He is normal weight.   HENT:      Head: Normocephalic.      Comments: Staples on posterior head, dry and " clean     Nose: Nose normal.      Mouth/Throat:      Mouth: Mucous membranes are moist.   Eyes:      Extraocular Movements: Extraocular movements intact.      Pupils: Pupils are equal, round, and reactive to light.   Cardiovascular:      Rate and Rhythm: Normal rate and regular rhythm.   Pulmonary:      Effort: Pulmonary effort is normal.      Breath sounds: Normal breath sounds.   Abdominal:      General: Abdomen is flat. Bowel sounds are normal.      Palpations: Abdomen is soft.   Musculoskeletal:         General: Normal range of motion.      Cervical back: Normal range of motion.      Comments: Tenderness to palpation of anterior right shoulder and along mid clavicle.  Significant bruising and superficial abrasion to right lateral shoulder covered in brand is dry and clean   Skin:     General: Skin is warm and dry.   Neurological:      General: No focal deficit present.      Mental Status: He is alert.   Psychiatric:         Mood and Affect: Mood normal.         Behavior: Behavior normal.         Thought Content: Thought content normal.         Judgment: Judgment normal.            Procedures     Results:  No results found for this or any previous visit (from the past 24 hour(s)).     Assessment and Plan     Assessment/Plan:  Diagnoses and all orders for this visit:    1. Acute pain of right shoulder (Primary)  Assessment & Plan:  Significant tenderness over right shoulder and clavicle, will obtain x-ray to rule out fracture.  Wound to lateral elbow examined, dressing replaced.  Wound is clean and dry.  Continue antibiotics as prescribed by dermatology.    Orders:  -     XR Shoulder 2+ View Right; Future    2. Laceration of head and neck, sequela  Assessment & Plan:  Wound clean and dry, staples in place.  Patient advised to have staples removed 14 days after placement.          Follow Up  Return if symptoms worsen or fail to improve.    Amie Hawkins DO   [facility]

## 2024-03-18 NOTE — ASSESSMENT & PLAN NOTE
Wound clean and dry, staples in place.  Patient advised to have staples removed 14 days after placement.

## 2024-03-22 ENCOUNTER — PREP FOR SURGERY (OUTPATIENT)
Dept: OTHER | Facility: HOSPITAL | Age: 89
End: 2024-03-22
Payer: MEDICARE

## 2024-03-22 RX ORDER — SODIUM CHLORIDE 0.9 % (FLUSH) 0.9 %
3 SYRINGE (ML) INJECTION EVERY 12 HOURS SCHEDULED
Status: CANCELLED | OUTPATIENT
Start: 2024-03-22

## 2024-03-22 RX ORDER — SODIUM CHLORIDE 9 MG/ML
40 INJECTION, SOLUTION INTRAVENOUS AS NEEDED
Status: CANCELLED | OUTPATIENT
Start: 2024-03-22

## 2024-03-22 RX ORDER — SODIUM CHLORIDE 0.9 % (FLUSH) 0.9 %
10 SYRINGE (ML) INJECTION AS NEEDED
Status: CANCELLED | OUTPATIENT
Start: 2024-03-22

## 2024-03-25 ENCOUNTER — HOSPITAL ENCOUNTER (OUTPATIENT)
Dept: ULTRASOUND IMAGING | Facility: HOSPITAL | Age: 89
Discharge: HOME | End: 2024-03-25
Admitting: NURSE PRACTITIONER
Payer: MEDICARE

## 2024-03-25 ENCOUNTER — TELEPHONE (OUTPATIENT)
Dept: CARDIOLOGY | Facility: CLINIC | Age: 89
End: 2024-03-25
Payer: MEDICARE

## 2024-03-25 VITALS
TEMPERATURE: 97.2 F | HEART RATE: 55 BPM | BODY MASS INDEX: 30.75 KG/M2 | SYSTOLIC BLOOD PRESSURE: 99 MMHG | WEIGHT: 220.4 LBS | OXYGEN SATURATION: 97 % | DIASTOLIC BLOOD PRESSURE: 75 MMHG

## 2024-03-25 PROCEDURE — 76942 ECHO GUIDE FOR BIOPSY: CPT

## 2024-03-25 RX ORDER — SODIUM CHLORIDE 9 MG/ML
40 INJECTION, SOLUTION INTRAVENOUS AS NEEDED
Status: DISCONTINUED | OUTPATIENT
Start: 2024-03-25 | End: 2024-03-26 | Stop reason: HOSPADM

## 2024-03-25 RX ORDER — SODIUM CHLORIDE 0.9 % (FLUSH) 0.9 %
3 SYRINGE (ML) INJECTION EVERY 12 HOURS SCHEDULED
Status: DISCONTINUED | OUTPATIENT
Start: 2024-03-25 | End: 2024-03-26 | Stop reason: HOSPADM

## 2024-03-25 RX ORDER — SODIUM CHLORIDE 0.9 % (FLUSH) 0.9 %
10 SYRINGE (ML) INJECTION AS NEEDED
Status: DISCONTINUED | OUTPATIENT
Start: 2024-03-25 | End: 2024-03-26 | Stop reason: HOSPADM

## 2024-03-25 RX ORDER — LIDOCAINE HYDROCHLORIDE 10 MG/ML
5 INJECTION, SOLUTION EPIDURAL; INFILTRATION; INTRACAUDAL; PERINEURAL ONCE
Status: COMPLETED | OUTPATIENT
Start: 2024-03-25 | End: 2024-03-25

## 2024-03-25 RX ADMIN — LIDOCAINE HYDROCHLORIDE 5 ML: 10 INJECTION, SOLUTION EPIDURAL; INFILTRATION; INTRACAUDAL; PERINEURAL at 12:42

## 2024-03-25 NOTE — POST-PROCEDURE NOTE
Vascular Interventional Radiology  Procedure Note    Date: 03/25/24      Time: 12:10 EDT     Pre-op Diagnosis: Ascites     Post-op Diagnosis: Ascites    Procedure: US guided Paracentesis. Via RLQ. See report for details.    Volume removed: 4.7 liters.    : MAKAYLA Madera    Attending: Brenden Villegas MD    Assistants: NA    Sedation: None    Estimated Blood Loss (EBL): 0 cc    IVF: NA    Findings: Above    Specimens: See report.    Complications: See report.    Disposition: IR recovery.    MAKAYLA Madera  Vascular Interventional Radiology

## 2024-03-25 NOTE — PRE-PROCEDURE NOTE
[facility]   Vascular Interventional Radiology  History & Physicial        Patient Name:Jordi Sam    : 1934    MRN: [id number]    Primary Care Physician: Jasper Avery MD    Referring Physician: MAKAYLA Stringer     Date of admission: 3/25/2024    Subjective     Reason for Consult: Paracentesis for reoccurring ascites.    History of Present Illness     Jordi Sam is a 89 y.o. male referred to IR as noted above.      Active Hospital Problems:  There are no active hospital problems to display for this patient.      Personal History     Past Medical History:   Diagnosis Date    A-fib     Acute inferior myocardial infarction     Acute inferior STEMI with RV infarct features, 2009.     CAD (coronary artery disease)     Cellulitis 2023    Facial. - admission at [facility]. 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 Seeing Dr Kiersten Nova and Dr Jasper Avery        Past Surgical History:   Procedure Laterality Date    BRONCHOSCOPY N/A 2019    Procedure: BRONCHOSCOPY WITH THORACENTESIS;  Surgeon: Marciano Higginbotham MD;  Location:  [facility];  Service: Pulmonary    CARDIAC CATHETERIZATION Bilateral 2019    Procedure: Right and Left Heart Cath;  Surgeon: Efrem Posadas MD;  Location:  [facility];  Service: Cardiology    CARDIAC CATHETERIZATION      CHOLECYSTECTOMY      CORONARY ANGIOPLASTY WITH STENT PLACEMENT  2009    Sirolimus eluting stents to the RCA, 2009.     HERNIA REPAIR      Hernia  repair x 2.     PARACENTESIS  06/17/2019    multiple    UMBILICAL HERNIA REPAIR N/A 7/26/2020    Procedure: UMBILICAL HERNIA REPAIR;  Surgeon: Maribell Her MD;  Location: [facility];  Service: General;  Laterality: N/A;       Family History: His family history includes Aneurysm in his father; Cancer in his sister; Dementia in his mother; Heart attack in his father; Heart disease in his brother and father; Hypertension in his daughter and son; No Known Problems in his brother and sister; Stroke in his brother and mother.     Social History: He  reports that he quit smoking about 40 years ago. His smoking use included cigarettes. He started smoking about 68 years ago. He has a 70 pack-year smoking history. He has never used smokeless tobacco. He reports that he does not drink alcohol and does not use drugs.    Home Medications:  albuterol sulfate HFA, allopurinol, apixaban, doxycycline, folic acid, furosemide, levothyroxine, mupirocin, nitroglycerin, propranolol, simvastatin, spironolactone, and terazosin    Current Medications:    sodium chloride    sodium chloride    sodium chloride     Allergies:  Allergies   Allergen Reactions    Bee Venom Swelling    Sulfamethoxazole-Trimethoprim Other (See Comments)     Pt unaware       Review of Systems    IR Procedure pertinent significant findings are mentioned in the PMH and PSH above.    Objective     Visit Vitals  /48 (BP Location: Right arm, Patient Position: Lying)   Pulse 57   Temp 97.2 °F (36.2 °C) (Tympanic)   Wt 100 kg (220 lb 6.4 oz)   SpO2 97%   BMI 30.75 kg/m²        Physical Exam    A&Ox3.   Able to communicate  No Apparent Distress  Average physique   CVS: VS as noted. Chart reviewed. Stable for the procedure.  Respiratory: Non labored breathing. No signs of respiratory compromise.    Result Review      I have personally reviewed the results from the time of this admission to 3/25/2024 12:08 EDT and agree with these findings.  [x]  Laboratory  []   "Microbiology  [x]  Radiology  []  EKG/Telemetry   []  Cardiology/Vascular   []  Pathology  []  Old records  []  Other:    Most notable findings include: As noted:      CBC & INR reviewed.                Estimated Creatinine Clearance: 35.9 mL/min (A) (by C-G formula based on SCr of 1.65 mg/dL (H)). No results found for: \"CREATININE\"    COVID19   Date Value Ref Range Status   11/04/2022 Not Detected  Final        No results found for: \"PREGTESTUR\", \"PREGSERUM\", \"HCG\", \"HCGQUANT\"     ASA SCALE ASSESSMENT (applicable ONLY if sedation planned):        MALLAMPATI CLASSIFICATION (applicable ONLY if sedation planned):       Assessment / Plan     Jordi Sam is a 89 y.o. male referred to the IR service with above problem.    Plan:   As above.    Notice: The note was created before the performance of the procedure. It might have been left in the pending status and signed off after the procedure. The time stamp on the note may be misleading.    MAKAYLA Joy   Vascular Interventional Radiology  03/25/24   12:08 PM EDT     "

## 2024-03-26 DIAGNOSIS — I50.812 CHRONIC RIGHT HEART FAILURE: ICD-10-CM

## 2024-03-26 RX ORDER — SPIRONOLACTONE 50 MG/1
TABLET, FILM COATED ORAL
Qty: 90 TABLET | Refills: 0 | Status: SHIPPED | OUTPATIENT
Start: 2024-03-26

## 2024-03-26 NOTE — TELEPHONE ENCOUNTER
Last seen on 11/2/23 and will follow-up on 5/2/24. Refill for spironolactone 50mg sent to [facility].     Jeanne Fernandez, PachecoD

## 2024-03-27 ENCOUNTER — OFFICE VISIT (OUTPATIENT)
Dept: FAMILY MEDICINE CLINIC | Facility: CLINIC | Age: 89
End: 2024-03-27
Payer: MEDICARE

## 2024-03-27 ENCOUNTER — LAB (OUTPATIENT)
Dept: LAB | Facility: HOSPITAL | Age: 89
End: 2024-03-27
Payer: MEDICARE

## 2024-03-27 VITALS
DIASTOLIC BLOOD PRESSURE: 68 MMHG | HEART RATE: 78 BPM | OXYGEN SATURATION: 98 % | BODY MASS INDEX: 29.12 KG/M2 | SYSTOLIC BLOOD PRESSURE: 118 MMHG | HEIGHT: 71 IN | WEIGHT: 208 LBS

## 2024-03-27 DIAGNOSIS — I10 ESSENTIAL HYPERTENSION: ICD-10-CM

## 2024-03-27 DIAGNOSIS — I50.812 CHRONIC RIGHT HEART FAILURE: ICD-10-CM

## 2024-03-27 DIAGNOSIS — T14.8XXA WOUND INFECTION: ICD-10-CM

## 2024-03-27 DIAGNOSIS — N18.32 STAGE 3B CHRONIC KIDNEY DISEASE: ICD-10-CM

## 2024-03-27 DIAGNOSIS — M25.511 ACUTE PAIN OF RIGHT SHOULDER: Primary | ICD-10-CM

## 2024-03-27 DIAGNOSIS — L08.9 WOUND INFECTION: ICD-10-CM

## 2024-03-27 DIAGNOSIS — K74.60 CIRRHOSIS OF LIVER WITH ASCITES, UNSPECIFIED HEPATIC CIRRHOSIS TYPE: ICD-10-CM

## 2024-03-27 DIAGNOSIS — N18.30 STAGE 3 CHRONIC KIDNEY DISEASE, UNSPECIFIED WHETHER STAGE 3A OR 3B CKD: ICD-10-CM

## 2024-03-27 DIAGNOSIS — R18.8 CIRRHOSIS OF LIVER WITH ASCITES, UNSPECIFIED HEPATIC CIRRHOSIS TYPE: ICD-10-CM

## 2024-03-27 PROCEDURE — 36415 COLL VENOUS BLD VENIPUNCTURE: CPT

## 2024-03-27 PROCEDURE — 80048 BASIC METABOLIC PNL TOTAL CA: CPT

## 2024-03-27 RX ORDER — DOXYCYCLINE HYCLATE 100 MG/1
100 CAPSULE ORAL DAILY
Qty: 20 CAPSULE | Refills: 0 | Status: SHIPPED | OUTPATIENT
Start: 2024-03-27

## 2024-03-27 NOTE — ASSESSMENT & PLAN NOTE
Looks really well however is extended his doxycycline another 10 days.  Continue dressing as she is doing.  Continue Bactroban as needed

## 2024-03-27 NOTE — ASSESSMENT & PLAN NOTE
Renal condition is worsening.  Continue current treatment regimen.  Continue current medications.  Monitor daily weight.  Regular aerobic exercise.  Sodium restriction  Renal condition will be reassessed in 3 months BMP today..

## 2024-03-27 NOTE — PROGRESS NOTES
Jordi Sam  1934  [id number]  Patient Care Team:  Jasper Avery MD as PCP - General (Internal Medicine)  Jordi Law MD as Consulting Physician (Urology)  Chapin Villagran MD as Consulting Physician (Otolaryngology)  Gen Laurent MD as Consulting Physician (Gastroenterology)  Ezekiel Abarca MD as Referring Physician (Dermatology)  Renny Faustin MD as Consulting Physician (Radiation Oncology)  Larry Sin MD as Consulting Physician (Cardiology)    Jordi aSm is a 89 y.o. male here today for follow up.     This patient is accompanied by their self who contributes to the history of their care.    Chief Complaint:    Chief Complaint   Patient presents with    Hypertension     F/u    Chronic Kidney Disease     F/u  Had paracentesis 2 days ago and would like labs done since then    Recurrent Skin Infections     Rt wrist is infected has been on abx has completed wants to see if he needs another medication        History of Present Illness:  I have reviewed and/or updated the patient's past medical, past surgical, family, social history, problem list and allergies as appropriate.     Had 4.6 liter removed with paracentesis. Scr was up prior. Contiues on lasix.  Denies any orthostatic symptoms.    He fell 3/13 seen I er. Still with shoulder pain. Xray was negative for fracture. His rom is improving. He is declining ortho referral- will consider PT    His RUE wound appeared puffy this am, finished abx  4 days ago. He deies fevers/chills. There had been drainage in the past, but not recently.  No fevers or chills.  Slight erythema this morning.    Review of Systems   Constitutional:  Positive for fatigue. Negative for fever.   Eyes: Negative.    Respiratory: Negative.     Cardiovascular: Negative.    Endocrine: Negative.    Musculoskeletal:  Positive for arthralgias.        Right shoulder   Skin:  Positive for wound.       Vitals:    03/27/24 1257   BP: 118/68   Pulse: 78  "  SpO2: 98%   Weight: 94.3 kg (208 lb)   Height: 180.3 cm (70.98\")     Body mass index is 29.03 kg/m².    Physical Exam  Vitals and nursing note reviewed.   Constitutional:       General: He is not in acute distress.     Appearance: He is well-developed. He is not diaphoretic.   HENT:      Head: Normocephalic and atraumatic.      Right Ear: External ear normal.      Left Ear: External ear normal.      Mouth/Throat:      Pharynx: No oropharyngeal exudate.   Eyes:      General: No scleral icterus.        Right eye: No discharge.      Conjunctiva/sclera: Conjunctivae normal.   Neck:      Thyroid: No thyromegaly.      Vascular: No JVD.      Trachea: No tracheal deviation.   Cardiovascular:      Rate and Rhythm: Normal rate and regular rhythm.      Heart sounds: Normal heart sounds.      Comments: PMI nondisplaced  Pulmonary:      Effort: Pulmonary effort is normal.      Breath sounds: Normal breath sounds. No wheezing or rales.   Abdominal:      General: Bowel sounds are normal.      Palpations: Abdomen is soft.      Tenderness: There is no abdominal tenderness. There is no guarding or rebound.   Musculoskeletal:      Cervical back: Normal range of motion and neck supple.      Comments: Antalgic, ambulates with cane.  Slightly decreased range of motion with internal/external rotation.   Lymphadenopathy:      Cervical: No cervical adenopathy.   Skin:     General: Skin is warm and dry.      Capillary Refill: Capillary refill takes less than 2 seconds.      Coloration: Skin is not pale.      Findings: No rash.      Comments: Right upper extremity wound looks really good.  There are some slight puffiness and minimal erythema.  No tenderness no fluctuance.   Neurological:      Mental Status: He is alert and oriented to person, place, and time.      Motor: No abnormal muscle tone.      Coordination: Coordination normal.   Psychiatric:         Judgment: Judgment normal.         Procedures    Results Review:    I reviewed the " patient's new clinical results.    Assessment/Plan:    Problem List Items Addressed This Visit       Essential hypertension    Relevant Medications    propranolol (INDERAL) 20 MG tablet    terazosin (HYTRIN) 2 MG capsule    furosemide (LASIX) 40 MG tablet    spironolactone (ALDACTONE) 50 MG tablet    Stage 3 chronic kidney disease    Current Assessment & Plan     Renal condition is worsening.  Continue current treatment regimen.  Continue current medications.  Monitor daily weight.  Regular aerobic exercise.  Sodium restriction  Renal condition will be reassessed in 3 months BMP today..         Relevant Medications    furosemide (LASIX) 40 MG tablet    spironolactone (ALDACTONE) 50 MG tablet    Acute pain of right shoulder - Primary    Relevant Orders    Ambulatory Referral to Physical Therapy Evaluate and treat (Completed)    Wound infection    Current Assessment & Plan     Looks really well however is extended his doxycycline another 10 days.  Continue dressing as she is doing.  Continue Bactroban as needed         Relevant Medications    doxycycline (VIBRAMYCIN) 100 MG capsule       Plan of care reviewed with patient at the conclusion of today's visit. Education was provided regarding diagnosis and management.  Patient verbalizes understanding of and agreement with management plan.    Return in about 4 months (around 7/27/2024).    Jasper Avery MD      Please note than portions of this note were completed [facility] a Voice Recognition Program

## 2024-03-28 LAB
ANION GAP SERPL CALCULATED.3IONS-SCNC: 10 MMOL/L (ref 5–15)
BUN SERPL-MCNC: 26 MG/DL (ref 8–23)
BUN/CREAT SERPL: 17 (ref 7–25)
CALCIUM SPEC-SCNC: 8.7 MG/DL (ref 8.2–9.6)
CHLORIDE SERPL-SCNC: 100 MMOL/L (ref 98–107)
CO2 SERPL-SCNC: 28 MMOL/L (ref 22–29)
CREAT SERPL-MCNC: 1.53 MG/DL (ref 0.76–1.27)
EGFRCR SERPLBLD CKD-EPI 2021: 42.9 ML/MIN/1.73
GLUCOSE SERPL-MCNC: 123 MG/DL (ref 65–99)
POTASSIUM SERPL-SCNC: 4.4 MMOL/L (ref 3.5–5.2)
SODIUM SERPL-SCNC: 138 MMOL/L (ref 136–145)

## 2024-04-16 ENCOUNTER — PREP FOR SURGERY (OUTPATIENT)
Dept: OTHER | Facility: HOSPITAL | Age: 89
End: 2024-04-16
Payer: MEDICARE

## 2024-04-16 RX ORDER — SODIUM CHLORIDE 0.9 % (FLUSH) 0.9 %
10 SYRINGE (ML) INJECTION AS NEEDED
Status: CANCELLED | OUTPATIENT
Start: 2024-04-16

## 2024-04-16 RX ORDER — SODIUM CHLORIDE 9 MG/ML
40 INJECTION, SOLUTION INTRAVENOUS AS NEEDED
Status: CANCELLED | OUTPATIENT
Start: 2024-04-16

## 2024-04-16 RX ORDER — SODIUM CHLORIDE 0.9 % (FLUSH) 0.9 %
3 SYRINGE (ML) INJECTION EVERY 12 HOURS SCHEDULED
Status: CANCELLED | OUTPATIENT
Start: 2024-04-16

## 2024-04-17 ENCOUNTER — HOSPITAL ENCOUNTER (OUTPATIENT)
Dept: ULTRASOUND IMAGING | Facility: HOSPITAL | Age: 89
Discharge: HOME | End: 2024-04-17
Admitting: RADIOLOGY
Payer: MEDICARE

## 2024-04-17 ENCOUNTER — TELEPHONE (OUTPATIENT)
Dept: CARDIOLOGY | Facility: CLINIC | Age: 89
End: 2024-04-17
Payer: MEDICARE

## 2024-04-17 VITALS
WEIGHT: 214.4 LBS | RESPIRATION RATE: 17 BRPM | DIASTOLIC BLOOD PRESSURE: 44 MMHG | HEART RATE: 57 BPM | TEMPERATURE: 97.8 F | BODY MASS INDEX: 29.92 KG/M2 | SYSTOLIC BLOOD PRESSURE: 106 MMHG | OXYGEN SATURATION: 97 %

## 2024-04-17 DIAGNOSIS — R18.8 OTHER ASCITES: Primary | ICD-10-CM

## 2024-04-17 PROCEDURE — 76942 ECHO GUIDE FOR BIOPSY: CPT

## 2024-04-17 PROCEDURE — 25010000002 ALBUMIN HUMAN 25% PER 50 ML: Performed by: NURSE PRACTITIONER

## 2024-04-17 PROCEDURE — P9047 ALBUMIN (HUMAN), 25%, 50ML: HCPCS | Performed by: NURSE PRACTITIONER

## 2024-04-17 RX ORDER — SODIUM CHLORIDE 9 MG/ML
40 INJECTION, SOLUTION INTRAVENOUS AS NEEDED
Status: DISCONTINUED | OUTPATIENT
Start: 2024-04-17 | End: 2024-04-18 | Stop reason: HOSPADM

## 2024-04-17 RX ORDER — SODIUM CHLORIDE 0.9 % (FLUSH) 0.9 %
3 SYRINGE (ML) INJECTION EVERY 12 HOURS SCHEDULED
Status: DISCONTINUED | OUTPATIENT
Start: 2024-04-17 | End: 2024-04-18 | Stop reason: HOSPADM

## 2024-04-17 RX ORDER — ALBUMIN (HUMAN) 12.5 G/50ML
25 SOLUTION INTRAVENOUS ONCE
OUTPATIENT
Start: 2024-04-17 | End: 2024-04-17

## 2024-04-17 RX ORDER — ALBUMIN (HUMAN) 12.5 G/50ML
25 SOLUTION INTRAVENOUS ONCE
Status: CANCELLED | OUTPATIENT
Start: 2024-04-17 | End: 2024-04-17

## 2024-04-17 RX ORDER — ALBUMIN (HUMAN) 12.5 G/50ML
25 SOLUTION INTRAVENOUS ONCE
Status: COMPLETED | OUTPATIENT
Start: 2024-04-17 | End: 2024-04-17

## 2024-04-17 RX ORDER — LIDOCAINE HYDROCHLORIDE 10 MG/ML
10 INJECTION, SOLUTION EPIDURAL; INFILTRATION; INTRACAUDAL; PERINEURAL ONCE
Status: COMPLETED | OUTPATIENT
Start: 2024-04-17 | End: 2024-04-17

## 2024-04-17 RX ORDER — SODIUM CHLORIDE 0.9 % (FLUSH) 0.9 %
10 SYRINGE (ML) INJECTION AS NEEDED
Status: DISCONTINUED | OUTPATIENT
Start: 2024-04-17 | End: 2024-04-18 | Stop reason: HOSPADM

## 2024-04-17 RX ADMIN — LIDOCAINE HYDROCHLORIDE 10 ML: 10 INJECTION, SOLUTION EPIDURAL; INFILTRATION; INTRACAUDAL; PERINEURAL at 15:37

## 2024-04-17 RX ADMIN — ALBUMIN (HUMAN) 25 G: 0.25 INJECTION, SOLUTION INTRAVENOUS at 16:58

## 2024-04-17 NOTE — PRE-PROCEDURE NOTE
[facility]   Vascular Interventional Radiology  History & Physicial        Patient Name:Jordi Sam    : 1934    MRN: [id number]    Primary Care Physician: Jasper Avery MD    Referring Physician: MAKAYLA Stringer     Date of admission: 2024    Subjective     Reason for Consult: Para recurring    History of Present Illness     Jordi Sam is a 89 y.o. male referred to IR as noted above.      Active Hospital Problems:  There are no active hospital problems to display for this patient.      Personal History     Past Medical History:   Diagnosis Date    A-fib     Acute inferior myocardial infarction     Acute inferior STEMI with RV infarct features, 2009.     CAD (coronary artery disease)     Cellulitis 2023    Facial. - admission at [facility]. 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 Seeing Dr Kiersten Nova and Dr Jasper Avery        Past Surgical History:   Procedure Laterality Date    BRONCHOSCOPY N/A 2019    Procedure: BRONCHOSCOPY WITH THORACENTESIS;  Surgeon: Marciano Higginbotham MD;  Location:  [facility];  Service: Pulmonary    CARDIAC CATHETERIZATION Bilateral 2019    Procedure: Right and Left Heart Cath;  Surgeon: Efrem Posadas MD;  Location:  [facility];  Service: Cardiology    CARDIAC CATHETERIZATION      CHOLECYSTECTOMY      CORONARY ANGIOPLASTY WITH STENT PLACEMENT  2009    Sirolimus eluting stents to the RCA, 2009.     HERNIA REPAIR      Hernia repair x 2.      PARACENTESIS  06/17/2019    multiple    UMBILICAL HERNIA REPAIR N/A 7/26/2020    Procedure: UMBILICAL HERNIA REPAIR;  Surgeon: Maribell Her MD;  Location: [facility];  Service: General;  Laterality: N/A;       Family History: His family history includes Aneurysm in his father; Cancer in his sister; Dementia in his mother; Heart attack in his father; Heart disease in his brother and father; Hypertension in his daughter and son; No Known Problems in his brother and sister; Stroke in his brother and mother.     Social History: He  reports that he quit smoking about 40 years ago. His smoking use included cigarettes. He started smoking about 68 years ago. He has a 70 pack-year smoking history. He has never used smokeless tobacco. He reports that he does not drink alcohol and does not use drugs.    Home Medications:  albuterol sulfate HFA, allopurinol, apixaban, doxycycline, folic acid, furosemide, levothyroxine, mupirocin, nitroglycerin, propranolol, simvastatin, spironolactone, and terazosin    Current Medications:    lidocaine PF 1%    sodium chloride    sodium chloride    sodium chloride     Allergies:  Allergies   Allergen Reactions    Bee Venom Swelling    Sulfamethoxazole-Trimethoprim Other (See Comments)     Pt unaware       Review of Systems    IR Procedure pertinent significant findings are mentioned in the PMH and PSH above.    Objective     Visit Vitals  /65 (BP Location: Right arm, Patient Position: Lying)   Pulse 55   Temp 97.8 °F (36.6 °C)   Resp 20   Wt 97.3 kg (214 lb 6.4 oz)   SpO2 98%   BMI 29.92 kg/m²        Physical Exam    A&Ox3.   Able to communicate  No Apparent Distress  Average physique   CVS: VS as noted. Chart reviewed. Stable for the procedure.  Respiratory: Non labored breathing. No signs of respiratory compromise.    Result Review      I have personally reviewed the results from the time of this admission to 4/17/2024 15:33 EDT and agree with these findings.  [x]   "Laboratory  []  Microbiology  [x]  Radiology  []  EKG/Telemetry   []  Cardiology/Vascular   []  Pathology  []  Old records  []  Other:    Most notable findings include: As noted:                      Estimated Creatinine Clearance: 38.2 mL/min (A) (by C-G formula based on SCr of 1.53 mg/dL (H)). No results found for: \"CREATININE\"    COVID19   Date Value Ref Range Status   11/04/2022 Not Detected  Final        No results found for: \"PREGTESTUR\", \"PREGSERUM\", \"HCG\", \"HCGQUANT\"     ASA SCALE ASSESSMENT (applicable ONLY if sedation planned):        MALLAMPATI CLASSIFICATION (applicable ONLY if sedation planned):       Assessment / Plan     Jordi Sam is a 89 y.o. male referred to the IR service with above problem.    Plan:   As above.    Notice: The note was created before the performance of the procedure. It might have been left in the pending status and signed off after the procedure. The time stamp on the note may be misleading.    David Heck MD   Vascular Interventional Radiology  04/17/24   3:33 PM EDT     "

## 2024-04-17 NOTE — NURSING NOTE
US guided paracentesis performed by Dr Heck. 4.3 Liters of fluid removed from peritoneum. Patient tolerated well. Report called to Ferdinand TONEY.

## 2024-04-18 ENCOUNTER — TELEPHONE (OUTPATIENT)
Dept: INFUSION THERAPY | Facility: HOSPITAL | Age: 89
End: 2024-04-18
Payer: MEDICARE

## 2024-05-08 ENCOUNTER — OFFICE VISIT (OUTPATIENT)
Dept: CARDIOLOGY | Facility: HOSPITAL | Age: 89
End: 2024-05-08
Payer: MEDICARE

## 2024-05-08 ENCOUNTER — HOSPITAL ENCOUNTER (OUTPATIENT)
Dept: CARDIOLOGY | Facility: HOSPITAL | Age: 89
Discharge: HOME | End: 2024-05-08
Payer: MEDICARE

## 2024-05-08 VITALS
BODY MASS INDEX: 31.19 KG/M2 | HEART RATE: 57 BPM | TEMPERATURE: 97.2 F | WEIGHT: 222.8 LBS | RESPIRATION RATE: 16 BRPM | SYSTOLIC BLOOD PRESSURE: 103 MMHG | OXYGEN SATURATION: 96 % | HEIGHT: 71 IN | DIASTOLIC BLOOD PRESSURE: 51 MMHG

## 2024-05-08 DIAGNOSIS — I50.812 CHRONIC RIGHT HEART FAILURE: Primary | ICD-10-CM

## 2024-05-08 DIAGNOSIS — I10 PRIMARY HYPERTENSION: ICD-10-CM

## 2024-05-08 DIAGNOSIS — N18.30 STAGE 3 CHRONIC KIDNEY DISEASE, UNSPECIFIED WHETHER STAGE 3A OR 3B CKD: ICD-10-CM

## 2024-05-08 DIAGNOSIS — I25.10 CORONARY ARTERY DISEASE INVOLVING NATIVE CORONARY ARTERY OF NATIVE HEART WITHOUT ANGINA PECTORIS: ICD-10-CM

## 2024-05-08 DIAGNOSIS — I48.20 CHRONIC ATRIAL FIBRILLATION: ICD-10-CM

## 2024-05-08 LAB
ALBUMIN SERPL-MCNC: 3.4 G/DL (ref 3.5–5.2)
ALBUMIN/GLOB SERPL: 1.5 G/DL
ALP SERPL-CCNC: 104 U/L (ref 39–117)
ALT SERPL W P-5'-P-CCNC: 9 U/L (ref 1–41)
ANION GAP SERPL CALCULATED.3IONS-SCNC: 10.9 MMOL/L (ref 5–15)
AST SERPL-CCNC: 17 U/L (ref 1–40)
BILIRUB SERPL-MCNC: 0.6 MG/DL (ref 0–1.2)
BUN SERPL-MCNC: 27 MG/DL (ref 8–23)
BUN/CREAT SERPL: 18.8 (ref 7–25)
CALCIUM SPEC-SCNC: 9 MG/DL (ref 8.2–9.6)
CHLORIDE SERPL-SCNC: 101 MMOL/L (ref 98–107)
CO2 SERPL-SCNC: 24.1 MMOL/L (ref 22–29)
CREAT SERPL-MCNC: 1.44 MG/DL (ref 0.76–1.27)
EGFRCR SERPLBLD CKD-EPI 2021: 46.2 ML/MIN/1.73
GLOBULIN UR ELPH-MCNC: 2.3 GM/DL
GLUCOSE SERPL-MCNC: 139 MG/DL (ref 65–99)
POTASSIUM SERPL-SCNC: 4.5 MMOL/L (ref 3.5–5.2)
PROT SERPL-MCNC: 5.7 G/DL (ref 6–8.5)
QT INTERVAL: 338 MS
QTC INTERVAL: 314 MS
SODIUM SERPL-SCNC: 136 MMOL/L (ref 136–145)

## 2024-05-08 PROCEDURE — 93005 ELECTROCARDIOGRAM TRACING: CPT | Performed by: NURSE PRACTITIONER

## 2024-05-08 PROCEDURE — 80053 COMPREHEN METABOLIC PANEL: CPT | Performed by: NURSE PRACTITIONER

## 2024-05-15 RX ORDER — TERAZOSIN 2 MG/1
CAPSULE ORAL
Qty: 90 CAPSULE | Refills: 0 | Status: SHIPPED | OUTPATIENT
Start: 2024-05-15

## 2024-05-20 ENCOUNTER — TELEPHONE (OUTPATIENT)
Dept: INFUSION THERAPY | Facility: HOSPITAL | Age: 89
End: 2024-05-20
Payer: MEDICARE

## 2024-05-21 ENCOUNTER — PREP FOR SURGERY (OUTPATIENT)
Dept: OTHER | Facility: HOSPITAL | Age: 89
End: 2024-05-21
Payer: MEDICARE

## 2024-05-21 RX ORDER — SODIUM CHLORIDE 0.9 % (FLUSH) 0.9 %
3 SYRINGE (ML) INJECTION EVERY 12 HOURS SCHEDULED
Status: CANCELLED | OUTPATIENT
Start: 2024-05-21

## 2024-05-21 RX ORDER — SODIUM CHLORIDE 9 MG/ML
40 INJECTION, SOLUTION INTRAVENOUS AS NEEDED
Status: CANCELLED | OUTPATIENT
Start: 2024-05-21

## 2024-05-21 RX ORDER — SODIUM CHLORIDE 0.9 % (FLUSH) 0.9 %
10 SYRINGE (ML) INJECTION AS NEEDED
Status: CANCELLED | OUTPATIENT
Start: 2024-05-21

## 2024-05-22 ENCOUNTER — HOSPITAL ENCOUNTER (OUTPATIENT)
Dept: ULTRASOUND IMAGING | Facility: HOSPITAL | Age: 89
Discharge: HOME | End: 2024-05-22
Payer: MEDICARE

## 2024-05-22 ENCOUNTER — TELEPHONE (OUTPATIENT)
Dept: CARDIOLOGY | Facility: HOSPITAL | Age: 89
End: 2024-05-22
Payer: MEDICARE

## 2024-05-22 VITALS
BODY MASS INDEX: 41.65 KG/M2 | SYSTOLIC BLOOD PRESSURE: 121 MMHG | HEART RATE: 65 BPM | WEIGHT: 220.6 LBS | RESPIRATION RATE: 18 BRPM | OXYGEN SATURATION: 99 % | TEMPERATURE: 97.9 F | HEIGHT: 61 IN | DIASTOLIC BLOOD PRESSURE: 49 MMHG

## 2024-05-22 DIAGNOSIS — R18.8 OTHER ASCITES: Primary | ICD-10-CM

## 2024-05-22 LAB — GLUCOSE BLDC GLUCOMTR-MCNC: 109 MG/DL (ref 70–130)

## 2024-05-22 PROCEDURE — 25010000002 ALBUMIN HUMAN 25% PER 50 ML: Performed by: NURSE PRACTITIONER

## 2024-05-22 PROCEDURE — 76942 ECHO GUIDE FOR BIOPSY: CPT

## 2024-05-22 PROCEDURE — 82948 REAGENT STRIP/BLOOD GLUCOSE: CPT

## 2024-05-22 PROCEDURE — 25010000002 LIDOCAINE 1 % SOLUTION: Performed by: NURSE PRACTITIONER

## 2024-05-22 PROCEDURE — P9047 ALBUMIN (HUMAN), 25%, 50ML: HCPCS | Performed by: NURSE PRACTITIONER

## 2024-05-22 RX ORDER — ALBUMIN (HUMAN) 12.5 G/50ML
25 SOLUTION INTRAVENOUS ONCE
OUTPATIENT
Start: 2024-05-22 | End: 2024-05-22

## 2024-05-22 RX ORDER — ALBUMIN (HUMAN) 12.5 G/50ML
25 SOLUTION INTRAVENOUS ONCE
Status: COMPLETED | OUTPATIENT
Start: 2024-05-22 | End: 2024-05-22

## 2024-05-22 RX ORDER — SODIUM CHLORIDE 0.9 % (FLUSH) 0.9 %
10 SYRINGE (ML) INJECTION AS NEEDED
Status: DISCONTINUED | OUTPATIENT
Start: 2024-05-22 | End: 2024-05-23 | Stop reason: HOSPADM

## 2024-05-22 RX ORDER — LIDOCAINE HYDROCHLORIDE 10 MG/ML
10 INJECTION, SOLUTION INFILTRATION; PERINEURAL ONCE
Status: COMPLETED | OUTPATIENT
Start: 2024-05-22 | End: 2024-05-22

## 2024-05-22 RX ORDER — SODIUM CHLORIDE 0.9 % (FLUSH) 0.9 %
3 SYRINGE (ML) INJECTION EVERY 12 HOURS SCHEDULED
Status: DISCONTINUED | OUTPATIENT
Start: 2024-05-22 | End: 2024-05-23 | Stop reason: HOSPADM

## 2024-05-22 RX ORDER — SODIUM CHLORIDE 9 MG/ML
40 INJECTION, SOLUTION INTRAVENOUS AS NEEDED
Status: DISCONTINUED | OUTPATIENT
Start: 2024-05-22 | End: 2024-05-23 | Stop reason: HOSPADM

## 2024-05-22 RX ADMIN — LIDOCAINE HYDROCHLORIDE 10 ML: 10 INJECTION, SOLUTION INFILTRATION; PERINEURAL at 10:05

## 2024-05-22 RX ADMIN — ALBUMIN (HUMAN) 25 G: 0.25 INJECTION, SOLUTION INTRAVENOUS at 11:34

## 2024-05-22 NOTE — PRE-PROCEDURE NOTE
[facility]   Vascular Interventional Radiology  History & Physicial        Patient Name:Jordi Sam    : 1934    MRN: [id number]    Primary Care Physician: Jasper Avery MD    Referring Physician: MAKAYLA Stirnger     Date of admission: 2024    Subjective     Reason for Consult: Paracentesis for ascites.    History of Present Illness     Jordi Sam is a 89 y.o. male referred to IR as noted above.      Active Hospital Problems:  There are no active hospital problems to display for this patient.      Personal History     Past Medical History:   Diagnosis Date    A-fib     Acute inferior myocardial infarction     Acute inferior STEMI with RV infarct features, 2009.     CAD (coronary artery disease)     Cellulitis 2023    Facial. - admission at [facility]. 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 Seeing Dr Kiersten Nova and Dr Jasper Avery        Past Surgical History:   Procedure Laterality Date    BRONCHOSCOPY N/A 2019    Procedure: BRONCHOSCOPY WITH THORACENTESIS;  Surgeon: Marciano Higginbotham MD;  Location:  [facility];  Service: Pulmonary    CARDIAC CATHETERIZATION Bilateral 2019    Procedure: Right and Left Heart Cath;  Surgeon: Efrem Posadas MD;  Location:  [facility];  Service: Cardiology    CARDIAC CATHETERIZATION      CHOLECYSTECTOMY      CORONARY ANGIOPLASTY WITH STENT PLACEMENT  2009    Sirolimus eluting stents to the RCA, 2009.     HERNIA REPAIR      Hernia repair x 2.      PARACENTESIS  06/17/2019    multiple    UMBILICAL HERNIA REPAIR N/A 7/26/2020    Procedure: UMBILICAL HERNIA REPAIR;  Surgeon: Maribell Her MD;  Location: [facility];  Service: General;  Laterality: N/A;       Family History: His family history includes Aneurysm in his father; Cancer in his sister; Dementia in his mother; Heart attack in his father; Heart disease in his brother and father; Hypertension in his daughter and son; No Known Problems in his brother and sister; Stroke in his brother and mother.     Social History: He  reports that he quit smoking about 41 years ago. His smoking use included cigarettes. He started smoking about 69 years ago. He has a 70 pack-year smoking history. He has never used smokeless tobacco. He reports that he does not drink alcohol and does not use drugs.    Home Medications:  albuterol sulfate HFA, allopurinol, apixaban, doxycycline, folic acid, furosemide, levothyroxine, mupirocin, nitroglycerin, simvastatin, spironolactone, and terazosin    Current Medications:    albumin human    sodium chloride    sodium chloride    sodium chloride     Allergies:  Allergies   Allergen Reactions    Bee Venom Swelling    Sulfamethoxazole-Trimethoprim Other (See Comments)     Pt unaware       Review of Systems    IR Procedure pertinent significant findings are mentioned in the PMH and PSH above.    Objective     There were no vitals taken for this visit.     Physical Exam    A&Ox3.   Able to communicate  No Apparent Distress  Average physique   CVS: VS as noted. Chart reviewed. Stable for the procedure.  Respiratory: Non labored breathing. No signs of respiratory compromise.    Result Review      I have personally reviewed the results from the time of this admission to 5/22/2024 09:24 EDT and agree with these findings.  [x]  Laboratory  []  Microbiology  [x]  Radiology  []  EKG/Telemetry   []  Cardiology/Vascular   []  Pathology  []  Old records  []  Other:    Most notable findings  "include: As noted:                      Estimated Creatinine Clearance: 41.3 mL/min (A) (by C-G formula based on SCr of 1.44 mg/dL (H)). No results found for: \"CREATININE\"    COVID19   Date Value Ref Range Status   11/04/2022 Not Detected  Final        No results found for: \"PREGTESTUR\", \"PREGSERUM\", \"HCG\", \"HCGQUANT\"     ASA SCALE ASSESSMENT (applicable ONLY if sedation planned):        MALLAMPATI CLASSIFICATION (applicable ONLY if sedation planned):       Assessment / Plan     Jordi Sam is a 89 y.o. male referred to the IR service with above problem.    Plan:   As above.    MAKAYLA Joy   Vascular Interventional Radiology  05/22/24   9:24 AM EDT     "

## 2024-05-22 NOTE — TELEPHONE ENCOUNTER
Called patient. Advised wife that Eliquis 2.5 mg ready for  at [facility]. Verbalized understanding.

## 2024-05-22 NOTE — NURSING NOTE
Image guided paracentesis performed by MAKAYLA Madera. 4550 mls of  clear, yellow fluid removed from peritoneum. Patient tolerated well. Report called to FENG Batres.

## 2024-05-22 NOTE — POST-PROCEDURE NOTE
Vascular Interventional Radiology  Procedure Note    Date: 05/22/24      Time: 09:25 EDT     Pre-op Diagnosis: Ascites     Post-op Diagnosis: Ascites    Procedure: US guided Paracentesis. Via RLQ. See report for details.    Volume removed: 4550 mls.    Sedation: None    Estimated Blood Loss (EBL): 0 cc    IVF: NA    Findings: Above    Specimens: See report.    Complications: See report.    Disposition: IR recovery.    MAKAYLA Madera  Vascular Interventional Radiology

## 2024-05-23 ENCOUNTER — TELEPHONE (OUTPATIENT)
Dept: INFUSION THERAPY | Facility: HOSPITAL | Age: 89
End: 2024-05-23
Payer: MEDICARE

## 2024-05-25 DIAGNOSIS — E78.2 MIXED HYPERLIPIDEMIA: ICD-10-CM

## 2024-05-29 RX ORDER — SIMVASTATIN 40 MG
TABLET ORAL
Qty: 90 TABLET | Refills: 0 | Status: SHIPPED | OUTPATIENT
Start: 2024-05-29

## 2024-05-29 RX ORDER — LEVOTHYROXINE SODIUM 0.03 MG/1
25 TABLET ORAL EVERY MORNING
Qty: 90 TABLET | Refills: 0 | Status: SHIPPED | OUTPATIENT
Start: 2024-05-29

## 2024-05-29 RX ORDER — ALLOPURINOL 100 MG/1
100 TABLET ORAL DAILY
Qty: 90 TABLET | Refills: 0 | Status: SHIPPED | OUTPATIENT
Start: 2024-05-29

## 2024-05-30 RX ORDER — ALLOPURINOL 100 MG/1
100 TABLET ORAL DAILY
Qty: 90 TABLET | Refills: 0 | OUTPATIENT
Start: 2024-05-30

## 2024-05-30 RX ORDER — LEVOTHYROXINE SODIUM 0.03 MG/1
25 TABLET ORAL EVERY MORNING
Qty: 90 TABLET | Refills: 0 | OUTPATIENT
Start: 2024-05-30

## 2024-06-07 DIAGNOSIS — E78.2 MIXED HYPERLIPIDEMIA: ICD-10-CM

## 2024-06-07 RX ORDER — SIMVASTATIN 40 MG
TABLET ORAL
Qty: 90 TABLET | Refills: 0 | OUTPATIENT
Start: 2024-06-07

## 2024-06-19 ENCOUNTER — OFFICE VISIT (OUTPATIENT)
Dept: CARDIOLOGY | Facility: HOSPITAL | Age: 89
End: 2024-06-19
Payer: MEDICARE

## 2024-06-19 VITALS
WEIGHT: 225.8 LBS | TEMPERATURE: 97.6 F | HEART RATE: 75 BPM | RESPIRATION RATE: 20 BRPM | BODY MASS INDEX: 31.61 KG/M2 | SYSTOLIC BLOOD PRESSURE: 129 MMHG | DIASTOLIC BLOOD PRESSURE: 56 MMHG | OXYGEN SATURATION: 97 % | HEIGHT: 71 IN

## 2024-06-19 DIAGNOSIS — T50.905A BRADYCARDIA, DRUG INDUCED: ICD-10-CM

## 2024-06-19 DIAGNOSIS — I48.20 CHRONIC ATRIAL FIBRILLATION: ICD-10-CM

## 2024-06-19 DIAGNOSIS — N18.30 STAGE 3 CHRONIC KIDNEY DISEASE, UNSPECIFIED WHETHER STAGE 3A OR 3B CKD: ICD-10-CM

## 2024-06-19 DIAGNOSIS — I10 PRIMARY HYPERTENSION: ICD-10-CM

## 2024-06-19 DIAGNOSIS — R00.1 BRADYCARDIA, DRUG INDUCED: ICD-10-CM

## 2024-06-19 DIAGNOSIS — R60.0 LOWER EXTREMITY EDEMA: ICD-10-CM

## 2024-06-19 DIAGNOSIS — I50.812 CHRONIC RIGHT HEART FAILURE: Primary | ICD-10-CM

## 2024-06-19 DIAGNOSIS — I25.10 CORONARY ARTERY DISEASE INVOLVING NATIVE CORONARY ARTERY OF NATIVE HEART WITHOUT ANGINA PECTORIS: ICD-10-CM

## 2024-06-19 RX ORDER — SPIRONOLACTONE 50 MG/1
50 TABLET, FILM COATED ORAL DAILY
Qty: 90 TABLET | Refills: 3 | Status: SHIPPED | OUTPATIENT
Start: 2024-06-19

## 2024-06-19 NOTE — PROGRESS NOTES
"[facility] Heart and Vascular    Chief Complaint  Congestive Heart Failure    Subjective    History of Present Illness {CC  Problem List  Visit  Diagnosis   Encounters  Notes  Medications  Labs  Result Review Imaging  Media :23}     Jordi Sam presents to [facility] CARDIOLOGY for   History of Present Illness     89-year-old male with CAD, right-sided heart failure, cirrhosis and ascites, routine periodic paracentesis followed by GI, chronic atrial fibrillation, hypothyroidism, large left abdominal hernia (not a surgical candidate for repair).     Unable to tolerate ACE, ARB, Arni due to hypotension and kidney function. Currently on spironolactone and furosemide.     Propranolol stopped due to bradycardia    HR has improved.  No dizziness, near syncope, syncope. NO CP or pressure, palpitations.  Dyspnea at baseline.      Parasentesis next week.  Increased edema and abdominal fullness.     Objective     Vital Signs:   Vitals:    06/19/24 1333   BP: 129/56   BP Location: Left arm   Patient Position: Sitting   Cuff Size: Adult   Pulse: 75   Resp: 20   Temp: 97.6 °F (36.4 °C)   TempSrc: Temporal   SpO2: 97%   Weight: 102 kg (225 lb 12.8 oz)   Height: 180.3 cm (71\")     Body mass index is 31.49 kg/m².  Physical Exam  Vitals reviewed.   Constitutional:       General: He is not in acute distress.  Cardiovascular:      Rate and Rhythm: Normal rate. Rhythm irregular.      Heart sounds: No murmur heard.  Pulmonary:      Effort: Pulmonary effort is normal.      Breath sounds: Normal breath sounds.   Abdominal:      General: There is distension.   Musculoskeletal:      Right lower leg: Edema present.      Left lower leg: Edema present.   Skin:     Coloration: Skin is not pale.   Neurological:      Mental Status: He is alert.   Psychiatric:         Mood and Affect: Mood normal.         Behavior: Behavior normal. Behavior is cooperative.              Result " Review  Data Reviewed:{ Labs  Result Review  Imaging  Med Tab  Media :23}   7-day Holter 7/13/2023: Average heart rate 69 bpm.  A-fib burden 100%.  Nonsustained VT, longest duration 21 beats.  PVCs 4%,     RANDALL 2019: EF 60%, moderate TR, small PFO with bidirectional shunting.     Ultrasound-guided paracentesis 4/17/2024: 4.3 L of fluid removed    Lab Results   Component Value Date    GLUCOSE 139 (H) 05/08/2024    CALCIUM 9.0 05/08/2024     05/08/2024    K 4.5 05/08/2024    CO2 24.1 05/08/2024     05/08/2024    BUN 27 (H) 05/08/2024    CREATININE 1.44 (H) 05/08/2024    EGFR 46.2 (L) 05/08/2024    BCR 18.8 05/08/2024    ANIONGAP 10.9 05/08/2024                     Assessment and Plan {CC Problem List  Visit Diagnosis  ROS  Review (Popup)  Health Maintenance  Quality  BestPractice  Medications  SmartSets  SnapShot Encounters  Media :23}   1. Chronic right heart failure  Refill:  - spironolactone (ALDACTONE) 50 MG tablet; Take 1 tablet by mouth Daily.  Dispense: 90 tablet; Refill: 3    Continue lasix    Scheduled paracentesis next week.      2. Chronic atrial fibrillation  Heart rate controlled off beta-blocker  Continue Eliquis  Patient to hold Eliquis 2 days before paracentesis and initiate day after procedure if hemodynamically stable.    Eliquis samples given:  #4, lot:  [id number], Exp;  09/25    3. Coronary artery disease involving native coronary artery of native heart without angina pectoris  No chest pain or pressure  Continue statin  4. Primary hypertension  Blood pressure control currently on Hytrin and spironolactone    5. Stage 3 chronic kidney disease, unspecified whether stage 3a or 3b CKD  Kidney function stable creatinine 1.4.    6. Bradycardia, drug induced  Proved with discontinuation of propranolol    7. Lower extremity edema  Lower extremity edema has worsened with abdominal fullness.  Encouraged Ace wraps for compression.  Continue Lasix spironolactone paracentesis as  scheduled.          Follow Up {Instructions Charge Capture  Follow-up Communications :23}   Return in about 3 months (around 9/19/2024), or if symptoms worsen or fail to improve, for HF.    Patient was given instructions and counseling regarding his condition or for health maintenance advice. Please see specific information pulled into the AVS if appropriate.  Patient was instructed to call the [facility] with any questions, concerns, or worsening symptoms.

## 2024-06-25 ENCOUNTER — TELEPHONE (OUTPATIENT)
Dept: CARDIOLOGY | Facility: CLINIC | Age: 89
End: 2024-06-25
Payer: MEDICARE

## 2024-06-25 ENCOUNTER — PREP FOR SURGERY (OUTPATIENT)
Dept: OTHER | Facility: HOSPITAL | Age: 89
End: 2024-06-25
Payer: MEDICARE

## 2024-06-25 ENCOUNTER — HOSPITAL ENCOUNTER (OUTPATIENT)
Dept: ULTRASOUND IMAGING | Facility: HOSPITAL | Age: 89
Discharge: HOME | End: 2024-06-25
Payer: MEDICARE

## 2024-06-25 VITALS
BODY MASS INDEX: 30.8 KG/M2 | TEMPERATURE: 96.1 F | HEIGHT: 71 IN | HEART RATE: 72 BPM | WEIGHT: 220 LBS | RESPIRATION RATE: 18 BRPM | SYSTOLIC BLOOD PRESSURE: 118 MMHG | DIASTOLIC BLOOD PRESSURE: 74 MMHG | OXYGEN SATURATION: 94 %

## 2024-06-25 DIAGNOSIS — R18.8 OTHER ASCITES: Primary | ICD-10-CM

## 2024-06-25 LAB
BASOPHILS # BLD AUTO: 0.04 10*3/MM3 (ref 0–0.2)
BASOPHILS NFR BLD AUTO: 0.8 % (ref 0–1.5)
DEPRECATED RDW RBC AUTO: 53.2 FL (ref 37–54)
EOSINOPHIL # BLD AUTO: 0.06 10*3/MM3 (ref 0–0.4)
EOSINOPHIL NFR BLD AUTO: 1.3 % (ref 0.3–6.2)
ERYTHROCYTE [DISTWIDTH] IN BLOOD BY AUTOMATED COUNT: 14.7 % (ref 12.3–15.4)
GLUCOSE BLDC GLUCOMTR-MCNC: 124 MG/DL (ref 70–130)
HCT VFR BLD AUTO: 34.2 % (ref 37.5–51)
HGB BLD-MCNC: 10.9 G/DL (ref 13–17.7)
IMM GRANULOCYTES # BLD AUTO: 0.06 10*3/MM3 (ref 0–0.05)
IMM GRANULOCYTES NFR BLD AUTO: 1.3 % (ref 0–0.5)
INR PPP: 1.15 (ref 0.89–1.12)
LYMPHOCYTES # BLD AUTO: 0.58 10*3/MM3 (ref 0.7–3.1)
LYMPHOCYTES NFR BLD AUTO: 12.1 % (ref 19.6–45.3)
MCH RBC QN AUTO: 31.2 PG (ref 26.6–33)
MCHC RBC AUTO-ENTMCNC: 31.9 G/DL (ref 31.5–35.7)
MCV RBC AUTO: 98 FL (ref 79–97)
MONOCYTES # BLD AUTO: 0.39 10*3/MM3 (ref 0.1–0.9)
MONOCYTES NFR BLD AUTO: 8.2 % (ref 5–12)
NEUTROPHILS NFR BLD AUTO: 3.65 10*3/MM3 (ref 1.7–7)
NEUTROPHILS NFR BLD AUTO: 76.3 % (ref 42.7–76)
NRBC BLD AUTO-RTO: 0 /100 WBC (ref 0–0.2)
PLATELET # BLD AUTO: 150 10*3/MM3 (ref 140–450)
PMV BLD AUTO: 9.9 FL (ref 6–12)
PROTHROMBIN TIME: 14.8 SECONDS (ref 12.2–14.5)
RBC # BLD AUTO: 3.49 10*6/MM3 (ref 4.14–5.8)
WBC NRBC COR # BLD AUTO: 4.78 10*3/MM3 (ref 3.4–10.8)

## 2024-06-25 PROCEDURE — 82948 REAGENT STRIP/BLOOD GLUCOSE: CPT

## 2024-06-25 PROCEDURE — 76942 ECHO GUIDE FOR BIOPSY: CPT

## 2024-06-25 PROCEDURE — 25010000002 ALBUMIN HUMAN 25% PER 50 ML

## 2024-06-25 PROCEDURE — 85025 COMPLETE CBC W/AUTO DIFF WBC: CPT | Performed by: NURSE PRACTITIONER

## 2024-06-25 PROCEDURE — 85610 PROTHROMBIN TIME: CPT | Performed by: NURSE PRACTITIONER

## 2024-06-25 PROCEDURE — P9047 ALBUMIN (HUMAN), 25%, 50ML: HCPCS

## 2024-06-25 RX ORDER — ALBUMIN (HUMAN) 12.5 G/50ML
25 SOLUTION INTRAVENOUS ONCE
Status: DISCONTINUED | OUTPATIENT
Start: 2024-06-25 | End: 2024-06-25

## 2024-06-25 RX ORDER — SODIUM CHLORIDE 0.9 % (FLUSH) 0.9 %
3 SYRINGE (ML) INJECTION EVERY 12 HOURS SCHEDULED
Status: DISCONTINUED | OUTPATIENT
Start: 2024-06-25 | End: 2024-06-26 | Stop reason: HOSPADM

## 2024-06-25 RX ORDER — ALBUMIN (HUMAN) 12.5 G/50ML
SOLUTION INTRAVENOUS
Status: COMPLETED
Start: 2024-06-25 | End: 2024-06-25

## 2024-06-25 RX ORDER — SODIUM CHLORIDE 0.9 % (FLUSH) 0.9 %
10 SYRINGE (ML) INJECTION AS NEEDED
Status: CANCELLED | OUTPATIENT
Start: 2024-06-25

## 2024-06-25 RX ORDER — LIDOCAINE HYDROCHLORIDE 10 MG/ML
5 INJECTION, SOLUTION EPIDURAL; INFILTRATION; INTRACAUDAL; PERINEURAL ONCE
Status: COMPLETED | OUTPATIENT
Start: 2024-06-25 | End: 2024-06-25

## 2024-06-25 RX ORDER — SODIUM CHLORIDE 9 MG/ML
40 INJECTION, SOLUTION INTRAVENOUS AS NEEDED
Status: DISCONTINUED | OUTPATIENT
Start: 2024-06-25 | End: 2024-06-26 | Stop reason: HOSPADM

## 2024-06-25 RX ORDER — SODIUM CHLORIDE 0.9 % (FLUSH) 0.9 %
3 SYRINGE (ML) INJECTION EVERY 12 HOURS SCHEDULED
Status: CANCELLED | OUTPATIENT
Start: 2024-06-25

## 2024-06-25 RX ORDER — SODIUM CHLORIDE 9 MG/ML
40 INJECTION, SOLUTION INTRAVENOUS AS NEEDED
Status: CANCELLED | OUTPATIENT
Start: 2024-06-25

## 2024-06-25 RX ORDER — ALBUMIN (HUMAN) 12.5 G/50ML
25 SOLUTION INTRAVENOUS ONCE
OUTPATIENT
Start: 2024-06-25 | End: 2024-06-25

## 2024-06-25 RX ORDER — ALBUMIN (HUMAN) 12.5 G/50ML
37.5 SOLUTION INTRAVENOUS ONCE
Status: COMPLETED | OUTPATIENT
Start: 2024-06-25 | End: 2024-06-25

## 2024-06-25 RX ORDER — SODIUM CHLORIDE 0.9 % (FLUSH) 0.9 %
10 SYRINGE (ML) INJECTION AS NEEDED
Status: DISCONTINUED | OUTPATIENT
Start: 2024-06-25 | End: 2024-06-26 | Stop reason: HOSPADM

## 2024-06-25 RX ADMIN — ALBUMIN (HUMAN) 37.5 G: 12.5 SOLUTION INTRAVENOUS at 12:49

## 2024-06-25 RX ADMIN — ALBUMIN (HUMAN) 37.5 G: 0.25 INJECTION, SOLUTION INTRAVENOUS at 12:49

## 2024-06-25 RX ADMIN — LIDOCAINE HYDROCHLORIDE 5 ML: 10 INJECTION, SOLUTION EPIDURAL; INFILTRATION; INTRACAUDAL; PERINEURAL at 12:35

## 2024-06-25 NOTE — TELEPHONE ENCOUNTER
Provided pt with eliquis samples 5 mg , advised pts spouse to cut dose in half due to pts dosage is 2.5 mg twice a day . Spouse verbalized understanding . No further questions /concerns.

## 2024-06-25 NOTE — PRE-PROCEDURE NOTE
[facility]   Vascular Interventional Radiology  History & Physicial        Patient Name:Jordi Sam    : 1934    MRN: [id number]    Primary Care Physician: Jasper Avery MD    Referring Physician: MAKAYLA Stringer     Date of admission: 2024    Subjective     Reason for Consult: Paracentesis for reoccurring ascites.    History of Present Illness     Jordi Sam is a 89 y.o. male referred to IR as noted above.      Active Hospital Problems:  There are no active hospital problems to display for this patient.      Personal History     Past Medical History:   Diagnosis Date    A-fib     Acute inferior myocardial infarction     Acute inferior STEMI with RV infarct features, 2009.     CAD (coronary artery disease)     Cellulitis 2023    Facial. - admission at [facility]. 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 Seeing Dr Kiersten Nova and Dr Jasper Avery        Past Surgical History:   Procedure Laterality Date    BRONCHOSCOPY N/A 2019    Procedure: BRONCHOSCOPY WITH THORACENTESIS;  Surgeon: Marciano Higginbotham MD;  Location:  [facility];  Service: Pulmonary    CARDIAC CATHETERIZATION Bilateral 2019    Procedure: Right and Left Heart Cath;  Surgeon: Efrem Posadas MD;  Location:  [facility];  Service: Cardiology    CARDIAC CATHETERIZATION      CHOLECYSTECTOMY      CORONARY ANGIOPLASTY WITH STENT PLACEMENT  2009  "   Sirolimus eluting stents to the RCA, 01/09/2009.     HERNIA REPAIR      Hernia repair x 2.     PARACENTESIS  06/17/2019    multiple    UMBILICAL HERNIA REPAIR N/A 7/26/2020    Procedure: UMBILICAL HERNIA REPAIR;  Surgeon: Maribell Her MD;  Location: [facility];  Service: General;  Laterality: N/A;       Family History: His family history includes Aneurysm in his father; Cancer in his sister; Dementia in his mother; Heart attack in his father; Heart disease in his brother and father; Hypertension in his daughter and son; No Known Problems in his brother and sister; Stroke in his brother and mother.     Social History: He  reports that he quit smoking about 41 years ago. His smoking use included cigarettes. He started smoking about 69 years ago. He has a 70 pack-year smoking history. He has never used smokeless tobacco. He reports that he does not drink alcohol and does not use drugs.    Home Medications:  albuterol sulfate HFA, allopurinol, apixaban, folic acid, furosemide, levothyroxine, nitroglycerin, simvastatin, spironolactone, and terazosin    Current Medications:    sodium chloride    sodium chloride    sodium chloride     Allergies:  Allergies   Allergen Reactions    Bee Venom Swelling    Sulfamethoxazole-Trimethoprim Other (See Comments)     Pt unaware       Review of Systems    IR Procedure pertinent significant findings are mentioned in the PMH and PSH above.    Objective     Visit Vitals  /57 (BP Location: Left arm)   Pulse 82   Temp 96.1 °F (35.6 °C) (Temporal)   Resp 20   Ht 180.3 cm (71\")   Wt 99.8 kg (220 lb)   SpO2 98%   BMI 30.68 kg/m²        Physical Exam    A&Ox3.   Able to communicate  No Apparent Distress  Average physique   CVS: VS as noted. Chart reviewed. Stable for the procedure.  Respiratory: Non labored breathing. No signs of respiratory compromise.    Result Review      I have personally reviewed the results from the time of this admission to 6/25/2024 11:39 EDT and agree " "with these findings.  [x]  Laboratory  []  Microbiology  [x]  Radiology  []  EKG/Telemetry   []  Cardiology/Vascular   []  Pathology  []  Old records  []  Other:    Most notable findings include: As noted:    Results from last 7 days   Lab Units 06/25/24  1034   INR  1.15*   WBC 10*3/mm3 4.78   HEMOGLOBIN g/dL 10.9*   HEMATOCRIT % 34.2*   PLATELETS 10*3/mm3 150                   CrCl cannot be calculated (Patient's most recent lab result is older than the maximum 30 days allowed.). No results found for: \"CREATININE\"    COVID19   Date Value Ref Range Status   11/04/2022 Not Detected  Final        No results found for: \"PREGTESTUR\", \"PREGSERUM\", \"HCG\", \"HCGQUANT\"     ASA SCALE ASSESSMENT (applicable ONLY if sedation planned):        MALLAMPATI CLASSIFICATION (applicable ONLY if sedation planned):       Assessment / Plan     Jordi Sam is a 89 y.o. male referred to the IR service with above problem.    Plan:   As above.    MAKAYLA Joy   Vascular Interventional Radiology  06/25/24   11:39 AM EDT     "

## 2024-06-25 NOTE — POST-PROCEDURE NOTE
Vascular Interventional Radiology  Procedure Note    Date: 06/25/24      Time: 11:40 EDT     Pre-op Diagnosis: Ascites     Post-op Diagnosis: Ascites    Procedure: US guided Paracentesis. Via LQ. See report for details.    Volume removed: See report.    Sedation: None    Estimated Blood Loss (EBL): 0 cc    IVF: NA    Findings: Above.    Specimens: See report.    Complications: See report.    Disposition: IR recovery.    MAKAYLA Madera  Vascular Interventional Radiology

## 2024-06-25 NOTE — NURSING NOTE
US guided paracentesis performed by Sima BENAVIDES. 6 Liters of fluid removed from peritoneum. Patient tolerated well. Report called to RAVINDRA.

## 2024-06-25 NOTE — NURSING NOTE
Pt discharged from ir dept s/p paracentesis. Pt tolerated procedure without complications. Pt and his wife verbalize being familiar with discharge instructions. Reviewed with them per LICO Carlson RN. Pt transported to exit via wheelchair per nurse.

## 2024-06-26 ENCOUNTER — TELEPHONE (OUTPATIENT)
Dept: INFUSION THERAPY | Facility: HOSPITAL | Age: 89
End: 2024-06-26
Payer: MEDICARE

## 2024-07-22 ENCOUNTER — OFFICE VISIT (OUTPATIENT)
Dept: FAMILY MEDICINE CLINIC | Facility: CLINIC | Age: 89
End: 2024-07-22
Payer: MEDICARE

## 2024-07-22 VITALS
DIASTOLIC BLOOD PRESSURE: 68 MMHG | HEIGHT: 71 IN | SYSTOLIC BLOOD PRESSURE: 124 MMHG | BODY MASS INDEX: 31.78 KG/M2 | OXYGEN SATURATION: 96 % | HEART RATE: 70 BPM | WEIGHT: 227 LBS

## 2024-07-22 DIAGNOSIS — K74.60 CIRRHOSIS OF LIVER WITH ASCITES, UNSPECIFIED HEPATIC CIRRHOSIS TYPE: ICD-10-CM

## 2024-07-22 DIAGNOSIS — K13.79 ORAL PAIN: ICD-10-CM

## 2024-07-22 DIAGNOSIS — E78.2 MIXED HYPERLIPIDEMIA: ICD-10-CM

## 2024-07-22 DIAGNOSIS — N18.32 STAGE 3B CHRONIC KIDNEY DISEASE: Primary | ICD-10-CM

## 2024-07-22 DIAGNOSIS — R18.8 CIRRHOSIS OF LIVER WITH ASCITES, UNSPECIFIED HEPATIC CIRRHOSIS TYPE: ICD-10-CM

## 2024-07-22 DIAGNOSIS — I10 ESSENTIAL HYPERTENSION: ICD-10-CM

## 2024-07-22 DIAGNOSIS — M1A.9XX0 CHRONIC GOUT INVOLVING TOE WITHOUT TOPHUS, UNSPECIFIED CAUSE, UNSPECIFIED LATERALITY: ICD-10-CM

## 2024-07-22 PROCEDURE — 1126F AMNT PAIN NOTED NONE PRSNT: CPT | Performed by: INTERNAL MEDICINE

## 2024-07-22 PROCEDURE — 99214 OFFICE O/P EST MOD 30 MIN: CPT | Performed by: INTERNAL MEDICINE

## 2024-07-22 RX ORDER — ALLOPURINOL 100 MG/1
100 TABLET ORAL DAILY
Qty: 90 TABLET | Refills: 0 | Status: SHIPPED | OUTPATIENT
Start: 2024-07-22

## 2024-07-22 RX ORDER — HYDROCODONE BITARTRATE AND ACETAMINOPHEN 7.5; 325 MG/1; MG/1
1 TABLET ORAL EVERY 6 HOURS PRN
Qty: 28 TABLET | Refills: 0 | Status: SHIPPED | OUTPATIENT
Start: 2024-07-22

## 2024-07-22 RX ORDER — SIMVASTATIN 40 MG
40 TABLET ORAL DAILY
Qty: 90 TABLET | Refills: 0 | Status: SHIPPED | OUTPATIENT
Start: 2024-07-22

## 2024-07-22 RX ORDER — LEVOTHYROXINE SODIUM 0.03 MG/1
25 TABLET ORAL EVERY MORNING
Qty: 90 TABLET | Refills: 0 | Status: SHIPPED | OUTPATIENT
Start: 2024-07-22

## 2024-07-22 NOTE — PROGRESS NOTES
Jordi Sam  1934  [id number]  Patient Care Team:  Jasper Avery MD as PCP - General (Internal Medicine)  Jordi Law MD as Consulting Physician (Urology)  Chapin Villagran MD as Consulting Physician (Otolaryngology)  Gen Laurent MD as Consulting Physician (Gastroenterology)  Ezekiel Abarca MD as Referring Physician (Dermatology)  Renny Faustin MD as Consulting Physician (Radiation Oncology)  Larry Sin MD as Consulting Physician (Cardiology)    Jordi Sam is a 89 y.o. male here today for follow up.     This patient is accompanied by their self who contributes to the history of their care.    Chief Complaint:    Chief Complaint   Patient presents with    Hypertension        History of Present Illness:  I have reviewed and/or updated the patient's past medical, past surgical, family, social history, problem list and allergies as appropriate.     He continues to undergo paracentesis secondary to recurrent ascites.  Last paracentesis was 06/25/2024.  He has upcoming paracentesis this weak.  Continues on Lasix 40 mg daily Aldactone 50 mg daily.  Blood pressures have been trending down.  Last May, renal function essentially at baseline with a creatinine of 1.44.. Unable to tolerate ACE, ARB, Arni due to hypotension and kidney function. Currently on spironolactone and furosemide.  Propranolol stopped due to bradycardia  He remains  in allopurinol 100 mg daily for gout.  No recent flares.  He continues on Synthroid 25 mcg daily denies any heat or cold intolerance.     He remains troubled by his inoperable  large abdominal hernia.  He has upcoming tooth extraction tomorrow. Has healed his eliquis. He would like to have pain medication he has been taking ibuprofen    Remains on simvastatin for lipids, due for lipid panel this spring. Denies and urinary color changes and myalgias.    Review of Systems   Constitutional:  Positive for fatigue.   HENT:          Tooth pain  "  Cardiovascular:  Positive for leg swelling.   Gastrointestinal:  Positive for abdominal distention.   Endocrine: Negative.    Genitourinary: Negative.    Skin: Negative.    Neurological: Negative.        Vitals:    07/22/24 1224   BP: 124/68   Pulse: 70   SpO2: 96%   Weight: 103 kg (227 lb)   Height: 180.3 cm (70.98\")   PainSc: 0-No pain     Body mass index is 31.67 kg/m².    Physical Exam  Constitutional:       Appearance: Normal appearance.   HENT:      Right Ear: Tympanic membrane normal.      Left Ear: Tympanic membrane normal.   Eyes:      Pupils: Pupils are equal, round, and reactive to light.   Cardiovascular:      Rate and Rhythm: Normal rate. Rhythm irregular.   Abdominal:      Hernia: A hernia is present.   Musculoskeletal:      Right lower leg: Edema present.      Left lower leg: Edema present.   Neurological:      Mental Status: He is oriented to person, place, and time.   Psychiatric:         Mood and Affect: Mood normal.         Behavior: Behavior normal.         Procedures    Results Review:    I reviewed the patient's new clinical results.    Assessment/Plan:    Problem List Items Addressed This Visit       Essential hypertension    Relevant Medications    furosemide (LASIX) 40 MG tablet    terazosin (HYTRIN) 2 MG capsule    spironolactone (ALDACTONE) 50 MG tablet    Cirrhosis of liver with ascites    Relevant Medications    furosemide (LASIX) 40 MG tablet    Stage 3 chronic kidney disease - Primary    Relevant Medications    furosemide (LASIX) 40 MG tablet    spironolactone (ALDACTONE) 50 MG tablet    Oral pain    Current Assessment & Plan     Norco 7.5 # 28 for tooth  extraction tomorrwm. Rx fro post op pain as well         Relevant Medications    allopurinol (ZYLOPRIM) 100 MG tablet     Other Visit Diagnoses       Mixed hyperlipidemia        Relevant Medications    simvastatin (ZOCOR) 40 MG tablet    Chronic gout involving toe without tophus, unspecified cause, unspecified laterality          "       Plan of care reviewed with patient at the conclusion of today's visit. Education was provided regarding diagnosis and management.  Patient verbalizes understanding of and agreement with management plan.    Return in about 3 months (around 10/22/2024) for Medicare Wellness.    Jasper Avery MD      Please note than portions of this note were completed wt a Voice Recognition Program

## 2024-07-23 ENCOUNTER — PREP FOR SURGERY (OUTPATIENT)
Dept: OTHER | Facility: HOSPITAL | Age: 89
End: 2024-07-23
Payer: MEDICARE

## 2024-07-23 RX ORDER — SODIUM CHLORIDE 0.9 % (FLUSH) 0.9 %
10 SYRINGE (ML) INJECTION AS NEEDED
Status: CANCELLED | OUTPATIENT
Start: 2024-07-23

## 2024-07-24 ENCOUNTER — HOSPITAL ENCOUNTER (OUTPATIENT)
Dept: ULTRASOUND IMAGING | Facility: HOSPITAL | Age: 89
Discharge: HOME | End: 2024-07-24
Admitting: NURSE PRACTITIONER
Payer: MEDICARE

## 2024-07-24 VITALS
TEMPERATURE: 97.1 F | DIASTOLIC BLOOD PRESSURE: 54 MMHG | HEIGHT: 72 IN | OXYGEN SATURATION: 98 % | WEIGHT: 228.6 LBS | SYSTOLIC BLOOD PRESSURE: 104 MMHG | BODY MASS INDEX: 30.96 KG/M2 | RESPIRATION RATE: 17 BRPM | HEART RATE: 61 BPM

## 2024-07-24 DIAGNOSIS — R18.8 OTHER ASCITES: Primary | ICD-10-CM

## 2024-07-24 PROCEDURE — 76942 ECHO GUIDE FOR BIOPSY: CPT

## 2024-07-24 RX ORDER — LIDOCAINE HYDROCHLORIDE 10 MG/ML
10 INJECTION, SOLUTION EPIDURAL; INFILTRATION; INTRACAUDAL; PERINEURAL ONCE
Status: COMPLETED | OUTPATIENT
Start: 2024-07-24 | End: 2024-07-24

## 2024-07-24 RX ORDER — ALBUMIN (HUMAN) 12.5 G/50ML
25 SOLUTION INTRAVENOUS ONCE
Status: DISCONTINUED | OUTPATIENT
Start: 2024-07-24 | End: 2024-07-25 | Stop reason: HOSPADM

## 2024-07-24 RX ORDER — SODIUM CHLORIDE 0.9 % (FLUSH) 0.9 %
10 SYRINGE (ML) INJECTION AS NEEDED
Status: DISCONTINUED | OUTPATIENT
Start: 2024-07-24 | End: 2024-07-25 | Stop reason: HOSPADM

## 2024-07-24 RX ORDER — ALBUMIN (HUMAN) 12.5 G/50ML
25 SOLUTION INTRAVENOUS ONCE
OUTPATIENT
Start: 2024-07-24 | End: 2024-07-24

## 2024-07-24 RX ORDER — LIDOCAINE HYDROCHLORIDE 10 MG/ML
10 INJECTION, SOLUTION EPIDURAL; INFILTRATION; INTRACAUDAL; PERINEURAL ONCE
Status: CANCELLED | OUTPATIENT
Start: 2024-07-24 | End: 2024-07-24

## 2024-07-24 RX ADMIN — LIDOCAINE HYDROCHLORIDE 10 ML: 10 INJECTION, SOLUTION EPIDURAL; INFILTRATION; INTRACAUDAL; PERINEURAL at 13:10

## 2024-07-24 NOTE — NURSING NOTE
US guided paracentesis performed by Sima BENAVIDES.  5.0 Liters of fluid removed from peritoneum. Patient tolerated well. Report called to RAVINDRA.   
DVT ppx: SCDs for now pending possible  invasive GI procedure tomorrow  Diet: NPO for now  Dispo: PT evaluation pending

## 2024-07-24 NOTE — PRE-PROCEDURE NOTE
[facility]   Vascular Interventional Radiology  History & Physicial        Patient Name:Jordi Sam    : 1934    MRN: [id number]    Primary Care Physician: Jasper Avery MD    Referring Physician: MAKAYLA Stringer     Date of admission: 2024    Subjective     Reason for Consult: Paracentesis for reoccurring.    History of Present Illness     Jordi Sam is a 89 y.o. male referred to IR as noted above.      Active Hospital Problems:  There are no active hospital problems to display for this patient.      Personal History     Past Medical History:   Diagnosis Date    A-fib     Acute inferior myocardial infarction     Acute inferior STEMI with RV infarct features, 2009.     CAD (coronary artery disease)     Cellulitis 2023    Facial. - admission at [facility]. 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 Seeing Dr Kiersten Nova and Dr Jasper Avery        Past Surgical History:   Procedure Laterality Date    BRONCHOSCOPY N/A 2019    Procedure: BRONCHOSCOPY WITH THORACENTESIS;  Surgeon: Marciano Higginbotham MD;  Location:  [facility]OS[facility];  Service: Pulmonary    CARDIAC CATHETERIZATION Bilateral 2019    Procedure: Right and Left Heart Cath;  Surgeon: Efrem Posadas MD;  Location:  [facility] CATH INVASIVE LOCATION;  Service: Cardiology    CARDIAC CATHETERIZATION      CHOLECYSTECTOMY      CORONARY ANGIOPLASTY WITH STENT PLACEMENT  2009     "Sirolimus eluting stents to the RCA, 01/09/2009.     HERNIA REPAIR      Hernia repair x 2.     PARACENTESIS  06/17/2019    multiple    UMBILICAL HERNIA REPAIR N/A 7/26/2020    Procedure: UMBILICAL HERNIA REPAIR;  Surgeon: Maribell Her MD;  Location: [facility];  Service: General;  Laterality: N/A;       Family History: His family history includes Aneurysm in his father; Cancer in his sister; Dementia in his mother; Heart attack in his father; Heart disease in his brother and father; Hypertension in his daughter and son; No Known Problems in his brother and sister; Stroke in his brother and mother.     Social History: He  reports that he quit smoking about 41 years ago. His smoking use included cigarettes. He started smoking about 69 years ago. He has a 70 pack-year smoking history. He has never been exposed to tobacco smoke. He has never used smokeless tobacco. He reports that he does not drink alcohol and does not use drugs.    Home Medications:  HYDROcodone-acetaminophen, albuterol sulfate HFA, allopurinol, apixaban, folic acid, furosemide, levothyroxine, mupirocin, nitroglycerin, simvastatin, spironolactone, and terazosin    Current Medications:  No current facility-administered medications for this encounter.     Allergies:  Allergies   Allergen Reactions    Bee Venom Swelling    Sulfamethoxazole-Trimethoprim Other (See Comments)     Pt unaware       Review of Systems    IR Procedure pertinent significant findings are mentioned in the PMH and PSH above.    Objective     Visit Vitals  /61 (BP Location: Left arm, Patient Position: Lying)   Pulse 82   Temp 97.1 °F (36.2 °C) (Tympanic)   Resp 18   Ht 182.9 cm (72\")   Wt 104 kg (228 lb 9.6 oz)   SpO2 97%   BMI 31.00 kg/m²        Physical Exam    A&Ox3.   Able to communicate  No Apparent Distress  Average physique   CVS: VS as noted. Chart reviewed. Stable for the procedure.  Respiratory: Non labored breathing. No signs of respiratory " "compromise.    Result Review      I have personally reviewed the results from the time of this admission to 7/24/2024 12:55 EDT and agree with these findings.  [x]  Laboratory  []  Microbiology  [x]  Radiology  []  EKG/Telemetry   []  Cardiology/Vascular   []  Pathology  []  Old records  []  Other:    Most notable findings include: As noted:      Previous CBC & INR reviewed.                CrCl cannot be calculated (Patient's most recent lab result is older than the maximum 30 days allowed.). No results found for: \"CREATININE\"    COVID19   Date Value Ref Range Status   11/04/2022 Not Detected  Final        No results found for: \"PREGTESTUR\", \"PREGSERUM\", \"HCG\", \"HCGQUANT\"     ASA SCALE ASSESSMENT (applicable ONLY if sedation planned):        MALLAMPATI CLASSIFICATION (applicable ONLY if sedation planned):       Assessment / Plan     Jordi Sam is a 89 y.o. male referred to the IR service with above problem.    Plan:   As above.    MAKAYLA Joy   Vascular Interventional Radiology  07/24/24   12:55 PM EDT     "

## 2024-07-24 NOTE — POST-PROCEDURE NOTE
Vascular Interventional Radiology  Procedure Note    Date: 07/24/24      Time: 12:56 EDT     Pre-op Diagnosis: Ascites     Post-op Diagnosis: Ascites    Procedure: US guided Paracentesis. Via LQ. See report for details.    Volume removed: See report.    Sedation: None    Estimated Blood Loss (EBL): 0 cc    IVF: NA    Findings: Above.    Specimens: See report.    Complications: See report.    Disposition: IR recovery.    MAKAYLA Madera  Vascular Interventional Radiology    
negative...

## 2024-07-31 ENCOUNTER — HOSPITAL ENCOUNTER (OUTPATIENT)
Dept: ULTRASOUND IMAGING | Facility: HOSPITAL | Age: 89
Discharge: HOME | End: 2024-07-31
Admitting: NURSE PRACTITIONER
Payer: MEDICARE

## 2024-07-31 DIAGNOSIS — K74.60 CIRRHOSIS OF LIVER WITH ASCITES, UNSPECIFIED HEPATIC CIRRHOSIS TYPE: ICD-10-CM

## 2024-07-31 DIAGNOSIS — R18.8 CIRRHOSIS OF LIVER WITH ASCITES, UNSPECIFIED HEPATIC CIRRHOSIS TYPE: ICD-10-CM

## 2024-07-31 PROCEDURE — 76705 ECHO EXAM OF ABDOMEN: CPT

## 2024-08-09 ENCOUNTER — TELEPHONE (OUTPATIENT)
Dept: FAMILY MEDICINE CLINIC | Facility: CLINIC | Age: 89
End: 2024-08-09
Payer: MEDICARE

## 2024-08-09 NOTE — TELEPHONE ENCOUNTER
Provider: DR. GARNER    Caller: DAVID     Relationship to Patient: WIFE    Phone Number: [phone]     Reason for Call: PATIENT WIFE IS NEEDING A CALL BACK TOP SEE IF IT IS OK FOR HER  TO GO OFF HIS MEDICATIONS FOR A FEW DAYS FOR HIS SURGERY.    PLEASE CALL WIFE TO DISCUSS ASAP

## 2024-08-12 ENCOUNTER — TELEPHONE (OUTPATIENT)
Dept: FAMILY MEDICINE CLINIC | Facility: CLINIC | Age: 89
End: 2024-08-12
Payer: MEDICARE

## 2024-08-12 NOTE — TELEPHONE ENCOUNTER
Unable to reach Jordi Sam by phone or leave message.    Hub may relay message and document.    Jasper Avery MD  e  Paty [facility] Clinical Pool3 days ago       I presume she is talking about his Eliquis.  It would be okay to hold for 3 days prior to surgery.  Resume as soon as surgeon is okay with him doing so

## 2024-08-12 NOTE — TELEPHONE ENCOUNTER
CONSTANTINE CALLED TO F/U ON THE ELIQUIS ORDERS FOR TOOTH EXTRACTION; FAX CAME IN ON THURSDAY & PLACED IN PROVIDER FILE    PH:  [phone]

## 2024-08-12 NOTE — TELEPHONE ENCOUNTER
"PT'S WIFE, DAVID CALLED AGAIN TO CHECK ON THIS. SHE REQUESTED TO BE CALLED ONCE THIS IS COMPLETED SO SHE WON'T HAVE TO WORRY.     SHE SAYS VIRI (LIKE KRIS BUT WITH A \"T\") AT THE ORAL SURGEON'S OFFICE IS WHO NEEDS THE INFORMATION - [phone]  "

## 2024-08-22 ENCOUNTER — PREP FOR SURGERY (OUTPATIENT)
Dept: OTHER | Facility: HOSPITAL | Age: 89
End: 2024-08-22
Payer: MEDICARE

## 2024-08-22 RX ORDER — SODIUM CHLORIDE 0.9 % (FLUSH) 0.9 %
10 SYRINGE (ML) INJECTION AS NEEDED
Status: CANCELLED | OUTPATIENT
Start: 2024-08-22

## 2024-08-23 ENCOUNTER — TELEPHONE (OUTPATIENT)
Dept: INFUSION THERAPY | Facility: HOSPITAL | Age: 89
End: 2024-08-23
Payer: MEDICARE

## 2024-08-26 ENCOUNTER — HOSPITAL ENCOUNTER (OUTPATIENT)
Dept: CT IMAGING | Facility: HOSPITAL | Age: 89
Discharge: HOME | End: 2024-08-26
Admitting: RADIOLOGY
Payer: MEDICARE

## 2024-08-26 VITALS
RESPIRATION RATE: 15 BRPM | SYSTOLIC BLOOD PRESSURE: 108 MMHG | HEART RATE: 76 BPM | TEMPERATURE: 97 F | DIASTOLIC BLOOD PRESSURE: 33 MMHG | OXYGEN SATURATION: 99 % | BODY MASS INDEX: 31.89 KG/M2 | WEIGHT: 227.8 LBS | HEIGHT: 71 IN

## 2024-08-26 DIAGNOSIS — R18.8 OTHER ASCITES: Primary | ICD-10-CM

## 2024-08-26 PROCEDURE — C1729 CATH, DRAINAGE: HCPCS

## 2024-08-26 PROCEDURE — 75989 ABSCESS DRAINAGE UNDER X-RAY: CPT

## 2024-08-26 PROCEDURE — 49083 ABD PARACENTESIS W/IMAGING: CPT | Performed by: RADIOLOGY

## 2024-08-26 PROCEDURE — 25010000002 ALBUMIN HUMAN 25% PER 50 ML: Performed by: NURSE PRACTITIONER

## 2024-08-26 PROCEDURE — 25010000002 LIDOCAINE 1 % SOLUTION: Performed by: RADIOLOGY

## 2024-08-26 PROCEDURE — P9047 ALBUMIN (HUMAN), 25%, 50ML: HCPCS | Performed by: NURSE PRACTITIONER

## 2024-08-26 RX ORDER — LIDOCAINE HYDROCHLORIDE 10 MG/ML
10 INJECTION, SOLUTION INFILTRATION; PERINEURAL ONCE
Status: COMPLETED | OUTPATIENT
Start: 2024-08-26 | End: 2024-08-26

## 2024-08-26 RX ORDER — ALBUMIN (HUMAN) 12.5 G/50ML
25 SOLUTION INTRAVENOUS ONCE
OUTPATIENT
Start: 2024-08-26 | End: 2024-08-26

## 2024-08-26 RX ORDER — SODIUM CHLORIDE 0.9 % (FLUSH) 0.9 %
10 SYRINGE (ML) INJECTION AS NEEDED
Status: DISCONTINUED | OUTPATIENT
Start: 2024-08-26 | End: 2024-08-27 | Stop reason: HOSPADM

## 2024-08-26 RX ORDER — ALBUMIN (HUMAN) 12.5 G/50ML
25 SOLUTION INTRAVENOUS ONCE
Status: COMPLETED | OUTPATIENT
Start: 2024-08-26 | End: 2024-08-26

## 2024-08-26 RX ORDER — CHLORHEXIDINE GLUCONATE ORAL RINSE 1.2 MG/ML
SOLUTION DENTAL
COMMUNITY
Start: 2024-08-15

## 2024-08-26 RX ADMIN — ALBUMIN (HUMAN) 25 G: 0.25 INJECTION, SOLUTION INTRAVENOUS at 13:24

## 2024-08-26 RX ADMIN — LIDOCAINE HYDROCHLORIDE 10 ML: 10 INJECTION, SOLUTION INFILTRATION; PERINEURAL at 13:01

## 2024-08-27 ENCOUNTER — TELEPHONE (OUTPATIENT)
Dept: INFUSION THERAPY | Facility: HOSPITAL | Age: 89
End: 2024-08-27
Payer: MEDICARE

## 2024-09-03 ENCOUNTER — TELEPHONE (OUTPATIENT)
Dept: GASTROENTEROLOGY | Facility: CLINIC | Age: 89
End: 2024-09-03

## 2024-09-03 NOTE — TELEPHONE ENCOUNTER
Hub staff attempted to follow warm transfer process and was unsuccessful     Caller: Melly Sam    Relationship to patient: Emergency Contact    Best call back number: [phone]     Patient is needing: PATIENT HAS AN APPT ON 9/4 WITH JENNIFER BUT WOULD LIKE TO SPEAK WITH IVY BEFORE RESCHEDULING. PLEASE CALL MELLY [phone].

## 2024-09-04 DIAGNOSIS — E78.2 MIXED HYPERLIPIDEMIA: ICD-10-CM

## 2024-09-05 RX ORDER — SIMVASTATIN 40 MG
40 TABLET ORAL DAILY
Qty: 90 TABLET | Refills: 0 | OUTPATIENT
Start: 2024-09-05

## 2024-09-09 RX ORDER — TERAZOSIN 2 MG/1
CAPSULE ORAL
Qty: 90 CAPSULE | Refills: 0 | Status: SHIPPED | OUTPATIENT
Start: 2024-09-09

## 2024-09-20 ENCOUNTER — OFFICE VISIT (OUTPATIENT)
Dept: CARDIOLOGY | Facility: HOSPITAL | Age: 89
End: 2024-09-20
Payer: MEDICARE

## 2024-09-20 VITALS
BODY MASS INDEX: 29.66 KG/M2 | HEIGHT: 72 IN | DIASTOLIC BLOOD PRESSURE: 58 MMHG | HEART RATE: 72 BPM | RESPIRATION RATE: 18 BRPM | SYSTOLIC BLOOD PRESSURE: 129 MMHG | WEIGHT: 219 LBS | OXYGEN SATURATION: 93 % | TEMPERATURE: 97.8 F

## 2024-09-20 DIAGNOSIS — N18.30 STAGE 3 CHRONIC KIDNEY DISEASE, UNSPECIFIED WHETHER STAGE 3A OR 3B CKD: ICD-10-CM

## 2024-09-20 DIAGNOSIS — I50.812 CHRONIC RIGHT HEART FAILURE: Primary | ICD-10-CM

## 2024-09-20 DIAGNOSIS — E03.9 HYPOTHYROIDISM (ACQUIRED): ICD-10-CM

## 2024-09-20 DIAGNOSIS — I25.10 CORONARY ARTERY DISEASE INVOLVING NATIVE CORONARY ARTERY OF NATIVE HEART WITHOUT ANGINA PECTORIS: ICD-10-CM

## 2024-09-20 DIAGNOSIS — I48.20 CHRONIC ATRIAL FIBRILLATION: ICD-10-CM

## 2024-09-20 DIAGNOSIS — I10 PRIMARY HYPERTENSION: ICD-10-CM

## 2024-09-25 ENCOUNTER — LAB (OUTPATIENT)
Dept: LAB | Facility: HOSPITAL | Age: 89
End: 2024-09-25
Payer: MEDICARE

## 2024-09-25 ENCOUNTER — OFFICE VISIT (OUTPATIENT)
Dept: GASTROENTEROLOGY | Facility: CLINIC | Age: 89
End: 2024-09-25
Payer: MEDICARE

## 2024-09-25 VITALS
OXYGEN SATURATION: 97 % | WEIGHT: 216 LBS | DIASTOLIC BLOOD PRESSURE: 60 MMHG | HEART RATE: 84 BPM | SYSTOLIC BLOOD PRESSURE: 124 MMHG | BODY MASS INDEX: 30.24 KG/M2 | HEIGHT: 71 IN

## 2024-09-25 DIAGNOSIS — K42.9 UMBILICAL HERNIA WITHOUT OBSTRUCTION AND WITHOUT GANGRENE: ICD-10-CM

## 2024-09-25 DIAGNOSIS — K74.60 CIRRHOSIS OF LIVER WITH ASCITES, UNSPECIFIED HEPATIC CIRRHOSIS TYPE: ICD-10-CM

## 2024-09-25 DIAGNOSIS — K74.60 CIRRHOSIS OF LIVER WITH ASCITES, UNSPECIFIED HEPATIC CIRRHOSIS TYPE: Primary | ICD-10-CM

## 2024-09-25 DIAGNOSIS — R18.8 CIRRHOSIS OF LIVER WITH ASCITES, UNSPECIFIED HEPATIC CIRRHOSIS TYPE: Primary | ICD-10-CM

## 2024-09-25 DIAGNOSIS — E03.9 HYPOTHYROIDISM (ACQUIRED): ICD-10-CM

## 2024-09-25 DIAGNOSIS — I50.812 CHRONIC RIGHT HEART FAILURE: ICD-10-CM

## 2024-09-25 DIAGNOSIS — N18.30 STAGE 3 CHRONIC KIDNEY DISEASE, UNSPECIFIED WHETHER STAGE 3A OR 3B CKD: ICD-10-CM

## 2024-09-25 DIAGNOSIS — R18.8 CIRRHOSIS OF LIVER WITH ASCITES, UNSPECIFIED HEPATIC CIRRHOSIS TYPE: ICD-10-CM

## 2024-09-25 PROBLEM — C44.42 SQUAMOUS CELL CARCINOMA OF SCALP: Status: ACTIVE | Noted: 2024-06-21

## 2024-09-25 LAB
BASOPHILS # BLD AUTO: 0.03 10*3/MM3 (ref 0–0.2)
BASOPHILS NFR BLD AUTO: 0.6 % (ref 0–1.5)
DEPRECATED RDW RBC AUTO: 52.5 FL (ref 37–54)
EOSINOPHIL # BLD AUTO: 0.04 10*3/MM3 (ref 0–0.4)
EOSINOPHIL NFR BLD AUTO: 0.8 % (ref 0.3–6.2)
ERYTHROCYTE [DISTWIDTH] IN BLOOD BY AUTOMATED COUNT: 14.8 % (ref 12.3–15.4)
HCT VFR BLD AUTO: 31.8 % (ref 37.5–51)
HGB BLD-MCNC: 10.2 G/DL (ref 13–17.7)
IMM GRANULOCYTES # BLD AUTO: 0.02 10*3/MM3 (ref 0–0.05)
IMM GRANULOCYTES NFR BLD AUTO: 0.4 % (ref 0–0.5)
INR PPP: 1.4 (ref 0.89–1.12)
LYMPHOCYTES # BLD AUTO: 0.56 10*3/MM3 (ref 0.7–3.1)
LYMPHOCYTES NFR BLD AUTO: 11.2 % (ref 19.6–45.3)
MCH RBC QN AUTO: 31.1 PG (ref 26.6–33)
MCHC RBC AUTO-ENTMCNC: 32.1 G/DL (ref 31.5–35.7)
MCV RBC AUTO: 97 FL (ref 79–97)
MONOCYTES # BLD AUTO: 0.43 10*3/MM3 (ref 0.1–0.9)
MONOCYTES NFR BLD AUTO: 8.6 % (ref 5–12)
NEUTROPHILS NFR BLD AUTO: 3.9 10*3/MM3 (ref 1.7–7)
NEUTROPHILS NFR BLD AUTO: 78.4 % (ref 42.7–76)
NRBC BLD AUTO-RTO: 0 /100 WBC (ref 0–0.2)
PLATELET # BLD AUTO: 167 10*3/MM3 (ref 140–450)
PMV BLD AUTO: 10.9 FL (ref 6–12)
PROTHROMBIN TIME: 17.3 SECONDS (ref 12.2–14.5)
RBC # BLD AUTO: 3.28 10*6/MM3 (ref 4.14–5.8)
WBC NRBC COR # BLD AUTO: 4.98 10*3/MM3 (ref 3.4–10.8)

## 2024-09-25 PROCEDURE — 85025 COMPLETE CBC W/AUTO DIFF WBC: CPT

## 2024-09-25 PROCEDURE — 85610 PROTHROMBIN TIME: CPT

## 2024-09-25 PROCEDURE — 80053 COMPREHEN METABOLIC PANEL: CPT

## 2024-09-25 PROCEDURE — 82306 VITAMIN D 25 HYDROXY: CPT

## 2024-09-25 PROCEDURE — 82105 ALPHA-FETOPROTEIN SERUM: CPT

## 2024-09-25 PROCEDURE — 84443 ASSAY THYROID STIM HORMONE: CPT

## 2024-09-25 PROCEDURE — 36415 COLL VENOUS BLD VENIPUNCTURE: CPT

## 2024-09-26 LAB
25(OH)D3 SERPL-MCNC: 7.6 NG/ML (ref 30–100)
ALBUMIN SERPL-MCNC: 3.8 G/DL (ref 3.5–5.2)
ALBUMIN/GLOB SERPL: 1.4 G/DL
ALP SERPL-CCNC: 110 U/L (ref 39–117)
ALPHA-FETOPROTEIN: 2.67 NG/ML (ref 0–8.3)
ALT SERPL W P-5'-P-CCNC: 10 U/L (ref 1–41)
ANION GAP SERPL CALCULATED.3IONS-SCNC: 12.8 MMOL/L (ref 5–15)
AST SERPL-CCNC: 20 U/L (ref 1–40)
BILIRUB SERPL-MCNC: 0.5 MG/DL (ref 0–1.2)
BUN SERPL-MCNC: 23 MG/DL (ref 8–23)
BUN/CREAT SERPL: 16.2 (ref 7–25)
CALCIUM SPEC-SCNC: 9.1 MG/DL (ref 8.2–9.6)
CHLORIDE SERPL-SCNC: 98 MMOL/L (ref 98–107)
CO2 SERPL-SCNC: 25.2 MMOL/L (ref 22–29)
CREAT SERPL-MCNC: 1.42 MG/DL (ref 0.76–1.27)
EGFRCR SERPLBLD CKD-EPI 2021: 46.9 ML/MIN/1.73
GLOBULIN UR ELPH-MCNC: 2.7 GM/DL
GLUCOSE SERPL-MCNC: 110 MG/DL (ref 65–99)
POTASSIUM SERPL-SCNC: 4.3 MMOL/L (ref 3.5–5.2)
PROT SERPL-MCNC: 6.5 G/DL (ref 6–8.5)
SODIUM SERPL-SCNC: 136 MMOL/L (ref 136–145)
TSH SERPL DL<=0.05 MIU/L-ACNC: 5.25 UIU/ML (ref 0.27–4.2)

## 2024-09-27 ENCOUNTER — PREP FOR SURGERY (OUTPATIENT)
Dept: OTHER | Facility: HOSPITAL | Age: 89
End: 2024-09-27
Payer: MEDICARE

## 2024-09-27 RX ORDER — ALBUMIN (HUMAN) 12.5 G/50ML
25 SOLUTION INTRAVENOUS AS NEEDED
Status: CANCELLED | OUTPATIENT
Start: 2024-09-27

## 2024-09-27 RX ORDER — SODIUM CHLORIDE 0.9 % (FLUSH) 0.9 %
10 SYRINGE (ML) INJECTION AS NEEDED
Status: CANCELLED | OUTPATIENT
Start: 2024-09-27

## 2024-09-30 ENCOUNTER — HOSPITAL ENCOUNTER (OUTPATIENT)
Dept: ULTRASOUND IMAGING | Facility: HOSPITAL | Age: 89
Discharge: HOME | End: 2024-09-30
Admitting: NURSE PRACTITIONER
Payer: MEDICARE

## 2024-09-30 VITALS
HEIGHT: 71 IN | DIASTOLIC BLOOD PRESSURE: 62 MMHG | OXYGEN SATURATION: 98 % | BODY MASS INDEX: 30.41 KG/M2 | HEART RATE: 70 BPM | RESPIRATION RATE: 15 BRPM | TEMPERATURE: 96.9 F | WEIGHT: 217.2 LBS | SYSTOLIC BLOOD PRESSURE: 121 MMHG

## 2024-09-30 DIAGNOSIS — K74.60 CIRRHOSIS OF LIVER WITH ASCITES, UNSPECIFIED HEPATIC CIRRHOSIS TYPE: ICD-10-CM

## 2024-09-30 DIAGNOSIS — R18.8 OTHER ASCITES: ICD-10-CM

## 2024-09-30 DIAGNOSIS — R18.8 CIRRHOSIS OF LIVER WITH ASCITES, UNSPECIFIED HEPATIC CIRRHOSIS TYPE: ICD-10-CM

## 2024-09-30 LAB
ALBUMIN FLD-MCNC: 1.8 G/DL
APPEARANCE FLD: CLEAR
COLOR FLD: YELLOW
LYMPHOCYTES NFR FLD MANUAL: 10 %
MACROPHAGE FLUID: 78 %
MONOCYTES NFR FLD: 10 %
NEUTROPHILS NFR FLD MANUAL: 2 %
PROT FLD-MCNC: 2.8 G/DL
RBC # FLD AUTO: <2000 /MM3
WBC # FLD AUTO: 185 /MM3

## 2024-09-30 PROCEDURE — 76942 ECHO GUIDE FOR BIOPSY: CPT

## 2024-09-30 PROCEDURE — 25010000002 ALBUMIN HUMAN 25% PER 50 ML: Performed by: NURSE PRACTITIONER

## 2024-09-30 PROCEDURE — P9047 ALBUMIN (HUMAN), 25%, 50ML: HCPCS | Performed by: NURSE PRACTITIONER

## 2024-09-30 PROCEDURE — 89051 BODY FLUID CELL COUNT: CPT | Performed by: NURSE PRACTITIONER

## 2024-09-30 PROCEDURE — 82042 OTHER SOURCE ALBUMIN QUAN EA: CPT | Performed by: NURSE PRACTITIONER

## 2024-09-30 PROCEDURE — 84157 ASSAY OF PROTEIN OTHER: CPT | Performed by: NURSE PRACTITIONER

## 2024-09-30 RX ORDER — LIDOCAINE HYDROCHLORIDE 10 MG/ML
10 INJECTION, SOLUTION INFILTRATION; PERINEURAL ONCE
Status: DISCONTINUED | OUTPATIENT
Start: 2024-09-30 | End: 2024-10-01 | Stop reason: HOSPADM

## 2024-09-30 RX ORDER — ALBUMIN (HUMAN) 12.5 G/50ML
25 SOLUTION INTRAVENOUS AS NEEDED
Status: DISCONTINUED | OUTPATIENT
Start: 2024-09-30 | End: 2024-10-01 | Stop reason: HOSPADM

## 2024-09-30 RX ORDER — SODIUM CHLORIDE 0.9 % (FLUSH) 0.9 %
10 SYRINGE (ML) INJECTION AS NEEDED
Status: DISCONTINUED | OUTPATIENT
Start: 2024-09-30 | End: 2024-10-01 | Stop reason: HOSPADM

## 2024-09-30 RX ADMIN — ALBUMIN (HUMAN) 25 G: 0.25 INJECTION, SOLUTION INTRAVENOUS at 12:00

## 2024-09-30 NOTE — DISCHARGE INSTRUCTIONS
You may remove the bandage tomorrow and shower tomorrow; but you will need to avoid tub baths or any situation where your are submersed in water for the next 4 or 5 days to avoid the risk of infection.

## 2024-09-30 NOTE — PRE-PROCEDURE NOTE
[facility]   Vascular Interventional Radiology  History & Physicial        Patient Name:Jordi Sam    : 1934    MRN: [id number]    Primary Care Physician: Jasper Avery MD    Referring Physician: MAKAYLA Stringer     Date of admission: 2024    Subjective     Reason for Consult: Paracentesis for reoccurring ascites.    History of Present Illness     Jordi Sam is a 89 y.o. male referred to IR as noted above.      Active Hospital Problems:  There are no active hospital problems to display for this patient.      Personal History     Past Medical History:   Diagnosis Date    A-fib     Acute inferior myocardial infarction     Acute inferior STEMI with RV infarct features, 2009.     CAD (coronary artery disease)     Cellulitis 2023    Facial. - admission at [facility]. 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 Seeing Dr Kiersten Nova and Dr Jasper Avery        Past Surgical History:   Procedure Laterality Date    BRONCHOSCOPY N/A 2019    Procedure: BRONCHOSCOPY WITH THORACENTESIS;  Surgeon: Marciano Higginbotham MD;  Location:  [facility];  Service: Pulmonary    CARDIAC CATHETERIZATION Bilateral 2019    Procedure: Right and Left Heart Cath;  Surgeon: Efrem Posadas MD;  Location:  [facility] CATH INVASIVE LOCATION;  Service: Cardiology    CARDIAC CATHETERIZATION      CHOLECYSTECTOMY      CORONARY ANGIOPLASTY WITH STENT PLACEMENT   01/09/2009    Sirolimus eluting stents to the RCA, 01/09/2009.     HERNIA REPAIR      Hernia repair x 2.     PARACENTESIS  06/17/2019    multiple    UMBILICAL HERNIA REPAIR N/A 7/26/2020    Procedure: UMBILICAL HERNIA REPAIR;  Surgeon: Maribell Her MD;  Location: [facility];  Service: General;  Laterality: N/A;       Family History: His family history includes Aneurysm in his father; Cancer in his sister; Dementia in his mother; Heart attack in his father; Heart disease in his brother and father; Hypertension in his daughter and son; No Known Problems in his brother and sister; Stroke in his brother and mother.     Social History: He  reports that he quit smoking about 41 years ago. His smoking use included cigarettes. He started smoking about 69 years ago. He has a 70 pack-year smoking history. He has never been exposed to tobacco smoke. He has never used smokeless tobacco. He reports that he does not drink alcohol and does not use drugs.    Home Medications:  HYDROcodone-acetaminophen, albuterol sulfate HFA, allopurinol, apixaban, chlorhexidine, folic acid, furosemide, levothyroxine, mupirocin, nitroglycerin, simvastatin, spironolactone, and terazosin    Current Medications:    sodium chloride     Allergies:  Allergies   Allergen Reactions    Bee Venom Swelling    Sulfamethoxazole-Trimethoprim Other (See Comments)     Pt unaware       Review of Systems    IR Procedure pertinent significant findings are mentioned in the PMH and PSH above.    Objective     There were no vitals taken for this visit.     Physical Exam    A&Ox3.   Able to communicate  No Apparent Distress  Average physique   CVS: VS as noted. Chart reviewed. Stable for the procedure.  Respiratory: Non labored breathing. No signs of respiratory compromise.    Result Review      I have personally reviewed the results from the time of this admission to 9/30/2024 10:42 EDT and agree with these findings.  [x]  Laboratory  []  Microbiology  [x]   "Radiology  []  EKG/Telemetry   []  Cardiology/Vascular   []  Pathology  []  Old records  []  Other:    Most notable findings include: As noted:    Results from last 7 days   Lab Units 09/25/24  1255   INR  1.40*   WBC 10*3/mm3 4.98   HEMOGLOBIN g/dL 10.2*   HEMATOCRIT % 31.8*   PLATELETS 10*3/mm3 167       Results from last 7 days   Lab Units 09/25/24  1255   SODIUM mmol/L 136   POTASSIUM mmol/L 4.3   CHLORIDE mmol/L 98   CO2 mmol/L 25.2   BUN mg/dL 23   CREATININE mg/dL 1.42*   EGFR mL/min/1.73 46.9*   GLUCOSE mg/dL 110*           Lab 09/25/24  1255   TOTAL PROTEIN 6.5   ALBUMIN 3.8   GLOBULIN 2.7   ALT (SGPT) 10   AST (SGOT) 20   BILIRUBIN 0.5   ALK PHOS 110       Estimated Creatinine Clearance: 41.3 mL/min (A) (by C-G formula based on SCr of 1.42 mg/dL (H)). No results found for: \"CREATININE\"    COVID19   Date Value Ref Range Status   11/04/2022 Not Detected  Final        No results found for: \"PREGTESTUR\", \"PREGSERUM\", \"HCG\", \"HCGQUANT\"     ASA SCALE ASSESSMENT (applicable ONLY if sedation planned):        MALLAMPATI CLASSIFICATION (applicable ONLY if sedation planned):       Assessment / Plan     Jordi Sam is a 89 y.o. male referred to the IR service with above problem.    Plan:   As above.    MAKAYLA Joy   Vascular Interventional Radiology  09/30/24   10:42 AM EDT     "

## 2024-09-30 NOTE — POST-PROCEDURE NOTE
Vascular Interventional Radiology  Procedure Note    Date: 09/30/24      Time: 10:43 EDT     Pre-op Diagnosis: Ascites     Post-op Diagnosis: Ascites    Procedure: US guided Paracentesis. Via LQ. See report for details.    Volume removed: See report.    Sedation: None    Estimated Blood Loss (EBL): 0 cc    IVF: NA    Findings: Above.    Specimens: See report.    Complications: See report.    Disposition: IR recovery.    MAKAYLA Madera  Vascular Interventional Radiology

## 2024-10-01 ENCOUNTER — TELEPHONE (OUTPATIENT)
Dept: INFUSION THERAPY | Facility: HOSPITAL | Age: 89
End: 2024-10-01
Payer: MEDICARE

## 2024-10-25 ENCOUNTER — TELEPHONE (OUTPATIENT)
Dept: INFUSION THERAPY | Facility: HOSPITAL | Age: 89
End: 2024-10-25
Payer: MEDICARE

## 2024-10-25 ENCOUNTER — HOSPITAL ENCOUNTER (OUTPATIENT)
Dept: ULTRASOUND IMAGING | Facility: HOSPITAL | Age: 89
Discharge: HOME | End: 2024-10-25
Payer: MEDICARE

## 2024-10-25 ENCOUNTER — PREP FOR SURGERY (OUTPATIENT)
Dept: OTHER | Facility: HOSPITAL | Age: 89
End: 2024-10-25
Payer: MEDICARE

## 2024-10-25 RX ORDER — SODIUM CHLORIDE 0.9 % (FLUSH) 0.9 %
10 SYRINGE (ML) INJECTION AS NEEDED
Status: CANCELLED | OUTPATIENT
Start: 2024-10-25

## 2024-10-25 NOTE — TELEPHONE ENCOUNTER
Attempted to contact Mr Sam with an appointment reminder for his scheduled paracentensis planned for 10:00 am on Monday. Left voicemail.

## 2024-10-28 ENCOUNTER — TELEPHONE (OUTPATIENT)
Dept: CARDIOLOGY | Facility: CLINIC | Age: 89
End: 2024-10-28
Payer: MEDICARE

## 2024-10-28 ENCOUNTER — HOSPITAL ENCOUNTER (OUTPATIENT)
Dept: ULTRASOUND IMAGING | Facility: HOSPITAL | Age: 89
Discharge: HOME | End: 2024-10-28
Admitting: NURSE PRACTITIONER
Payer: MEDICARE

## 2024-10-28 VITALS
WEIGHT: 220 LBS | BODY MASS INDEX: 30.8 KG/M2 | HEART RATE: 73 BPM | RESPIRATION RATE: 18 BRPM | OXYGEN SATURATION: 98 % | SYSTOLIC BLOOD PRESSURE: 102 MMHG | TEMPERATURE: 97.4 F | DIASTOLIC BLOOD PRESSURE: 49 MMHG | HEIGHT: 71 IN

## 2024-10-28 DIAGNOSIS — R18.8 OTHER ASCITES: Primary | ICD-10-CM

## 2024-10-28 PROCEDURE — 25010000002 ALBUMIN HUMAN 25% PER 50 ML: Performed by: NURSE PRACTITIONER

## 2024-10-28 PROCEDURE — 76942 ECHO GUIDE FOR BIOPSY: CPT

## 2024-10-28 PROCEDURE — P9047 ALBUMIN (HUMAN), 25%, 50ML: HCPCS | Performed by: NURSE PRACTITIONER

## 2024-10-28 PROCEDURE — 25010000002 LIDOCAINE PF 1% 1 % SOLUTION: Performed by: NURSE PRACTITIONER

## 2024-10-28 RX ORDER — SODIUM CHLORIDE 0.9 % (FLUSH) 0.9 %
10 SYRINGE (ML) INJECTION AS NEEDED
Status: DISCONTINUED | OUTPATIENT
Start: 2024-10-28 | End: 2024-10-29 | Stop reason: HOSPADM

## 2024-10-28 RX ORDER — ALBUMIN (HUMAN) 12.5 G/50ML
25 SOLUTION INTRAVENOUS ONCE
OUTPATIENT
Start: 2024-10-28 | End: 2024-10-28

## 2024-10-28 RX ORDER — ALBUMIN (HUMAN) 12.5 G/50ML
25 SOLUTION INTRAVENOUS ONCE
Status: COMPLETED | OUTPATIENT
Start: 2024-10-28 | End: 2024-10-28

## 2024-10-28 RX ORDER — LIDOCAINE HYDROCHLORIDE 10 MG/ML
5 INJECTION, SOLUTION EPIDURAL; INFILTRATION; INTRACAUDAL; PERINEURAL ONCE
Status: COMPLETED | OUTPATIENT
Start: 2024-10-28 | End: 2024-10-28

## 2024-10-28 RX ADMIN — LIDOCAINE HYDROCHLORIDE 5 ML: 10 INJECTION, SOLUTION EPIDURAL; INFILTRATION; INTRACAUDAL; PERINEURAL at 12:33

## 2024-10-28 RX ADMIN — ALBUMIN (HUMAN) 25 G: 0.25 INJECTION, SOLUTION INTRAVENOUS at 13:43

## 2024-10-28 NOTE — TELEPHONE ENCOUNTER
Eliquis 2.5 mg samples  provided.    Alert and oriented, no focal deficits, no motor or sensory deficits.

## 2024-10-28 NOTE — POST-PROCEDURE NOTE
Vascular Interventional Radiology  Procedure Note    Date: 10/28/24      Time: 11:08 EDT     Pre-op Diagnosis: Ascites     Post-op Diagnosis: Ascites    Procedure: US guided Paracentesis. Via LQ. See report for details.    Volume removed: See report.    Sedation: None    Estimated Blood Loss (EBL): 0 cc    IVF: NA    Findings: Above.    Specimens: See report.    Complications: See report.    Disposition: IR recovery.    MAKAYLA Madera  Vascular Interventional Radiology

## 2024-10-28 NOTE — PRE-PROCEDURE NOTE
[facility]   Vascular Interventional Radiology  History & Physicial        Patient Name:Jordi Sam    : 1934    MRN: [id number]    Primary Care Physician: Jasper Avery MD    Referring Physician: MAKAYLA Taveras     Date of admission: 10/28/2024    Subjective     Reason for Consult: Paracentesis for reoccurring ascites.    History of Present Illness     Jordi Sam is a 89 y.o. male referred to IR as noted above.      Active Hospital Problems:  There are no active hospital problems to display for this patient.      Personal History     Past Medical History:   Diagnosis Date    A-fib     Acute inferior myocardial infarction     Acute inferior STEMI with RV infarct features, 2009.     CAD (coronary artery disease)     Cellulitis 2023    Facial. - admission at [facility]. 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 Seeing Dr Kiersten Nova and Dr Jasper Avery        Past Surgical History:   Procedure Laterality Date    BRONCHOSCOPY N/A 2019    Procedure: BRONCHOSCOPY WITH THORACENTESIS;  Surgeon: Marciano Higginbotham MD;  Location:  [facility] ENDOSCOPY;  Service: Pulmonary    CARDIAC CATHETERIZATION Bilateral 2019    Procedure: Right and Left Heart Cath;  Surgeon: Efrem Posadas MD;  Location:  [facility] CATH INVASIVE LOCATION;  Service: Cardiology    CARDIAC CATHETERIZATION      CHOLECYSTECTOMY      CORONARY ANGIOPLASTY WITH STENT PLACEMENT   "01/09/2009    Sirolimus eluting stents to the RCA, 01/09/2009.     HERNIA REPAIR      Hernia repair x 2.     PARACENTESIS  06/17/2019    multiple    UMBILICAL HERNIA REPAIR N/A 7/26/2020    Procedure: UMBILICAL HERNIA REPAIR;  Surgeon: Maribell Her MD;  Location: [facility];  Service: General;  Laterality: N/A;       Family History: His family history includes Aneurysm in his father; Cancer in his sister; Dementia in his mother; Heart attack in his father; Heart disease in his brother and father; Hypertension in his daughter and son; No Known Problems in his brother and sister; Stroke in his brother and mother.     Social History: He  reports that he quit smoking about 41 years ago. His smoking use included cigarettes. He started smoking about 69 years ago. He has a 70 pack-year smoking history. He has never been exposed to tobacco smoke. He has never used smokeless tobacco. He reports that he does not drink alcohol and does not use drugs.    Home Medications:  HYDROcodone-acetaminophen, albuterol sulfate HFA, allopurinol, apixaban, chlorhexidine, folic acid, furosemide, levothyroxine, mupirocin, nitroglycerin, simvastatin, spironolactone, and terazosin    Current Medications:    sodium chloride     Allergies:  Allergies   Allergen Reactions    Bee Venom Swelling    Sulfamethoxazole-Trimethoprim Other (See Comments)     Pt unaware       Review of Systems    IR Procedure pertinent significant findings are mentioned in the PMH and PSH above.    Objective     Visit Vitals  /65 (BP Location: Left arm, Patient Position: Lying)   Pulse 79   Temp 97.4 °F (36.3 °C) (Tympanic)   Resp 18   Ht 180.3 cm (71\")   Wt 99.8 kg (220 lb)   SpO2 98%   BMI 30.68 kg/m²        Physical Exam    A&Ox3.   Able to communicate  No Apparent Distress  Average physique   CVS: VS as noted. Chart reviewed. Stable for the procedure.  Respiratory: Non labored breathing. No signs of respiratory compromise.    Result Review      I have " "personally reviewed the results from the time of this admission to 10/28/2024 11:07 EDT and agree with these findings.  [x]  Laboratory  []  Microbiology  [x]  Radiology  []  EKG/Telemetry   []  Cardiology/Vascular   []  Pathology  []  Old records  []  Other:    Most notable findings include: As noted:      Previous CBC & INR reviewed.                CrCl cannot be calculated (Patient's most recent lab result is older than the maximum 30 days allowed.). No results found for: \"CREATININE\"    COVID19   Date Value Ref Range Status   11/04/2022 Not Detected  Final        No results found for: \"PREGTESTUR\", \"PREGSERUM\", \"HCG\", \"HCGQUANT\"     ASA SCALE ASSESSMENT (applicable ONLY if sedation planned):        MALLAMPATI CLASSIFICATION (applicable ONLY if sedation planned):       Assessment / Plan     Jordi Sam is a 89 y.o. male referred to the IR service with above problem.    Plan:   As above.    MAKAYLA Joy   Vascular Interventional Radiology  10/28/24   11:07 AM EDT     "

## 2024-10-31 DIAGNOSIS — R18.8 CIRRHOSIS OF LIVER WITH ASCITES, UNSPECIFIED HEPATIC CIRRHOSIS TYPE: Primary | ICD-10-CM

## 2024-10-31 DIAGNOSIS — K74.60 CIRRHOSIS OF LIVER WITH ASCITES, UNSPECIFIED HEPATIC CIRRHOSIS TYPE: Primary | ICD-10-CM

## 2024-10-31 RX ORDER — SPIRONOLACTONE 100 MG/1
100 TABLET, FILM COATED ORAL DAILY
Qty: 90 TABLET | Refills: 3 | Status: SHIPPED | OUTPATIENT
Start: 2024-10-31

## 2024-11-22 ENCOUNTER — PREP FOR SURGERY (OUTPATIENT)
Dept: OTHER | Facility: HOSPITAL | Age: 89
End: 2024-11-22
Payer: MEDICARE

## 2024-11-22 RX ORDER — SODIUM CHLORIDE 0.9 % (FLUSH) 0.9 %
10 SYRINGE (ML) INJECTION AS NEEDED
Status: CANCELLED | OUTPATIENT
Start: 2024-11-22

## 2024-11-25 ENCOUNTER — HOSPITAL ENCOUNTER (OUTPATIENT)
Dept: INTERVENTIONAL RADIOLOGY/VASCULAR | Facility: HOSPITAL | Age: 89
Discharge: HOME | End: 2024-11-25
Payer: MEDICARE

## 2024-11-25 ENCOUNTER — HOSPITAL ENCOUNTER (OUTPATIENT)
Dept: ULTRASOUND IMAGING | Facility: HOSPITAL | Age: 89
Discharge: HOME | End: 2024-11-25
Payer: MEDICARE

## 2024-11-25 VITALS
HEART RATE: 88 BPM | TEMPERATURE: 97.4 F | OXYGEN SATURATION: 98 % | SYSTOLIC BLOOD PRESSURE: 104 MMHG | DIASTOLIC BLOOD PRESSURE: 54 MMHG | WEIGHT: 219.6 LBS | BODY MASS INDEX: 30.74 KG/M2 | RESPIRATION RATE: 18 BRPM | HEIGHT: 71 IN

## 2024-11-25 DIAGNOSIS — R18.8 OTHER ASCITES: Primary | ICD-10-CM

## 2024-11-25 PROCEDURE — 25010000002 ALBUMIN HUMAN 25% PER 50 ML

## 2024-11-25 PROCEDURE — P9047 ALBUMIN (HUMAN), 25%, 50ML: HCPCS

## 2024-11-25 PROCEDURE — 25010000002 LIDOCAINE PF 1% 1 % SOLUTION

## 2024-11-25 PROCEDURE — 96365 THER/PROPH/DIAG IV INF INIT: CPT

## 2024-11-25 PROCEDURE — C1729 CATH, DRAINAGE: HCPCS

## 2024-11-25 PROCEDURE — 76942 ECHO GUIDE FOR BIOPSY: CPT

## 2024-11-25 RX ORDER — ALBUMIN (HUMAN) 12.5 G/50ML
SOLUTION INTRAVENOUS
Status: COMPLETED
Start: 2024-11-25 | End: 2024-11-25

## 2024-11-25 RX ORDER — ALBUMIN (HUMAN) 12.5 G/50ML
25 SOLUTION INTRAVENOUS ONCE
OUTPATIENT
Start: 2024-11-25 | End: 2024-11-25

## 2024-11-25 RX ORDER — ALBUMIN (HUMAN) 12.5 G/50ML
37.5 SOLUTION INTRAVENOUS ONCE
Status: COMPLETED | OUTPATIENT
Start: 2024-11-25 | End: 2024-11-25

## 2024-11-25 RX ORDER — LIDOCAINE HYDROCHLORIDE 10 MG/ML
INJECTION, SOLUTION EPIDURAL; INFILTRATION; INTRACAUDAL; PERINEURAL
Status: COMPLETED
Start: 2024-11-25 | End: 2024-11-25

## 2024-11-25 RX ORDER — SODIUM CHLORIDE 0.9 % (FLUSH) 0.9 %
10 SYRINGE (ML) INJECTION AS NEEDED
Status: DISCONTINUED | OUTPATIENT
Start: 2024-11-25 | End: 2024-11-26 | Stop reason: HOSPADM

## 2024-11-25 RX ADMIN — ALBUMIN (HUMAN) 37.5 G: 12.5 SOLUTION INTRAVENOUS at 12:04

## 2024-11-25 RX ADMIN — LIDOCAINE HYDROCHLORIDE 8 ML: 10 INJECTION, SOLUTION EPIDURAL; INFILTRATION; INTRACAUDAL; PERINEURAL at 11:19

## 2024-11-25 RX ADMIN — ALBUMIN (HUMAN) 37.5 G: 0.25 INJECTION, SOLUTION INTRAVENOUS at 12:04

## 2024-11-25 NOTE — POST-PROCEDURE NOTE
Vascular Interventional Radiology  Procedure Note    Date: 11/25/24      Time: 10:37 EST     Pre-op Diagnosis: Ascites     Post-op Diagnosis: Ascites    Procedure: US guided Paracentesis. Via LQ. See report for details.    Volume removed: See report.    Sedation: None    Estimated Blood Loss (EBL): 0 cc    IVF: NA    Findings: Above.    Specimens: See report.    Complications: See report.    Disposition: IR recovery.    MAKAYLA Madera  Vascular Interventional Radiology

## 2024-11-25 NOTE — NURSING NOTE
Pt discharged from ir dept s/p paracentesis. Pt tolerated procedure without complications. Dressing remains clean dry and intact, site without evidence of hematoma at time of discharge. Pt transported to exit via wheelchair per [facility].

## 2024-11-25 NOTE — PRE-PROCEDURE NOTE
[facility]   Vascular Interventional Radiology  History & Physicial        Patient Name:Jordi Sam    : 1934    MRN: [id number]    Primary Care Physician: Jasper Avery MD    Referring Physician: MAKAYLA Taveras     Date of admission: 2024    Subjective     Reason for Consult: Paracentesis for reoccurring ascites.    History of Present Illness     Jordi Sam is a 89 y.o. male referred to IR as noted above.      Active Hospital Problems:  There are no active hospital problems to display for this patient.      Personal History     Past Medical History:   Diagnosis Date    A-fib     Acute inferior myocardial infarction     Acute inferior STEMI with RV infarct features, 2009.     CAD (coronary artery disease)     Cellulitis 2023    Facial. - admission at [facility]. 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 Seeing Dr Kiersten Nova and Dr Jasper Avery        Past Surgical History:   Procedure Laterality Date    BRONCHOSCOPY N/A 2019    Procedure: BRONCHOSCOPY WITH THORACENTESIS;  Surgeon: Marciano Higginbotham MD;  Location:  [facility] ENDOSCOPY;  Service: Pulmonary    CARDIAC CATHETERIZATION Bilateral 2019    Procedure: Right and Left Heart Cath;  Surgeon: Efrem Posadas MD;  Location:  [facility] CATH INVASIVE LOCATION;  Service: Cardiology    CARDIAC CATHETERIZATION      CHOLECYSTECTOMY      CORONARY ANGIOPLASTY WITH STENT PLACEMENT   01/09/2009    Sirolimus eluting stents to the RCA, 01/09/2009.     HERNIA REPAIR      Hernia repair x 2.     PARACENTESIS  06/17/2019    multiple    UMBILICAL HERNIA REPAIR N/A 7/26/2020    Procedure: UMBILICAL HERNIA REPAIR;  Surgeon: Maribell Her MD;  Location: [facility];  Service: General;  Laterality: N/A;       Family History: His family history includes Aneurysm in his father; Cancer in his sister; Dementia in his mother; Heart attack in his father; Heart disease in his brother and father; Hypertension in his daughter and son; No Known Problems in his brother and sister; Stroke in his brother and mother.     Social History: He  reports that he quit smoking about 41 years ago. His smoking use included cigarettes. He started smoking about 69 years ago. He has a 70 pack-year smoking history. He has never been exposed to tobacco smoke. He has never used smokeless tobacco. He reports that he does not drink alcohol and does not use drugs.    Home Medications:  HYDROcodone-acetaminophen, albuterol sulfate HFA, allopurinol, apixaban, chlorhexidine, folic acid, furosemide, levothyroxine, mupirocin, nitroglycerin, simvastatin, spironolactone, and terazosin    Current Medications:    sodium chloride     Allergies:  Allergies   Allergen Reactions    Bee Venom Swelling    Sulfamethoxazole-Trimethoprim Other (See Comments)     Pt unaware       Review of Systems    IR Procedure pertinent significant findings are mentioned in the PMH and PSH above.    Objective     There were no vitals taken for this visit.     Physical Exam    A&Ox3.   Able to communicate  No Apparent Distress  Average physique   CVS: VS as noted. Chart reviewed. Stable for the procedure.  Respiratory: Non labored breathing. No signs of respiratory compromise.    Result Review      I have personally reviewed the results from the time of this admission to 11/25/2024 10:36 EST and agree with these findings.  [x]  Laboratory  []  Microbiology  [x]   "Radiology  []  EKG/Telemetry   []  Cardiology/Vascular   []  Pathology  []  Old records  []  Other:    Most notable findings include: As noted:      Previous CBC & INR review.                CrCl cannot be calculated (Patient's most recent lab result is older than the maximum 30 days allowed.). No results found for: \"CREATININE\"    COVID19   Date Value Ref Range Status   11/04/2022 Not Detected  Final        No results found for: \"PREGTESTUR\", \"PREGSERUM\", \"HCG\", \"HCGQUANT\"     ASA SCALE ASSESSMENT (applicable ONLY if sedation planned):        MALLAMPATI CLASSIFICATION (applicable ONLY if sedation planned):       Assessment / Plan     Jordi Sam is a 89 y.o. male referred to the IR service with above problem.    Plan:   As above.    MAKAYLA Joy   Vascular Interventional Radiology  11/25/24   10:36 AM EST     "

## 2024-11-26 ENCOUNTER — TELEPHONE (OUTPATIENT)
Dept: INFUSION THERAPY | Facility: HOSPITAL | Age: 89
End: 2024-11-26
Payer: MEDICARE

## 2024-11-26 DIAGNOSIS — E78.2 MIXED HYPERLIPIDEMIA: ICD-10-CM

## 2024-11-26 DIAGNOSIS — K13.79 ORAL PAIN: ICD-10-CM

## 2024-11-26 RX ORDER — TERAZOSIN 2 MG/1
CAPSULE ORAL
Qty: 90 CAPSULE | Refills: 0 | Status: SHIPPED | OUTPATIENT
Start: 2024-11-26

## 2024-11-26 RX ORDER — ALLOPURINOL 100 MG/1
100 TABLET ORAL DAILY
Qty: 90 TABLET | Refills: 0 | Status: SHIPPED | OUTPATIENT
Start: 2024-11-26

## 2024-11-26 RX ORDER — SIMVASTATIN 40 MG
40 TABLET ORAL DAILY
Qty: 90 TABLET | Refills: 0 | Status: SHIPPED | OUTPATIENT
Start: 2024-11-26

## 2024-12-03 ENCOUNTER — OFFICE VISIT (OUTPATIENT)
Dept: FAMILY MEDICINE CLINIC | Facility: CLINIC | Age: 89
End: 2024-12-03
Payer: MEDICARE

## 2024-12-03 ENCOUNTER — LAB (OUTPATIENT)
Dept: LAB | Facility: HOSPITAL | Age: 89
End: 2024-12-03
Payer: MEDICARE

## 2024-12-03 VITALS
OXYGEN SATURATION: 98 % | SYSTOLIC BLOOD PRESSURE: 112 MMHG | DIASTOLIC BLOOD PRESSURE: 60 MMHG | BODY MASS INDEX: 29.9 KG/M2 | HEIGHT: 71 IN | HEART RATE: 86 BPM | WEIGHT: 213.6 LBS

## 2024-12-03 DIAGNOSIS — R73.03 PRE-DIABETES: ICD-10-CM

## 2024-12-03 DIAGNOSIS — N18.32 STAGE 3B CHRONIC KIDNEY DISEASE: Primary | ICD-10-CM

## 2024-12-03 DIAGNOSIS — Z51.81 MEDICATION MONITORING ENCOUNTER: ICD-10-CM

## 2024-12-03 DIAGNOSIS — E78.2 MIXED HYPERLIPIDEMIA: ICD-10-CM

## 2024-12-03 DIAGNOSIS — E03.8 OTHER SPECIFIED HYPOTHYROIDISM: ICD-10-CM

## 2024-12-03 DIAGNOSIS — K74.60 CIRRHOSIS OF LIVER WITH ASCITES, UNSPECIFIED HEPATIC CIRRHOSIS TYPE: ICD-10-CM

## 2024-12-03 DIAGNOSIS — N18.32 STAGE 3B CHRONIC KIDNEY DISEASE: ICD-10-CM

## 2024-12-03 DIAGNOSIS — E78.5 DYSLIPIDEMIA: ICD-10-CM

## 2024-12-03 DIAGNOSIS — R18.8 CIRRHOSIS OF LIVER WITH ASCITES, UNSPECIFIED HEPATIC CIRRHOSIS TYPE: ICD-10-CM

## 2024-12-03 DIAGNOSIS — H53.9 VISION CHANGES: ICD-10-CM

## 2024-12-03 DIAGNOSIS — R39.9 LOWER URINARY TRACT SYMPTOMS (LUTS): ICD-10-CM

## 2024-12-03 DIAGNOSIS — Z00.00 MEDICARE ANNUAL WELLNESS VISIT, SUBSEQUENT: ICD-10-CM

## 2024-12-03 DIAGNOSIS — M10.9 GOUT, UNSPECIFIED CAUSE, UNSPECIFIED CHRONICITY, UNSPECIFIED SITE: ICD-10-CM

## 2024-12-03 DIAGNOSIS — K43.2 INCISIONAL HERNIA, WITHOUT OBSTRUCTION OR GANGRENE: ICD-10-CM

## 2024-12-03 LAB
ALBUMIN SERPL-MCNC: 3.4 G/DL (ref 3.5–5.2)
ALBUMIN/GLOB SERPL: 1.2 G/DL
ALP SERPL-CCNC: 112 U/L (ref 39–117)
ALT SERPL W P-5'-P-CCNC: 12 U/L (ref 1–41)
ANION GAP SERPL CALCULATED.3IONS-SCNC: 13.3 MMOL/L (ref 5–15)
AST SERPL-CCNC: 26 U/L (ref 1–40)
BILIRUB SERPL-MCNC: 0.6 MG/DL (ref 0–1.2)
BUN SERPL-MCNC: 24 MG/DL (ref 8–23)
BUN/CREAT SERPL: 16.6 (ref 7–25)
CALCIUM SPEC-SCNC: 8.8 MG/DL (ref 8.2–9.6)
CHLORIDE SERPL-SCNC: 98 MMOL/L (ref 98–107)
CHOLEST SERPL-MCNC: 98 MG/DL (ref 0–200)
CO2 SERPL-SCNC: 24.7 MMOL/L (ref 22–29)
CREAT SERPL-MCNC: 1.45 MG/DL (ref 0.76–1.27)
DEPRECATED RDW RBC AUTO: 52.3 FL (ref 37–54)
EGFRCR SERPLBLD CKD-EPI 2021: 45.8 ML/MIN/1.73
ERYTHROCYTE [DISTWIDTH] IN BLOOD BY AUTOMATED COUNT: 15.4 % (ref 12.3–15.4)
GLOBULIN UR ELPH-MCNC: 2.8 GM/DL
GLUCOSE SERPL-MCNC: 118 MG/DL (ref 65–99)
HCT VFR BLD AUTO: 33.3 % (ref 37.5–51)
HDLC SERPL-MCNC: 43 MG/DL (ref 40–60)
HGB BLD-MCNC: 11 G/DL (ref 13–17.7)
LDLC SERPL CALC-MCNC: 42 MG/DL (ref 0–100)
LDLC/HDLC SERPL: 1.01 {RATIO}
MCH RBC QN AUTO: 31.2 PG (ref 26.6–33)
MCHC RBC AUTO-ENTMCNC: 33 G/DL (ref 31.5–35.7)
MCV RBC AUTO: 94.3 FL (ref 79–97)
PLATELET # BLD AUTO: 180 10*3/MM3 (ref 140–450)
PMV BLD AUTO: 10.3 FL (ref 6–12)
POTASSIUM SERPL-SCNC: 4.6 MMOL/L (ref 3.5–5.2)
PROT SERPL-MCNC: 6.2 G/DL (ref 6–8.5)
RBC # BLD AUTO: 3.53 10*6/MM3 (ref 4.14–5.8)
SODIUM SERPL-SCNC: 136 MMOL/L (ref 136–145)
TRIGL SERPL-MCNC: 57 MG/DL (ref 0–150)
VLDLC SERPL-MCNC: 13 MG/DL (ref 5–40)
WBC NRBC COR # BLD AUTO: 5.92 10*3/MM3 (ref 3.4–10.8)

## 2024-12-03 PROCEDURE — 80061 LIPID PANEL: CPT

## 2024-12-03 PROCEDURE — 83036 HEMOGLOBIN GLYCOSYLATED A1C: CPT

## 2024-12-03 PROCEDURE — 84439 ASSAY OF FREE THYROXINE: CPT

## 2024-12-03 PROCEDURE — G0439 PPPS, SUBSEQ VISIT: HCPCS | Performed by: INTERNAL MEDICINE

## 2024-12-03 PROCEDURE — 1126F AMNT PAIN NOTED NONE PRSNT: CPT | Performed by: INTERNAL MEDICINE

## 2024-12-03 PROCEDURE — 85027 COMPLETE CBC AUTOMATED: CPT

## 2024-12-03 PROCEDURE — 82043 UR ALBUMIN QUANTITATIVE: CPT

## 2024-12-03 PROCEDURE — 1170F FXNL STATUS ASSESSED: CPT | Performed by: INTERNAL MEDICINE

## 2024-12-03 PROCEDURE — 99214 OFFICE O/P EST MOD 30 MIN: CPT | Performed by: INTERNAL MEDICINE

## 2024-12-03 PROCEDURE — 84443 ASSAY THYROID STIM HORMONE: CPT

## 2024-12-03 PROCEDURE — 80053 COMPREHEN METABOLIC PANEL: CPT

## 2024-12-03 RX ORDER — LEVOTHYROXINE SODIUM 25 UG/1
25 TABLET ORAL EVERY MORNING
Qty: 90 TABLET | Refills: 0 | Status: SHIPPED | OUTPATIENT
Start: 2024-12-03

## 2024-12-03 RX ORDER — ALLOPURINOL 100 MG/1
100 TABLET ORAL DAILY
Qty: 90 TABLET | Refills: 0 | Status: SHIPPED | OUTPATIENT
Start: 2024-12-03

## 2024-12-03 RX ORDER — SIMVASTATIN 40 MG
40 TABLET ORAL DAILY
Qty: 90 TABLET | Refills: 0 | Status: SHIPPED | OUTPATIENT
Start: 2024-12-03

## 2024-12-03 RX ORDER — TERAZOSIN 2 MG/1
2 CAPSULE ORAL NIGHTLY
Qty: 90 CAPSULE | Refills: 0 | Status: SHIPPED | OUTPATIENT
Start: 2024-12-03

## 2024-12-03 NOTE — ASSESSMENT & PLAN NOTE
Risk of operative repair for left outweighs benefit.  Recommend only operative repair if emergent situation.

## 2024-12-03 NOTE — ASSESSMENT & PLAN NOTE
Orders:    Comprehensive Metabolic Panel; Future    Lipid Panel; Future    Hemoglobin A1c; Future    CBC (No Diff); Future    MicroAlbumin, Urine, Random - Urine, Clean Catch; Future

## 2024-12-03 NOTE — ASSESSMENT & PLAN NOTE
Continue current medications of Lasix and Aldactone for update Bmp urine microalbumin.    Orders:    Comprehensive Metabolic Panel; Future    CBC (No Diff); Future    MicroAlbumin, Urine, Random - Urine, Clean Catch; Future

## 2024-12-03 NOTE — ASSESSMENT & PLAN NOTE
Orders:    levothyroxine (SYNTHROID, LEVOTHROID) 25 MCG tablet; Take 1 tablet by mouth Every Morning.    TSH Rfx On Abnormal To Free T4; Future

## 2024-12-03 NOTE — ASSESSMENT & PLAN NOTE
Orders:    Comprehensive Metabolic Panel; Future    Hemoglobin A1c; Future    CBC (No Diff); Future    MicroAlbumin, Urine, Random - Urine, Clean Catch; Future

## 2024-12-03 NOTE — ASSESSMENT & PLAN NOTE
Orders:    Comprehensive Metabolic Panel; Future    CBC (No Diff); Future    MicroAlbumin, Urine, Random - Urine, Clean Catch; Future     Universal Safety Interventions

## 2024-12-04 LAB
ALBUMIN UR-MCNC: <1.2 MG/DL
HBA1C MFR BLD: 6.2 % (ref 4.8–5.6)
T4 FREE SERPL-MCNC: 1.13 NG/DL (ref 0.92–1.68)
TSH SERPL DL<=0.05 MIU/L-ACNC: 5.36 UIU/ML (ref 0.27–4.2)

## 2024-12-06 ENCOUNTER — TELEPHONE (OUTPATIENT)
Dept: CARDIOLOGY | Facility: HOSPITAL | Age: 89
End: 2024-12-06
Payer: MEDICARE

## 2024-12-06 NOTE — TELEPHONE ENCOUNTER
Spoke to pt wife advised her to check with gastro to ensure they wanted medication change to 100 mg from 50 mg. Pt wife stated that he had been taking only 50 mg per day and had not changed to 100 mg. Advised her contact gastro.

## 2024-12-06 NOTE — TELEPHONE ENCOUNTER
Patient wife left voice mail had question about Spironolactone dosage. Reviewing chart patient dosage was increased from 50 mg to 100 mg by gastro.    She can be reached at [phone]

## 2024-12-06 NOTE — TELEPHONE ENCOUNTER
It looks like gastroenterology had increased the spironolactone to 100 mg daily.  This is usually done for his ascites and fluid management.    Would you mind to follow-up with her and triaged her questions.    Kidney function was stable 3 days ago.  Potassium level was stable.

## 2024-12-19 ENCOUNTER — PREP FOR SURGERY (OUTPATIENT)
Dept: OTHER | Facility: HOSPITAL | Age: 89
End: 2024-12-19
Payer: MEDICARE

## 2024-12-19 RX ORDER — SODIUM CHLORIDE 0.9 % (FLUSH) 0.9 %
10 SYRINGE (ML) INJECTION AS NEEDED
OUTPATIENT
Start: 2024-12-19

## 2024-12-20 ENCOUNTER — HOSPITAL ENCOUNTER (OUTPATIENT)
Dept: ULTRASOUND IMAGING | Facility: HOSPITAL | Age: 89
Discharge: HOME | End: 2024-12-20
Payer: MEDICARE

## 2024-12-20 VITALS
RESPIRATION RATE: 18 BRPM | SYSTOLIC BLOOD PRESSURE: 102 MMHG | TEMPERATURE: 97 F | WEIGHT: 225 LBS | HEIGHT: 70 IN | DIASTOLIC BLOOD PRESSURE: 46 MMHG | BODY MASS INDEX: 32.21 KG/M2 | HEART RATE: 80 BPM | OXYGEN SATURATION: 98 %

## 2024-12-20 DIAGNOSIS — R18.8 OTHER ASCITES: Primary | ICD-10-CM

## 2024-12-20 PROCEDURE — P9047 ALBUMIN (HUMAN), 25%, 50ML: HCPCS | Performed by: NURSE PRACTITIONER

## 2024-12-20 PROCEDURE — 76942 ECHO GUIDE FOR BIOPSY: CPT

## 2024-12-20 PROCEDURE — 25010000002 ALBUMIN HUMAN 25% PER 50 ML: Performed by: NURSE PRACTITIONER

## 2024-12-20 RX ORDER — ALBUMIN (HUMAN) 12.5 G/50ML
25 SOLUTION INTRAVENOUS ONCE
Status: COMPLETED | OUTPATIENT
Start: 2024-12-20 | End: 2024-12-20

## 2024-12-20 RX ORDER — ALBUMIN (HUMAN) 12.5 G/50ML
25 SOLUTION INTRAVENOUS ONCE
OUTPATIENT
Start: 2024-12-20 | End: 2024-12-20

## 2024-12-20 RX ORDER — LIDOCAINE HYDROCHLORIDE 10 MG/ML
5 INJECTION, SOLUTION EPIDURAL; INFILTRATION; INTRACAUDAL; PERINEURAL ONCE
Status: DISCONTINUED | OUTPATIENT
Start: 2024-12-20 | End: 2024-12-21 | Stop reason: HOSPADM

## 2024-12-20 RX ADMIN — ALBUMIN (HUMAN) 25 G: 0.25 INJECTION, SOLUTION INTRAVENOUS at 14:56

## 2024-12-20 NOTE — POST-PROCEDURE NOTE
Vascular Interventional Radiology  Procedure Note    Date: 12/20/24      Time: 10:55 EST     Pre-op Diagnosis: Ascites     Post-op Diagnosis: Ascites    Procedure: US guided Paracentesis. Via LQ. See report for details.    Volume removed: See report.    Sedation: None    Estimated Blood Loss (EBL): 0 cc    IVF: NA    Findings: Above.    Specimens: See report.    Complications: See report.    Disposition: IR recovery.    MAKAYLA Madera  Vascular Interventional Radiology

## 2024-12-20 NOTE — PRE-PROCEDURE NOTE
[facility]   Vascular Interventional Radiology  History & Physicial        Patient Name:Jordi Sam    : 1934    MRN: [id number]    Primary Care Physician: Jasper Avery MD    Referring Physician: MAKAYLA Taveras     Date of admission: 2024    Subjective     Reason for Consult: Paracentesis for reoccurring ascites.    History of Present Illness     Jordi Sam is a 89 y.o. male referred to IR as noted above.      Active Hospital Problems:  There are no active hospital problems to display for this patient.      Personal History     Past Medical History:   Diagnosis Date    A-fib     Acute inferior myocardial infarction     Acute inferior STEMI with RV infarct features, 2009.     CAD (coronary artery disease)     Cellulitis 2023    Facial. - admission at [facility]. 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 Seeing Dr Kiersten Nova and Dr Jasper Avery        Past Surgical History:   Procedure Laterality Date    BRONCHOSCOPY N/A 2019    Procedure: BRONCHOSCOPY WITH THORACENTESIS;  Surgeon: Marciano Higginbotham MD;  Location:  [facility] ENDOSCOPY;  Service: Pulmonary    CARDIAC CATHETERIZATION Bilateral 2019    Procedure: Right and Left Heart Cath;  Surgeon: Efrem Posadas MD;  Location:  [facility] CATH INVASIVE LOCATION;  Service: Cardiology    CARDIAC CATHETERIZATION      CHOLECYSTECTOMY      CORONARY ANGIOPLASTY WITH STENT PLACEMENT   01/09/2009    Sirolimus eluting stents to the RCA, 01/09/2009.     HERNIA REPAIR      Hernia repair x 2.     PARACENTESIS  06/17/2019    multiple    UMBILICAL HERNIA REPAIR N/A 7/26/2020    Procedure: UMBILICAL HERNIA REPAIR;  Surgeon: Maribell Her MD;  Location: [facility];  Service: General;  Laterality: N/A;       Family History: His family history includes Aneurysm in his father; Cancer in his sister; Dementia in his mother; Heart attack in his father; Heart disease in his brother and father; Hypertension in his daughter and son; No Known Problems in his brother and sister; Stroke in his brother and mother.     Social History: He  reports that he quit smoking about 41 years ago. His smoking use included cigarettes. He started smoking about 69 years ago. He has a 70 pack-year smoking history. He has never been exposed to tobacco smoke. He has never used smokeless tobacco. He reports that he does not drink alcohol and does not use drugs.    Home Medications:  albuterol sulfate HFA, allopurinol, apixaban, folic acid, furosemide, levothyroxine, nitroglycerin, simvastatin, spironolactone, and terazosin    Current Medications:  No current facility-administered medications for this encounter.     Allergies:  Allergies   Allergen Reactions    Bee Venom Swelling    Sulfamethoxazole-Trimethoprim Other (See Comments)     Pt unaware       Review of Systems    IR Procedure pertinent significant findings are mentioned in the PMH and PSH above.    Objective     There were no vitals taken for this visit.     Physical Exam    A&Ox3.   Able to communicate  No Apparent Distress  Average physique   CVS: VS as noted. Chart reviewed. Stable for the procedure.  Respiratory: Non labored breathing. No signs of respiratory compromise.    Result Review      I have personally reviewed the results from the time of this admission to 12/20/2024 10:54 EST and agree with these findings.  [x]  Laboratory  []  Microbiology  [x]   "Radiology  []  EKG/Telemetry   []  Cardiology/Vascular   []  Pathology  []  Old records  []  Other:    Most notable findings include: As noted:      Recent CBC & INR reviewed.                Estimated Creatinine Clearance: 40.2 mL/min (A) (by C-G formula based on SCr of 1.45 mg/dL (H)). No results found for: \"CREATININE\"    COVID19   Date Value Ref Range Status   11/04/2022 Not Detected  Final        No results found for: \"PREGTESTUR\", \"PREGSERUM\", \"HCG\", \"HCGQUANT\"     ASA SCALE ASSESSMENT (applicable ONLY if sedation planned):        MALLAMPATI CLASSIFICATION (applicable ONLY if sedation planned):       Assessment / Plan     Jordi Sam is a 89 y.o. male referred to the IR service with above problem.    Plan:   As above.    MAKAYLA Joy   Vascular Interventional Radiology  12/20/24   10:54 AM EST     "

## 2024-12-23 ENCOUNTER — TELEPHONE (OUTPATIENT)
Dept: INFUSION THERAPY | Facility: HOSPITAL | Age: 89
End: 2024-12-23
Payer: MEDICARE

## 2024-12-26 ENCOUNTER — APPOINTMENT (OUTPATIENT)
Dept: CT IMAGING | Facility: HOSPITAL | Age: 89
End: 2024-12-26
Payer: MEDICARE

## 2024-12-26 ENCOUNTER — HOSPITAL ENCOUNTER (EMERGENCY)
Facility: HOSPITAL | Age: 89
Discharge: HOME | End: 2024-12-26
Attending: EMERGENCY MEDICINE
Payer: MEDICARE

## 2024-12-26 VITALS
TEMPERATURE: 98 F | BODY MASS INDEX: 30.8 KG/M2 | RESPIRATION RATE: 18 BRPM | WEIGHT: 220 LBS | HEART RATE: 80 BPM | SYSTOLIC BLOOD PRESSURE: 111 MMHG | OXYGEN SATURATION: 96 % | HEIGHT: 71 IN | DIASTOLIC BLOOD PRESSURE: 55 MMHG

## 2024-12-26 DIAGNOSIS — S09.90XA INJURY OF HEAD, INITIAL ENCOUNTER: ICD-10-CM
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
## 2024-12-26 DIAGNOSIS — S01.01XA SCALP LACERATION, INITIAL ENCOUNTER: Primary | ICD-10-CM

## 2024-12-26 PROCEDURE — 70450 CT HEAD/BRAIN W/O DYE: CPT

## 2024-12-26 PROCEDURE — 99284 EMERGENCY DEPT VISIT MOD MDM: CPT

## 2024-12-26 RX ADMIN — Medication 3 ML: at 03:10

## 2024-12-26 NOTE — ED PROVIDER NOTES
[place]    EMERGENCY DEPARTMENT ENCOUNTER      Pt Name: Jordi Sam  MRN: [id number]  YOB: 1934  Date of evaluation: 12/26/2024  Provider: Chang Wright MD    CHIEF COMPLAINT       Chief Complaint   Patient presents with    Fall         HISTORY OF PRESENT ILLNESS   Jordi Sam is a 89 y.o. male who presents to the emergency department for evaluation of fall and head injury.  Patient was moving around his home today, stumbled over his own feet and went to the ground striking his head on a table on the way to the ground.  He has noticed a small laceration to his right scalp.  He said he saw stars but never lost consciousness.  He is not having any headache, numbness, weakness, speech difficulty.  No neck pain or other areas of pain.    REVIEW OF SYSTEMS     ROS:  A chief complaint appropriate review of systems was completed and is negative except as noted in the HPI.      PAST MEDICAL HISTORY     Past Medical History:   Diagnosis Date    A-fib     Acute inferior myocardial infarction     Acute inferior STEMI with RV infarct features, January 2009.     CAD (coronary artery disease)     Cellulitis 02/26/2023    Facial. Feb 26-March 1st admission at [facility]. 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 Seeing Dr Kiersten Nova and Dr Jasper Avery 8-2021         SURGICAL HISTORY       Past Surgical History:   Procedure Laterality Date    BRONCHOSCOPY N/A 4/12/2019    Procedure: BRONCHOSCOPY WITH  THORACENTESIS;  Surgeon: Marciano Higginbotham MD;  Location:  [facility];  Service: Pulmonary    CARDIAC CATHETERIZATION Bilateral 1/30/2019    Procedure: Right and Left Heart Cath;  Surgeon: Efrem Posadas MD;  Location:  [facility];  Service: Cardiology    CARDIAC CATHETERIZATION      CHOLECYSTECTOMY      CORONARY ANGIOPLASTY WITH STENT PLACEMENT  01/09/2009    Sirolimus eluting stents to the RCA, 01/09/2009.     HERNIA REPAIR      Hernia repair x 2.     PARACENTESIS  06/17/2019    multiple    UMBILICAL HERNIA REPAIR N/A 7/26/2020    Procedure: UMBILICAL HERNIA REPAIR;  Surgeon: Maribell Her MD;  Location:  [facility];  Service: General;  Laterality: N/A;         CURRENT MEDICATIONS     No current facility-administered medications for this encounter.    Current Outpatient Medications:     albuterol sulfate  (90 Base) MCG/ACT inhaler, Inhale 2 puffs Every 4 (Four) Hours As Needed for Wheezing., Disp: 18 g, Rfl: 0    allopurinol (ZYLOPRIM) 100 MG tablet, Take 1 tablet by mouth Daily., Disp: 90 tablet, Rfl: 0    apixaban (ELIQUIS) 2.5 MG tablet tablet, Take 1 tablet by mouth 2 (Two) Times a Day. Last dose3-18-24, Disp: , Rfl:     folic acid (FOLVITE) 400 MCG tablet, Take 1 tablet by mouth Daily., Disp: , Rfl:     furosemide (LASIX) 40 MG tablet, TAKE 1 TABLET BY MOUTH ONCE DAILY IN THE MORNING, 1/2 TABLET IN THE AFTERNOON AND AS NEEDED FOR WEIGHT GAIN, EDEMA AND DYPNEA, Disp: 45 tablet, Rfl: 11    levothyroxine (SYNTHROID, LEVOTHROID) 25 MCG tablet, Take 1 tablet by mouth Every Morning., Disp: 90 tablet, Rfl: 0    nitroglycerin (NITROSTAT) 0.4 MG SL tablet, DISSOLVE ONE TABLET UNDER THE TONGUE EVERY 5 MINUTES AS NEEDED FOR CHEST PAIN.  DO NOT EXCEED A TOTAL OF 3 DOSES IN 15 MINUTES, Disp: 25 tablet, Rfl: 3    simvastatin (ZOCOR) 40 MG tablet, Take 1 tablet by mouth Daily., Disp: 90 tablet, Rfl: 0    spironolactone (Aldactone) 100 MG tablet, Take 1 tablet by mouth  "Daily., Disp: 90 tablet, Rfl: 3    terazosin (HYTRIN) 2 MG capsule, Take 1 capsule by mouth Every Night., Disp: 90 capsule, Rfl: 0    ALLERGIES     Bee venom and Sulfamethoxazole-trimethoprim    FAMILY HISTORY       Family History   Problem Relation Age of Onset    Dementia Mother     Stroke Mother     Heart disease Father     Heart attack Father     Aneurysm Father     Cancer Sister     No Known Problems Sister     No Known Problems Brother     Heart disease Brother         CABG    Stroke Brother     Hypertension Daughter     Hypertension Son     Colon cancer Neg Hx     Colon polyps Neg Hx           SOCIAL HISTORY       Social History     Socioeconomic History    Marital status:      Spouse name: Melly    Number of children: 4   Tobacco Use    Smoking status: Former     Current packs/day: 0.00     Average packs/day: 2.5 packs/day for 28.0 years (70.0 ttl pk-yrs)     Types: Cigarettes     Start date: 1955     Quit date: 1983     Years since quittin.6     Passive exposure: Never    Smokeless tobacco: Never   Vaping Use    Vaping status: Never Used   Substance and Sexual Activity    Alcohol use: No    Drug use: No    Sexual activity: Not Currently         PHYSICAL EXAM    (up to 7 for level 4, 8 or more for level 5)     Vitals:    24 0252   BP: 134/72   BP Location: Left arm   Patient Position: Sitting   Pulse: 72   Resp: 18   Temp: 98 °F (36.7 °C)   TempSrc: Oral   SpO2: 97%   Weight: 99.8 kg (220 lb)   Height: 180.3 cm (71\")       Physical Exam  Constitutional:       General: He is not in acute distress.  HENT:      Head: Normocephalic.      Comments: Superficial laceration to the right parietal scalp  Eyes:      Conjunctiva/sclera: Conjunctivae normal.      Pupils: Pupils are equal, round, and reactive to light.      Comments: No raccoon eyes   Neck:      Comments: No tenderness to the cervical spine.  No step-offs or deformities  Cardiovascular:      Rate and Rhythm: Normal rate and " regular rhythm.      Pulses: Normal pulses.      Heart sounds: No murmur heard.     No gallop.   Pulmonary:      Effort: Pulmonary effort is normal. No respiratory distress.   Abdominal:      General: Abdomen is flat. There is no distension.      Tenderness: There is no abdominal tenderness.   Musculoskeletal:         General: No swelling or deformity. Normal range of motion.      Comments: Full range of motion of the bilateral upper and lower extremities.  No tenderness to the midline thoracic or lumbar spine.  No step-offs or deformities   Skin:     General: Skin is warm and dry.      Capillary Refill: Capillary refill takes less than 2 seconds.   Neurological:      General: No focal deficit present.      Mental Status: He is alert and oriented to person, place, and time.      Comments: GCS 15.  Cranial Nerves II-XII intact without deficit.  Strength 5/5 in the bilateral upper extremities.  Strength 5/5 in the bilateral lower extremities.  Sensation to light touch intact throughout.  Cerebellar function intact via finger-nose-finger.     Psychiatric:         Mood and Affect: Mood normal.         Behavior: Behavior normal.            DIAGNOSTIC RESULTS     EKG: All EKGs are interpreted by the Emergency Department Physician who either signs or Co-signs this chart in the absence of a cardiologist.    No orders to display         RADIOLOGY:   [x] Radiologist's Report Reviewed:  CT Head Without Contrast   Final Result   Impression:   1.No acute intracranial abnormality.   2.Stable chronic left parieto-occipital infarct.   3.Mild chronic small vessel ischemic change.                  Electronically Signed: Louis Negron MD     12/26/2024 3:47 AM EST     Workstation ID: [id number]          I ordered and independently reviewed the above noted radiographic studies.        LABS:  I independently interpreted all laboratory studies conducted during this ED visit.  The results of these studies can be seen below and my  independent interpretation in the ED course      EMERGENCY DEPARTMENT COURSE and DIFFERENTIAL DIAGNOSIS/MDM:   Vitals:  AS OF 03:59 EST    BP - 134/72  HR - 72  TEMP - 98 °F (36.7 °C) (Oral)  O2 SATS - 97%        Discussion below represents my analysis of pertinent findings related to patient's condition, differential diagnosis, treatment plan and final disposition.      Differential diagnosis:  The differential diagnosis associated with the patient's presentation includes: Concussion, skull fracture, intracranial bleeding, scalp laceration      Independent interpretations (ECG/rhythm strip/X-ray/US/CT scan): See ED course      Additional sources:  Discussed/obtained information from independent historians:   [x] Spouse: Spouse provides some details of HPI   [] Parent:   [] Friend:   [] EMS:   [] Other:    External record review:  12/3/2024 reviewed most recent outpatient provider note, patient seen in internal medicine clinic for wellness visit and management of chronic medical conditions including chronic kidney disease prediabetes, cirrhosis      Patient's care impacted by:   [] Diabetes   [] Hypertension   [] Coronary Artery Disease   [] Cancer   [x] Other: cirrhosis    Care significantly affected by Social Determinants of Health (housing and economic circumstances, unemployment)    [] Yes     [x] No   If yes, Patient's care significantly limited by  Social Determinants of Health including:    [] Inadequate housing    [] Low income    [] Alcoholism and drug addiction in family    [] Problems related to primary support group    [] Unemployment    [] Problems related to employment    [] Other Social Determinants of Health:     I considered prescription management with:    [] Pain medication:   [] Antiviral:   [] Antibiotic:   [] Other:    Additional orders considered but not ordered:  The following testing was considered but ultimately not selected: N/A    ED Course:    ED Course as of 12/26/24 0359   Thu Dec 26,  2024   0358 CT scan of the brain independently interpreted by myself demonstrates no acute traumatic injury [KB]      ED Course User Index  [KB] Chang Wright MD         CT scan of the head was performed.  Topical let was applied and patient's wound was closed with primary repair.  Ultimately discharged in good condition.    Prior to discharge from the emergency department I discussed with the patient and any family members present the current diagnosis, treatment plan, recommendations regarding follow-up with a primary care doctor or specialist, general emergency room return precautions as well as return precautions specific to their diagnosis and treatment.  The patient/family indicated understanding of these instructions.  Time was allotted to answer questions at that time and throughout the ED course.         PROCEDURES:  Laceration Repair    Date/Time: 12/26/2024 3:58 AM    Performed by: Chang Wright MD  Authorized by: Chang Wright MD    Consent:     Consent obtained:  Verbal    Consent given by:  Patient    Risks discussed:  Infection, pain, poor cosmetic result and poor wound healing  Universal protocol:     Patient identity confirmed:  Verbally with patient  Laceration details:     Location:  Scalp    Scalp location:  R parietal    Length (cm):  2    Depth (mm):  2  Exploration:     Limited defect created (wound extended): no      Imaging outcome: foreign body not noted      Wound extent: no fascia violation noted, no foreign bodies/material noted, no muscle damage noted and no underlying fracture noted      Contaminated: no    Treatment:     Area cleansed with:  Soap and water    Amount of cleaning:  Standard    Irrigation solution:  Tap water    Irrigation method:  Tap    Visualized foreign bodies/material removed: no      Debridement:  None    Undermining:  None    Scar revision: no    Skin repair:     Repair method:  Sutures    Suture size:  5-0    Wound skin closure material used: vicryl rapide.     Suture technique:  Simple interrupted    Number of sutures:  2  Approximation:     Approximation:  Close  Repair type:     Repair type:  Simple  Post-procedure details:     Dressing:  Open (no dressing)    Procedure completion:  Tolerated well, no immediate complications      CRITICAL CARE TIME        CONSULTS       FINAL IMPRESSION      1. Scalp laceration, initial encounter    2. Fall, initial encounter    3. Injury of head, initial encounter          DISPOSITION/PLAN     ED Disposition       ED Disposition   Discharge    Condition   Stable    Comment   --                 Comment: Please note this report has been produced using speech recognition software.      Chang Wright MD  Attending Emergency Physician    No results found for this or any previous visit (from the past 24 hours).  Note: In addition to lab results from this visit, the labs listed above may include labs taken at another facility or during a different encounter within the last 24 hours. Please correlate lab times with ED admission and discharge times for further clarification of the services performed during this visit.                 Chang Wright MD  12/26/24 0359

## 2024-12-26 NOTE — DISCHARGE INSTRUCTIONS
Sutures are dissolvable and will go away on their own.  Return to the ER as needed for any new or worsening symptoms.

## 2025-01-20 ENCOUNTER — OFFICE VISIT (OUTPATIENT)
Dept: FAMILY MEDICINE CLINIC | Facility: CLINIC | Age: OVER 89
End: 2025-01-20
Payer: MEDICARE

## 2025-01-20 VITALS
BODY MASS INDEX: 30.97 KG/M2 | HEIGHT: 71 IN | WEIGHT: 221.2 LBS | OXYGEN SATURATION: 98 % | DIASTOLIC BLOOD PRESSURE: 69 MMHG | HEART RATE: 97 BPM | SYSTOLIC BLOOD PRESSURE: 143 MMHG

## 2025-01-20 DIAGNOSIS — K64.4 EXTERNAL HEMORRHOID: Primary | ICD-10-CM

## 2025-01-20 PROCEDURE — 99214 OFFICE O/P EST MOD 30 MIN: CPT | Performed by: INTERNAL MEDICINE

## 2025-01-20 PROCEDURE — 1126F AMNT PAIN NOTED NONE PRSNT: CPT | Performed by: INTERNAL MEDICINE

## 2025-01-20 RX ORDER — LIDOCAINE 50 MG/G
1 OINTMENT TOPICAL 3 TIMES DAILY PRN
Qty: 30 G | Refills: 0 | Status: SHIPPED | OUTPATIENT
Start: 2025-01-20

## 2025-01-20 RX ORDER — HYDROCORTISONE 25 MG/G
CREAM TOPICAL 2 TIMES DAILY
Qty: 30 G | Refills: 1 | Status: SHIPPED | OUTPATIENT
Start: 2025-01-20

## 2025-01-20 NOTE — PROGRESS NOTES
"Jordi Sam  1934  [id number]  Patient Care Team:  Jasper Avery MD as PCP - General (Internal Medicine)  Jordi Law MD as Consulting Physician (Urology)  Chapin Villagran MD as Consulting Physician (Otolaryngology)  Gen Laurent MD as Consulting Physician (Gastroenterology)  Ezekiel Abarca MD as Referring Physician (Dermatology)  Renny Faustin MD as Consulting Physician (Radiation Oncology)  Larry Sin MD as Consulting Physician (Cardiology)  Lv Van MD as Consulting Physician (Ophthalmology)    Jordi Sam is a 89 y.o. male here today for follow up.     This patient is accompanied by their self who contributes to the history of their care.    Chief Complaint:    Chief Complaint   Patient presents with    Hemorrhoids     Had some blood when he wiped.         History of Present Illness:  I have reviewed and/or updated the patient's past medical, past surgical, family, social history, problem list and allergies as appropriate.     He was recently seen in the emergency room after sustaining a ground-level fall striking his head on the edge of a table.  He has healed well.  CT performed in the emergency room showed nothing acute.  Hide syncope however indicates that he stumbled over his own feet.  NO LOC.    He has noticed some blood on toilet paper when he wipes.  He is on chronic Eliquis for his chronic atrial fibrillation. He has been having more difficult time moving his bowels. Strains to stool. \"Hard lump\" stool\" lately. Does not take stool softeners. This happened last night. He denies any nausea or vomiting. He is still having slight discomfort- burning slightly. Has history of hemrrhoids as well as rectal fistula     Review of Systems   Constitutional:  Positive for fatigue.   Gastrointestinal:  Positive for rectal pain.        Blood on the toilet paper with wiping       Vitals:    01/20/25 1409   BP: 143/69   Pulse: 97   SpO2: 98%   Weight: 100 " "kg (221 lb 3.2 oz)   Height: 180.3 cm (70.98\")     Body mass index is 30.87 kg/m².    Physical Exam  Vitals and nursing note reviewed.   Constitutional:       General: He is not in acute distress.     Appearance: He is well-developed. He is not diaphoretic.   HENT:      Head: Normocephalic and atraumatic.      Right Ear: External ear normal.      Left Ear: External ear normal.      Mouth/Throat:      Pharynx: No oropharyngeal exudate.   Eyes:      General: No scleral icterus.        Right eye: No discharge.      Conjunctiva/sclera: Conjunctivae normal.   Neck:      Thyroid: No thyromegaly.      Vascular: No JVD.      Trachea: No tracheal deviation.   Cardiovascular:      Rate and Rhythm: Normal rate and regular rhythm.      Heart sounds: Normal heart sounds.      Comments: PMI nondisplaced  Pulmonary:      Effort: Pulmonary effort is normal.      Breath sounds: Normal breath sounds. No wheezing or rales.   Abdominal:      General: Bowel sounds are normal.      Palpations: Abdomen is soft.      Tenderness: There is no abdominal tenderness. There is no guarding or rebound.   Musculoskeletal:      Cervical back: Normal range of motion and neck supple.   Lymphadenopathy:      Cervical: No cervical adenopathy.   Skin:     General: Skin is warm and dry.      Capillary Refill: Capillary refill takes less than 2 seconds.      Coloration: Skin is not pale.      Findings: No rash.   Neurological:      Mental Status: He is alert and oriented to person, place, and time.      Motor: No abnormal muscle tone.      Coordination: Coordination normal.   Psychiatric:         Judgment: Judgment normal.         Procedures    Results Review:    I reviewed the patient's new clinical results.    Assessment/Plan:    Problem List Items Addressed This Visit    None  Visit Diagnoses       External hemorrhoid    -  Primary    Relevant Medications    Hydrocortisone, Perianal, (Anusol-HC) 2.5 % rectal cream    lidocaine (XYLOCAINE) 5 % ointment "        Suspect he has an external hemorrhoid.  Was not examined today secondary to his large ventral hernia difficulty standing however I have placed him on Anusol as well as lidocaine.  If symptoms do not improve we will get him back to colorectal surgery.  Certainly if bleeding increases and becomes free-flowing he should seek emergent care as he is on Eliquis    Plan of care reviewed with patient at the conclusion of today's visit. Education was provided regarding diagnosis and management.  Patient verbalizes understanding of and agreement with management plan.    Return for Next scheduled follow up.    Jasper Avery MD      Please note than portions of this note were completed [facility] a Voice Recognition Program

## 2025-01-23 NOTE — PROGRESS NOTES
Problem: Adult Inpatient Plan of Care  Goal: Plan of Care Review  2025 by Rhoda Lou, RN  Outcome: Progressing  2025 by Rhoda Lou RN  Outcome: Progressing     Problem:  Loss  Goal: Optimal Adjustment to Loss  2025 by Rhoda Lou, RN  Outcome: Progressing  2025 by Rhoda Lou, RN  Outcome: Progressing       Subjective   The ABCs of the Annual Wellness Visit  Medicare Wellness Visit      Jordi Sam is a 89 y.o. patient who presents for a Medicare Wellness Visit.    The following portions of the patient's history were reviewed and   updated as appropriate: He  has a past medical history of A-fib, Acute inferior myocardial infarction, CAD (coronary artery disease), Cellulitis (02/26/2023), Cirrhosis, COVID-19 (08/25/2022), Dyslipidemia, Falls frequently, Goiter, Gout, Hernia, umbilical, History of radiation therapy (10/31/2023), History of removal of skin mole, Hyperlipidemia, Hypertension, GARCIA (nonalcoholic steatohepatitis), Nephrolithiasis, Skin cancer, Skin cancer (09/20/2023), Type 2 diabetes mellitus, and Umbilical hernia.  He does not have any pertinent problems on file.  He  has a past surgical history that includes Cholecystectomy; Hernia repair; Coronary angioplasty with stent (01/09/2009); Cardiac catheterization (Bilateral, 1/30/2019); Cardiac catheterization; Bronchoscopy (N/A, 4/12/2019); Paracentesis (06/17/2019); and Umbilical hernia repair (N/A, 7/26/2020).  His family history includes Aneurysm in his father; Cancer in his sister; Dementia in his mother; Heart attack in his father; Heart disease in his brother and father; Hypertension in his daughter and son; No Known Problems in his brother and sister; Stroke in his brother and mother.  He  reports that he quit smoking about 41 years ago. His smoking use included cigarettes. He started smoking about 69 years ago. He has a 70 pack-year smoking history. He has never been exposed to tobacco smoke. He has never used smokeless tobacco. He reports that he does not drink alcohol and does not use drugs.  Current Outpatient Medications   Medication Sig Dispense Refill    albuterol sulfate  (90 Base) MCG/ACT inhaler Inhale 2 puffs Every 4 (Four) Hours As Needed for Wheezing. 18 g 0    allopurinol (ZYLOPRIM) 100 MG tablet Take 1 tablet by mouth Daily.  90 tablet 0    apixaban (ELIQUIS) 2.5 MG tablet tablet Take 1 tablet by mouth 2 (Two) Times a Day. Last dose3-18-24      folic acid (FOLVITE) 400 MCG tablet Take 1 tablet by mouth Daily.      furosemide (LASIX) 40 MG tablet TAKE 1 TABLET BY MOUTH ONCE DAILY IN THE MORNING, 1/2 TABLET IN THE AFTERNOON AND AS NEEDED FOR WEIGHT GAIN, EDEMA AND DYPNEA 45 tablet 11    levothyroxine (SYNTHROID, LEVOTHROID) 25 MCG tablet Take 1 tablet by mouth Every Morning. 90 tablet 0    nitroglycerin (NITROSTAT) 0.4 MG SL tablet DISSOLVE ONE TABLET UNDER THE TONGUE EVERY 5 MINUTES AS NEEDED FOR CHEST PAIN.  DO NOT EXCEED A TOTAL OF 3 DOSES IN 15 MINUTES 25 tablet 3    simvastatin (ZOCOR) 40 MG tablet Take 1 tablet by mouth Daily. 90 tablet 0    spironolactone (Aldactone) 100 MG tablet Take 1 tablet by mouth Daily. 90 tablet 3    terazosin (HYTRIN) 2 MG capsule Take 1 capsule by mouth Every Night. 90 capsule 0     No current facility-administered medications for this visit.     He is allergic to bee venom and sulfamethoxazole-trimethoprim..    Compared to one year ago, the patient's physical   health is the same.  Compared to one year ago, the patient's mental   health is the same.    Recent Hospitalizations:  He was not admitted to the hospital during the last year.     Current Medical Providers:  Patient Care Team:  Jasper Avery MD as PCP - General (Internal Medicine)  Jordi Law MD as Consulting Physician (Urology)  Chapin Villagran MD as Consulting Physician (Otolaryngology)  Gen Laurent MD as Consulting Physician (Gastroenterology)  Ezekiel Abarca MD as Referring Physician (Dermatology)  Renny Faustin MD as Consulting Physician (Radiation Oncology)  Larry Sin MD as Consulting Physician (Cardiology)  Lv Van MD as Consulting Physician (Ophthalmology)    Outpatient Medications Prior to Visit   Medication Sig Dispense Refill    albuterol sulfate  (90 Base) MCG/ACT inhaler  Inhale 2 puffs Every 4 (Four) Hours As Needed for Wheezing. 18 g 0    apixaban (ELIQUIS) 2.5 MG tablet tablet Take 1 tablet by mouth 2 (Two) Times a Day. Last dose3-18-24      folic acid (FOLVITE) 400 MCG tablet Take 1 tablet by mouth Daily.      furosemide (LASIX) 40 MG tablet TAKE 1 TABLET BY MOUTH ONCE DAILY IN THE MORNING, 1/2 TABLET IN THE AFTERNOON AND AS NEEDED FOR WEIGHT GAIN, EDEMA AND DYPNEA 45 tablet 11    nitroglycerin (NITROSTAT) 0.4 MG SL tablet DISSOLVE ONE TABLET UNDER THE TONGUE EVERY 5 MINUTES AS NEEDED FOR CHEST PAIN.  DO NOT EXCEED A TOTAL OF 3 DOSES IN 15 MINUTES 25 tablet 3    spironolactone (Aldactone) 100 MG tablet Take 1 tablet by mouth Daily. 90 tablet 3    allopurinol (ZYLOPRIM) 100 MG tablet Take 1 tablet by mouth once daily 90 tablet 0    levothyroxine (SYNTHROID, LEVOTHROID) 25 MCG tablet Take 1 tablet by mouth Every Morning. 90 tablet 0    simvastatin (ZOCOR) 40 MG tablet Take 1 tablet by mouth once daily 90 tablet 0    terazosin (HYTRIN) 2 MG capsule TAKE 1 CAPSULE BY MOUTH ONCE DAILY AT NIGHT 90 capsule 0    chlorhexidine (PERIDEX) 0.12 % solution RINSE 15ML SOLUTION IN MOUTH TWICE DAILY AFTER BRUSHING TEETH, SWISH IN MOUTH FOR 30 SECONDS THEN SPIT OUT (Patient not taking: Reported on 9/20/2024)      HYDROcodone-acetaminophen (Norco) 7.5-325 MG per tablet Take 1 tablet by mouth Every 6 (Six) Hours As Needed for Moderate Pain (tooth pain). 28 tablet 0    mupirocin (BACTROBAN) 2 % ointment        No facility-administered medications prior to visit.     No opioid medication identified on active medication list. I have reviewed chart for other potential  high risk medication/s and harmful drug interactions in the elderly.      Aspirin is not on active medication list.  Aspirin use is not indicated based on review of current medical condition/s. Risk of harm outweighs potential benefits.  .    Patient Active Problem List   Diagnosis    Coronary arteriosclerosis in native artery     "Panlobular emphysema    Functional diarrhea    Essential hypertension    Chronic cough    Dyslipidemia    Nephrolithiasis    Chronic atrial fibrillation    Shortness of breath    Chronic right heart failure    Other ascites    Cirrhosis of liver with ascites    Stage 3 chronic kidney disease    End stage liver disease    S/P umbilical hernia repair, follow-up exam    Constipation    Right hip pain    Hearing loss of right ear due to cerumen impaction    Pre-diabetes    Umbilical hernia    Benign prostatic hyperplasia with nocturia    Chronic anticoagulation    Facial cellulitis    Right ankle pain    Medicare annual wellness visit, subsequent    Lower urinary tract symptoms due to benign prostatic hyperplasia    Basal cell carcinoma of right ala nasi    Acute pain of right shoulder    Laceration of head and neck    Wound infection    Oral pain    Squamous cell carcinoma of scalp    Incisional hernia, without obstruction or gangrene    Other specified hypothyroidism     Advance Care Planning Advance Directive is not on file.  ACP discussion was held with the patient during this visit. Patient has an advance directive (not in EMR), copy requested.            Objective   Vitals:    12/03/24 1117   BP: 112/60   Pulse: 86   SpO2: 98%   Weight: 96.9 kg (213 lb 9.6 oz)   Height: 180.3 cm (70.98\")       Estimated body mass index is 29.8 kg/m² as calculated from the following:    Height as of this encounter: 180.3 cm (70.98\").    Weight as of this encounter: 96.9 kg (213 lb 9.6 oz).            Does the patient have evidence of cognitive impairment? No                                                                                                Health  Risk Assessment    Smoking Status:  Social History     Tobacco Use   Smoking Status Former    Current packs/day: 0.00    Average packs/day: 2.5 packs/day for 28.0 years (70.0 ttl pk-yrs)    Types: Cigarettes    Start date: 5/16/1955    Quit date: 5/16/1983    Years since " quittin.5    Passive exposure: Never   Smokeless Tobacco Never     Alcohol Consumption:  Social History     Substance and Sexual Activity   Alcohol Use No       Fall Risk Screen  STEADI Fall Risk Assessment was completed, and patient is at MODERATE risk for falls. Assessment completed on:12/3/2024    Depression Screening   Little interest or pleasure in doing things? Not at all   Feeling down, depressed, or hopeless? Not at all   PHQ-2 Total Score 0      Health Habits and Functional and Cognitive Screenin/3/2024    11:44 AM   Functional & Cognitive Status   Do you have difficulty preparing food and eating? Yes   Do you have difficulty bathing yourself, getting dressed or grooming yourself? No   Do you have difficulty using the toilet? No   Do you have difficulty moving around from place to place? Yes   Do you have trouble with steps or getting out of a bed or a chair? Yes   Current Diet Well Balanced Diet   Dental Exam Up to date   Eye Exam Not up to date   Exercise (times per week) 0 times per week   Current Exercises Include No Regular Exercise   Do you need help using the phone?  Yes   Are you deaf or do you have serious difficulty hearing?  Yes   Do you need help to go to places out of walking distance? Yes   Do you need help shopping? Yes   Do you need help preparing meals?  Yes   Do you need help with housework?  Yes   Do you need help with laundry? Yes   Do you need help taking your medications? Yes   Do you need help managing money? No   Do you ever drive or ride in a car without wearing a seat belt? No   Have you felt unusual stress, anger or loneliness in the last month? No   Who do you live with? Spouse   If you need help, do you have trouble finding someone available to you? No   Have you been bothered in the last four weeks by sexual problems? Yes   Do you have difficulty concentrating, remembering or making decisions? No           Age-appropriate Screening Schedule:  Refer to the list  below for future screening recommendations based on patient's age, sex and/or medical conditions. Orders for these recommended tests are listed in the plan section. The patient has been provided with a written plan.    Health Maintenance List  Health Maintenance   Topic Date Due    Hepatitis B (1 of 3 - Risk 3-dose series) Never done    DIABETIC EYE EXAM  10/31/2019    ZOSTER VACCINE (2 of 2) 04/14/2022    HEMOGLOBIN A1C  03/27/2024    URINE MICROALBUMIN  05/24/2024    RSV Vaccine - Adults (1 - 1-dose 75+ series) 03/18/2025 (Originally 1/31/2009)    LIPID PANEL  03/12/2025    BMI FOLLOWUP  03/27/2025    ANNUAL WELLNESS VISIT  12/03/2025    TDAP/TD VACCINES (2 - Td or Tdap) 02/18/2032    COVID-19 Vaccine  Completed    INFLUENZA VACCINE  Completed    Pneumococcal Vaccine 65+  Completed                                                                                                                                                CMS Preventative Services Quick Reference  Risk Factors Identified During Encounter  Fall Risk-High or Moderate: Information on Fall Prevention Shared in After Visit Summary  Dental Screening Recommended  Vision Screening Recommended    The above risks/problems have been discussed with the patient.  Pertinent information has been shared with the patient in the After Visit Summary.  An After Visit Summary and PPPS were made available to the patient.    Follow Up:   Next Medicare Wellness visit to be scheduled in 1 year.         Additional E&M Note during same encounter follows:  Patient has additional, significant, and separately identifiable condition(s)/problem(s) that require work above and beyond the Medicare Wellness Visit     Chief Complaint  Medicare Wellness-subsequent    Subjective   HPI  Jordi is also being seen today for additional medical problem/s.  Jordi continues to undergo paracentesis secondary to recurrent ascites.  Last paracentesis was 011/25/2024.   Continues on Lasix 40  "mg daily Aldactone 50 mg daily.  Blood pressures have been trending down.  Last May, renal function essentially at baseline with a creatinine of 1.42.. Unable to tolerate ACE, ARB, Arni due to hypotension and kidney function. Currently on spironolactone and furosemide.  Propranolol stopped due to bradycardia  He remains  in allopurinol 100 mg daily for gout.  No recent flares.  He continues on Synthroid 25 mcg daily denies any heat or cold intolerance.     He remains troubled by his inoperable  large abdominal hernia.  He has upcoming tooth extraction tomorrow. Has healed his eliquis.  He has not needed any pain medication recentyl.      Remains on simvastatin for lipids, due for lipid panel this spring. Denies and urinary color changes and myalgias. He does need to see opth- last seen in 1 year ( kelly sena)  Review of Systems   Constitutional:  Positive for fatigue.   Eyes: Negative.    Cardiovascular:  Positive for leg swelling. Negative for chest pain.   Gastrointestinal:  Positive for abdominal distention.   Endocrine: Positive for cold intolerance.   Genitourinary: Negative.    Skin:         Left temporal lesion              Objective   Vital Signs:  /60   Pulse 86   Ht 180.3 cm (70.98\")   Wt 96.9 kg (213 lb 9.6 oz)   SpO2 98%   BMI 29.80 kg/m²   Physical Exam  Vitals and nursing note reviewed.   Constitutional:       General: He is not in acute distress.     Appearance: He is well-developed. He is not diaphoretic.   HENT:      Head: Normocephalic and atraumatic.      Right Ear: External ear normal.      Left Ear: External ear normal.      Mouth/Throat:      Pharynx: No oropharyngeal exudate.   Eyes:      General: No scleral icterus.        Right eye: No discharge.      Conjunctiva/sclera: Conjunctivae normal.   Neck:      Thyroid: No thyromegaly.      Vascular: No JVD.      Trachea: No tracheal deviation.   Cardiovascular:      Rate and Rhythm: Normal rate and regular rhythm.      Heart " sounds: Normal heart sounds.      Comments: PMI nondisplaced  Pulmonary:      Effort: Pulmonary effort is normal.      Breath sounds: Normal breath sounds. No wheezing or rales.   Abdominal:      General: Bowel sounds are normal.      Palpations: Abdomen is soft.      Tenderness: There is no abdominal tenderness. There is no guarding or rebound.   Musculoskeletal:      Cervical back: Normal range of motion and neck supple.      Comments: Ambulates with a cane   Lymphadenopathy:      Cervical: No cervical adenopathy.   Skin:     General: Skin is warm and dry.      Capillary Refill: Capillary refill takes less than 2 seconds.      Coloration: Skin is not pale.      Findings: No rash.      Comments: 1 cm eschar left temple   Neurological:      Mental Status: He is alert and oriented to person, place, and time.      Motor: No abnormal muscle tone.      Coordination: Coordination normal.      Comments: Tug > than 12 sec   Psychiatric:         Mood and Affect: Mood normal.         Behavior: Behavior normal.         Judgment: Judgment normal.                       Assessment and Plan            Stage 3b chronic kidney disease  Continue current medications of Lasix and Aldactone for update Bmp urine microalbumin.    Orders:    Comprehensive Metabolic Panel; Future    CBC (No Diff); Future    MicroAlbumin, Urine, Random - Urine, Clean Catch; Future    Pre-diabetes    Orders:    Comprehensive Metabolic Panel; Future    Hemoglobin A1c; Future    CBC (No Diff); Future    MicroAlbumin, Urine, Random - Urine, Clean Catch; Future    Medicare annual wellness visit, subsequent    Orders:    Comprehensive Metabolic Panel; Future    Lipid Panel; Future    Hemoglobin A1c; Future    CBC (No Diff); Future    MicroAlbumin, Urine, Random - Urine, Clean Catch; Future    Cirrhosis of liver with ascites, unspecified hepatic cirrhosis type    Orders:    Comprehensive Metabolic Panel; Future    CBC (No Diff); Future    MicroAlbumin, Urine,  Random - Urine, Clean Catch; Future    Dyslipidemia    Orders:    Comprehensive Metabolic Panel; Future    Lipid Panel; Future    Incisional hernia, without obstruction or gangrene  Risk of operative repair for left outweighs benefit.  Recommend only operative repair if emergent situation.       Medication monitoring encounter         Mixed hyperlipidemia   Lipid abnormalities are stable    Plan:  Continue same medication/s without change.      Discussed medication dosage, use, side effects, and goals of treatment in detail.      Patient Treatment Goals:   LDL goal is less than 70    Followup in 1 year.    Orders:    simvastatin (ZOCOR) 40 MG tablet; Take 1 tablet by mouth Daily.    Other specified hypothyroidism    Orders:    levothyroxine (SYNTHROID, LEVOTHROID) 25 MCG tablet; Take 1 tablet by mouth Every Morning.    TSH Rfx On Abnormal To Free T4; Future    Lower urinary tract symptoms (LUTS)    Orders:    terazosin (HYTRIN) 2 MG capsule; Take 1 capsule by mouth Every Night.    Gout, unspecified cause, unspecified chronicity, unspecified site    Orders:    allopurinol (ZYLOPRIM) 100 MG tablet; Take 1 tablet by mouth Daily.    Vision changes    Orders:    Ambulatory Referral to Ophthalmology            Follow Up   Return in about 1 year (around 12/3/2025) for Medicare Wellness   4 mon htn/prediabetes.  Patient was given instructions and counseling regarding his condition or for health maintenance advice. Please see specific information pulled into the AVS if appropriate.

## 2025-01-24 ENCOUNTER — OFFICE VISIT (OUTPATIENT)
Dept: CARDIOLOGY | Facility: CLINIC | Age: OVER 89
End: 2025-01-24
Payer: MEDICARE

## 2025-01-24 ENCOUNTER — PREP FOR SURGERY (OUTPATIENT)
Dept: OTHER | Facility: HOSPITAL | Age: OVER 89
End: 2025-01-24
Payer: MEDICARE

## 2025-01-24 VITALS
HEART RATE: 48 BPM | SYSTOLIC BLOOD PRESSURE: 116 MMHG | BODY MASS INDEX: 31.08 KG/M2 | HEIGHT: 71 IN | DIASTOLIC BLOOD PRESSURE: 50 MMHG | WEIGHT: 222 LBS | OXYGEN SATURATION: 98 %

## 2025-01-24 DIAGNOSIS — E78.2 MIXED HYPERLIPIDEMIA: ICD-10-CM

## 2025-01-24 DIAGNOSIS — I47.29 NSVT (NONSUSTAINED VENTRICULAR TACHYCARDIA): Primary | ICD-10-CM

## 2025-01-24 DIAGNOSIS — I10 PRIMARY HYPERTENSION: ICD-10-CM

## 2025-01-24 DIAGNOSIS — I25.10 CORONARY ARTERY DISEASE INVOLVING NATIVE CORONARY ARTERY OF NATIVE HEART WITHOUT ANGINA PECTORIS: ICD-10-CM

## 2025-01-24 PROCEDURE — 99214 OFFICE O/P EST MOD 30 MIN: CPT | Performed by: INTERNAL MEDICINE

## 2025-01-24 NOTE — PROGRESS NOTES
[facility] Cardiology  Office Progress Note  Jordi Sam  1934  [address] [facility] [address]       Visit Date: 01/24/25    PCP: aJsper Avery MD  [address]  [facility] [address]    IDENTIFICATION: A 89 y.o. male  Retired  for [employer]. Previous [employer] Serv[employer]eman stationed in [place]/traveled to many different countries.  Family owned the [place]     Previous [place] patient     PROBLEM LIST:   Coronary artery disease:  Acute inferior STEMI with RV infarct features, January 2009.   Normal LV function, single vessel  RCA disease.     Sirolimus eluting stents to the RCA, 01/09/2009.   Pericarditis, resolved.   LHC, 01/13/2012 (for recurrent angina).    Normal LV function.   of RCA with failed intervention.  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   Dyslipidemia.  5/23 98/60/43/41  Diabetes mellitus, type 2.    8/16 A1C 6.1  9/23 A1c 6.7  CKD stage III  Nephrolithiasis.   History of “goiter.”  Thrombocytopenia  Incarcerated umbilical hernia with repair, 7/2020 (Dr. ZURITA)  Recurrence, Summer 2021  Cirrhosis, recent evaluation by [facility]  Paracentesis with findings consistent with cardiac ascites, Spring 2019  8/21 and 9/21 paracentesis per   Surgical history:  Cholecystectomy.  Hernia repair x 2.            CC:   Chief Complaint   Patient presents with    Chronic atrial fibrillation     1 yr       Allergies  Allergies   Allergen Reactions    Bee Venom Swelling    Sulfamethoxazole-Trimethoprim Other (See Comments)     Pt unaware       Current Medications    Current Outpatient Medications:     albuterol sulfate  (90 Base) MCG/ACT inhaler, Inhale 2 puffs Every 4 (Four) Hours As Needed for Wheezing., Disp: 18 g, Rfl: 0    allopurinol (ZYLOPRIM) 100 MG tablet, Take 1 tablet by mouth Daily., Disp: 90 tablet, Rfl: 0    apixaban (ELIQUIS) 2.5 MG tablet tablet, Take 1 tablet by mouth 2 (Two) Times a Day. Last dose3-18-24, Disp: , Rfl:     folic acid (FOLVITE) 400 MCG tablet, Take 1 tablet by mouth Daily., Disp: , Rfl:     furosemide (LASIX) 40 MG tablet, TAKE 1 TABLET BY MOUTH ONCE DAILY IN THE MORNING, 1/2 TABLET IN THE AFTERNOON AND AS NEEDED FOR WEIGHT GAIN, EDEMA AND DYPNEA, Disp: 45 tablet, Rfl: 11    Hydrocortisone, Perianal, (Anusol-HC) 2.5 % rectal cream, Insert  into the rectum 2 (Two) Times a Day., Disp: 30 g, Rfl: 1    levothyroxine (SYNTHROID, LEVOTHROID) 25 MCG tablet, Take 1 tablet by mouth Every Morning., Disp: 90 tablet, Rfl: 0    lidocaine (XYLOCAINE) 5 % ointment, Apply 1 Application topically to the appropriate area as directed 3 (Three) Times a Day As Needed for Mild Pain (rectal pain)., Disp: 30 g, Rfl: 0    nitroglycerin (NITROSTAT) 0.4 MG SL tablet, DISSOLVE ONE TABLET UNDER THE TONGUE EVERY 5 MINUTES AS NEEDED FOR CHEST PAIN.  DO NOT EXCEED A TOTAL OF 3 DOSES IN 15 MINUTES, Disp: 25 tablet, Rfl: 3    simvastatin (ZOCOR) 40 MG tablet, Take 1 tablet by mouth Daily., Disp: 90 tablet, Rfl: 0    spironolactone (Aldactone) 100 MG tablet, Take 1 tablet by mouth Daily., Disp: 90 tablet, Rfl: 3    terazosin (HYTRIN) 2 MG capsule, Take 1 capsule by mouth Every Night., Disp: 90 capsule, Rfl: 0      History of Present Illness   Jordi Sam is a 89 y.o. year old male here for follow up on right  "sided heart failure, CAD, chronic afib and cardiac risk factor management.   No new issues he scheduled to have repeat paracentesis next week.      OBJECTIVE:  Vitals:    01/24/25 1328   BP: 116/50   BP Location: Left arm   Patient Position: Sitting   Cuff Size: Adult   Pulse: (!) 48   SpO2: 98%   Weight: 101 kg (222 lb)   Height: 180.3 cm (71\")     Body mass index is 30.96 kg/m².    Constitutional:       Appearance: Healthy appearance. Not in distress.   Neck:      Vascular: No JVR. JVD normal.   Pulmonary:      Effort: Pulmonary effort is normal.      Breath sounds: Normal breath sounds. No wheezing. No rhonchi. No rales.   Chest:      Chest wall: Not tender to palpatation.   Cardiovascular:      PMI at left midclavicular line. Normal rate. Irregularly irregular rhythm. Normal S1. Normal S2.       Murmurs: There is no murmur.      No gallop.  No click. No rub.   Pulses:     Intact distal pulses.   Edema:     Peripheral edema absent.   Abdominal:      General: Bowel sounds are normal.      Palpations: Abdomen is soft.      Tenderness: There is no abdominal tenderness.   Musculoskeletal: Normal range of motion.         General: No tenderness. Skin:     General: Skin is warm and dry.   Neurological:      General: No focal deficit present.      Mental Status: Alert and oriented to person, place and time.         Diagnostic Data:  Procedures        ASSESSMENT:   Diagnosis Plan   1. NSVT (nonsustained ventricular tachycardia)        2. Coronary artery disease involving native coronary artery of native heart without angina pectoris        3. Primary hypertension        4. Mixed hyperlipidemia              PLAN:  NSVT in combination of chronic liver failure will initiate nonselective beta-blockade and patient will follow his pulse rate at home via a pulse oximeter  2.  CAD -patient today reporting very atypical anginal chest pain.  Patient denies any recurrence of his previous angina associated with his STEMI.  Will defer " ischemic evaluation at this time.  3.  Hyperlipidemia -continue statin therapy.  4.  Hypertension-patient's in clinic blood pressure appreciated 100/62.  Patient shows provider recent blood pressure logs and all acceptable.  Goal <130/80.  5.  Chronic atrial fibrillation - patient denies any recent episodes of tachypalpitations, tachyarrhythmia, and tachycardia.  Patient currently anticoagulated on Eliquis 2.5.  Patient was counseled on risks of esophageal varices and bleeding.  Patient was instructed to present to [facility]/hospital if he were to notice black tarry stools/signs or symptoms of GI bleeding.   6.  Chronic liver failure -currently being followed by [facility].              Larry Sin MD, FACC

## 2025-01-27 ENCOUNTER — HOSPITAL ENCOUNTER (OUTPATIENT)
Dept: INTERVENTIONAL RADIOLOGY/VASCULAR | Facility: HOSPITAL | Age: OVER 89
Discharge: HOME | End: 2025-01-27
Admitting: NURSE PRACTITIONER
Payer: MEDICARE

## 2025-01-27 ENCOUNTER — DOCUMENTATION (OUTPATIENT)
Dept: CARDIOLOGY | Facility: CLINIC | Age: OVER 89
End: 2025-01-27
Payer: MEDICARE

## 2025-01-27 VITALS
BODY MASS INDEX: 31 KG/M2 | RESPIRATION RATE: 18 BRPM | HEIGHT: 71 IN | DIASTOLIC BLOOD PRESSURE: 60 MMHG | SYSTOLIC BLOOD PRESSURE: 117 MMHG | TEMPERATURE: 97 F | WEIGHT: 221.4 LBS | HEART RATE: 88 BPM | OXYGEN SATURATION: 96 %

## 2025-01-27 PROCEDURE — 25010000002 ALBUMIN HUMAN 25% PER 50 ML: Performed by: NURSE PRACTITIONER

## 2025-01-27 PROCEDURE — 96365 THER/PROPH/DIAG IV INF INIT: CPT

## 2025-01-27 PROCEDURE — 76942 ECHO GUIDE FOR BIOPSY: CPT

## 2025-01-27 PROCEDURE — 25010000002 LIDOCAINE PF 1% 1 % SOLUTION

## 2025-01-27 PROCEDURE — P9047 ALBUMIN (HUMAN), 25%, 50ML: HCPCS | Performed by: NURSE PRACTITIONER

## 2025-01-27 RX ORDER — LIDOCAINE HYDROCHLORIDE 10 MG/ML
INJECTION, SOLUTION EPIDURAL; INFILTRATION; INTRACAUDAL; PERINEURAL
Status: COMPLETED
Start: 2025-01-27 | End: 2025-01-27

## 2025-01-27 RX ORDER — LIDOCAINE HYDROCHLORIDE 10 MG/ML
INJECTION, SOLUTION EPIDURAL; INFILTRATION; INTRACAUDAL; PERINEURAL
Status: DISPENSED
Start: 2025-01-27 | End: 2025-01-27

## 2025-01-27 RX ORDER — SODIUM CHLORIDE 0.9 % (FLUSH) 0.9 %
10 SYRINGE (ML) INJECTION AS NEEDED
Status: DISCONTINUED | OUTPATIENT
Start: 2025-01-27 | End: 2025-01-28 | Stop reason: HOSPADM

## 2025-01-27 RX ORDER — ALBUMIN (HUMAN) 12.5 G/50ML
37.5 SOLUTION INTRAVENOUS ONCE
Status: COMPLETED | OUTPATIENT
Start: 2025-01-27 | End: 2025-01-27

## 2025-01-27 RX ADMIN — LIDOCAINE HYDROCHLORIDE 8 ML: 10 INJECTION, SOLUTION EPIDURAL; INFILTRATION; INTRACAUDAL; PERINEURAL at 12:20

## 2025-01-27 RX ADMIN — ALBUMIN (HUMAN) 37.5 G: 0.25 INJECTION, SOLUTION INTRAVENOUS at 13:15

## 2025-01-27 NOTE — POST-PROCEDURE NOTE
Vascular Interventional Radiology  Procedure Note    Date: 01/27/25      Time: 11:56 EST     Pre-op Diagnosis: Ascites     Post-op Diagnosis: Ascites    Procedure: US guided Paracentesis. Via LQ. See report for details.    Volume removed: See report.    Sedation: None    Estimated Blood Loss (EBL): 0 cc    IVF: NA    Findings: Above.    Specimens: See report.    Complications: See report.    Disposition: IR recovery.    MAKAYLA Madera  Vascular Interventional Radiology

## 2025-01-27 NOTE — PRE-PROCEDURE NOTE
[facility]   Vascular Interventional Radiology  History & Physicial        Patient Name:Jordi Sam    : 1934    MRN: [id number]    Primary Care Physician: Jasper Avery MD    Referring Physician: MAKAYLA Taveras     Date of admission: 2025    Subjective     Reason for Consult: Paracentesis for ascites.    History of Present Illness     Jordi Sam is a 89 y.o. male referred to IR as noted above.      Active Hospital Problems:  There are no active hospital problems to display for this patient.      Personal History     Past Medical History:   Diagnosis Date    A-fib     Acute inferior myocardial infarction     Acute inferior STEMI with RV infarct features, 2009.     CAD (coronary artery disease)     Cellulitis 2023    Facial. - admission at [facility]. 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 Seeing Dr Kiersten Nova and Dr Jasper Avery        Past Surgical History:   Procedure Laterality Date    BRONCHOSCOPY N/A 2019    Procedure: BRONCHOSCOPY WITH THORACENTESIS;  Surgeon: Marciano Higginbotham MD;  Location:  [facility];  Service: Pulmonary    CARDIAC CATHETERIZATION Bilateral 2019    Procedure: Right and Left Heart Cath;  Surgeon: Efrem Posadas MD;  Location:  [facility] INVASIVE LOCATION;  Service: Cardiology    CARDIAC CATHETERIZATION      CHOLECYSTECTOMY      CORONARY ANGIOPLASTY WITH STENT PLACEMENT  2009     "Sirolimus eluting stents to the RCA, 01/09/2009.     HERNIA REPAIR      Hernia repair x 2.     PARACENTESIS  06/17/2019    multiple    UMBILICAL HERNIA REPAIR N/A 7/26/2020    Procedure: UMBILICAL HERNIA REPAIR;  Surgeon: Maribell Her MD;  Location: [facility];  Service: General;  Laterality: N/A;       Family History: His family history includes Aneurysm in his father; Cancer in his sister; Dementia in his mother; Heart attack in his father; Heart disease in his brother and father; Hypertension in his daughter and son; No Known Problems in his brother and sister; Stroke in his brother and mother.     Social History: He  reports that he quit smoking about 41 years ago. His smoking use included cigarettes. He started smoking about 69 years ago. He has a 70 pack-year smoking history. He has never been exposed to tobacco smoke. He has never used smokeless tobacco. He reports that he does not drink alcohol and does not use drugs.    Home Medications:  Hydrocortisone (Perianal), albuterol sulfate HFA, allopurinol, apixaban, folic acid, furosemide, levothyroxine, lidocaine, nitroglycerin, simvastatin, spironolactone, and terazosin    Current Medications:    lidocaine PF 1%    lidocaine PF 1%    sodium chloride     Allergies:  Allergies   Allergen Reactions    Bee Venom Swelling    Sulfamethoxazole-Trimethoprim Other (See Comments)     Pt unaware       Review of Systems    IR Procedure pertinent significant findings are mentioned in the PMH and PSH above.    Objective     Visit Vitals  /57 (BP Location: Right arm, Patient Position: Lying)   Pulse 71   Temp 97 °F (36.1 °C) (Temporal)   Resp 18   Ht 180.3 cm (71\")   Wt 100 kg (221 lb 6.4 oz)   SpO2 96%   BMI 30.88 kg/m²        Physical Exam    A&Ox3.   Able to communicate  No Apparent Distress  Average physique   CVS: VS as noted. Chart reviewed. Stable for the procedure.  Respiratory: Non labored breathing. No signs of respiratory compromise.    Result Review  " "    I have personally reviewed the results from the time of this admission to 1/27/2025 11:55 EST and agree with these findings.  [x]  Laboratory  []  Microbiology  [x]  Radiology  []  EKG/Telemetry   []  Cardiology/Vascular   []  Pathology  []  Old records  []  Other:    Most notable findings include: As noted:      Previous CBC & INR reviewed.                CrCl cannot be calculated (Patient's most recent lab result is older than the maximum 30 days allowed.). No results found for: \"CREATININE\"    COVID19   Date Value Ref Range Status   11/04/2022 Not Detected  Final        No results found for: \"PREGTESTUR\", \"PREGSERUM\", \"HCG\", \"HCGQUANT\"     ASA SCALE ASSESSMENT (applicable ONLY if sedation planned):        MALLAMPATI CLASSIFICATION (applicable ONLY if sedation planned):       Assessment / Plan     Jordi Sam is a 89 y.o. male referred to the IR service with above problem.    Plan:   As above.    MAKAYLA Joy   Vascular Interventional Radiology  01/27/25   11:55 AM EST     "

## 2025-01-27 NOTE — NURSING NOTE
Paracentesis completed in IR by Sima BENAVIDES. 7.5 L removed. Tolerated well. Report called to Elvira TONEY.

## 2025-01-28 ENCOUNTER — TELEPHONE (OUTPATIENT)
Dept: INFUSION THERAPY | Facility: HOSPITAL | Age: OVER 89
End: 2025-01-28
Payer: MEDICARE

## 2025-01-29 DIAGNOSIS — K74.60 CIRRHOSIS OF LIVER WITH ASCITES, UNSPECIFIED HEPATIC CIRRHOSIS TYPE: Primary | ICD-10-CM

## 2025-01-29 DIAGNOSIS — R18.8 CIRRHOSIS OF LIVER WITH ASCITES, UNSPECIFIED HEPATIC CIRRHOSIS TYPE: Primary | ICD-10-CM

## 2025-01-30 ENCOUNTER — HOSPITAL ENCOUNTER (OUTPATIENT)
Dept: ULTRASOUND IMAGING | Facility: HOSPITAL | Age: OVER 89
Discharge: HOME | End: 2025-01-30
Admitting: NURSE PRACTITIONER
Payer: MEDICARE

## 2025-01-30 DIAGNOSIS — R18.8 CIRRHOSIS OF LIVER WITH ASCITES, UNSPECIFIED HEPATIC CIRRHOSIS TYPE: ICD-10-CM

## 2025-01-30 DIAGNOSIS — K74.60 CIRRHOSIS OF LIVER WITH ASCITES, UNSPECIFIED HEPATIC CIRRHOSIS TYPE: ICD-10-CM

## 2025-01-30 PROCEDURE — 76700 US EXAM ABDOM COMPLETE: CPT

## 2025-02-01 DIAGNOSIS — K74.60 CIRRHOSIS OF LIVER WITH ASCITES, UNSPECIFIED HEPATIC CIRRHOSIS TYPE: ICD-10-CM

## 2025-02-01 DIAGNOSIS — R18.8 CIRRHOSIS OF LIVER WITH ASCITES, UNSPECIFIED HEPATIC CIRRHOSIS TYPE: ICD-10-CM

## 2025-02-01 DIAGNOSIS — I50.812 CHRONIC RIGHT HEART FAILURE: ICD-10-CM

## 2025-02-03 NOTE — TELEPHONE ENCOUNTER
Last visit 9/20/24, next visit 4/25/25. Please refill if appropriate, deny, or re-route.  Miguel Britt MA

## 2025-02-05 RX ORDER — FUROSEMIDE 40 MG/1
TABLET ORAL
Qty: 135 TABLET | Refills: 0 | Status: SHIPPED | OUTPATIENT
Start: 2025-02-05

## 2025-02-24 ENCOUNTER — PREP FOR SURGERY (OUTPATIENT)
Dept: OTHER | Facility: HOSPITAL | Age: OVER 89
End: 2025-02-24
Payer: MEDICARE

## 2025-02-24 RX ORDER — SODIUM CHLORIDE 0.9 % (FLUSH) 0.9 %
10 SYRINGE (ML) INJECTION AS NEEDED
Status: CANCELLED | OUTPATIENT
Start: 2025-02-24

## 2025-02-25 ENCOUNTER — HOSPITAL ENCOUNTER (OUTPATIENT)
Dept: ULTRASOUND IMAGING | Facility: HOSPITAL | Age: OVER 89
Discharge: HOME | End: 2025-02-25
Admitting: NURSE PRACTITIONER
Payer: MEDICARE

## 2025-02-25 VITALS
TEMPERATURE: 97.2 F | OXYGEN SATURATION: 94 % | WEIGHT: 231 LBS | RESPIRATION RATE: 18 BRPM | BODY MASS INDEX: 32.34 KG/M2 | HEART RATE: 78 BPM | SYSTOLIC BLOOD PRESSURE: 100 MMHG | HEIGHT: 71 IN | DIASTOLIC BLOOD PRESSURE: 53 MMHG

## 2025-02-25 DIAGNOSIS — R18.8 OTHER ASCITES: Primary | ICD-10-CM

## 2025-02-25 PROCEDURE — 76942 ECHO GUIDE FOR BIOPSY: CPT

## 2025-02-25 PROCEDURE — 25010000002 ALBUMIN HUMAN 25% PER 50 ML: Performed by: NURSE PRACTITIONER

## 2025-02-25 PROCEDURE — P9047 ALBUMIN (HUMAN), 25%, 50ML: HCPCS | Performed by: NURSE PRACTITIONER

## 2025-02-25 RX ORDER — ALBUMIN (HUMAN) 12.5 G/50ML
25 SOLUTION INTRAVENOUS ONCE
Status: DISCONTINUED | OUTPATIENT
Start: 2025-02-25 | End: 2025-02-25

## 2025-02-25 RX ORDER — SODIUM CHLORIDE 0.9 % (FLUSH) 0.9 %
10 SYRINGE (ML) INJECTION AS NEEDED
Status: DISCONTINUED | OUTPATIENT
Start: 2025-02-25 | End: 2025-02-26 | Stop reason: HOSPADM

## 2025-02-25 RX ORDER — ALBUMIN (HUMAN) 12.5 G/50ML
25 SOLUTION INTRAVENOUS ONCE
OUTPATIENT
Start: 2025-02-25 | End: 2025-02-25

## 2025-02-25 RX ORDER — ALBUMIN (HUMAN) 12.5 G/50ML
50 SOLUTION INTRAVENOUS ONCE
Status: COMPLETED | OUTPATIENT
Start: 2025-02-25 | End: 2025-02-25

## 2025-02-25 RX ADMIN — ALBUMIN (HUMAN) 50 G: 0.25 INJECTION, SOLUTION INTRAVENOUS at 12:55

## 2025-02-25 NOTE — NURSING NOTE
Image guided paracentesis performed by Lindsay BENAVIDES. Yellow to greenish in color. 8.2 liters fluid removed from peritoneum. Patient tolerated well. Report called to RN.

## 2025-02-25 NOTE — POST-PROCEDURE NOTE
Vascular Interventional Radiology  Procedure Note    Date: 02/25/25      Time: 11:12 EST     Pre-op Diagnosis: Ascites     Post-op Diagnosis: Ascites    Procedure: US guided Paracentesis. Via LQ. See report for details.    Volume removed: See report.    Sedation: None    Estimated Blood Loss (EBL): 0 cc    IVF: NA    Findings: Above.    Specimens: See report.    Complications: See report.    Disposition: IR recovery.    MAKAYLA Madera  Vascular Interventional Radiology

## 2025-02-25 NOTE — PRE-PROCEDURE NOTE
[facility]   Vascular Interventional Radiology  History & Physicial        Patient Name:Jordi Sam    : 1934    MRN: [id number]    Primary Care Physician: Jasper Avery MD    Referring Physician: MAKAYLA Taveras     Date of admission: 2025    Subjective     Reason for Consult: Paracentesis for reoccurring ascites.    History of Present Illness     Jordi Sam is a 89 y.o. male referred to IR as noted above.      Active Hospital Problems:  There are no active hospital problems to display for this patient.      Personal History     Past Medical History:   Diagnosis Date    A-fib     Acute inferior myocardial infarction     Acute inferior STEMI with RV infarct features, 2009.     CAD (coronary artery disease)     Cellulitis 2023    Facial. - admission at [facility]. 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 Seeing Dr Kiersten Nova and Dr Jasper Avery        Past Surgical History:   Procedure Laterality Date    BRONCHOSCOPY N/A 2019    Procedure: BRONCHOSCOPY WITH THORACENTESIS;  Surgeon: Marciano Higginbotham MD;  Location:  [facility];  Service: Pulmonary    CARDIAC CATHETERIZATION Bilateral 2019    Procedure: Right and Left Heart Cath;  Surgeon: Efrem Posadas MD;  Location:  [facility] INV[facility]IVE LOCATION;  Service: Cardiology    CARDIAC CATHETERIZATION      CHOLECYSTECTOMY      CORONARY ANGIOPLASTY WITH STENT PLACEMENT   "01/09/2009    Sirolimus eluting stents to the RCA, 01/09/2009.     HERNIA REPAIR      Hernia repair x 2.     PARACENTESIS  06/17/2019    multiple    UMBILICAL HERNIA REPAIR N/A 7/26/2020    Procedure: UMBILICAL HERNIA REPAIR;  Surgeon: Maribell Her MD;  Location: [facility];  Service: General;  Laterality: N/A;       Family History: His family history includes Aneurysm in his father; Cancer in his sister; Dementia in his mother; Heart attack in his father; Heart disease in his brother and father; Hypertension in his daughter and son; No Known Problems in his brother and sister; Stroke in his brother and mother.     Social History: He  reports that he quit smoking about 41 years ago. His smoking use included cigarettes. He started smoking about 69 years ago. He has a 70 pack-year smoking history. He has never been exposed to tobacco smoke. He has never used smokeless tobacco. He reports that he does not drink alcohol and does not use drugs.    Home Medications:  Hydrocortisone (Perianal), albuterol sulfate HFA, allopurinol, apixaban, folic acid, furosemide, levothyroxine, lidocaine, nitroglycerin, simvastatin, spironolactone, and terazosin    Current Medications:  No current facility-administered medications for this encounter.     Allergies:  Allergies   Allergen Reactions    Bee Venom Swelling    Sulfamethoxazole-Trimethoprim Other (See Comments)     Pt unaware       Review of Systems    IR Procedure pertinent significant findings are mentioned in the PMH and PSH above.    Objective     Visit Vitals  /58 (BP Location: Right arm, Patient Position: Lying)   Pulse 82   Temp 97.2 °F (36.2 °C) (Temporal)   Resp 18   Ht 180.3 cm (71\")   Wt 105 kg (231 lb)   SpO2 96%   BMI 32.22 kg/m²        Physical Exam    A&Ox3.   Able to communicate  No Apparent Distress  Average physique   CVS: VS as noted. Chart reviewed. Stable for the procedure.  Respiratory: Non labored breathing. No signs of respiratory " "compromise.    Result Review      I have personally reviewed the results from the time of this admission to 2/25/2025 11:11 EST and agree with these findings.  [x]  Laboratory  []  Microbiology  [x]  Radiology  []  EKG/Telemetry   []  Cardiology/Vascular   []  Pathology  []  Old records  []  Other:    Most notable findings include: As noted:      Previous CBC & INR reviewed.                CrCl cannot be calculated (Patient's most recent lab result is older than the maximum 30 days allowed.). No results found for: \"CREATININE\"    COVID19   Date Value Ref Range Status   11/04/2022 Not Detected  Final        No results found for: \"PREGTESTUR\", \"PREGSERUM\", \"HCG\", \"HCGQUANT\"     ASA SCALE ASSESSMENT (applicable ONLY if sedation planned):        MALLAMPATI CLASSIFICATION (applicable ONLY if sedation planned):       Assessment / Plan     Jordi Sam is a 89 y.o. male referred to the IR service with above problem.    Plan:   As above.    MAKAYLA Joy   Vascular Interventional Radiology  02/25/25   11:11 AM EST     "

## 2025-02-26 ENCOUNTER — TELEPHONE (OUTPATIENT)
Dept: INFUSION THERAPY | Facility: HOSPITAL | Age: OVER 89
End: 2025-02-26
Payer: MEDICARE

## 2025-02-26 DIAGNOSIS — E03.8 OTHER SPECIFIED HYPOTHYROIDISM: ICD-10-CM

## 2025-02-26 RX ORDER — LEVOTHYROXINE SODIUM 25 UG/1
25 TABLET ORAL EVERY MORNING
Qty: 90 TABLET | Refills: 0 | Status: SHIPPED | OUTPATIENT
Start: 2025-02-26

## 2025-03-17 ENCOUNTER — TELEPHONE (OUTPATIENT)
Dept: FAMILY MEDICINE CLINIC | Facility: CLINIC | Age: OVER 89
End: 2025-03-17
Payer: MEDICARE

## 2025-03-17 ENCOUNTER — HOSPITAL ENCOUNTER (OUTPATIENT)
Dept: GENERAL RADIOLOGY | Facility: HOSPITAL | Age: OVER 89
Discharge: HOME | End: 2025-03-17
Admitting: INTERNAL MEDICINE
Payer: MEDICARE

## 2025-03-17 DIAGNOSIS — R05.1 ACUTE COUGH: ICD-10-CM

## 2025-03-17 DIAGNOSIS — R05.1 ACUTE COUGH: Primary | ICD-10-CM

## 2025-03-17 PROCEDURE — 71046 X-RAY EXAM CHEST 2 VIEWS: CPT

## 2025-03-17 NOTE — TELEPHONE ENCOUNTER
Caller: DAVID SNOW    Relationship: Emergency Contact    Best call back number: [phone]    What is the medical concern/diagnosis: COUGH FOR AT LEAST 2 WEEKS    What specialty or service is being requested: CHEST XRAY    What is the provider, practice or medical service name: NOT SURE

## 2025-03-17 NOTE — TELEPHONE ENCOUNTER
Informed patient they would need to be seen. Scheduled appt tomorrow.   They requested if the chest xray could be placed today and discussed at the appt tomorrow.

## 2025-03-18 ENCOUNTER — OFFICE VISIT (OUTPATIENT)
Dept: FAMILY MEDICINE CLINIC | Facility: CLINIC | Age: OVER 89
End: 2025-03-18
Payer: MEDICARE

## 2025-03-18 VITALS
OXYGEN SATURATION: 99 % | DIASTOLIC BLOOD PRESSURE: 62 MMHG | SYSTOLIC BLOOD PRESSURE: 118 MMHG | HEIGHT: 71 IN | WEIGHT: 226.6 LBS | BODY MASS INDEX: 31.72 KG/M2 | HEART RATE: 84 BPM

## 2025-03-18 DIAGNOSIS — J18.9 COMMUNITY ACQUIRED PNEUMONIA OF LEFT LOWER LOBE OF LUNG: Primary | ICD-10-CM

## 2025-03-18 DIAGNOSIS — R05.1 ACUTE COUGH: ICD-10-CM

## 2025-03-18 PROCEDURE — 96372 THER/PROPH/DIAG INJ SC/IM: CPT | Performed by: INTERNAL MEDICINE

## 2025-03-18 PROCEDURE — 99214 OFFICE O/P EST MOD 30 MIN: CPT | Performed by: INTERNAL MEDICINE

## 2025-03-18 PROCEDURE — 1126F AMNT PAIN NOTED NONE PRSNT: CPT | Performed by: INTERNAL MEDICINE

## 2025-03-18 RX ORDER — CEFTRIAXONE 1 G/1
1 INJECTION, POWDER, FOR SOLUTION INTRAMUSCULAR; INTRAVENOUS ONCE
Status: COMPLETED | OUTPATIENT
Start: 2025-03-18 | End: 2025-03-18

## 2025-03-18 RX ORDER — BENZONATATE 100 MG/1
100 CAPSULE ORAL 3 TIMES DAILY PRN
Qty: 60 CAPSULE | Refills: 1 | Status: SHIPPED | OUTPATIENT
Start: 2025-03-18

## 2025-03-18 RX ORDER — METHYLPREDNISOLONE 4 MG/1
TABLET ORAL
Qty: 1 EACH | Refills: 0 | Status: SHIPPED | OUTPATIENT
Start: 2025-03-18

## 2025-03-18 RX ORDER — DOXYCYCLINE 100 MG/1
100 CAPSULE ORAL 2 TIMES DAILY
Qty: 20 CAPSULE | Refills: 0 | Status: SHIPPED | OUTPATIENT
Start: 2025-03-18

## 2025-03-18 RX ORDER — ALBUTEROL SULFATE 90 UG/1
2 INHALANT RESPIRATORY (INHALATION) EVERY 4 HOURS PRN
Qty: 18 G | Refills: 0 | Status: SHIPPED | OUTPATIENT
Start: 2025-03-18

## 2025-03-18 RX ADMIN — CEFTRIAXONE 1 G: 1 INJECTION, POWDER, FOR SOLUTION INTRAMUSCULAR; INTRAVENOUS at 10:11

## 2025-03-18 NOTE — PROGRESS NOTES
Jordi Sam  1934  [id number]  Patient Care Team:  Jasper Avery MD as PCP - General (Internal Medicine)  Jordi Law MD as Consulting Physician (Urology)  Chapin Villagran MD as Consulting Physician (Otolaryngology)  Gen Laurent MD as Consulting Physician (Gastroenterology)  Ezekiel Abarca MD as Referring Physician (Dermatology)  Renny Faustin MD as Consulting Physician (Radiation Oncology)  Larry Sin MD as Consulting Physician (Cardiology)  Lv Van MD as Consulting Physician (Ophthalmology)    Jordi Sam is a 89 y.o. male here today for follow up.     This patient is accompanied by their self who contributes to the history of their care.    Chief Complaint:    Chief Complaint   Patient presents with    Cough     Two weeks        History of Present Illness:  I have reviewed and/or updated the patient's past medical, past surgical, family, social history, problem list and allergies as appropriate.     Mr. Sam is here complaining of cough x 2 weeks.  He did come in for a chest x-ray yesterday in anticipation of this visit.  It showed small left effusion and possible left basilar infiltrate.. He has had cough productive of clear sputum- no fevers and no chill. ( He stays cold a lot). Denies chest pain, hemoptysis. He denies orthopnea or PND. He has been retiring to bed more frequently. He does have more SOA with movement. He has taken coricidin early on . He denies any sorethroat., ear aches. His ocugh seems to improve when supine and he does clear his throat. He wife denies lethargy    Review of Systems   Constitutional:  Positive for fatigue. Negative for fever.   HENT:  Positive for postnasal drip.    Eyes: Negative.    Respiratory:  Positive for cough.    Cardiovascular:  Positive for leg swelling. Negative for chest pain and palpitations.       Vitals:    03/18/25 0919 03/18/25 1001   BP: 118/62    Pulse: 84    SpO2: (!) 87% 99%   Weight: 103 kg  "(226 lb 9.6 oz)    Height: 180.3 cm (70.98\")      Body mass index is 31.62 kg/m².    Physical Exam  Vitals and nursing note reviewed.   Constitutional:       General: He is not in acute distress.     Appearance: He is well-developed. He is obese. He is not diaphoretic.   HENT:      Head: Normocephalic and atraumatic.      Right Ear: External ear normal.      Left Ear: External ear normal.      Mouth/Throat:      Pharynx: No oropharyngeal exudate.   Eyes:      General: No scleral icterus.        Right eye: No discharge.      Conjunctiva/sclera: Conjunctivae normal.   Neck:      Thyroid: No thyromegaly.      Vascular: No JVD.      Trachea: No tracheal deviation.   Cardiovascular:      Rate and Rhythm: Normal rate and regular rhythm.      Heart sounds: Normal heart sounds.      Comments: PMI nondisplaced  Pulmonary:      Effort: Pulmonary effort is normal.      Breath sounds: Normal breath sounds. Wheezing and rhonchi present. No rales.   Abdominal:      General: Bowel sounds are normal.      Palpations: Abdomen is soft.      Tenderness: There is no abdominal tenderness. There is no guarding or rebound.   Musculoskeletal:      Cervical back: Normal range of motion and neck supple.   Lymphadenopathy:      Cervical: No cervical adenopathy.   Skin:     General: Skin is warm and dry.      Capillary Refill: Capillary refill takes less than 2 seconds.      Coloration: Skin is not pale.      Findings: No rash.   Neurological:      Mental Status: He is alert and oriented to person, place, and time.      Motor: No abnormal muscle tone.      Coordination: Coordination normal.   Psychiatric:         Judgment: Judgment normal.         Procedures    Results Review:    I reviewed the patient's new clinical results.  I reviewed the patient's new imaging results and agree with the interpretation.  XR CHEST PA AND LATERAL     Date of Exam: 3/17/2025 3:49 PM EDT     Indication: cough     Comparison: Chest radiograph 3/1/2023   "   Findings:        Mediastinum: Cardiac silhouette appears unchanged and mildly enlarged.     Lungs: Mild left basal opacities.     Pleura: Small left pleural effusion. No right pleural effusion or pneumothorax.     Bones and soft tissues: No acute, displaced fracture seen.        IMPRESSION:  Impression:  Small left pleural effusion.     Left basal opacities, likely atelectasis. Superimposed infection is possible in the appropriate clinical setting.  Assessment/Plan:    Problem List Items Addressed This Visit    None  Visit Diagnoses         Community acquired pneumonia of left lower lobe of lung    -  Primary    Relevant Medications    cefTRIAXone (ROCEPHIN) injection 1 g    doxycycline (VIBRAMYCIN) 100 MG capsule    methylPREDNISolone (MEDROL) 4 MG dose pack    benzonatate (Tessalon Perles) 100 MG capsule    albuterol sulfate  (90 Base) MCG/ACT inhaler      Acute cough        Relevant Medications    benzonatate (Tessalon Perles) 100 MG capsule    albuterol sulfate  (90 Base) MCG/ACT inhaler        Rocephin 1 g IM x 1 oral doxy x x 10 days. Medrol dose pack. Revist 1 week. Heis wife will proceeed to ER if he worsens ( confusion , weaknees, fevers or aiir hunger    Plan of care reviewed with patient at the conclusion of today's visit. Education was provided regarding diagnosis and management.  Patient verbalizes understanding of and agreement with management plan.    Return in about 1 week (around 3/25/2025) for follow up  pneumonia.    Jasper Avery MD      Please note than portions of this note were completed [facility] a Voice Recognition Program

## 2025-03-19 ENCOUNTER — TELEPHONE (OUTPATIENT)
Dept: FAMILY MEDICINE CLINIC | Facility: CLINIC | Age: OVER 89
End: 2025-03-19
Payer: MEDICARE

## 2025-03-19 NOTE — TELEPHONE ENCOUNTER
Caller: DAVID SNOW    Relationship: Emergency Contact    Best call back number: [phone]     What is the best time to reach you: TODAY PLEASE    Who are you requesting to speak with (clinical staff, provider,  specific staff member): PCP/MA    Do you know the name of the person who called: DAVID    What was the call regarding: WIFE OF PATIENT STATED HE WAS GIVEN A ANTIBIOTIC SHOT YESTERDAY AND THEN DOXYCYCLINE, MEDROL, TESSALON PEARLS AND ALBUTEROL WAS CALLED IN. WIFE STATED SHE NOTICED THIS MORNING THAT HIS  URINE IS BROWN AND CLOUDY AND THINKS IT MAY HAVE STARTED LATE LAST NIGHT ALSO. SHE SAID ONE SPOT ALMOST LOOKED A LITTLE RED. PATIENT STATED HE DOES NOT FEEL BAD. WIFE WANTS A CALLBACK TODAY PLEASE ASAP TO DISCUSS IF THAT COULD BE DUE TO ONE OF THE MEDICATIONS OR NEXT STEPS    Is it okay if the provider responds through MyChart: CALLBACK ASAP TODAY

## 2025-03-20 ENCOUNTER — LAB (OUTPATIENT)
Dept: LAB | Facility: HOSPITAL | Age: OVER 89
End: 2025-03-20
Payer: MEDICARE

## 2025-03-20 ENCOUNTER — HOSPITAL ENCOUNTER (OUTPATIENT)
Dept: CT IMAGING | Facility: HOSPITAL | Age: OVER 89
Discharge: HOME | End: 2025-03-20
Payer: MEDICARE

## 2025-03-20 ENCOUNTER — OFFICE VISIT (OUTPATIENT)
Dept: FAMILY MEDICINE CLINIC | Facility: CLINIC | Age: OVER 89
End: 2025-03-20
Payer: MEDICARE

## 2025-03-20 VITALS
BODY MASS INDEX: 32.51 KG/M2 | SYSTOLIC BLOOD PRESSURE: 144 MMHG | HEART RATE: 96 BPM | DIASTOLIC BLOOD PRESSURE: 67 MMHG | HEIGHT: 71 IN | WEIGHT: 232.2 LBS | OXYGEN SATURATION: 96 %

## 2025-03-20 DIAGNOSIS — R31.0 GROSS HEMATURIA: Primary | ICD-10-CM

## 2025-03-20 DIAGNOSIS — R31.0 GROSS HEMATURIA: ICD-10-CM

## 2025-03-20 LAB
ALBUMIN SERPL-MCNC: 3.4 G/DL (ref 3.5–5.2)
ALBUMIN/GLOB SERPL: 1.2 G/DL
ALP SERPL-CCNC: 113 U/L (ref 39–117)
ALT SERPL W P-5'-P-CCNC: 11 U/L (ref 1–41)
ANION GAP SERPL CALCULATED.3IONS-SCNC: 10.3 MMOL/L (ref 5–15)
AST SERPL-CCNC: 21 U/L (ref 1–40)
BACTERIA UR QL AUTO: ABNORMAL /HPF
BILIRUB BLD-MCNC: NEGATIVE MG/DL
BILIRUB SERPL-MCNC: 0.4 MG/DL (ref 0–1.2)
BILIRUB UR QL STRIP: NEGATIVE
BUN SERPL-MCNC: 34 MG/DL (ref 8–23)
BUN/CREAT SERPL: 22.1 (ref 7–25)
CALCIUM SPEC-SCNC: 9.2 MG/DL (ref 8.2–9.6)
CHLORIDE SERPL-SCNC: 98 MMOL/L (ref 98–107)
CLARITY UR: ABNORMAL
CLARITY, POC: ABNORMAL
CO2 SERPL-SCNC: 25.7 MMOL/L (ref 22–29)
COLOR UR: ABNORMAL
COLOR UR: ABNORMAL
CREAT SERPL-MCNC: 1.54 MG/DL (ref 0.76–1.27)
DEPRECATED RDW RBC AUTO: 53.7 FL (ref 37–54)
EGFRCR SERPLBLD CKD-EPI 2021: 42.3 ML/MIN/1.73
ERYTHROCYTE [DISTWIDTH] IN BLOOD BY AUTOMATED COUNT: 14.9 % (ref 12.3–15.4)
EXPIRATION DATE: ABNORMAL
GLOBULIN UR ELPH-MCNC: 2.9 GM/DL
GLUCOSE SERPL-MCNC: 115 MG/DL (ref 65–99)
GLUCOSE UR STRIP-MCNC: NEGATIVE MG/DL
GLUCOSE UR STRIP-MCNC: NEGATIVE MG/DL
HCT VFR BLD AUTO: 33.1 % (ref 37.5–51)
HGB BLD-MCNC: 10.3 G/DL (ref 13–17.7)
HGB UR QL STRIP.AUTO: ABNORMAL
HYALINE CASTS UR QL AUTO: ABNORMAL /LPF
KETONES UR QL STRIP: NEGATIVE
KETONES UR QL: NEGATIVE
LEUKOCYTE EST, POC: ABNORMAL
LEUKOCYTE ESTERASE UR QL STRIP.AUTO: ABNORMAL
Lab: ABNORMAL
MCH RBC QN AUTO: 30.5 PG (ref 26.6–33)
MCHC RBC AUTO-ENTMCNC: 31.1 G/DL (ref 31.5–35.7)
MCV RBC AUTO: 97.9 FL (ref 79–97)
NITRITE UR QL STRIP: NEGATIVE
NITRITE UR-MCNC: NEGATIVE MG/ML
PH UR STRIP.AUTO: 6 [PH] (ref 5–8)
PH UR: 6 [PH] (ref 5–8)
PLATELET # BLD AUTO: 178 10*3/MM3 (ref 140–450)
PMV BLD AUTO: 10.6 FL (ref 6–12)
POTASSIUM SERPL-SCNC: 4.8 MMOL/L (ref 3.5–5.2)
PROT SERPL-MCNC: 6.3 G/DL (ref 6–8.5)
PROT UR QL STRIP: ABNORMAL
PROT UR STRIP-MCNC: ABNORMAL MG/DL
RBC # BLD AUTO: 3.38 10*6/MM3 (ref 4.14–5.8)
RBC # UR STRIP: ABNORMAL /HPF
RBC # UR STRIP: ABNORMAL /UL
REF LAB TEST METHOD: ABNORMAL
SODIUM SERPL-SCNC: 134 MMOL/L (ref 136–145)
SP GR UR STRIP: 1.01 (ref 1–1.03)
SP GR UR: 1.01 (ref 1–1.03)
SQUAMOUS #/AREA URNS HPF: ABNORMAL /HPF
UROBILINOGEN UR QL STRIP: ABNORMAL
UROBILINOGEN UR QL: NORMAL
WBC # UR STRIP: ABNORMAL /HPF
WBC NRBC COR # BLD AUTO: 10.53 10*3/MM3 (ref 3.4–10.8)

## 2025-03-20 PROCEDURE — 80053 COMPREHEN METABOLIC PANEL: CPT

## 2025-03-20 PROCEDURE — 85027 COMPLETE CBC AUTOMATED: CPT

## 2025-03-20 PROCEDURE — 74176 CT ABD & PELVIS W/O CONTRAST: CPT

## 2025-03-20 PROCEDURE — 87086 URINE CULTURE/COLONY COUNT: CPT | Performed by: INTERNAL MEDICINE

## 2025-03-20 PROCEDURE — 81001 URINALYSIS AUTO W/SCOPE: CPT | Performed by: INTERNAL MEDICINE

## 2025-03-20 RX ORDER — CEFDINIR 300 MG/1
300 CAPSULE ORAL 2 TIMES DAILY
Qty: 20 CAPSULE | Refills: 0 | Status: SHIPPED | OUTPATIENT
Start: 2025-03-20

## 2025-03-20 NOTE — PROGRESS NOTES
Jordi Sam  1934  [id number]  Patient Care Team:  Jasper Avery MD as PCP - General (Internal Medicine)  Jordi Law MD as Consulting Physician (Urology)  Chapin Villagran MD as Consulting Physician (Otolaryngology)  Gen Laurent MD as Consulting Physician (Gastroenterology)  Ezekiel Abarca MD as Referring Physician (Dermatology)  Renny Faustin MD as Consulting Physician (Radiation Oncology)  Larry Sin MD as Consulting Physician (Cardiology)  Lv Van MD as Consulting Physician (Ophthalmology)    Jordi Sam is a 89 y.o. male here today for follow up.     This patient is accompanied by their self who contributes to the history of their care.    Chief Complaint:    Chief Complaint   Patient presents with    Blood in Urine        History of Present Illness:  I have reviewed and/or updated the patient's past medical, past surgical, family, social history, problem list and allergies as appropriate.     He was recently seen in the office with suspected community-acquired pneumonia, we placed him on Rocephin 1 g IM here in the office and then placed him on doxycycline at home.  Shortly after returning home his wife indicates he passed dark urine.      Past imaging in EMR shows of his abdomen pelvis that he has a small left renal cysts, no filling defects in the collecting system.  Urinary bladder appeared thickened however suboptimal assessment secondary to decompressed state bladder diverticulum noted.  Trabeculations noted.    His wife had noted brwwn urine- later changing to pink then gross blood. By yesterday he was passing clot. There has been dysuria.  He remains on eliquis. He denies nausea or vomiting and hsi appetite is good. He denies any abdominal pain ( except for large hernia ( unchanged)    He is supposed to have paracentesis tomorrow. He had stopped eliquis Tuesday. He has noted no difficulty urinating.         Review of Systems  "  Constitutional: Negative.  Negative for fatigue and fever.   HENT: Negative.  Swollen glands: omnicef.    Cardiovascular: Negative.    Gastrointestinal: Negative.    Genitourinary:  Positive for dysuria and hematuria. Negative for flank pain.       Vitals:    03/20/25 1315   BP: 144/67   Pulse: 96   SpO2: 96%   Weight: 105 kg (232 lb 3.2 oz)   Height: 180.3 cm (70.98\")     Body mass index is 32.4 kg/m².    Physical Exam  Vitals and nursing note reviewed.   Constitutional:       General: He is not in acute distress.     Appearance: He is well-developed. He is not diaphoretic.   HENT:      Head: Normocephalic and atraumatic.      Right Ear: External ear normal.      Left Ear: External ear normal.      Mouth/Throat:      Pharynx: No oropharyngeal exudate.   Eyes:      General: No scleral icterus.        Right eye: No discharge.      Conjunctiva/sclera: Conjunctivae normal.   Neck:      Thyroid: No thyromegaly.      Vascular: No JVD.      Trachea: No tracheal deviation.   Cardiovascular:      Rate and Rhythm: Normal rate and regular rhythm.      Heart sounds: Normal heart sounds.      Comments: PMI nondisplaced  Pulmonary:      Effort: Pulmonary effort is normal.      Breath sounds: Normal breath sounds. No wheezing or rales.   Abdominal:      General: Bowel sounds are normal.      Palpations: Abdomen is soft.      Tenderness: There is no abdominal tenderness. There is no right CVA tenderness, left CVA tenderness, guarding or rebound.      Hernia: A hernia is present.   Musculoskeletal:      Cervical back: Normal range of motion and neck supple.   Lymphadenopathy:      Cervical: No cervical adenopathy.   Skin:     General: Skin is warm and dry.      Capillary Refill: Capillary refill takes less than 2 seconds.      Coloration: Skin is not pale.      Findings: No rash.   Neurological:      Mental Status: He is alert and oriented to person, place, and time.      Motor: No abnormal muscle tone.      Coordination: " Coordination normal.   Psychiatric:         Judgment: Judgment normal.         Procedures    Results Review:    I reviewed the patient's new clinical results.    Assessment/Plan:    Problem List Items Addressed This Visit    None  Visit Diagnoses         Gross hematuria    -  Primary    Relevant Orders    Comprehensive Metabolic Panel    CBC (No Diff)    Urine Culture - Urine, Urine, Clean Catch    Urinalysis With Microscopic - Urine, Clean Catch    POC Urinalysis Dipstick, Automated (Completed)    CT Abdomen Pelvis Without Contrast    Ambulatory Referral to Urology (Completed)        Labs as above.  Placed an urgent CT hopefully can be done at the time of paracentesis.  Have also recommended he see his urologist Dr. Jordi Law ASAP.  I have broadened his antibiotics by adding Omnicef to his doxycycline.  Await urine culture.  Have asked him to hold his Eliquis until Monday.    Plan of care reviewed with patient at the conclusion of today's visit. Education was provided regarding diagnosis and management.  Patient verbalizes understanding of and agreement with management plan.    No follow-ups on file.    Jasper Avery MD      Please note than portions of this note were completed [facility] a Voice Recognition Program

## 2025-03-20 NOTE — TELEPHONE ENCOUNTER
Caller: DAVID SNOW    Relationship: Emergency Contact    Best call back number: [phone]     What is the best time to reach you: ANYTIME    Who are you requesting to speak with (clinical staff, provider,  specific staff member): CLINIC    Do you know the name of the person who called: EMERGENCY CONTACT    What was the call regarding: PATIENT HAS SCHEDULED AN APPOINTMENT TO COME IN TODAY, 3.20.25 TO BE SEEN FOR THE BLOOD IN THE URINE. IT IS NOW BRIGHT RED.  PER CALLER REQUEST, PLEASE CALL TO DISCUSS THESE SYMPTOMS PRIOR TO THE APPOINTMENT.

## 2025-03-21 ENCOUNTER — TELEPHONE (OUTPATIENT)
Dept: CARDIOLOGY | Facility: CLINIC | Age: OVER 89
End: 2025-03-21
Payer: MEDICARE

## 2025-03-21 ENCOUNTER — HOSPITAL ENCOUNTER (OUTPATIENT)
Dept: ULTRASOUND IMAGING | Facility: HOSPITAL | Age: OVER 89
Discharge: HOME | End: 2025-03-21
Payer: MEDICARE

## 2025-03-21 VITALS
OXYGEN SATURATION: 94 % | WEIGHT: 262.6 LBS | DIASTOLIC BLOOD PRESSURE: 63 MMHG | HEART RATE: 78 BPM | TEMPERATURE: 97.7 F | SYSTOLIC BLOOD PRESSURE: 119 MMHG | RESPIRATION RATE: 18 BRPM | BODY MASS INDEX: 36.64 KG/M2

## 2025-03-21 LAB
APPEARANCE FLD: CLEAR
COLOR FLD: YELLOW
LYMPHOCYTES NFR FLD MANUAL: 23 %
MACROPHAGE FLUID %: 63 %
MESOTHL CELL NFR FLD MANUAL: 2 %
MONOCYTES NFR FLD: 1 %
NEUTROPHILS NFR FLD MANUAL: 11 %
RBC # FLD AUTO: <2000 /MM3
WBC # FLD AUTO: 52 /MM3

## 2025-03-21 PROCEDURE — 76942 ECHO GUIDE FOR BIOPSY: CPT

## 2025-03-21 PROCEDURE — 25010000002 LIDOCAINE PF 1% 1 % SOLUTION: Performed by: NURSE PRACTITIONER

## 2025-03-21 PROCEDURE — 25010000002 ALBUMIN HUMAN 25% PER 50 ML: Performed by: NURSE PRACTITIONER

## 2025-03-21 PROCEDURE — P9047 ALBUMIN (HUMAN), 25%, 50ML: HCPCS | Performed by: NURSE PRACTITIONER

## 2025-03-21 PROCEDURE — 89051 BODY FLUID CELL COUNT: CPT | Performed by: NURSE PRACTITIONER

## 2025-03-21 RX ORDER — LIDOCAINE HYDROCHLORIDE 10 MG/ML
10 INJECTION, SOLUTION EPIDURAL; INFILTRATION; INTRACAUDAL; PERINEURAL ONCE
Status: COMPLETED | OUTPATIENT
Start: 2025-03-21 | End: 2025-03-21

## 2025-03-21 RX ORDER — ALBUMIN (HUMAN) 12.5 G/50ML
25 SOLUTION INTRAVENOUS ONCE
OUTPATIENT
Start: 2025-03-21 | End: 2025-03-21

## 2025-03-21 RX ORDER — ALBUMIN (HUMAN) 12.5 G/50ML
50 SOLUTION INTRAVENOUS
Status: COMPLETED | OUTPATIENT
Start: 2025-03-21 | End: 2025-03-21

## 2025-03-21 RX ORDER — ALBUMIN (HUMAN) 12.5 G/50ML
25 SOLUTION INTRAVENOUS
Status: DISCONTINUED | OUTPATIENT
Start: 2025-03-21 | End: 2025-03-21

## 2025-03-21 RX ADMIN — ALBUMIN (HUMAN) 50 G: 0.25 INJECTION, SOLUTION INTRAVENOUS at 13:56

## 2025-03-21 RX ADMIN — LIDOCAINE HYDROCHLORIDE 10 ML: 10 INJECTION, SOLUTION EPIDURAL; INFILTRATION; INTRACAUDAL; PERINEURAL at 13:02

## 2025-03-22 LAB — BACTERIA SPEC AEROBE CULT: NO GROWTH

## 2025-03-24 ENCOUNTER — TELEPHONE (OUTPATIENT)
Dept: INFUSION THERAPY | Facility: HOSPITAL | Age: OVER 89
End: 2025-03-24
Payer: MEDICARE

## 2025-03-25 ENCOUNTER — TELEPHONE (OUTPATIENT)
Dept: INFUSION THERAPY | Facility: HOSPITAL | Age: OVER 89
End: 2025-03-25
Payer: MEDICARE

## 2025-03-25 ENCOUNTER — OFFICE VISIT (OUTPATIENT)
Dept: FAMILY MEDICINE CLINIC | Facility: CLINIC | Age: OVER 89
End: 2025-03-25
Payer: MEDICARE

## 2025-03-25 VITALS
SYSTOLIC BLOOD PRESSURE: 116 MMHG | OXYGEN SATURATION: 98 % | DIASTOLIC BLOOD PRESSURE: 63 MMHG | HEIGHT: 71 IN | BODY MASS INDEX: 30.21 KG/M2 | WEIGHT: 215.8 LBS | HEART RATE: 86 BPM

## 2025-03-25 DIAGNOSIS — R31.0 GROSS HEMATURIA: ICD-10-CM

## 2025-03-25 DIAGNOSIS — Z79.01 CHRONIC ANTICOAGULATION: ICD-10-CM

## 2025-03-25 DIAGNOSIS — Q61.00 CONGENITAL HEMORRHAGIC CYST OF KIDNEY: Primary | ICD-10-CM

## 2025-03-25 NOTE — PROGRESS NOTES
Jordi Sam  1934  [id number]  Patient Care Team:  Jasper Avery MD as PCP - General (Internal Medicine)  Jordi Law MD as Consulting Physician (Urology)  Chapin Villagran MD as Consulting Physician (Otolaryngology)  Gen Laurent MD as Consulting Physician (Gastroenterology)  Ezekiel Abarca MD as Referring Physician (Dermatology)  Renny Faustin MD as Consulting Physician (Radiation Oncology)  Larry Sin MD as Consulting Physician (Cardiology)  Lv Van MD as Consulting Physician (Ophthalmology)    Jordi Sam is a 89 y.o. male here today for follow up.     This patient is accompanied by their self who contributes to the history of their care.    Chief Complaint:    Chief Complaint   Patient presents with    Blood in Urine     F/U        History of Present Illness:  I have reviewed and/or updated the patient's past medical, past surgical, family, social history, problem list and allergies as appropriate.     Mauri was seen last week for an upper respiratory infection, 2 days later seen for gross hematuria.  Urine culture showed no growth.  He was told to hold his Eliquis.  Consultation with urology was requested.  CT showed no hydronephrosis or kidney stones.  Likely hemorrhagic cyst on the periphery of the left kidney was noted.  Recommended follow-up CT urogram. - He has continued to hold his eliquis. Denies flank pain, dysuria. His urine has cleared 2 days after stopping eliquis- ( he is on this for afib.   He  sees urology tomorrow. His cough is imporved. Denies fevers or chills. Denies nausea or vomitng.   Review of Systems   Constitutional: Negative.    HENT: Negative.     Respiratory: Negative.     Gastrointestinal:  Positive for abdominal distention.   Endocrine: Negative.    Genitourinary:  Negative for dysuria and hematuria.   Musculoskeletal: Negative.    Hematological:  Bruises/bleeds easily.       Vitals:    03/25/25 1108   BP: 116/63  "  Pulse: 86   SpO2: 98%   Weight: 97.9 kg (215 lb 12.8 oz)   Height: 180.3 cm (70.98\")     Body mass index is 30.11 kg/m².    Physical Exam  Constitutional:       Appearance: Normal appearance. He is obese.   HENT:      Head: Normocephalic and atraumatic.   Eyes:      Conjunctiva/sclera: Conjunctivae normal.   Cardiovascular:      Rate and Rhythm: Normal rate and regular rhythm.   Pulmonary:      Effort: Pulmonary effort is normal.      Breath sounds: Normal breath sounds.   Abdominal:      General: Bowel sounds are normal.      Palpations: Abdomen is soft.      Hernia: A hernia is present.   Genitourinary:     Comments: No CVA tendernesss   Skin:     General: Skin is warm and dry.   Neurological:      Mental Status: He is alert and oriented to person, place, and time.   Psychiatric:         Mood and Affect: Mood normal.         Behavior: Behavior normal.         Procedures    Results Review:    I reviewed the patient's new clinical results.  CT ABDOMEN PELVIS WO CONTRAST     Date of Exam: 3/20/2025 3:43 PM EDT     Indication: gross hematuria.     Comparison: 7/13/2022     Technique: Axial CT images were obtained of the abdomen and pelvis without the administration of contrast. Reconstructed coronal and sagittal images were also obtained. Automated exposure control and iterative construction methods were used.     Findings:     Liver: Undulating liver contour may indicate the presence of cirrhosis. Evaluation for focal liver lesions is limited without IV contrast. No biliary dilation is seen.     Gallbladder: Surgically absent.     Pancreas: Unremarkable.     Spleen: Spleen is upper limit of normal in size.     Adrenal glands: Unremarkable.     Genitourinary tract: Probable small hemorrhagic cyst along the periphery of the left kidney. Right kidney is unremarkable. No hydronephrosis is seen. No urinary tract calculi are seen. The visualized portions of the ureters and urinary bladder appear   unremarkable. Prostate " gland is unremarkable.     Gastrointestinal tract: Limited evaluation of the hollow viscera due to lack of IV contrast administration. Periumbilical hernia defect contains multiple nonobstructed loops of small bowel. Colonic diverticulosis is present. No findings to suggest bowel   obstruction.     Appendix: No findings to suggest acute appendicitis.     Other findings: Large volume ascites. No free air. No pathologically enlarged lymph nodes are seen. Vascular calcifications are noted.     Bones and soft tissues: No acute osseous lesion is identified. Superficial soft tissues demonstrate no acute abnormality. There is a large periumbilical hernia defect which contains multiple nonobstructed loops of bowel as well as a large amount of   ascitic fluid. Lobulated left inguinal hernia defect contains ascitic fluid and fat. Small right inguinal hernia contains only fat     Lung bases: Partially visualized left pleural effusion with left basilar atelectasis. Coronary artery calcifications     IMPRESSION:  Impression:  1.No hydronephrosis or urinary tract calculi. Probable small hemorrhagic cyst along the periphery of the left kidney. Consider follow-up CT urogram for further work-up of hematuria.  2.Liver morphology suggestive of cirrhosis. Large volume ascites.  3.Large periumbilical hernia defect containing ascitic fluid and multiple nonobstructed loops of small bowel.  4.Colonic diverticulosis.  5.Additional findings as detailed above.     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  If urology is okay resuming Eliquis tomorrow, okay with that.  He is asymptomatic.  However he is at and CVA risk secondary to A-fib.    Plan of care reviewed with  patient at the conclusion of today's visit. Education was provided regarding diagnosis and management.  Patient verbalizes understanding of and agreement with management plan.    Return for Next scheduled follow up.    Jasper Avery MD      Please note than portions of this note were completed wt a Voice Recognition Program

## 2025-04-04 ENCOUNTER — OFFICE VISIT (OUTPATIENT)
Dept: FAMILY MEDICINE CLINIC | Facility: CLINIC | Age: OVER 89
End: 2025-04-04
Payer: MEDICARE

## 2025-04-04 VITALS
BODY MASS INDEX: 30.38 KG/M2 | OXYGEN SATURATION: 96 % | SYSTOLIC BLOOD PRESSURE: 128 MMHG | DIASTOLIC BLOOD PRESSURE: 76 MMHG | HEART RATE: 77 BPM | WEIGHT: 217 LBS | HEIGHT: 71 IN

## 2025-04-04 DIAGNOSIS — R31.0 GROSS HEMATURIA: Primary | ICD-10-CM

## 2025-04-04 DIAGNOSIS — I48.20 CHRONIC ATRIAL FIBRILLATION: ICD-10-CM

## 2025-04-04 DIAGNOSIS — Z79.01 CHRONIC ANTICOAGULATION: ICD-10-CM

## 2025-04-04 NOTE — PROGRESS NOTES
Answers submitted by the patient for this visit:  Problem not listed (Submitted on 4/4/2025)  Chief Complaint: Other medical problem  Reason for appointment: Follow up  abdominal pain: No  cough: Yes  Jordi Sam  1934  [id number]  Patient Care Team:  Jasper Avery MD as PCP - General (Internal Medicine)  Jordi Law MD as Consulting Physician (Urology)  Chapin Villagran MD as Consulting Physician (Otolaryngology)  Gen Laurent MD as Consulting Physician (Gastroenterology)  Ezekiel Abarca MD as Referring Physician (Dermatology)  Renny Faustin MD as Consulting Physician (Radiation Oncology)  Larry Sin MD as Consulting Physician (Cardiology)  Lv Van MD as Consulting Physician (Ophthalmology)    Jordi Sam is a 89 y.o. male here today for follow up.     This patient is accompanied by their self and wife who contributes to the history of their care.    Chief Complaint:    Chief Complaint   Patient presents with    kidney issue        History of Present Illness:  I have reviewed and/or updated the patient's past medical, past surgical, family, social history, problem list and allergies as appropriate.     Per [facility] urology 3/26/25  89 year old male here for further evaluation of gross hematuria.  He has a history of BPH and takes terazosin 2 mg daily.  He has a history of A-fib and takes Eliquis.  He has CKD with creatinine baseline 1.5 and GFR 42.  He has paracentesis once monthly for abdominal fluid.  Beginning last week, he noticed bright red blood with clots in his urine. He was started on antibiotics for possible UTI by Dr. Avery. A urine culture showed no bacterial growth. A CT of the abdomen and pelvis without contrast showed no hydronephrosis or stone. There is a probable small hemorrhagic cyst along the periphery of the left kidney, liver cirrhosis and a large periumbilical hernia and colonic diverticulosis. In comparison, a CT of the  "abdomen and pelvis w/wo contrast from 7/2022 at [facility], [place], operated by [facility] showed two small left renal cysts. He continues to have bloody urine. He denies difficulty urinating.  He is a former smoker.    [place]  89 year old male with a history of BPH and hematuria. He noticed bright red blood with clots in his urine last week. He was started on doxycycline for possible UTI by Dr. Avery. A urine culture showed no bacterial growth. A CT of the abdomen and pelvis without contrast at [facility], [place], operated by [facility] last week showed no hydronephrosis or stone. There is a probable small hemorrhagic cyst along the periphery of the left kidney, liver cirrhosis and a large periumbilical hernia and colonic diverticulosis.     We discussed his CT results. We discussed differentials for gross hematuria. He will likely need a CT with contrast and cystoscopy. I will ask Dr. García to review imaging and make recommendations. The patient will continue to hold Eliquis per his PCP.  END  copied text    Current visit  He has appointment for cystoscopy scheduled on 5/8/2025 with Dr. García, He resumed his eliquis  after the urology  appointmenwithout a reocccurence of hematuria.  He denies polyuira, dysuria or back pain. His cough is much improved. Contiues to use tesslon prn      Review of Systems   Constitutional: Negative.    Respiratory:  Positive for cough.    Gastrointestinal:  Positive for abdominal distention. Negative for abdominal pain.   Genitourinary:  Positive for hematuria. Negative for dysuria and flank pain.       Vitals:    04/04/25 1102   BP: 128/76   Pulse: 77   SpO2: 96%   Weight: 98.4 kg (217 lb)   Height: 180.3 cm (70.98\")     Body mass index is 30.28 kg/m².    Physical Exam  Vitals and nursing note reviewed.   Constitutional:       General: He is not in acute distress.     Appearance: He is well-developed. He is not diaphoretic.   HENT:      Head: Normocephalic and atraumatic.      Right Ear: External ear normal.      Left Ear: External ear normal.     "  Mouth/Throat:      Pharynx: No oropharyngeal exudate.   Eyes:      General: No scleral icterus.        Right eye: No discharge.      Conjunctiva/sclera: Conjunctivae normal.   Neck:      Thyroid: No thyromegaly.      Vascular: No JVD.      Trachea: No tracheal deviation.   Cardiovascular:      Rate and Rhythm: Normal rate and regular rhythm.      Heart sounds: Normal heart sounds.      Comments: PMI nondisplaced  Pulmonary:      Effort: Pulmonary effort is normal.      Breath sounds: Normal breath sounds. No wheezing or rales.   Abdominal:      General: Bowel sounds are normal.      Palpations: Abdomen is soft.      Tenderness: There is no abdominal tenderness. There is no guarding or rebound.   Musculoskeletal:      Cervical back: Normal range of motion and neck supple.   Lymphadenopathy:      Cervical: No cervical adenopathy.   Skin:     General: Skin is warm and dry.      Capillary Refill: Capillary refill takes less than 2 seconds.      Coloration: Skin is not pale.      Findings: No rash.   Neurological:      Mental Status: He is alert and oriented to person, place, and time.      Motor: No abnormal muscle tone.      Coordination: Coordination normal.   Psychiatric:         Judgment: Judgment normal.         Procedures    Results Review:    I reviewed the patient's new clinical results.    Assessment/Plan:    Problem List Items Addressed This Visit       Chronic atrial fibrillation    Relevant Medications    nitroglycerin (NITROSTAT) 0.4 MG SL tablet    Chronic anticoagulation    Gross hematuria - Primary   Recommend continuing Eliquis however stopped 3 days prior to cystoscopy.  If his hematuria recurs he has been instructed to stop his Eliquis immediately.  If this happens and he cannot void he should seek emergent care in the ER further evaluation for clots.  He will follow-up after cystoscopy.    Plan of care reviewed with patient at the conclusion of today's visit. Education was provided regarding  diagnosis and management.  Patient verbalizes understanding of and agreement with management plan.    Return for Next scheduled follow up.    Jasper Avery MD      Please note than portions of this note were completed wt a Voice Recognition Program

## 2025-04-12 DIAGNOSIS — K74.60 CIRRHOSIS OF LIVER WITH ASCITES, UNSPECIFIED HEPATIC CIRRHOSIS TYPE: ICD-10-CM

## 2025-04-12 DIAGNOSIS — R18.8 CIRRHOSIS OF LIVER WITH ASCITES, UNSPECIFIED HEPATIC CIRRHOSIS TYPE: ICD-10-CM

## 2025-04-12 DIAGNOSIS — I50.812 CHRONIC RIGHT HEART FAILURE: ICD-10-CM

## 2025-04-14 RX ORDER — FUROSEMIDE 40 MG/1
TABLET ORAL
Qty: 135 TABLET | Refills: 0 | Status: SHIPPED | OUTPATIENT
Start: 2025-04-14

## 2025-04-23 ENCOUNTER — TELEPHONE (OUTPATIENT)
Dept: INFUSION THERAPY | Facility: HOSPITAL | Age: OVER 89
End: 2025-04-23
Payer: MEDICARE

## 2025-04-25 ENCOUNTER — TELEPHONE (OUTPATIENT)
Dept: CARDIOLOGY | Facility: CLINIC | Age: OVER 89
End: 2025-04-25
Payer: MEDICARE

## 2025-04-25 ENCOUNTER — HOSPITAL ENCOUNTER (OUTPATIENT)
Dept: INTERVENTIONAL RADIOLOGY/VASCULAR | Facility: HOSPITAL | Age: OVER 89
Discharge: HOME | End: 2025-04-25
Payer: MEDICARE

## 2025-04-25 VITALS
TEMPERATURE: 97.8 F | DIASTOLIC BLOOD PRESSURE: 46 MMHG | SYSTOLIC BLOOD PRESSURE: 101 MMHG | RESPIRATION RATE: 18 BRPM | BODY MASS INDEX: 32.48 KG/M2 | WEIGHT: 232 LBS | OXYGEN SATURATION: 96 % | HEIGHT: 71 IN | HEART RATE: 70 BPM

## 2025-04-25 DIAGNOSIS — R18.8 CIRRHOSIS OF LIVER WITH ASCITES, UNSPECIFIED HEPATIC CIRRHOSIS TYPE: Primary | ICD-10-CM

## 2025-04-25 DIAGNOSIS — K74.60 CIRRHOSIS OF LIVER WITH ASCITES, UNSPECIFIED HEPATIC CIRRHOSIS TYPE: Primary | ICD-10-CM

## 2025-04-25 DIAGNOSIS — R18.8 OTHER ASCITES: Primary | ICD-10-CM

## 2025-04-25 LAB
APPEARANCE FLD: CLEAR
BASOPHILS # BLD AUTO: 0.03 10*3/MM3 (ref 0–0.2)
BASOPHILS NFR BLD AUTO: 0.7 % (ref 0–1.5)
COLOR FLD: YELLOW
DEPRECATED RDW RBC AUTO: 62.1 FL (ref 37–54)
EOSINOPHIL # BLD AUTO: 0.03 10*3/MM3 (ref 0–0.4)
EOSINOPHIL NFR BLD AUTO: 0.7 % (ref 0.3–6.2)
ERYTHROCYTE [DISTWIDTH] IN BLOOD BY AUTOMATED COUNT: 17.3 % (ref 12.3–15.4)
HCT VFR BLD AUTO: 29.5 % (ref 37.5–51)
HGB BLD-MCNC: 9 G/DL (ref 13–17.7)
IMM GRANULOCYTES # BLD AUTO: 0.01 10*3/MM3 (ref 0–0.05)
IMM GRANULOCYTES NFR BLD AUTO: 0.2 % (ref 0–0.5)
INR PPP: 1.21 (ref 0.89–1.12)
LYMPHOCYTES # BLD AUTO: 0.29 10*3/MM3 (ref 0.7–3.1)
LYMPHOCYTES NFR BLD AUTO: 6.3 % (ref 19.6–45.3)
LYMPHOCYTES NFR FLD MANUAL: 18 %
MACROPHAGE FLUID %: 79 %
MCH RBC QN AUTO: 30 PG (ref 26.6–33)
MCHC RBC AUTO-ENTMCNC: 30.5 G/DL (ref 31.5–35.7)
MCV RBC AUTO: 98.3 FL (ref 79–97)
MONOCYTES # BLD AUTO: 0.41 10*3/MM3 (ref 0.1–0.9)
MONOCYTES NFR BLD AUTO: 8.9 % (ref 5–12)
NEUTROPHILS NFR BLD AUTO: 3.84 10*3/MM3 (ref 1.7–7)
NEUTROPHILS NFR BLD AUTO: 83.2 % (ref 42.7–76)
NEUTROPHILS NFR FLD MANUAL: 3 %
NRBC BLD AUTO-RTO: 0 /100 WBC (ref 0–0.2)
NRBC FLD-RTO: 0 /100 WBCS
PLATELET # BLD AUTO: 142 10*3/MM3 (ref 140–450)
PMV BLD AUTO: 10.4 FL (ref 6–12)
PROTHROMBIN TIME: 16.1 SECONDS (ref 12.2–15.3)
RBC # BLD AUTO: 3 10*6/MM3 (ref 4.14–5.8)
RBC # FLD AUTO: <2000 /MM3
WBC # FLD AUTO: 70 /MM3
WBC NRBC COR # BLD AUTO: 4.61 10*3/MM3 (ref 3.4–10.8)

## 2025-04-25 PROCEDURE — C1729 CATH, DRAINAGE: HCPCS

## 2025-04-25 PROCEDURE — 25010000002 ALBUMIN HUMAN 25% PER 50 ML

## 2025-04-25 PROCEDURE — P9047 ALBUMIN (HUMAN), 25%, 50ML: HCPCS

## 2025-04-25 PROCEDURE — 85610 PROTHROMBIN TIME: CPT | Performed by: STUDENT IN AN ORGANIZED HEALTH CARE EDUCATION/TRAINING PROGRAM

## 2025-04-25 PROCEDURE — 25010000002 LIDOCAINE PF 1% 1 % SOLUTION

## 2025-04-25 PROCEDURE — 89051 BODY FLUID CELL COUNT: CPT | Performed by: NURSE PRACTITIONER

## 2025-04-25 PROCEDURE — 96365 THER/PROPH/DIAG IV INF INIT: CPT

## 2025-04-25 PROCEDURE — 85025 COMPLETE CBC W/AUTO DIFF WBC: CPT | Performed by: STUDENT IN AN ORGANIZED HEALTH CARE EDUCATION/TRAINING PROGRAM

## 2025-04-25 PROCEDURE — 76942 ECHO GUIDE FOR BIOPSY: CPT

## 2025-04-25 RX ORDER — ALBUMIN (HUMAN) 12.5 G/50ML
25 SOLUTION INTRAVENOUS ONCE
Status: DISCONTINUED | OUTPATIENT
Start: 2025-04-25 | End: 2025-04-25

## 2025-04-25 RX ORDER — ALBUMIN (HUMAN) 12.5 G/50ML
25 SOLUTION INTRAVENOUS ONCE
OUTPATIENT
Start: 2025-04-25 | End: 2025-04-25

## 2025-04-25 RX ORDER — LIDOCAINE HYDROCHLORIDE 10 MG/ML
INJECTION, SOLUTION EPIDURAL; INFILTRATION; INTRACAUDAL; PERINEURAL
Status: COMPLETED
Start: 2025-04-25 | End: 2025-04-25

## 2025-04-25 RX ORDER — SODIUM CHLORIDE 0.9 % (FLUSH) 0.9 %
10 SYRINGE (ML) INJECTION AS NEEDED
Status: DISCONTINUED | OUTPATIENT
Start: 2025-04-25 | End: 2025-04-26 | Stop reason: HOSPADM

## 2025-04-25 RX ORDER — ALBUMIN (HUMAN) 12.5 G/50ML
37.5 SOLUTION INTRAVENOUS ONCE
Status: COMPLETED | OUTPATIENT
Start: 2025-04-25 | End: 2025-04-25

## 2025-04-25 RX ORDER — SODIUM CHLORIDE 9 MG/ML
40 INJECTION, SOLUTION INTRAVENOUS AS NEEDED
Status: DISCONTINUED | OUTPATIENT
Start: 2025-04-25 | End: 2025-04-26 | Stop reason: HOSPADM

## 2025-04-25 RX ORDER — SODIUM CHLORIDE 0.9 % (FLUSH) 0.9 %
10 SYRINGE (ML) INJECTION EVERY 12 HOURS SCHEDULED
Status: DISCONTINUED | OUTPATIENT
Start: 2025-04-25 | End: 2025-04-26 | Stop reason: HOSPADM

## 2025-04-25 RX ORDER — ALBUMIN (HUMAN) 12.5 G/50ML
SOLUTION INTRAVENOUS
Status: COMPLETED
Start: 2025-04-25 | End: 2025-04-25

## 2025-04-25 RX ADMIN — LIDOCAINE HYDROCHLORIDE 8 ML: 10 INJECTION, SOLUTION EPIDURAL; INFILTRATION; INTRACAUDAL; PERINEURAL at 12:41

## 2025-04-25 RX ADMIN — ALBUMIN (HUMAN) 37.5 G: 0.25 INJECTION, SOLUTION INTRAVENOUS at 14:01

## 2025-04-25 RX ADMIN — ALBUMIN (HUMAN) 37.5 G: 12.5 SOLUTION INTRAVENOUS at 14:01

## 2025-04-28 ENCOUNTER — TELEPHONE (OUTPATIENT)
Dept: INFUSION THERAPY | Facility: HOSPITAL | Age: OVER 89
End: 2025-04-28
Payer: MEDICARE

## 2025-04-30 ENCOUNTER — OFFICE VISIT (OUTPATIENT)
Dept: CARDIOLOGY | Facility: HOSPITAL | Age: OVER 89
End: 2025-04-30
Payer: MEDICARE

## 2025-04-30 VITALS
HEART RATE: 76 BPM | BODY MASS INDEX: 43.09 KG/M2 | WEIGHT: 219.5 LBS | OXYGEN SATURATION: 96 % | HEIGHT: 60 IN | DIASTOLIC BLOOD PRESSURE: 56 MMHG | RESPIRATION RATE: 18 BRPM | SYSTOLIC BLOOD PRESSURE: 122 MMHG

## 2025-04-30 DIAGNOSIS — D64.9 ANEMIA, UNSPECIFIED TYPE: ICD-10-CM

## 2025-04-30 DIAGNOSIS — K74.60 CIRRHOSIS OF LIVER WITH ASCITES, UNSPECIFIED HEPATIC CIRRHOSIS TYPE: ICD-10-CM

## 2025-04-30 DIAGNOSIS — R18.8 CIRRHOSIS OF LIVER WITH ASCITES, UNSPECIFIED HEPATIC CIRRHOSIS TYPE: ICD-10-CM

## 2025-04-30 DIAGNOSIS — I50.812 CHRONIC RIGHT HEART FAILURE: Primary | ICD-10-CM

## 2025-04-30 DIAGNOSIS — N18.30 STAGE 3 CHRONIC KIDNEY DISEASE, UNSPECIFIED WHETHER STAGE 3A OR 3B CKD: ICD-10-CM

## 2025-04-30 DIAGNOSIS — I48.20 CHRONIC ATRIAL FIBRILLATION: ICD-10-CM

## 2025-04-30 DIAGNOSIS — I25.10 CORONARY ARTERY DISEASE INVOLVING NATIVE CORONARY ARTERY OF NATIVE HEART WITHOUT ANGINA PECTORIS: ICD-10-CM

## 2025-04-30 RX ORDER — SPIRONOLACTONE 50 MG/1
50 TABLET, FILM COATED ORAL DAILY
Qty: 90 TABLET | Refills: 2 | Status: SHIPPED | OUTPATIENT
Start: 2025-04-30

## 2025-04-30 RX ORDER — SPIRONOLACTONE 50 MG/1
50 TABLET, FILM COATED ORAL DAILY
Start: 2025-04-30 | End: 2025-04-30 | Stop reason: SDUPTHER

## 2025-04-30 NOTE — PROGRESS NOTES
"[facility]    Chief Complaint  Follow-up    Subjective    History of Present Illness {CC  Problem List  Visit  Diagnosis   Encounters  Notes  Medications  Labs  Result Review Imaging  Media :23}     Jordi Sam presents to [facility] CARDIOLOGY for   History of Present Illness     89-year-old male with CAD, right-sided heart failure, cirrhosis and ascites, routine periodic paracentesis followed by GI, chronic atrial fibrillation, hypothyroidism, large left abdominal hernia (not a surgical candidate for repair).      Unable to tolerate ACE, ARB, Arni due to hypotension and kidney function. Currently on spironolactone and furosemide.     Propranolol stopped due to bradycardia     Paracentesis 4/25/2025: 6500 mL removed    Pt reports that at some time he was prescribed 100 mg of aldactone, but has only continue 50mg.     Overall reports stable condition per patient report.     No CP or pressure.  No worsening dyspnea, dizziness, near syncope ,syncope, or palpitations.     Objective     Vital Signs:   Vitals:    04/30/25 1356   BP: 122/56   BP Location: Left arm   Patient Position: Sitting   Cuff Size: Adult   Pulse: 76   Resp: 18   SpO2: 96%   Weight: 99.6 kg (219 lb 8 oz)   Height: 71 cm (27.95\")     Body mass index is 197.51 kg/m².  Physical Exam  Vitals reviewed.   Constitutional:       General: He is not in acute distress.  Cardiovascular:      Rate and Rhythm: Normal rate. Rhythm irregular.   Pulmonary:      Effort: Pulmonary effort is normal.      Breath sounds: Normal breath sounds.   Abdominal:      General: There is distension.   Musculoskeletal:      Right lower leg: Edema present.      Left lower leg: Edema present.   Skin:     Coloration: Skin is not pale.   Neurological:      Mental Status: He is alert.   Psychiatric:         Mood and Affect: Mood normal.         Behavior: Behavior normal. Behavior is cooperative. "              Result Review  Data Reviewed:{ Labs  Result Review  Imaging  Med Tab  Media :23}   7-day Holter 7/13/2023: Average heart rate 69 bpm.  A-fib burden 100%.  Nonsustained VT, longest duration 21 beats.  PVCs 4%,     RANDALL 2019: EF 60%, moderate TR, small PFO with bidirectional shunting.    Lab Results   Component Value Date    WBC 4.61 04/25/2025    HGB 9.0 (L) 04/25/2025    HCT 29.5 (L) 04/25/2025    MCV 98.3 (H) 04/25/2025     04/25/2025     Lab Results   Component Value Date    GLUCOSE 115 (H) 03/20/2025    BUN 34 (H) 03/20/2025    CREATININE 1.54 (H) 03/20/2025     (L) 03/20/2025    K 4.8 03/20/2025    CL 98 03/20/2025    CALCIUM 9.2 03/20/2025    PROTEINTOT 6.3 03/20/2025    ALBUMIN 3.4 (L) 03/20/2025    ALT 11 03/20/2025    AST 21 03/20/2025    ALKPHOS 113 03/20/2025    BILITOT 0.4 03/20/2025    GLOB 2.9 03/20/2025    AGRATIO 1.2 03/20/2025    BCR 22.1 03/20/2025    ANIONGAP 10.3 03/20/2025    EGFR 42.3 (L) 03/20/2025                   Assessment and Plan {CC Problem List  Visit Diagnosis  ROS  Review (Popup)  Health Maintenance  Quality  BestPractice  Medications  SmartSets  SnapShot Encounters  Media :23}   1. Chronic right heart failure  Lasix,Spironolactone  - Comprehensive Metabolic Panel; Future    2. Chronic atrial fibrillation  Heart rate controlled  With Eliquis for stroke prevention    3. Coronary artery disease involving native coronary artery of native heart without angina pectoris  No chest pain or pressure    4. Stage 3 chronic kidney disease, unspecified whether stage 3a or 3b CKD  Stable  - CBC (No Diff); Future  - Comprehensive Metabolic Panel; Future    5. Cirrhosis of liver with ascites, unspecified hepatic cirrhosis type  Routine paracentesis with GI  - Comprehensive Metabolic Panel; Future  - spironolactone (Aldactone) 50 MG tablet; Take 1 tablet by mouth Daily.  Dispense: 90 tablet; Refill: 2    6. Anemia, unspecified type  Recent anemia.  Recheck lab  work in 2 weeks.  - CBC (No Diff); Future          Follow Up {Instructions Charge Capture  Follow-up Communications :23}   Return in about 6 months (around 10/30/2025), or if symptoms worsen or fail to improve, for Afib, HF.    Patient was given instructions and counseling regarding his condition or for health maintenance advice. Please see specific information pulled into the AVS if appropriate.  Patient was instructed to call the [facility] with any questions, concerns, or worsening symptoms.

## 2025-05-06 ENCOUNTER — LAB (OUTPATIENT)
Dept: LAB | Facility: HOSPITAL | Age: OVER 89
End: 2025-05-06
Payer: MEDICARE

## 2025-05-06 DIAGNOSIS — N18.30 STAGE 3 CHRONIC KIDNEY DISEASE, UNSPECIFIED WHETHER STAGE 3A OR 3B CKD: ICD-10-CM

## 2025-05-06 DIAGNOSIS — I50.812 CHRONIC RIGHT HEART FAILURE: ICD-10-CM

## 2025-05-06 DIAGNOSIS — D64.9 ANEMIA, UNSPECIFIED TYPE: ICD-10-CM

## 2025-05-06 DIAGNOSIS — R18.8 CIRRHOSIS OF LIVER WITH ASCITES, UNSPECIFIED HEPATIC CIRRHOSIS TYPE: ICD-10-CM

## 2025-05-06 DIAGNOSIS — K74.60 CIRRHOSIS OF LIVER WITH ASCITES, UNSPECIFIED HEPATIC CIRRHOSIS TYPE: ICD-10-CM

## 2025-05-06 PROCEDURE — 85027 COMPLETE CBC AUTOMATED: CPT

## 2025-05-06 PROCEDURE — 36415 COLL VENOUS BLD VENIPUNCTURE: CPT

## 2025-05-06 PROCEDURE — 80053 COMPREHEN METABOLIC PANEL: CPT

## 2025-05-07 LAB
ALBUMIN SERPL-MCNC: 3.2 G/DL (ref 3.5–5.2)
ALBUMIN/GLOB SERPL: 1.2 G/DL
ALP SERPL-CCNC: 101 U/L (ref 39–117)
ALT SERPL W P-5'-P-CCNC: 7 U/L (ref 1–41)
ANION GAP SERPL CALCULATED.3IONS-SCNC: 10.5 MMOL/L (ref 5–15)
AST SERPL-CCNC: 17 U/L (ref 1–40)
BILIRUB SERPL-MCNC: 0.4 MG/DL (ref 0–1.2)
BUN SERPL-MCNC: 19 MG/DL (ref 8–23)
BUN/CREAT SERPL: 17.3 (ref 7–25)
CALCIUM SPEC-SCNC: 8.7 MG/DL (ref 8.2–9.6)
CHLORIDE SERPL-SCNC: 99 MMOL/L (ref 98–107)
CO2 SERPL-SCNC: 25.5 MMOL/L (ref 22–29)
CREAT SERPL-MCNC: 1.1 MG/DL (ref 0.76–1.27)
DEPRECATED RDW RBC AUTO: 52.8 FL (ref 37–54)
EGFRCR SERPLBLD CKD-EPI 2021: 63.4 ML/MIN/1.73
ERYTHROCYTE [DISTWIDTH] IN BLOOD BY AUTOMATED COUNT: 15.2 % (ref 12.3–15.4)
GLOBULIN UR ELPH-MCNC: 2.6 GM/DL
GLUCOSE SERPL-MCNC: 131 MG/DL (ref 65–99)
HCT VFR BLD AUTO: 30.5 % (ref 37.5–51)
HGB BLD-MCNC: 9.5 G/DL (ref 13–17.7)
MCH RBC QN AUTO: 29.9 PG (ref 26.6–33)
MCHC RBC AUTO-ENTMCNC: 31.1 G/DL (ref 31.5–35.7)
MCV RBC AUTO: 95.9 FL (ref 79–97)
PLATELET # BLD AUTO: 155 10*3/MM3 (ref 140–450)
PMV BLD AUTO: 10.8 FL (ref 6–12)
POTASSIUM SERPL-SCNC: 4.7 MMOL/L (ref 3.5–5.2)
PROT SERPL-MCNC: 5.8 G/DL (ref 6–8.5)
RBC # BLD AUTO: 3.18 10*6/MM3 (ref 4.14–5.8)
SODIUM SERPL-SCNC: 135 MMOL/L (ref 136–145)
WBC NRBC COR # BLD AUTO: 5.23 10*3/MM3 (ref 3.4–10.8)

## 2025-05-21 ENCOUNTER — OFFICE VISIT (OUTPATIENT)
Dept: FAMILY MEDICINE CLINIC | Facility: CLINIC | Age: OVER 89
End: 2025-05-21
Payer: MEDICARE

## 2025-05-21 VITALS
HEART RATE: 47 BPM | OXYGEN SATURATION: 100 % | DIASTOLIC BLOOD PRESSURE: 60 MMHG | BODY MASS INDEX: 31.86 KG/M2 | SYSTOLIC BLOOD PRESSURE: 120 MMHG | HEIGHT: 71 IN | WEIGHT: 227.6 LBS

## 2025-05-21 DIAGNOSIS — R31.0 GROSS HEMATURIA: ICD-10-CM

## 2025-05-21 DIAGNOSIS — I10 ESSENTIAL HYPERTENSION: ICD-10-CM

## 2025-05-21 DIAGNOSIS — N18.32 STAGE 3B CHRONIC KIDNEY DISEASE: Primary | ICD-10-CM

## 2025-05-21 NOTE — ASSESSMENT & PLAN NOTE
Renal condition is stable.  Continue current treatment regimen.  Continue current medications.  Monitor daily weight.  Sodium restriction  Renal condition will be reassessed in 3 months.

## 2025-05-21 NOTE — PROGRESS NOTES
Jordi Sam  1934  [id number]  Patient Care Team:  Jasper Avery MD as PCP - General (Internal Medicine)  Jordi Law MD as Consulting Physician (Urology)  Chapin Villagran MD as Consulting Physician (Otolaryngology)  Gen Laurent MD as Consulting Physician (Gastroenterology)  Ezekiel Abarca MD as Referring Physician (Dermatology)  Renny Faustin MD as Consulting Physician (Radiation Oncology)  Larry Sin MD as Consulting Physician (Cardiology)  Lv Van MD as Consulting Physician (Ophthalmology)    Jordi Sam is a 89 y.o. male here today for follow up.     This patient is accompanied by their self and wife who contributes to the history of their care.    Chief Complaint:    Chief Complaint   Patient presents with    Gross Hematuria        History of Present Illness:  I have reviewed and/or updated the patient's past medical, past surgical, family, social history, problem list and allergies as appropriate.     Was seen last month for gross HUU.      He has a history of BPH and takes terazosin 2 mg daily.  He has a history of A-fib and takes Eliquis.  He has CKD with creatinine baseline 1.5 and GFR 42.  He has paracentesis once monthly for abdominal fluid.  Last month  he noticed bright red blood with clots in his urine. He was started on antibiotics for possible UTI by Dr. Avery. A urine culture showed no bacterial growth. A CT of the abdomen and pelvis without contrast showed no hydronephrosis or stone. There is a probable small hemorrhagic cyst along the periphery of the left kidney, liver cirrhosis and a large periumbilical hernia and colonic diverticulosis. In comparison, a CT of the abdomen and pelvis w/wo contrast from 7/2022 at [facility] showed two small left renal cysts. He continues to have bloody urine. He denies difficulty urinating.   Urology  discussed his CT resultsand differentials for gross hematuria. He will likely need a CT with contrast  "and cystoscopy. I will ask Dr. García to review imaging and make recommendations. The patient will continue to hold Eliquis per his PCP.     Current visit    appointment for cystoscopy scheduled on 5/8/2025 with Dr. García, Could not be completed. They are wanting to  undergo general anesthesia. He is not wanting to do this .El has resumed the  eliquis ( held for paracentesis on friday)   without a reocccurence of hematuria.  He denies polyuira, dysuria or back pain. His cough is much improved. Contiues to use tesslon prn    He is diiseclined to under go general anesthesia/     His cough has improved as well.   Bp controlled  at home.  Denies any lightheadedness with standing.  Remains on Lasix Aldactone at night.  Review of Systems   Constitutional:  Positive for fever.   Gastrointestinal:  Negative for nausea and vomiting.   Genitourinary:  Negative for difficulty urinating, dysuria, flank pain, hematuria and urgency.   Musculoskeletal:  Negative for back pain.   All other systems reviewed and are negative.      Vitals:    05/21/25 1430   BP: 120/60   Pulse: (!) 47   SpO2: 100%   Weight: 103 kg (227 lb 9.6 oz)   Height: 180.3 cm (70.98\")     Body mass index is 31.76 kg/m².    Physical Exam  Vitals reviewed.   Constitutional:       Appearance: He is well-developed.   HENT:      Head: Normocephalic and atraumatic.   Eyes:      General:         Right eye: No discharge.         Left eye: No discharge.      Conjunctiva/sclera: Conjunctivae normal.   Cardiovascular:      Rate and Rhythm: Normal rate and regular rhythm.   Pulmonary:      Effort: Pulmonary effort is normal.      Breath sounds: Normal breath sounds. No wheezing or rales.   Chest:      Chest wall: No tenderness.   Abdominal:      General: Bowel sounds are normal.      Palpations: Abdomen is soft.      Hernia: A hernia is present.      Comments: Incisional hernia-massive   Genitourinary:     Comments: No CVA tendernesss   Skin:     General: Skin is warm and " dry.   Neurological:      Mental Status: He is alert and oriented to person, place, and time.         Procedures    Results Review:    I reviewed the patient's new clinical results.    Assessment/Plan:    Problem List Items Addressed This Visit       Essential hypertension    Current Assessment & Plan   Hypertension is improving with treatment.  Continue current treatment regimen.  Dietary sodium restriction.  Weight loss.  Regular aerobic exercise.  Continue current medications.  Ambulatory blood pressure monitoring.  Blood pressure will be reassessed in 3 months         Relevant Medications    terazosin (HYTRIN) 2 MG capsule    furosemide (LASIX) 40 MG tablet    spironolactone (Aldactone) 50 MG tablet    Stage 3 chronic kidney disease - Primary    Current Assessment & Plan   Renal condition is stable.  Continue current treatment regimen.  Continue current medications.  Monitor daily weight.  Sodium restriction  Renal condition will be reassessed in 3 months.         Relevant Medications    furosemide (LASIX) 40 MG tablet    spironolactone (Aldactone) 50 MG tablet    Gross hematuria   No recurrence of his gross hematuria while being on Eliquis.  He is currently [place] paracentesis on Friday.  Urology is wanting general anesthesia to put him to sleep for cystoscopy in the OR.  Cardiovascularly I think this is too risky.  Patient is in agreement.  Will keep him on his Eliquis as he has had been doing.  If hematuria recurs he will hold Eliquis and notify this office.  Unless it is an emergency, I recommended against general anesthesia.  He understands there could be a delayed diagnosis of or missed diagnosis of bladder tumor prostate cancer etc. however he voices understanding accepts this.    Plan of care reviewed with patient at the conclusion of today's visit. Education was provided regarding diagnosis and management.  Patient verbalizes understanding of and agreement with management plan.    Return in about 3  months (around 8/21/2025) for htn/ hematuria/gout/ dm.    Jasper Avery MD      Please note than portions of this note were completed wth a Voice Recognition Program

## 2025-05-21 NOTE — ASSESSMENT & PLAN NOTE
Hypertension is improving with treatment.  Continue current treatment regimen.  Dietary sodium restriction.  Weight loss.  Regular aerobic exercise.  Continue current medications.  Ambulatory blood pressure monitoring.  Blood pressure will be reassessed in 3 months

## 2025-05-23 ENCOUNTER — HOSPITAL ENCOUNTER (OUTPATIENT)
Dept: ULTRASOUND IMAGING | Facility: HOSPITAL | Age: OVER 89
Discharge: HOME | End: 2025-05-23
Payer: MEDICARE

## 2025-05-23 ENCOUNTER — TELEPHONE (OUTPATIENT)
Dept: CARDIOLOGY | Facility: CLINIC | Age: OVER 89
End: 2025-05-23
Payer: MEDICARE

## 2025-05-23 VITALS
HEART RATE: 93 BPM | TEMPERATURE: 96.6 F | BODY MASS INDEX: 31.78 KG/M2 | OXYGEN SATURATION: 96 % | HEIGHT: 71 IN | WEIGHT: 227 LBS | SYSTOLIC BLOOD PRESSURE: 100 MMHG | RESPIRATION RATE: 20 BRPM | DIASTOLIC BLOOD PRESSURE: 58 MMHG

## 2025-05-23 LAB
APPEARANCE FLD: CLEAR
COLOR FLD: YELLOW
LYMPHOCYTES NFR FLD MANUAL: 20 %
MACROPHAGE FLUID %: 68 %
MESOTHL CELL NFR FLD MANUAL: 1 %
MONOCYTES NFR FLD: 9 %
NEUTROPHILS NFR FLD MANUAL: 2 %
RBC # FLD AUTO: <2000 /MM3
WBC # FLD AUTO: 55 /MM3

## 2025-05-23 PROCEDURE — 25010000002 LIDOCAINE 1 % SOLUTION: Performed by: NURSE PRACTITIONER

## 2025-05-23 PROCEDURE — 76942 ECHO GUIDE FOR BIOPSY: CPT

## 2025-05-23 PROCEDURE — 89051 BODY FLUID CELL COUNT: CPT | Performed by: NURSE PRACTITIONER

## 2025-05-23 RX ORDER — LIDOCAINE HYDROCHLORIDE 10 MG/ML
10 INJECTION, SOLUTION INFILTRATION; PERINEURAL ONCE
Status: COMPLETED | OUTPATIENT
Start: 2025-05-23 | End: 2025-05-23

## 2025-05-23 RX ORDER — SODIUM CHLORIDE 0.9 % (FLUSH) 0.9 %
10 SYRINGE (ML) INJECTION AS NEEDED
Status: DISCONTINUED | OUTPATIENT
Start: 2025-05-23 | End: 2025-05-24 | Stop reason: HOSPADM

## 2025-05-23 RX ADMIN — LIDOCAINE HYDROCHLORIDE 10 ML: 10 INJECTION, SOLUTION INFILTRATION; PERINEURAL at 13:00

## 2025-05-25 DIAGNOSIS — E78.2 MIXED HYPERLIPIDEMIA: ICD-10-CM

## 2025-05-26 DIAGNOSIS — E03.8 OTHER SPECIFIED HYPOTHYROIDISM: ICD-10-CM

## 2025-05-27 RX ORDER — LEVOTHYROXINE SODIUM 25 UG/1
25 TABLET ORAL EVERY MORNING
Qty: 90 TABLET | Refills: 0 | Status: SHIPPED | OUTPATIENT
Start: 2025-05-27

## 2025-05-27 RX ORDER — SIMVASTATIN 40 MG
40 TABLET ORAL DAILY
Qty: 90 TABLET | Refills: 0 | Status: SHIPPED | OUTPATIENT
Start: 2025-05-27

## 2025-05-27 NOTE — TELEPHONE ENCOUNTER
Rx Refill Note  Requested Prescriptions     Pending Prescriptions Disp Refills    levothyroxine (SYNTHROID, LEVOTHROID) 25 MCG tablet [Pharmacy Med Name: Levothyroxine Sodium 25 MCG Oral Tablet] 90 tablet 0     Sig: TAKE 1 TABLET BY MOUTH ONCE DAILY IN THE MORNING      Last office visit with prescribing clinician: 5/21/2025   Last telemedicine visit with prescribing clinician: Visit date not found   Next office visit with prescribing clinician: 8/27/2025                         Would you like a call back once the refill request has been completed: [] Yes [] No    If the office needs to give you a call back, can they leave a voicemail: [] Yes [] No    Jena Danielle MA  05/27/25, 09:53 EDT

## 2025-05-27 NOTE — TELEPHONE ENCOUNTER
Rx Refill Note  Requested Prescriptions     Pending Prescriptions Disp Refills    simvastatin (ZOCOR) 40 MG tablet [Pharmacy Med Name: Simvastatin 40 MG Oral Tablet] 90 tablet 0     Sig: Take 1 tablet by mouth once daily      Last office visit with prescribing clinician: 5/21/2025   Last telemedicine visit with prescribing clinician: Visit date not found   Next office visit with prescribing clinician: 5/26/2025                         Would you like a call back once the refill request has been completed: [] Yes [] No    If the office needs to give you a call back, can they leave a voicemail: [] Yes [] No    Soraida Solomon MA  05/27/25, 13:53 EDT

## 2025-06-04 DIAGNOSIS — R39.9 LOWER URINARY TRACT SYMPTOMS (LUTS): ICD-10-CM

## 2025-06-04 DIAGNOSIS — M10.9 GOUT, UNSPECIFIED CAUSE, UNSPECIFIED CHRONICITY, UNSPECIFIED SITE: ICD-10-CM

## 2025-06-05 RX ORDER — TERAZOSIN 2 MG/1
2 CAPSULE ORAL NIGHTLY
Qty: 90 CAPSULE | Refills: 0 | Status: SHIPPED | OUTPATIENT
Start: 2025-06-05

## 2025-06-05 RX ORDER — ALLOPURINOL 100 MG/1
100 TABLET ORAL DAILY
Qty: 90 TABLET | Refills: 0 | Status: SHIPPED | OUTPATIENT
Start: 2025-06-05

## 2025-06-13 ENCOUNTER — OFFICE VISIT (OUTPATIENT)
Dept: GASTROENTEROLOGY | Facility: CLINIC | Age: OVER 89
End: 2025-06-13
Payer: MEDICARE

## 2025-06-13 VITALS
HEIGHT: 71 IN | TEMPERATURE: 97.5 F | DIASTOLIC BLOOD PRESSURE: 51 MMHG | WEIGHT: 229.6 LBS | SYSTOLIC BLOOD PRESSURE: 101 MMHG | BODY MASS INDEX: 32.14 KG/M2 | HEART RATE: 88 BPM

## 2025-06-13 DIAGNOSIS — K74.60 CIRRHOSIS OF LIVER WITH ASCITES, UNSPECIFIED HEPATIC CIRRHOSIS TYPE: Primary | ICD-10-CM

## 2025-06-13 DIAGNOSIS — R23.8 SKIN IRRITATION: ICD-10-CM

## 2025-06-13 DIAGNOSIS — E55.9 VITAMIN D DEFICIENCY: ICD-10-CM

## 2025-06-13 DIAGNOSIS — K42.9 UMBILICAL HERNIA WITHOUT OBSTRUCTION AND WITHOUT GANGRENE: ICD-10-CM

## 2025-06-13 DIAGNOSIS — R18.8 CIRRHOSIS OF LIVER WITH ASCITES, UNSPECIFIED HEPATIC CIRRHOSIS TYPE: Primary | ICD-10-CM

## 2025-06-13 NOTE — PROGRESS NOTES
GASTROENTEROLOGY OFFICE NOTE    Jordi Sam  [id number]  1934    CARE TEAM  Patient Care Team:  Jasper Avery MD as PCP - General (Internal Medicine)  Jordi Law MD as Consulting Physician (Urology)  Chapin Villagran MD as Consulting Physician (Otolaryngology)  Gen Laurent MD as Consulting Physician (Gastroenterology)  Ezekiel Abarca MD as Referring Physician (Dermatology)  Renny Faustin MD as Consulting Physician (Radiation Oncology)  Larry Sin MD as Consulting Physician (Cardiology)  Lv Van MD as Consulting Physician (Ophthalmology)    Referring Provider: No ref. provider found    Chief Complaint   Patient presents with    Hepatic Disease        HISTORY OF PRESENT ILLNESS:   Jordi Sam is a 89 y.o. 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  It was documented at that visit the patient was taking furosemide 40 mg in the morning and 20 mg in the evening and Aldactone 50 mg daily as directed by cardiology, requires paracentesis approximately once monthly without history of SBP.  He continues on propranolol as directed by cardiology, denied GI bleeding, no history of hepatic encephalopathy.  Patient has abdominal wall hernia that has become somewhat symptomatic with pain in the area, increased protrusion that becomes worse with ascites.      1/30/2025 ultrasound abdomen revealed finding consistent with cirrhosis, splenomegaly, mild ascites    3/20/2025 CT abdomen and pelvis without contrast due to hematuria revealed findings suggestive of cirrhosis, evaluation for focal liver lesion limited  without IV contrast, large volume ascites, large periumbilical hernia that contains multiple nonobstructed loops of bowel as well as large amount of ascitic fluid, lobulated left inguinal hernia defect contains ascitic fluid and fat, small right inguinal hernia contains only fat, probable small hemorrhagic cyst along periphery of left kidney, diverticulosis.  It was recommended by primary care provider that patient hold Eliquis until he sees urology.    He continues to undergo paracentesis due to recurrent ascites.   10/28/2024 4 liters of ascites removed   11/27/2024 7 liters of ascites removed   12/23/2024 5.2 liters removed   1/127/2025 7.5 liters removed   2/25/2025 8.2 liters removed via paracentesis with albumin infused  3/21/2025 8 liters of ascites removed via paracentesis with albumin infused    4/25/2025 6.5 liters of ascites removed with albumin infused, legs swelling  5/23/2025 4.5 liters removed at time of paracentesis. Fluid analysis inconsistent with SBP    History of Present Illness  The patient is a 89-year-old male who returns for follow up with history as above. He is accompanied by his wife who expresses concern regarding possible rash on his back     He has been experiencing a rash, which was first noticed during his shower today. The rash is not associated with any itching or burning sensations. It appears to be improving, with the redness subsiding after the application of Neosporin this morning. He also has small bumps scattered across his body, which are attributed to his age. He has a scheduled appointment with dermatology in 07/2025.    He is not currently on a vitamin D supplement regimen.    He continues to undergo paracentesis about once a month and is scheduled for the next procedure on 06/23/2025. A CT scan performed in 03/2025 due to bloody urine revealed a possible cyst that caused hematuria, but he reports no current blood in his urine. He also has hernias as above on CT report.      They are planning to attend a family reunion in [facility] tomorrow      Past Medical History:   Diagnosis Date    A-fib     Acute inferior myocardial infarction     Acute inferior STEMI with RV infarct features, January 2009.     CAD (coronary artery disease)     Cellulitis 02/26/2023    Facial. Feb 26-March 1st admission at [facility]. 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 Seeing Dr Kiersten Nova and Dr Jasper Avery 8-2021        Past Surgical History:   Procedure Laterality Date    BRONCHOSCOPY N/A 4/12/2019    Procedure: BRONCHOSCOPY WITH THORACENTESIS;  Surgeon: Marciano Higginbotham MD;  Location:  [facility];  Service: Pulmonary    CARDIAC CATHETERIZATION Bilateral 1/30/2019    Procedure: Right and Left Heart Cath;  Surgeon: Efrem Posadas MD;  Location:  [facility] CATH INVASIVE LOCATION;  Service: Cardiology    CARDIAC CATHETERIZATION      CHOLECYSTECTOMY      CORONARY ANGIOPLASTY WITH STENT PLACEMENT  01/09/2009    Sirolimus eluting stents to the RCA, 01/09/2009.     HERNIA REPAIR      Hernia repair x 2.     PARACENTESIS  06/17/2019    multiple    UMBILICAL HERNIA REPAIR N/A 7/26/2020    Procedure: UMBILICAL HERNIA REPAIR;  Surgeon: Maribell Her MD;  Location:  [facility] OR;  Service: General;  Laterality: N/A;        Current Outpatient Medications on File Prior to Visit   Medication Sig    albuterol sulfate  (90 Base) MCG/ACT inhaler Inhale 2 puffs Every 4 (Four) Hours  As Needed for Wheezing.    allopurinol (ZYLOPRIM) 100 MG tablet Take 1 tablet by mouth once daily    apixaban (ELIQUIS) 2.5 MG tablet tablet Take 1 tablet by mouth 2 (Two) Times a Day.    benzonatate (Tessalon Perles) 100 MG capsule Take 1 capsule by mouth 3 (Three) Times a Day As Needed for Cough.    folic acid (FOLVITE) 400 MCG tablet Take 1 tablet by mouth Daily.    furosemide (LASIX) 40 MG tablet TAKE 1 TABLET BY MOUTH ONCE DAILY IN THE MORNING, 1/2 TABLET IN THE AFTERNOON AND AS NEEDED FOR WEIGHT GAIN, EDEMA, AND DYPNEA    Hydrocortisone, Perianal, (Anusol-HC) 2.5 % rectal cream Insert  into the rectum 2 (Two) Times a Day.    levothyroxine (SYNTHROID, LEVOTHROID) 25 MCG tablet TAKE 1 TABLET BY MOUTH ONCE DAILY IN THE MORNING    simvastatin (ZOCOR) 40 MG tablet Take 1 tablet by mouth once daily    spironolactone (Aldactone) 50 MG tablet Take 1 tablet by mouth Daily.    terazosin (HYTRIN) 2 MG capsule TAKE 1 CAPSULE BY MOUTH ONCE DAILY AT NIGHT    [DISCONTINUED] lidocaine (XYLOCAINE) 5 % ointment Apply 1 Application topically to the appropriate area as directed 3 (Three) Times a Day As Needed for Mild Pain (rectal pain). (Patient not taking: Reported on 6/13/2025)    [DISCONTINUED] nitroglycerin (NITROSTAT) 0.4 MG SL tablet DISSOLVE ONE TABLET UNDER THE TONGUE EVERY 5 MINUTES AS NEEDED FOR CHEST PAIN.  DO NOT EXCEED A TOTAL OF 3 DOSES IN 15 MINUTES (Patient not taking: Reported on 6/13/2025)     No current facility-administered medications on file prior to visit.       Allergies   Allergen Reactions    Bee Venom Swelling    Sulfamethoxazole-Trimethoprim Other (See Comments)     Pt unaware       Family History   Problem Relation Age of Onset    Dementia Mother     Stroke Mother     Heart disease Father     Heart attack Father     Aneurysm Father     Cancer Sister     No Known Problems Sister     No Known Problems Brother     Heart disease Brother         CABG    Stroke Brother     Hypertension Daughter      "Hypertension Son     Colon cancer Neg Hx     Colon polyps Neg Hx        Social History     Socioeconomic History    Marital status:      Spouse name: Melly    Number of children: 4   Tobacco Use    Smoking status: Former     Current packs/day: 0.00     Average packs/day: 2.5 packs/day for 28.0 years (70.0 ttl pk-yrs)     Types: Cigarettes     Start date: 1955     Quit date: 1983     Years since quittin.1     Passive exposure: Never    Smokeless tobacco: Never   Vaping Use    Vaping status: Never Used   Substance and Sexual Activity    Alcohol use: No    Drug use: No    Sexual activity: Not Currently       PHYSICAL EXAM   /51 (BP Location: Left arm, Patient Position: Sitting, Cuff Size: Adult)   Pulse 88   Temp 97.5 °F (36.4 °C) (Temporal)   Ht 180.3 cm (71\")   Wt 104 kg (229 lb 9.6 oz)   BMI 32.02 kg/m²   Physical Exam  Constitutional:       General: He is not in acute distress.     Comments: Chronically ill appearing   HENT:      Head: Normocephalic and atraumatic. No contusion.      Right Ear: External ear normal.      Left Ear: External ear normal.   Eyes:      General: Lids are normal. No scleral icterus.        Right eye: No discharge.         Left eye: No discharge.      Extraocular Movements: Extraocular movements intact.   Neck:      Trachea: Trachea normal.      Comments: No visible mass  No visible adenopathy  Cardiovascular:      Rate and Rhythm: Normal rate.   Pulmonary:      Effort: No respiratory distress.      Comments: Symmetrical expansion    Abdominal:      Palpations: Abdomen is soft.      Hernia: A hernia is present.   Musculoskeletal:      Right lower leg: Edema present.      Left lower leg: Edema present.      Comments: Sitting in wheelchair during exam   Skin:     General: Skin is warm and dry.      Coloration: Skin is not jaundiced.      Comments: Flat, round approximately 0.5 cm area noted to posterior neck at hairline; round, erythematous possible pustule " appearing lesion noted to mid low back and approximately 4 or 5 other areas that appeared erythematous with possible pustules noted to left low back with bandages in place   Neurological:      Mental Status: He is alert.   Psychiatric:         Mood and Affect: Mood normal.         Behavior: Behavior normal.         Thought Content: Thought content normal.         Results Review:  7/31/2024 US revealed Morphologic changes of cirrhosis with redemonstrated small to moderate volume ascites. No suspicious focal hepatic parenchymal lesion.     1/30/2025 ultrasound abdomen revealed finding consistent with cirrhosis, splenomegaly, mild ascites    3/20/2025 CT abdomen and pelvis without contrast due to hematuria revealed findings suggestive of cirrhosis, evaluation for focal liver lesion limited without IV contrast, large volume ascites, large periumbilical hernia that contains multiple nonobstructed loops of bowel as well as large amount of ascitic fluid, lobulated left inguinal hernia defect contains ascitic fluid and fat, small right inguinal hernia contains only fat, probable small hemorrhagic cyst along periphery of left kidney, diverticulosis.  It was recommended by primary care provider that patient hold Eliquis until he sees urology.   paracentesis due to recurrent ascites:  10/28/2024 4 liters of ascites removed   11/27/2024 7 liters of ascites removed   12/23/2024 5.2 liters removed   1/127/2025 7.5 liters removed   2/25/2025 8.2 liters removed via paracentesis with albumin infused  3/21/2025 8 liters of ascites removed via paracentesis with albumin infused    4/25/2025 6.5 liters of ascites removed with albumin infused, legs swelling  5/23/2025 4.5 liters removed at time of paracentesis. Fluid analysis inconsistent with SBP  CMP          12/3/2024    12:20 3/20/2025    14:29 5/6/2025    15:01   CMP   Glucose 118  115  131    BUN 24  34  19    Creatinine 1.45  1.54  1.10    EGFR 45.8  42.3  63.4    Sodium 136  134   135    Potassium 4.6  4.8  4.7    Chloride 98  98  99    Calcium 8.8  9.2  8.7    Total Protein 6.2  6.3  5.8    Albumin 3.4  3.4  3.2    Globulin 2.8  2.9  2.6    Total Bilirubin 0.6  0.4  0.4    Alkaline Phosphatase 112  113  101    AST (SGOT) 26  21  17    ALT (SGPT) 12  11  7    Albumin/Globulin Ratio 1.2  1.2  1.2    BUN/Creatinine Ratio 16.6  22.1  17.3    Anion Gap 13.3  10.3  10.5      CBC          3/20/2025    14:29 4/25/2025    11:19 5/6/2025    15:01   CBC   WBC 10.53  4.61  5.23    RBC 3.38  3.00  3.18    Hemoglobin 10.3  9.0  9.5    Hematocrit 33.1  29.5  30.5    MCV 97.9  98.3  95.9    MCH 30.5  30.0  29.9    MCHC 31.1  30.5  31.1    RDW 14.9  17.3  15.2    Platelets 178  142  155      TSH          9/25/2024    12:55 12/3/2024    12:20   TSH   TSH 5.250  5.360      INR          6/25/2024    10:34 9/25/2024    12:55 4/25/2025    11:19   Common Labs   INR 1.15  1.40  1.21        ASSESSMENT / PLAN    Assessment & Plan      1. Cirrhosis of liver with ascites, unspecified hepatic cirrhosis type  - continue follow up with cardiology as he has cardiac dysfunction contributing to recurrent ascites and likely etiology of cirrhosis  - 9/27/2024 MELD 3.0 14/MELD NA 15/MELD 14.  I do not plan to refer him for transplant evaluation regardless of MELD score in the future due to age and coexisting conditions  US q 6 months for HCC screening  next due 7/2025, ordered today  - continue with paracentesis approximately once monthly due to recurrent cardiac Ascites (spironolactone 50 mg daily currently on medication list as well as furosemide 40 mg daily)  No Mass  No overt HE   - history of taking beta blocker but it does not seem as though he is taking beta blocker at this time.  Do not believe benefit of EGD at this time outweighs risk.    Recommend daily protein intake of 110 to 120 grams  Limit salt/sodium intake to no more than 2 grams or 2,000 mg per day.   Avoid NSAIDs such as ibuprofen, Advil, Motrin, diclofenac,  meloxicam, naproxen. Limit use of acetaminophen to no more than 2,000 mg per day (patient may take acetaminophen 500 mg q 6 hours as needed for pain).  Avoid alcohol, tobacco and raw shellfish  - AFP Tumor Marker; Future  - CBC (No Diff); Future  - Protime-INR; Future  - Comprehensive Metabolic Panel; Future  - US Abdomen Complete; Future    2. Umbilical hernia without obstruction and without gangrene  - risks of surgical repair seem to outweigh benefits   CT scan 3/2025 also revealed left inguinal hernia, right inguinal hernia  3. Vitamin D deficiency  - previously recommended vitamin D supplement but patient has not started supplement, prescription sent to pharmacy or purchase OTC  - Cholecalciferol (Vitamin D3) 25 MCG (1000 UT) capsule; Take 2 capsules by mouth Daily.  Dispense: 180 capsule; Refill: 3    4. Skin irritation  - his wife reported neosporin seemed helpful to erythematous areas on back, she may continue to apply neosporin, consider exposing areas to air when able, if worse, notify PCP on Monday, proceed with dermatology follow up next month as scheduled      Return in about 6 months (around 12/13/2025).     Patient or patient representative verbalized consent for the use of Ambient Listening during the visit with  MAKAYLA Taveras for chart documentation. 6/13/2025  1154  EDT    MAKAYLA Taveras  06/13/2025

## 2025-06-18 ENCOUNTER — LAB (OUTPATIENT)
Dept: LAB | Facility: HOSPITAL | Age: OVER 89
End: 2025-06-18
Payer: MEDICARE

## 2025-06-18 DIAGNOSIS — R18.8 CIRRHOSIS OF LIVER WITH ASCITES, UNSPECIFIED HEPATIC CIRRHOSIS TYPE: ICD-10-CM

## 2025-06-18 DIAGNOSIS — K74.60 CIRRHOSIS OF LIVER WITH ASCITES, UNSPECIFIED HEPATIC CIRRHOSIS TYPE: ICD-10-CM

## 2025-06-18 LAB
ALBUMIN SERPL-MCNC: 3.3 G/DL (ref 3.5–5.2)
ALBUMIN/GLOB SERPL: 1.3 G/DL
ALP SERPL-CCNC: 103 U/L (ref 39–117)
ALPHA-FETOPROTEIN: <2 NG/ML (ref 0–8.3)
ALT SERPL W P-5'-P-CCNC: 8 U/L (ref 1–41)
ANION GAP SERPL CALCULATED.3IONS-SCNC: 10.4 MMOL/L (ref 5–15)
AST SERPL-CCNC: 18 U/L (ref 1–40)
BILIRUB SERPL-MCNC: 0.5 MG/DL (ref 0–1.2)
BUN SERPL-MCNC: 28 MG/DL (ref 8–23)
BUN/CREAT SERPL: 17.3 (ref 7–25)
CALCIUM SPEC-SCNC: 8.7 MG/DL (ref 8.2–9.6)
CHLORIDE SERPL-SCNC: 98 MMOL/L (ref 98–107)
CO2 SERPL-SCNC: 24.6 MMOL/L (ref 22–29)
CREAT SERPL-MCNC: 1.62 MG/DL (ref 0.76–1.27)
DEPRECATED RDW RBC AUTO: 54.3 FL (ref 37–54)
EGFRCR SERPLBLD CKD-EPI 2021: 39.8 ML/MIN/1.73
ERYTHROCYTE [DISTWIDTH] IN BLOOD BY AUTOMATED COUNT: 15.6 % (ref 12.3–15.4)
GLOBULIN UR ELPH-MCNC: 2.6 GM/DL
GLUCOSE SERPL-MCNC: 116 MG/DL (ref 65–99)
HCT VFR BLD AUTO: 29.9 % (ref 37.5–51)
HGB BLD-MCNC: 9.4 G/DL (ref 13–17.7)
INR PPP: 1.46 (ref 0.89–1.12)
MCH RBC QN AUTO: 30.1 PG (ref 26.6–33)
MCHC RBC AUTO-ENTMCNC: 31.4 G/DL (ref 31.5–35.7)
MCV RBC AUTO: 95.8 FL (ref 79–97)
PLATELET # BLD AUTO: 152 10*3/MM3 (ref 140–450)
PMV BLD AUTO: 10.5 FL (ref 6–12)
POTASSIUM SERPL-SCNC: 4.8 MMOL/L (ref 3.5–5.2)
PROT SERPL-MCNC: 5.9 G/DL (ref 6–8.5)
PROTHROMBIN TIME: 18.7 SECONDS (ref 12.2–15.3)
RBC # BLD AUTO: 3.12 10*6/MM3 (ref 4.14–5.8)
SODIUM SERPL-SCNC: 133 MMOL/L (ref 136–145)
WBC NRBC COR # BLD AUTO: 5.47 10*3/MM3 (ref 3.4–10.8)

## 2025-06-18 PROCEDURE — 80053 COMPREHEN METABOLIC PANEL: CPT

## 2025-06-18 PROCEDURE — 85027 COMPLETE CBC AUTOMATED: CPT

## 2025-06-18 PROCEDURE — 82105 ALPHA-FETOPROTEIN SERUM: CPT

## 2025-06-18 PROCEDURE — 85610 PROTHROMBIN TIME: CPT

## 2025-06-20 ENCOUNTER — TELEPHONE (OUTPATIENT)
Dept: CARDIOLOGY | Facility: CLINIC | Age: OVER 89
End: 2025-06-20
Payer: MEDICARE

## 2025-06-20 ENCOUNTER — HOSPITAL ENCOUNTER (OUTPATIENT)
Dept: ULTRASOUND IMAGING | Facility: HOSPITAL | Age: OVER 89
Discharge: HOME | End: 2025-06-20
Payer: MEDICARE

## 2025-06-20 VITALS
RESPIRATION RATE: 28 BRPM | WEIGHT: 232.6 LBS | OXYGEN SATURATION: 95 % | HEART RATE: 93 BPM | BODY MASS INDEX: 32.44 KG/M2 | TEMPERATURE: 97.6 F | SYSTOLIC BLOOD PRESSURE: 100 MMHG | DIASTOLIC BLOOD PRESSURE: 49 MMHG

## 2025-06-20 PROCEDURE — 25010000002 ALBUMIN HUMAN 25% PER 50 ML: Performed by: NURSE PRACTITIONER

## 2025-06-20 PROCEDURE — C1729 CATH, DRAINAGE: HCPCS

## 2025-06-20 PROCEDURE — 76942 ECHO GUIDE FOR BIOPSY: CPT

## 2025-06-20 PROCEDURE — 25010000002 LIDOCAINE PF 1% 1 % SOLUTION: Performed by: NURSE PRACTITIONER

## 2025-06-20 PROCEDURE — P9047 ALBUMIN (HUMAN), 25%, 50ML: HCPCS | Performed by: NURSE PRACTITIONER

## 2025-06-20 RX ORDER — ALBUMIN (HUMAN) 12.5 G/50ML
62.5 SOLUTION INTRAVENOUS ONCE
Status: COMPLETED | OUTPATIENT
Start: 2025-06-20 | End: 2025-06-20

## 2025-06-20 RX ORDER — LIDOCAINE HYDROCHLORIDE 10 MG/ML
5 INJECTION, SOLUTION EPIDURAL; INFILTRATION; INTRACAUDAL; PERINEURAL ONCE
Status: COMPLETED | OUTPATIENT
Start: 2025-06-20 | End: 2025-06-20

## 2025-06-20 RX ORDER — HYDROCODONE BITARTRATE AND ACETAMINOPHEN 5; 325 MG/1; MG/1
1 TABLET ORAL EVERY 6 HOURS PRN
COMMUNITY

## 2025-06-20 RX ADMIN — LIDOCAINE HYDROCHLORIDE 5 ML: 10 INJECTION, SOLUTION EPIDURAL; INFILTRATION; INTRACAUDAL; PERINEURAL at 11:55

## 2025-06-20 RX ADMIN — ALBUMIN (HUMAN) 62.5 G: 0.25 INJECTION, SOLUTION INTRAVENOUS at 12:25

## 2025-06-20 NOTE — PRE-PROCEDURE NOTE
[facility]   Interventional Radiology H&P    Patient Name: Jordi Sam  : 1934  MRN: [id number]  Primary Care Physician:  Jasper Avery MD  Referring Physician: MAKAYLA Taveras  Date of admission: 2025    Subjective   Subjective     HPI:  Jordi Sam is a 89 y.o. male with a history of recurrent ascites.  Patient presents to interventional radiology for image guided paracentesis.    Review of Systems:   Constitutional no fever,  no weight loss       Otolaryngeal no difficulty swallowing   Cardiovascular no chest pain   Pulmonary no cough, no sputum production   Gastrointestinal no constipation, no diarrhea                         Personal History       Past Medical/Surgical History:   Past Medical History:   Diagnosis Date    A-fib     Acute inferior myocardial infarction     Acute inferior STEMI with RV infarct features, 2009.     CAD (coronary artery disease)     Cellulitis 2023    Facial. - admission at [facility]. 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 Seeing Dr Kiersten Nova and Dr Jasper Avery      Past Surgical History:   Procedure Laterality Date    BRONCHOSCOPY N/A 2019    Procedure: BRONCHOSCOPY WITH THORACENTESIS;  Surgeon: Marciano Higginbotham MD;  Location: [facility] ENDOSCOPY;  Service: Pulmonary    CARDIAC CATHETERIZATION Bilateral 2019    Procedure: Right and Left Heart Cath;  Surgeon: Efrem Posadas MD;   "Location: [facility] CATH INVASIVE LOCATION;  Service: Cardiology    CARDIAC CATHETERIZATION      CHOLECYSTECTOMY      CORONARY ANGIOPLASTY WITH STENT PLACEMENT  01/09/2009    Sirolimus eluting stents to the RCA, 01/09/2009.     HERNIA REPAIR      Hernia repair x 2.     PARACENTESIS  06/17/2019    multiple    UMBILICAL HERNIA REPAIR N/A 7/26/2020    Procedure: UMBILICAL HERNIA REPAIR;  Surgeon: Maribell Her MD;  Location: [facility] OR;  Service: General;  Laterality: N/A;       Social History:  reports that he quit smoking about 42 years ago. His smoking use included cigarettes. He started smoking about 70 years ago. He has a 70 pack-year smoking history. He has never been exposed to tobacco smoke. He has never used smokeless tobacco. He reports that he does not drink alcohol and does not use drugs.    Medications:  (Not in a hospital admission)    Current medications:    Current IV drips:  No current facility-administered medications for this encounter.      Allergies:  Allergies   Allergen Reactions    Bee Venom Swelling    Sulfamethoxazole-Trimethoprim Other (See Comments)     Pt unaware       Objective    Objective     Vitals:   Temp:  [97.6 °F (36.4 °C)] 97.6 °F (36.4 °C)  Heart Rate:  [93] 93  Resp:  [18] 18  BP: (104)/(68) 104/68      Physical Exam:   Constitutional: Awake, alert, No acute distress    Respiratory: nonlabored respirations    Gastrointestinal: distended        ASA SCALE ASSESSMENT:       MALLAMPATI CLASSIFICATION:          Result Review        Result Review:     Sodium   Date Value Ref Range Status   06/18/2025 133 (L) 136 - 145 mmol/L Final       Potassium   Date Value Ref Range Status   06/18/2025 4.8 3.5 - 5.2 mmol/L Final       Chloride   Date Value Ref Range Status   06/18/2025 98 98 - 107 mmol/L Final       No results found for: \"PLASMABICARB\"    BUN   Date Value Ref Range Status   06/18/2025 28.0 (H) 8.0 - 23.0 mg/dL Final       Creatinine   Date Value Ref Range Status   06/18/2025 " "1.62 (H) 0.76 - 1.27 mg/dL Final       Calcium   Date Value Ref Range Status   06/18/2025 8.7 8.2 - 9.6 mg/dL Final           No components found for: \"GLUCOSE.*\"  Results from last 7 days   Lab Units 06/18/25  1611   WBC 10*3/mm3 5.47   HEMOGLOBIN g/dL 9.4*   HEMATOCRIT % 29.9*   PLATELETS 10*3/mm3 152      Results from last 7 days   Lab Units 06/18/25  1611   INR  1.46*           Assessment / Plan     Assesment:  89-year-old male with a history of recurrent ascites.  Patient presents to interventional radiology for image guided paracentesis.      Plan:   Image guided paracentesis with local anesthesia only.    The risks and benefits of the procedure were discussed with the patient.    Electronically signed by Miguel Kong MD, 06/20/25, 11:45 AM EDT.    "

## 2025-06-20 NOTE — OP NOTE
IR POST OP NOTE    Physician:Miguel Kong MD      Procedure: Image guided paracentesis      Pre Op DX: Ascites      Post Op DX: Same      Anesthesia: Local only      Findings: Large ascites      Complications: No immediate      Provider Signature: Miguel Kong MD    06/20/25  11:45 EDT

## 2025-06-23 ENCOUNTER — TELEPHONE (OUTPATIENT)
Dept: INFUSION THERAPY | Facility: HOSPITAL | Age: OVER 89
End: 2025-06-23
Payer: MEDICARE

## 2025-06-26 ENCOUNTER — OFFICE VISIT (OUTPATIENT)
Dept: FAMILY MEDICINE CLINIC | Facility: CLINIC | Age: OVER 89
End: 2025-06-26
Payer: MEDICARE

## 2025-06-26 ENCOUNTER — LAB (OUTPATIENT)
Dept: LAB | Facility: HOSPITAL | Age: OVER 89
End: 2025-06-26
Payer: MEDICARE

## 2025-06-26 ENCOUNTER — RESULTS FOLLOW-UP (OUTPATIENT)
Dept: FAMILY MEDICINE CLINIC | Facility: CLINIC | Age: OVER 89
End: 2025-06-26

## 2025-06-26 VITALS
HEIGHT: 71 IN | WEIGHT: 220 LBS | OXYGEN SATURATION: 97 % | HEART RATE: 86 BPM | BODY MASS INDEX: 30.8 KG/M2 | SYSTOLIC BLOOD PRESSURE: 94 MMHG | DIASTOLIC BLOOD PRESSURE: 52 MMHG

## 2025-06-26 DIAGNOSIS — R31.0 GROSS HEMATURIA: ICD-10-CM

## 2025-06-26 DIAGNOSIS — I48.20 CHRONIC ATRIAL FIBRILLATION: ICD-10-CM

## 2025-06-26 DIAGNOSIS — R31.0 GROSS HEMATURIA: Primary | ICD-10-CM

## 2025-06-26 LAB
DEPRECATED RDW RBC AUTO: 52.7 FL (ref 37–54)
ERYTHROCYTE [DISTWIDTH] IN BLOOD BY AUTOMATED COUNT: 15.1 % (ref 12.3–15.4)
HCT VFR BLD AUTO: 30.2 % (ref 37.5–51)
HGB BLD-MCNC: 9.8 G/DL (ref 13–17.7)
MCH RBC QN AUTO: 30.8 PG (ref 26.6–33)
MCHC RBC AUTO-ENTMCNC: 32.5 G/DL (ref 31.5–35.7)
MCV RBC AUTO: 95 FL (ref 79–97)
PLATELET # BLD AUTO: 158 10*3/MM3 (ref 140–450)
PMV BLD AUTO: 10.3 FL (ref 6–12)
RBC # BLD AUTO: 3.18 10*6/MM3 (ref 4.14–5.8)
WBC NRBC COR # BLD AUTO: 8.83 10*3/MM3 (ref 3.4–10.8)

## 2025-06-26 PROCEDURE — 81001 URINALYSIS AUTO W/SCOPE: CPT | Performed by: INTERNAL MEDICINE

## 2025-06-26 PROCEDURE — 85027 COMPLETE CBC AUTOMATED: CPT

## 2025-06-26 PROCEDURE — 87086 URINE CULTURE/COLONY COUNT: CPT | Performed by: INTERNAL MEDICINE

## 2025-06-26 PROCEDURE — 87186 SC STD MICRODIL/AGAR DIL: CPT | Performed by: INTERNAL MEDICINE

## 2025-06-26 PROCEDURE — 87077 CULTURE AEROBIC IDENTIFY: CPT | Performed by: INTERNAL MEDICINE

## 2025-06-26 RX ORDER — CEFDINIR 300 MG/1
300 CAPSULE ORAL 2 TIMES DAILY
Qty: 28 CAPSULE | Refills: 0 | Status: SHIPPED | OUTPATIENT
Start: 2025-06-26

## 2025-06-26 NOTE — ASSESSMENT & PLAN NOTE
"Eliquis secondary to ongoing bleeding.  He has urologic evaluation on 7/8/2028 hopefully it will be just conscious sedation.  Will require a Devries postoperatively.  He will have some degree of \"auto anticoagulation\" from his underlying liver disease.  However the risk of continuing Eliquis at this point was down 2 episodes of gross hematuria history of high.  Instructed to stop his Eliquis until further notice.  "

## 2025-06-26 NOTE — PROGRESS NOTES
Jordi Sam  1934  [id number]  Patient Care Team:  Jasper Avery MD as PCP - General (Internal Medicine)  Jordi Law MD as Consulting Physician (Urology)  Chapin Villagran MD as Consulting Physician (Otolaryngology)  Gen Laurent MD as Consulting Physician (Gastroenterology)  Ezekiel Abarca MD as Referring Physician (Dermatology)  Renny Faustin MD as Consulting Physician (Radiation Oncology)  Larry Sin MD as Consulting Physician (Cardiology)  Lv Van MD as Consulting Physician (Ophthalmology)    Jordi Sam is a 89 y.o. male here today for follow up.     This patient is accompanied by their self who contributes to the history of their care.    Chief Complaint:    Chief Complaint   Patient presents with    Blood in Urine        History of Present Illness:  I have reviewed and/or updated the patient's past medical, past surgical, family, social history, problem list and allergies as appropriate.     Current visit  Jordi taylor has brought him today with worsening weakness, somnolence and lethargy for past several days.. He has been sleeping more- will sleep at least 1/2 day. His appetite has been okay. He has noted blood in is urine yesterday. His wife note blood on commode 2 days ago.  Last night gross hematuria with continuing today. His with indcates a lot of blood. There has been clots,, increased difficulty with urination. There is dysuria since yesterday. He has not felt feverish.      Was seen last 2 months month for gross HU.  He was evaluated initially by [facility] urology  ;  [facility]  He has a history of BPH and takes terazosin 2 mg daily.  He has a history of A-fib and takes Eliquis.  He has CKD with creatinine baseline 1.5 and GFR 42.  He has paracentesis once monthly for abdominal fluid.  Last month  he noticed bright red blood with clots in his urine. He was started on antibiotics for possible UTI by Dr. Avery. A urine culture  "showed no bacterial growth. A CT of the abdomen and pelvis without contrast showed no hydronephrosis or stone. There is a probable small hemorrhagic cyst along the periphery of the left kidney, liver cirrhosis and a large periumbilical hernia and colonic diverticulosis. In comparison, a CT of the abdomen and pelvis w/wo contrast from 7/2022 at [facility] showed two small left renal cysts. He continues to have bloody urine. He denies difficulty urinating.   Urology  discussed his CT resultsand differentials for gross hematuria. He will likely need a CT with contrast and cystoscopy. I will ask Dr. García to review imaging and make recommendations. The patient will continue to hold Eliquis per his PCP.  \" End copied text from [facility]     Follow up  visit 5/21/2025    appointment for cystoscopy scheduled on 5/8/2025 with Dr. García, Could not be completed. They are wanting to him  undergo general anesthesia. He is not wanting to do this morbidities..He ha resumed the  eliquis ( held for paracentesis on friday)   without a reocccurence of hematuria.  He denies polyuira, dysuria or back pain. His cough is much improved. Contiues to use tesslon prn.  He was disinclined to under go general anesthesia given his comorbidities.   He was ok continuing to observe:   \"No recurrence of his gross hematuria while being on Eliquis. He is currently [place] paracentesis on Friday. Urology is wanting general anesthesia to put him to sleep for cystoscopy in the OR. Cardiovascularly I think this is too risky. Patient is in agreement.  Will keep him on his Eliquis as he has had been doing. If hematuria recurs he will hold Eliquis and notify this office.  Unless it is an emergency, I recommended against general anesthesia. He understands there could be a delayed diagnosis of or missed diagnosis of bladder tumor prostate cancer etc. however he voices understanding accepts this.        Review of Systems   Constitutional:  Positive for fatigue. " "  HENT: Negative.     Respiratory:  Negative for shortness of breath.    Cardiovascular:  Negative for chest pain, palpitations and leg swelling.   Gastrointestinal:  Positive for vomiting. Negative for diarrhea and nausea.   Endocrine: Negative.    Genitourinary:  Positive for hematuria.   Musculoskeletal: Negative.    Skin: Negative.    Neurological:  Positive for weakness. Negative for numbness.       Vitals:    06/26/25 1317   BP: 94/52   Pulse: 86   SpO2: 97%   Weight: 99.8 kg (220 lb)   Height: 180.3 cm (70.98\")     Body mass index is 30.7 kg/m².    Physical Exam  Vitals reviewed.   Constitutional:       Appearance: He is well-developed. He is obese. He is ill-appearing.   HENT:      Head: Normocephalic and atraumatic.   Eyes:      General:         Right eye: No discharge.         Left eye: No discharge.      Conjunctiva/sclera: Conjunctivae normal.   Cardiovascular:      Rate and Rhythm: Normal rate and regular rhythm.   Pulmonary:      Effort: Pulmonary effort is normal.      Breath sounds: Normal breath sounds.   Chest:      Chest wall: No tenderness.   Abdominal:      General: Bowel sounds are normal.      Palpations: Abdomen is soft.      Hernia: A hernia is present.   Genitourinary:     Comments: No CVA tendernesss   Skin:     General: Skin is warm and dry.   Neurological:      Mental Status: He is alert and oriented to person, place, and time.   Psychiatric:         Mood and Affect: Mood normal.         Behavior: Behavior normal.         Procedures    Results Review:    I reviewed the patient's new clinical results.    Assessment/Plan:    Problem List Items Addressed This Visit       Chronic atrial fibrillation    Current Assessment & Plan   Eliquis secondary to ongoing bleeding.  He has urologic evaluation on 7/8/2028 hopefully it will be just conscious sedation.  Will require a Devries postoperatively.  He will have some degree of \"auto anticoagulation\" from his underlying liver disease.  However the " risk of continuing Eliquis at this point was down 2 episodes of gross hematuria history of high.  Instructed to stop his Eliquis until further notice.         Gross hematuria - Primary    Relevant Medications    cefdinir (OMNICEF) 300 MG capsule    Other Relevant Orders    Urinalysis With Microscopic - Urine, Clean Catch    Urine Culture - Urine, Urine, Clean Catch    CBC (No Diff)   We have requested urinalysis, urine culture.  Empirically treat with cefdinir 300 mg p.o. twice daily.  Hopefully this will help with his fatigue.  He may have an underlying UTI.  He will proceed with urologic evaluation as outlined by Dr. Jansen    Plan of care reviewed with patient at the conclusion of today's visit. Education was provided regarding diagnosis and management.  Patient verbalizes understanding of and agreement with management plan.    Return in about 3 weeks (around 7/17/2025) for follow up uriolg appt.    Jasper Avery MD      Please note than portions of this note were completed wt a Voice Recognition Program

## 2025-06-27 LAB
BACTERIA UR QL AUTO: ABNORMAL /HPF
BILIRUB UR QL STRIP: NEGATIVE
CLARITY UR: ABNORMAL
COLOR UR: ABNORMAL
GLUCOSE UR STRIP-MCNC: NEGATIVE MG/DL
HGB UR QL STRIP.AUTO: ABNORMAL
HYALINE CASTS UR QL AUTO: ABNORMAL /LPF
KETONES UR QL STRIP: NEGATIVE
LEUKOCYTE ESTERASE UR QL STRIP.AUTO: ABNORMAL
NITRITE UR QL STRIP: POSITIVE
PH UR STRIP.AUTO: <=5 [PH] (ref 5–8)
PROT UR QL STRIP: ABNORMAL
RBC # UR STRIP: ABNORMAL /HPF
REF LAB TEST METHOD: ABNORMAL
SP GR UR STRIP: 1.01 (ref 1–1.03)
SQUAMOUS #/AREA URNS HPF: ABNORMAL /HPF
UROBILINOGEN UR QL STRIP: ABNORMAL
WBC # UR STRIP: ABNORMAL /HPF

## 2025-06-28 LAB — BACTERIA SPEC AEROBE CULT: ABNORMAL

## 2025-06-30 NOTE — TELEPHONE ENCOUNTER
PATIENT CALLED REGARDING THE MISS CALL THAT DR. GARNER HAD TRIED TO REACH THEM AT REGARDING RESULTS.. I RELAYED MESSAGE TO PATIENTS WIFE DAVID AND TO PATIENT MORALES!

## 2025-07-01 DIAGNOSIS — R18.8 CIRRHOSIS OF LIVER WITH ASCITES, UNSPECIFIED HEPATIC CIRRHOSIS TYPE: ICD-10-CM

## 2025-07-01 DIAGNOSIS — I50.812 CHRONIC RIGHT HEART FAILURE: ICD-10-CM

## 2025-07-01 DIAGNOSIS — K74.60 CIRRHOSIS OF LIVER WITH ASCITES, UNSPECIFIED HEPATIC CIRRHOSIS TYPE: ICD-10-CM

## 2025-07-01 RX ORDER — FUROSEMIDE 40 MG/1
TABLET ORAL
Qty: 135 TABLET | Refills: 0 | Status: SHIPPED | OUTPATIENT
Start: 2025-07-01

## 2025-07-17 ENCOUNTER — OFFICE VISIT (OUTPATIENT)
Dept: FAMILY MEDICINE CLINIC | Facility: CLINIC | Age: OVER 89
End: 2025-07-17
Payer: MEDICARE

## 2025-07-17 VITALS
SYSTOLIC BLOOD PRESSURE: 98 MMHG | BODY MASS INDEX: 31.86 KG/M2 | HEART RATE: 80 BPM | DIASTOLIC BLOOD PRESSURE: 62 MMHG | OXYGEN SATURATION: 99 % | HEIGHT: 71 IN | WEIGHT: 227.6 LBS

## 2025-07-17 DIAGNOSIS — R31.0 GROSS HEMATURIA: Primary | ICD-10-CM

## 2025-07-17 DIAGNOSIS — R05.1 ACUTE COUGH: ICD-10-CM

## 2025-07-17 DIAGNOSIS — R31.0 GROSS HEMATURIA: ICD-10-CM

## 2025-07-17 LAB

## 2025-07-17 PROCEDURE — 1126F AMNT PAIN NOTED NONE PRSNT: CPT | Performed by: INTERNAL MEDICINE

## 2025-07-17 PROCEDURE — 87186 SC STD MICRODIL/AGAR DIL: CPT | Performed by: INTERNAL MEDICINE

## 2025-07-17 PROCEDURE — 87086 URINE CULTURE/COLONY COUNT: CPT | Performed by: INTERNAL MEDICINE

## 2025-07-17 PROCEDURE — 81001 URINALYSIS AUTO W/SCOPE: CPT | Performed by: INTERNAL MEDICINE

## 2025-07-17 PROCEDURE — 99213 OFFICE O/P EST LOW 20 MIN: CPT | Performed by: INTERNAL MEDICINE

## 2025-07-17 PROCEDURE — 87077 CULTURE AEROBIC IDENTIFY: CPT | Performed by: INTERNAL MEDICINE

## 2025-07-17 RX ORDER — BENZONATATE 100 MG/1
100 CAPSULE ORAL 3 TIMES DAILY PRN
Qty: 60 CAPSULE | Refills: 1 | Status: SHIPPED | OUTPATIENT
Start: 2025-07-17

## 2025-07-17 NOTE — PROGRESS NOTES
"Answers submitted by the patient for this visit:  Chronic Condition Follow-up (Submitted on 7/15/2025)  Chief Complaint: PCP follow-up  other: Yes  Medication compliance: all of the time  Treatment barriers: no complaince problems  Exercise: rarely  Jordi Sam  1934  [id number]  Patient Care Team:  Jasper Avery MD as PCP - General (Internal Medicine)  Jordi Law MD as Consulting Physician (Urology)  Chapin Villagran MD as Consulting Physician (Otolaryngology)  Gen Laurent MD as Consulting Physician (Gastroenterology)  Ezekiel Abarca MD as Referring Physician (Dermatology)  Renny Faustin MD as Consulting Physician (Radiation Oncology)  Larry Sin MD as Consulting Physician (Cardiology)  Lv Van MD as Consulting Physician (Ophthalmology)    Jordi Sam is a 89 y.o. male here today for follow up.     This patient is accompanied by their self who contributes to the history of their care.    Chief Complaint:    Chief Complaint   Patient presents with    Blood in Urine     F/U        History of Present Illness:  I have reviewed and/or updated the patient's past medical, past surgical, family, social history, problem list and allergies as appropriate.     Follow-up gross hematuria.  At last visit this culture was positive for E. coli we placed him on Omnicef.  He was left off of his Eliquis as it was suspected he was have a procedure with urology.   Surgery was cancelled - He has been increasingly frustrated. With the delay. He  remains of of eliquis. ( Increased stroke risk was re-explained- voices understanding- )   he may have  an element of \"autoanti-coagluation\"  form his liver disease. He wishes to proceed with observation off eliquis and try to avoid surgery.    He is urinating ok. No hematuria        LAST VISIT Jordi wife has brought him today with worsening weakness, somnolence and lethargy for past several days.. He has been sleeping more- " "will sleep at least 1/2 day. His appetite has been okay. He has noted blood in is urine yesterday. His wife note blood on commode 2 days ago.  Last night gross hematuria with continuing today. His with indcates a lot of blood. There has been clots,, increased difficulty with urination. There is dysuria since yesterday. He has not felt feverish.      Was seen last 2 months month for gross HU.  He was evaluated initially by [facility] urology  ;  [facility]  He has a history of BPH and takes terazosin 2 mg daily.  He has a history of A-fib and takes Eliquis.  He has CKD with creatinine baseline 1.5 and GFR 42.  He has paracentesis once monthly for abdominal fluid.  Last month  he noticed bright red blood with clots in his urine. He was started on antibiotics for possible UTI by Dr. Avery. A urine culture showed no bacterial growth. A CT of the abdomen and pelvis without contrast showed no hydronephrosis or stone. There is a probable small hemorrhagic cyst along the periphery of the left kidney, liver cirrhosis and a large periumbilical hernia and colonic diverticulosis. In comparison, a CT of the abdomen and pelvis w/wo contrast from 7/2022 at [facility] showed two small left renal cysts. He continues to have bloody urine. He denies difficulty urinating.   Urology  discussed his CT resultsand differentials for gross hematuria. He will likely need a CT with contrast and cystoscopy. I will ask Dr. García to review imaging and make recommendations. The patient will continue to hold Eliquis per his PCP.  \" End copied text from [facility]      Follow up  visit 5/21/2025    appointment for cystoscopy scheduled on 5/8/2025 with Dr. García, Could not be completed. They are wanting to him  undergo general anesthesia. He is not wanting to do this morbidities..He ha resumed the  eliquis ( held for paracentesis on friday)   without a reocccurence of hematuria.  He denies polyuira, dysuria or back pain. His cough is much " "improved. Contiues to use tesslon prn.  He was disinclined to under go general anesthesia given his comorbidities.   He was ok continuing to observe:   \"No recurrence of his gross hematuria while being on Eliquis. He is currently [place] paracentesis on Friday. Urology is wanting general anesthesia to put him to sleep for cystoscopy in the OR. Cardiovascularly I think this is too risky. Patient is in agreement.  Will keep him on his Eliquis as he has had been doing. If hematuria recurs he will hold Eliquis and notify this office.  Unless it is an emergency, I recommended against general anesthesia. He understands there could be a delayed diagnosis of or missed diagnosis of bladder tumor prostate cancer etc. however he voices understanding accepts this.      Review of Systems   Constitutional:  Positive for fatigue.       Vitals:    07/17/25 1147   BP: 98/62   Pulse: 80   SpO2: 99%   Weight: 103 kg (227 lb 9.6 oz)   Height: 180.3 cm (70.98\")     Body mass index is 31.76 kg/m².    Physical Exam  Constitutional:       Appearance: Normal appearance. He is obese.   HENT:      Head: Normocephalic and atraumatic.   Cardiovascular:      Rate and Rhythm: Rhythm irregular.   Pulmonary:      Effort: Pulmonary effort is normal. No respiratory distress.   Abdominal:      General: Abdomen is flat. Bowel sounds are normal.   Psychiatric:         Mood and Affect: Mood normal.         Behavior: Behavior normal.         Procedures    Results Review:    I reviewed the patient's new clinical results.    Assessment/Plan:    Problem List Items Addressed This Visit       Gross hematuria - Primary    Relevant Medications    cefdinir (OMNICEF) 300 MG capsule    Other Relevant Orders    Urine Culture - Urine, Urine, Clean Catch    Urinalysis With Microscopic - Urine, Clean Catch     Other Visit Diagnoses         Acute cough        Relevant Medications    benzonatate (Tessalon Perles) 100 MG capsule    Other Relevant Orders    Urine Culture - " Urine, Urine, Clean Catch    Urinalysis With Microscopic - Urine, Clean Catch            Plan of care reviewed with patient at the conclusion of today's visit. Education was provided regarding diagnosis and management.  Patient verbalizes understanding of and agreement with management plan.    Return for Next scheduled follow up.    Jasper Avery MD      Please note than portions of this note were completed [facility] a Voice Recognition Program

## 2025-07-19 LAB — BACTERIA SPEC AEROBE CULT: ABNORMAL

## 2025-07-20 ENCOUNTER — HOSPITAL ENCOUNTER (EMERGENCY)
Facility: HOSPITAL | Age: OVER 89
Discharge: HOME | End: 2025-07-20
Attending: STUDENT IN AN ORGANIZED HEALTH CARE EDUCATION/TRAINING PROGRAM | Admitting: STUDENT IN AN ORGANIZED HEALTH CARE EDUCATION/TRAINING PROGRAM
Payer: MEDICARE

## 2025-07-20 ENCOUNTER — APPOINTMENT (OUTPATIENT)
Dept: CT IMAGING | Facility: HOSPITAL | Age: OVER 89
End: 2025-07-20
Payer: MEDICARE

## 2025-07-20 VITALS
SYSTOLIC BLOOD PRESSURE: 120 MMHG | OXYGEN SATURATION: 98 % | WEIGHT: 220 LBS | RESPIRATION RATE: 20 BRPM | BODY MASS INDEX: 30.8 KG/M2 | HEART RATE: 82 BPM | HEIGHT: 71 IN | TEMPERATURE: 97.9 F | DIASTOLIC BLOOD PRESSURE: 65 MMHG

## 2025-07-20 DIAGNOSIS — S01.01XA LACERATION OF SCALP, INITIAL ENCOUNTER: ICD-10-CM

## 2025-07-20 DIAGNOSIS — R18.8 CIRRHOSIS OF LIVER WITH ASCITES, UNSPECIFIED HEPATIC CIRRHOSIS TYPE: ICD-10-CM

## 2025-07-20 DIAGNOSIS — S00.01XA ABRASION OF SCALP, INITIAL ENCOUNTER: ICD-10-CM

## 2025-07-20 DIAGNOSIS — N18.32 STAGE 3B CHRONIC KIDNEY DISEASE: ICD-10-CM

## 2025-07-20 DIAGNOSIS — K74.60 CIRRHOSIS OF LIVER WITH ASCITES, UNSPECIFIED HEPATIC CIRRHOSIS TYPE: ICD-10-CM
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
## 2025-07-20 DIAGNOSIS — S22.010A COMPRESSION FRACTURE OF T1 VERTEBRA, INITIAL ENCOUNTER: ICD-10-CM

## 2025-07-20 PROCEDURE — 70450 CT HEAD/BRAIN W/O DYE: CPT

## 2025-07-20 PROCEDURE — 71250 CT THORAX DX C-: CPT

## 2025-07-20 PROCEDURE — 99284 EMERGENCY DEPT VISIT MOD MDM: CPT

## 2025-07-20 RX ORDER — IBUPROFEN 600 MG/1
600 TABLET, FILM COATED ORAL EVERY 6 HOURS PRN
Qty: 6 TABLET | Refills: 0 | Status: SHIPPED | OUTPATIENT
Start: 2025-07-20

## 2025-07-20 RX ORDER — METHOCARBAMOL 750 MG/1
750 TABLET, FILM COATED ORAL 3 TIMES DAILY
Qty: 9 TABLET | Refills: 0 | Status: SHIPPED | OUTPATIENT
Start: 2025-07-20 | End: 2025-07-28 | Stop reason: HOSPADM

## 2025-07-20 RX ORDER — ACETAMINOPHEN 500 MG
1000 TABLET ORAL ONCE
Status: COMPLETED | OUTPATIENT
Start: 2025-07-20 | End: 2025-07-20

## 2025-07-20 RX ORDER — METHOCARBAMOL 750 MG/1
750 TABLET, FILM COATED ORAL ONCE
Status: COMPLETED | OUTPATIENT
Start: 2025-07-20 | End: 2025-07-20

## 2025-07-20 RX ORDER — IBUPROFEN 600 MG/1
600 TABLET, FILM COATED ORAL ONCE
Status: COMPLETED | OUTPATIENT
Start: 2025-07-20 | End: 2025-07-20

## 2025-07-20 RX ORDER — LIDOCAINE 50 MG/G
1 PATCH TOPICAL EVERY 24 HOURS
Qty: 6 PATCH | Refills: 0 | Status: SHIPPED | OUTPATIENT
Start: 2025-07-20

## 2025-07-20 RX ORDER — LIDOCAINE 4 G/G
1 PATCH TOPICAL ONCE
Status: DISCONTINUED | OUTPATIENT
Start: 2025-07-20 | End: 2025-07-20 | Stop reason: HOSPADM

## 2025-07-20 RX ADMIN — ACETAMINOPHEN 1000 MG: 500 TABLET, FILM COATED ORAL at 11:47

## 2025-07-20 RX ADMIN — METHOCARBAMOL 750 MG: 750 TABLET ORAL at 13:20

## 2025-07-20 RX ADMIN — LIDOCAINE 1 PATCH: 4 PATCH TOPICAL at 13:20

## 2025-07-20 RX ADMIN — Medication 3 ML: at 11:48

## 2025-07-20 RX ADMIN — IBUPROFEN 600 MG: 600 TABLET, FILM COATED ORAL at 13:20

## 2025-07-20 NOTE — DISCHARGE INSTRUCTIONS
Keep the scalp wound clean and covered the sutures will come out on their own.  Showering with warm soapy water is fine.  Follow-up with your PCP.  Use the provided pain medication help with symptoms.  While today's workup was reassuring if symptoms change or worsen please return to the ED or seek other medical care.

## 2025-07-20 NOTE — ED PROVIDER NOTES
EMERGENCY DEPARTMENT ENCOUNTER    Pt Name: Jordi Sam  MRN: [id number]  Pt :   1934  Room Number:    Date of encounter:  2025  PCP: Jasper Avery MD  ED Provider: Miguel Mixon MD    Historian: Patient, spouse      HPI:  Chief Complaint: Fall, scalp injury        Context: Jordi Sam is a 89-year-old man history of A-fib on Eliquis, chronic kidney disease, cirrhosis getting serial paracentesis, who presents for evaluation of occipital scalp pain after a fall from standing.  He said he tripped and fell in the bathroom he thinks he hit his head on the toilet.  He does not believe that he lost consciousness.  He has been able to ambulate since the fall and denies any pain in his legs or hips.  He noticed some bruising over his left forearm but denies any significant pain in his arms he does have chest wall tenderness as well as pain from an obvious abrasion over his occipital scalp.  He was able to control the bleeding with pressure.  He denies pain elsewhere.  No other complaints at this time.      PAST MEDICAL HISTORY  Past Medical History:   Diagnosis Date    A-fib     Acute inferior myocardial infarction     Acute inferior STEMI with RV infarct features, 2009.     Arrhythmia     CAD (coronary artery disease)     Cellulitis 2023    Facial. - admission at [facility]. 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  Seeing Dr Kiersten Nova and Dr Jasper Avery          PAST SURGICAL HISTORY  Past Surgical History:   Procedure Laterality Date    BRONCHOSCOPY N/A 2019    Procedure: BRONCHOSCOPY WITH THORACENTESIS;  Surgeon: Marciano Higginbotham MD;  Location:  [facility];  Service: Pulmonary    CARDIAC CATHETERIZATION Bilateral 2019    Procedure: Right and Left Heart Cath;  Surgeon: Efrem Posadas MD;  Location: [facility] CATH INVASIVE LOCATION;  Service: Cardiology    CARDIAC CATHETERIZATION      CHOLECYSTECTOMY      CORONARY ANGIOPLASTY WITH STENT PLACEMENT  2009    Sirolimus eluting stents to the RCA, 2009.     HERNIA REPAIR      Hernia repair x 2.     PARACENTESIS  2019    multiple    UMBILICAL HERNIA REPAIR N/A 2020    Procedure: UMBILICAL HERNIA REPAIR;  Surgeon: Maribell Her MD;  Location:  [facility];  Service: General;  Laterality: N/A;         FAMILY HISTORY  Family History   Problem Relation Age of Onset    Dementia Mother     Stroke Mother     Heart disease Father     Heart attack Father     Aneurysm Father     Cancer Sister     No Known Problems Sister     No Known Problems Brother     Heart disease Brother         CABG    Stroke Brother     Heart disease Brother     Hypertension Daughter     Hypertension Son     Colon cancer Neg Hx     Colon polyps Neg Hx          SOCIAL HISTORY  Social History     Socioeconomic History    Marital status:      Spouse name: Melly    Number of children: 4   Tobacco Use    Smoking status: Former     Current packs/day: 0.00     Average packs/day: 2.5 packs/day for 28.0 years (70.0 ttl pk-yrs)     Types: Cigarettes     Start date: 1955     Quit date: 1983     Years since quittin.2     Passive exposure: Never    Smokeless tobacco: Never   Vaping Use    Vaping status: Never Used   Substance and Sexual Activity    Alcohol use: No    Drug use: No    Sexual activity: Not Currently         ALLERGIES  Bee venom and  Sulfamethoxazole-trimethoprim        REVIEW OF SYSTEMS  Review of Systems       All systems reviewed and negative except for those discussed in HPI.       PHYSICAL EXAM    I have reviewed the triage vital signs and nursing notes.    ED Triage Vitals [07/20/25 1035]   Temp Heart Rate Resp BP SpO2   97.9 °F (36.6 °C) 102 20 125/52 96 %      Temp src Heart Rate Source Patient Position BP Location FiO2 (%)   Oral Monitor Sitting -- --       Physical Exam  GENERAL:   Appears chronically ill but awake and alert in no acute distress  HENT: Nares patent.  Abrasion over the posterior midline occipital scalp.  EYES: No scleral icterus.  Pupils equal and reactive  CV: Regular rhythm, regular rate.  RESPIRATORY: Normal effort.  No audible wheezes, rales or rhonchi.  ABDOMEN: Soft,, with ventral hernia which is soft and nontender patient reports his chronic  MUSCULOSKELETAL: No deformities.  Symmetric pitting lower extremity edema which patient reports is chronic.  No tenderness along the midline CT or L-spine, abdomen soft, pelvis is stable and nontender, moves all extremities without pain.  NEURO: Alert, moves all extremities, follows commands.  SKIN: Warm, dry, scattered bruising in different stages of healing.      LAB RESULTS  No results found for this or any previous visit (from the past 24 hours).    If labs were ordered, I independently reviewed the results and considered them in treating the patient.        RADIOLOGY  CT Head Without Contrast  Result Date: 7/20/2025  CT HEAD WO CONTRAST Date of Exam: 7/20/2025 11:16 AM EDT Indication: fall with head trauma on thinners. Comparison: CT head 12/26/2024 Technique: Axial CT images were obtained of the head without contrast administration.  Automated exposure control and iterative construction methods were used. Findings: There is no evidence of acute infarction. Previous infarct is noted in the left posterior parietal region with decreased attenuation. There there is no  acute intracranial hemorrhage. There are no extra-axial collections. Ventricles and CSF spaces are symmetric. No mass effect nor hydrocephalus. Periventricular white matter changes are noted compatible with small vessel disease. Some punctate lacunar infarcts in the left basal ganglia and adjacent to the right caudate nucleus are suspected.  Paranasal sinuses and mastoid air cells are adequately aerated.  Osseous structures and orbits appear intact.     Impression: 1. Stable examination with chronic left parietal infarct and age-related changes. Electronically Signed: Shahla Joseph MD  7/20/2025 11:54 AM EDT  Workstation ID: [id number]    CT Chest Without Contrast Diagnostic  Result Date: 7/20/2025  CT CHEST WO CONTRAST DIAGNOSTIC Date of Exam: 7/20/2025 11:16 AM EDT Indication: anterior chest wall and sternal pain/TTP after fall, hx chronic effusions. Comparison: Chest x-ray 3/17/2025 and CT chest/12/19 Technique: Axial CT images were obtained of the chest without contrast administration.  Reconstructed coronal and sagittal images were also obtained. Automated exposure control and iterative construction methods were used. Findings: Hilum and Mediastinum: No pathologically enlarged lymph nodes. The heart is prominent in size with a pericardial effusion measuring up to 12 mm in diameter.  Unremarkable thoracic aorta and pulmonary arteries. Heavy coronary artery calcifications are noted. Coronary artery stent is present. There is substernal extension of the right thyroid gland with underlying thyroid nodule unchanged from prior study. Peripherally calcified left thyroid nodule is noted. Lung Parenchyma and Pleura: Evaluation of lung parenchyma demonstrates emphysema. There is faint subpleural reticulation compatible with chronic lung changes. There is left basilar airspace disease with bronchial wall thickening and consolidation. There is a loculated mild to moderate left pleural effusion. No evidence for  pneumothorax. Upper Abdomen: Evaluation of the upper abdomen demonstrates nodular contour to the liver compatible with cirrhosis. Abdominal ascites is present. The gallbladder is surgically absent. There is bilateral flank edema. There is focal diaphragmatic hernia anteriorly containing abdominal ascites. Soft tissues: There is development of bilateral gynecomastia. Osseous structures: Evaluation of osseous structures demonstrates a fracture involving the medial left clavicle (image 14 of series 3). There is some sclerosis along the fracture margin suggesting subacute injury. There is heterogeneous appearance to the  proximal body of the sternum centrally and left of midline (image 61 of series 901 and image 23 of series 3). Some cortical thinning is noted along the inner margin. Minimally displaced fracture or underlying osseous lesion cannot be excluded. No evidence for abnormal soft tissue density or stranding within the adjacent mediastinum. There is mild superior endplate irregularity involving T1. Acute compression injury not excluded. Remaining thoracic vertebral bodies appear normally aligned. No evidence for acute rib injury.     Impression: 1. Findings compatible with a subacute appearing fracture involving the medial left clavicle. 2. Abnormal heterogeneous appearance involving the proximal body of the sternum. This may be posttraumatic related to healing or previous osseous injury however underlying osseous lesion cannot be excluded 3. Suspected compression deformity along the superior endplate of T1 with minimal loss of vertebral body height. 4. Left basilar airspace disease with loculated mild to moderate left pleural effusion. 5. Cardiomegaly and pericardial effusion. 6. Cirrhosis and abdominal ascites. Electronically Signed: Shahla Joseph MD  7/20/2025 11:50 AM EDT  Workstation ID: [id number]      I ordered and independently reviewed the above noted radiographic studies.      I viewed images of CT  scan of the head which showed no acute fracture or intracranial hemorrhage that I can appreciate per my independent interpretation.  CT scan of the chest showing chronic effusion but otherwise no acute pathology on my interpretation    See radiologist's dictation for official interpretation.        PROCEDURES    Laceration Repair    Date/Time: 7/20/2025 4:36 PM    Performed by: Miguel Mixon MD  Authorized by: Miguel Mixon MD    Consent:     Consent obtained:  Verbal    Consent given by:  Patient and spouse    Risks, benefits, and alternatives were discussed: yes      Alternatives discussed:  No treatment  Universal protocol:     Patient identity confirmed:  Verbally with patient  Anesthesia:     Anesthesia method:  Topical application and local infiltration    Topical anesthetic:  LET    Local anesthetic:  Lidocaine 1% WITH epi  Laceration details:     Location:  Scalp    Scalp location:  Occipital    Length (cm):  4    Depth (mm):  2  Exploration:     Hemostasis achieved with:  Direct pressure and LET    Imaging obtained comment:  Head CT    Imaging outcome: foreign body not noted      Wound exploration: wound explored through full range of motion    Treatment:     Area cleansed with:  Saline    Amount of cleaning:  Extensive    Irrigation solution:  Sterile saline    Irrigation volume:  180cc    Irrigation method:  Pressure wash and syringe    Debridement:  Minimal  Skin repair:     Repair method:  Sutures    Suture size:  3-0    Suture material:  Plain gut    Suture technique:  Horizontal mattress and simple interrupted    Number of sutures:  4  Approximation:     Approximation:  Close  Repair type:     Repair type:  Complex  Post-procedure details:     Procedure completion:  Tolerated well, no immediate complications      No orders to display       MEDICATIONS GIVEN IN ER    Medications   acetaminophen (TYLENOL) tablet 1,000 mg (1,000 mg Oral Given 7/20/25 1147)   Lido-EPINEPHrine-Tetracaine  4-0.18-0.5 % gel 5 mL (3 mL Topical Given 7/20/25 1148)   methocarbamol (ROBAXIN) tablet 750 mg (750 mg Oral Given 7/20/25 1320)   ibuprofen (ADVIL,MOTRIN) tablet 600 mg (600 mg Oral Given 7/20/25 1320)         MEDICAL DECISION MAKING, PROGRESS, and CONSULTS    All labs, if obtained, have been independently reviewed by me.  All radiology studies, if obtained, have been reviewed by me and the radiologist dictating the report.  All EKGs, if obtained, have been independently viewed and interpreted by me/my attending physician.      Discussion below represents my analysis of pertinent findings related to patient's condition, differential diagnosis, treatment plan and final disposition.                                          Differential diagnosis:    Scalp hematoma/abrasion/laceration, skull fracture, intracranial hemorrhage, spinal injury, chest wall or abdominal injury, extremity injury, nerve injury, vascular injury      Additional sources:    - Discussed/ obtained information from independent historians: Spouse    - External (non-ED) record review:  Chart review shows gastroenterology notes with history of cirrhosis and PCP notes with history of:    Stage 3b chronic kidney disease    Gross hematuria    Essential hypertension    - Chronic or social conditions impacting care: Atrial fibrillation on antiplatelet and anticoagulation, chronic kidney disease, cirrhosis        Orders placed during this visit:  Orders Placed This Encounter   Procedures    Laceration Repair    CT Head Without Contrast    CT Chest Without Contrast Diagnostic         Additional orders considered but not ordered:      ED Course:    Consultants:      ED Course as of 07/20/25 1638   Sun Jul 20, 2025   1110 Chart review shows gastroenterology notes with history of cirrhosis and PCP notes with history of:    Stage 3b chronic kidney disease    Gross hematuria    Essential hypertension   [CC]   1110 This a very nice 89-year-old man history of A-fib  on Eliquis, chronic kidney disease, cirrhosis getting serial paracentesis, who presents for evaluation of occipital scalp pain after a fall from standing.  He said he tripped and fell in the bathroom he thinks he hit his head on the toilet.  He does not believe that he lost consciousness.  He has been able to ambulate since the fall and denies any pain in his legs or hips.  He noticed some bruising over his left forearm but denies any significant pain in his arms he does have chest wall tenderness as well as pain from an obvious abrasion over his occipital scalp.  He was able to control the bleeding with pressure.  He denies pain elsewhere.  No other complaints at this time. [CC]   1111 Patient arrived awake and alert obvious significant skin abrasion and bruising over the occipital scalp on not sure if there is a laceration I can close I have applied LET topically and will clean the wound after he has some degree of anesthesia and make sure that no staples are indicated with his age and anticoagulation I am obtaining head CT to rule out intracranial hemorrhage or skull fracture likewise he is having some sternal and anterior chest wall tenderness evident on CT scan of the chest he has some bruising over the left forearm and numerous bruises in different stages of healing but active motion with no pain and no deformity otherwise I do not appreciate any other injuries on my exam no tenderness along the spine pelvis is stable and has been able to ambulate he does have chronic lower extremity edema and a large left ventral hernia which he reports is baseline it is soft and nontender.  Treating with acetaminophen.  Will reevaluate pending imaging. [CC]   1633 Head CT fortunately not showing any acute bleeds, fractures or other acute pathology that I can appreciate.  CT scan of the chest showing multiple chronic findings he was read to have a subacute left clavicle fracture he has no tenderness there and I do not think  this is an acute injury he does have a T1 endplate fracture without significant height loss that could be new.  I also read irregularity of the proximal sternum where he also does not have any acute pain or tenderness he did have some tenderness along the distal sternum when he arrived here.  He is now complaining of pain in the left shoulder and scapula unfortunately no acute pathology on CT of this is occipital wound was cleaned and does seem to have a midline 4 cm laceration with significant surrounding abrasion which will make closure difficult I was able to approximate the wound with 2 large horizontal mattress sutures as well as 2 simple interrupted sutures.  He tolerated this well.  Pain controlled with methocarbamol and Lidoderm and ibuprofen.  They are agreeable to discharge with follow-up.  Will follow-up with PCP.  Discharged in stable condition with return precautions. [CC]      ED Course User Index  [CC] Miguel Mixon MD              Shared Decision Making:  After my consideration of clinical presentation and any laboratory/radiology studies obtained, I discussed the findings with the patient/patient representative who is in agreement with the treatment plan and the final disposition.   Risks and benefits of discharge and/or observation/admission were discussed.       AS OF 16:38 EDT VITALS:    BP - 120/65  HR - 82  TEMP - 97.9 °F (36.6 °C) (Oral)  O2 SATS - 98%                  DIAGNOSIS  Final diagnoses:   Fall, initial encounter   Laceration of scalp, initial encounter   Abrasion of scalp, initial encounter   Compression fracture of T1 vertebra, initial encounter   Stage 3b chronic kidney disease   Cirrhosis of liver with ascites, unspecified hepatic cirrhosis type         DISPOSITION  DISCHARGE    Patient discharged in stable condition.    Reviewed implications of results, diagnosis, meds, responsibility to follow up, warning signs and symptoms of possible worsening, potential complications  and reasons to return to ER.    Patient/Family voiced understanding of above instructions.    Discussed plan for discharge, as there is no emergent indication for admission.  Pt/family is agreeable and understands need for follow up and possible repeat testing.  Pt/family is aware that discharge does not mean that nothing is wrong but that it indicates no emergency is currently present that requires admission and they must continue care with follow-up as given below or with a physician of their choice.     FOLLOW-UP  Jasper Avery MD  [address] [place], [place] [id number] [phone]    Call            Medication List        New Prescriptions      ibuprofen 600 MG tablet  Commonly known as: ADVIL,MOTRIN  Take 1 tablet by mouth Every 6 (Six) Hours As Needed for Mild Pain.     lidocaine 5 %  Commonly known as: LIDODERM  Place 1 patch on the skin as directed by provider Daily. Remove & Discard patch within 12 hours or as directed by MD     methocarbamol 750 MG tablet  Commonly known as: ROBAXIN  Take 1 tablet by mouth 3 (Three) Times a Day for 3 days.               Where to Get Your Medications        These medications were sent to [address] [facility] [employer] [facility] [phone] [facility] [phone]   [address] [place] [id number]      Phone: [phone]   ibuprofen 600 MG tablet  lidocaine 5 %  methocarbamol 750 MG tablet             Please note that portions of this document were completed with voice recognition software.        Miguel Mixon MD  07/20/25 2656

## 2025-07-22 ENCOUNTER — HOSPITAL ENCOUNTER (INPATIENT)
Facility: HOSPITAL | Age: OVER 89
LOS: 5 days | Discharge: NURSING, REHAB OR LONG-TERM CARE | DRG: 871 | End: 2025-07-28
Attending: STUDENT IN AN ORGANIZED HEALTH CARE EDUCATION/TRAINING PROGRAM | Admitting: INTERNAL MEDICINE
Payer: MEDICARE

## 2025-07-22 ENCOUNTER — APPOINTMENT (OUTPATIENT)
Dept: CT IMAGING | Facility: HOSPITAL | Age: OVER 89
DRG: 871 | End: 2025-07-22
Payer: MEDICARE

## 2025-07-22 ENCOUNTER — APPOINTMENT (OUTPATIENT)
Dept: GENERAL RADIOLOGY | Facility: HOSPITAL | Age: OVER 89
DRG: 871 | End: 2025-07-22
Payer: MEDICARE

## 2025-07-22 DIAGNOSIS — R65.20 SEPSIS WITH ACUTE RENAL FAILURE WITHOUT SEPTIC SHOCK, DUE TO UNSPECIFIED ORGANISM, UNSPECIFIED ACUTE RENAL FAILURE TYPE: Primary | ICD-10-CM

## 2025-07-22 DIAGNOSIS — N17.9 SEPSIS WITH ACUTE RENAL FAILURE WITHOUT SEPTIC SHOCK, DUE TO UNSPECIFIED ORGANISM, UNSPECIFIED ACUTE RENAL FAILURE TYPE: Primary | ICD-10-CM

## 2025-07-22 DIAGNOSIS — R13.10 DYSPHAGIA, UNSPECIFIED TYPE: ICD-10-CM

## 2025-07-22 DIAGNOSIS — K74.60 CIRRHOSIS OF LIVER WITH ASCITES, UNSPECIFIED HEPATIC CIRRHOSIS TYPE: ICD-10-CM

## 2025-07-22 DIAGNOSIS — A41.9 SEPSIS WITH ACUTE RENAL FAILURE WITHOUT SEPTIC SHOCK, DUE TO UNSPECIFIED ORGANISM, UNSPECIFIED ACUTE RENAL FAILURE TYPE: Primary | ICD-10-CM

## 2025-07-22 DIAGNOSIS — N39.0 ACUTE UTI (URINARY TRACT INFECTION): ICD-10-CM

## 2025-07-22 DIAGNOSIS — J43.1 PANLOBULAR EMPHYSEMA: ICD-10-CM

## 2025-07-22 DIAGNOSIS — R18.8 CIRRHOSIS OF LIVER WITH ASCITES, UNSPECIFIED HEPATIC CIRRHOSIS TYPE: ICD-10-CM

## 2025-07-22 DIAGNOSIS — N18.32 STAGE 3B CHRONIC KIDNEY DISEASE: ICD-10-CM

## 2025-07-22 DIAGNOSIS — L03.115 CELLULITIS OF RIGHT LOWER EXTREMITY: ICD-10-CM

## 2025-07-22 DIAGNOSIS — I50.812 CHRONIC RIGHT HEART FAILURE: ICD-10-CM

## 2025-07-22 LAB
ALBUMIN SERPL-MCNC: 3.4 G/DL (ref 3.5–5.2)
ALBUMIN/GLOB SERPL: 1.3 G/DL
ALP SERPL-CCNC: 114 U/L (ref 39–117)
ALT SERPL W P-5'-P-CCNC: 13 U/L (ref 1–41)
AMMONIA BLD-SCNC: 15 UMOL/L (ref 16–60)
ANION GAP SERPL CALCULATED.3IONS-SCNC: 13.2 MMOL/L (ref 5–15)
ARTERIAL PATENCY WRIST A: ABNORMAL
AST SERPL-CCNC: 39 U/L (ref 1–40)
ATMOSPHERIC PRESS: ABNORMAL MM[HG]
B PARAPERT DNA SPEC QL NAA+PROBE: NOT DETECTED
B PERT DNA SPEC QL NAA+PROBE: NOT DETECTED
BACTERIA UR QL AUTO: ABNORMAL /HPF
BASE EXCESS BLDA CALC-SCNC: -0.9 MMOL/L (ref 0–2)
BASOPHILS # BLD AUTO: 0.02 10*3/MM3 (ref 0–0.2)
BASOPHILS NFR BLD AUTO: 0.3 % (ref 0–1.5)
BDY SITE: ABNORMAL
BILIRUB SERPL-MCNC: 0.8 MG/DL (ref 0–1.2)
BILIRUB UR QL STRIP: NEGATIVE
BODY TEMPERATURE: 37
BUN SERPL-MCNC: 32 MG/DL (ref 8–23)
BUN/CREAT SERPL: 15.8 (ref 7–25)
C PNEUM DNA NPH QL NAA+NON-PROBE: NOT DETECTED
CALCIUM SPEC-SCNC: 8.7 MG/DL (ref 8.2–9.6)
CHLORIDE SERPL-SCNC: 95 MMOL/L (ref 98–107)
CLARITY UR: ABNORMAL
CO2 BLDA-SCNC: 23.2 MMOL/L (ref 22–33)
CO2 SERPL-SCNC: 21.8 MMOL/L (ref 22–29)
COHGB MFR BLD: 1.6 % (ref 0–2)
COLOR UR: ABNORMAL
CREAT SERPL-MCNC: 2.02 MG/DL (ref 0.76–1.27)
CRP SERPL-MCNC: 10.1 MG/DL (ref 0–0.5)
D DIMER PPP FEU-MCNC: 5.36 MCGFEU/ML (ref 0–0.91)
D-LACTATE SERPL-SCNC: 2.4 MMOL/L (ref 0.5–2)
DEPRECATED RDW RBC AUTO: 58.4 FL (ref 37–54)
EGFRCR SERPLBLD CKD-EPI 2021: 30.6 ML/MIN/1.73
EOSINOPHIL # BLD AUTO: 0 10*3/MM3 (ref 0–0.4)
EOSINOPHIL NFR BLD AUTO: 0 % (ref 0.3–6.2)
EPAP: 0
ERYTHROCYTE [DISTWIDTH] IN BLOOD BY AUTOMATED COUNT: 17.2 % (ref 12.3–15.4)
FLUAV SUBTYP SPEC NAA+PROBE: NOT DETECTED
FLUBV RNA NPH QL NAA+NON-PROBE: NOT DETECTED
GEN 5 1HR TROPONIN T REFLEX: 81 NG/L
GLOBULIN UR ELPH-MCNC: 2.7 GM/DL
GLUCOSE SERPL-MCNC: 126 MG/DL (ref 65–99)
GLUCOSE UR STRIP-MCNC: NEGATIVE MG/DL
HADV DNA SPEC NAA+PROBE: NOT DETECTED
HCO3 BLDA-SCNC: 22.3 MMOL/L (ref 20–26)
HCOV 229E RNA SPEC QL NAA+PROBE: NOT DETECTED
HCOV HKU1 RNA SPEC QL NAA+PROBE: NOT DETECTED
HCOV NL63 RNA SPEC QL NAA+PROBE: NOT DETECTED
HCOV OC43 RNA SPEC QL NAA+PROBE: NOT DETECTED
HCT VFR BLD AUTO: 29.7 % (ref 37.5–51)
HCT VFR BLD CALC: 28.9 % (ref 38–51)
HGB BLD-MCNC: 9.7 G/DL (ref 13–17.7)
HGB BLDA-MCNC: 9.4 G/DL (ref 13.5–17.5)
HGB UR QL STRIP.AUTO: ABNORMAL
HMPV RNA NPH QL NAA+NON-PROBE: NOT DETECTED
HOLD SPECIMEN: NORMAL
HPIV1 RNA ISLT QL NAA+PROBE: NOT DETECTED
HPIV2 RNA SPEC QL NAA+PROBE: NOT DETECTED
HPIV3 RNA NPH QL NAA+PROBE: NOT DETECTED
HPIV4 P GENE NPH QL NAA+PROBE: NOT DETECTED
HYALINE CASTS UR QL AUTO: ABNORMAL /LPF
IMM GRANULOCYTES # BLD AUTO: 0.04 10*3/MM3 (ref 0–0.05)
IMM GRANULOCYTES NFR BLD AUTO: 0.6 % (ref 0–0.5)
INHALED O2 CONCENTRATION: 21 %
IPAP: 0
KETONES UR QL STRIP: ABNORMAL
LEUKOCYTE ESTERASE UR QL STRIP.AUTO: ABNORMAL
LIPASE SERPL-CCNC: 28 U/L (ref 13–60)
LYMPHOCYTES # BLD AUTO: 0.21 10*3/MM3 (ref 0.7–3.1)
LYMPHOCYTES NFR BLD AUTO: 3 % (ref 19.6–45.3)
M PNEUMO IGG SER IA-ACNC: NOT DETECTED
MAGNESIUM SERPL-MCNC: 2.3 MG/DL (ref 1.7–2.3)
MCH RBC QN AUTO: 30.1 PG (ref 26.6–33)
MCHC RBC AUTO-ENTMCNC: 32.7 G/DL (ref 31.5–35.7)
MCV RBC AUTO: 92.2 FL (ref 79–97)
METHGB BLD QL: 0.1 % (ref 0–1.5)
MODALITY: ABNORMAL
MONOCYTES # BLD AUTO: 0.48 10*3/MM3 (ref 0.1–0.9)
MONOCYTES NFR BLD AUTO: 6.9 % (ref 5–12)
NEUTROPHILS NFR BLD AUTO: 6.23 10*3/MM3 (ref 1.7–7)
NEUTROPHILS NFR BLD AUTO: 89.2 % (ref 42.7–76)
NITRITE UR QL STRIP: NEGATIVE
NRBC BLD AUTO-RTO: 0 /100 WBC (ref 0–0.2)
NT-PROBNP SERPL-MCNC: ABNORMAL PG/ML (ref 0–1800)
OXYHGB MFR BLDV: 94.3 % (ref 94–99)
PAW @ PEAK INSP FLOW SETTING VENT: 0 CMH2O
PCO2 BLDA: 30.7 MM HG (ref 35–45)
PCO2 TEMP ADJ BLD: 30.7 MM HG (ref 35–48)
PH BLDA: 7.47 PH UNITS (ref 7.35–7.45)
PH UR STRIP.AUTO: <=5 [PH] (ref 5–8)
PH, TEMP CORRECTED: 7.47 PH UNITS
PHOSPHATE SERPL-MCNC: 2.6 MG/DL (ref 2.5–4.5)
PLATELET # BLD AUTO: 148 10*3/MM3 (ref 140–450)
PMV BLD AUTO: 10.4 FL (ref 6–12)
PO2 BLDA: 71.6 MM HG (ref 83–108)
PO2 TEMP ADJ BLD: 71.6 MM HG (ref 83–108)
POTASSIUM SERPL-SCNC: 4.9 MMOL/L (ref 3.5–5.2)
PROCALCITONIN SERPL-MCNC: 0.49 NG/ML (ref 0–0.25)
PROT SERPL-MCNC: 6.1 G/DL (ref 6–8.5)
PROT UR QL STRIP: ABNORMAL
RBC # BLD AUTO: 3.22 10*6/MM3 (ref 4.14–5.8)
RBC # UR STRIP: ABNORMAL /HPF
REF LAB TEST METHOD: ABNORMAL
RHINOVIRUS RNA SPEC NAA+PROBE: NOT DETECTED
RSV RNA NPH QL NAA+NON-PROBE: NOT DETECTED
S PYO AG THROAT QL: NEGATIVE
SARS-COV-2 RNA RESP QL NAA+PROBE: NOT DETECTED
SODIUM SERPL-SCNC: 130 MMOL/L (ref 136–145)
SP GR UR STRIP: 1.02 (ref 1–1.03)
SQUAMOUS #/AREA URNS HPF: ABNORMAL /HPF
TOTAL RATE: 0 BREATHS/MINUTE
TROPONIN T % DELTA: -6
TROPONIN T NUMERIC DELTA: -5 NG/L
TROPONIN T SERPL HS-MCNC: 86 NG/L
TSH SERPL DL<=0.05 MIU/L-ACNC: 3.81 UIU/ML (ref 0.27–4.2)
UROBILINOGEN UR QL STRIP: ABNORMAL
WBC # UR STRIP: ABNORMAL /HPF
WBC NRBC COR # BLD AUTO: 6.98 10*3/MM3 (ref 3.4–10.8)
WHOLE BLOOD HOLD COAG: NORMAL
WHOLE BLOOD HOLD SPECIMEN: NORMAL

## 2025-07-22 PROCEDURE — 82805 BLOOD GASES W/O2 SATURATION: CPT

## 2025-07-22 PROCEDURE — 25510000001 IOPAMIDOL PER 1 ML: Performed by: STUDENT IN AN ORGANIZED HEALTH CARE EDUCATION/TRAINING PROGRAM

## 2025-07-22 PROCEDURE — 83690 ASSAY OF LIPASE: CPT | Performed by: STUDENT IN AN ORGANIZED HEALTH CARE EDUCATION/TRAINING PROGRAM

## 2025-07-22 PROCEDURE — 84100 ASSAY OF PHOSPHORUS: CPT | Performed by: STUDENT IN AN ORGANIZED HEALTH CARE EDUCATION/TRAINING PROGRAM

## 2025-07-22 PROCEDURE — 36600 WITHDRAWAL OF ARTERIAL BLOOD: CPT

## 2025-07-22 PROCEDURE — 87880 STREP A ASSAY W/OPTIC: CPT | Performed by: STUDENT IN AN ORGANIZED HEALTH CARE EDUCATION/TRAINING PROGRAM

## 2025-07-22 PROCEDURE — 25010000002 PIPERACILLIN SOD-TAZOBACTAM PER 1 G: Performed by: STUDENT IN AN ORGANIZED HEALTH CARE EDUCATION/TRAINING PROGRAM

## 2025-07-22 PROCEDURE — 81001 URINALYSIS AUTO W/SCOPE: CPT | Performed by: STUDENT IN AN ORGANIZED HEALTH CARE EDUCATION/TRAINING PROGRAM

## 2025-07-22 PROCEDURE — 74177 CT ABD & PELVIS W/CONTRAST: CPT

## 2025-07-22 PROCEDURE — 87154 CUL TYP ID BLD PTHGN 6+ TRGT: CPT | Performed by: STUDENT IN AN ORGANIZED HEALTH CARE EDUCATION/TRAINING PROGRAM

## 2025-07-22 PROCEDURE — 0202U NFCT DS 22 TRGT SARS-COV-2: CPT | Performed by: STUDENT IN AN ORGANIZED HEALTH CARE EDUCATION/TRAINING PROGRAM

## 2025-07-22 PROCEDURE — 87186 SC STD MICRODIL/AGAR DIL: CPT | Performed by: STUDENT IN AN ORGANIZED HEALTH CARE EDUCATION/TRAINING PROGRAM

## 2025-07-22 PROCEDURE — 83605 ASSAY OF LACTIC ACID: CPT | Performed by: STUDENT IN AN ORGANIZED HEALTH CARE EDUCATION/TRAINING PROGRAM

## 2025-07-22 PROCEDURE — 85025 COMPLETE CBC W/AUTO DIFF WBC: CPT | Performed by: STUDENT IN AN ORGANIZED HEALTH CARE EDUCATION/TRAINING PROGRAM

## 2025-07-22 PROCEDURE — 84443 ASSAY THYROID STIM HORMONE: CPT | Performed by: STUDENT IN AN ORGANIZED HEALTH CARE EDUCATION/TRAINING PROGRAM

## 2025-07-22 PROCEDURE — 71045 X-RAY EXAM CHEST 1 VIEW: CPT

## 2025-07-22 PROCEDURE — 83735 ASSAY OF MAGNESIUM: CPT | Performed by: STUDENT IN AN ORGANIZED HEALTH CARE EDUCATION/TRAINING PROGRAM

## 2025-07-22 PROCEDURE — 93005 ELECTROCARDIOGRAM TRACING: CPT | Performed by: STUDENT IN AN ORGANIZED HEALTH CARE EDUCATION/TRAINING PROGRAM

## 2025-07-22 PROCEDURE — 84145 PROCALCITONIN (PCT): CPT | Performed by: STUDENT IN AN ORGANIZED HEALTH CARE EDUCATION/TRAINING PROGRAM

## 2025-07-22 PROCEDURE — 99291 CRITICAL CARE FIRST HOUR: CPT

## 2025-07-22 PROCEDURE — 82375 ASSAY CARBOXYHB QUANT: CPT

## 2025-07-22 PROCEDURE — 87077 CULTURE AEROBIC IDENTIFY: CPT | Performed by: STUDENT IN AN ORGANIZED HEALTH CARE EDUCATION/TRAINING PROGRAM

## 2025-07-22 PROCEDURE — 36415 COLL VENOUS BLD VENIPUNCTURE: CPT

## 2025-07-22 PROCEDURE — 86140 C-REACTIVE PROTEIN: CPT | Performed by: STUDENT IN AN ORGANIZED HEALTH CARE EDUCATION/TRAINING PROGRAM

## 2025-07-22 PROCEDURE — 83050 HGB METHEMOGLOBIN QUAN: CPT

## 2025-07-22 PROCEDURE — 84484 ASSAY OF TROPONIN QUANT: CPT | Performed by: STUDENT IN AN ORGANIZED HEALTH CARE EDUCATION/TRAINING PROGRAM

## 2025-07-22 PROCEDURE — 86308 HETEROPHILE ANTIBODY SCREEN: CPT | Performed by: STUDENT IN AN ORGANIZED HEALTH CARE EDUCATION/TRAINING PROGRAM

## 2025-07-22 PROCEDURE — 87081 CULTURE SCREEN ONLY: CPT | Performed by: STUDENT IN AN ORGANIZED HEALTH CARE EDUCATION/TRAINING PROGRAM

## 2025-07-22 PROCEDURE — 25010000002 ALBUMIN HUMAN 25% PER 50 ML: Performed by: STUDENT IN AN ORGANIZED HEALTH CARE EDUCATION/TRAINING PROGRAM

## 2025-07-22 PROCEDURE — 85379 FIBRIN DEGRADATION QUANT: CPT | Performed by: STUDENT IN AN ORGANIZED HEALTH CARE EDUCATION/TRAINING PROGRAM

## 2025-07-22 PROCEDURE — 82140 ASSAY OF AMMONIA: CPT | Performed by: STUDENT IN AN ORGANIZED HEALTH CARE EDUCATION/TRAINING PROGRAM

## 2025-07-22 PROCEDURE — P9047 ALBUMIN (HUMAN), 25%, 50ML: HCPCS | Performed by: STUDENT IN AN ORGANIZED HEALTH CARE EDUCATION/TRAINING PROGRAM

## 2025-07-22 PROCEDURE — 83880 ASSAY OF NATRIURETIC PEPTIDE: CPT | Performed by: STUDENT IN AN ORGANIZED HEALTH CARE EDUCATION/TRAINING PROGRAM

## 2025-07-22 PROCEDURE — 70450 CT HEAD/BRAIN W/O DYE: CPT

## 2025-07-22 PROCEDURE — 25810000003 SODIUM CHLORIDE 0.9 % SOLUTION: Performed by: STUDENT IN AN ORGANIZED HEALTH CARE EDUCATION/TRAINING PROGRAM

## 2025-07-22 PROCEDURE — 71275 CT ANGIOGRAPHY CHEST: CPT

## 2025-07-22 PROCEDURE — 87040 BLOOD CULTURE FOR BACTERIA: CPT | Performed by: STUDENT IN AN ORGANIZED HEALTH CARE EDUCATION/TRAINING PROGRAM

## 2025-07-22 PROCEDURE — 80053 COMPREHEN METABOLIC PANEL: CPT | Performed by: STUDENT IN AN ORGANIZED HEALTH CARE EDUCATION/TRAINING PROGRAM

## 2025-07-22 RX ORDER — IPRATROPIUM BROMIDE AND ALBUTEROL SULFATE 2.5; .5 MG/3ML; MG/3ML
3 SOLUTION RESPIRATORY (INHALATION) ONCE
Status: COMPLETED | OUTPATIENT
Start: 2025-07-23 | End: 2025-07-23

## 2025-07-22 RX ORDER — SODIUM CHLORIDE 0.9 % (FLUSH) 0.9 %
10 SYRINGE (ML) INJECTION AS NEEDED
Status: DISCONTINUED | OUTPATIENT
Start: 2025-07-22 | End: 2025-07-28 | Stop reason: HOSPADM

## 2025-07-22 RX ORDER — ALBUMIN (HUMAN) 12.5 G/50ML
12.5 SOLUTION INTRAVENOUS ONCE
Status: DISCONTINUED | OUTPATIENT
Start: 2025-07-22 | End: 2025-07-22

## 2025-07-22 RX ORDER — ALBUMIN (HUMAN) 12.5 G/50ML
25 SOLUTION INTRAVENOUS ONCE
Status: COMPLETED | OUTPATIENT
Start: 2025-07-22 | End: 2025-07-23

## 2025-07-22 RX ORDER — MIDODRINE HYDROCHLORIDE 5 MG/1
5 TABLET ORAL ONCE
Status: COMPLETED | OUTPATIENT
Start: 2025-07-22 | End: 2025-07-22

## 2025-07-22 RX ORDER — VANCOMYCIN 2 GRAM/500 ML IN 0.9 % SODIUM CHLORIDE INTRAVENOUS
20 ONCE
Status: COMPLETED | OUTPATIENT
Start: 2025-07-22 | End: 2025-07-22

## 2025-07-22 RX ORDER — ACETAMINOPHEN 500 MG
1000 TABLET ORAL ONCE
Status: COMPLETED | OUTPATIENT
Start: 2025-07-22 | End: 2025-07-22

## 2025-07-22 RX ORDER — IOPAMIDOL 755 MG/ML
100 INJECTION, SOLUTION INTRAVASCULAR
Status: COMPLETED | OUTPATIENT
Start: 2025-07-22 | End: 2025-07-22

## 2025-07-22 RX ADMIN — SODIUM CHLORIDE 500 ML: 9 INJECTION, SOLUTION INTRAVENOUS at 21:52

## 2025-07-22 RX ADMIN — MIDODRINE HYDROCHLORIDE 5 MG: 5 TABLET ORAL at 23:16

## 2025-07-22 RX ADMIN — Medication 2000 MG: at 20:32

## 2025-07-22 RX ADMIN — IOPAMIDOL 100 ML: 755 INJECTION, SOLUTION INTRAVENOUS at 21:14

## 2025-07-22 RX ADMIN — ACETAMINOPHEN 1000 MG: 500 TABLET, FILM COATED ORAL at 19:34

## 2025-07-22 RX ADMIN — ALBUMIN (HUMAN) 25 G: 0.25 INJECTION, SOLUTION INTRAVENOUS at 23:16

## 2025-07-22 RX ADMIN — PIPERACILLIN AND TAZOBACTAM 3.38 G: 3; .375 INJECTION, POWDER, LYOPHILIZED, FOR SOLUTION INTRAVENOUS at 19:35

## 2025-07-22 NOTE — ED PROVIDER NOTES
EMERGENCY DEPARTMENT ENCOUNTER    Pt Name: Jordi Sam  MRN: [id number]  Pt :   1934  Room Number:    Date of encounter:  2025  PCP: Jasper Avery MD  ED Provider: Miguel Mixon MD    Historian: EMS, patient, family      HPI:  Chief Complaint: Generalized malaise, dyspnea, sore throat        Context: Jordi Sam is a 89-year-old man who presents by EMS because of dyspnea and lethargy.  He has history of cirrhosis, heart failure, chronic kidney disease, chronic ventral hernia recently had a fall and was seen in this emergency department.  His wife reported to family that he has not been the same since he woke up this morning being significantly more fatigued EMS found him on the toilet and said he was unable to stand on his own and was febrile for them.  He reportedly did have diarrhea this morning.  He also complains of mild sore throat he denies headache or cough but does say he has some shortness of breath.  No other complaints at this time.      PAST MEDICAL HISTORY  Past Medical History:   Diagnosis Date    A-fib     Acute inferior myocardial infarction     Acute inferior STEMI with RV infarct features, 2009.     Arrhythmia     CAD (coronary artery disease)     Cellulitis 2023    Facial. - admission at [facility]. 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 Seeing Dr Kiersten Nova and Dr Jasper Avery           PAST SURGICAL HISTORY  Past Surgical History:   Procedure Laterality Date    BRONCHOSCOPY N/A 2019    Procedure: BRONCHOSCOPY WITH THORACENTESIS;  Surgeon: Marciano Higginbotham MD;  Location:  [facility];  Service: Pulmonary    CARDIAC CATHETERIZATION Bilateral 2019    Procedure: Right and Left Heart Cath;  Surgeon: Efrem Posadas MD;  Location:  [facility];  Service: Cardiology    CARDIAC CATHETERIZATION      CHOLECYSTECTOMY      CORONARY ANGIOPLASTY WITH STENT PLACEMENT  2009    Sirolimus eluting stents to the RCA, 2009.     HERNIA REPAIR      Hernia repair x 2.     PARACENTESIS  2019    multiple    UMBILICAL HERNIA REPAIR N/A 2020    Procedure: UMBILICAL HERNIA REPAIR;  Surgeon: Maribell Her MD;  Location:  [facility];  Service: General;  Laterality: N/A;         FAMILY HISTORY  Family History   Problem Relation Age of Onset    Dementia Mother     Stroke Mother     Heart disease Father     Heart attack Father     Aneurysm Father     Cancer Sister     No Known Problems Sister     No Known Problems Brother     Heart disease Brother         CABG    Stroke Brother     Heart disease Brother     Hypertension Daughter     Hypertension Son     Colon cancer Neg Hx     Colon polyps Neg Hx          SOCIAL HISTORY  Social History     Socioeconomic History    Marital status:      Spouse name: Melly    Number of children: 4   Tobacco Use    Smoking status: Former     Current packs/day: 0.00     Average packs/day: 2.5 packs/day for 28.0 years (70.0 ttl pk-yrs)     Types: Cigarettes     Start date: 1955     Quit date: 1983     Years since quittin.2     Passive exposure: Never    Smokeless tobacco: Never   Vaping Use    Vaping status: Never Used   Substance and Sexual Activity    Alcohol use: No    Drug use: No    Sexual activity: Not Currently         ALLERGIES  Bee venom and Sulfamethoxazole-trimethoprim        REVIEW OF  SYSTEMS  Review of Systems       All systems reviewed and negative except for those discussed in HPI.       PHYSICAL EXAM    I have reviewed the triage vital signs and nursing notes.    ED Triage Vitals [07/22/25 1839]   Temp Heart Rate Resp BP SpO2   99.5 °F (37.5 °C) 85 18 125/48 94 %      Temp src Heart Rate Source Patient Position BP Location FiO2 (%)   Oral Monitor -- -- --       Physical Exam  GENERAL:   Appears acute on chronically ill  HENT: Nares patent.  EYES: No scleral icterus.  Pupils equal reactive  CV: Regular rhythm, regular rate.  RESPIRATORY: Normal effort.  Rhonchorous breath sounds without appreciated focal consolidation  ABDOMEN: Soft, mildly distended large left ventral hernia feels soft  MUSCULOSKELETAL: No deformities.  Symmetric pitting lower extremity edema the right ankle has erythema and warmth concerning for cellulitis  NEURO: Alert but somewhat somnolent will arouse to voice and answer my questions appropriately, moves all extremities, follows commands.  SKIN: Warm, dry, no rash visualized.      LAB RESULTS  Recent Results (from the past 24 hours)   Comprehensive Metabolic Panel    Collection Time: 07/22/25  6:42 PM    Specimen: Blood   Result Value Ref Range    Glucose 126 (H) 65 - 99 mg/dL    BUN 32.0 (H) 8.0 - 23.0 mg/dL    Creatinine 2.02 (H) 0.76 - 1.27 mg/dL    Sodium 130 (L) 136 - 145 mmol/L    Potassium 4.9 3.5 - 5.2 mmol/L    Chloride 95 (L) 98 - 107 mmol/L    CO2 21.8 (L) 22.0 - 29.0 mmol/L    Calcium 8.7 8.2 - 9.6 mg/dL    Total Protein 6.1 6.0 - 8.5 g/dL    Albumin 3.4 (L) 3.5 - 5.2 g/dL    ALT (SGPT) 13 1 - 41 U/L    AST (SGOT) 39 1 - 40 U/L    Alkaline Phosphatase 114 39 - 117 U/L    Total Bilirubin 0.8 0.0 - 1.2 mg/dL    Globulin 2.7 gm/dL    A/G Ratio 1.3 g/dL    BUN/Creatinine Ratio 15.8 7.0 - 25.0    Anion Gap 13.2 5.0 - 15.0 mmol/L    eGFR 30.6 (L) >60.0 mL/min/1.73   BNP    Collection Time: 07/22/25  6:42 PM    Specimen: Blood   Result Value Ref Range    proBNP  12,872.0 (H) 0.0 - 1,800.0 pg/mL   High Sensitivity Troponin T    Collection Time: 07/22/25  6:42 PM    Specimen: Blood   Result Value Ref Range    HS Troponin T 86 (C) <22 ng/L   Green Top (Gel)    Collection Time: 07/22/25  6:42 PM   Result Value Ref Range    Extra Tube Hold for add-ons.    Lavender Top    Collection Time: 07/22/25  6:42 PM   Result Value Ref Range    Extra Tube hold for add-on    Gold Top - SST    Collection Time: 07/22/25  6:42 PM   Result Value Ref Range    Extra Tube Hold for add-ons.    Gray Top    Collection Time: 07/22/25  6:42 PM   Result Value Ref Range    Extra Tube Hold for add-ons.    Light Blue Top    Collection Time: 07/22/25  6:42 PM   Result Value Ref Range    Extra Tube Hold for add-ons.    CBC Auto Differential    Collection Time: 07/22/25  6:42 PM    Specimen: Blood   Result Value Ref Range    WBC 6.98 3.40 - 10.80 10*3/mm3    RBC 3.22 (L) 4.14 - 5.80 10*6/mm3    Hemoglobin 9.7 (L) 13.0 - 17.7 g/dL    Hematocrit 29.7 (L) 37.5 - 51.0 %    MCV 92.2 79.0 - 97.0 fL    MCH 30.1 26.6 - 33.0 pg    MCHC 32.7 31.5 - 35.7 g/dL    RDW 17.2 (H) 12.3 - 15.4 %    RDW-SD 58.4 (H) 37.0 - 54.0 fl    MPV 10.4 6.0 - 12.0 fL    Platelets 148 140 - 450 10*3/mm3    Neutrophil % 89.2 (H) 42.7 - 76.0 %    Lymphocyte % 3.0 (L) 19.6 - 45.3 %    Monocyte % 6.9 5.0 - 12.0 %    Eosinophil % 0.0 (L) 0.3 - 6.2 %    Basophil % 0.3 0.0 - 1.5 %    Immature Grans % 0.6 (H) 0.0 - 0.5 %    Neutrophils, Absolute 6.23 1.70 - 7.00 10*3/mm3    Lymphocytes, Absolute 0.21 (L) 0.70 - 3.10 10*3/mm3    Monocytes, Absolute 0.48 0.10 - 0.90 10*3/mm3    Eosinophils, Absolute 0.00 0.00 - 0.40 10*3/mm3    Basophils, Absolute 0.02 0.00 - 0.20 10*3/mm3    Immature Grans, Absolute 0.04 0.00 - 0.05 10*3/mm3    nRBC 0.0 0.0 - 0.2 /100 WBC   Lipase    Collection Time: 07/22/25  6:42 PM    Specimen: Blood   Result Value Ref Range    Lipase 28 13 - 60 U/L   D-dimer, Quantitative    Collection Time: 07/22/25  6:42 PM    Specimen: Blood    Result Value Ref Range    D-Dimer, Quantitative 5.36 (H) 0.00 - 0.91 MCGFEU/mL   Lactic Acid, Plasma    Collection Time: 07/22/25  6:42 PM    Specimen: Blood   Result Value Ref Range    Lactate 2.4 (C) 0.5 - 2.0 mmol/L   Procalcitonin    Collection Time: 07/22/25  6:42 PM    Specimen: Blood   Result Value Ref Range    Procalcitonin 0.49 (H) 0.00 - 0.25 ng/mL   Mononucleosis Screen    Collection Time: 07/22/25  6:42 PM    Specimen: Blood   Result Value Ref Range    Monospot Negative Negative   C-reactive Protein    Collection Time: 07/22/25  6:42 PM    Specimen: Blood   Result Value Ref Range    C-Reactive Protein 10.10 (H) 0.00 - 0.50 mg/dL   Magnesium    Collection Time: 07/22/25  6:42 PM    Specimen: Blood   Result Value Ref Range    Magnesium 2.3 1.7 - 2.3 mg/dL   Phosphorus    Collection Time: 07/22/25  6:42 PM    Specimen: Blood   Result Value Ref Range    Phosphorus 2.6 2.5 - 4.5 mg/dL   TSH Rfx On Abnormal To Free T4    Collection Time: 07/22/25  6:42 PM    Specimen: Blood   Result Value Ref Range    TSH 3.810 0.270 - 4.200 uIU/mL   Ammonia    Collection Time: 07/22/25  7:05 PM    Specimen: Blood   Result Value Ref Range    Ammonia 15 (L) 16 - 60 umol/L   Rapid Strep A Screen - Swab, Throat    Collection Time: 07/22/25  7:06 PM    Specimen: Throat; Swab   Result Value Ref Range    Strep A Ag Negative Negative   Respiratory Panel PCR w/COVID-19(SARS-CoV-2) [facility] In-House, NP Swab in UTM/VTM, 2 HR TAT - Swab, Nasopharynx    Collection Time: 07/22/25  7:06 PM    Specimen: Nasopharynx; Swab   Result Value Ref Range    ADENOVIRUS, PCR Not Detected Not Detected    Coronavirus 229E Not Detected Not Detected    Coronavirus HKU1 Not Detected Not Detected    Coronavirus NL63 Not Detected Not Detected    Coronavirus OC43 Not Detected Not Detected    COVID19 Not Detected Not Detected - Ref. Range    Human Metapneumovirus Not Detected Not Detected    Human Rhinovirus/Enterovirus Not Detected Not  Detected    Influenza A PCR Not Detected Not Detected    Influenza B PCR Not Detected Not Detected    Parainfluenza Virus 1 Not Detected Not Detected    Parainfluenza Virus 2 Not Detected Not Detected    Parainfluenza Virus 3 Not Detected Not Detected    Parainfluenza Virus 4 Not Detected Not Detected    RSV, PCR Not Detected Not Detected    Bordetella pertussis pcr Not Detected Not Detected    Bordetella parapertussis PCR Not Detected Not Detected    Chlamydophila pneumoniae PCR Not Detected Not Detected    Mycoplasma pneumo by PCR Not Detected Not Detected   Blood Gas, Arterial With Co-Ox    Collection Time: 07/22/25  7:19 PM    Specimen: Arterial Blood   Result Value Ref Range    Site Right Radial     Gian's Test N/A     pH, Arterial 7.470 (H) 7.350 - 7.450 pH units    pCO2, Arterial 30.7 (L) 35.0 - 45.0 mm Hg    pO2, Arterial 71.6 (L) 83.0 - 108.0 mm Hg    HCO3, Arterial 22.3 20.0 - 26.0 mmol/L    Base Excess, Arterial -0.9 (L) 0.0 - 2.0 mmol/L    Hemoglobin, Blood Gas 9.4 (L) 13.5 - 17.5 g/dL    Hematocrit, Blood Gas 28.9 (L) 38.0 - 51.0 %    Oxyhemoglobin 94.3 94 - 99 %    Methemoglobin 0.10 0.00 - 1.50 %    Carboxyhemoglobin 1.6 0 - 2 %    CO2 Content 23.2 22 - 33 mmol/L    Temperature 37.0     Barometric Pressure for Blood Gas      Modality Room Air     FIO2 21 %    Rate 0 Breaths/minute    PIP 0 cmH2O    IPAP 0     EPAP 0     pH, Temp Corrected 7.470 pH Units    pCO2, Temperature Corrected 30.7 (L) 35 - 48 mm Hg    pO2, Temperature Corrected 71.6 (L) 83 - 108 mm Hg   ECG 12 Lead Dyspnea    Collection Time: 07/22/25  8:36 PM   Result Value Ref Range    QT Interval 408 ms    QTC Interval 482 ms   High Sensitivity Troponin T 1Hr    Collection Time: 07/22/25  8:37 PM    Specimen: Blood   Result Value Ref Range    HS Troponin T 81 (C) <22 ng/L    Troponin T Numeric Delta -5 ng/L    Troponin T % Delta -6 Abnormal if >/= 20%   Urinalysis With Microscopic If Indicated (No Culture) - Urine, Clean Catch     Collection Time: 07/22/25 10:52 PM    Specimen: Urine, Clean Catch   Result Value Ref Range    Color, UA Dark Yellow (A) Yellow, Straw    Appearance, UA Turbid (A) Clear    pH, UA <=5.0 5.0 - 8.0    Specific Gravity, UA 1.023 1.005 - 1.030    Glucose, UA Negative Negative    Ketones, UA Trace (A) Negative    Bilirubin, UA Negative Negative    Blood, UA Large (3+) (A) Negative    Protein,  mg/dL (2+) (A) Negative    Leuk Esterase, UA Large (3+) (A) Negative    Nitrite, UA Negative Negative    Urobilinogen, UA 1.0 E.U./dL 0.2 - 1.0 E.U./dL   Urinalysis, Microscopic Only - Urine, Clean Catch    Collection Time: 07/22/25 10:52 PM    Specimen: Urine, Clean Catch   Result Value Ref Range    RBC, UA 11-20 (A) None Seen, 0-2 /HPF    WBC, UA Too Numerous to Count (A) None Seen, 0-2 /HPF    Bacteria, UA 4+ (A) None Seen /HPF    Squamous Epithelial Cells, UA 31-50 (A) None Seen, 0-2 /HPF    Hyaline Casts, UA Unable to determine due to loaded field None Seen /LPF    Methodology Manual Light Microscopy    STAT Lactic Acid, Reflex    Collection Time: 07/22/25 11:30 PM    Specimen: Blood   Result Value Ref Range    Lactate 1.4 0.5 - 2.0 mmol/L       If labs were ordered, I independently reviewed the results and considered them in treating the patient.        RADIOLOGY  CT Angiogram Chest Pulmonary Embolism  Result Date: 7/22/2025  CT ANGIOGRAM CHEST PULMONARY EMBOLISM Date of Exam: 7/22/2025 8:53 PM EDT Indication: sepsis, dyspnea, chronic immobilization, leg swelling, dimer >500. Comparison: 7/20/2025. Technique: Axial CT images were obtained of the chest after the uneventful intravenous administration of 80 mL Isovue-370 utilizing pulmonary embolism protocol.  In addition, a 3-D volume rendered image was created for interpretation.  Reconstructed coronal and sagittal images were also obtained. Automated exposure control and iterative construction methods were used. Findings: There is no pathologic axillary adenopathy or  other worrisome body wall soft tissue finding in the chest. The partially characterized upper abdomen demonstrates known cirrhosis and moderate volume ascites. There is a small pericardial effusion as well as  unchanged small left pleural effusion. Atherosclerotic, nonaneurysmal thoracic aorta. The pulmonary arteries demonstrate no evidence of focal filling defect. The lung fields demonstrate similar prominent volume loss in the left lower lobe adjacent to the pleural effusion. There is no distinct suspicious pulmonary nodularity. The osseous structures again demonstrate a nonacute left clavicle fracture. No acute fracture or new aggressive osseous lesion is identified.     Impression: No evidence of acute pulmonary embolus. Redemonstrated chronic left pleural effusion with adjacent left basilar atelectasis as well as chronic pericardial effusion. Electronically Signed: Son Carey MD  7/22/2025 9:45 PM EDT  Workstation ID: [id number]    CT Abdomen Pelvis With Contrast  Result Date: 7/22/2025  CT ABDOMEN PELVIS W CONTRAST Date of Exam: 7/22/2025 8:53 PM EDT Indication: Sepsis of unknown source, abdominal distention, history of chronic hernia and cirrhosis with ascites. Comparison: CT abdomen pelvis 3/20/2025 Technique: Axial CT images were obtained of the abdomen and pelvis following the uneventful intravenous administration of 100 mL Isovue 370. Reconstructed coronal and sagittal images were also obtained. Automated exposure control and iterative construction methods were used. Findings: Separately dictated CT chest. Cirrhotic liver morphology. No discrete hypervascular liver lesion. Portal vasculature, splenic vein and superior mesenteric vein patent. Cholecystectomy. No dilation of biliary tree. Atrophic pancreas. Spleen within normal limits in size. Minimal varices. Large volume ascites. No suspicious adrenal nodule. Symmetric renal size, contour and enhancement. Few small hypodense renal lesions favoring cysts  too small to accurately characterize. There is a stable 1.1 cm partially exophytic lesion in the left anterior midpole showing intrinsic high density on prior noncontrasted exam consistent with hemorrhagic/proteinaceous cyst. Punctate nonobstructing right upper pole calculus. No hydronephrosis. Focal somewhat nodular urinary bladder wall thickening near hepatic dome measuring up to 1.2 cm thickness sagittal image 79 series 901. Prostate within normal limits in size. Symmetric seminal vesicles. Small hiatal hernia. Expected configuration stomach  and duodenum. Colonic diverticulosis. No evidence of bowel obstruction or active inflammation. Normal appendix. Diffuse aortic atherosclerotic disease. Fusiform dilation of infrarenal abdominal aorta measuring 3.1 cm, technically aneurysmal without significant change from prior exam. No overtly suspicious adenopathy. No organized fluid collection. No free air. There is a large fluid containing ventral abdominal wall hernia measuring 11.1 x 20.2 x 16.2 cm AP, transverse and craniocaudal dimension. Hernia defect measures 7.7 cm transverse dimension containing a small portion of the traversing bowel. Small fat-containing right and small to moderate fat and fluid-containing left inguinal hernias. These appear similar to prior. Diffuse body wall edema. Gynecomastia. Paraspinous muscle atrophy. Multilevel degenerative change throughout the spine. No aggressive bone lesion.     Impression: 1. Focal nodular bladder wall thickening near urinary bladder dome, possibly presenting a primary malignancy (urothelial carcinoma). Recommend nonemergent urology consultation for consideration of cystoscopy and tissue sampling. 2. Cirrhotic liver morphology with sequelae of portal hypertension including large volume ascites and minimal varices. No significant splenomegaly. Portal vasculature patent. No signs of hepatocellular carcinoma. 3. Large ventral and smaller bilateral inguinal hernias  detailed above, without significant change from prior CT comparison. The large ventral hernia contains a small portion of traversing bowel without obstruction. 4. Anasarca. 5. Colonic diverticulosis. 6. 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 7. Separately dictated CT chest. Electronically Signed: Robson Lopez MD  7/22/2025 9:34 PM EDT  Workstation ID: [id number]    CT Head Without Contrast  Result Date: 7/22/2025  CT HEAD WO CONTRAST Date of Exam: 7/22/2025 8:53 PM EDT Indication: Somnolence, confusion, suspect metabolic. Comparison: Head CT 7/20/2025 Technique: Axial CT images were obtained of the head without contrast administration.  Automated exposure control and iterative construction methods were used. Findings: Motion-degraded exam. 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 No new large territory infarct, acute intracranial hemorrhage, large mass lesion, midline shift or hydrocephalus. Mild global parenchymal volume loss and sequelae of chronic microvascular ischemic change similar to prior. Senescent related basal ganglia calcifications on the right. No large extra-axial fluid collection. No obvious displaced fracture or joint malalignment. No obstructive sinus disease or large mastoid effusion.     Impression: Allowing for motion degradation, no acute intracranial findings. Grossly stable CT exam since 7/20/2025 comparison. Electronically Signed: Robson Lopez MD  7/22/2025 9:23 PM EDT  Workstation ID: [id number]    XR Chest 1 View  Result Date: 7/22/2025  XR CHEST 1 VW Date of Exam: 7/22/2025 6:40 PM EDT Indication: SOA triage protocol Comparison: Chest CT 7/20/2025 Findings: Enlarged cardiac silhouette probably combination of cardiomegaly and pericardial effusion compared to recent CT. Right lung appears relatively clear. Increasing probably small to moderate size  left-sided pleural effusion and adjacent left lower lobe atelectasis versus airspace disease. No pneumothorax. Aortic atherosclerotic disease. Degenerative elated osseous change.     Impression: 1. Worsening left lower lobe atelectasis/airspace disease and adjacent pleural effusion. 2. Grossly similar cardiomegaly and probable pericardial effusion correlating with recent CT findings. Electronically Signed: Robson Lopez MD  7/22/2025 7:04 PM EDT  Workstation ID: [id number]      I ordered and independently reviewed the above noted radiographic studies.      I viewed images of CTA of the chest which does not show pulmonary embolism or aortic injury does show chronic left pleural effusion with adjacent opacities concerning for possible pneumonia per my independent interpretation   CT scan of the head which does not show any acute pathology that I could appreciate on my interpretation  CT scan of the abdomen pelvis showing cirrhosis and ascites as well as bladder thickening concerning for urinary tract infection  See radiologist's dictation for official interpretation.        PROCEDURES    Procedures    ECG 12 Lead Dyspnea   Preliminary Result   Test Reason : Dyspnea   Blood Pressure :   */*   mmHG   Vent. Rate :  84 BPM     Atrial Rate :  79 BPM      P-R Int :   * ms          QRS Dur : 136 ms       QT Int : 408 ms       P-R-T Axes :   * -11 -47 degrees     QTcB Int : 482 ms      Atrial fibrillation with premature ventricular or aberrantly conducted   complexes   Right bundle branch block   Abnormal ECG   When compared with ECG of 08-May-2024 13:45,   Atrial fibrillation has replaced Sinus rhythm   Vent. rate has increased by  32 bpm   Right bundle branch block is now present      Referred By: ED MD           Confirmed By:       Telemetry Scan   Final Result      ECG 12 Lead Dyspnea    (Results Pending)       MEDICATIONS GIVEN IN ER    Medications   sodium chloride 0.9 % flush 10 mL (has no administration in time range)    acetaminophen (TYLENOL) tablet 1,000 mg (1,000 mg Oral Given 7/1934)   vancomycin IVPB 2000 mg in 0.9% Sodium Chloride 500 mL (0 mg Intravenous Stopped 7/22/25 2317)   piperacillin-tazobactam (ZOSYN) 3.375 g IVPB in 100 mL NS MBP (CD) (0 g Intravenous Stopped 7/22/25 2032)   sodium chloride 0.9 % bolus 500 mL (0 mL Intravenous Stopped 7/22/25 2321)   iopamidol (ISOVUE-370) 76 % injection 100 mL (100 mL Intravenous Given 7/22/25 2114)   midodrine (PROAMATINE) tablet 5 mg (5 mg Oral Given 7/22/25 2316)   albumin human 25 % IV SOLN 25 g (0 g Intravenous Stopped 7/23/25 0011)   ipratropium-albuterol (DUO-NEB) nebulizer solution 3 mL (3 mL Nebulization Given 7/23/25 0036)         MEDICAL DECISION MAKING, PROGRESS, and CONSULTS    All labs, if obtained, have been independently reviewed by me.  All radiology studies, if obtained, have been reviewed by me and the radiologist dictating the report.  All EKGs, if obtained, have been independently viewed and interpreted by me/my attending physician.      Discussion below represents my analysis of pertinent findings related to patient's condition, differential diagnosis, treatment plan and final disposition.                                          Differential diagnosis:    Sepsis, intra-abdominal infection, pneumonia, pulmonary embolism, cellulitis or abscess, meningitis, pharyngitis or viral URI,, anemia, electrolyte abnormality      Additional sources:    - Discussed/ obtained information from independent historians: EMS, family    - External (non-ED) record review:  Chart review of outpatient PCP notes with Dr. Agosto shows history of:  Stage 3b chronic kidney disease    Pre-diabetes    Medicare annual wellness visit, subsequent    Cirrhosis of liver with ascites, unspecified hepatic cirrhosis type    Dyslipidemia    Incisional hernia, without obstruction or gangrene    Medication monitoring encounter    Mixed hyperlipidemia    Other specified hypothyroidism    Lower  urinary tract symptoms (LUTS)    Gout, unspecified cause, unspecified chronicity, unspecified site    - Chronic or social conditions impacting care: Cirrhosis, heart failure, chronic kidney disease        Orders placed during this visit:  Orders Placed This Encounter   Procedures    COVID PRE-OP / PRE-PROCEDURE SCREENING ORDER (NO ISOLATION) - Swab, Nasopharynx    Blood Culture - Blood,    Blood Culture - Blood,    Rapid Strep A Screen - Swab, Throat    Respiratory Panel PCR w/COVID-19(SARS-CoV-2) [facility] In-House, NP Swab in UTM/VTM, 2 HR TAT - Swab, Nasopharynx    Beta Strep Culture, Throat - Swab, Throat    XR Chest 1 View    CT Head Without Contrast    CT Abdomen Pelvis With Contrast    CT Angiogram Chest Pulmonary Embolism    [facility]    Comprehensive Metabolic Panel    BNP    High Sensitivity Troponin T    CBC Auto Differential    Lipase    Urinalysis With Microscopic If Indicated (No Culture) - Urine, Catheter    Blood Gas, Arterial -With Co-Ox Panel: Yes    D-dimer, Quantitative    Lactic Acid, Plasma    Procalcitonin    Mononucleosis Screen    C-reactive Protein    Magnesium    Phosphorus    TSH Rfx On Abnormal To Free T4    Ammonia    Blood Gas, Arterial With Co-Ox    High Sensitivity Troponin T 1Hr    STAT Lactic Acid, Reflex    Urinalysis, Microscopic Only - Urine, Clean Catch    NPO Diet NPO Type: Strict NPO    Undress & Gown    Continuous Pulse Oximetry    Vital Signs    Code Status and Medical Interventions: No CPR (Do Not Attempt to Resuscitate); Limited Support; No intubation (DNI)    Oxygen Therapy- Nasal Cannula; Titrate 1-6 LPM Per SpO2; 90 - 95%    ECG 12 Lead Dyspnea    Telemetry Scan    Insert Peripheral IV    Initiate Observation Status    CBC & Differential    Green Top (Gel)    Lavender Top    Gold Top - SST    Gray Top    Light Blue Top         Additional orders considered but not ordered:      ED Course:    Consultants: Hospital medicine    ED Course as of 07/23/25  0150   Tue Jul 22, 2025   1841 Chart review of outpatient PCP notes with Dr. Agosto shows history of:  Stage 3b chronic kidney disease    Pre-diabetes    Medicare annual wellness visit, subsequent    Cirrhosis of liver with ascites, unspecified hepatic cirrhosis type    Dyslipidemia    Incisional hernia, without obstruction or gangrene    Medication monitoring encounter    Mixed hyperlipidemia    Other specified hypothyroidism    Lower urinary tract symptoms (LUTS)    Gout, unspecified cause, unspecified chronicity, unspecified site   [CC]   1852 This a very nice 89-year-old man who presents by EMS because of dyspnea and lethargy.  He has history of cirrhosis, heart failure, chronic kidney disease, chronic ventral hernia recently had a fall and was seen in this emergency department.  His wife reported to family that he has not been the same since he woke up this morning being significantly more fatigued EMS found him on the toilet and said he was unable to stand on his own and was febrile for them.  He reportedly did have diarrhea this morning.  He also complains of mild sore throat he denies headache or cough but does say he has some shortness of breath.  No other complaints at this time. [CC]   1853 He arrived awake and alert but he is ill-appearing vitals are within normal limits temperature 99.5 he was 100.7 for EMS.  He is alert and oriented answering my questions appropriately but there is a fatigue or mild somnolence about him.  Abdomen is distended consistent with his known cirrhosis and ascites his large left ventral hernia feels soft.  He does have sore throat also appears to have cellulitis he has chronic lower extremity edema and there is erythema and warmth of the right ankle [CC]   1854  left ankle is equally swollen but without the erythema and warmth.  He has significant pitting edema and ascites appears to be in fluid overload I am holding on IV fluids were going and starting broad-spectrum  antibiotics and giving acetaminophen as an antipyretic obtaining full infectious workup.  Will reevaluate pending initial workup. [CC]   Wed Jul 23, 2025   0107 CBC showing stable chronic anemia metabolic panel showing worsening of his already poor kidney function creatinine of 2.02 proBNP significantly elevated with his dry mucous membranes I am giving a small 500 cc fluid bolus but will need to monitor closely I do not think he can tolerate a full sepsis fluid bolus  Procalcitonin and lactic acid both elevated consistent with sepsis initial troponin significantly elevated 86 but downtrending on repeat he denies chest pain D-dimer of 5.36 have added on CT of the chest already getting a scan of his abdomen urinalysis obtained and shows urinary tract infection with gross pyuria.  CT scan of the abdomen pelvis not showing acute pathology but does show findings consistent with cystitis and formal overread also concern for possible bladder mass.  Blood pressures have dropped maps in the 60s and 70s this may be close to his baseline but I have added on midodrine and an albumin infusion should hopefully help with his worsening kidney function as well.  At this point is apparent he will need hospitalization.  Medicine team consulted for admission. [CC]      ED Course User Index  [CC] Miguel Mixon MD       50 minutes of critical care provided. This time excludes other billable procedures. Time does include preparation of documents, medical consultations, review of old records, and direct bedside care. Patient is at high risk for life-threatening deterioration due to sepsis, urinary tract infection, cellulitis, cirrhosis, heart failure, acute kidney injury.         Shared Decision Making:  After my consideration of clinical presentation and any laboratory/radiology studies obtained, I discussed the findings with the patient/patient representative who is in agreement with the treatment plan and the final  disposition.   Risks and benefits of discharge and/or observation/admission were discussed.       AS OF 01:50 EDT VITALS:    BP - 109/51  HR - 73  TEMP - 99.5 °F (37.5 °C) (Oral)  O2 SATS - 97%                  DIAGNOSIS  Final diagnoses:   Sepsis with acute renal failure without septic shock, due to unspecified organism, unspecified acute renal failure type   Acute UTI (urinary tract infection)   Cellulitis of right lower extremity   Stage 3b chronic kidney disease   Chronic right heart failure   Panlobular emphysema   Cirrhosis of liver with ascites, unspecified hepatic cirrhosis type         DISPOSITION  Admit      Please note that portions of this document were completed with voice recognition software.        Miguel Mixon MD  07/23/25 0109       Miguel Mixon MD  07/23/25 0150

## 2025-07-23 ENCOUNTER — APPOINTMENT (OUTPATIENT)
Dept: ULTRASOUND IMAGING | Facility: HOSPITAL | Age: OVER 89
DRG: 871 | End: 2025-07-23
Payer: MEDICARE

## 2025-07-23 ENCOUNTER — TELEPHONE (OUTPATIENT)
Dept: FAMILY MEDICINE CLINIC | Facility: CLINIC | Age: OVER 89
End: 2025-07-23
Payer: MEDICARE

## 2025-07-23 ENCOUNTER — HOSPITAL ENCOUNTER (OUTPATIENT)
Dept: ULTRASOUND IMAGING | Facility: HOSPITAL | Age: OVER 89
Discharge: HOME | End: 2025-07-23
Payer: MEDICARE

## 2025-07-23 PROBLEM — E87.20 LACTIC ACIDOSIS: Status: ACTIVE | Noted: 2025-07-23

## 2025-07-23 PROBLEM — K43.9 VENTRAL HERNIA: Status: ACTIVE | Noted: 2025-07-23

## 2025-07-23 PROBLEM — D63.8 ANEMIA, CHRONIC DISEASE: Status: ACTIVE | Noted: 2025-07-23

## 2025-07-23 PROBLEM — A41.9 SEPSIS: Status: ACTIVE | Noted: 2025-07-23

## 2025-07-23 PROBLEM — R79.89 ELEVATED TROPONIN: Status: ACTIVE | Noted: 2025-07-23

## 2025-07-23 PROBLEM — J90 CHRONIC BILATERAL PLEURAL EFFUSIONS: Status: ACTIVE | Noted: 2025-07-23

## 2025-07-23 PROBLEM — I31.39 PERICARDIAL EFFUSION: Status: ACTIVE | Noted: 2025-07-23

## 2025-07-23 PROBLEM — N39.0 ACUTE UTI (URINARY TRACT INFECTION): Status: ACTIVE | Noted: 2025-07-23

## 2025-07-23 PROBLEM — L03.90 CELLULITIS: Status: ACTIVE | Noted: 2025-07-23

## 2025-07-23 PROBLEM — E87.1 HYPONATREMIA: Status: ACTIVE | Noted: 2025-07-23

## 2025-07-23 PROBLEM — R79.89 ELEVATED SERUM CREATININE: Status: ACTIVE | Noted: 2025-07-23

## 2025-07-23 LAB
ABO GROUP BLD: NORMAL
ANION GAP SERPL CALCULATED.3IONS-SCNC: 12.2 MMOL/L (ref 5–15)
APPEARANCE FLD: CLEAR
BACTERIA BLD CULT: ABNORMAL
BASOPHILS # BLD AUTO: 0.02 10*3/MM3 (ref 0–0.2)
BASOPHILS NFR BLD AUTO: 0.3 % (ref 0–1.5)
BLD GP AB SCN SERPL QL: NEGATIVE
BOTTLE TYPE: ABNORMAL
BUN SERPL-MCNC: 33.3 MG/DL (ref 8–23)
BUN/CREAT SERPL: 15.3 (ref 7–25)
CALCIUM SPEC-SCNC: 8.4 MG/DL (ref 8.2–9.6)
CHLORIDE SERPL-SCNC: 96 MMOL/L (ref 98–107)
CO2 SERPL-SCNC: 20.8 MMOL/L (ref 22–29)
COLOR FLD: YELLOW
CREAT SERPL-MCNC: 2.18 MG/DL (ref 0.76–1.27)
D-LACTATE SERPL-SCNC: 1.4 MMOL/L (ref 0.5–2)
DEPRECATED RDW RBC AUTO: 57.8 FL (ref 37–54)
EGFRCR SERPLBLD CKD-EPI 2021: 27.9 ML/MIN/1.73
EOSINOPHIL # BLD AUTO: 0.01 10*3/MM3 (ref 0–0.4)
EOSINOPHIL NFR BLD AUTO: 0.2 % (ref 0.3–6.2)
ERYTHROCYTE [DISTWIDTH] IN BLOOD BY AUTOMATED COUNT: 17.2 % (ref 12.3–15.4)
GLUCOSE SERPL-MCNC: 125 MG/DL (ref 65–99)
HCT VFR BLD AUTO: 27.7 % (ref 37.5–51)
HETEROPH AB SER QL LA: NEGATIVE
HGB BLD-MCNC: 8.9 G/DL (ref 13–17.7)
IMM GRANULOCYTES # BLD AUTO: 0.04 10*3/MM3 (ref 0–0.05)
IMM GRANULOCYTES NFR BLD AUTO: 0.7 % (ref 0–0.5)
LYMPHOCYTES # BLD AUTO: 0.17 10*3/MM3 (ref 0.7–3.1)
LYMPHOCYTES NFR BLD AUTO: 2.8 % (ref 19.6–45.3)
LYMPHOCYTES NFR FLD MANUAL: 25 %
MACROPHAGE FLUID %: 58 %
MAGNESIUM SERPL-MCNC: 2.4 MG/DL (ref 1.7–2.3)
MCH RBC QN AUTO: 29.9 PG (ref 26.6–33)
MCHC RBC AUTO-ENTMCNC: 32.1 G/DL (ref 31.5–35.7)
MCV RBC AUTO: 93 FL (ref 79–97)
MESOTHL CELL NFR FLD MANUAL: 4 %
MONOCYTES # BLD AUTO: 0.53 10*3/MM3 (ref 0.1–0.9)
MONOCYTES NFR BLD AUTO: 8.9 % (ref 5–12)
MONOCYTES NFR FLD: 2 %
NEUTROPHILS NFR BLD AUTO: 5.21 10*3/MM3 (ref 1.7–7)
NEUTROPHILS NFR BLD AUTO: 87.1 % (ref 42.7–76)
NEUTROPHILS NFR FLD MANUAL: 11 %
NRBC BLD AUTO-RTO: 0 /100 WBC (ref 0–0.2)
PHOSPHATE SERPL-MCNC: 3.3 MG/DL (ref 2.5–4.5)
PLATELET # BLD AUTO: 130 10*3/MM3 (ref 140–450)
PMV BLD AUTO: 10.7 FL (ref 6–12)
POTASSIUM SERPL-SCNC: 5.1 MMOL/L (ref 3.5–5.2)
QT INTERVAL: 408 MS
QTC INTERVAL: 482 MS
RBC # BLD AUTO: 2.98 10*6/MM3 (ref 4.14–5.8)
RBC # FLD AUTO: <2000 /MM3
RH BLD: POSITIVE
SODIUM SERPL-SCNC: 129 MMOL/L (ref 136–145)
T&S EXPIRATION DATE: NORMAL
WBC # FLD AUTO: 39 /MM3
WBC NRBC COR # BLD AUTO: 5.98 10*3/MM3 (ref 3.4–10.8)

## 2025-07-23 PROCEDURE — 94640 AIRWAY INHALATION TREATMENT: CPT

## 2025-07-23 PROCEDURE — 82042 OTHER SOURCE ALBUMIN QUAN EA: CPT | Performed by: STUDENT IN AN ORGANIZED HEALTH CARE EDUCATION/TRAINING PROGRAM

## 2025-07-23 PROCEDURE — 83615 LACTATE (LD) (LDH) ENZYME: CPT | Performed by: STUDENT IN AN ORGANIZED HEALTH CARE EDUCATION/TRAINING PROGRAM

## 2025-07-23 PROCEDURE — 0W9G3ZZ DRAINAGE OF PERITONEAL CAVITY, PERCUTANEOUS APPROACH: ICD-10-PCS | Performed by: STUDENT IN AN ORGANIZED HEALTH CARE EDUCATION/TRAINING PROGRAM

## 2025-07-23 PROCEDURE — C1729 CATH, DRAINAGE: HCPCS

## 2025-07-23 PROCEDURE — 83735 ASSAY OF MAGNESIUM: CPT | Performed by: INTERNAL MEDICINE

## 2025-07-23 PROCEDURE — 25010000002 LIDOCAINE 1 % SOLUTION: Performed by: STUDENT IN AN ORGANIZED HEALTH CARE EDUCATION/TRAINING PROGRAM

## 2025-07-23 PROCEDURE — 49083 ABD PARACENTESIS W/IMAGING: CPT | Performed by: STUDENT IN AN ORGANIZED HEALTH CARE EDUCATION/TRAINING PROGRAM

## 2025-07-23 PROCEDURE — 86850 RBC ANTIBODY SCREEN: CPT | Performed by: INTERNAL MEDICINE

## 2025-07-23 PROCEDURE — 85025 COMPLETE CBC W/AUTO DIFF WBC: CPT | Performed by: INTERNAL MEDICINE

## 2025-07-23 PROCEDURE — 25010000002 CEFAZOLIN PER 500 MG: Performed by: INTERNAL MEDICINE

## 2025-07-23 PROCEDURE — 87015 SPECIMEN INFECT AGNT CONCNTJ: CPT | Performed by: STUDENT IN AN ORGANIZED HEALTH CARE EDUCATION/TRAINING PROGRAM

## 2025-07-23 PROCEDURE — 25010000002 CEFTRIAXONE PER 250 MG: Performed by: STUDENT IN AN ORGANIZED HEALTH CARE EDUCATION/TRAINING PROGRAM

## 2025-07-23 PROCEDURE — 80048 BASIC METABOLIC PNL TOTAL CA: CPT | Performed by: INTERNAL MEDICINE

## 2025-07-23 PROCEDURE — 87205 SMEAR GRAM STAIN: CPT | Performed by: STUDENT IN AN ORGANIZED HEALTH CARE EDUCATION/TRAINING PROGRAM

## 2025-07-23 PROCEDURE — 84100 ASSAY OF PHOSPHORUS: CPT | Performed by: INTERNAL MEDICINE

## 2025-07-23 PROCEDURE — 76942 ECHO GUIDE FOR BIOPSY: CPT

## 2025-07-23 PROCEDURE — 87075 CULTR BACTERIA EXCEPT BLOOD: CPT | Performed by: STUDENT IN AN ORGANIZED HEALTH CARE EDUCATION/TRAINING PROGRAM

## 2025-07-23 PROCEDURE — 86901 BLOOD TYPING SEROLOGIC RH(D): CPT | Performed by: INTERNAL MEDICINE

## 2025-07-23 PROCEDURE — 86900 BLOOD TYPING SEROLOGIC ABO: CPT | Performed by: INTERNAL MEDICINE

## 2025-07-23 PROCEDURE — 89051 BODY FLUID CELL COUNT: CPT | Performed by: STUDENT IN AN ORGANIZED HEALTH CARE EDUCATION/TRAINING PROGRAM

## 2025-07-23 PROCEDURE — 87070 CULTURE OTHR SPECIMN AEROBIC: CPT | Performed by: STUDENT IN AN ORGANIZED HEALTH CARE EDUCATION/TRAINING PROGRAM

## 2025-07-23 PROCEDURE — 84157 ASSAY OF PROTEIN OTHER: CPT | Performed by: STUDENT IN AN ORGANIZED HEALTH CARE EDUCATION/TRAINING PROGRAM

## 2025-07-23 RX ORDER — ALBUMIN (HUMAN) 12.5 G/50ML
12.5 SOLUTION INTRAVENOUS ONCE
OUTPATIENT
Start: 2025-07-23 | End: 2025-07-23

## 2025-07-23 RX ORDER — SODIUM CHLORIDE 0.9 % (FLUSH) 0.9 %
10 SYRINGE (ML) INJECTION EVERY 12 HOURS SCHEDULED
Status: DISCONTINUED | OUTPATIENT
Start: 2025-07-23 | End: 2025-07-28 | Stop reason: HOSPADM

## 2025-07-23 RX ORDER — SPIRONOLACTONE 25 MG/1
50 TABLET ORAL DAILY
Status: DISCONTINUED | OUTPATIENT
Start: 2025-07-23 | End: 2025-07-27

## 2025-07-23 RX ORDER — ATORVASTATIN CALCIUM 20 MG/1
20 TABLET, FILM COATED ORAL DAILY
Status: DISCONTINUED | OUTPATIENT
Start: 2025-07-23 | End: 2025-07-28 | Stop reason: HOSPADM

## 2025-07-23 RX ORDER — BENZONATATE 100 MG/1
100 CAPSULE ORAL 3 TIMES DAILY PRN
Status: DISCONTINUED | OUTPATIENT
Start: 2025-07-23 | End: 2025-07-28 | Stop reason: HOSPADM

## 2025-07-23 RX ORDER — POLYETHYLENE GLYCOL 3350 17 G/17G
17 POWDER, FOR SOLUTION ORAL DAILY PRN
Status: DISCONTINUED | OUTPATIENT
Start: 2025-07-23 | End: 2025-07-28 | Stop reason: HOSPADM

## 2025-07-23 RX ORDER — SODIUM CHLORIDE 0.9 % (FLUSH) 0.9 %
10 SYRINGE (ML) INJECTION AS NEEDED
Status: DISCONTINUED | OUTPATIENT
Start: 2025-07-23 | End: 2025-07-28 | Stop reason: HOSPADM

## 2025-07-23 RX ORDER — SODIUM CHLORIDE 9 MG/ML
40 INJECTION, SOLUTION INTRAVENOUS AS NEEDED
Status: DISCONTINUED | OUTPATIENT
Start: 2025-07-23 | End: 2025-07-28 | Stop reason: HOSPADM

## 2025-07-23 RX ORDER — ALBUTEROL SULFATE 90 UG/1
2 INHALANT RESPIRATORY (INHALATION) EVERY 4 HOURS PRN
Status: DISCONTINUED | OUTPATIENT
Start: 2025-07-23 | End: 2025-07-28 | Stop reason: HOSPADM

## 2025-07-23 RX ORDER — BISACODYL 5 MG/1
5 TABLET, DELAYED RELEASE ORAL DAILY PRN
Status: DISCONTINUED | OUTPATIENT
Start: 2025-07-23 | End: 2025-07-28 | Stop reason: HOSPADM

## 2025-07-23 RX ORDER — FOLIC ACID 0.4 MG
400 TABLET ORAL DAILY
Status: DISCONTINUED | OUTPATIENT
Start: 2025-07-23 | End: 2025-07-28 | Stop reason: HOSPADM

## 2025-07-23 RX ORDER — NITROGLYCERIN 0.4 MG/1
0.4 TABLET SUBLINGUAL
Status: DISCONTINUED | OUTPATIENT
Start: 2025-07-23 | End: 2025-07-28 | Stop reason: HOSPADM

## 2025-07-23 RX ORDER — MIDODRINE HYDROCHLORIDE 5 MG/1
5 TABLET ORAL
Status: DISCONTINUED | OUTPATIENT
Start: 2025-07-23 | End: 2025-07-28 | Stop reason: HOSPADM

## 2025-07-23 RX ORDER — LIDOCAINE HYDROCHLORIDE 10 MG/ML
10 INJECTION, SOLUTION INFILTRATION; PERINEURAL ONCE
Status: DISCONTINUED | OUTPATIENT
Start: 2025-07-23 | End: 2025-07-23

## 2025-07-23 RX ORDER — ALLOPURINOL 100 MG/1
100 TABLET ORAL DAILY
Status: DISCONTINUED | OUTPATIENT
Start: 2025-07-23 | End: 2025-07-28 | Stop reason: HOSPADM

## 2025-07-23 RX ORDER — AMOXICILLIN 250 MG
2 CAPSULE ORAL 2 TIMES DAILY PRN
Status: DISCONTINUED | OUTPATIENT
Start: 2025-07-23 | End: 2025-07-28 | Stop reason: HOSPADM

## 2025-07-23 RX ORDER — BISACODYL 10 MG
10 SUPPOSITORY, RECTAL RECTAL DAILY PRN
Status: DISCONTINUED | OUTPATIENT
Start: 2025-07-23 | End: 2025-07-28 | Stop reason: HOSPADM

## 2025-07-23 RX ORDER — LIDOCAINE HYDROCHLORIDE 10 MG/ML
10 INJECTION, SOLUTION EPIDURAL; INFILTRATION; INTRACAUDAL; PERINEURAL ONCE
Status: DISCONTINUED | OUTPATIENT
Start: 2025-07-23 | End: 2025-07-28 | Stop reason: HOSPADM

## 2025-07-23 RX ORDER — LEVOTHYROXINE SODIUM 25 UG/1
25 TABLET ORAL EVERY MORNING
Status: DISCONTINUED | OUTPATIENT
Start: 2025-07-23 | End: 2025-07-28 | Stop reason: HOSPADM

## 2025-07-23 RX ADMIN — BENZONATATE 100 MG: 100 CAPSULE ORAL at 03:11

## 2025-07-23 RX ADMIN — LEVOTHYROXINE SODIUM 25 MCG: 0.03 TABLET ORAL at 06:32

## 2025-07-23 RX ADMIN — ALLOPURINOL 100 MG: 100 TABLET ORAL at 09:14

## 2025-07-23 RX ADMIN — MIDODRINE HYDROCHLORIDE 5 MG: 5 TABLET ORAL at 12:25

## 2025-07-23 RX ADMIN — MIDODRINE HYDROCHLORIDE 5 MG: 5 TABLET ORAL at 16:34

## 2025-07-23 RX ADMIN — FOLIC ACID TAB 400 MCG 400 MCG: 400 TAB at 09:14

## 2025-07-23 RX ADMIN — MIDODRINE HYDROCHLORIDE 5 MG: 5 TABLET ORAL at 06:32

## 2025-07-23 RX ADMIN — LIDOCAINE HYDROCHLORIDE 10 ML: 10 INJECTION, SOLUTION INFILTRATION; PERINEURAL at 14:39

## 2025-07-23 RX ADMIN — Medication 10 ML: at 20:21

## 2025-07-23 RX ADMIN — ATORVASTATIN CALCIUM 20 MG: 20 TABLET, FILM COATED ORAL at 09:14

## 2025-07-23 RX ADMIN — SODIUM CHLORIDE 2000 MG: 900 INJECTION INTRAVENOUS at 16:34

## 2025-07-23 RX ADMIN — SODIUM CHLORIDE 2000 MG: 900 INJECTION INTRAVENOUS at 03:06

## 2025-07-23 RX ADMIN — CEFTRIAXONE 2000 MG: 2 INJECTION, POWDER, FOR SOLUTION INTRAMUSCULAR; INTRAVENOUS at 18:40

## 2025-07-23 RX ADMIN — IPRATROPIUM BROMIDE AND ALBUTEROL SULFATE 3 ML: .5; 3 SOLUTION RESPIRATORY (INHALATION) at 00:36

## 2025-07-23 NOTE — PLAN OF CARE
Goal Outcome Evaluation:      VSS. Afib on tele. Pt rested most of the shift. Wife at bedside. Pt went for paracentesis today.

## 2025-07-23 NOTE — ED NOTES
Jordi Sam    Nursing Report ED to Floor:  Mental status: anox4  Ambulatory status: up with two   Oxygen Therapy:  ra  Cardiac Rhythm: nsr  Admitted from: home  Safety Concerns:  none  Precautions: none  Social Issues: none  ED Room #:  12    ED Nurse Phone Extension - [phone] or may call [phone].      HPI:   Chief Complaint   Patient presents with    Weakness - Generalized       Past Medical History:  Past Medical History:   Diagnosis Date    A-fib     Acute inferior myocardial infarction     Acute inferior STEMI with RV infarct features, January 2009.     Arrhythmia     CAD (coronary artery disease)     Cellulitis 02/26/2023    Facial. Feb 26-March 1st admission at [facility]. 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 Seeing Dr Kiersten Nova and Dr Jasper Avery 8-2021        Past Surgical History:  Past Surgical History:   Procedure Laterality Date    BRONCHOSCOPY N/A 4/12/2019    Procedure: BRONCHOSCOPY WITH THORACENTESIS;  Surgeon: Marciano Higginbotham MD;  Location:  [facility]COPY;  Service: Pulmonary    CARDIAC CATHETERIZATION Bilateral 1/30/2019    Procedure: Right and Left Heart Cath;  Surgeon: Efrem Posadas MD;  Location:  [facility];  Service: Cardiology    CARDIAC CATHETERIZATION      CHOLECYSTECTOMY      CORONARY ANGIOPLASTY WITH STENT PLACEMENT  01/09/2009    Sirolimus eluting stents to the RCA, 01/09/2009.     HERNIA REPAIR       Hernia repair x 2.     PARACENTESIS  06/17/2019    multiple    UMBILICAL HERNIA REPAIR N/A 7/26/2020    Procedure: UMBILICAL HERNIA REPAIR;  Surgeon: Maribell Her MD;  Location: [facility];  Service: General;  Laterality: N/A;        Admitting Doctor:   Candis Grant MD    Consulting Provider(s):  Consults       No orders found for last 30 day(s).             Admitting Diagnosis:   The primary encounter diagnosis was Sepsis with acute renal failure without septic shock, due to unspecified organism, unspecified acute renal failure type. Diagnoses of Acute UTI (urinary tract infection), Cellulitis of right lower extremity, Stage 3b chronic kidney disease, and Chronic right heart failure were also pertinent to this visit.    Most Recent Vitals:   Vitals:    07/23/25 0000 07/23/25 0002 07/23/25 0030 07/23/25 0100   BP:  128/77 95/52 106/51   Pulse: 88  60 68   Resp:       Temp:       TempSrc:       SpO2:   97% 96%   Weight:       Height:           Active LDAs/IV Access:   Lines, Drains & Airways       Active LDAs       Name Placement date Placement time Site Days    Peripheral IV 07/22/25 1843 18 G Anterior;Left Antecubital 07/22/25 1843  Antecubital  less than 1                    Labs (abnormal labs have a star):   Labs Reviewed   COMPREHENSIVE METABOLIC PANEL - Abnormal; Notable for the following components:       Result Value    Glucose 126 (*)     BUN 32.0 (*)     Creatinine 2.02 (*)     Sodium 130 (*)     Chloride 95 (*)     CO2 21.8 (*)     Albumin 3.4 (*)     eGFR 30.6 (*)     All other components within normal limits    Narrative:     GFR Categories in Chronic Kidney Disease (CKD)              GFR Category          GFR (mL/min/1.73)    Interpretation  G1                    90 or greater        Normal or high (1)  G2                    60-89                Mild decrease (1)  G3a                   45-59                Mild to moderate decrease  G3b                   30-44                Moderate to  severe decrease  G4                    15-29                Severe decrease  G5                    14 or less           Kidney failure    (1)In the absence of evidence of kidney disease, neither GFR category G1 or G2 fulfill the criteria for CKD.    eGFR calculation 2021 CKD-EPI creatinine equation, which does not include race as a factor   BNP (IN-HOUSE) - Abnormal; Notable for the following components:    proBNP 12,872.0 (*)     All other components within normal limits    Narrative:     This assay is used as an aid in the diagnosis of individuals suspected of having heart failure. It can be used as an aid in the diagnosis of acute decompensated heart failure (ADHF) in patients presenting with signs and symptoms of ADHF to the emergency department (ED). In addition, NT-proBNP of <300 pg/mL indicates ADHF is not likely.    Age Range Result Interpretation  NT-proBNP Concentration (pg/mL:      <50             Positive            >450                   Gray                 300-450                    Negative             <300    50-75           Positive            >900                  Gray                300-900                  Negative            <300      >75             Positive            >1800                  Gray                300-1800                  Negative            <300   TROPONIN - Abnormal; Notable for the following components:    HS Troponin T 86 (*)     All other components within normal limits    Narrative:     High Sensitive Troponin T Reference Range:  <14.0 ng/L- Negative Female for AMI  <22.0 ng/L- Negative Male for AMI  >=14 - Abnormal Female indicating possible myocardial injury.  >=22 - Abnormal Male indicating possible myocardial injury.   Clinicians would have to utilize clinical acumen, EKG, Troponin, and serial changes to determine if it is an Acute Myocardial Infarction or myocardial injury due to an underlying chronic condition.        CBC WITH AUTO DIFFERENTIAL - Abnormal; Notable  "for the following components:    RBC 3.22 (*)     Hemoglobin 9.7 (*)     Hematocrit 29.7 (*)     RDW 17.2 (*)     RDW-SD 58.4 (*)     Neutrophil % 89.2 (*)     Lymphocyte % 3.0 (*)     Eosinophil % 0.0 (*)     Immature Grans % 0.6 (*)     Lymphocytes, Absolute 0.21 (*)     All other components within normal limits   URINALYSIS W/ MICROSCOPIC IF INDICATED (NO CULTURE) - Abnormal; Notable for the following components:    Color, UA Dark Yellow (*)     Appearance, UA Turbid (*)     Ketones, UA Trace (*)     Blood, UA Large (3+) (*)     Protein,  mg/dL (2+) (*)     Leuk Esterase, UA Large (3+) (*)     All other components within normal limits   D-DIMER, QUANTITATIVE - Abnormal; Notable for the following components:    D-Dimer, Quantitative 5.36 (*)     All other components within normal limits    Narrative:     According to the assay 's published package insert, a normal (<0.50 MCGFEU/mL) D-dimer result in conjunction with a non-high clinical probability assessment, excludes deep vein thrombosis (DVT) and pulmonary embolism (PE) with high sensitivity.    D-dimer values increase with age and this can make VTE exclusion of an older population difficult. To address this, the American College [employer], based on best available evidence and recent guidelines, recommends that clinicians use age-adjusted D-dimer thresholds in patients greater than 50 years of age with: a) a low probability of PE who do not meet all Pulmonary Embolism Rule Out Criteria, or b) in those with intermediate probability of PE.   The formula for an age-adjusted D-dimer cut-off is \"age/100\".  For example, a 60 year old patient would have an age-adjusted cut-off of 0.60 MCGFEU/mL and an 80 year old 0.80 MCGFEU/mL.   LACTIC ACID, PLASMA - Abnormal; Notable for the following components:    Lactate 2.4 (*)     All other components within normal limits   PROCALCITONIN - Abnormal; Notable for the following components:    Procalcitonin " "0.49 (*)     All other components within normal limits    Narrative:     As a Marker for Sepsis (Non-Neonates):    1. <0.5 ng/mL represents a low risk of severe sepsis and/or septic shock.  2. >2 ng/mL represents a high risk of severe sepsis and/or septic shock.    As a Marker for Lower Respiratory Tract Infections that require antibiotic therapy:    PCT on Admission    Antibiotic Therapy       6-12 Hrs later    >0.5                Strongly Recommended  >0.25 - <0.5        Recommended   0.1 - 0.25          Discouraged              Remeasure/reassess PCT  <0.1                Strongly Discouraged     Remeasure/reassess PCT    As 28 day mortality risk marker: \"Change in Procalcitonin Result\" (>80% or <=80%) if Day 0 (or Day 1) and Day 4 values are available. Refer to http://[email or web address]-pct-calculator.[email or web address]    Change in PCT <=80%  A decrease of PCT levels below or equal to 80% defines a positive change in PCT test result representing a higher risk for 28-day all-cause mortality of patients diagnosed with severe sepsis for septic shock.    Change in PCT >80%  A decrease of PCT levels of more than 80% defines a negative change in PCT result representing a lower risk for 28-day all-cause mortality of patients diagnosed with severe sepsis or septic shock.      C-REACTIVE PROTEIN - Abnormal; Notable for the following components:    C-Reactive Protein 10.10 (*)     All other components within normal limits   AMMONIA - Abnormal; Notable for the following components:    Ammonia 15 (*)     All other components within normal limits   BLOOD GAS, ARTERIAL W/CO-OXIMETRY - Abnormal; Notable for the following components:    pH, Arterial 7.470 (*)     pCO2, Arterial 30.7 (*)     pO2, Arterial 71.6 (*)     Base Excess, Arterial -0.9 (*)     Hemoglobin, Blood Gas 9.4 (*)     Hematocrit, Blood Gas 28.9 (*)     pCO2, Temperature Corrected 30.7 (*)     pO2, Temperature Corrected 71.6 (*)     All other components within normal limits   HIGH " SENSITIVITIY TROPONIN T 1HR - Abnormal; Notable for the following components:    HS Troponin T 81 (*)     All other components within normal limits    Narrative:     High Sensitive Troponin T Reference Range:  <14.0 ng/L- Negative Female for AMI  <22.0 ng/L- Negative Male for AMI  >=14 - Abnormal Female indicating possible myocardial injury.  >=22 - Abnormal Male indicating possible myocardial injury.   Clinicians would have to utilize clinical acumen, EKG, Troponin, and serial changes to determine if it is an Acute Myocardial Infarction or myocardial injury due to an underlying chronic condition.        URINALYSIS, MICROSCOPIC ONLY - Abnormal; Notable for the following components:    RBC, UA 11-20 (*)     WBC, UA Too Numerous to Count (*)     Bacteria, UA 4+ (*)     Squamous Epithelial Cells, UA 31-50 (*)     All other components within normal limits   RAPID STREP A SCREEN - Normal    Narrative:     Test performed by [facility].   RESPIRATORY PANEL PCR W/ COVID-19 (SARS-COV-2), NP SWAB IN UTM/VTP, 2 HR TAT - Normal    Narrative:     In the setting of a positive respiratory panel with a viral infection PLUS a negative procalcitonin without other underlying concern for bacterial infection, consider observing off antibiotics or discontinuation of antibiotics and continue supportive care. If the respiratory panel is positive for atypical bacterial infection (Bordetella pertussis, Chlamydophila pneumoniae, or Mycoplasma pneumoniae), consider antibiotic de-escalation to target atypical bacterial infection.   LIPASE - Normal   MONONUCLEOSIS SCREEN - Normal    Narrative:     Indicates the absence of Heterophile Antibodies associated with Infectious Mononucleosis.   MAGNESIUM - Normal   PHOSPHORUS - Normal   TSH RFX ON ABNORMAL TO FREE T4 - Normal   LACTIC ACID, REFLEX - Normal   COVID PRE-OP / PRE-PROCEDURE SCREENING ORDER (NO ISOLATION)    Narrative:     The following orders were created for panel order COVID  PRE-OP / PRE-PROCEDURE SCREENING ORDER (NO ISOLATION) - Swab, Nasopharynx.  Procedure                               Abnormality         Status                     ---------                               -----------         ------                     Respiratory Panel PCR w/...[[id number]]  Normal              Final result                 Please view results for these tests on the individual orders.   BLOOD CULTURE   BLOOD CULTURE   BETA HEMOLYTIC STREP CULTURE, THROAT   RAINBOW DRAW    Narrative:     The following orders were created for panel order [place] Draw.  Procedure                               Abnormality         Status                     ---------                               -----------         ------                     Green Top (Gel)[[id number]]                                  Final result               Lavender Top[[id number]]                                     Final result               Gold Top - SST[[id number]]                                   Final result               [place] Top[[id number]]                                         Final result               Light Blue Top[[id number]]                                   Final result                 Please view results for these tests on the individual orders.   BLOOD GAS, ARTERIAL   CBC AND DIFFERENTIAL    Narrative:     The following orders were created for panel order CBC & Differential.  Procedure                               Abnormality         Status                     ---------                               -----------         ------                     CBC Auto Differential[[id number]]        Abnormal            Final result                 Please view results for these tests on the individual orders.   GREEN TOP   LAVENDER TOP   GOLD TOP - SST   GRAY TOP   LIGHT BLUE TOP       Meds Given in ED:   Medications   sodium chloride 0.9 % flush 10 mL (has no administration in time range)   acetaminophen (TYLENOL) tablet 1,000 mg (1,000 mg Oral Given  7/1934)   vancomycin IVPB 2000 mg in 0.9% Sodium Chloride 500 mL (0 mg Intravenous Stopped 7/22/25 2317)   piperacillin-tazobactam (ZOSYN) 3.375 g IVPB in 100 mL NS MBP (CD) (0 g Intravenous Stopped 7/22/25 2032)   sodium chloride 0.9 % bolus 500 mL (0 mL Intravenous Stopped 7/22/25 2321)   iopamidol (ISOVUE-370) 76 % injection 100 mL (100 mL Intravenous Given 7/22/25 2114)   midodrine (PROAMATINE) tablet 5 mg (5 mg Oral Given 7/22/25 2316)   albumin human 25 % IV SOLN 25 g (0 g Intravenous Stopped 7/23/25 0011)   ipratropium-albuterol (DUO-NEB) nebulizer solution 3 mL (3 mL Nebulization Given 7/23/25 0036)           Last NIH score:                                                          Dysphagia screening results:  Patient Factors Component (Dysphagia:Stroke or Rule-out)  Best Eye Response: 4-->(E4) spontaneous (07/22/25 1845)  Best Motor Response: 6-->(M6) obeys commands (07/22/25 1845)  Best Verbal Response: 5-->(V5) oriented (07/22/25 1845)  [place] Coma Scale Score: 15 (07/22/25 1845)     [place] Coma Scale:  No data recorded     CIWA:        Restraint Type:            Isolation Status:  No active isolations

## 2025-07-23 NOTE — PRE-PROCEDURE NOTE
[facility]   Interventional Radiology H&P    Patient Name: Jordi Sam  : 1934  MRN: [id number]  Primary Care Physician:  Jasper Aevry MD  Referring Physician: No ref. provider found  Date of admission: 2025    Subjective   Subjective     HPI:  Jordi Sam is a 89 y.o. male with history of recurrent ascites.  Patient presents to interventional radiology for image guided paracentesis.    Review of Systems:   Constitutional no fever,  no weight loss       Otolaryngeal no difficulty swallowing   Cardiovascular no chest pain   Pulmonary no cough, no sputum production   Gastrointestinal no constipation, no diarrhea                         Personal History       Past Medical/Surgical History:   Past Medical History:   Diagnosis Date    A-fib     Acute inferior myocardial infarction     Acute inferior STEMI with RV infarct features, 2009.     Arrhythmia     CAD (coronary artery disease)     Cellulitis 2023    Facial. - admission at [facility]. 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 Seeing Dr Kiersten Nova and Dr Jasper Avery      Past Surgical History:   Procedure Laterality Date    BRONCHOSCOPY N/A 2019    Procedure: BRONCHOSCOPY WITH THORACENTESIS;  Surgeon: Marciano Higginbotham MD;  Location: [facility] ENDOSCOPY;  Service: Pulmonary    CARDIAC CATHETERIZATION Bilateral 2019    Procedure: Right and  Left Heart Cath;  Surgeon: Efrem Posadas MD;  Location:  [facility] CATH INVASIVE LOCATION;  Service: Cardiology    CARDIAC CATHETERIZATION      CHOLECYSTECTOMY      CORONARY ANGIOPLASTY WITH STENT PLACEMENT  01/09/2009    Sirolimus eluting stents to the RCA, 01/09/2009.     HERNIA REPAIR      Hernia repair x 2.     PARACENTESIS  06/17/2019    multiple    UMBILICAL HERNIA REPAIR N/A 7/26/2020    Procedure: UMBILICAL HERNIA REPAIR;  Surgeon: Maribell Her MD;  Location:  [facility] OR;  Service: General;  Laterality: N/A;       Social History:  reports that he quit smoking about 42 years ago. His smoking use included cigarettes. He started smoking about 70 years ago. He has a 70 pack-year smoking history. He has never been exposed to tobacco smoke. He has never used smokeless tobacco. He reports that he does not drink alcohol and does not use drugs.    Medications:  Medications Prior to Admission   Medication Sig Dispense Refill Last Dose/Taking    albuterol sulfate  (90 Base) MCG/ACT inhaler Inhale 2 puffs Every 4 (Four) Hours As Needed for Wheezing. 18 g 0     allopurinol (ZYLOPRIM) 100 MG tablet Take 1 tablet by mouth once daily 90 tablet 0     apixaban (ELIQUIS) 2.5 MG tablet tablet Take 1 tablet by mouth 2 (Two) Times a Day.       benzonatate (Tessalon Perles) 100 MG capsule Take 1 capsule by mouth 3 (Three) Times a Day As Needed for Cough. 60 capsule 1     cefdinir (OMNICEF) 300 MG capsule Take 1 capsule by mouth 2 (Two) Times a Day. 28 capsule 0     cefdinir (OMNICEF) 300 MG capsule Take 1 capsule by mouth 2 (Two) Times a Day. 20 capsule 0     Cholecalciferol (Vitamin D3) 25 MCG (1000 UT) capsule Take 2 capsules by mouth Daily. 180 capsule 3     folic acid (FOLVITE) 400 MCG tablet Take 1 tablet by mouth Daily.       furosemide (LASIX) 40 MG tablet TAKE 1 TABLET BY MOUTH ONCE DAILY IN THE MORNING, 1/2 TABLET IN THE AFTERNOON AND AS NEEDED FOR WEIGHT GAIN, EDEMA AND DYPNEA 135 tablet 0      HYDROcodone-acetaminophen (NORCO) 5-325 MG per tablet Take 1 tablet by mouth Every 6 (Six) Hours As Needed.       Hydrocortisone, Perianal, (Anusol-HC) 2.5 % rectal cream Insert  into the rectum 2 (Two) Times a Day. 30 g 1     ibuprofen (ADVIL,MOTRIN) 600 MG tablet Take 1 tablet by mouth Every 6 (Six) Hours As Needed for Mild Pain. 6 tablet 0     levothyroxine (SYNTHROID, LEVOTHROID) 25 MCG tablet TAKE 1 TABLET BY MOUTH ONCE DAILY IN THE MORNING 90 tablet 0     lidocaine (LIDODERM) 5 % Place 1 patch on the skin as directed by provider Daily. Remove & Discard patch within 12 hours or as directed by MD 6 patch 0     methocarbamol (ROBAXIN) 750 MG tablet Take 1 tablet by mouth 3 (Three) Times a Day for 3 days. 9 tablet 0     simvastatin (ZOCOR) 40 MG tablet Take 1 tablet by mouth once daily 90 tablet 0     spironolactone (Aldactone) 50 MG tablet Take 1 tablet by mouth Daily. 90 tablet 2     terazosin (HYTRIN) 2 MG capsule TAKE 1 CAPSULE BY MOUTH ONCE DAILY AT NIGHT 90 capsule 0      Current medications:  allopurinol, 100 mg, Oral, Daily  atorvastatin, 20 mg, Oral, Daily  ceFAZolin, 2,000 mg, Intravenous, Q12H  folic acid, 400 mcg, Oral, Daily  levothyroxine, 25 mcg, Oral, QAM  lidocaine PF 1%, 10 mL, Infiltration, Once  midodrine, 5 mg, Oral, TID AC  sodium chloride, 10 mL, Intravenous, Q12H  spironolactone, 50 mg, Oral, Daily      Current IV drips:       Allergies:  Allergies   Allergen Reactions    Bee Venom Swelling    Sulfamethoxazole-Trimethoprim Other (See Comments)     Pt unaware       Objective    Objective     Vitals:   Temp:  [97.4 °F (36.3 °C)-99.5 °F (37.5 °C)] 97.5 °F (36.4 °C)  Heart Rate:  [60-88] 71  Resp:  [18] 18  BP: ()/(44-77) 118/66  Flow (L/min) (Oxygen Therapy):  [1] 1      Physical Exam:   Constitutional: Awake, alert, No acute distress    Respiratory: nonlabored respirations    Gastrointestinal: soft, nontender, distended, large hernia.           Result Review        Result Review:      "Sodium   Date Value Ref Range Status   07/23/2025 129 (L) 136 - 145 mmol/L Final   07/22/2025 130 (L) 136 - 145 mmol/L Final       Potassium   Date Value Ref Range Status   07/23/2025 5.1 3.5 - 5.2 mmol/L Final   07/22/2025 4.9 3.5 - 5.2 mmol/L Final       Chloride   Date Value Ref Range Status   07/23/2025 96 (L) 98 - 107 mmol/L Final   07/22/2025 95 (L) 98 - 107 mmol/L Final       No results found for: \"PLASMABICARB\"    BUN   Date Value Ref Range Status   07/23/2025 33.3 (H) 8.0 - 23.0 mg/dL Final   07/22/2025 32.0 (H) 8.0 - 23.0 mg/dL Final       Creatinine   Date Value Ref Range Status   07/23/2025 2.18 (H) 0.76 - 1.27 mg/dL Final   07/22/2025 2.02 (H) 0.76 - 1.27 mg/dL Final       Calcium   Date Value Ref Range Status   07/23/2025 8.4 8.2 - 9.6 mg/dL Final   07/22/2025 8.7 8.2 - 9.6 mg/dL Final           No components found for: \"GLUCOSE.*\"  Results from last 7 days   Lab Units 07/23/25  0413   WBC 10*3/mm3 5.98   HEMOGLOBIN g/dL 8.9*   HEMATOCRIT % 27.7*   PLATELETS 10*3/mm3 130*                Assessment / Plan     Assesment:  89-year-old male presenting to interventional radiology with a history of recurrent ascites.  Interventional radiology has been consulted for image guided paracentesis.    Plan:   Image guided paracentesis with local anesthesia only.    The risks and benefits of the procedure were discussed with the patient.    Electronically signed by Miguel Kong MD, 07/23/25, 2:41 PM EDT.    "

## 2025-07-23 NOTE — TELEPHONE ENCOUNTER
I called to speak with patients wife regarding her concerns for medications.   She did not get the [employer] message right away on 7/20 where the abx was called in for the patients UTI. He was admitted to the hospital yesterday. They have not started the omnicef and the patient is on a lot of medications now from the ER. Should they pick this up still?

## 2025-07-23 NOTE — SIGNIFICANT NOTE
07/23/25 1520   SLP Deferred Reason   SLP Deferred Reason Patient unavailable for evaluation  (Pt off the floor for a procedure)

## 2025-07-23 NOTE — CONSULTS
Consult    Patient Name: Jordi Sam  Medical Record Number: [id number]  YOB: 1934    Date of consultation: 7/23/2025    Referring Provider:No ref. provider found Candis Grant MD and Josette Clifford MD  Reason for Consultation: UTI, hematuria, urethral stricture, sepsis    Patient Care Team:  Jasper Avery MD as PCP - General (Internal Medicine)  Jordi Law MD as Consulting Physician (Urology)  Chapin Villagran MD as Consulting Physician (Otolaryngology)  Gen Laurent MD as Consulting Physician (Gastroenterology)  Ezekiel Abarca MD as Referring Physician (Dermatology)  Renny Faustin MD as Consulting Physician (Radiation Oncology)  Larry Sin MD as Consulting Physician (Cardiology)  Lv Van MD as Consulting Physician (Ophthalmology)    Chief complaint   Chief Complaint   Patient presents with    Weakness - Generalized       Subjective .     History of present illness:    89-year-old white male with a past urologic history of BPH, recurrent UTIs,gross hematuria, and urethral stricture.  He has a history of multiple medical problems including diabetes, A-fib on Eliquis, CAD, CHF, HTN, HLD, cirrhosis, ascites, ventral hernia, and cellulitis.  He gets monthly paracentesis.  He had a couple episodes of gross hematuria and underwent local cystoscopy at the [facility] 5/2025 and was found to have a 5 Naur[place] stricture in the pendulous or bulbous urethra and prostate and bladder were not evaluated.  We planned cystoscopy with possible urethral dilation, direct vision internal urethrotomy, and bladder biopsy but the patient and his wife opted to defer that due to other medical issues.    A urine culture 7/17/2025 through Dr. Avery again showed E. coli.  He was prescribed an antibiotic but he fell and cut his head and came to the emergency room and his wife did not  the antibiotic.    He had weakness, lethargy, and shortness of breath at  home and was brought to the emergency room.  He was admitted 7/22/2025 for sepsis likely due to UTI.  Blood culture PCR showed E. coli.  Labs included WBC 6.98, hemoglobin 9.7, platelets 148, creatinine 2.02, lactate 2.4, and Pro-Mason 0.49.  He received Zosyn and vancomycin and switched to Rocephin.  The Eliquis is on hold.  A CT scan with contrast showed cirrhosis with large ascites, large ventral hernia, small bilateral inguinal hernias, and thickened bladder at the dome.  A CT angio of the chest showed no PE.    He underwent paracentesis today with 9 L fluid drained.  He feels better and is breathing better.  He is voiding into a male PureWick.  The urine is hazy dark yellow in the canister.    Past Medical History:   Diagnosis Date    A-fib     Acute inferior myocardial infarction     Acute inferior STEMI with RV infarct features, January 2009.     Arrhythmia     CAD (coronary artery disease)     Cellulitis 02/26/2023    Facial. Feb 26-March 1st admission at [facility]. 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 Seeing Dr Kiersten Nova and Dr Jasper Avery 8-2021     Past Surgical History:   Procedure Laterality Date    BRONCHOSCOPY N/A 4/12/2019    Procedure: BRONCHOSCOPY WITH THORACENTESIS;  Surgeon: Marciano Higginbotham MD;  Location: [facility] ENDOSCOPY;  Service: Pulmonary    CARDIAC CATHETERIZATION Bilateral 1/30/2019    Procedure: Right and Left Heart Cath;   Surgeon: Efrem Posadas MD;  Location:  [facility] CATH INVASIVE LOCATION;  Service: Cardiology    CARDIAC CATHETERIZATION      CHOLECYSTECTOMY      CORONARY ANGIOPLASTY WITH STENT PLACEMENT  2009    Sirolimus eluting stents to the RCA, 2009.     HERNIA REPAIR      Hernia repair x 2.     PARACENTESIS  2019    multiple    UMBILICAL HERNIA REPAIR N/A 2020    Procedure: UMBILICAL HERNIA REPAIR;  Surgeon: Maribell Her MD;  Location:  [facility] OR;  Service: General;  Laterality: N/A;     Family History   Problem Relation Age of Onset    Dementia Mother     Stroke Mother     Heart disease Father     Heart attack Father     Aneurysm Father     Cancer Sister     No Known Problems Sister     No Known Problems Brother     Heart disease Brother         CABG    Stroke Brother     Heart disease Brother     Hypertension Daughter     Hypertension Son     Colon cancer Neg Hx     Colon polyps Neg Hx      Social History     Tobacco Use    Smoking status: Former     Current packs/day: 0.00     Average packs/day: 2.5 packs/day for 28.0 years (70.0 ttl pk-yrs)     Types: Cigarettes     Start date: 1955     Quit date: 1983     Years since quittin.2     Passive exposure: Never    Smokeless tobacco: Never   Vaping Use    Vaping status: Never Used   Substance Use Topics    Alcohol use: No    Drug use: No     Medications Prior to Admission   Medication Sig Dispense Refill Last Dose/Taking    albuterol sulfate  (90 Base) MCG/ACT inhaler Inhale 2 puffs Every 4 (Four) Hours As Needed for Wheezing. 18 g 0     allopurinol (ZYLOPRIM) 100 MG tablet Take 1 tablet by mouth once daily 90 tablet 0     apixaban (ELIQUIS) 2.5 MG tablet tablet Take 1 tablet by mouth 2 (Two) Times a Day.       benzonatate (Tessalon Perles) 100 MG capsule Take 1 capsule by mouth 3 (Three) Times a Day As Needed for Cough. 60 capsule 1     cefdinir (OMNICEF) 300 MG capsule Take 1 capsule by mouth 2 (Two) Times a Day. 28 capsule  "0     cefdinir (OMNICEF) 300 MG capsule Take 1 capsule by mouth 2 (Two) Times a Day. 20 capsule 0     Cholecalciferol (Vitamin D3) 25 MCG (1000 UT) capsule Take 2 capsules by mouth Daily. 180 capsule 3     folic acid (FOLVITE) 400 MCG tablet Take 1 tablet by mouth Daily.       furosemide (LASIX) 40 MG tablet TAKE 1 TABLET BY MOUTH ONCE DAILY IN THE MORNING, 1/2 TABLET IN THE AFTERNOON AND AS NEEDED FOR WEIGHT GAIN, EDEMA AND DYPNEA 135 tablet 0     HYDROcodone-acetaminophen (NORCO) 5-325 MG per tablet Take 1 tablet by mouth Every 6 (Six) Hours As Needed.       Hydrocortisone, Perianal, (Anusol-HC) 2.5 % rectal cream Insert  into the rectum 2 (Two) Times a Day. 30 g 1     ibuprofen (ADVIL,MOTRIN) 600 MG tablet Take 1 tablet by mouth Every 6 (Six) Hours As Needed for Mild Pain. 6 tablet 0     levothyroxine (SYNTHROID, LEVOTHROID) 25 MCG tablet TAKE 1 TABLET BY MOUTH ONCE DAILY IN THE MORNING 90 tablet 0     lidocaine (LIDODERM) 5 % Place 1 patch on the skin as directed by provider Daily. Remove & Discard patch within 12 hours or as directed by MD 6 patch 0     methocarbamol (ROBAXIN) 750 MG tablet Take 1 tablet by mouth 3 (Three) Times a Day for 3 days. 9 tablet 0     simvastatin (ZOCOR) 40 MG tablet Take 1 tablet by mouth once daily 90 tablet 0     spironolactone (Aldactone) 50 MG tablet Take 1 tablet by mouth Daily. 90 tablet 2     terazosin (HYTRIN) 2 MG capsule TAKE 1 CAPSULE BY MOUTH ONCE DAILY AT NIGHT 90 capsule 0      Allergies:  Bee venom and Sulfamethoxazole-trimethoprim    Review of Systems  Pertinent items are noted in HPI    Objective     Vital Signs   /57   Pulse 71   Temp 97.5 °F (36.4 °C) (Axillary)   Resp 18   Ht 180.3 cm (71\")   Wt 99.8 kg (220 lb)   SpO2 95%   BMI 30.68 kg/m²     Physical Exam:  General Appearance: No acute distress, sitting up in bed, pleasant, appears comfortable, sleepy but arousable  Lungs: Respirations regular, even and  unlabored  Abdomen: Large ventral " hernia  Genital: Penis and scrotum normal, wearing a male PureWick, urine hazy dark yellow in the canister  Extremities: Bilateral lower extremity edema with venous stasis skin changes  Neurologic: Neurologically grossly intact    Results Review:  Lab Results (last 24 hours)       Procedure Component Value Units Date/Time    Blood Culture ID, PCR - Blood, Hand, Right [[id number]]  (Abnormal) Collected: 07/22/25 1908    Specimen: Blood from Hand, Right Updated: 07/23/25 1715     BCID, PCR Escherichia coli. Identification by BCID2 PCR.     BOTTLE TYPE Aerobic Bottle    Narrative:      No resistance genes detected.    Body Fluid Cell Count With Differential - Body Fluid, Peritoneum [[id number]] Collected: 07/23/25 1440    Specimen: Body Fluid from Peritoneum Updated: 07/23/25 1706    Narrative:      The following orders were created for panel order Body Fluid Cell Count With Differential - Body Fluid, Peritoneum.  Procedure                               Abnormality         Status                     ---------                               -----------         ------                     Body fluid cell count - ...[[id number]]                      Final result               Body fluid differential ...[[id number]]                      Final result                 Please view results for these tests on the individual orders.    Body fluid differential - Body Fluid, Peritoneum [[id number]] Collected: 07/23/25 1440    Specimen: Body Fluid from Peritoneum Updated: 07/23/25 1706     Neutrophils, Fluid % 11 %      Lymphocytes, Fluid % 25 %      Monocytes, Fluid % 2 %      Mesothelial Cells, Fluid % 4 %      Macrophage, Fluid % 58 %     Narrative:      If the reference range(s) are not listed, then the reference range(s) have not been defined, so unavailable for the body fluid result.    Albumin, Fluid - Body Fluid, Peritoneum [[id number]] Collected: 07/23/25 1440    Specimen: Body Fluid from Peritoneum Updated: 07/23/25 1635     Lactate Dehydrogenase, Body Fluid - Body Fluid, Peritoneum [[id number]] Collected: 07/23/25 1440    Specimen: Body Fluid from Peritoneum Updated: 07/23/25 1635    Protein, Body Fluid - Body Fluid, Peritoneum [[id number]] Collected: 07/23/25 1440    Specimen: Body Fluid from Peritoneum Updated: 07/23/25 1635    Body fluid cell count - Body Fluid, Peritoneum [[id number]] Collected: 07/23/25 1440    Specimen: Body Fluid from Peritoneum Updated: 07/23/25 1626     Color, Fluid Yellow     Appearance, Fluid Clear     WBC, Fluid 39 /mm3      RBC, Fluid <2,000 /mm3     Narrative:      If the reference range(s) are not listed, then the reference range(s) have not been defined, so unavailable for the body fluid result.    Anaerobic Culture - Body Fluid, Peritoneum [[id number]] Collected: 07/23/25 1440    Specimen: Body Fluid from Peritoneum Updated: 07/23/25 1607    Body Fluid Culture - Body Fluid, Peritoneum [[id number]] Collected: 07/23/25 1440    Specimen: Body Fluid from Peritoneum Updated: 07/23/25 1607    Blood Culture - Blood, Hand, Right [[id number]]  (Abnormal) Collected: 07/22/25 1908    Specimen: Blood from Hand, Right Updated: 07/23/25 1543     Blood Culture Abnormal Stain     Gram Stain Aerobic Bottle Gram negative bacilli    Beta Strep Culture, Throat - Swab, Throat [[id number]]  (Normal) Collected: 07/22/25 1906    Specimen: Swab from Throat Updated: 07/23/25 0933     Throat Culture, Beta Strep No Beta Hemolytic Streptococcus Isolated    Narrative:      Group A Strep incidence is low in adults. Positive culture for Beta hemolytic Streptococcus species can reflect colonization and not true infection. Please correlate clinically.    Mononucleosis Screen [[id number]]  (Normal) Collected: 07/22/25 1842    Specimen: Blood Updated: 07/23/25 0908     Monospot Negative    Narrative:      Indicates the absence of Heterophile Antibodies associated with Infectious Mononucleosis.    Phosphorus [[id number]]  (Normal) Collected:  07/23/25 0413    Specimen: Blood Updated: 07/23/25 0514     Phosphorus 3.3 mg/dL     Basic Metabolic Panel [[id number]]  (Abnormal) Collected: 07/23/25 0413    Specimen: Blood Updated: 07/23/25 0511     Glucose 125 mg/dL      BUN 33.3 mg/dL      Creatinine 2.18 mg/dL      Sodium 129 mmol/L      Potassium 5.1 mmol/L      Chloride 96 mmol/L      CO2 20.8 mmol/L      Calcium 8.4 mg/dL      BUN/Creatinine Ratio 15.3     Anion Gap 12.2 mmol/L      eGFR 27.9 mL/min/1.73     Narrative:      GFR Categories in Chronic Kidney Disease (CKD)              GFR Category          GFR (mL/min/1.73)    Interpretation  G1                    90 or greater        Normal or high (1)  G2                    60-89                Mild decrease (1)  G3a                   45-59                Mild to moderate decrease  G3b                   30-44                Moderate to severe decrease  G4                    15-29                Severe decrease  G5                    14 or less           Kidney failure    (1)In the absence of evidence of kidney disease, neither GFR category G1 or G2 fulfill the criteria for CKD.    eGFR calculation 2021 CKD-EPI creatinine equation, which does not include race as a factor    Magnesium [[id number]]  (Abnormal) Collected: 07/23/25 0413    Specimen: Blood Updated: 07/23/25 0511     Magnesium 2.4 mg/dL     CBC Auto Differential [[id number]]  (Abnormal) Collected: 07/23/25 0413    Specimen: Blood Updated: 07/23/25 0446     WBC 5.98 10*3/mm3      RBC 2.98 10*6/mm3      Hemoglobin 8.9 g/dL      Hematocrit 27.7 %      MCV 93.0 fL      MCH 29.9 pg      MCHC 32.1 g/dL      RDW 17.2 %      RDW-SD 57.8 fl      MPV 10.7 fL      Platelets 130 10*3/mm3      Neutrophil % 87.1 %      Lymphocyte % 2.8 %      Monocyte % 8.9 %      Eosinophil % 0.2 %      Basophil % 0.3 %      Immature Grans % 0.7 %      Neutrophils, Absolute 5.21 10*3/mm3      Lymphocytes, Absolute 0.17 10*3/mm3      Monocytes, Absolute 0.53 10*3/mm3       Eosinophils, Absolute 0.01 10*3/mm3      Basophils, Absolute 0.02 10*3/mm3      Immature Grans, Absolute 0.04 10*3/mm3      nRBC 0.0 /100 WBC     STAT Lactic Acid, Reflex [[id number]]  (Normal) Collected: 07/22/25 2330    Specimen: Blood Updated: 07/23/25 0006     Lactate 1.4 mmol/L      Comment: Falsely depressed results may occur on samples drawn from patients receiving N-Acetylcysteine (NAC) or Metamizole.       Urinalysis, Microscopic Only - Urine, Clean Catch [[id number]]  (Abnormal) Collected: 07/22/25 2252    Specimen: Urine, Clean Catch Updated: 07/22/25 2350     RBC, UA 11-20 /HPF      WBC, UA Too Numerous to Count /HPF      Bacteria, UA 4+ /HPF      Squamous Epithelial Cells, UA 31-50 /HPF      Hyaline Casts, UA       Unable to determine due to loaded field     /LPF     Methodology Manual Light Microscopy    Urinalysis With Microscopic If Indicated (No Culture) - Urine, Clean Catch [[id number]]  (Abnormal) Collected: 07/22/25 2252    Specimen: Urine, Clean Catch Updated: 07/22/25 2327     Color, UA Dark Yellow     Appearance, UA Turbid     pH, UA <=5.0     Specific Gravity, UA 1.023     Glucose, UA Negative     Ketones, UA Trace     Bilirubin, UA Negative     Blood, UA Large (3+)     Protein,  mg/dL (2+)     Leuk Esterase, UA Large (3+)     Nitrite, UA Negative     Urobilinogen, UA 1.0 E.U./dL    High Sensitivity Troponin T 1Hr [[id number]]  (Abnormal) Collected: 07/22/25 2037    Specimen: Blood Updated: 07/22/25 2117     HS Troponin T 81 ng/L      Troponin T Numeric Delta -5 ng/L      Troponin T % Delta -6    Narrative:      High Sensitive Troponin T Reference Range:  <14.0 ng/L- Negative Female for AMI  <22.0 ng/L- Negative Male for AMI  >=14 - Abnormal Female indicating possible myocardial injury.  >=22 - Abnormal Male indicating possible myocardial injury.   Clinicians would have to utilize clinical acumen, EKG, Troponin, and serial changes to determine if it is an Acute Myocardial Infarction or  myocardial injury due to an underlying chronic condition.         Blood Culture - Blood, Arm, Left [[id number]] Collected: 07/22/25 1908    Specimen: Blood from Arm, Left Updated: 07/22/25 2041    COVID PRE-OP / PRE-PROCEDURE SCREENING ORDER (NO ISOLATION) - Swab, Nasopharynx [[id number]]  (Normal) Collected: 07/22/25 1906    Specimen: Swab from Nasopharynx Updated: 07/22/25 2025    Narrative:      The following orders were created for panel order COVID PRE-OP / PRE-PROCEDURE SCREENING ORDER (NO ISOLATION) - Swab, Nasopharynx.  Procedure                               Abnormality         Status                     ---------                               -----------         ------                     Respiratory Panel PCR w/...[[id number]]  Normal              Final result                 Please view results for these tests on the individual orders.    Respiratory Panel PCR w/COVID-19(SARS-CoV-2) [facility] In-House, NP Swab in UTM/VTM, 2 HR TAT - Swab, Nasopharynx [[id number]]  (Normal) Collected: 07/22/25 1906    Specimen: Swab from Nasopharynx Updated: 07/22/25 2025     ADENOVIRUS, PCR Not Detected     Coronavirus 229E Not Detected     Coronavirus HKU1 Not Detected     Coronavirus NL63 Not Detected     Coronavirus OC43 Not Detected     COVID19 Not Detected     Human Metapneumovirus Not Detected     Human Rhinovirus/Enterovirus Not Detected     Influenza A PCR Not Detected     Influenza B PCR Not Detected     Parainfluenza Virus 1 Not Detected     Parainfluenza Virus 2 Not Detected     Parainfluenza Virus 3 Not Detected     Parainfluenza Virus 4 Not Detected     RSV, PCR Not Detected     Bordetella pertussis pcr Not Detected     Bordetella parapertussis PCR Not Detected     Chlamydophila pneumoniae PCR Not Detected     Mycoplasma pneumo by PCR Not Detected    Narrative:      In the setting of a positive respiratory panel with a viral infection PLUS a negative procalcitonin without other underlying concern  "for bacterial infection, consider observing off antibiotics or discontinuation of antibiotics and continue supportive care. If the respiratory panel is positive for atypical bacterial infection (Bordetella pertussis, Chlamydophila pneumoniae, or Mycoplasma pneumoniae), consider antibiotic de-escalation to target atypical bacterial infection.    D-dimer, Quantitative [[id number]]  (Abnormal) Collected: 07/22/25 1842    Specimen: Blood Updated: 07/22/25 2012     D-Dimer, Quantitative 5.36 MCGFEU/mL     Narrative:      According to the assay 's published package insert, a normal (<0.50 MCGFEU/mL) D-dimer result in conjunction with a non-high clinical probability assessment, excludes deep vein thrombosis (DVT) and pulmonary embolism (PE) with high sensitivity.    D-dimer values increase with age and this can make VTE exclusion of an older population difficult. To address this, the [employer], based on best available evidence and recent guidelines, recommends that clinicians use age-adjusted D-dimer thresholds in patients greater than 50 years of age with: a) a low probability of PE who do not meet all Pulmonary Embolism Rule Out Criteria, or b) in those with intermediate probability of PE.   The formula for an age-adjusted D-dimer cut-off is \"age/100\".  For example, a 60 year old patient would have an age-adjusted cut-off of 0.60 MCGFEU/mL and an 80 year old 0.80 MCGFEU/mL.    Ammonia [[id number]]  (Abnormal) Collected: 07/22/25 1905    Specimen: Blood Updated: 07/22/25 1939     Ammonia 15 umol/L     Rapid Strep A Screen - Swab, Throat [[id number]]  (Normal) Collected: 07/22/25 1906    Specimen: Swab from Throat Updated: 07/22/25 1935     Strep A Ag Negative    Narrative:      Test performed by [facility].    High Sensitivity Troponin T [[id number]]  (Abnormal) Collected: 07/22/25 1842    Specimen: Blood Updated: 07/22/25 1928     HS Troponin T 86 ng/L     Narrative:      High " Sensitive Troponin T Reference Range:  <14.0 ng/L- Negative Female for AMI  <22.0 ng/L- Negative Male for AMI  >=14 - Abnormal Female indicating possible myocardial injury.  >=22 - Abnormal Male indicating possible myocardial injury.   Clinicians would have to utilize clinical acumen, EKG, Troponin, and serial changes to determine if it is an Acute Myocardial Infarction or myocardial injury due to an underlying chronic condition.         Lactic Acid, Plasma [[id number]]  (Abnormal) Collected: 07/22/25 1842    Specimen: Blood Updated: 07/22/25 1928     Lactate 2.4 mmol/L      Comment: Falsely depressed results may occur on samples drawn from patients receiving N-Acetylcysteine (NAC) or Metamizole.       BNP [[id number]]  (Abnormal) Collected: 07/22/25 1842    Specimen: Blood Updated: 07/22/25 1926     proBNP 12,872.0 pg/mL     Narrative:      This assay is used as an aid in the diagnosis of individuals suspected of having heart failure. It can be used as an aid in the diagnosis of acute decompensated heart failure (ADHF) in patients presenting with signs and symptoms of ADHF to the emergency department (ED). In addition, NT-proBNP of <300 pg/mL indicates ADHF is not likely.    Age Range Result Interpretation  NT-proBNP Concentration (pg/mL:      <50             Positive            >450                   Gray                 300-450                    Negative             <300    50-75           Positive            >900                  Gray                300-900                  Negative            <300      >75             Positive            >1800                  Gray                300-1800                  Negative            <300    Lipase [[id number]]  (Normal) Collected: 07/22/25 1842    Specimen: Blood Updated: 07/22/25 1926     Lipase 28 U/L     Procalcitonin [[id number]]  (Abnormal) Collected: 07/22/25 1842    Specimen: Blood Updated: 07/22/25 1926     Procalcitonin 0.49 ng/mL     Narrative:      As a Marker  "for Sepsis (Non-Neonates):    1. <0.5 ng/mL represents a low risk of severe sepsis and/or septic shock.  2. >2 ng/mL represents a high risk of severe sepsis and/or septic shock.    As a Marker for Lower Respiratory Tract Infections that require antibiotic therapy:    PCT on Admission    Antibiotic Therapy       6-12 Hrs later    >0.5                Strongly Recommended  >0.25 - <0.5        Recommended   0.1 - 0.25          Discouraged              Remeasure/reassess PCT  <0.1                Strongly Discouraged     Remeasure/reassess PCT    As 28 day mortality risk marker: \"Change in Procalcitonin Result\" (>80% or <=80%) if Day 0 (or Day 1) and Day 4 values are available. Refer to [email or web address]    Change in PCT <=80%  A decrease of PCT levels below or equal to 80% defines a positive change in PCT test result representing a higher risk for 28-day all-cause mortality of patients diagnosed with severe sepsis for septic shock.    Change in PCT >80%  A decrease of PCT levels of more than 80% defines a negative change in PCT result representing a lower risk for 28-day all-cause mortality of patients diagnosed with severe sepsis or septic shock.       Magnesium [[id number]]  (Normal) Collected: 07/22/25 1842    Specimen: Blood Updated: 07/22/25 1926     Magnesium 2.3 mg/dL     TSH Rfx On Abnormal To Free T4 [[id number]]  (Normal) Collected: 07/22/25 1842    Specimen: Blood Updated: 07/22/25 1926     TSH 3.810 uIU/mL     Comprehensive Metabolic Panel [[id number]]  (Abnormal) Collected: 07/22/25 1842    Specimen: Blood Updated: 07/22/25 1926     Glucose 126 mg/dL      BUN 32.0 mg/dL      Creatinine 2.02 mg/dL      Sodium 130 mmol/L      Potassium 4.9 mmol/L      Chloride 95 mmol/L      CO2 21.8 mmol/L      Calcium 8.7 mg/dL      Total Protein 6.1 g/dL      Albumin 3.4 g/dL      ALT (SGPT) 13 U/L      AST (SGOT) 39 U/L      Alkaline Phosphatase 114 U/L      Total Bilirubin 0.8 mg/dL      Globulin 2.7 gm/dL "      Comment: Calculated Result        A/G Ratio 1.3 g/dL      BUN/Creatinine Ratio 15.8     Anion Gap 13.2 mmol/L      eGFR 30.6 mL/min/1.73     Narrative:      GFR Categories in Chronic Kidney Disease (CKD)              GFR Category          GFR (mL/min/1.73)    Interpretation  G1                    90 or greater        Normal or high (1)  G2                    60-89                Mild decrease (1)  G3a                   45-59                Mild to moderate decrease  G3b                   30-44                Moderate to severe decrease  G4                    15-29                Severe decrease  G5                    14 or less           Kidney failure    (1)In the absence of evidence of kidney disease, neither GFR category G1 or G2 fulfill the criteria for CKD.    eGFR calculation 2021 CKD-EPI creatinine equation, which does not include race as a factor    C-reactive Protein [[id number]]  (Abnormal) Collected: 07/22/25 1842    Specimen: Blood Updated: 07/22/25 1926     C-Reactive Protein 10.10 mg/dL     Phosphorus [[id number]]  (Normal) Collected: 07/22/25 1842    Specimen: Blood Updated: 07/22/25 1926     Phosphorus 2.6 mg/dL     Blood Gas, Arterial With Co-Ox [[id number]]  (Abnormal) Collected: 07/22/25 1919    Specimen: Arterial Blood Updated: 07/22/25 1919     Site Right Radial     Gian's Test N/A     pH, Arterial 7.470 pH units      Comment: 83 Value above reference range        pCO2, Arterial 30.7 mm Hg      Comment: 84 Value below reference range        pO2, Arterial 71.6 mm Hg      Comment: 84 Value below reference range        HCO3, Arterial 22.3 mmol/L      Base Excess, Arterial -0.9 mmol/L      Hemoglobin, Blood Gas 9.4 g/dL      Comment: 84 Value below reference range        Hematocrit, Blood Gas 28.9 %      Oxyhemoglobin 94.3 %      Methemoglobin 0.10 %      Carboxyhemoglobin 1.6 %      CO2 Content 23.2 mmol/L      Temperature 37.0     Barometric Pressure for Blood Gas --     Comment: N/A         Modality Room Air     FIO2 21 %      Rate 0 Breaths/minute      PIP 0 cmH2O      Comment: Meter: [id number]     :  [id number]        IPAP 0     EPAP 0     pH, Temp Corrected 7.470 pH Units      pCO2, Temperature Corrected 30.7 mm Hg      pO2, Temperature Corrected 71.6 mm Hg     [facility] [facility] [[id number]] Collected: 07/22/25 1842    Specimen: Blood Updated: 07/22/25 1900    Narrative:      The following orders were created for panel order [facility].  Procedure                               Abnormality         Status                     ---------                               -----------         ------                     Green Top (Gel)[[id number]]                                  Final result               Lavender Top[[id number]]                                     Final result               Gold Top - SST[[id number]]                                   Final result               Mosqueda Top[[id number]]                                         Final result               Light Blue Top[[id number]]                                   Final result                 Please view results for these tests on the individual orders.    Gold Top - SST [[id number]] Collected: 07/22/25 1842    Specimen: Blood Updated: 07/22/25 1900     Extra Tube Hold for add-ons.     Comment: Auto resulted.       Mosqueda Top [[id number]] Collected: 07/22/25 1842    Specimen: Blood Updated: 07/22/25 1900     Extra Tube Hold for add-ons.     Comment: Auto resulted.       Green Top (Gel) [[id number]] Collected: 07/22/25 1842    Specimen: Blood Updated: 07/22/25 1900     Extra Tube Hold for add-ons.     Comment: Auto resulted.       Lavender Top [[id number]] Collected: 07/22/25 1842    Specimen: Blood Updated: 07/22/25 1900     Extra Tube hold for add-on     Comment: Auto resulted       Light Blue Top [[id number]] Collected: 07/22/25 1842    Specimen: Blood Updated: 07/22/25 1900     Extra Tube Hold for add-ons.     Comment: Auto resulted       CBC &  Differential [[id number]]  (Abnormal) Collected: 07/22/25 1842    Specimen: Blood Updated: 07/22/25 1855    Narrative:      The following orders were created for panel order CBC & Differential.  Procedure                               Abnormality         Status                     ---------                               -----------         ------                     CBC Auto Differential[[id number]]        Abnormal            Final result                 Please view results for these tests on the individual orders.    CBC Auto Differential [[id number]]  (Abnormal) Collected: 07/22/25 1842    Specimen: Blood Updated: 07/22/25 1855     WBC 6.98 10*3/mm3      RBC 3.22 10*6/mm3      Hemoglobin 9.7 g/dL      Hematocrit 29.7 %      MCV 92.2 fL      MCH 30.1 pg      MCHC 32.7 g/dL      RDW 17.2 %      RDW-SD 58.4 fl      MPV 10.4 fL      Platelets 148 10*3/mm3      Neutrophil % 89.2 %      Lymphocyte % 3.0 %      Monocyte % 6.9 %      Eosinophil % 0.0 %      Basophil % 0.3 %      Immature Grans % 0.6 %      Neutrophils, Absolute 6.23 10*3/mm3      Lymphocytes, Absolute 0.21 10*3/mm3      Monocytes, Absolute 0.48 10*3/mm3      Eosinophils, Absolute 0.00 10*3/mm3      Basophils, Absolute 0.02 10*3/mm3      Immature Grans, Absolute 0.04 10*3/mm3      nRBC 0.0 /100 WBC           Imaging Results (Last 72 Hours)       Procedure Component Value Units Date/Time    US Paracentesis [[id number]] Collected: 07/23/25 1608    Specimen: Body Fluid Updated: 07/23/25 1613    Narrative:      PROCEDURE: Ultrasound-guided paracentesis     Procedural Personnel  Attending physician(s): Miguel Kong     Pre-procedure diagnosis: Paracentesis  Post-procedure diagnosis: Same  Indication: Uncomplicated ascites  Additional clinical history: None     Complications: No immediate complications.       Impression:         Ultrasound-guided paracentesis with drainage of 9100 mL of serous fluid.     Plan:      Resume care by clinical  team.  _______________________________________________________________     PROCEDURE SUMMARY:  - Limited abdominal ultrasound  - Ultrasound-guided paracentesis  - Additional procedure(s): None     PROCEDURE DETAILS:     Pre-procedure  Consent: Informed consent for the procedure including risks, benefits  and alternatives was obtained and time-out was performed prior to the  procedure.  Preparation: The site was prepared and draped using maximal sterile  barrier technique including cutaneous antisepsis.     Initial abdominal ultrasound  Initial abdominal ultrasound was performed.  Findings: Large ascites. A safe window for paracentesis was identified.     Paracentesis  Local anesthesia was administered. The peritoneal cavity was accessed  and fluid return confirmed position. Ascites was drained. The catheter  was then removed, and a sterile bandage was applied.  Paracentesis access technique: Real-time ultrasound guidance  Catheter placed: 5F Marieeh  Post-drainage ultrasound: Not performed     Additional Details  Additional description of procedure: None  Equipment details: None  Specimens removed: Abdominal fluid  Estimated blood loss (mL): Less than 10                 7/23/2025 4:10 PM by Miguel Kong MD on Workstation: [id number]       CT Angiogram Chest Pulmonary Embolism [[id number]] Collected: 07/22/25 2138     Updated: 07/22/25 2148    Narrative:      CT ANGIOGRAM CHEST PULMONARY EMBOLISM    Date of Exam: 7/22/2025 8:53 PM EDT    Indication: sepsis, dyspnea, chronic immobilization, leg swelling, dimer >500.    Comparison: 7/20/2025.    Technique: Axial CT images were obtained of the chest after the uneventful intravenous administration of 80 mL Isovue-370 utilizing pulmonary embolism protocol.  In addition, a 3-D volume rendered image was created for interpretation.  Reconstructed   coronal and sagittal images were also obtained. Automated exposure control and iterative construction methods were  used.      Findings:  There is no pathologic axillary adenopathy or other worrisome body wall soft tissue finding in the chest. The partially characterized upper abdomen demonstrates known cirrhosis and moderate volume ascites. There is a small pericardial effusion as well as   unchanged small left pleural effusion. Atherosclerotic, nonaneurysmal thoracic aorta. The pulmonary arteries demonstrate no evidence of focal filling defect. The lung fields demonstrate similar prominent volume loss in the left lower lobe adjacent to   the pleural effusion. There is no distinct suspicious pulmonary nodularity. The osseous structures again demonstrate a nonacute left clavicle fracture. No acute fracture or new aggressive osseous lesion is identified.      Impression:      Impression:  No evidence of acute pulmonary embolus. Redemonstrated chronic left pleural effusion with adjacent left basilar atelectasis as well as chronic pericardial effusion.        Electronically Signed: Son Carey MD    7/22/2025 9:45 PM EDT    Workstation ID: [id number]    CT Abdomen Pelvis With Contrast [[id number]] Collected: 07/22/25 2123     Updated: 07/22/25 2137    Narrative:      CT ABDOMEN PELVIS W CONTRAST    Date of Exam: 7/22/2025 8:53 PM EDT    Indication: Sepsis of unknown source, abdominal distention, history of chronic hernia and cirrhosis with ascites.    Comparison: CT abdomen pelvis 3/20/2025    Technique: Axial CT images were obtained of the abdomen and pelvis following the uneventful intravenous administration of 100 mL Isovue 370. Reconstructed coronal and sagittal images were also obtained. Automated exposure control and iterative   construction methods were used.      Findings:  Separately dictated CT chest.    Cirrhotic liver morphology. No discrete hypervascular liver lesion. Portal vasculature, splenic vein and superior mesenteric vein patent. Cholecystectomy. No dilation of biliary tree. Atrophic pancreas. Spleen within  normal limits in size. Minimal   varices. Large volume ascites.    No suspicious adrenal nodule. Symmetric renal size, contour and enhancement. Few small hypodense renal lesions favoring cysts too small to accurately characterize. There is a stable 1.1 cm partially exophytic lesion in the left anterior midpole showing   intrinsic high density on prior noncontrasted exam consistent with hemorrhagic/proteinaceous cyst. Punctate nonobstructing right upper pole calculus. No hydronephrosis.    Focal somewhat nodular urinary bladder wall thickening near hepatic dome measuring up to 1.2 cm thickness sagittal image 79 series 901. Prostate within normal limits in size. Symmetric seminal vesicles. Small hiatal hernia. Expected configuration stomach   and duodenum. Colonic diverticulosis. No evidence of bowel obstruction or active inflammation. Normal appendix. Diffuse aortic atherosclerotic disease. Fusiform dilation of infrarenal abdominal aorta measuring 3.1 cm, technically aneurysmal without   significant change from prior exam.    No overtly suspicious adenopathy. No organized fluid collection. No free air. There is a large fluid containing ventral abdominal wall hernia measuring 11.1 x 20.2 x 16.2 cm AP, transverse and craniocaudal dimension. Hernia defect measures 7.7 cm   transverse dimension containing a small portion of the traversing bowel. Small fat-containing right and small to moderate fat and fluid-containing left inguinal hernias. These appear similar to prior. Diffuse body wall edema. Gynecomastia. Paraspinous   muscle atrophy. Multilevel degenerative change throughout the spine. No aggressive bone lesion.      Impression:      Impression:  1. Focal nodular bladder wall thickening near urinary bladder dome, possibly presenting a primary malignancy (urothelial carcinoma). Recommend nonemergent urology consultation for consideration of cystoscopy and tissue sampling.  2. Cirrhotic liver morphology with sequelae  of portal hypertension including large volume ascites and minimal varices. No significant splenomegaly. Portal vasculature patent. No signs of hepatocellular carcinoma.  3. Large ventral and smaller bilateral inguinal hernias detailed above, without significant change from prior CT comparison. The large ventral hernia contains a small portion of traversing bowel without obstruction.  4. Anasarca.  5. Colonic diverticulosis.  6. 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  7. Separately dictated CT chest.        Electronically Signed: Robson Lopez MD    7/22/2025 9:34 PM EDT    Workstation ID: [id number]    CT Head Without Contrast [[id number]] Collected: 07/22/25 2121     Updated: 07/22/25 2126    Narrative:      CT HEAD WO CONTRAST    Date of Exam: 7/22/2025 8:53 PM EDT    Indication: Somnolence, confusion, suspect metabolic.    Comparison: Head CT 7/20/2025    Technique: Axial CT images were obtained of the head without contrast administration.  Automated exposure control and iterative construction methods were used.      Findings:  Motion-degraded exam. 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 No new large territory infarct, acute intracranial hemorrhage, large mass lesion, midline shift or hydrocephalus. Mild global   parenchymal volume loss and sequelae of chronic microvascular ischemic change similar to prior. Senescent related basal ganglia calcifications on the right. No large extra-axial fluid collection. No obvious displaced fracture or joint malalignment. No   obstructive sinus disease or large mastoid effusion.      Impression:      Impression:  Allowing for motion degradation, no acute intracranial findings. Grossly stable CT exam since 7/20/2025 comparison.        Electronically Signed: Robson Lopez MD    7/22/2025 9:23 PM EDT    Workstation ID: [id number]    XR Chest 1  View [[id number]] Collected: 07/22/25 1902     Updated: 07/22/25 1907    Narrative:      XR CHEST 1 VW    Date of Exam: 7/22/2025 6:40 PM EDT    Indication: SOA triage protocol    Comparison: Chest CT 7/20/2025    Findings:  Enlarged cardiac silhouette probably combination of cardiomegaly and pericardial effusion compared to recent CT. Right lung appears relatively clear. Increasing probably small to moderate size left-sided pleural effusion and adjacent left lower lobe   atelectasis versus airspace disease. No pneumothorax. Aortic atherosclerotic disease. Degenerative elated osseous change.      Impression:      Impression:  1. Worsening left lower lobe atelectasis/airspace disease and adjacent pleural effusion.  2. Grossly similar cardiomegaly and probable pericardial effusion correlating with recent CT findings.      Electronically Signed: Robson Lopez MD    7/22/2025 7:04 PM EDT    Workstation ID: [id number]             I reviewed the patient's new clinical results.  I reviewed the patient's new imaging results and agree with the interpretation.      Assessment and Recommendations  89-year-old white male with a history of BPH, recurrent UTIs,gross hematuria,urethral stricture, diabetes, A-fib on Eliquis, CAD, CHF, HTN, HLD, cirrhosis, ascites, ventral hernia, and cellulitis.  He gets monthly paracentesis.  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  We planned cystoscopy under anesthesia with possible urethral dilation, direct vision internal urethrotomy, and bladder biopsy but the patient and his wife opted to defer that due to other medical issues.    He was admitted 7/22/2025 for sepsis likely due to UTI.  A urine culture 7/17/2025 showed E. coli.  Blood culture PCR showed E. coli.  He received Zosyn and vancomycin and switched to Rocephin.  The Eliquis is on hold.   A CT scan with contrast showed cirrhosis with large ascites, large ventral hernia, small bilateral inguinal hernias, and thickened bladder at the dome.  A CT angio of the chest showed no PE. He underwent paracentesis today with 9 L fluid drained.  He is voiding into a male PureWick.  The urine is hazy dark yellow in the canister.    We again discussed possible cystoscopy with urethral dilation, DVIU, and bladder biopsy.  This would require general or MAC anesthesia.  We agree on conservative management for now given his multiple comorbidities.  Of note, placement of a Devries catheter would be difficult or not possible given the stricture.  The recurrent UTIs are likely due to urethral stricture and the gross hematuria is likely due to hemorrhagic cystitis.    Plan: Continue antibiotic for UTI, currently Rocephin.  Continue male PureWick.  Hold Eliquis for now.  Conservative management of the urethral stricture for now.  Check bladder scan residual.      Sepsis    Panlobular emphysema    Dyslipidemia    Chronic atrial fibrillation    Chronic right heart failure    Cirrhosis of liver with ascites    Ventral hernia    Elevated troponin    Acute UTI (urinary tract infection)    Lactic acidosis    Hyponatremia    Elevated serum creatinine    Anemia, chronic disease    Chronic bilateral pleural effusions    Pericardial effusion    Cellulitis      I discussed the patients findings and my recommendations with patient and family    Bucky García MD  07/23/25  17:58 EDT

## 2025-07-23 NOTE — TELEPHONE ENCOUNTER
Left message for Jordi Sam  to return my call.    Hub may relay message and document  Patient does NOT have to  the abx from the pharmacy

## 2025-07-23 NOTE — PLAN OF CARE
Goal Outcome Evaluation:  Plan of Care Reviewed With: patient        Progress: no change  Outcome Evaluation: pt new admit this morning for sepsis. pt afib on tele. SOB when moving around. placed on 1L NC while resting per pt request. pt presents with LLQ hernia that he states has had for 3 years. IV antibiotics continued. no other complaints this shift. fall precautions in place. plan of care on going.

## 2025-07-23 NOTE — POST-PROCEDURE NOTE
IR POST OP NOTE    Physician:Miguel Kong MD      Procedure: Image guided paracentesis      Pre Op DX: Ascites      Post Op DX: Same      Anesthesia: Local only      Findings: Large ascites      Complications: No immediate      Provider Signature: Miguel Kong MD    07/23/25  15:04 EDT

## 2025-07-23 NOTE — PROGRESS NOTES
[facility]  ADMISSION FOLLOW-UP NOTE          Patient admitted after midnight, H&P by my partner performed earlier on today's date reviewed.  Interim findings, labs, and charting also reviewed.        The [facility] Problem List has been managed and updated to include any new diagnoses:  Active Hospital Problems    Diagnosis  POA    **Sepsis [A41.9]  Yes    Ventral hernia [K43.9]  Yes    Elevated troponin [R79.89]  Yes    Acute UTI (urinary tract infection) [N39.0]  Yes    Lactic acidosis [E87.20]  Yes    Hyponatremia [E87.1]  Yes    Elevated serum creatinine [R79.89]  Yes    Anemia, chronic disease [D63.8]  Yes    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         ADDITIONAL PLAN:  - detailed assessment and plan from admission reviewed  - Jordi Sam is a 89-year-old male with a history of A-fib, CAD, cellulitis, CHF, NAFLD cirrhosis, lipidemia, hypertension, diabetes presenting with weakness    Weakness  Sepsis  UTI  Bilateral lower extremity cellulitis  - Continue broad-spectrum antibiotics for now, currently on cefazolin.  Cultures in process    Cirrhosis  Ascites  - Ordered IR paracentesis      Hypotension  --controlled on midodrine    Volume overload  --cont home spironolactone    Ventral hernia    Chronic anemia    A-fib  - Rate controlled, not currently on anticoagulation    Hematuria  --d/w PCP , was taken off eliquis d/t hematuria. He was supposed to have cystoscopy set up preferably in acute care setting given all his comorbidities, possible stricture. Will ask urology here to see    Expected Discharge    Expected Discharge Date: 7/24/2025; Expected Discharge Time:      Josette Clifford MD  07/23/25

## 2025-07-23 NOTE — TELEPHONE ENCOUNTER
Caller: DAVID SNOW    Relationship: Emergency Contact    Best call back number: [phone]     What is the best time to reach you: ANYTIME    Who are you requesting to speak with (clinical staff, provider,  specific staff member): DR. GARNER    Do you know the name of the person who called: DAVID    What was the call regarding: PATIENT'S WIFE IS CALLING TO LET THE PROVIDER KNOW THAT THE PATIENT IS IN THE [facility].      PLEASE CALL WIFE TO DISCUSS THE NEW  MEDICATION THAT WAS PRESCRIBED     Is it okay if the provider responds through MyChart: NO PLEASE CALL

## 2025-07-23 NOTE — CASE MANAGEMENT/SOCIAL WORK
Discharge Planning Assessment  [facility]     Patient Name: Jordi Sam  MRN: [id number]  Today's Date: 7/23/2025    Admit Date: 7/22/2025    Plan: Home   Discharge Needs Assessment       Row Name 07/23/25 0959       Living Environment    People in Home spouse    Name(s) of People in Home Melly Sam (spouse) [phone]    Current Living Arrangements home    Potentially Unsafe Housing Conditions none    In the past 12 months has the electric, gas, oil, or water company threatened to shut off services in your home? No    Primary Care Provided by self    Provides Primary Care For no one    Family Caregiver if Needed spouse    Family Caregiver Names Melly Sam (spouse) [phone]    Quality of Family Relationships helpful;involved;supportive    Able to Return to Prior Arrangements yes       Resource/Environmental Concerns    Resource/Environmental Concerns none    Transportation Concerns none       Transportation Needs    In the past 12 months, has lack of transportation kept you from medical appointments or from getting medications? no    In the past 12 months, has lack of transportation kept you from meetings, work, or from getting things needed for daily living? No       Food Insecurity    Within the past 12 months, you worried that your food would run out before you got the money to buy more. Never true    Within the past 12 months, the food you bought just didn't last and you didn't have money to get more. Never true       Transition Planning    Patient/Family Anticipates Transition to home with family    Patient/Family Anticipated Services at Transition     Transportation Anticipated family or friend will provide       Discharge Needs Assessment    Readmission Within the Last 30 Days no previous admission in last 30 days    Equipment Currently Used at Home shower chair;grab bar;cane, straight    Concerns to be Addressed discharge planning    Do you want help finding or keeping work or a job? I  do not need or want help    Do you want help with school or training? For example, starting or completing job training or getting a high school diploma, GED or equivalent No    Anticipated Changes Related to Illness none    Equipment Needed After Discharge cane, straight;grab bar, tub/shower;shower chair    Current Discharge Risk dependent with mobility/activities of daily living    Discharge Coordination/Progress I spoke with Pt, in room, to initiate discharge plan. Pt is admitted with sepsis. He lives with his spouse in a two story home in [facility]. There are 6 steps at the entrance of the home. Address on file was verified. Prior to admission, Pt ambulated independently with a straight cane. He is dependent with ADLs (spouse assists). He has the following DME: shower chair, grab bars, and straight cane. His PCP is Dr Jasper Avery. He has [facility] Medicare which has Rx coverage. He uses [employer], [employer]. He states precriptions are affordable. He denies HH/HH/OPPT. His goal is to return home at discharge. His spouse will provide transportation. No discharge needs identified. CM will continue to follow hospital course.                   Discharge Plan       Row Name 07/23/25 1003       Plan    Plan Home    Final Discharge Disposition Code 01 - home or self-care                  Continued Care and Services - Admitted Since 7/22/2025    No active coordination exists.       Expected Discharge Date and Time       Expected Discharge Date Expected Discharge Time    Jul 24, 2025            Demographic Summary       Row Name 07/23/25 0957       General Information    Arrived From home    Referral Source emergency department    Reason for Consult discharge planning    Preferred Language English    General Information Comments PCP Dr Jasper Avery       Contact Information    Permission Granted to Share Info With     Contact Information Obtained for     Contact Information Comments Melly  Flaco (spouse) [phone], Linnea Carbone (daughter) [phone], Lv Sam (son) [phone]                   Functional Status       Row Name 07/23/25 0958       Functional Status    Usual Activity Tolerance fair    Current Activity Tolerance fair       Functional Status, IADL    Medications assistive person    Meal Preparation assistive person    Housekeeping assistive person    Laundry assistive person    Shopping assistive person                   Psychosocial    No documentation.                  Abuse/Neglect    No documentation.                  Legal    No documentation.                  Substance Abuse    No documentation.                  Patient Forms    No documentation.                     Jasmine Pinzon RN

## 2025-07-23 NOTE — H&P
[facility] Medicine Services  HISTORY AND PHYSICAL    Patient Name: Jordi Sam  : 1934  MRN: [id number]  Primary Care Physician: Jasper Avery MD  Date of admission: 2025      Subjective   Subjective     Chief Complaint:   Weak and lethargic     HPI:  Jordi Sam is a 89 y.o. male  with hx of afib, CAD, cellulitis, CHF, NAFLD/cirrhosis, HLD, HLD, HTN, DM, umbilical hernia who presents now w/ wife who provides hx.  Wife notes pt was weak and lethargic today.  Unable to stand from toilet.  Also more short of breath which is what prompted them to seek attention in ED.  Has had baseline cough but no worse.  Unaware of fever.  No n/v/d/c.  Has frequent UTI's and wife notes pt was seen in ED 2 days prior with head trauma from fall and she feels that he may have had a UTI then and has never really bounced back from this.  Poor po intake.  Has paracentesis approx every 30 days and was scheduled to have one tomorrow with last one being .  Wife notes pt was having blood in urine so was taken off of anticoagulation.  Notes swelling/redness in legs which wife states is worse when he sits with legs down.        Personal History     Past Medical History:   Diagnosis Date    A-fib     Acute inferior myocardial infarction     Acute inferior STEMI with RV infarct features, 2009.     Arrhythmia     CAD (coronary artery disease)     Cellulitis 2023    Facial. - admission at [facility]. Treated with Bactrim  ointment and IV ABX    CHF (congestive heart failure)     Cirrhosis     COVID-19 2022    Dyslipidemia     Falls frequently     3 falls in 2 months    Goiter     History of “goiter.”    Gout     Hernia, umbilical     History of radiation therapy 10/31/2023    Right nasal ala BCC    History of removal of skin mole     Hyperlipidemia     Hypertension     GARCIA (nonalcoholic steatohepatitis)     Nephrolithiasis     Skin cancer     Skin cancer  09/20/2023    basal cell with radiation    Type 2 diabetes mellitus     Umbilical hernia     Pt stated has recently returned. Seeing Dr Kiersten Nova and Dr Jasper Avery 8-2021         Oncology Problem List:  Squamous cell carcinoma of scalp (06/21/2024; Status: Active)  Basal cell carcinoma of right ala nasi (09/20/2023; Status: Active)    Oncology/Hematology History   Basal cell carcinoma of right ala nasi   9/20/2023 Initial Diagnosis    Basal cell carcinoma of right ala nasi     10/10/2023 - 10/31/2023 Radiation    Radiation OncologyTreatment Course:  Jordi Sam received 4200 cGy in 7 fractions to right nasal ala via External Beam Radiation - EBRT (Xoft).       Past Surgical History:   Procedure Laterality Date    BRONCHOSCOPY N/A 4/12/2019    Procedure: BRONCHOSCOPY WITH THORACENTESIS;  Surgeon: Marciano Higginbotham MD;  Location: [facility] ENDOSCOPY;  Service: Pulmonary    CARDIAC CATHETERIZATION Bilateral 1/30/2019    Procedure: Right and Left Heart Cath;  Surgeon: Efrem Posadas MD;  Location: [facility] CATH INVASIVE LOCATION;  Service: Cardiology    CARDIAC CATHETERIZATION      CHOLECYSTECTOMY      CORONARY ANGIOPLASTY WITH STENT PLACEMENT  01/09/2009    Sirolimus eluting stents to the RCA, 01/09/2009.     HERNIA REPAIR      Hernia repair x 2.     PARACENTESIS  06/17/2019    multiple    UMBILICAL HERNIA REPAIR N/A 7/26/2020    Procedure: UMBILICAL HERNIA REPAIR;  Surgeon: Maribell Her MD;  Location: [facility] OR;  Service: General;  Laterality: N/A;       Family History: family history includes Aneurysm in his father; Cancer in his sister; Dementia in his mother; Heart attack in his father; Heart disease in his brother, brother, and father; Hypertension in his daughter and son; No Known Problems in his brother and sister; Stroke in his brother and mother.     Social History:  reports that he quit smoking about 42 years ago. His smoking use included cigarettes. He started smoking about 70 years  ago. He has a 70 pack-year smoking history. He has never been exposed to tobacco smoke. He has never used smokeless tobacco. He reports that he does not drink alcohol and does not use drugs.  Social History     Social History Narrative    Caffeine Intake: 2  servings per day (coffee)    Patient lives at home with his wife       Medications:  Available home medication information reviewed.  HYDROcodone-acetaminophen, Hydrocortisone (Perianal), Vitamin D3, albuterol sulfate HFA, allopurinol, apixaban, benzonatate, cefdinir, folic acid, furosemide, ibuprofen, levothyroxine, lidocaine, methocarbamol, simvastatin, spironolactone, and terazosin    Allergies   Allergen Reactions    Bee Venom Swelling    Sulfamethoxazole-Trimethoprim Other (See Comments)     Pt unaware       Objective   Objective     Vital Signs:   Temp:  [99.5 °F (37.5 °C)] 99.5 °F (37.5 °C)  Heart Rate:  [60-88] 62  Resp:  [18] 18  BP: ()/(44-77) 111/59       Physical Exam    Gen: lethargic but will awaken to voice and briefly answers questions then falls back to sleep  Heent; eyes closed, pasty mm  Cv; rrr, no mrg  L; dec bs's bll, audible end exp wheeze  Abd; distended; large ventral hernia which is soft and nontender, no r/g  Ext; ble w/ 3+ pitting edema w/ erythema, no cc  Skin; above  Neuro; unable to assess  Psych; lethargic     Result Review:  I have personally reviewed the results from the time of this admission to 7/23/2025 02:30 EDT and agree with these findings:  [x]  Laboratory list / accordion  [x]  Microbiology  [x]  Radiology  [x]  EKG/Telemetry   []  Cardiology/Vascular   []  Pathology  []  Old records  []  Other:  Most notable findings include:        LAB RESULTS:      Lab 07/22/25  2330 07/22/25  1842   WBC  --  6.98   HEMOGLOBIN  --  9.7*   HEMATOCRIT  --  29.7*   PLATELETS  --  148   NEUTROS ABS  --  6.23   IMMATURE GRANS (ABS)  --  0.04   LYMPHS ABS  --  0.21*   MONOS ABS  --  0.48   EOS ABS  --  0.00   MCV  --  92.2   CRP  --   10.10*   PROCALCITONIN  --  0.49*   LACTATE 1.4 2.4*   D DIMER QUANT  --  5.36*         Lab 07/22/25 1842   SODIUM 130*   POTASSIUM 4.9   CHLORIDE 95*   CO2 21.8*   ANION GAP 13.2   BUN 32.0*   CREATININE 2.02*   EGFR 30.6*   GLUCOSE 126*   CALCIUM 8.7   MAGNESIUM 2.3   PHOSPHORUS 2.6   TSH 3.810         Lab 07/22/25 1842   TOTAL PROTEIN 6.1   ALBUMIN 3.4*   GLOBULIN 2.7   ALT (SGPT) 13   AST (SGOT) 39   BILIRUBIN 0.8   ALK PHOS 114   LIPASE 28         Lab 07/22/25 2037 07/22/25 1842   PROBNP  --  12,872.0*   HSTROP T 81* 86*                 Lab 07/22/25 1919   PH, ARTERIAL 7.470*   PCO2, ARTERIAL 30.7*   PO2 ART 71.6*   FIO2 21   HCO3 ART 22.3   BASE EXCESS ART -0.9*   CARBOXYHEMOGLOBIN 1.6     UA          6/26/2025    14:02 7/17/2025    16:03 7/22/2025    22:52   Urinalysis   Squamous Epithelial Cells, UA Unable to determine due to loaded field  0-2  31-50    Specific [place], UA 1.013  1.011  1.023    Ketones, UA Negative  Negative  Trace    Blood, UA Moderate (2+)  Moderate (2+)  Large (3+)    Leukocytes, UA Large (3+)  Large (3+)  Large (3+)    Nitrite, UA Positive  Positive  Negative    RBC, UA Too Numerous to Count  6-10  11-20    WBC, UA Unable to determine due to loaded field  Too Numerous to Count  Too Numerous to Count    Bacteria, UA Unable to determine due to loaded field  4+  4+        Microbiology Results (last 10 days)       Procedure Component Value - Date/Time    COVID PRE-OP / PRE-PROCEDURE SCREENING ORDER (NO ISOLATION) - Swab, Nasopharynx [[id number]]  (Normal) Collected: 07/22/25 1906    Lab Status: Final result Specimen: Swab from Nasopharynx Updated: 07/22/25 2025    Narrative:      The following orders were created for panel order COVID PRE-OP / PRE-PROCEDURE SCREENING ORDER (NO ISOLATION) - Swab, Nasopharynx.  Procedure                               Abnormality         Status                     ---------                               -----------         ------                      Respiratory Panel PCR w/...[[id number]]  Normal              Final result                 Please view results for these tests on the individual orders.    Rapid Strep A Screen - Swab, Throat [[id number]]  (Normal) Collected: 07/22/25 1906    Lab Status: Final result Specimen: Swab from Throat Updated: 07/22/25 1935     Strep A Ag Negative    Narrative:      Test performed by [facility].    Respiratory Panel PCR w/COVID-19(SARS-CoV-2) MATTHEW/ZAY/SEVEN/PAD/COR/TONY In-House, NP Swab in UTM/VTM, 2 HR TAT - Swab, Nasopharynx [[id number]]  (Normal) Collected: 07/22/25 1906    Lab Status: Final result Specimen: Swab from Nasopharynx Updated: 07/22/25 2025     ADENOVIRUS, PCR Not Detected     Coronavirus 229E Not Detected     Coronavirus HKU1 Not Detected     Coronavirus NL63 Not Detected     Coronavirus OC43 Not Detected     COVID19 Not Detected     Human Metapneumovirus Not Detected     Human Rhinovirus/Enterovirus Not Detected     Influenza A PCR Not Detected     Influenza B PCR Not Detected     Parainfluenza Virus 1 Not Detected     Parainfluenza Virus 2 Not Detected     Parainfluenza Virus 3 Not Detected     Parainfluenza Virus 4 Not Detected     RSV, PCR Not Detected     Bordetella pertussis pcr Not Detected     Bordetella parapertussis PCR Not Detected     Chlamydophila pneumoniae PCR Not Detected     Mycoplasma pneumo by PCR Not Detected    Narrative:      In the setting of a positive respiratory panel with a viral infection PLUS a negative procalcitonin without other underlying concern for bacterial infection, consider observing off antibiotics or discontinuation of antibiotics and continue supportive care. If the respiratory panel is positive for atypical bacterial infection (Bordetella pertussis, Chlamydophila pneumoniae, or Mycoplasma pneumoniae), consider antibiotic de-escalation to target atypical bacterial infection.    Urine Culture - Urine, Urine, Clean Catch [[id number]]  (Abnormal)  (Susceptibility)  Collected: 07/17/25 1603    Lab Status: Final result Specimen: Urine, Clean Catch Updated: 07/19/25 0456     Urine Culture >100,000 CFU/mL Escherichia coli    Narrative:      Colonization of the urinary tract without infection is common. Treatment is discouraged unless the patient is symptomatic, pregnant, or undergoing an invasive urologic procedure.    Susceptibility        Escherichia coli      ALFREDITO      Amoxicillin + Clavulanate Resistant      Ampicillin Resistant      Ampicillin + Sulbactam Resistant      Cefazolin (Urine) Susceptible      Cefepime Susceptible      Ceftazidime Susceptible      Ceftriaxone Susceptible      Cefuroxime axetil Susceptible      Ciprofloxacin Resistant      Gentamicin Susceptible      Levofloxacin Intermediate      Nitrofurantoin Susceptible      Piperacillin + Tazobactam Susceptible      Trimethoprim + Sulfamethoxazole Susceptible                                   CT Angiogram Chest Pulmonary Embolism  Result Date: 7/22/2025  CT ANGIOGRAM CHEST PULMONARY EMBOLISM Date of Exam: 7/22/2025 8:53 PM EDT Indication: sepsis, dyspnea, chronic immobilization, leg swelling, dimer >500. Comparison: 7/20/2025. Technique: Axial CT images were obtained of the chest after the uneventful intravenous administration of 80 mL Isovue-370 utilizing pulmonary embolism protocol.  In addition, a 3-D volume rendered image was created for interpretation.  Reconstructed coronal and sagittal images were also obtained. Automated exposure control and iterative construction methods were used. Findings: There is no pathologic axillary adenopathy or other worrisome body wall soft tissue finding in the chest. The partially characterized upper abdomen demonstrates known cirrhosis and moderate volume ascites. There is a small pericardial effusion as well as  unchanged small left pleural effusion. Atherosclerotic, nonaneurysmal thoracic aorta. The pulmonary arteries demonstrate no evidence of focal filling defect. The  lung fields demonstrate similar prominent volume loss in the left lower lobe adjacent to the pleural effusion. There is no distinct suspicious pulmonary nodularity. The osseous structures again demonstrate a nonacute left clavicle fracture. No acute fracture or new aggressive osseous lesion is identified.     Impression: Impression: No evidence of acute pulmonary embolus. Redemonstrated chronic left pleural effusion with adjacent left basilar atelectasis as well as chronic pericardial effusion. Electronically Signed: Son Carey MD  7/22/2025 9:45 PM EDT  Workstation ID: [id number]    CT Abdomen Pelvis With Contrast  Result Date: 7/22/2025  CT ABDOMEN PELVIS W CONTRAST Date of Exam: 7/22/2025 8:53 PM EDT Indication: Sepsis of unknown source, abdominal distention, history of chronic hernia and cirrhosis with ascites. Comparison: CT abdomen pelvis 3/20/2025 Technique: Axial CT images were obtained of the abdomen and pelvis following the uneventful intravenous administration of 100 mL Isovue 370. Reconstructed coronal and sagittal images were also obtained. Automated exposure control and iterative construction methods were used. Findings: Separately dictated CT chest. Cirrhotic liver morphology. No discrete hypervascular liver lesion. Portal vasculature, splenic vein and superior mesenteric vein patent. Cholecystectomy. No dilation of biliary tree. Atrophic pancreas. Spleen within normal limits in size. Minimal varices. Large volume ascites. No suspicious adrenal nodule. Symmetric renal size, contour and enhancement. Few small hypodense renal lesions favoring cysts too small to accurately characterize. There is a stable 1.1 cm partially exophytic lesion in the left anterior midpole showing intrinsic high density on prior noncontrasted exam consistent with hemorrhagic/proteinaceous cyst. Punctate nonobstructing right upper pole calculus. No hydronephrosis. Focal somewhat nodular urinary bladder wall thickening near  hepatic dome measuring up to 1.2 cm thickness sagittal image 79 series 901. Prostate within normal limits in size. Symmetric seminal vesicles. Small hiatal hernia. Expected configuration stomach  and duodenum. Colonic diverticulosis. No evidence of bowel obstruction or active inflammation. Normal appendix. Diffuse aortic atherosclerotic disease. Fusiform dilation of infrarenal abdominal aorta measuring 3.1 cm, technically aneurysmal without significant change from prior exam. No overtly suspicious adenopathy. No organized fluid collection. No free air. There is a large fluid containing ventral abdominal wall hernia measuring 11.1 x 20.2 x 16.2 cm AP, transverse and craniocaudal dimension. Hernia defect measures 7.7 cm transverse dimension containing a small portion of the traversing bowel. Small fat-containing right and small to moderate fat and fluid-containing left inguinal hernias. These appear similar to prior. Diffuse body wall edema. Gynecomastia. Paraspinous muscle atrophy. Multilevel degenerative change throughout the spine. No aggressive bone lesion.     Impression: Impression: 1. Focal nodular bladder wall thickening near urinary bladder dome, possibly presenting a primary malignancy (urothelial carcinoma). Recommend nonemergent urology consultation for consideration of cystoscopy and tissue sampling. 2. Cirrhotic liver morphology with sequelae of portal hypertension including large volume ascites and minimal varices. No significant splenomegaly. Portal vasculature patent. No signs of hepatocellular carcinoma. 3. Large ventral and smaller bilateral inguinal hernias detailed above, without significant change from prior CT comparison. The large ventral hernia contains a small portion of traversing bowel without obstruction. 4. Anasarca. 5. Colonic diverticulosis. 6. 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 7. Separately dictated CT chest.  Electronically Signed: Robson Lopez MD  7/22/2025 9:34 PM EDT  Workstation ID: [id number]    CT Head Without Contrast  Result Date: 7/22/2025  CT HEAD WO CONTRAST Date of Exam: 7/22/2025 8:53 PM EDT Indication: Somnolence, confusion, suspect metabolic. Comparison: Head CT 7/20/2025 Technique: Axial CT images were obtained of the head without contrast administration.  Automated exposure control and iterative construction methods were used. Findings: Motion-degraded exam. 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 No new large territory infarct, acute intracranial hemorrhage, large mass lesion, midline shift or hydrocephalus. Mild global parenchymal volume loss and sequelae of chronic microvascular ischemic change similar to prior. Senescent related basal ganglia calcifications on the right. No large extra-axial fluid collection. No obvious displaced fracture or joint malalignment. No obstructive sinus disease or large mastoid effusion.     Impression: Impression: Allowing for motion degradation, no acute intracranial findings. Grossly stable CT exam since 7/20/2025 comparison. Electronically Signed: Robson Lopez MD  7/22/2025 9:23 PM EDT  Workstation ID: [id number]    XR Chest 1 View  Result Date: 7/22/2025  XR CHEST 1 VW Date of Exam: 7/22/2025 6:40 PM EDT Indication: SOA triage protocol Comparison: Chest CT 7/20/2025 Findings: Enlarged cardiac silhouette probably combination of cardiomegaly and pericardial effusion compared to recent CT. Right lung appears relatively clear. Increasing probably small to moderate size left-sided pleural effusion and adjacent left lower lobe atelectasis versus airspace disease. No pneumothorax. Aortic atherosclerotic disease. Degenerative elated osseous change.     Impression: Impression: 1. Worsening left lower lobe atelectasis/airspace disease and adjacent pleural effusion. 2. Grossly similar cardiomegaly and probable  pericardial effusion correlating with recent CT findings. Electronically Signed: Robson Lopez MD  7/22/2025 7:04 PM EDT  Workstation ID: [id number]          Assessment & Plan   Assessment & Plan       Sepsis    Panlobular emphysema    Dyslipidemia    Chronic atrial fibrillation    Chronic right heart failure    Cirrhosis of liver with ascites    Ventral hernia    Elevated troponin    Acute UTI (urinary tract infection)    Lactic acidosis    Hyponatremia    Elevated serum creatinine    Anemia, chronic disease    Chronic bilateral pleural effusions    Pericardial effusion    Cellulitis     89 y.o. male  with hx of afib, CAD, cellulitis, CHF, NAFLD/cirrhosis, HLD, HLD, HTN, DM, umbilical hernia who presents now w/ wife who provides hx and notes pt has been lethargic and weak for past 3 days but worse today:  Sepsis/UTI/BLE cellulitis  -place on cefazolin  -s/p vanc/zosyn in ED  -albumin given but may need to be repeated  -hold lasix for now  -cultures   -nodular lesion noted on bladder but as d/w wife, comorbidities would outweigh potential benefit of further evaluation; wife agrees  2. Cirrhosis of Liver/Ascites  -pt due for paracentesis  -once more hemodynamically stable (has been hypotensive in ED) pt would benefit from large volume paracentesis  3. Lactic acidosis  -resolved  4. Elevated creatinine  -likely related to poor perfusion from compressive ascites w/ poor circulatory volume  5. Hyponatremia  -secondary to above  6. Chronic bilateral pleural effusions/pericardial effusions  -pt somewhat dyspneic but not hypoxic so feel this is related to ascites  7. Ventral hernia  -bowel present but not entrapped  8. Anemia, chronic  -monitor  9. Afib  -rate controlled  -pt off anticoagulation now per wife                  VTE Prophylaxis:  Mechanical VTE prophylaxis orders are signed & held.            CODE STATUS:    Code Status and Medical Interventions: No CPR (Do Not Attempt to Resuscitate); Limited Support; No  intubation (DNI)   Ordered at: 07/23/25 0103     Code Status (Patient has no pulse and is not breathing):    No CPR (Do Not Attempt to Resuscitate)     Medical Interventions (Patient has pulse or is breathing):    Limited Support     Medical Intervention Limits:    No intubation (DNI)       Expected Discharge   Expected discharge date/ time has not been documented.     Candis Grant MD  07/23/25  Electronically signed by Candis Grant MD, 07/23/25, 4:29 AM EDT.

## 2025-07-24 LAB
ALBUMIN FLD-MCNC: 1.4 G/DL
ANION GAP SERPL CALCULATED.3IONS-SCNC: 11 MMOL/L (ref 5–15)
BACTERIA SPEC AEROBE CULT: NORMAL
BASOPHILS # BLD AUTO: 0.02 10*3/MM3 (ref 0–0.2)
BASOPHILS NFR BLD AUTO: 0.3 % (ref 0–1.5)
BUN SERPL-MCNC: 40.5 MG/DL (ref 8–23)
BUN/CREAT SERPL: 18.7 (ref 7–25)
CALCIUM SPEC-SCNC: 8 MG/DL (ref 8.2–9.6)
CHLORIDE SERPL-SCNC: 95 MMOL/L (ref 98–107)
CO2 SERPL-SCNC: 22 MMOL/L (ref 22–29)
CREAT SERPL-MCNC: 2.17 MG/DL (ref 0.76–1.27)
DEPRECATED RDW RBC AUTO: 59.2 FL (ref 37–54)
EGFRCR SERPLBLD CKD-EPI 2021: 28 ML/MIN/1.73
EOSINOPHIL # BLD AUTO: 0.03 10*3/MM3 (ref 0–0.4)
EOSINOPHIL NFR BLD AUTO: 0.5 % (ref 0.3–6.2)
ERYTHROCYTE [DISTWIDTH] IN BLOOD BY AUTOMATED COUNT: 17.2 % (ref 12.3–15.4)
GLUCOSE SERPL-MCNC: 99 MG/DL (ref 65–99)
HCT VFR BLD AUTO: 28.1 % (ref 37.5–51)
HGB BLD-MCNC: 8.9 G/DL (ref 13–17.7)
IMM GRANULOCYTES # BLD AUTO: 0.04 10*3/MM3 (ref 0–0.05)
IMM GRANULOCYTES NFR BLD AUTO: 0.7 % (ref 0–0.5)
LDH FLD-CCNC: 89 U/L
LYMPHOCYTES # BLD AUTO: 0.33 10*3/MM3 (ref 0.7–3.1)
LYMPHOCYTES NFR BLD AUTO: 5.4 % (ref 19.6–45.3)
MCH RBC QN AUTO: 29.6 PG (ref 26.6–33)
MCHC RBC AUTO-ENTMCNC: 31.7 G/DL (ref 31.5–35.7)
MCV RBC AUTO: 93.4 FL (ref 79–97)
MONOCYTES # BLD AUTO: 0.62 10*3/MM3 (ref 0.1–0.9)
MONOCYTES NFR BLD AUTO: 10.1 % (ref 5–12)
NEUTROPHILS NFR BLD AUTO: 5.1 10*3/MM3 (ref 1.7–7)
NEUTROPHILS NFR BLD AUTO: 83 % (ref 42.7–76)
NRBC BLD AUTO-RTO: 0 /100 WBC (ref 0–0.2)
PLATELET # BLD AUTO: 130 10*3/MM3 (ref 140–450)
PMV BLD AUTO: 10.6 FL (ref 6–12)
POTASSIUM SERPL-SCNC: 4.7 MMOL/L (ref 3.5–5.2)
PROT FLD-MCNC: 2.6 G/DL
RBC # BLD AUTO: 3.01 10*6/MM3 (ref 4.14–5.8)
SODIUM SERPL-SCNC: 128 MMOL/L (ref 136–145)
WBC NRBC COR # BLD AUTO: 6.14 10*3/MM3 (ref 3.4–10.8)

## 2025-07-24 PROCEDURE — 92610 EVALUATE SWALLOWING FUNCTION: CPT

## 2025-07-24 PROCEDURE — 80048 BASIC METABOLIC PNL TOTAL CA: CPT | Performed by: STUDENT IN AN ORGANIZED HEALTH CARE EDUCATION/TRAINING PROGRAM

## 2025-07-24 PROCEDURE — 97165 OT EVAL LOW COMPLEX 30 MIN: CPT | Performed by: OCCUPATIONAL THERAPIST

## 2025-07-24 PROCEDURE — 99232 SBSQ HOSP IP/OBS MODERATE 35: CPT | Performed by: STUDENT IN AN ORGANIZED HEALTH CARE EDUCATION/TRAINING PROGRAM

## 2025-07-24 PROCEDURE — 25010000002 CEFTRIAXONE PER 250 MG: Performed by: STUDENT IN AN ORGANIZED HEALTH CARE EDUCATION/TRAINING PROGRAM

## 2025-07-24 PROCEDURE — 85025 COMPLETE CBC W/AUTO DIFF WBC: CPT | Performed by: STUDENT IN AN ORGANIZED HEALTH CARE EDUCATION/TRAINING PROGRAM

## 2025-07-24 PROCEDURE — 97161 PT EVAL LOW COMPLEX 20 MIN: CPT | Performed by: PHYSICAL THERAPIST

## 2025-07-24 RX ADMIN — ALLOPURINOL 100 MG: 100 TABLET ORAL at 08:15

## 2025-07-24 RX ADMIN — ATORVASTATIN CALCIUM 20 MG: 20 TABLET, FILM COATED ORAL at 08:15

## 2025-07-24 RX ADMIN — LEVOTHYROXINE SODIUM 25 MCG: 0.03 TABLET ORAL at 06:20

## 2025-07-24 RX ADMIN — Medication 10 ML: at 08:15

## 2025-07-24 RX ADMIN — CEFTRIAXONE 2000 MG: 2 INJECTION, POWDER, FOR SOLUTION INTRAMUSCULAR; INTRAVENOUS at 17:15

## 2025-07-24 RX ADMIN — MIDODRINE HYDROCHLORIDE 5 MG: 5 TABLET ORAL at 16:48

## 2025-07-24 RX ADMIN — SPIRONOLACTONE 50 MG: 25 TABLET ORAL at 08:15

## 2025-07-24 RX ADMIN — MIDODRINE HYDROCHLORIDE 5 MG: 5 TABLET ORAL at 11:06

## 2025-07-24 RX ADMIN — FOLIC ACID TAB 400 MCG 400 MCG: 400 TAB at 08:15

## 2025-07-24 RX ADMIN — MIDODRINE HYDROCHLORIDE 5 MG: 5 TABLET ORAL at 06:20

## 2025-07-24 NOTE — PLAN OF CARE
Goal Outcome Evaluation:  Up in chair w 2 asst.   VSS on RA.   AF on tele.   PW in place w good UO.  IV AB cont.

## 2025-07-24 NOTE — PLAN OF CARE
Goal Outcome Evaluation:  Plan of Care Reviewed With: patient                Anticipated Discharge Disposition (SLP): home with home health (if further needs arise during admission)          SLP Swallowing Diagnosis: suspected pharyngeal dysphagia, esophageal dysphagia (07/24/25 7710)

## 2025-07-24 NOTE — PLAN OF CARE
Goal Outcome Evaluation:  Plan of Care Reviewed With: patient           Outcome Evaluation: Pt presents with generalized weakness, impaired balance, and decreased tolerance to activity. He is below baseline level of function with mobilty and would benefit from skilled PT services to imprve independence. Recommend SNF at discharge.    Anticipated Discharge Disposition (PT): skilled nursing facility

## 2025-07-24 NOTE — PROGRESS NOTES
Enter Query Response Below      Query Response: lactic acidosis, clinically significant             If applicable, please update the problem list.

## 2025-07-24 NOTE — PROGRESS NOTES
[facility] Medicine Services  PROGRESS NOTE    Patient Name: Jorid Sam  : 1934  MRN: [id number]    Date of Admission: 2025  Primary Care Physician: Jasper Avery MD    Subjective   Subjective     CC:  F/u weakness    HPI:  No new issues overnight, he is sitting in the chair eating breakfast.  He endorses a poor appetite      Objective   Objective     Vital Signs:   Temp:  [97.5 °F (36.4 °C)-98.4 °F (36.9 °C)] 98 °F (36.7 °C)  Heart Rate:  [62-91] 82  Resp:  [16-18] 18  BP: (102-129)/(46-66) 118/56     Physical Exam:  Constitutional: No acute distress, drowsy, chronically ill appearing, elderly/frail  HENT: NCAT, mucous membranes moist  Respiratory: Clear to auscultation bilaterally, respiratory effort fair on RA   Cardiovascular: irregular, no mrg   Gastrointestinal: Positive bowel sounds, soft, nontender, distention improved  Musculoskeletal: 1+ pitting BLE  Psychiatric: Appropriate affect, cooperative  Neurologic: Oriented x 3, no focal deficit, globally weak  Skin: BLE venous stasis dermatitis      Results Reviewed:  LAB RESULTS:      Lab 25  0358 25  0413 25  2330 257 25  1842   WBC 6.14 5.98  --   --  6.98   HEMOGLOBIN 8.9* 8.9*  --   --  9.7*   HEMATOCRIT 28.1* 27.7*  --   --  29.7*   PLATELETS 130* 130*  --   --  148   NEUTROS ABS 5.10 5.21  --   --  6.23   IMMATURE GRANS (ABS) 0.04 0.04  --   --  0.04   LYMPHS ABS 0.33* 0.17*  --   --  0.21*   MONOS ABS 0.62 0.53  --   --  0.48   EOS ABS 0.03 0.01  --   --  0.00   MCV 93.4 93.0  --   --  92.2   CRP  --   --   --   --  10.10*   PROCALCITONIN  --   --   --   --  0.49*   LACTATE  --   --  1.4  --  2.4*   D DIMER QUANT  --   --   --   --  5.36*   HSTROP T  --   --   --  81* 86*         Lab 25  0358 25  0413 25  1842   SODIUM 128* 129* 130*   POTASSIUM 4.7 5.1 4.9   CHLORIDE 95* 96* 95*   CO2 22.0 20.8* 21.8*   ANION GAP 11.0 12.2 13.2   BUN 40.5* 33.3* 32.0*    CREATININE 2.17* 2.18* 2.02*   EGFR 28.0* 27.9* 30.6*   GLUCOSE 99 125* 126*   CALCIUM 8.0* 8.4 8.7   MAGNESIUM  --  2.4* 2.3   PHOSPHORUS  --  3.3 2.6   TSH  --   --  3.810         Lab 07/22/25  1842   TOTAL PROTEIN 6.1   ALBUMIN 3.4*   GLOBULIN 2.7   ALT (SGPT) 13   AST (SGOT) 39   BILIRUBIN 0.8   ALK PHOS 114   LIPASE 28         Lab 07/22/25 2037 07/22/25  1842   PROBNP  --  12,872.0*   HSTROP T 81* 86*             Lab 07/23/25  0413   ABO TYPING O   RH TYPING Positive   ANTIBODY SCREEN Negative         Lab 07/22/25 1919   PH, ARTERIAL 7.470*   PCO2, ARTERIAL 30.7*   PO2 ART 71.6*   FIO2 21   HCO3 ART 22.3   BASE EXCESS ART -0.9*   CARBOXYHEMOGLOBIN 1.6     Brief Urine Lab Results  (Last result in the past 365 days)        Color   Clarity   Blood   Leuk Est   Nitrite   Protein   CREAT   Urine HCG        07/22/25 2252 Dark Yellow   Turbid   Large (3+)   Large (3+)   Negative   100 mg/dL (2+)                   Microbiology Results Abnormal       Procedure Component Value - Date/Time    Blood Culture - Blood, Hand, Right [[id number]]  (Abnormal) Collected: 07/22/25 1908    Lab Status: Preliminary result Specimen: Blood from Hand, Right Updated: 07/24/25 0640     Blood Culture Gram Negative Bacilli     Isolated from Aerobic Bottle     Gram Stain Aerobic Bottle Gram negative bacilli      Anaerobic Bottle Gram negative bacilli    Blood Culture ID, PCR - Blood, Hand, Right [[id number]]  (Abnormal) Collected: 07/22/25 1908    Lab Status: Final result Specimen: Blood from Hand, Right Updated: 07/23/25 1715     BCID, PCR Escherichia coli. Identification by BCID2 PCR.     BOTTLE TYPE Aerobic Bottle    Narrative:      No resistance genes detected.            US Paracentesis  Result Date: 7/23/2025  PROCEDURE: Ultrasound-guided paracentesis  Procedural Personnel Attending physician(s): Miguel Kong  Pre-procedure diagnosis: Paracentesis Post-procedure diagnosis: Same Indication: Uncomplicated ascites Additional clinical  history: None  Complications: No immediate complications.      Impression:  Ultrasound-guided paracentesis with drainage of 9100 mL of serous fluid.  Plan:  Resume care by clinical team. _______________________________________________________________  PROCEDURE SUMMARY: - Limited abdominal ultrasound - Ultrasound-guided paracentesis - Additional procedure(s): None  PROCEDURE DETAILS:  Pre-procedure Consent: Informed consent for the procedure including risks, benefits and alternatives was obtained and time-out was performed prior to the procedure. Preparation: The site was prepared and draped using maximal sterile barrier technique including cutaneous antisepsis.  Initial abdominal ultrasound Initial abdominal ultrasound was performed. Findings: Large ascites. A safe window for paracentesis was identified.  Paracentesis Local anesthesia was administered. The peritoneal cavity was accessed and fluid return confirmed position. Ascites was drained. The catheter was then removed, and a sterile bandage was applied. Paracentesis access technique: Real-time ultrasound guidance Catheter placed: 5F Yueh Post-drainage ultrasound: Not performed  Additional Details Additional description of procedure: None Equipment details: None Specimens removed: Abdominal fluid Estimated blood loss (mL): Less than 10      7/23/2025 4:10 PM by Miguel Kong MD on Workstation: [id number]      CT Angiogram Chest Pulmonary Embolism  Result Date: 7/22/2025  CT ANGIOGRAM CHEST PULMONARY EMBOLISM Date of Exam: 7/22/2025 8:53 PM EDT Indication: sepsis, dyspnea, chronic immobilization, leg swelling, dimer >500. Comparison: 7/20/2025. Technique: Axial CT images were obtained of the chest after the uneventful intravenous administration of 80 mL Isovue-370 utilizing pulmonary embolism protocol.  In addition, a 3-D volume rendered image was created for interpretation.  Reconstructed coronal and sagittal images were also obtained. Automated exposure  control and iterative construction methods were used. Findings: There is no pathologic axillary adenopathy or other worrisome body wall soft tissue finding in the chest. The partially characterized upper abdomen demonstrates known cirrhosis and moderate volume ascites. There is a small pericardial effusion as well as  unchanged small left pleural effusion. Atherosclerotic, nonaneurysmal thoracic aorta. The pulmonary arteries demonstrate no evidence of focal filling defect. The lung fields demonstrate similar prominent volume loss in the left lower lobe adjacent to the pleural effusion. There is no distinct suspicious pulmonary nodularity. The osseous structures again demonstrate a nonacute left clavicle fracture. No acute fracture or new aggressive osseous lesion is identified.     Impression: Impression: No evidence of acute pulmonary embolus. Redemonstrated chronic left pleural effusion with adjacent left basilar atelectasis as well as chronic pericardial effusion. Electronically Signed: Son Carey MD  7/22/2025 9:45 PM EDT  Workstation ID: [id number]    CT Abdomen Pelvis With Contrast  Result Date: 7/22/2025  CT ABDOMEN PELVIS W CONTRAST Date of Exam: 7/22/2025 8:53 PM EDT Indication: Sepsis of unknown source, abdominal distention, history of chronic hernia and cirrhosis with ascites. Comparison: CT abdomen pelvis 3/20/2025 Technique: Axial CT images were obtained of the abdomen and pelvis following the uneventful intravenous administration of 100 mL Isovue 370. Reconstructed coronal and sagittal images were also obtained. Automated exposure control and iterative construction methods were used. Findings: Separately dictated CT chest. Cirrhotic liver morphology. No discrete hypervascular liver lesion. Portal vasculature, splenic vein and superior mesenteric vein patent. Cholecystectomy. No dilation of biliary tree. Atrophic pancreas. Spleen within normal limits in size. Minimal varices. Large volume ascites. No  suspicious adrenal nodule. Symmetric renal size, contour and enhancement. Few small hypodense renal lesions favoring cysts too small to accurately characterize. There is a stable 1.1 cm partially exophytic lesion in the left anterior midpole showing intrinsic high density on prior noncontrasted exam consistent with hemorrhagic/proteinaceous cyst. Punctate nonobstructing right upper pole calculus. No hydronephrosis. Focal somewhat nodular urinary bladder wall thickening near hepatic dome measuring up to 1.2 cm thickness sagittal image 79 series 901. Prostate within normal limits in size. Symmetric seminal vesicles. Small hiatal hernia. Expected configuration stomach  and duodenum. Colonic diverticulosis. No evidence of bowel obstruction or active inflammation. Normal appendix. Diffuse aortic atherosclerotic disease. Fusiform dilation of infrarenal abdominal aorta measuring 3.1 cm, technically aneurysmal without significant change from prior exam. No overtly suspicious adenopathy. No organized fluid collection. No free air. There is a large fluid containing ventral abdominal wall hernia measuring 11.1 x 20.2 x 16.2 cm AP, transverse and craniocaudal dimension. Hernia defect measures 7.7 cm transverse dimension containing a small portion of the traversing bowel. Small fat-containing right and small to moderate fat and fluid-containing left inguinal hernias. These appear similar to prior. Diffuse body wall edema. Gynecomastia. Paraspinous muscle atrophy. Multilevel degenerative change throughout the spine. No aggressive bone lesion.     Impression: Impression: 1. Focal nodular bladder wall thickening near urinary bladder dome, possibly presenting a primary malignancy (urothelial carcinoma). Recommend nonemergent urology consultation for consideration of cystoscopy and tissue sampling. 2. Cirrhotic liver morphology with sequelae of portal hypertension including large volume ascites and minimal varices. No significant  splenomegaly. Portal vasculature patent. No signs of hepatocellular carcinoma. 3. Large ventral and smaller bilateral inguinal hernias detailed above, without significant change from prior CT comparison. The large ventral hernia contains a small portion of traversing bowel without obstruction. 4. Anasarca. 5. Colonic diverticulosis. 6. 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 7. Separately dictated CT chest. Electronically Signed: Robson Lopez MD  7/22/2025 9:34 PM EDT  Workstation ID: [id number]    CT Head Without Contrast  Result Date: 7/22/2025  CT HEAD WO CONTRAST Date of Exam: 7/22/2025 8:53 PM EDT Indication: Somnolence, confusion, suspect metabolic. Comparison: Head CT 7/20/2025 Technique: Axial CT images were obtained of the head without contrast administration.  Automated exposure control and iterative construction methods were used. Findings: Motion-degraded exam. 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 No new large territory infarct, acute intracranial hemorrhage, large mass lesion, midline shift or hydrocephalus. Mild global parenchymal volume loss and sequelae of chronic microvascular ischemic change similar to prior. Senescent related basal ganglia calcifications on the right. No large extra-axial fluid collection. No obvious displaced fracture or joint malalignment. No obstructive sinus disease or large mastoid effusion.     Impression: Impression: Allowing for motion degradation, no acute intracranial findings. Grossly stable CT exam since 7/20/2025 comparison. Electronically Signed: Robson Lopez MD  7/22/2025 9:23 PM EDT  Workstation ID: [id number]    XR Chest 1 View  Result Date: 7/22/2025  XR CHEST 1 VW Date of Exam: 7/22/2025 6:40 PM EDT Indication: SOA triage protocol Comparison: Chest CT 7/20/2025 Findings: Enlarged cardiac silhouette probably combination of  cardiomegaly and pericardial effusion compared to recent CT. Right lung appears relatively clear. Increasing probably small to moderate size left-sided pleural effusion and adjacent left lower lobe atelectasis versus airspace disease. No pneumothorax. Aortic atherosclerotic disease. Degenerative elated osseous change.     Impression: Impression: 1. Worsening left lower lobe atelectasis/airspace disease and adjacent pleural effusion. 2. Grossly similar cardiomegaly and probable pericardial effusion correlating with recent CT findings. Electronically Signed: Robson Lopez MD  7/22/2025 7:04 PM EDT  Workstation ID: [id number]          Current medications:  Scheduled Meds:allopurinol, 100 mg, Oral, Daily  atorvastatin, 20 mg, Oral, Daily  cefTRIAXone, 2,000 mg, Intravenous, Q24H  folic acid, 400 mcg, Oral, Daily  levothyroxine, 25 mcg, Oral, QAM  lidocaine PF 1%, 10 mL, Infiltration, Once  midodrine, 5 mg, Oral, TID AC  sodium chloride, 10 mL, Intravenous, Q12H  [Held by provider] spironolactone, 50 mg, Oral, Daily      Continuous Infusions:   PRN Meds:.  albuterol sulfate HFA    benzonatate    senna-docusate sodium **AND** polyethylene glycol **AND** bisacodyl **AND** bisacodyl    Calcium Replacement - Follow Nurse / BPA Driven Protocol    Magnesium Standard Dose Replacement - Follow Nurse / BPA Driven Protocol    nitroglycerin    Phosphorus Replacement - Follow Nurse / BPA Driven Protocol    Potassium Replacement - Follow Nurse / BPA Driven Protocol    sodium chloride    sodium chloride    sodium chloride    Assessment & Plan   Assessment & Plan     Active Hospital Problems    Diagnosis  POA    **Sepsis [A41.9]  Yes    Ventral hernia [K43.9]  Yes    Elevated troponin [R79.89]  Yes    Acute UTI (urinary tract infection) [N39.0]  Yes    Lactic acidosis [E87.20]  Yes    Hyponatremia [E87.1]  Yes    Elevated serum creatinine [R79.89]  Yes    Anemia, chronic disease [D63.8]  Yes    Chronic bilateral pleural effusions [J90]   Yes    Pericardial effusion [I31.39]  Yes    Cellulitis [L03.90]  Yes    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        Brief Hospital Course to date:  Jordi Sam is a 89 y.o. male with a history of A-fib, CAD, cellulitis, CHF, NAFLD cirrhosis, lipidemia, hypertension, diabetes presenting with weakness     Weakness  Sepsis  UTI--recurrent in setting of urethral stricture  Bilateral lower extremity cellulitis  Gm neg bacteremia  - cont ceftriaxone for now. Blood cx w GNB, likely from urinary source  --ascitis culture in process, no growth so far     Cirrhosis  Ascites  - s/p para 7/23 w 9L removal        Hypotension  --controlled on midodrine     Volume overload  MELVI on CKD  --hold spironolactone for MELVI and hyperkalemia  --suspect also exacerbated by stricture. If no improvement will ask nephrology to see     Ventral hernia     Chronic anemia     A-fib  - Rate controlled, not currently on anticoagulation     Hematuria  --urology evaluated, recommends cont ceftriaxone for UTI likely exacerbated by stricture. Hematuria likely from hemorrhagic cystitis  --conservative management for stricture, hold eliquis also    Expected Discharge Location and Transportation: TBD  Expected Discharge   Expected Discharge Date: 7/28/2025; Expected Discharge Time:      VTE Prophylaxis:  Mechanical VTE prophylaxis orders are present.         AM-PAC 6 Clicks Score (PT): 12 (07/23/25 2000)    CODE STATUS:   Code Status and Medical Interventions: No CPR (Do Not Attempt to Resuscitate); Limited Support; No intubation (DNI)   Ordered at: 07/23/25 0103     Code Status (Patient has no pulse and is not breathing):    No CPR (Do Not Attempt to Resuscitate)     Medical Interventions (Patient has pulse or is  breathing):    Limited Support     Medical Intervention Limits:    No intubation (DNI)       Josette Clifford MD  07/24/25

## 2025-07-24 NOTE — THERAPY EVALUATION
Acute Care - Speech Language Pathology   Swallow Initial Evaluation [facility]  Clinical Swallow Evaluation     Patient Name: Jordi Sam  : 1934  MRN: [id number]  Today's Date: 2025               Admit Date: 2025    Visit Dx:     ICD-10-CM ICD-9-CM   1. Sepsis with acute renal failure without septic shock, due to unspecified organism, unspecified acute renal failure type  A41.9 038.9    R65.20 995.92    N17.9 584.9   2. Acute UTI (urinary tract infection)  N39.0 599.0   3. Cellulitis of right lower extremity  L03.115 682.6   4. Stage 3b chronic kidney disease  N18.32 585.3   5. Chronic right heart failure  I50.812 428.0   6. Panlobular emphysema  J43.1 492.8   7. Cirrhosis of liver with ascites, unspecified hepatic cirrhosis type  K74.60 571.5    R18.8    8. Dysphagia, unspecified type  R13.10 787.20     Patient Active Problem List   Diagnosis    Coronary arteriosclerosis in native artery    Panlobular emphysema    Functional diarrhea    Essential hypertension    Chronic cough    Dyslipidemia    Nephrolithiasis    Chronic atrial fibrillation    Shortness of breath    Chronic right heart failure    Other ascites    Cirrhosis of liver with ascites    Stage 3 chronic kidney disease    End stage liver disease    S/P umbilical hernia repair, follow-up exam    Constipation    Right hip pain    Hearing loss of right ear due to cerumen impaction    Pre-diabetes    Umbilical hernia    Benign prostatic hyperplasia with nocturia    Chronic anticoagulation    Facial cellulitis    Right ankle pain    Medicare annual wellness visit, subsequent    Lower urinary tract symptoms due to benign prostatic hyperplasia    Basal cell carcinoma of right ala nasi    Acute pain of right shoulder    Laceration of head and neck    Wound infection    Oral pain    Squamous cell carcinoma of scalp    Incisional hernia, without obstruction or gangrene    Other specified hypothyroidism    Gross hematuria    Congenital  hemorrhagic cyst of kidney    Sepsis    Ventral hernia    Elevated troponin    Acute UTI (urinary tract infection)    Lactic acidosis    Hyponatremia    Elevated serum creatinine    Anemia, chronic disease    Chronic bilateral pleural effusions    Pericardial effusion    Cellulitis     Past Medical History:   Diagnosis Date    A-fib     Acute inferior myocardial infarction     Acute inferior STEMI with RV infarct features, January 2009.     Arrhythmia     CAD (coronary artery disease)     Cellulitis 02/26/2023    Facial. Feb 26-March 1st admission at [facility]. 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 Seeing Dr Kiersten Nova and Dr Jasper Avery 8-2021     Past Surgical History:   Procedure Laterality Date    BRONCHOSCOPY N/A 4/12/2019    Procedure: BRONCHOSCOPY WITH THORACENTESIS;  Surgeon: Marciano Higginbotham MD;  Location:  [facility];  Service: Pulmonary    CARDIAC CATHETERIZATION Bilateral 1/30/2019    Procedure: Right and Left Heart Cath;  Surgeon: Efrem Posadas MD;  Location:  [facility];  Service: Cardiology    CARDIAC CATHETERIZATION      CHOLECYSTECTOMY      CORONARY ANGIOPLASTY WITH STENT PLACEMENT  01/09/2009    Sirolimus eluting stents to the RCA, 01/09/2009.     HERNIA REPAIR      Hernia repair x 2.     PARACENTESIS  06/17/2019    multiple    UMBILICAL HERNIA REPAIR N/A 7/26/2020    Procedure: UMBILICAL HERNIA REPAIR;  Surgeon:  Maribell Her MD;  Location: [facility];  Service: General;  Laterality: N/A;       SLP Recommendation and Plan  SLP Swallowing Diagnosis: suspected pharyngeal dysphagia, esophageal dysphagia (07/24/25 1135)  SLP Diet Recommendation: regular textures, thin liquids (consider NPO and notify SLP if concerns arise) (07/24/25 1135)  Recommended Precautions and Strategies: upright posture during/after eating, general aspiration precautions, reflux precautions (07/24/25 1135)  SLP Rec. for Method of Medication Administration: as tolerated (07/24/25 1135)     Monitor for Signs of Aspiration: yes, notify SLP if any concerns (07/24/25 1135)  Recommended Diagnostics: VFSS (MBS) (pt declined MBS @ this time) (07/24/25 1135)  Swallow Criteria for Skilled Therapeutic Interventions Met: demonstrates skilled criteria (07/24/25 1135)  Anticipated Discharge Disposition (SLP): home with home health (if further needs arise during admission) (07/24/25 1135)  Rehab Potential/Prognosis, Swallowing: re-evaluate goals as necessary (07/24/25 1135)  Therapy Frequency (Swallow): 3 days per week (07/24/25 1135)  Predicted Duration Therapy Intervention (Days): 1 week (07/24/25 1135)  Oral Care Recommendations: Oral Care BID/PRN, Toothbrush (07/24/25 1135)          SWALLOW EVALUATION (Last 72 Hours)       SLP Adult Swallow Evaluation       Row Name 07/24/25 1135                   Rehab Evaluation    Document Type evaluation  -AC        Subjective Information no complaints  -AC        Patient Observations alert;cooperative  -AC        Patient/Family/Caregiver Comments/Observations No family present.  -AC        Patient Effort good  -AC           General Information    Patient Profile Reviewed yes  -AC        Pertinent History Of Current Problem Sepsis of unknown source, UTI, weakness, BLE cellulitis. Fall a few days ago that brought him to ED. S/p paracentesis yesterday. Hx emphysema, CHF, cirrhosis of liver w/ ascites, chronic L pleural  effusion.  -AC        Current Method of Nutrition regular textures;thin liquids  -AC        Precautions/Limitations, Vision WFL;for purposes of eval  -AC        Precautions/Limitations, Hearing WFL;for purposes of eval  w/ increased volume  -AC        Prior Level of Function-Communication unknown  -AC        Prior Level of Function-Swallowing no diet consistency restrictions  -AC        Plans/Goals Discussed with patient;agreed upon  -AC        Barriers to Rehab none identified  -        Patient's Goals for Discharge patient did not state  -AC           Pain    Pretreatment Pain Rating 0/10 - no pain  -AC        Posttreatment Pain Rating 0/10 - no pain  -AC           Oral Motor Structure and Function    Dentition Assessment natural, present and adequate  -AC        Secretion Management WNL/WFL  -AC        Mucosal Quality moist, healthy  -AC           Oral Musculature and Cranial Nerve Assessment    Oral Motor General Assessment WFL  -AC           General Eating/Swallowing Observations    Respiratory Support Currently in Use room air  -AC        Eating/Swallowing Skills self-fed;appropriate self-feeding skills observed  -AC        Positioning During Eating upright 90 degree;upright in chair  -AC        Utensils Used spoon;cup;straw  -AC        Consistencies Trialed thin liquids;pureed;regular textures  -AC           Clinical Swallow Eval    Oral Prep Phase WFL  -AC        Oral Transit WFL  -AC        Oral Residue WFL  -AC        Pharyngeal Phase suspected pharyngeal impairment  -AC        Esophageal Phase suspected esophageal impairment  -AC           Pharyngeal Phase Concerns    Pharyngeal Phase Concerns cough;acute vocal quality changes  -AC        Acute Vocal Quality Changes dysphonia  pt reported fairly chronic, but worse as of late  -AC           Esophageal Phase Concerns    Esophageal Phase Concerns belching  -AC        Esophageal Phase Concerns, Comment Audible swallows w/ belching t/o eval. Delayed cough  minutes after PO trials. ? Pharyngoesophageal dysphagia. Discussed concerns/risks/options for mgt w/ pt. He stated understanding risks of aspiration, but does not want to pursue MBS @ this time. Notified Dr. Clifford.  -           Swallowing Quality of Life Assessment    Education and counseling provided Risks of aspiration;Signs of aspiration;Aspiration precautions  -           SLP Evaluation Clinical Impression    SLP Swallowing Diagnosis suspected pharyngeal dysphagia;esophageal dysphagia  -        Functional Impact risk of aspiration/pneumonia  -        Rehab Potential/Prognosis, Swallowing re-evaluate goals as necessary  -        Swallow Criteria for Skilled Therapeutic Interventions Met demonstrates skilled criteria  -           Recommendations    Therapy Frequency (Swallow) 3 days per week  -        Predicted Duration Therapy Intervention (Days) 1 week  -        SLP Diet Recommendation regular textures;thin liquids  consider NPO and notify SLP if concerns arise  -        Recommended Diagnostics VFSS (MBS)  pt declined MBS @ this time  -        Recommended Precautions and Strategies upright posture during/after eating;general aspiration precautions;reflux precautions  -        Oral Care Recommendations Oral Care BID/PRN;Toothbrush  -        SLP Rec. for Method of Medication Administration as tolerated  -        Monitor for Signs of Aspiration yes;notify SLP if any concerns  -        Anticipated Discharge Disposition (SLP) home with home health  if further needs arise during admission  -                  User Key  (r) = Recorded By, (t) = Taken By, (c) = Cosigned By      Initials Name Effective Dates     Ann-Marie Jasmine MS [facility]-SLP 02/03/23 -                     EDUCATION  The patient has been educated in the following areas:   Dysphagia (Swallowing Impairment) Oral Care/Hydration.        SLP GOALS       Row Name 07/24/25 8205             (LTG) Patient will demonstrate functional  swallow for    Diet Texture (Demonstrate functional swallow) regular textures  -AC      Liquid viscosity (Demonstrate functional swallow) thin liquids  -AC      [place] (Demonstrate functional swallow) independently (over 90% accuracy)  -AC      Time Frame (Demonstrate functional swallow) 1 week  -AC         (STG) Patient will tolerate trials of    Consistencies Trialed (Tolerate trials) regular textures;thin liquids  -AC      Desired Outcome (Tolerate trials) without signs/symptoms of aspiration;without signs of distress  -AC      [place] (Tolerate trials) independently (over 90% accuracy)  -AC      Time Frame (Tolerate trials) 1 week  -AC                User Key  (r) = Recorded By, (t) = Taken By, (c) = Cosigned By      Initials Name Provider Type     Ann-Marie Jasmine MS CCC-SLP Speech and Language Pathologist                         Time Calculation:    Time Calculation- SLP       Row Name 07/24/25 1217             Time Calculation- SLP    SLP Start Time 1135  -      SLP Received On 07/24/25  -         Untimed Charges    25647-MV Eval Oral Pharyng Swallow Minutes 48  -AC         Total Minutes    Untimed Charges Total Minutes 48  -AC       Total Minutes 48  -AC                User Key  (r) = Recorded By, (t) = Taken By, (c) = Cosigned By      Initials Name Provider Type     Ann-Marie Jasmine MS CCC-SLP Speech and Language Pathologist                    Therapy Charges for Today       Code Description Service Date Service Provider Modifiers Qty    [id number]  ST EVAL ORAL PHARYNG SWALLOW 3 7/24/2025 Ann-Marie Jasmine MS CCC-SLP GN 1                 Ann-Marie Jasmine MS CCC-JENELLE  7/24/2025

## 2025-07-24 NOTE — THERAPY EVALUATION
Patient Name: Jordi Sam  : 1934    MRN: [id number]                              Today's Date: 2025       Admit Date: 2025    Visit Dx:     ICD-10-CM ICD-9-CM   1. Sepsis with acute renal failure without septic shock, due to unspecified organism, unspecified acute renal failure type  A41.9 038.9    R65.20 995.92    N17.9 584.9   2. Acute UTI (urinary tract infection)  N39.0 599.0   3. Cellulitis of right lower extremity  L03.115 682.6   4. Stage 3b chronic kidney disease  N18.32 585.3   5. Chronic right heart failure  I50.812 428.0   6. Panlobular emphysema  J43.1 492.8   7. Cirrhosis of liver with ascites, unspecified hepatic cirrhosis type  K74.60 571.5    R18.8      Patient Active Problem List   Diagnosis    Coronary arteriosclerosis in native artery    Panlobular emphysema    Functional diarrhea    Essential hypertension    Chronic cough    Dyslipidemia    Nephrolithiasis    Chronic atrial fibrillation    Shortness of breath    Chronic right heart failure    Other ascites    Cirrhosis of liver with ascites    Stage 3 chronic kidney disease    End stage liver disease    S/P umbilical hernia repair, follow-up exam    Constipation    Right hip pain    Hearing loss of right ear due to cerumen impaction    Pre-diabetes    Umbilical hernia    Benign prostatic hyperplasia with nocturia    Chronic anticoagulation    Facial cellulitis    Right ankle pain    Medicare annual wellness visit, subsequent    Lower urinary tract symptoms due to benign prostatic hyperplasia    Basal cell carcinoma of right ala nasi    Acute pain of right shoulder    Laceration of head and neck    Wound infection    Oral pain    Squamous cell carcinoma of scalp    Incisional hernia, without obstruction or gangrene    Other specified hypothyroidism    Gross hematuria    Congenital hemorrhagic cyst of kidney    Sepsis    Ventral hernia    Elevated troponin    Acute UTI (urinary tract infection)    Lactic acidosis     Hyponatremia    Elevated serum creatinine    Anemia, chronic disease    Chronic bilateral pleural effusions    Pericardial effusion    Cellulitis     Past Medical History:   Diagnosis Date    A-fib     Acute inferior myocardial infarction     Acute inferior STEMI with RV infarct features, January 2009.     Arrhythmia     CAD (coronary artery disease)     Cellulitis 02/26/2023    Facial. Feb 26-March 1st admission at [facility]. 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 Seeing Dr Kiersten Nova and Dr Jasper Avery 8-2021     Past Surgical History:   Procedure Laterality Date    BRONCHOSCOPY N/A 4/12/2019    Procedure: BRONCHOSCOPY WITH THORACENTESIS;  Surgeon: Marciano Higginbotham MD;  Location:  [facility];  Service: Pulmonary    CARDIAC CATHETERIZATION Bilateral 1/30/2019    Procedure: Right and Left Heart Cath;  Surgeon: Efrem Posadas MD;  Location:  [facility];  Service: Cardiology    CARDIAC CATHETERIZATION      CHOLECYSTECTOMY      CORONARY ANGIOPLASTY WITH STENT PLACEMENT  01/09/2009    Sirolimus eluting stents to the RCA, 01/09/2009.     HERNIA REPAIR      Hernia repair x 2.     PARACENTESIS  06/17/2019    multiple    UMBILICAL HERNIA REPAIR N/A 7/26/2020    Procedure: UMBILICAL HERNIA REPAIR;  Surgeon: Maribell Her MD;  Location:  [facility];  Service: General;  Laterality: N/A;      General Information       Row Name 07/24/25  0728          Physical Therapy Time and Intention    Document Type evaluation  -LS     Mode of Treatment physical therapy  -       Row Name 07/24/25 0728          General Information    Patient Profile Reviewed yes  -LS     Prior Level of Function independent:;gait;transfer;bed mobility;using stairs;dressing;bathing  Pt walks with a SC (SC not here; one was issued today) and owns a 2-wheel RW. Pt said he is able to drive but that is wife does the driving.  -LS     Existing Precautions/Restrictions fall;other (see comments)  incontinent of bowel today  -LS       Row Name 07/24/25 0728          Living Environment    Current Living Arrangements home  -LS     People in Home spouse  -LS       Row Name 07/24/25 0728          Home Main Entrance    Number of Stairs, Main Entrance six  -LS     Stair Railings, Main Entrance railings on both sides of stairs  -LS       Row Name 07/24/25 0728          Stairs Within Home, Primary    Stairs, Within Home, Primary walk out basemement, but pt said he does not have to go to the basement.  -LS       Row Name 07/24/25 0728          Cognition    Orientation Status (Cognition) oriented x 4  -LS       Row Name 07/24/25 0728          Safety Issues/Impairments Affecting Functional Mobility    Safety Issues Affecting Function (Mobility) awareness of need for assistance;insight into deficits/self-awareness;safety precautions follow-through/compliance;safety precaution awareness  -     Impairments Affecting Function (Mobility) balance;endurance/activity tolerance;strength  -LS               User Key  (r) = Recorded By, (t) = Taken By, (c) = Cosigned By      Initials Name Provider Type    LS Bharti Munguia PT Physical Therapist                   Mobility       Row Name 07/24/25 0728          Bed Mobility    Bed Mobility supine-sit  -LS     Supine-Sit [place] (Bed Mobility) verbal cues;minimum assist (75% patient effort)  -LS     Assistive Device (Bed Mobility) bed rails;head of  bed elevated  -LS     Comment, (Bed Mobility) vcs for sequencing. increased time and eeffort needed for transition. min A to bring legs off EOB and to raise trunk upright.  -LS       Row Name 07/24/25 0728          Bed-Chair Transfer    Bed-Chair [place] (Transfers) verbal cues;minimum assist (75% patient effort)  -LS     Comment, (Bed-Chair Transfer) transferred bed to bedside chair reaching for armrest of commode and using SC. transferred commode to bed using SC and using bedrail.  -LS       Row Name 07/24/25 0728          Sit-Stand Transfer    Sit-Stand [place] (Transfers) verbal cues  -LS       Row Name 07/24/25 0728          Gait/Stairs (Locomotion)    [place] Level (Gait) verbal cues;minimum assist (75% patient effort)  -LS     Assistive Device (Gait) walker, front-wheeled  -LS     Patient was able to Ambulate yes  -LS     Distance in Feet (Gait) 14  -LS     Deviations/Abnormal Patterns (Gait) gait speed decreased;bilateral deviations;stride length decreased  -LS     Bilateral Gait Deviations forward flexed posture  -LS     Comment, (Gait/Stairs) pt wanted to try to use cane; however, this was unsafe. he needed RW for stability while walking today. step-through gait pattern at a very slow pace.  -LS               User Key  (r) = Recorded By, (t) = Taken By, (c) = Cosigned By      Initials Name Provider Type    Bharti Mesa, PT Physical Therapist                   Obj/Interventions       Row Name 07/24/25 0728          Range of Motion Comprehensive    General Range of Motion bilateral lower extremity ROM WFL  -LS       Row Name 07/24/25 0728          Strength Comprehensive (MMT)    Comment, General Manual Muscle Testing (MMT) Assessment B hip flexors 4-/5. B knee extensors and B ankle dorsiflexors 4+/5.  -LS       Row Name 07/24/25 0728          Balance    Static Standing Balance contact guard  -LS     Dynamic Standing Balance minimal assist  -LS     Position/Device Used, Standing  Balance supported;walker, rolling  -LS     Balance Interventions standing;sit to stand;supported;weight shifting activity  -LS               User Key  (r) = Recorded By, (t) = Taken By, (c) = Cosigned By      Initials Name Provider Type    Bharti Mesa, PT Physical Therapist                   Goals/Plan       Row Name 07/24/25 0728          Bed Mobility Goal 1 (PT)    Activity/Assistive Device (Bed Mobility Goal 1, PT) sit to supine/supine to sit  -LS     [place] Level/Cues Needed (Bed Mobility Goal 1, PT) independent  -LS     Time Frame (Bed Mobility Goal 1, PT) long term goal (LTG);10 days  -LS     Progress/Outcomes (Bed Mobility Goal 1, PT) goal ongoing  -LS       Row Name 07/24/25 0728          Transfer Goal 1 (PT)    Activity/Assistive Device (Transfer Goal 1, PT) sit-to-stand/stand-to-sit;walker, rolling  -LS     [place] Level/Cues Needed (Transfer Goal 1, PT) standby assist  -LS     Time Frame (Transfer Goal 1, PT) short term goal (STG);5 days  -LS     Progress/Outcome (Transfer Goal 1, PT) goal ongoing  -LS       Row Name 07/24/25 0728          Gait Training Goal 1 (PT)    Activity/Assistive Device (Gait Training Goal 1, PT) gait (walking locomotion);assistive device use;walker, rolling  -LS     [place] Level (Gait Training Goal 1, PT) standby assist  -LS     Distance (Gait Training Goal 1, PT) 100  -LS     Time Frame (Gait Training Goal 1, PT) long term goal (LTG);10 days  -LS     Progress/Outcome (Gait Training Goal 1, PT) goal ongoing  -LS       Row Name 07/24/25 0728          Stairs Goal 1 (PT)    Activity/Assistive Device (Stairs Goal 1, PT) ascending stairs;descending stairs;other (see comments)   with HR  -LS     [place] Level/Cues Needed (Stairs Goal 1, PT) contact guard required  -LS     Number of Stairs (Stairs Goal 1, PT) 6  -LS     Time Frame (Stairs Goal 1, PT) long term goal (LTG);10 days  -LS     Progress/Outcome (Stairs Goal 1, PT) goal ongoing  -       Row  Name 07/24/25 0728          Therapy Assessment/Plan (PT)    Planned Therapy Interventions (PT) balance training;bed mobility training;gait training;home exercise program;postural re-education;patient/family education;stair training;strengthening;transfer training  -               User Key  (r) = Recorded By, (t) = Taken By, (c) = Cosigned By      Initials Name Provider Type    Bharti Mesa, PT Physical Therapist                   Clinical Impression       Row Name 07/24/25 0728          Pain    Pretreatment Pain Rating 0/10 - no pain  -LS       Row Name 07/24/25 0728          Plan of Care Review    Plan of Care Reviewed With patient  -LS     Outcome Evaluation Pt presents with generalized weakness, impaired balance, and decreased tolerance to activity. He is below baseline level of function with mobilty and would benefit from skilled PT services to imprve independence. Recommend SNF at discharge.  -       Row Name 07/24/25 0728          Therapy Assessment/Plan (PT)    Patient/Family Therapy Goals Statement (PT) get better  -LS     Rehab Potential (PT) good  -LS     Criteria for Skilled Interventions Met (PT) yes;meets criteria  -LS     Therapy Frequency (PT) daily  -       Row Name 07/24/25 0728          Vital Signs    Pre Systolic BP Rehab 107  -LS     Pre Treatment Diastolic BP 64  -LS     Posttreatment Heart Rate (beats/min) 80  -LS     Post SpO2 (%) 97  -LS     O2 Delivery Post Treatment room air  -LS     Pre Patient Position Supine  -LS     Post Patient Position Sitting  -LS       Row Name 07/24/25 0728          Positioning and Restraints    Pre-Treatment Position in bed  -LS     Post Treatment Position chair  -LS     In Chair notified nsg;sitting;call light within reach;encouraged to call for assist;exit alarm on;waffle cushion;legs elevated;heels elevated  -               User Key  (r) = Recorded By, (t) = Taken By, (c) = Cosigned By      Initials Name Provider Type    Bharti Mesa  Du[place], PT Physical Therapist                   Outcome Measures       Row Name 07/24/25 0728          How much help from another person do you currently need...    Turning from your back to your side while in flat bed without using bedrails? 3  -LS     Moving from lying on back to sitting on the side of a flat bed without bedrails? 2  -LS     Moving to and from a bed to a chair (including a wheelchair)? 3  -LS     Standing up from a chair using your arms (e.g., wheelchair, bedside chair)? 3  -LS     Climbing 3-5 steps with a railing? 3  -LS     To walk in hospital room? 3  -LS     AM-PAC 6 Clicks Score (PT) 17  -LS     Highest Level of Mobility Goal Stand (1 or More Minutes)-5  -LS       Row Name 07/24/25 0908 07/24/25 0728       Functional Assessment    Outcome Measure Options AM-PAC 6 Clicks Daily Activity (OT)  -CHAD AM-PAC 6 Clicks Basic Mobility (PT)  -              User Key  (r) = Recorded By, (t) = Taken By, (c) = Cosigned By      Initials Name Provider Type    Rosario Ibarra, OT Occupational Therapist    Bharti Mesa, PT Physical Therapist                                 Physical Therapy Education       Title: PT OT SLP Therapies (In Progress)       Topic: Physical Therapy (In Progress)       Point: Mobility training (Done)       Learning Progress Summary            Patient Acceptance, E, VU,NR by  at 7/24/2025 0928                      Point: Home exercise program (Not Started)       Learner Progress:  Not documented in this visit.              Point: Body mechanics (Not Started)       Learner Progress:  Not documented in this visit.              Point: Precautions (Not Started)       Learner Progress:  Not documented in this visit.                              User Key       Initials Effective Dates Name Provider Type Discipline     07/11/23 -  Bharti Munguia, PT Physical Therapist PT                  PT Recommendation and Plan  Planned Therapy Interventions (PT): balance  training, bed mobility training, gait training, home exercise program, postural re-education, patient/family education, stair training, strengthening, transfer training  Outcome Evaluation: Pt presents with generalized weakness, impaired balance, and decreased tolerance to activity. He is below baseline level of function with mobilty and would benefit from skilled PT services to imprve independence. Recommend SNF at discharge.     Time Calculation:   PT Evaluation Complexity  History, PT Evaluation Complexity: 3 or more personal factors and/or comorbidities  Examination of Body Systems (PT Eval Complexity): total of 4 or more elements  Clinical Presentation (PT Evaluation Complexity): stable  Clinical Decision Making (PT Evaluation Complexity): low complexity  Overall Complexity (PT Evaluation Complexity): low complexity     PT Charges       Row Name 07/24/25 0928             Time Calculation    Start Time 0928  -LS      PT Received On 07/24/25  -LS      PT Goal Re-Cert Due Date 08/04/25  -LS         Untimed Charges    PT Eval/Re-eval Minutes 70  -LS         Total Minutes    Untimed Charges Total Minutes 70  -LS       Total Minutes 70  -LS                User Key  (r) = Recorded By, (t) = Taken By, (c) = Cosigned By      Initials Name Provider Type    Bharti Mesa, PT Physical Therapist                  Therapy Charges for Today       Code Description Service Date Service Provider Modifiers Qty    19032067719 HC PT EVAL LOW COMPLEXITY 5 7/24/2025 Bharti Munguia, PT  1            PT G-Codes  Outcome Measure Options: AM-PAC 6 Clicks Daily Activity (OT)  AM-PAC 6 Clicks Score (PT): 17  AM-PAC 6 Clicks Score (OT): 14  PT Discharge Summary  Anticipated Discharge Disposition (PT): skilled nursing facility    Bharti Munguia, PT  7/24/2025

## 2025-07-24 NOTE — PLAN OF CARE
Goal Outcome Evaluation:  Plan of Care Reviewed With: patient        Progress: improving  Outcome Evaluation: pt afib on tele. room air. pt post paracentesis. no complaints during shift. fall precautions in place. plan of care on going

## 2025-07-24 NOTE — THERAPY EVALUATION
Patient Name: Jordi Sam  : 1934    MRN: [id number]                              Today's Date: 2025       Admit Date: 2025    Visit Dx:     ICD-10-CM ICD-9-CM   1. Sepsis with acute renal failure without septic shock, due to unspecified organism, unspecified acute renal failure type  A41.9 038.9    R65.20 995.92    N17.9 584.9   2. Acute UTI (urinary tract infection)  N39.0 599.0   3. Cellulitis of right lower extremity  L03.115 682.6   4. Stage 3b chronic kidney disease  N18.32 585.3   5. Chronic right heart failure  I50.812 428.0   6. Panlobular emphysema  J43.1 492.8   7. Cirrhosis of liver with ascites, unspecified hepatic cirrhosis type  K74.60 571.5    R18.8      Patient Active Problem List   Diagnosis    Coronary arteriosclerosis in native artery    Panlobular emphysema    Functional diarrhea    Essential hypertension    Chronic cough    Dyslipidemia    Nephrolithiasis    Chronic atrial fibrillation    Shortness of breath    Chronic right heart failure    Other ascites    Cirrhosis of liver with ascites    Stage 3 chronic kidney disease    End stage liver disease    S/P umbilical hernia repair, follow-up exam    Constipation    Right hip pain    Hearing loss of right ear due to cerumen impaction    Pre-diabetes    Umbilical hernia    Benign prostatic hyperplasia with nocturia    Chronic anticoagulation    Facial cellulitis    Right ankle pain    Medicare annual wellness visit, subsequent    Lower urinary tract symptoms due to benign prostatic hyperplasia    Basal cell carcinoma of right ala nasi    Acute pain of right shoulder    Laceration of head and neck    Wound infection    Oral pain    Squamous cell carcinoma of scalp    Incisional hernia, without obstruction or gangrene    Other specified hypothyroidism    Gross hematuria    Congenital hemorrhagic cyst of kidney    Sepsis    Ventral hernia    Elevated troponin    Acute UTI (urinary tract infection)    Lactic acidosis     Hyponatremia    Elevated serum creatinine    Anemia, chronic disease    Chronic bilateral pleural effusions    Pericardial effusion    Cellulitis     Past Medical History:   Diagnosis Date    A-fib     Acute inferior myocardial infarction     Acute inferior STEMI with RV infarct features, January 2009.     Arrhythmia     CAD (coronary artery disease)     Cellulitis 02/26/2023    Facial. Feb 26-March 1st admission at [facility]. 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 Seeing Dr Kiersten Nova and Dr Jasper Avery 8-2021     Past Surgical History:   Procedure Laterality Date    BRONCHOSCOPY N/A 4/12/2019    Procedure: BRONCHOSCOPY WITH THORACENTESIS;  Surgeon: Marciano Higginbotham MD;  Location:  [facility];  Service: Pulmonary    CARDIAC CATHETERIZATION Bilateral 1/30/2019    Procedure: Right and Left Heart Cath;  Surgeon: Efrem Posadas MD;  Location:  [facility];  Service: Cardiology    CARDIAC CATHETERIZATION      CHOLECYSTECTOMY      CORONARY ANGIOPLASTY WITH STENT PLACEMENT  01/09/2009    Sirolimus eluting stents to the RCA, 01/09/2009.     HERNIA REPAIR      Hernia repair x 2.     PARACENTESIS  06/17/2019    multiple    UMBILICAL HERNIA REPAIR N/A 7/26/2020    Procedure: UMBILICAL HERNIA REPAIR;  Surgeon: Maribell Her MD;  Location:  [facility];  Service: General;  Laterality: N/A;      General Information       Row Name 07/24/25  0854          OT Time and Intention    Document Type evaluation  -CHAD     Mode of Treatment individual therapy;occupational therapy  -CHAD     Patient Effort good  -CHAD       Row Name 07/24/25 0854          General Information    Patient Profile Reviewed yes  -CHAD     Prior Level of Function independent:;all household mobility;gait;transfer;bed mobility;feeding;grooming;dressing;bathing  -CHAD     Existing Precautions/Restrictions fall  -CHAD     Barriers to Rehab previous functional deficit  -CHAD       Row Name 07/24/25 0854          Occupational Profile    Environmental Supports and Barriers (Occupational Profile) shower chair, grab bar, straight cane, rw wx  -CHAD       Row Name 07/24/25 0854          Living Environment    Current Living Arrangements home  -CHAD     People in Home spouse  -CHAD       Row Name 07/24/25 0854          Home Main Entrance    Number of Stairs, Main Entrance six  -CHAD       Row Name 07/24/25 0854          Stairs Within Home, Primary    Stairs Comment, Within Home, Primary with a walk out basement, but pt. can stay with all needs on first floor  -CHAD       Row Name 07/24/25 0854          Cognition    Orientation Status (Cognition) oriented x 3  -CHAD       Row Name 07/24/25 0854          Safety Issues/Impairments Affecting Functional Mobility    Safety Issues Affecting Function (Mobility) awareness of need for assistance;insight into deficits/self-awareness;safety precaution awareness;safety precautions follow-through/compliance;sequencing abilities  -CHAD     Impairments Affecting Function (Mobility) balance;endurance/activity tolerance;range of motion (ROM);strength;postural/trunk control  -CHAD               User Key  (r) = Recorded By, (t) = Taken By, (c) = Cosigned By      Initials Name Provider Type    CHAD Rosario Parker, OT Occupational Therapist                     Mobility/ADL's       Row Name 07/24/25 0855          Bed Mobility    Bed Mobility supine-sit  -CHAD     Supine-Sit [place] (Bed Mobility)  minimum assist (75% patient effort);verbal cues  -CHAD     Bed Mobility, Safety Issues decreased use of legs for bridging/pushing;impaired trunk control for bed mobility  -CHAD     Assistive Device (Bed Mobility) bed rails;head of bed elevated  -CHAD     Comment, (Bed Mobility) extra time and effort needed, cues to reach across and use rail, some trunk and LE assist  -       Row Name 07/24/25 0855          Transfers    Transfers sit-stand transfer;stand-sit transfer;toilet transfer  -CHAD     Comment, (Transfers) patient needed cues each time for hand placement, pt. up to BSC with cane and bed rail, but needed rw wx for further movement to keep balance, pt. stood 2 times  -       Row Name 07/24/25 0855          Sit-Stand Transfer    Sit-Stand [place] (Transfers) minimum assist (75% patient effort);1 person assist;verbal cues  -CHAD     Assistive Device (Sit-Stand Transfers) walker, front-wheeled  -       Row Name 07/24/25 0855          Stand-Sit Transfer    Stand-Sit [place] (Transfers) minimum assist (75% patient effort);1 person assist;verbal cues  -CHAD     Assistive Device (Stand-Sit Transfers) walker, front-wheeled  -CHAD       Row Name 07/24/25 0855          Toilet Transfer    Type (Toilet Transfer) sit-stand;stand-sit  -CHAD     [place] Level (Toilet Transfer) minimum assist (75% patient effort);1 person assist;verbal cues  -CHAD     Assistive Device (Toilet Transfer) commode, bedside without drop arms  -       Row Name 07/24/25 0855          Functional Mobility    Functional Mobility- Ind. Level contact guard assist;verbal cues required  -     Functional Mobility- Device walker, front-wheeled  -     Functional Mobility-Distance (Feet) 14  -CHAD     Functional Mobility- Safety Issues step length decreased  -CHAD     Functional Mobility- Comment slowed pace with flexed trunk  -       Row Name 07/24/25 0855          Activities of Daily Living    BADL Assessment/Intervention lower body  dressing;toileting;feeding  -       Row Name 07/24/25 0879          Lower Body Dressing Assessment/Training    [place] Level (Lower Body Dressing) don;shoes/slippers;minimum assist (75% patient effort)  -     Position (Lower Body Dressing) edge of bed sitting  -CHAD     Comment, (Lower Body Dressing) pt. using cane to assist keeping slipper in place  -       Row Name 07/24/25 0855          Toileting Assessment/Training    [place] Level (Toileting) perform perineal hygiene;dependent (less than 25% patient effort)  -     Assistive Devices (Toileting) commode, bedside without drop arms  -CHAD     Position (Toileting) supported standing;supported sitting  -CHAD     Comment, (Toileting) purewick changed out due to soiled, loose stool in bed, finished on BSC, floor cleaned  -[facility] Name 07/24/25 0867          Self-Feeding Assessment/Training    [place] Level (Feeding) prepare tray/open items;maximum assist (25% patient effort);liquids to mouth;scoop food and bring to mouth;independent  -CHAD     Position (Feeding) supported sitting  -CHAD               User Key  (r) = Recorded By, (t) = Taken By, (c) = Cosigned By      Initials Name Provider Type    CHAD Rosario Parker OT Occupational Therapist                   Obj/Interventions       Row Name 07/24/25 0859          Vision Assessment/Intervention    Visual Impairment/Limitations corrective lenses for distance;corrective lenses for reading  -[facility] Name 07/24/25 0806          Range of Motion Comprehensive    General Range of Motion upper extremity range of motion deficits identified  -     Comment, General Range of Motion B shoulder PROM grossly  degrees  -       Row Name 07/24/25 0841          Strength Comprehensive (MMT)    General Manual Muscle Testing (MMT) Assessment upper extremity strength deficits identified  -     Comment, General Manual Muscle Testing (MMT) Assessment B shoulders grossly 3+ to 4/5, distally 4 to 4+/5  -        Row Name 07/24/25 0859          Motor Skills    Motor Skills functional endurance  -CHAD     Functional Endurance impaired for BADL task performance  -       Row Name 07/24/25 0859          Balance    Balance Assessment sitting static balance;sitting dynamic balance;sit to stand dynamic balance;standing static balance;standing dynamic balance  -CHAD     Static Sitting Balance standby assist  -CHAD     Dynamic Sitting Balance standby assist  -CHAD     Position, Sitting Balance unsupported;sitting edge of bed;other (see comments)  BSC  -CHAD     Sit to Stand Dynamic Balance minimal assist;1-person assist;verbal cues  -CHAD     Static Standing Balance contact guard;1-person assist  -CHAD     Dynamic Standing Balance minimal assist;1-person assist  -CHAD     Position/Device Used, Standing Balance walker, front-wheeled  -CHAD     Balance Interventions sit to stand;occupation based/functional task  -CHAD     Comment, Balance flexed posture, min A 1 especially with cane only, improved with walker use  -CHAD               User Key  (r) = Recorded By, (t) = Taken By, (c) = Cosigned By      Initials Name Provider Type    Rosario Ibarra, OT Occupational Therapist                   Goals/Plan       Row Name 07/24/25 0906          Bed Mobility Goal 1 (OT)    Activity/Assistive Device (Bed Mobility Goal 1, OT) sit to supine;supine to sit  -CHAD     [place] Level/Cues Needed (Bed Mobility Goal 1, OT) modified independence  -CHAD     Time Frame (Bed Mobility Goal 1, OT) long term goal (LTG);10 days  -CHAD     Progress/Outcomes (Bed Mobility Goal 1, OT) new goal  -       Row Name 07/24/25 0906          Transfer Goal 1 (OT)    Activity/Assistive Device (Transfer Goal 1, OT) sit-to-stand/stand-to-sit;toilet;commode;walker, rolling  -CHAD     [place] Level/Cues Needed (Transfer Goal 1, OT) contact guard required  -CHAD     Time Frame (Transfer Goal 1, OT) long term goal (LTG);10 days  -CHAD     Progress/Outcome (Transfer Goal 1, OT) new goal  -CHAD        Row Name 07/24/25 0906          Dressing Goal 1 (OT)    Activity/Device (Dressing Goal 1, OT) lower body dressing  -CHAD     [place]/Cues Needed (Dressing Goal 1, OT) minimum assist (75% or more patient effort);set-up required  -CHAD     Time Frame (Dressing Goal 1, OT) long term goal (LTG);10 days  -CHAD     Strategies/Barriers (Dressing Goal 1, OT) socks and pants  -CHAD     Progress/Outcome (Dressing Goal 1, OT) new goal  -CHAD       Row Name 07/24/25 0906          Grooming Goal 1 (OT)    Activity/Device (Grooming Goal 1, OT) hair care;oral care;wash face, hands  -CHAD     [place] (Grooming Goal 1, OT) contact guard required  -CHAD     Time Frame (Grooming Goal 1, OT) short term goal (STG);5 days  -CHAD     Progress/Outcome (Grooming Goal 1, OT) new goal  -CHAD       Row Name 07/24/25 0906          Therapy Assessment/Plan (OT)    Planned Therapy Interventions (OT) activity tolerance training;BADL retraining;patient/caregiver education/training;ROM/therapeutic exercise;occupation/activity based interventions;functional balance retraining;strengthening exercise;transfer/mobility retraining  -CHAD               User Key  (r) = Recorded By, (t) = Taken By, (c) = Cosigned By      Initials Name Provider Type    Rosario Ibarra, OT Occupational Therapist                   Clinical Impression       Row Name 07/24/25 0903          Pain Assessment    Pretreatment Pain Rating 0/10 - no pain  -CHAD     Posttreatment Pain Rating 0/10 - no pain  -CHAD       Row Name 07/24/25 0903          Plan of Care Review    Plan of Care Reviewed With patient  -CHAD     Progress no change  -CHAD     Outcome Evaluation Patient presents with impaired strength, balance and endurance impacting PLOF ADLs warranting continued skilled OT services to address deficit areas and promote return to PLOF. Pt. with stool incontinence and dependent for toileting, min A donning slippers and setup for self feeding. Recommend SNF at discharge.  -CHAD       Row Name  07/24/25 0903          Therapy Assessment/Plan (OT)    Patient/Family Therapy Goal Statement (OT) return to PLOF  -CHAD     Rehab Potential (OT) good  -CHAD     Criteria for Skilled Therapeutic Interventions Met (OT) yes;meets criteria;skilled treatment is necessary  -CHAD     Therapy Frequency (OT) daily  -CHAD     Predicted Duration of Therapy Intervention (OT) 10 days  -CHAD       Row Name 07/24/25 0903          Therapy Plan Review/Discharge Plan (OT)    Anticipated Discharge Disposition (OT) skilled nursing facility  -CHAD       Row Name 07/24/25 0903          Vital Signs    Pre Systolic BP Rehab 107  -CHAD     Pre Treatment Diastolic BP 64  -CHAD     Posttreatment Heart Rate (beats/min) 80  -CHAD     O2 Delivery Pre Treatment room air  -CHAD     O2 Delivery Intra Treatment room air  -CHAD     Post SpO2 (%) 97  -CHAD     O2 Delivery Post Treatment room air  -CHAD     Pre Patient Position Supine  -CHAD     Intra Patient Position Standing  -CHAD     Post Patient Position Sitting  -CHAD       Row Name 07/24/25 0903          Positioning and Restraints    Pre-Treatment Position in bed  -CHAD     Post Treatment Position chair  -CHAD     In Chair notified nsg;reclined;call light within reach;encouraged to call for assist;exit alarm on;waffle cushion;legs elevated;heels elevated  -CHAD               User Key  (r) = Recorded By, (t) = Taken By, (c) = Cosigned By      Initials Name Provider Type    Rosario Ibarra, OT Occupational Therapist                   Outcome Measures       Row Name 07/24/25 0908          How much help from another is currently needed...    Putting on and taking off regular lower body clothing? 2  -CHAD     Bathing (including washing, rinsing, and drying) 2  -CHAD     Toileting (which includes using toilet bed pan or urinal) 2  -CHAD     Putting on and taking off regular upper body clothing 2  -CHAD     Taking care of personal grooming (such as brushing teeth) 3  -CHAD     Eating meals 3  -CHAD     AM-PAC 6 Clicks Score (OT) 14  -CHAD       Row  Name 07/24/25 0908          Functional Assessment    Outcome Measure Options AM-PAC 6 Clicks Daily Activity (OT)  -               User Key  (r) = Recorded By, (t) = Taken By, (c) = Cosigned By      Initials Name Provider Type    Rosario Ibarra OT Occupational Therapist                    Occupational Therapy Education       Title: PT OT SLP Therapies (In Progress)       Topic: Occupational Therapy (In Progress)       Point: ADL training (Done)       Learning Progress Summary            Patient Acceptance, E, VU,NR by CHAD at 7/24/2025 0908    Comment: reason for consult, evaluation results, posture, transfers safety                      Point: Precautions (Done)       Learning Progress Summary            Patient Acceptance, E, VU,NR by CHAD at 7/24/2025 0908    Comment: reason for consult, evaluation results, posture, transfers safety                      Point: Body mechanics (Done)       Learning Progress Summary            Patient Acceptance, E, VU,NR by CHAD at 7/24/2025 0908    Comment: reason for consult, evaluation results, posture, transfers safety                                      User Key       Initials Effective Dates Name Provider Type Discipline    CHAD 07/11/23 -  Rosario Parker OT Occupational Therapist OT                  OT Recommendation and Plan  Planned Therapy Interventions (OT): activity tolerance training, BADL retraining, patient/caregiver education/training, ROM/therapeutic exercise, occupation/activity based interventions, functional balance retraining, strengthening exercise, transfer/mobility retraining  Therapy Frequency (OT): daily  Plan of Care Review  Plan of Care Reviewed With: patient  Progress: no change  Outcome Evaluation: Patient presents with impaired strength, balance and endurance impacting PLOF ADLs warranting continued skilled OT services to address deficit areas and promote return to PLOF. Pt. with stool incontinence and dependent for toileting, min A donning slippers  and setup for self feeding. Recommend SNF at discharge.     Time Calculation:   Evaluation Complexity (OT)  Review Occupational Profile/Medical/Therapy History Complexity: brief/low complexity  Assessment, Occupational Performance/Identification of Deficit Complexity: 1-3 performance deficits  Clinical Decision Making Complexity (OT): problem focused assessment/low complexity  Overall Complexity of Evaluation (OT): low complexity     Time Calculation- OT       Row Name 07/24/25 0909             Time Calculation- OT    OT Start Time 0742  -CHAD      OT Received On 07/24/25  -CHAD      OT Goal Re-Cert Due Date 08/03/25  -CHAD         Untimed Charges    OT Eval/Re-eval Minutes 49  -CHAD         Total Minutes    Untimed Charges Total Minutes 49  -CHAD       Total Minutes 49  -CHAD                User Key  (r) = Recorded By, (t) = Taken By, (c) = Cosigned By      Initials Name Provider Type    Rosario Ibarra OT Occupational Therapist                  Therapy Charges for Today       Code Description Service Date Service Provider Modifiers Qty    [id number] HC OT EVAL LOW COMPLEXITY 4 7/24/2025 Rosario Parker OT GO 1                 Rosario Parker OT  7/24/2025

## 2025-07-24 NOTE — PLAN OF CARE
Goal Outcome Evaluation:  Plan of Care Reviewed With: patient        Progress: no change  Outcome Evaluation: Patient presents with impaired strength, balance and endurance impacting PLOF ADLs warranting continued skilled OT services to address deficit areas and promote return to PLOF. Pt. with stool incontinence and dependent for toileting, min A donning slippers and setup for self feeding. Recommend SNF at discharge.    Anticipated Discharge Disposition (OT): skilled nursing facility

## 2025-07-25 ENCOUNTER — TELEPHONE (OUTPATIENT)
Dept: FAMILY MEDICINE CLINIC | Facility: CLINIC | Age: OVER 89
End: 2025-07-25
Payer: MEDICARE

## 2025-07-25 LAB
ANION GAP SERPL CALCULATED.3IONS-SCNC: 11 MMOL/L (ref 5–15)
BACTERIA SPEC AEROBE CULT: ABNORMAL
BASOPHILS # BLD AUTO: 0.03 10*3/MM3 (ref 0–0.2)
BASOPHILS NFR BLD AUTO: 0.5 % (ref 0–1.5)
BUN SERPL-MCNC: 43.9 MG/DL (ref 8–23)
BUN/CREAT SERPL: 21.5 (ref 7–25)
CALCIUM SPEC-SCNC: 7.9 MG/DL (ref 8.2–9.6)
CHLORIDE SERPL-SCNC: 96 MMOL/L (ref 98–107)
CO2 SERPL-SCNC: 22 MMOL/L (ref 22–29)
CREAT SERPL-MCNC: 2.04 MG/DL (ref 0.76–1.27)
DEPRECATED RDW RBC AUTO: 57.8 FL (ref 37–54)
EGFRCR SERPLBLD CKD-EPI 2021: 30.2 ML/MIN/1.73
EOSINOPHIL # BLD AUTO: 0.05 10*3/MM3 (ref 0–0.4)
EOSINOPHIL NFR BLD AUTO: 0.8 % (ref 0.3–6.2)
ERYTHROCYTE [DISTWIDTH] IN BLOOD BY AUTOMATED COUNT: 17.2 % (ref 12.3–15.4)
GLUCOSE BLDC GLUCOMTR-MCNC: 153 MG/DL (ref 70–130)
GLUCOSE SERPL-MCNC: 117 MG/DL (ref 65–99)
GRAM STN SPEC: ABNORMAL
GRAM STN SPEC: ABNORMAL
HCT VFR BLD AUTO: 28 % (ref 37.5–51)
HGB BLD-MCNC: 8.9 G/DL (ref 13–17.7)
IMM GRANULOCYTES # BLD AUTO: 0.02 10*3/MM3 (ref 0–0.05)
IMM GRANULOCYTES NFR BLD AUTO: 0.3 % (ref 0–0.5)
ISOLATED FROM: ABNORMAL
LYMPHOCYTES # BLD AUTO: 0.34 10*3/MM3 (ref 0.7–3.1)
LYMPHOCYTES NFR BLD AUTO: 5.4 % (ref 19.6–45.3)
MCH RBC QN AUTO: 29.6 PG (ref 26.6–33)
MCHC RBC AUTO-ENTMCNC: 31.8 G/DL (ref 31.5–35.7)
MCV RBC AUTO: 93 FL (ref 79–97)
MONOCYTES # BLD AUTO: 0.7 10*3/MM3 (ref 0.1–0.9)
MONOCYTES NFR BLD AUTO: 11.2 % (ref 5–12)
NEUTROPHILS NFR BLD AUTO: 5.12 10*3/MM3 (ref 1.7–7)
NEUTROPHILS NFR BLD AUTO: 81.8 % (ref 42.7–76)
NRBC BLD AUTO-RTO: 0 /100 WBC (ref 0–0.2)
PLATELET # BLD AUTO: 135 10*3/MM3 (ref 140–450)
PMV BLD AUTO: 11 FL (ref 6–12)
POTASSIUM SERPL-SCNC: 4.5 MMOL/L (ref 3.5–5.2)
RBC # BLD AUTO: 3.01 10*6/MM3 (ref 4.14–5.8)
SODIUM SERPL-SCNC: 129 MMOL/L (ref 136–145)
WBC NRBC COR # BLD AUTO: 6.26 10*3/MM3 (ref 3.4–10.8)

## 2025-07-25 PROCEDURE — 25010000002 CEFTRIAXONE PER 250 MG: Performed by: STUDENT IN AN ORGANIZED HEALTH CARE EDUCATION/TRAINING PROGRAM

## 2025-07-25 PROCEDURE — 82948 REAGENT STRIP/BLOOD GLUCOSE: CPT

## 2025-07-25 PROCEDURE — 80048 BASIC METABOLIC PNL TOTAL CA: CPT | Performed by: STUDENT IN AN ORGANIZED HEALTH CARE EDUCATION/TRAINING PROGRAM

## 2025-07-25 PROCEDURE — 85025 COMPLETE CBC W/AUTO DIFF WBC: CPT | Performed by: STUDENT IN AN ORGANIZED HEALTH CARE EDUCATION/TRAINING PROGRAM

## 2025-07-25 PROCEDURE — 99232 SBSQ HOSP IP/OBS MODERATE 35: CPT | Performed by: STUDENT IN AN ORGANIZED HEALTH CARE EDUCATION/TRAINING PROGRAM

## 2025-07-25 RX ADMIN — LEVOTHYROXINE SODIUM 25 MCG: 0.03 TABLET ORAL at 06:23

## 2025-07-25 RX ADMIN — ATORVASTATIN CALCIUM 20 MG: 20 TABLET, FILM COATED ORAL at 08:21

## 2025-07-25 RX ADMIN — MIDODRINE HYDROCHLORIDE 5 MG: 5 TABLET ORAL at 11:33

## 2025-07-25 RX ADMIN — MIDODRINE HYDROCHLORIDE 5 MG: 5 TABLET ORAL at 17:04

## 2025-07-25 RX ADMIN — FOLIC ACID TAB 400 MCG 400 MCG: 400 TAB at 08:21

## 2025-07-25 RX ADMIN — Medication 10 ML: at 08:21

## 2025-07-25 RX ADMIN — ALLOPURINOL 100 MG: 100 TABLET ORAL at 08:21

## 2025-07-25 RX ADMIN — MIDODRINE HYDROCHLORIDE 5 MG: 5 TABLET ORAL at 08:21

## 2025-07-25 RX ADMIN — CEFTRIAXONE 2000 MG: 2 INJECTION, POWDER, FOR SOLUTION INTRAMUSCULAR; INTRAVENOUS at 18:00

## 2025-07-25 NOTE — DISCHARGE PLACEMENT REQUEST
"Morales Snow (89 y.o. Male)       Date of Birth   1934    Social Security Number       [address] [facility] [address]    Home Phone   [phone]    MRN   [id number]       Sikhism   Jehovah's witness    Marital Status                               Admission Date   2025    Admission Type   Emergency    Admitting Provider   Josette Clifford MD    Attending Provider   Josette Clifford MD    Department, Room/Bed   [facility] 6B, N630/1       Discharge Date       Discharge Disposition       Discharge Destination                                 Attending Provider: Josette Clifford MD    Allergies: Bee Venom, Sulfamethoxazole-trimethoprim    Isolation: None   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)   Code Status: No CPR    Ht: 180.3 cm (71\")   Wt: 99.8 kg (220 lb)    Admission Cmt: None   Principal Problem: Sepsis [A41.9]                   Active Insurance as of 2025       Primary Coverage       Payor Plan Insurance Group Employer/Plan Group    [employer] MEDICARE REPLACEMENT UHC Medicare Advantage GROUP PPO [id number]       Payor Plan Address Payor Plan Phone Number Payor Plan Fax Number Effective Dates    [address] [address]   2023 - None Entered    [facility] [id number]         Subscriber Name Subscriber Birth Date Member ID       MORALES SNOW 1934 [id number]                     Emergency Contacts        (Rel.) Home Phone Work Phone Mobile Phone    DAVID SNOW (Spouse) [phone] -- [phone]    JOJO PALMER (Son) [phone] -- [phone]    SPENCERRADHA (Daughter) [phone] -- [phone]    AMADEO SNOW (Son) [phone] -- [phone]                 History & Physical        Day, Candis CURRY MD at 25 0215              [facility] Medicine Services  HISTORY AND PHYSICAL    Patient Name: Morales Snow  : 1934  MRN: [id number]  Primary Care Physician: Jasper Avery MD  Date of admission: " 7/22/2025      Subjective   Subjective     Chief Complaint:   Weak and lethargic     HPI:  Jordi Sam is a 89 y.o. male  with hx of afib, CAD, cellulitis, CHF, NAFLD/cirrhosis, HLD, HLD, HTN, DM, umbilical hernia who presents now w/ wife who provides hx.  Wife notes pt was weak and lethargic today.  Unable to stand from toilet.  Also more short of breath which is what prompted them to seek attention in ED.  Has had baseline cough but no worse.  Unaware of fever.  No n/v/d/c.  Has frequent UTI's and wife notes pt was seen in ED 2 days prior with head trauma from fall and she feels that he may have had a UTI then and has never really bounced back from this.  Poor po intake.  Has paracentesis approx every 30 days and was scheduled to have one tomorrow with last one being 6/25.  Wife notes pt was having blood in urine so was taken off of anticoagulation.  Notes swelling/redness in legs which wife states is worse when he sits with legs down.        Personal History     Past Medical History:   Diagnosis Date    A-fib     Acute inferior myocardial infarction     Acute inferior STEMI with RV infarct features, January 2009.     Arrhythmia     CAD (coronary artery disease)     Cellulitis 02/26/2023    Facial. Feb 26-March 1st admission at [facility]. 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       Past Surgical History:   Procedure Laterality Date    BRONCHOSCOPY N/A 4/12/2019    Procedure: BRONCHOSCOPY WITH THORACENTESIS;  Surgeon: Marciano Higginbotham MD;  Location:  [facility];  Service: Pulmonary    CARDIAC CATHETERIZATION Bilateral 1/30/2019    Procedure: Right and Left Heart Cath;  Surgeon: Efrem Posadas MD;  Location:  [facility];  Service: Cardiology    CARDIAC CATHETERIZATION      CHOLECYSTECTOMY      CORONARY ANGIOPLASTY WITH STENT PLACEMENT  01/09/2009    Sirolimus eluting stents to the RCA, 01/09/2009.     HERNIA REPAIR      Hernia repair x 2.     PARACENTESIS  06/17/2019    multiple    UMBILICAL HERNIA REPAIR N/A 7/26/2020    Procedure: UMBILICAL HERNIA REPAIR;  Surgeon: Maribell Her MD;  Location:  [facility] OR;  Service: General;  Laterality: N/A;       Family History: family history includes Aneurysm in his father; Cancer in his sister; Dementia in his mother; Heart attack in his father; Heart disease in his brother, brother, and father; Hypertension in his daughter and son; No Known Problems in his brother and sister; Stroke in his brother and mother.     Social History:  reports that he quit smoking about 42 years ago. His smoking use included cigarettes. He started smoking about 70 years ago. He has a 70 pack-year smoking history. He has never been exposed to tobacco smoke. He has never used smokeless tobacco. He reports that he does not drink alcohol and does not use drugs.  Social History      Social History Narrative    Caffeine Intake: 2  servings per day (coffee)    Patient lives at home with his wife       Medications:  Available home medication information reviewed.  HYDROcodone-acetaminophen, Hydrocortisone (Perianal), Vitamin D3, albuterol sulfate HFA, allopurinol, apixaban, benzonatate, cefdinir, folic acid, furosemide, ibuprofen, levothyroxine, lidocaine, methocarbamol, simvastatin, spironolactone, and terazosin    Allergies   Allergen Reactions    Bee Venom Swelling    Sulfamethoxazole-Trimethoprim Other (See Comments)     Pt unaware       Objective   Objective     Vital Signs:   Temp:  [99.5 °F (37.5 °C)] 99.5 °F (37.5 °C)  Heart Rate:  [60-88] 62  Resp:  [18] 18  BP: ()/(44-77) 111/59       Physical Exam    Gen: lethargic but will awaken to voice and briefly answers questions then falls back to sleep  Heent; eyes closed, pasty mm  Cv; rrr, no mrg  L; dec bs's bll, audible end exp wheeze  Abd; distended; large ventral hernia which is soft and nontender, no r/g  Ext; ble w/ 3+ pitting edema w/ erythema, no cc  Skin; above  Neuro; unable to assess  Psych; lethargic     Result Review:  I have personally reviewed the results from the time of this admission to 7/23/2025 02:30 EDT and agree with these findings:  [x]  Laboratory list / accordion  [x]  Microbiology  [x]  Radiology  [x]  EKG/Telemetry   []  Cardiology/Vascular   []  Pathology  []  Old records  []  Other:  Most notable findings include:        LAB RESULTS:      Lab 07/22/25 2330 07/22/25  1842   WBC  --  6.98   HEMOGLOBIN  --  9.7*   HEMATOCRIT  --  29.7*   PLATELETS  --  148   NEUTROS ABS  --  6.23   IMMATURE GRANS (ABS)  --  0.04   LYMPHS ABS  --  0.21*   MONOS ABS  --  0.48   EOS ABS  --  0.00   MCV  --  92.2   CRP  --  10.10*   PROCALCITONIN  --  0.49*   LACTATE 1.4 2.4*   D DIMER QUANT  --  5.36*         Lab 07/22/25  1842   SODIUM 130*   POTASSIUM 4.9   CHLORIDE 95*   CO2 21.8*   ANION GAP 13.2   BUN 32.0*   CREATININE  2.02*   EGFR 30.6*   GLUCOSE 126*   CALCIUM 8.7   MAGNESIUM 2.3   PHOSPHORUS 2.6   TSH 3.810         Lab 07/22/25  1842   TOTAL PROTEIN 6.1   ALBUMIN 3.4*   GLOBULIN 2.7   ALT (SGPT) 13   AST (SGOT) 39   BILIRUBIN 0.8   ALK PHOS 114   LIPASE 28         Lab 07/22/25 2037 07/22/25  1842   PROBNP  --  12,872.0*   HSTROP T 81* 86*                 Lab 07/22/25  1919   PH, ARTERIAL 7.470*   PCO2, ARTERIAL 30.7*   PO2 ART 71.6*   FIO2 21   HCO3 ART 22.3   BASE EXCESS ART -0.9*   CARBOXYHEMOGLOBIN 1.6     UA          6/26/2025    14:02 7/17/2025    16:03 7/22/2025    22:52   Urinalysis   Squamous Epithelial Cells, UA Unable to determine due to loaded field  0-2  31-50    Specific [place], UA 1.013  1.011  1.023    Ketones, UA Negative  Negative  Trace    Blood, UA Moderate (2+)  Moderate (2+)  Large (3+)    Leukocytes, UA Large (3+)  Large (3+)  Large (3+)    Nitrite, UA Positive  Positive  Negative    RBC, UA Too Numerous to Count  6-10  11-20    WBC, UA Unable to determine due to loaded field  Too Numerous to Count  Too Numerous to Count    Bacteria, UA Unable to determine due to loaded field  4+  4+        Microbiology Results (last 10 days)       Procedure Component Value - Date/Time    COVID PRE-OP / PRE-PROCEDURE SCREENING ORDER (NO ISOLATION) - Swab, Nasopharynx [[id number]]  (Normal) Collected: 07/22/25 1906    Lab Status: Final result Specimen: Swab from Nasopharynx Updated: 07/22/25 2025    Narrative:      The following orders were created for panel order COVID PRE-OP / PRE-PROCEDURE SCREENING ORDER (NO ISOLATION) - Swab, Nasopharynx.  Procedure                               Abnormality         Status                     ---------                               -----------         ------                     Respiratory Panel PCR w/...[[id number]]  Normal              Final result                 Please view results for these tests on the individual orders.    Rapid Strep A Screen - Swab, Throat [[id number]]  (Normal)  Collected: 07/22/25 1906    Lab Status: Final result Specimen: Swab from Throat Updated: 07/22/25 1935     Strep A Ag Negative    Narrative:      Test performed by [facility].    Respiratory Panel PCR w/COVID-19(SARS-CoV-2) [facility] In-House, NP Swab in UTM/VTM, 2 HR TAT - Swab, Nasopharynx [[id number]]  (Normal) Collected: 07/22/25 1906    Lab Status: Final result Specimen: Swab from Nasopharynx Updated: 07/22/25 2025     ADENOVIRUS, PCR Not Detected     Coronavirus 229E Not Detected     Coronavirus HKU1 Not Detected     Coronavirus NL63 Not Detected     Coronavirus OC43 Not Detected     COVID19 Not Detected     Human Metapneumovirus Not Detected     Human Rhinovirus/Enterovirus Not Detected     Influenza A PCR Not Detected     Influenza B PCR Not Detected     Parainfluenza Virus 1 Not Detected     Parainfluenza Virus 2 Not Detected     Parainfluenza Virus 3 Not Detected     Parainfluenza Virus 4 Not Detected     RSV, PCR Not Detected     Bordetella pertussis pcr Not Detected     Bordetella parapertussis PCR Not Detected     Chlamydophila pneumoniae PCR Not Detected     Mycoplasma pneumo by PCR Not Detected    Narrative:      In the setting of a positive respiratory panel with a viral infection PLUS a negative procalcitonin without other underlying concern for bacterial infection, consider observing off antibiotics or discontinuation of antibiotics and continue supportive care. If the respiratory panel is positive for atypical bacterial infection (Bordetella pertussis, Chlamydophila pneumoniae, or Mycoplasma pneumoniae), consider antibiotic de-escalation to target atypical bacterial infection.    Urine Culture - Urine, Urine, Clean Catch [[id number]]  (Abnormal)  (Susceptibility) Collected: 07/17/25 1603    Lab Status: Final result Specimen: Urine, Clean Catch Updated: 07/19/25 0456     Urine Culture >100,000 CFU/mL Escherichia coli    Narrative:      Colonization of the urinary tract  without infection is common. Treatment is discouraged unless the patient is symptomatic, pregnant, or undergoing an invasive urologic procedure.    Susceptibility        Escherichia coli      ALFREDITO      Amoxicillin + Clavulanate Resistant      Ampicillin Resistant      Ampicillin + Sulbactam Resistant      Cefazolin (Urine) Susceptible      Cefepime Susceptible      Ceftazidime Susceptible      Ceftriaxone Susceptible      Cefuroxime axetil Susceptible      Ciprofloxacin Resistant      Gentamicin Susceptible      Levofloxacin Intermediate      Nitrofurantoin Susceptible      Piperacillin + Tazobactam Susceptible      Trimethoprim + Sulfamethoxazole Susceptible                                   CT Angiogram Chest Pulmonary Embolism  Result Date: 7/22/2025  CT ANGIOGRAM CHEST PULMONARY EMBOLISM Date of Exam: 7/22/2025 8:53 PM EDT Indication: sepsis, dyspnea, chronic immobilization, leg swelling, dimer >500. Comparison: 7/20/2025. Technique: Axial CT images were obtained of the chest after the uneventful intravenous administration of 80 mL Isovue-370 utilizing pulmonary embolism protocol.  In addition, a 3-D volume rendered image was created for interpretation.  Reconstructed coronal and sagittal images were also obtained. Automated exposure control and iterative construction methods were used. Findings: There is no pathologic axillary adenopathy or other worrisome body wall soft tissue finding in the chest. The partially characterized upper abdomen demonstrates known cirrhosis and moderate volume ascites. There is a small pericardial effusion as well as  unchanged small left pleural effusion. Atherosclerotic, nonaneurysmal thoracic aorta. The pulmonary arteries demonstrate no evidence of focal filling defect. The lung fields demonstrate similar prominent volume loss in the left lower lobe adjacent to the pleural effusion. There is no distinct suspicious pulmonary nodularity. The osseous structures again demonstrate a  nonacute left clavicle fracture. No acute fracture or new aggressive osseous lesion is identified.     Impression: Impression: No evidence of acute pulmonary embolus. Redemonstrated chronic left pleural effusion with adjacent left basilar atelectasis as well as chronic pericardial effusion. Electronically Signed: Son Carey MD  7/22/2025 9:45 PM EDT  Workstation ID: [id number]    CT Abdomen Pelvis With Contrast  Result Date: 7/22/2025  CT ABDOMEN PELVIS W CONTRAST Date of Exam: 7/22/2025 8:53 PM EDT Indication: Sepsis of unknown source, abdominal distention, history of chronic hernia and cirrhosis with ascites. Comparison: CT abdomen pelvis 3/20/2025 Technique: Axial CT images were obtained of the abdomen and pelvis following the uneventful intravenous administration of 100 mL Isovue 370. Reconstructed coronal and sagittal images were also obtained. Automated exposure control and iterative construction methods were used. Findings: Separately dictated CT chest. Cirrhotic liver morphology. No discrete hypervascular liver lesion. Portal vasculature, splenic vein and superior mesenteric vein patent. Cholecystectomy. No dilation of biliary tree. Atrophic pancreas. Spleen within normal limits in size. Minimal varices. Large volume ascites. No suspicious adrenal nodule. Symmetric renal size, contour and enhancement. Few small hypodense renal lesions favoring cysts too small to accurately characterize. There is a stable 1.1 cm partially exophytic lesion in the left anterior midpole showing intrinsic high density on prior noncontrasted exam consistent with hemorrhagic/proteinaceous cyst. Punctate nonobstructing right upper pole calculus. No hydronephrosis. Focal somewhat nodular urinary bladder wall thickening near hepatic dome measuring up to 1.2 cm thickness sagittal image 79 series 901. Prostate within normal limits in size. Symmetric seminal vesicles. Small hiatal hernia. Expected configuration stomach  and duodenum.  Colonic diverticulosis. No evidence of bowel obstruction or active inflammation. Normal appendix. Diffuse aortic atherosclerotic disease. Fusiform dilation of infrarenal abdominal aorta measuring 3.1 cm, technically aneurysmal without significant change from prior exam. No overtly suspicious adenopathy. No organized fluid collection. No free air. There is a large fluid containing ventral abdominal wall hernia measuring 11.1 x 20.2 x 16.2 cm AP, transverse and craniocaudal dimension. Hernia defect measures 7.7 cm transverse dimension containing a small portion of the traversing bowel. Small fat-containing right and small to moderate fat and fluid-containing left inguinal hernias. These appear similar to prior. Diffuse body wall edema. Gynecomastia. Paraspinous muscle atrophy. Multilevel degenerative change throughout the spine. No aggressive bone lesion.     Impression: Impression: 1. Focal nodular bladder wall thickening near urinary bladder dome, possibly presenting a primary malignancy (urothelial carcinoma). Recommend nonemergent urology consultation for consideration of cystoscopy and tissue sampling. 2. Cirrhotic liver morphology with sequelae of portal hypertension including large volume ascites and minimal varices. No significant splenomegaly. Portal vasculature patent. No signs of hepatocellular carcinoma. 3. Large ventral and smaller bilateral inguinal hernias detailed above, without significant change from prior CT comparison. The large ventral hernia contains a small portion of traversing bowel without obstruction. 4. Anasarca. 5. Colonic diverticulosis. 6. 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 7. Separately dictated CT chest. Electronically Signed: Robson Lopez MD  7/22/2025 9:34 PM EDT  Workstation ID: [id number]    CT Head Without Contrast  Result Date: 7/22/2025  CT HEAD WO CONTRAST Date of Exam: 7/22/2025 8:53 PM EDT  Indication: Somnolence, confusion, suspect metabolic. Comparison: Head CT 7/20/2025 Technique: Axial CT images were obtained of the head without contrast administration.  Automated exposure control and iterative construction methods were used. Findings: Motion-degraded exam. 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 No new large territory infarct, acute intracranial hemorrhage, large mass lesion, midline shift or hydrocephalus. Mild global parenchymal volume loss and sequelae of chronic microvascular ischemic change similar to prior. Senescent related basal ganglia calcifications on the right. No large extra-axial fluid collection. No obvious displaced fracture or joint malalignment. No obstructive sinus disease or large mastoid effusion.     Impression: Impression: Allowing for motion degradation, no acute intracranial findings. Grossly stable CT exam since 7/20/2025 comparison. Electronically Signed: Robson Lopez MD  7/22/2025 9:23 PM EDT  Workstation ID: [id number]    XR Chest 1 View  Result Date: 7/22/2025  XR CHEST 1 VW Date of Exam: 7/22/2025 6:40 PM EDT Indication: SOA triage protocol Comparison: Chest CT 7/20/2025 Findings: Enlarged cardiac silhouette probably combination of cardiomegaly and pericardial effusion compared to recent CT. Right lung appears relatively clear. Increasing probably small to moderate size left-sided pleural effusion and adjacent left lower lobe atelectasis versus airspace disease. No pneumothorax. Aortic atherosclerotic disease. Degenerative elated osseous change.     Impression: Impression: 1. Worsening left lower lobe atelectasis/airspace disease and adjacent pleural effusion. 2. Grossly similar cardiomegaly and probable pericardial effusion correlating with recent CT findings. Electronically Signed: Robson Lopez MD  7/22/2025 7:04 PM EDT  Workstation ID: [id number]          Assessment & Plan   Assessment & Plan        Sepsis    Panlobular emphysema    Dyslipidemia    Chronic atrial fibrillation    Chronic right heart failure    Cirrhosis of liver with ascites    Ventral hernia    Elevated troponin    Acute UTI (urinary tract infection)    Lactic acidosis    Hyponatremia    Elevated serum creatinine    Anemia, chronic disease    Chronic bilateral pleural effusions    Pericardial effusion    Cellulitis     89 y.o. male  with hx of afib, CAD, cellulitis, CHF, NAFLD/cirrhosis, HLD, HLD, HTN, DM, umbilical hernia who presents now w/ wife who provides hx and notes pt has been lethargic and weak for past 3 days but worse today:  Sepsis/UTI/BLE cellulitis  -place on cefazolin  -s/p vanc/zosyn in ED  -albumin given but may need to be repeated  -hold lasix for now  -cultures   -nodular lesion noted on bladder but as d/w wife, comorbidities would outweigh potential benefit of further evaluation; wife agrees  2. Cirrhosis of Liver/Ascites  -pt due for paracentesis  -once more hemodynamically stable (has been hypotensive in ED) pt would benefit from large volume paracentesis  3. Lactic acidosis  -resolved  4. Elevated creatinine  -likely related to poor perfusion from compressive ascites w/ poor circulatory volume  5. Hyponatremia  -secondary to above  6. Chronic bilateral pleural effusions/pericardial effusions  -pt somewhat dyspneic but not hypoxic so feel this is related to ascites  7. Ventral hernia  -bowel present but not entrapped  8. Anemia, chronic  -monitor  9. Afib  -rate controlled  -pt off anticoagulation now per wife                  VTE Prophylaxis:  Mechanical VTE prophylaxis orders are signed & held.            CODE STATUS:    Code Status and Medical Interventions: No CPR (Do Not Attempt to Resuscitate); Limited Support; No intubation (DNI)   Ordered at: 07/23/25 0103     Code Status (Patient has no pulse and is not breathing):    No CPR (Do Not Attempt to Resuscitate)     Medical Interventions (Patient has pulse or is  breathing):    Limited Support     Medical Intervention Limits:    No intubation (DNI)       Expected Discharge   Expected discharge date/ time has not been documented.     Candis Grant MD  25  Electronically signed by Candis Grant MD, 25, 4:29 AM EDT.      Electronically signed by Candis Grant MD at 25 0430          Physician Progress Notes (most recent note)        Josette Clifford MD at 25 0427              [facility] Medicine Services  PROGRESS NOTE    Patient Name: Jordi Sam  : 1934  MRN: [id number]    Date of Admission: 2025  Primary Care Physician: Jasper Avery MD    Subjective   Subjective     CC:  F/u weakness    HPI:  No new overnight issues, he feels comfortable. We discussed therapy recs for rehab and he wasn't sure how much benefit he would have. We discussed palliative and he was open to having our palliative team meet w him and family      Objective   Objective     Vital Signs:   Temp:  [97.4 °F (36.3 °C)-98.2 °F (36.8 °C)] 97.4 °F (36.3 °C)  Heart Rate:  [62-85] 67  Resp:  [18-19] 19  BP: (107-125)/(54-65) 110/56     Physical Exam:  Constitutional: No acute distress, drowsy, chronically ill appearing, elderly/frail  HENT: NCAT, mucous membranes moist  Respiratory: Clear to auscultation bilaterally, respiratory effort fair on RA   Cardiovascular: irregular, no mrg   Gastrointestinal: Positive bowel sounds, soft, nontender, distention improved  Musculoskeletal: 1+ pitting BLE  Psychiatric: Appropriate affect, cooperative  Neurologic: Oriented x 3, no focal deficit, globally weak  Skin: BLE venous stasis dermatitis      Results Reviewed:  LAB RESULTS:      Lab 25  0358 25  0413 25  2330 257 25  1842   WBC 6.14 5.98  --   --  6.98   HEMOGLOBIN 8.9* 8.9*  --   --  9.7*   HEMATOCRIT 28.1* 27.7*  --   --  29.7*   PLATELETS 130* 130*  --   --  148   NEUTROS ABS 5.10 5.21  --   --  6.23   IMMATURE GRANS  (ABS) 0.04 0.04  --   --  0.04   LYMPHS ABS 0.33* 0.17*  --   --  0.21*   MONOS ABS 0.62 0.53  --   --  0.48   EOS ABS 0.03 0.01  --   --  0.00   MCV 93.4 93.0  --   --  92.2   CRP  --   --   --   --  10.10*   PROCALCITONIN  --   --   --   --  0.49*   LACTATE  --   --  1.4  --  2.4*   D DIMER QUANT  --   --   --   --  5.36*   HSTROP T  --   --   --  81* 86*         Lab 07/24/25 0358 07/23/25 0413 07/22/25  1842   SODIUM 128* 129* 130*   POTASSIUM 4.7 5.1 4.9   CHLORIDE 95* 96* 95*   CO2 22.0 20.8* 21.8*   ANION GAP 11.0 12.2 13.2   BUN 40.5* 33.3* 32.0*   CREATININE 2.17* 2.18* 2.02*   EGFR 28.0* 27.9* 30.6*   GLUCOSE 99 125* 126*   CALCIUM 8.0* 8.4 8.7   MAGNESIUM  --  2.4* 2.3   PHOSPHORUS  --  3.3 2.6   TSH  --   --  3.810         Lab 07/22/25  1842   TOTAL PROTEIN 6.1   ALBUMIN 3.4*   GLOBULIN 2.7   ALT (SGPT) 13   AST (SGOT) 39   BILIRUBIN 0.8   ALK PHOS 114   LIPASE 28         Lab 07/22/25 2037 07/22/25  1842   PROBNP  --  12,872.0*   HSTROP T 81* 86*             Lab 07/23/25 0413   ABO TYPING O   RH TYPING Positive   ANTIBODY SCREEN Negative         Lab 07/22/25 1919   PH, ARTERIAL 7.470*   PCO2, ARTERIAL 30.7*   PO2 ART 71.6*   FIO2 21   HCO3 ART 22.3   BASE EXCESS ART -0.9*   CARBOXYHEMOGLOBIN 1.6     Brief Urine Lab Results  (Last result in the past 365 days)        Color   Clarity   Blood   Leuk Est   Nitrite   Protein   CREAT   Urine HCG        07/22/25 2252 Dark Yellow   Turbid   Large (3+)   Large (3+)   Negative   100 mg/dL (2+)                   Microbiology Results Abnormal       Procedure Component Value - Date/Time    Blood Culture - Blood, Hand, Right [[id number]]  (Abnormal) Collected: 07/22/25 1908    Lab Status: Preliminary result Specimen: Blood from Hand, Right Updated: 07/24/25 0640     Blood Culture Gram Negative Bacilli     Isolated from Aerobic Bottle     Gram Stain Aerobic Bottle Gram negative bacilli      Anaerobic Bottle Gram negative bacilli    Blood Culture ID, PCR - Blood,  Hand, Right [[id number]]  (Abnormal) Collected: 07/22/25 1908    Lab Status: Final result Specimen: Blood from Hand, Right Updated: 07/23/25 1715     BCID, PCR Escherichia coli. Identification by BCID2 PCR.     BOTTLE TYPE Aerobic Bottle    Narrative:      No resistance genes detected.            US Paracentesis  Result Date: 7/23/2025  PROCEDURE: Ultrasound-guided paracentesis  Procedural Personnel Attending physician(s): Miguel Kong  Pre-procedure diagnosis: Paracentesis Post-procedure diagnosis: Same Indication: Uncomplicated ascites Additional clinical history: None  Complications: No immediate complications.      Impression:  Ultrasound-guided paracentesis with drainage of 9100 mL of serous fluid.  Plan:  Resume care by clinical team. _______________________________________________________________  PROCEDURE SUMMARY: - Limited abdominal ultrasound - Ultrasound-guided paracentesis - Additional procedure(s): None  PROCEDURE DETAILS:  Pre-procedure Consent: Informed consent for the procedure including risks, benefits and alternatives was obtained and time-out was performed prior to the procedure. Preparation: The site was prepared and draped using maximal sterile barrier technique including cutaneous antisepsis.  Initial abdominal ultrasound Initial abdominal ultrasound was performed. Findings: Large ascites. A safe window for paracentesis was identified.  Paracentesis Local anesthesia was administered. The peritoneal cavity was accessed and fluid return confirmed position. Ascites was drained. The catheter was then removed, and a sterile bandage was applied. Paracentesis access technique: Real-time ultrasound guidance Catheter placed: 5F Yueh Post-drainage ultrasound: Not performed  Additional Details Additional description of procedure: None Equipment details: None Specimens removed: Abdominal fluid Estimated blood loss (mL): Less than 10      7/23/2025 4:10 PM by Miguel Kong MD on Workstation:  [id number]            Current medications:  Scheduled Meds:allopurinol, 100 mg, Oral, Daily  atorvastatin, 20 mg, Oral, Daily  cefTRIAXone, 2,000 mg, Intravenous, Q24H  folic acid, 400 mcg, Oral, Daily  levothyroxine, 25 mcg, Oral, QAM  lidocaine PF 1%, 10 mL, Infiltration, Once  midodrine, 5 mg, Oral, TID AC  sodium chloride, 10 mL, Intravenous, Q12H  [Held by provider] spironolactone, 50 mg, Oral, Daily      Continuous Infusions:   PRN Meds:.  albuterol sulfate HFA    benzonatate    senna-docusate sodium **AND** polyethylene glycol **AND** bisacodyl **AND** bisacodyl    Calcium Replacement - Follow Nurse / BPA Driven Protocol    Magnesium Standard Dose Replacement - Follow Nurse / BPA Driven Protocol    nitroglycerin    Phosphorus Replacement - Follow Nurse / BPA Driven Protocol    Potassium Replacement - Follow Nurse / BPA Driven Protocol    sodium chloride    sodium chloride    sodium chloride    Assessment & Plan   Assessment & Plan     Active Hospital Problems    Diagnosis  POA    **Sepsis [A41.9]  Yes    Ventral hernia [K43.9]  Yes    Elevated troponin [R79.89]  Yes    Acute UTI (urinary tract infection) [N39.0]  Yes    Lactic acidosis [E87.20]  Yes    Hyponatremia [E87.1]  Yes    Elevated serum creatinine [R79.89]  Yes    Anemia, chronic disease [D63.8]  Yes    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        Brief Hospital Course to date:  Jordi Sam is a 89 y.o. male with a history of A-fib, CAD, cellulitis, CHF, NAFLD cirrhosis, lipidemia, hypertension, diabetes presenting with weakness     Weakness  Sepsis  UTI--recurrent in setting of urethral  stricture  Bilateral lower extremity cellulitis  E coli bacteremia  - cont ceftriaxone for now. Blood cx w E coli, likely from urinary source  --ascitis culture in process, no growth so far     Cirrhosis  Ascites  - s/p para 7/23 w 9L removal        Hypotension  --controlled on midodrine     Volume overload  MELVI on CKD  --hold spironolactone for MELVI and hyperkalemia  --suspect also exacerbated by stricture. If no improvement will ask nephrology to see     Ventral hernia     Chronic anemia     A-fib  - Rate controlled, not currently on anticoagulation     Hematuria  --urology evaluated, recommends cont ceftriaxone for UTI likely exacerbated by stricture. Hematuria likely from hemorrhagic cystitis  --recommends conservative management for stricture, holding eliquis also      GOC  --palliative met w family, addressed GOC. At this time interested in pursuing rehab options    Expected Discharge Location and Transportation: rehab  Expected Discharge   Expected Discharge Date: 7/28/2025; Expected Discharge Time:      VTE Prophylaxis:  Mechanical VTE prophylaxis orders are present.         AM-PAC 6 Clicks Score (PT): 17 (07/24/25 2000)    CODE STATUS:   Code Status and Medical Interventions: No CPR (Do Not Attempt to Resuscitate); Limited Support; No intubation (DNI)   Ordered at: 07/23/25 0103     Code Status (Patient has no pulse and is not breathing):    No CPR (Do Not Attempt to Resuscitate)     Medical Interventions (Patient has pulse or is breathing):    Limited Support     Medical Intervention Limits:    No intubation (DNI)       Josette Clifford MD  07/25/25        Electronically signed by Josette Clifford MD at 07/25/25 143          Physical Therapy Notes (most recent note)        Bharti Munguia, PT at 07/24/25 3658  Version 1 of 1         Goal Outcome Evaluation:  Plan of Care Reviewed With: patient           Outcome Evaluation: Pt presents with generalized weakness, impaired balance, and decreased tolerance to  activity. He is below baseline level of function with mobilty and would benefit from skilled PT services to imprve independence. Recommend SNF at discharge.    Anticipated Discharge Disposition (PT): skilled nursing facility                          Electronically signed by Bharti Munguia, PT at 25 0942          Occupational Therapy Notes (most recent note)        Rosario Parker, OT at 25 0742          Patient Name: Jordi Sam  : 1934    MRN: [id number]                              Today's Date: 2025       Admit Date: 2025    Visit Dx:     ICD-10-CM ICD-9-CM   1. Sepsis with acute renal failure without septic shock, due to unspecified organism, unspecified acute renal failure type  A41.9 038.9    R65.20 995.92    N17.9 584.9   2. Acute UTI (urinary tract infection)  N39.0 599.0   3. Cellulitis of right lower extremity  L03.115 682.6   4. Stage 3b chronic kidney disease  N18.32 585.3   5. Chronic right heart failure  I50.812 428.0   6. Panlobular emphysema  J43.1 492.8   7. Cirrhosis of liver with ascites, unspecified hepatic cirrhosis type  K74.60 571.5    R18.8      Patient Active Problem List   Diagnosis    Coronary arteriosclerosis in native artery    Panlobular emphysema    Functional diarrhea    Essential hypertension    Chronic cough    Dyslipidemia    Nephrolithiasis    Chronic atrial fibrillation    Shortness of breath    Chronic right heart failure    Other ascites    Cirrhosis of liver with ascites    Stage 3 chronic kidney disease    End stage liver disease    S/P umbilical hernia repair, follow-up exam    Constipation    Right hip pain    Hearing loss of right ear due to cerumen impaction    Pre-diabetes    Umbilical hernia    Benign prostatic hyperplasia with nocturia    Chronic anticoagulation    Facial cellulitis    Right ankle pain    Medicare annual wellness visit, subsequent    Lower urinary tract symptoms due to benign prostatic hyperplasia    Basal cell  carcinoma of right ala nasi    Acute pain of right shoulder    Laceration of head and neck    Wound infection    Oral pain    Squamous cell carcinoma of scalp    Incisional hernia, without obstruction or gangrene    Other specified hypothyroidism    Gross hematuria    Congenital hemorrhagic cyst of kidney    Sepsis    Ventral hernia    Elevated troponin    Acute UTI (urinary tract infection)    Lactic acidosis    Hyponatremia    Elevated serum creatinine    Anemia, chronic disease    Chronic bilateral pleural effusions    Pericardial effusion    Cellulitis     Past Medical History:   Diagnosis Date    A-fib     Acute inferior myocardial infarction     Acute inferior STEMI with RV infarct features, January 2009.     Arrhythmia     CAD (coronary artery disease)     Cellulitis 02/26/2023    Facial. Feb 26-March 1st admission at [facility]. 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 Seeing Dr Kiersten Nova and Dr Jasper Avery 8-2021     Past Surgical History:   Procedure Laterality Date    BRONCHOSCOPY N/A 4/12/2019    Procedure: BRONCHOSCOPY WITH THORACENTESIS;  Surgeon: Marciano Higginbotham MD;  Location:  [facility];  Service: Pulmonary    CARDIAC CATHETERIZATION Bilateral 1/30/2019    Procedure: Right and Left Heart Cath;  Surgeon: Efrem Posadas MD;  Location:  [facility];  Service: Cardiology    CARDIAC CATHETERIZATION      CHOLECYSTECTOMY       CORONARY ANGIOPLASTY WITH STENT PLACEMENT  01/09/2009    Sirolimus eluting stents to the RCA, 01/09/2009.     HERNIA REPAIR      Hernia repair x 2.     PARACENTESIS  06/17/2019    multiple    UMBILICAL HERNIA REPAIR N/A 7/26/2020    Procedure: UMBILICAL HERNIA REPAIR;  Surgeon: Maribell Her MD;  Location: [facility];  Service: General;  Laterality: N/A;      General Information       Row Name 07/24/25 0854          OT Time and Intention    Document Type evaluation  -CHAD     Mode of Treatment individual therapy;occupational therapy  -CHAD     Patient Effort good  -CHAD       Row Name 07/24/25 0854          General Information    Patient Profile Reviewed yes  -CHAD     Prior Level of Function independent:;all household mobility;gait;transfer;bed mobility;feeding;grooming;dressing;bathing  -CHAD     Existing Precautions/Restrictions fall  -CHAD     Barriers to Rehab previous functional deficit  -CHAD       Row Name 07/24/25 0854          Occupational Profile    Environmental Supports and Barriers (Occupational Profile) shower chair, grab bar, straight cane, rw wx  -CHAD       Row Name 07/24/25 0854          Living Environment    Current Living Arrangements home  -CHAD     People in Home spouse  -CHAD       Row Name 07/24/25 0854          Home Main Entrance    Number of Stairs, Main Entrance six  -CHAD       Row Name 07/24/25 0854          Stairs Within Home, Primary    Stairs Comment, Within Home, Primary with a walk out basement, but pt. can stay with all needs on first floor  -CHAD       Row Name 07/24/25 0854          Cognition    Orientation Status (Cognition) oriented x 3  -CHAD       Row Name 07/24/25 0854          Safety Issues/Impairments Affecting Functional Mobility    Safety Issues Affecting Function (Mobility) awareness of need for assistance;insight into deficits/self-awareness;safety precaution awareness;safety precautions follow-through/compliance;sequencing abilities  -CHAD     Impairments Affecting Function (Mobility)  balance;endurance/activity tolerance;range of motion (ROM);strength;postural/trunk control  -               User Key  (r) = Recorded By, (t) = Taken By, (c) = Cosigned By      Initials Name Provider Type    Rosario Ibarra, OT Occupational Therapist                     Mobility/ADL's       Row Name 07/24/25 0855          Bed Mobility    Bed Mobility supine-sit  -CHAD     Supine-Sit [place] (Bed Mobility) minimum assist (75% patient effort);verbal cues  -CHAD     Bed Mobility, Safety Issues decreased use of legs for bridging/pushing;impaired trunk control for bed mobility  -CHAD     Assistive Device (Bed Mobility) bed rails;head of bed elevated  -CHAD     Comment, (Bed Mobility) extra time and effort needed, cues to reach across and use rail, some trunk and LE assist  -       Row Name 07/24/25 0855          Transfers    Transfers sit-stand transfer;stand-sit transfer;toilet transfer  -CHAD     Comment, (Transfers) patient needed cues each time for hand placement, pt. up to BSC with cane and bed rail, but needed rw wx for further movement to keep balance, pt. stood 2 times  -       Row Name 07/24/25 0855          Sit-Stand Transfer    Sit-Stand [place] (Transfers) minimum assist (75% patient effort);1 person assist;verbal cues  -CHAD     Assistive Device (Sit-Stand Transfers) walker, front-wheeled  -CHAD       Row Name 07/24/25 0855          Stand-Sit Transfer    Stand-Sit [place] (Transfers) minimum assist (75% patient effort);1 person assist;verbal cues  -CHAD     Assistive Device (Stand-Sit Transfers) walker, front-wheeled  -CHAD       Row Name 07/24/25 0855          Toilet Transfer    Type (Toilet Transfer) sit-stand;stand-sit  -CHAD     [place] Level (Toilet Transfer) minimum assist (75% patient effort);1 person assist;verbal cues  -CHAD     Assistive Device (Toilet Transfer) commode, bedside without drop arms  -       Row Name 07/24/25 0855          Functional Mobility    Functional Mobility- Ind. Level  contact guard assist;verbal cues required  -     Functional Mobility- Device walker, front-wheeled  -     Functional Mobility-Distance (Feet) 14  -CHAD     Functional Mobility- Safety Issues step length decreased  -CHAD     Functional Mobility- Comment slowed pace with flexed trunk  -       Row Name 07/24/25 0855          Activities of Daily Living    BADL Assessment/Intervention lower body dressing;toileting;feeding  -       Row Name 07/24/25 0855          Lower Body Dressing Assessment/Training    [place] Level (Lower Body Dressing) don;shoes/slippers;minimum assist (75% patient effort)  -CHAD     Position (Lower Body Dressing) edge of bed sitting  -CHAD     Comment, (Lower Body Dressing) pt. using cane to assist keeping slipper in place  -       Row Name 07/24/25 0855          Toileting Assessment/Training    [place] Level (Toileting) perform perineal hygiene;dependent (less than 25% patient effort)  -     Assistive Devices (Toileting) commode, bedside without drop arms  -CHAD     Position (Toileting) supported standing;supported sitting  -CHAD     Comment, (Toileting) purewick changed out due to soiled, loose stool in bed, finished on BSC, floor cleaned  -       Row Name 07/24/25 0855          Self-Feeding Assessment/Training    [place] Level (Feeding) prepare tray/open items;maximum assist (25% patient effort);liquids to mouth;scoop food and bring to mouth;independent  -CHAD     Position (Feeding) supported sitting  -CHAD               User Key  (r) = Recorded By, (t) = Taken By, (c) = Cosigned By      Initials Name Provider Type    Rosario Ibarra OT Occupational Therapist                   Obj/Interventions       Row Name 07/24/25 0859          Vision Assessment/Intervention    Visual Impairment/Limitations corrective lenses for distance;corrective lenses for reading  -       Row Name 07/24/25 0859          Range of Motion Comprehensive    General Range of Motion upper extremity range of  motion deficits identified  -CHAD     Comment, General Range of Motion B shoulder PROM grossly  degrees  -CHAD       Row Name 07/24/25 0859          Strength Comprehensive (MMT)    General Manual Muscle Testing (MMT) Assessment upper extremity strength deficits identified  -CHAD     Comment, General Manual Muscle Testing (MMT) Assessment B shoulders grossly 3+ to 4/5, distally 4 to 4+/5  -CHAD       Row Name 07/24/25 0859          Motor Skills    Motor Skills functional endurance  -CHAD     Functional Endurance impaired for BADL task performance  -CHAD       Row Name 07/24/25 0859          Balance    Balance Assessment sitting static balance;sitting dynamic balance;sit to stand dynamic balance;standing static balance;standing dynamic balance  -CHAD     Static Sitting Balance standby assist  -CHAD     Dynamic Sitting Balance standby assist  -CHAD     Position, Sitting Balance unsupported;sitting edge of bed;other (see comments)  BSC  -CHAD     Sit to Stand Dynamic Balance minimal assist;1-person assist;verbal cues  -CHAD     Static Standing Balance contact guard;1-person assist  -CHAD     Dynamic Standing Balance minimal assist;1-person assist  -CHAD     Position/Device Used, Standing Balance walker, front-wheeled  -CHAD     Balance Interventions sit to stand;occupation based/functional task  -CHAD     Comment, Balance flexed posture, min A 1 especially with cane only, improved with walker use  -CHAD               User Key  (r) = Recorded By, (t) = Taken By, (c) = Cosigned By      Initials Name Provider Type    Rosario Ibarra, OT Occupational Therapist                   Goals/Plan       Row Name 07/24/25 0906          Bed Mobility Goal 1 (OT)    Activity/Assistive Device (Bed Mobility Goal 1, OT) sit to supine;supine to sit  -CHAD     [place] Level/Cues Needed (Bed Mobility Goal 1, OT) modified independence  -CHAD     Time Frame (Bed Mobility Goal 1, OT) long term goal (LTG);10 days  -CHAD     Progress/Outcomes (Bed Mobility Goal 1, OT)  new goal  -CHAD       Row Name 07/24/25 0906          Transfer Goal 1 (OT)    Activity/Assistive Device (Transfer Goal 1, OT) sit-to-stand/stand-to-sit;toilet;commode;walker, rolling  -CHAD     [place] Level/Cues Needed (Transfer Goal 1, OT) contact guard required  -CHAD     Time Frame (Transfer Goal 1, OT) long term goal (LTG);10 days  -CHAD     Progress/Outcome (Transfer Goal 1, OT) new goal  -CHAD       Row Name 07/24/25 0906          Dressing Goal 1 (OT)    Activity/Device (Dressing Goal 1, OT) lower body dressing  -CHAD     [place]/Cues Needed (Dressing Goal 1, OT) minimum assist (75% or more patient effort);set-up required  -CHAD     Time Frame (Dressing Goal 1, OT) long term goal (LTG);10 days  -CHAD     Strategies/Barriers (Dressing Goal 1, OT) socks and pants  -CHAD     Progress/Outcome (Dressing Goal 1, OT) new goal  -CHAD       Row Name 07/24/25 0906          Grooming Goal 1 (OT)    Activity/Device (Grooming Goal 1, OT) hair care;oral care;wash face, hands  -CHAD     [place] (Grooming Goal 1, OT) contact guard required  -CHAD     Time Frame (Grooming Goal 1, OT) short term goal (STG);5 days  -CHAD     Progress/Outcome (Grooming Goal 1, OT) new goal  -CHAD       Row Name 07/24/25 0906          Therapy Assessment/Plan (OT)    Planned Therapy Interventions (OT) activity tolerance training;BADL retraining;patient/caregiver education/training;ROM/therapeutic exercise;occupation/activity based interventions;functional balance retraining;strengthening exercise;transfer/mobility retraining  -CHAD               User Key  (r) = Recorded By, (t) = Taken By, (c) = Cosigned By      Initials Name Provider Type    Rosario Ibarra, OT Occupational Therapist                   Clinical Impression       Row Name 07/24/25 0903          Pain Assessment    Pretreatment Pain Rating 0/10 - no pain  -CHAD     Posttreatment Pain Rating 0/10 - no pain  -CHAD       Row Name 07/24/25 0903          Plan of Care Review    Plan of Care Reviewed With  patient  -CHAD     Progress no change  -CHAD     Outcome Evaluation Patient presents with impaired strength, balance and endurance impacting PLOF ADLs warranting continued skilled OT services to address deficit areas and promote return to PLOF. Pt. with stool incontinence and dependent for toileting, min A donning slippers and setup for self feeding. Recommend SNF at discharge.  -CHAD       Row Name 07/24/25 0903          Therapy Assessment/Plan (OT)    Patient/Family Therapy Goal Statement (OT) return to PLOF  -CHAD     Rehab Potential (OT) good  -CHAD     Criteria for Skilled Therapeutic Interventions Met (OT) yes;meets criteria;skilled treatment is necessary  -CHAD     Therapy Frequency (OT) daily  -CHAD     Predicted Duration of Therapy Intervention (OT) 10 days  -CHAD       Row Name 07/24/25 0903          Therapy Plan Review/Discharge Plan (OT)    Anticipated Discharge Disposition (OT) skilled nursing facility  -       Row Name 07/24/25 0903          Vital Signs    Pre Systolic BP Rehab 107  -CHAD     Pre Treatment Diastolic BP 64  -CHAD     Posttreatment Heart Rate (beats/min) 80  -CHAD     O2 Delivery Pre Treatment room air  -CHAD     O2 Delivery Intra Treatment room air  -CHAD     Post SpO2 (%) 97  -CHAD     O2 Delivery Post Treatment room air  -CHAD     Pre Patient Position Supine  -CHAD     Intra Patient Position Standing  -CHAD     Post Patient Position Sitting  -CHAD       Row Name 07/24/25 0903          Positioning and Restraints    Pre-Treatment Position in bed  -CHAD     Post Treatment Position chair  -CHAD     In Chair notified nsg;reclined;call light within reach;encouraged to call for assist;exit alarm on;waffle cushion;legs elevated;heels elevated  -CHAD               User Key  (r) = Recorded By, (t) = Taken By, (c) = Cosigned By      Initials Name Provider Type    Rosario Ibarra, OT Occupational Therapist                   Outcome Measures       Row Name 07/24/25 0908          How much help from another is currently needed...     Putting on and taking off regular lower body clothing? 2  -CHAD     Bathing (including washing, rinsing, and drying) 2  -CHAD     Toileting (which includes using toilet bed pan or urinal) 2  -CHAD     Putting on and taking off regular upper body clothing 2  -CHAD     Taking care of personal grooming (such as brushing teeth) 3  -CHAD     Eating meals 3  -CHAD     AM-PAC 6 Clicks Score (OT) 14  -CHAD       Row Name 07/24/25 0908          Functional Assessment    Outcome Measure Options AM-PAC 6 Clicks Daily Activity (OT)  -CHAD               User Key  (r) = Recorded By, (t) = Taken By, (c) = Cosigned By      Initials Name Provider Type    Rosario Ibarra, OT Occupational Therapist                    Occupational Therapy Education       Title: PT OT SLP Therapies (In Progress)       Topic: Occupational Therapy (In Progress)       Point: ADL training (Done)       Learning Progress Summary            Patient Acceptance, E, VU,NR by CHAD at 7/24/2025 0908    Comment: reason for consult, evaluation results, posture, transfers safety                      Point: Precautions (Done)       Learning Progress Summary            Patient Acceptance, E, VU,NR by  at 7/24/2025 0908    Comment: reason for consult, evaluation results, posture, transfers safety                      Point: Body mechanics (Done)       Learning Progress Summary            Patient Acceptance, E, VU,NR by CHAD at 7/24/2025 0908    Comment: reason for consult, evaluation results, posture, transfers safety                                      User Key       Initials Effective Dates Name Provider Type [facility] 07/11/23 -  Rosario Parker OT Occupational Therapist OT                  OT Recommendation and Plan  Planned Therapy Interventions (OT): activity tolerance training, BADL retraining, patient/caregiver education/training, ROM/therapeutic exercise, occupation/activity based interventions, functional balance retraining, strengthening exercise, transfer/mobility  retraining  Therapy Frequency (OT): daily  Plan of Care Review  Plan of Care Reviewed With: patient  Progress: no change  Outcome Evaluation: Patient presents with impaired strength, balance and endurance impacting PLOF ADLs warranting continued skilled OT services to address deficit areas and promote return to PLOF. Pt. with stool incontinence and dependent for toileting, min A donning slippers and setup for self feeding. Recommend SNF at discharge.     Time Calculation:   Evaluation Complexity (OT)  Review Occupational Profile/Medical/Therapy History Complexity: brief/low complexity  Assessment, Occupational Performance/Identification of Deficit Complexity: 1-3 performance deficits  Clinical Decision Making Complexity (OT): problem focused assessment/low complexity  Overall Complexity of Evaluation (OT): low complexity     Time Calculation- OT       Row Name 07/24/25 0909             Time Calculation- OT    OT Start Time 0742  -CHAD      OT Received On 07/24/25  -CHAD      OT Goal Re-Cert Due Date 08/03/25  -HCAD         Untimed Charges    OT Eval/Re-eval Minutes 49  -CHAD         Total Minutes    Untimed Charges Total Minutes 49  -CHAD       Total Minutes 49  -CHAD                User Key  (r) = Recorded By, (t) = Taken By, (c) = Cosigned By      Initials Name Provider Type    Rosario Ibarra OT Occupational Therapist                  Therapy Charges for Today       Code Description Service Date Service Provider Modifiers Qty    [id number] HC OT EVAL LOW COMPLEXITY 4 7/24/2025 Rosario Parker OT GO 1                 Rosario Parker OT  7/24/2025    Electronically signed by Rosario Parker OT at 07/24/25 0910

## 2025-07-25 NOTE — PLAN OF CARE
Goal Outcome Evaluation:      Patient is on Room Air, in A-Fib, Aox4 but hard of hearing, Continued IV Antibiotics, had a changes in Interventions regarding care options with the doctors today, Palliative service now on board, Urinary output was yellow and ~100mL's every few hours, VSS - will continue POC.

## 2025-07-25 NOTE — CASE MANAGEMENT/SOCIAL WORK
Continued Stay Note  Psychiatric     Patient Name: Jordi Sam  MRN: [id number]  Today's Date: 7/25/2025    Admit Date: 7/22/2025    Plan: [facility]   Discharge Plan       Row Name 07/25/25 1438       Plan    Plan [facility]    Plan Comments Creatinine is better (2.04). Pt is receiving IV Abx and is on room air. Palliative and ID were consulted. Pt walked 14ft with minimal assist and a walker yesterday. Therapy recommended rehab, and Pt and spouse are agreeable. At their request, a referral was called to [facility] with [facility]. Precert was initiated today (ok per Dr Clifford). CM will continue to follow.    Final Discharge Disposition Code 03 - skilled nursing facility (SNF)                   Discharge Codes    No documentation.                 Expected Discharge Date and Time       Expected Discharge Date Expected Discharge Time    Jul 28, 2025               Jasmine Pinzon RN

## 2025-07-25 NOTE — PROGRESS NOTES
[facility] Medicine Services  PROGRESS NOTE    Patient Name: Jordi Sam  : 1934  MRN: [id number]    Date of Admission: 2025  Primary Care Physician: Jasper Avery MD    Subjective   Subjective     CC:  F/u weakness    HPI:  No new overnight issues, he feels comfortable. We discussed therapy recs for rehab and he wasn't sure how much benefit he would have. We discussed palliative and he was open to having our palliative team meet w him and family      Objective   Objective     Vital Signs:   Temp:  [97.4 °F (36.3 °C)-98.2 °F (36.8 °C)] 97.4 °F (36.3 °C)  Heart Rate:  [62-85] 67  Resp:  [18-19] 19  BP: (107-125)/(54-65) 110/56     Physical Exam:  Constitutional: No acute distress, drowsy, chronically ill appearing, elderly/frail  HENT: NCAT, mucous membranes moist  Respiratory: Clear to auscultation bilaterally, respiratory effort fair on RA   Cardiovascular: irregular, no mrg   Gastrointestinal: Positive bowel sounds, soft, nontender, distention improved  Musculoskeletal: 1+ pitting BLE  Psychiatric: Appropriate affect, cooperative  Neurologic: Oriented x 3, no focal deficit, globally weak  Skin: BLE venous stasis dermatitis      Results Reviewed:  LAB RESULTS:      Lab 25  0358 25  0413 25  2330 25  2037 25  1842   WBC 6.14 5.98  --   --  6.98   HEMOGLOBIN 8.9* 8.9*  --   --  9.7*   HEMATOCRIT 28.1* 27.7*  --   --  29.7*   PLATELETS 130* 130*  --   --  148   NEUTROS ABS 5.10 5.21  --   --  6.23   IMMATURE GRANS (ABS) 0.04 0.04  --   --  0.04   LYMPHS ABS 0.33* 0.17*  --   --  0.21*   MONOS ABS 0.62 0.53  --   --  0.48   EOS ABS 0.03 0.01  --   --  0.00   MCV 93.4 93.0  --   --  92.2   CRP  --   --   --   --  10.10*   PROCALCITONIN  --   --   --   --  0.49*   LACTATE  --   --  1.4  --  2.4*   D DIMER QUANT  --   --   --   --  5.36*   HSTROP T  --   --   --  81* 86*         Lab 25  0358 25  0413 25  1842   SODIUM 128* 129*  130*   POTASSIUM 4.7 5.1 4.9   CHLORIDE 95* 96* 95*   CO2 22.0 20.8* 21.8*   ANION GAP 11.0 12.2 13.2   BUN 40.5* 33.3* 32.0*   CREATININE 2.17* 2.18* 2.02*   EGFR 28.0* 27.9* 30.6*   GLUCOSE 99 125* 126*   CALCIUM 8.0* 8.4 8.7   MAGNESIUM  --  2.4* 2.3   PHOSPHORUS  --  3.3 2.6   TSH  --   --  3.810         Lab 07/22/25  1842   TOTAL PROTEIN 6.1   ALBUMIN 3.4*   GLOBULIN 2.7   ALT (SGPT) 13   AST (SGOT) 39   BILIRUBIN 0.8   ALK PHOS 114   LIPASE 28         Lab 07/22/25 2037 07/22/25 1842   PROBNP  --  12,872.0*   HSTROP T 81* 86*             Lab 07/23/25  0413   ABO TYPING O   RH TYPING Positive   ANTIBODY SCREEN Negative         Lab 07/22/25 1919   PH, ARTERIAL 7.470*   PCO2, ARTERIAL 30.7*   PO2 ART 71.6*   FIO2 21   HCO3 ART 22.3   BASE EXCESS ART -0.9*   CARBOXYHEMOGLOBIN 1.6     Brief Urine Lab Results  (Last result in the past 365 days)        Color   Clarity   Blood   Leuk Est   Nitrite   Protein   CREAT   Urine HCG        07/22/25 2252 Dark Yellow   Turbid   Large (3+)   Large (3+)   Negative   100 mg/dL (2+)                   Microbiology Results Abnormal       Procedure Component Value - Date/Time    Blood Culture - Blood, Hand, Right [[id number]]  (Abnormal) Collected: 07/22/25 1908    Lab Status: Preliminary result Specimen: Blood from Hand, Right Updated: 07/24/25 0640     Blood Culture Gram Negative Bacilli     Isolated from Aerobic Bottle     Gram Stain Aerobic Bottle Gram negative bacilli      Anaerobic Bottle Gram negative bacilli    Blood Culture ID, PCR - Blood, Hand, Right [[id number]]  (Abnormal) Collected: 07/22/25 1908    Lab Status: Final result Specimen: Blood from Hand, Right Updated: 07/23/25 1715     BCID, PCR Escherichia coli. Identification by BCID2 PCR.     BOTTLE TYPE Aerobic Bottle    Narrative:      No resistance genes detected.            US Paracentesis  Result Date: 7/23/2025  PROCEDURE: Ultrasound-guided paracentesis  Procedural Personnel Attending physician(s): Miguel  Luann  Pre-procedure diagnosis: Paracentesis Post-procedure diagnosis: Same Indication: Uncomplicated ascites Additional clinical history: None  Complications: No immediate complications.      Impression:  Ultrasound-guided paracentesis with drainage of 9100 mL of serous fluid.  Plan:  Resume care by clinical team. _______________________________________________________________  PROCEDURE SUMMARY: - Limited abdominal ultrasound - Ultrasound-guided paracentesis - Additional procedure(s): None  PROCEDURE DETAILS:  Pre-procedure Consent: Informed consent for the procedure including risks, benefits and alternatives was obtained and time-out was performed prior to the procedure. Preparation: The site was prepared and draped using maximal sterile barrier technique including cutaneous antisepsis.  Initial abdominal ultrasound Initial abdominal ultrasound was performed. Findings: Large ascites. A safe window for paracentesis was identified.  Paracentesis Local anesthesia was administered. The peritoneal cavity was accessed and fluid return confirmed position. Ascites was drained. The catheter was then removed, and a sterile bandage was applied. Paracentesis access technique: Real-time ultrasound guidance Catheter placed: 5F Yueh Post-drainage ultrasound: Not performed  Additional Details Additional description of procedure: None Equipment details: None Specimens removed: Abdominal fluid Estimated blood loss (mL): Less than 10      7/23/2025 4:10 PM by Miguel Kong MD on Workstation: [id number]            Current medications:  Scheduled Meds:allopurinol, 100 mg, Oral, Daily  atorvastatin, 20 mg, Oral, Daily  cefTRIAXone, 2,000 mg, Intravenous, Q24H  folic acid, 400 mcg, Oral, Daily  levothyroxine, 25 mcg, Oral, QAM  lidocaine PF 1%, 10 mL, Infiltration, Once  midodrine, 5 mg, Oral, TID AC  sodium chloride, 10 mL, Intravenous, Q12H  [Held by provider] spironolactone, 50 mg, Oral, Daily      Continuous Infusions:   PRN  Meds:.  albuterol sulfate HFA    benzonatate    senna-docusate sodium **AND** polyethylene glycol **AND** bisacodyl **AND** bisacodyl    Calcium Replacement - Follow Nurse / BPA Driven Protocol    Magnesium Standard Dose Replacement - Follow Nurse / BPA Driven Protocol    nitroglycerin    Phosphorus Replacement - Follow Nurse / BPA Driven Protocol    Potassium Replacement - Follow Nurse / BPA Driven Protocol    sodium chloride    sodium chloride    sodium chloride    Assessment & Plan   Assessment & Plan     Active Hospital Problems    Diagnosis  POA    **Sepsis [A41.9]  Yes    Ventral hernia [K43.9]  Yes    Elevated troponin [R79.89]  Yes    Acute UTI (urinary tract infection) [N39.0]  Yes    Lactic acidosis [E87.20]  Yes    Hyponatremia [E87.1]  Yes    Elevated serum creatinine [R79.89]  Yes    Anemia, chronic disease [D63.8]  Yes    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        Brief Hospital Course to date:  Jordi Sam is a 89 y.o. male with a history of A-fib, CAD, cellulitis, CHF, NAFLD cirrhosis, lipidemia, hypertension, diabetes presenting with weakness     Weakness  Sepsis  UTI--recurrent in setting of urethral stricture  Bilateral lower extremity cellulitis  E coli bacteremia  - cont ceftriaxone for now. Blood cx w E coli, likely from urinary source  --ascitis culture in process, no growth so far     Cirrhosis  Ascites  - s/p para 7/23 w 9L removal        Hypotension  --controlled on midodrine     Volume overload  MELVI on CKD  --hold spironolactone for MELVI and hyperkalemia  --suspect also exacerbated by stricture. If no improvement will ask nephrology to  see     Ventral hernia     Chronic anemia     A-fib  - Rate controlled, not currently on anticoagulation     Hematuria  --urology evaluated, recommends cont ceftriaxone for UTI likely exacerbated by stricture. Hematuria likely from hemorrhagic cystitis  --recommends conservative management for stricture, holding eliquis also      GOC  --palliative met w family, addressed GOC. At this time interested in pursuing rehab options    Expected Discharge Location and Transportation: rehab  Expected Discharge   Expected Discharge Date: 7/28/2025; Expected Discharge Time:      VTE Prophylaxis:  Mechanical VTE prophylaxis orders are present.         AM-PAC 6 Clicks Score (PT): 17 (07/24/25 2000)    CODE STATUS:   Code Status and Medical Interventions: No CPR (Do Not Attempt to Resuscitate); Limited Support; No intubation (DNI)   Ordered at: 07/23/25 0103     Code Status (Patient has no pulse and is not breathing):    No CPR (Do Not Attempt to Resuscitate)     Medical Interventions (Patient has pulse or is breathing):    Limited Support     Medical Intervention Limits:    No intubation (DNI)       Josette Clifford MD  07/25/25

## 2025-07-25 NOTE — TELEPHONE ENCOUNTER
I called patients wife back and advised that Dr Avery was aware. Also advised her per the last telephone encounter he did not need to take the abx that was previously prescribed.

## 2025-07-25 NOTE — TELEPHONE ENCOUNTER
Caller: DAVID SNOW    Relationship: Emergency Contact    Best call back number: [phone]     Who are you requesting to speak with (clinical staff, provider,  specific staff member):     What was the call regarding: CALLER WANTS TO BE SURE PCP KNOWS THAT THE PATIENT HAS BEEN IN [facility] SINCE 7/22/25.  PLEASE CALL TO DISCUSS PATIENT'S CONDITION.

## 2025-07-25 NOTE — PLAN OF CARE
Goal Outcome Evaluation:  Plan of Care Reviewed With: patient, spouse        Progress: no change  Outcome Evaluation: Palliative RN and Dr. SAÚL Melgar saw pt. at 1253.  New palliative consult for assistance with GOC per Dr. Clifford.  NOK is spouse Melly Sam.  Palliative care introduced.  Palliative brochure and meal discount card provided.  Wife Melly at  indicated the goal was to get her  back home with her.  Code status addressed.  Pt. wishes to remain DNR/DNI.  Pt. denied pain stating he never has any pain.  He did endorse shortness of breath on RA.  Pt. stated he is always short of breath. He stated he is generally very tired for a day or two post paracentesis.  he stated 9L was taken off yesterday.  Pt. appeared jaundiced.  Melly stated her  had a good appetite until  7/21 then his appetite dropped off.  Per chart review, pt. is consuming about 75%.  Lunch tray at  revealed about half eaten today.  The couple have been  for 63 years and have 3 daughters, one son and 9 grandchildren.  Pt. wishes to continue current plan of care but no heroic measures.  Palliative care to follow for support and ongoing GOC.       Problem: Palliative Care  Goal: Enhanced Quality of Life  Intervention: Promote Advance Care Planning  Flowsheets (Taken 7/25/2025 8662)  Life Transition/Adjustment:   palliative care initiated   palliative care discussed       0930 Palliative IDT meeting: MD; APRN ; MDiv; RNs   After hours, weekends and holidays, contact Palliative Provider by calling [phone].

## 2025-07-25 NOTE — CONSULTS
Palliative Care Initial Consult   Attending Physician: Josette Clifford MD  Referring Provider: Josette Clifford      Reason for Referral:  assistance with clarification of goals of care    Code Status:   Code Status and Medical Interventions: No CPR (Do Not Attempt to Resuscitate); Limited Support; No intubation (DNI)   Ordered at: 07/23/25 0103     Code Status (Patient has no pulse and is not breathing):    No CPR (Do Not Attempt to Resuscitate)     Medical Interventions (Patient has pulse or is breathing):    Limited Support     Medical Intervention Limits:    No intubation (DNI)      Advanced Directives: Advance Directive Status: Patient has advance directive, copy requested   Family/Support: spouse and children     Goals of Care:    Goals of Care/Treatment Preferences:    Treat what we can easily.  Does want PT/OT.       HPI:   89 yo male with hx afib, CAD cellulitis, NAFLD/cirrhosis, HLD, HTN, DM and umbilical hernia admitted for first time in > 2 years.  Presented with known UTI and progressing weakness and SOA.  Has hx of monthly paracentesis and underwent after hospitalized with 9 L removed.  Urine and blood cultures + for E coli.  Reports good appetite up until day prior to admit.  Seen by  for urethral stricture.  Had  consult with decision for no intervention as would not tolerate tx and risk of general anesthesia.  Typical day prior to illness was walking around house with and and sitting on deck, sometimes doing something around the house.  Appetite improving and resp status greatly improved since admit.        ROS:   Denied SOA  Denied pain  Denied nausea   + over all weakness      Past Medical History:   Diagnosis Date    A-fib     Acute inferior myocardial infarction     Acute inferior STEMI with RV infarct features, January 2009.     Arrhythmia     CAD (coronary artery disease)     Cellulitis 02/26/2023    Facial. Feb 26-March 1st admission at [facility]. 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 Seeing Dr Kiersten Nova and Dr Jasper Avery      Past Surgical History:   Procedure Laterality Date    BRONCHOSCOPY N/A 2019    Procedure: BRONCHOSCOPY WITH THORACENTESIS;  Surgeon: Marciano Higginbotham MD;  Location:  [facility];  Service: Pulmonary    CARDIAC CATHETERIZATION Bilateral 2019    Procedure: Right and Left Heart Cath;  Surgeon: Efrem Posadas MD;  Location:  [facility];  Service: Cardiology    CARDIAC CATHETERIZATION      CHOLECYSTECTOMY      CORONARY ANGIOPLASTY WITH STENT PLACEMENT  2009    Sirolimus eluting stents to the RCA, 2009.     HERNIA REPAIR      Hernia repair x 2.     PARACENTESIS  2019    multiple    UMBILICAL HERNIA REPAIR N/A 2020    Procedure: UMBILICAL HERNIA REPAIR;  Surgeon: Maribell Her MD;  Location:  [facility];  Service: General;  Laterality: N/A;     Social History     Socioeconomic History    Marital status:      Spouse name: Melly    Number of children: 4   Tobacco Use    Smoking status: Former     Current packs/day: 0.00     Average packs/day: 2.5 packs/day for 28.0 years (70.0 ttl pk-yrs)     Types: Cigarettes     Start date: 1955     Quit date: 1983     Years since quittin.2     Passive exposure: Never    Smokeless tobacco: Never   Vaping Use    Vaping status: Never Used   Substance and Sexual Activity    Alcohol use: No    Drug use: No     Sexual activity: Not Currently     Family History   Problem Relation Age of Onset    Dementia Mother     Stroke Mother     Heart disease Father     Heart attack Father     Aneurysm Father     Cancer Sister     No Known Problems Sister     No Known Problems Brother     Heart disease Brother         CABG    Stroke Brother     Heart disease Brother     Hypertension Daughter     Hypertension Son     Colon cancer Neg Hx     Colon polyps Neg Hx        Allergies   Allergen Reactions    Bee Venom Swelling    Sulfamethoxazole-Trimethoprim Other (See Comments)     Pt unaware         Current Facility-Administered Medications:     albuterol sulfate HFA (PROVENTIL HFA;VENTOLIN HFA;PROAIR HFA) inhaler 2 puff, 2 puff, Inhalation, Q4H PRN, DayCandis MD    allopurinol (ZYLOPRIM) tablet 100 mg, 100 mg, Oral, Daily, Day, Candis CURRY MD, 100 mg at 07/25/25 0821    atorvastatin (LIPITOR) tablet 20 mg, 20 mg, Oral, Daily, Day, Candis CURRY MD, 20 mg at 07/25/25 0821    benzonatate (TESSALON) capsule 100 mg, 100 mg, Oral, TID PRN, Candis Grant MD, 100 mg at 07/23/25 0311    sennosides-docusate (PERICOLACE) 8.6-50 MG per tablet 2 tablet, 2 tablet, Oral, BID PRN **AND** polyethylene glycol (MIRALAX) packet 17 g, 17 g, Oral, Daily PRN **AND** bisacodyl (DULCOLAX) EC tablet 5 mg, 5 mg, Oral, Daily PRN **AND** bisacodyl (DULCOLAX) suppository 10 mg, 10 mg, Rectal, Daily PRN, Candis Grant MD    Calcium Replacement - Follow Nurse / BPA Driven Protocol, , Not Applicable, PRN, Day, Candis CURRY MD    cefTRIAXone (ROCEPHIN) 2,000 mg in sodium chloride 0.9 % 100 mL MBP, 2,000 mg, Intravenous, Q24H, Josette Clifford MD, Last Rate: 200 mL/hr at 07/24/25 1715, 2,000 mg at 07/24/25 1715    folic acid (FOLVITE) tablet 400 mcg, 400 mcg, Oral, Daily, Day, Candis CURRY MD, 400 mcg at 07/25/25 0821    levothyroxine (SYNTHROID, LEVOTHROID) tablet 25 mcg, 25 mcg, Oral, Juanita ARM Kristi S, MD, 25 mcg at 07/25/25 0623    lidocaine PF 1% (XYLOCAINE) injection 10 mL,  "10 mL, Infiltration, Once, Bj Oviedo, PharmD    Magnesium Standard Dose Replacement - Follow Nurse / BPA Driven Protocol, , Not Applicable, PRN, Candis Grant MD    midodrine (PROAMATINE) tablet 5 mg, 5 mg, Oral, TID AC, Candis Grant MD, 5 mg at 07/25/25 1133    nitroglycerin (NITROSTAT) SL tablet 0.4 mg, 0.4 mg, Sublingual, Q5 Min PRN, Candis Grant MD    Phosphorus Replacement - Follow Nurse / BPA Driven Protocol, , Not Applicable, PRN, Candis Grant MD    Potassium Replacement - Follow Nurse / BPA Driven Protocol, , Not Applicable, PRN, Candis Grant MD    sodium chloride 0.9 % flush 10 mL, 10 mL, Intravenous, PRN, Apurva, Miguel YIN MD    sodium chloride 0.9 % flush 10 mL, 10 mL, Intravenous, Q12H, Candis Grant MD, 10 mL at 07/25/25 0821    sodium chloride 0.9 % flush 10 mL, 10 mL, Intravenous, PRN, Candis Grant MD    sodium chloride 0.9 % infusion 40 mL, 40 mL, Intravenous, PRN, Candis Grant MD    [Held by provider] spironolactone (ALDACTONE) tablet 50 mg, 50 mg, Oral, Daily, Candis Grant MD, 50 mg at 07/24/25 0815     Palliative Performance Scale Score: 60%    BP 96/61 (BP Location: Right arm, Patient Position: Lying)   Pulse 82   Temp 97.4 °F (36.3 °C) (Oral)   Resp 16   Ht 180.3 cm (71\")   Wt 99.8 kg (220 lb)   SpO2 96%   BMI 30.68 kg/m²     Intake/Output Summary (Last 24 hours) at 7/25/2025 1328  Last data filed at 7/25/2025 1134  Gross per 24 hour   Intake 100 ml   Output 900 ml   Net -800 ml       Physical Exam:    General Appearance:    Alert, cooperative, NAD   HEENT:    NC/AT, EOMI, anicteric, MMM, face relaxed   Neck:   supple, trachea midline, no JVD   Lungs:     CTA bilat, diminished in bases; respirations regular, even     and unlabored    Heart:    RRR, normal S1 and S2, no M/R/G   Abdomen:     Normal bowel sounds, soft, nontender,  mild distended, + reducible ventral hernia   G/U:   Deferred   MSK/Extremities:   No clubbing , cyanosis, + edema, No wasting   Pulses:   " Pulses palpable and equal bilaterally   Skin:   Warm, dry, no mottling   Neurologic:   A/O place and year. Though month was June, cooperative, moves extremities x 4 weakly, no tremor, nl     Tone, no asterixis   Psych:   Calm, appropriate         Labs:   Results from last 7 days   Lab Units 07/25/25  0527   WBC 10*3/mm3 6.26   HEMOGLOBIN g/dL 8.9*   HEMATOCRIT % 28.0*   PLATELETS 10*3/mm3 135*     Results from last 7 days   Lab Units 07/25/25  0527 07/23/25  0413 07/22/25  1842   SODIUM mmol/L 129*   < > 130*   POTASSIUM mmol/L 4.5   < > 4.9   CHLORIDE mmol/L 96*   < > 95*   CO2 mmol/L 22.0   < > 21.8*   BUN mg/dL 43.9*   < > 32.0*   CREATININE mg/dL 2.04*   < > 2.02*   CALCIUM mg/dL 7.9*   < > 8.7   BILIRUBIN mg/dL  --   --  0.8   ALK PHOS U/L  --   --  114   ALT (SGPT) U/L  --   --  13   AST (SGOT) U/L  --   --  39   GLUCOSE mg/dL 117*   < > 126*    < > = values in this interval not displayed.     Imaging Results (Last 72 Hours)       Procedure Component Value Units Date/Time    US Paracentesis [[id number]] Collected: 07/23/25 1608    Specimen: Body Fluid Updated: 07/23/25 1613    Narrative:      PROCEDURE: Ultrasound-guided paracentesis     Procedural Personnel  Attending physician(s): Miguel Kong     Pre-procedure diagnosis: Paracentesis  Post-procedure diagnosis: Same  Indication: Uncomplicated ascites  Additional clinical history: None     Complications: No immediate complications.       Impression:         Ultrasound-guided paracentesis with drainage of 9100 mL of serous fluid.     Plan:      Resume care by clinical team.  _______________________________________________________________     PROCEDURE SUMMARY:  - Limited abdominal ultrasound  - Ultrasound-guided paracentesis  - Additional procedure(s): None     PROCEDURE DETAILS:     Pre-procedure  Consent: Informed consent for the procedure including risks, benefits  and alternatives was obtained and time-out was performed prior to  the  procedure.  Preparation: The site was prepared and draped using maximal sterile  barrier technique including cutaneous antisepsis.     Initial abdominal ultrasound  Initial abdominal ultrasound was performed.  Findings: Large ascites. A safe window for paracentesis was identified.     Paracentesis  Local anesthesia was administered. The peritoneal cavity was accessed  and fluid return confirmed position. Ascites was drained. The catheter  was then removed, and a sterile bandage was applied.  Paracentesis access technique: Real-time ultrasound guidance  Catheter placed: 5F Marieeh  Post-drainage ultrasound: Not performed     Additional Details  Additional description of procedure: None  Equipment details: None  Specimens removed: Abdominal fluid  Estimated blood loss (mL): Less than 10                 7/23/2025 4:10 PM by Miguel Kong MD on Workstation: [id number]       CT Angiogram Chest Pulmonary Embolism [[id number]] Collected: 07/22/25 2138     Updated: 07/22/25 2148    Narrative:      CT ANGIOGRAM CHEST PULMONARY EMBOLISM    Date of Exam: 7/22/2025 8:53 PM EDT    Indication: sepsis, dyspnea, chronic immobilization, leg swelling, dimer >500.    Comparison: 7/20/2025.    Technique: Axial CT images were obtained of the chest after the uneventful intravenous administration of 80 mL Isovue-370 utilizing pulmonary embolism protocol.  In addition, a 3-D volume rendered image was created for interpretation.  Reconstructed   coronal and sagittal images were also obtained. Automated exposure control and iterative construction methods were used.      Findings:  There is no pathologic axillary adenopathy or other worrisome body wall soft tissue finding in the chest. The partially characterized upper abdomen demonstrates known cirrhosis and moderate volume ascites. There is a small pericardial effusion as well as   unchanged small left pleural effusion. Atherosclerotic, nonaneurysmal thoracic aorta. The pulmonary  arteries demonstrate no evidence of focal filling defect. The lung fields demonstrate similar prominent volume loss in the left lower lobe adjacent to   the pleural effusion. There is no distinct suspicious pulmonary nodularity. The osseous structures again demonstrate a nonacute left clavicle fracture. No acute fracture or new aggressive osseous lesion is identified.      Impression:      Impression:  No evidence of acute pulmonary embolus. Redemonstrated chronic left pleural effusion with adjacent left basilar atelectasis as well as chronic pericardial effusion.        Electronically Signed: Son Carey MD    7/22/2025 9:45 PM EDT    Workstation ID: [id number]    CT Abdomen Pelvis With Contrast [[id number]] Collected: 07/22/25 2123     Updated: 07/22/25 2137    Narrative:      CT ABDOMEN PELVIS W CONTRAST    Date of Exam: 7/22/2025 8:53 PM EDT    Indication: Sepsis of unknown source, abdominal distention, history of chronic hernia and cirrhosis with ascites.    Comparison: CT abdomen pelvis 3/20/2025    Technique: Axial CT images were obtained of the abdomen and pelvis following the uneventful intravenous administration of 100 mL Isovue 370. Reconstructed coronal and sagittal images were also obtained. Automated exposure control and iterative   construction methods were used.      Findings:  Separately dictated CT chest.    Cirrhotic liver morphology. No discrete hypervascular liver lesion. Portal vasculature, splenic vein and superior mesenteric vein patent. Cholecystectomy. No dilation of biliary tree. Atrophic pancreas. Spleen within normal limits in size. Minimal   varices. Large volume ascites.    No suspicious adrenal nodule. Symmetric renal size, contour and enhancement. Few small hypodense renal lesions favoring cysts too small to accurately characterize. There is a stable 1.1 cm partially exophytic lesion in the left anterior midpole showing   intrinsic high density on prior noncontrasted exam consistent  with hemorrhagic/proteinaceous cyst. Punctate nonobstructing right upper pole calculus. No hydronephrosis.    Focal somewhat nodular urinary bladder wall thickening near hepatic dome measuring up to 1.2 cm thickness sagittal image 79 series 901. Prostate within normal limits in size. Symmetric seminal vesicles. Small hiatal hernia. Expected configuration stomach   and duodenum. Colonic diverticulosis. No evidence of bowel obstruction or active inflammation. Normal appendix. Diffuse aortic atherosclerotic disease. Fusiform dilation of infrarenal abdominal aorta measuring 3.1 cm, technically aneurysmal without   significant change from prior exam.    No overtly suspicious adenopathy. No organized fluid collection. No free air. There is a large fluid containing ventral abdominal wall hernia measuring 11.1 x 20.2 x 16.2 cm AP, transverse and craniocaudal dimension. Hernia defect measures 7.7 cm   transverse dimension containing a small portion of the traversing bowel. Small fat-containing right and small to moderate fat and fluid-containing left inguinal hernias. These appear similar to prior. Diffuse body wall edema. Gynecomastia. Paraspinous   muscle atrophy. Multilevel degenerative change throughout the spine. No aggressive bone lesion.      Impression:      Impression:  1. Focal nodular bladder wall thickening near urinary bladder dome, possibly presenting a primary malignancy (urothelial carcinoma). Recommend nonemergent urology consultation for consideration of cystoscopy and tissue sampling.  2. Cirrhotic liver morphology with sequelae of portal hypertension including large volume ascites and minimal varices. No significant splenomegaly. Portal vasculature patent. No signs of hepatocellular carcinoma.  3. Large ventral and smaller bilateral inguinal hernias detailed above, without significant change from prior CT comparison. The large ventral hernia contains a small portion of traversing bowel without  obstruction.  4. Anasarca.  5. Colonic diverticulosis.  6. 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  7. Separately dictated CT chest.        Electronically Signed: Robson Lopez MD    7/22/2025 9:34 PM EDT    Workstation ID: [id number]    CT Head Without Contrast [[id number]] Collected: 07/22/25 2121     Updated: 07/22/25 2126    Narrative:      CT HEAD WO CONTRAST    Date of Exam: 7/22/2025 8:53 PM EDT    Indication: Somnolence, confusion, suspect metabolic.    Comparison: Head CT 7/20/2025    Technique: Axial CT images were obtained of the head without contrast administration.  Automated exposure control and iterative construction methods were used.      Findings:  Motion-degraded exam. 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 No new large territory infarct, acute intracranial hemorrhage, large mass lesion, midline shift or hydrocephalus. Mild global   parenchymal volume loss and sequelae of chronic microvascular ischemic change similar to prior. Senescent related basal ganglia calcifications on the right. No large extra-axial fluid collection. No obvious displaced fracture or joint malalignment. No   obstructive sinus disease or large mastoid effusion.      Impression:      Impression:  Allowing for motion degradation, no acute intracranial findings. Grossly stable CT exam since 7/20/2025 comparison.        Electronically Signed: Robson Lopez MD    7/22/2025 9:23 PM EDT    Workstation ID: [id number]    XR Chest 1 View [[id number]] Collected: 07/22/25 1902     Updated: 07/22/25 1907    Narrative:      XR CHEST 1 VW    Date of Exam: 7/22/2025 6:40 PM EDT    Indication: SOA triage protocol    Comparison: Chest CT 7/20/2025    Findings:  Enlarged cardiac silhouette probably combination of cardiomegaly and pericardial effusion compared to recent CT. Right lung appears relatively clear.  Increasing probably small to moderate size left-sided pleural effusion and adjacent left lower lobe   atelectasis versus airspace disease. No pneumothorax. Aortic atherosclerotic disease. Degenerative elated osseous change.      Impression:      Impression:  1. Worsening left lower lobe atelectasis/airspace disease and adjacent pleural effusion.  2. Grossly similar cardiomegaly and probable pericardial effusion correlating with recent CT findings.      Electronically Signed: Robson Lopez MD    7/22/2025 7:04 PM EDT    Workstation ID: [id number]                Diagnostics:   No valid procedures specified.    A:     Sepsis    Panlobular emphysema    Dyslipidemia    Chronic atrial fibrillation    Chronic right heart failure    Cirrhosis of liver with ascites    Ventral hernia    Elevated troponin    Acute UTI (urinary tract infection)    Lactic acidosis    Hyponatremia    Elevated serum creatinine    Anemia, chronic disease    Chronic bilateral pleural effusions    Pericardial effusion    Cellulitis         Impression:   Cirrhosis  Sepsis  UTI  BLE cellulitis  Ascites s/p (l paracentesis  E coli bacteremia  Ventral hernia  Afib urethral stricture  Pericardial effusion  Chronic R heart failure    Symptoms:  Dyspnea - improved  Debility  ?AMS       P:    Met with pt and spouse in room.  Reviewed recent events and introduced palliative care.  Decisions made with further adjustments to code status.  They want PT/OT at this time.  Agreeable to rehab if needed.  Broached hospice but they are not there et.  Will monitor for symptom needs and support through decisions. Thank you for this consult and allowing us to participate in patient's plan of care. Palliative Care Team will continue to follow patient.       Kaylee Melgar MD, 7/25/2025, 13:28 EDT

## 2025-07-25 NOTE — CONSULTS
INFECTIOUS DISEASES  INPATIENT CONSULT NOTE / INITIAL HOSPITAL VISIT      PATIENT NAME:  Jordi Sam  YOB: 1934  MEDICAL RECORD NUMBER:  [id number]    Date of Admission:  7/22/2025  Date of Consult: 7/25/2025    Requesting Provider: HILARY Clifford MD  Evaluating Physician: Hamzah Avery MD    Chief Complaint   Patient presents with    Weakness - Generalized       Reason for Consultation:   E. coli UTI with bacteremia    History of Present Illness:  Patient is a 89 y.o. male with a past medical history significant for urethral stricture and cirrhosis who was admitted to [facility] on 7/22/2025 after presenting to ED with complaints of falls and lethargy.  Family at the bedside gives most of the history.  Wife states that he was having cloudy urine at home.  He has known history of cirrhosis and ascites.  Had been having some cloudy urine.  Known history of urethral stricture and recurrent UTIs, follows with urology.  An outpatient urine culture collected on 7/17/2025, which returned positive for greater than 100,000 CFU E. coli, resistant to ampicillin and Cipro.  Admission blood cultures also returned positive for E. coli.  He is currently on day 4 of antibiotics with ceftriaxone and clinically improving.  CT without any urinary tract obstruction.  Large ascites.  Paracentesis performed and fluid not consistent with peritonitis.    I have been consulted to assist in workup and antimicrobial management of E. coli UTI with bacteremia.    Past Medical History:   Diagnosis Date    A-fib     Acute inferior myocardial infarction     Acute inferior STEMI with RV infarct features, January 2009.     Arrhythmia     CAD (coronary artery disease)     Cellulitis 02/26/2023    Facial. Feb 26-March 1st admission at [facility]. 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 Seeing Dr Kiersten Nova and Dr Jasper Avery 8-2021       Past Surgical History:   Procedure Laterality Date    BRONCHOSCOPY N/A 4/12/2019    Procedure: BRONCHOSCOPY WITH THORACENTESIS;  Surgeon: Marciano Higginbotham MD;  Location:  [facility];  Service: Pulmonary    CARDIAC CATHETERIZATION Bilateral 1/30/2019    Procedure: Right and Left Heart Cath;  Surgeon: Efrem Posadas MD;  Location:  [facility];  Service: Cardiology    CARDIAC CATHETERIZATION      CHOLECYSTECTOMY      CORONARY ANGIOPLASTY WITH STENT PLACEMENT  01/09/2009    Sirolimus eluting stents to the RCA, 01/09/2009.     HERNIA REPAIR      Hernia repair x 2.     PARACENTESIS  06/17/2019    multiple    UMBILICAL HERNIA REPAIR N/A 7/26/2020    Procedure: UMBILICAL HERNIA REPAIR;  Surgeon: Maribell Her MD;  Location:  [facility] OR;  Service: General;  Laterality: N/A;       Family History   Problem Relation Age of Onset    Dementia Mother     Stroke Mother     Heart disease Father     Heart attack Father     Aneurysm Father     Cancer Sister     No Known Problems Sister     No Known Problems Brother     Heart disease Brother         CABG    Stroke Brother     Heart disease Brother     Hypertension Daughter     Hypertension Son     Colon cancer Neg Hx     Colon polyps Neg Hx        Social History     Socioeconomic History    Marital status:      Spouse name: Melly    Number of children: 4   Tobacco Use    Smoking status: Former     Current packs/day: 0.00     Average packs/day: 2.5 packs/day for 28.0 years  (70.0 ttl pk-yrs)     Types: Cigarettes     Start date: 1955     Quit date: 1983     Years since quittin.2     Passive exposure: Never    Smokeless tobacco: Never   Vaping Use    Vaping status: Never Used   Substance and Sexual Activity    Alcohol use: No    Drug use: No    Sexual activity: Not Currently         Allergies:  Allergies   Allergen Reactions    Bee Venom Swelling    Sulfamethoxazole-Trimethoprim Other (See Comments)     Pt unaware       Home Medications:  No current facility-administered medications on file prior to encounter.     Current Outpatient Medications on File Prior to Encounter   Medication Sig Dispense Refill    albuterol sulfate  (90 Base) MCG/ACT inhaler Inhale 2 puffs Every 4 (Four) Hours As Needed for Wheezing. 18 g 0    allopurinol (ZYLOPRIM) 100 MG tablet Take 1 tablet by mouth once daily 90 tablet 0    apixaban (ELIQUIS) 2.5 MG tablet tablet Take 1 tablet by mouth 2 (Two) Times a Day.      benzonatate (Tessalon Perles) 100 MG capsule Take 1 capsule by mouth 3 (Three) Times a Day As Needed for Cough. 60 capsule 1    cefdinir (OMNICEF) 300 MG capsule Take 1 capsule by mouth 2 (Two) Times a Day. 28 capsule 0    cefdinir (OMNICEF) 300 MG capsule Take 1 capsule by mouth 2 (Two) Times a Day. 20 capsule 0    Cholecalciferol (Vitamin D3) 25 MCG (1000 UT) capsule Take 2 capsules by mouth Daily. 180 capsule 3    folic acid (FOLVITE) 400 MCG tablet Take 1 tablet by mouth Daily.      furosemide (LASIX) 40 MG tablet TAKE 1 TABLET BY MOUTH ONCE DAILY IN THE MORNING, 1/2 TABLET IN THE AFTERNOON AND AS NEEDED FOR WEIGHT GAIN, EDEMA AND DYPNEA 135 tablet 0    HYDROcodone-acetaminophen (NORCO) 5-325 MG per tablet Take 1 tablet by mouth Every 6 (Six) Hours As Needed.      Hydrocortisone, Perianal, (Anusol-HC) 2.5 % rectal cream Insert  into the rectum 2 (Two) Times a Day. 30 g 1    ibuprofen (ADVIL,MOTRIN) 600 MG tablet Take 1 tablet by mouth Every 6 (Six) Hours As Needed for Mild  Pain. 6 tablet 0    levothyroxine (SYNTHROID, LEVOTHROID) 25 MCG tablet TAKE 1 TABLET BY MOUTH ONCE DAILY IN THE MORNING 90 tablet 0    lidocaine (LIDODERM) 5 % Place 1 patch on the skin as directed by provider Daily. Remove & Discard patch within 12 hours or as directed by MD 6 patch 0    simvastatin (ZOCOR) 40 MG tablet Take 1 tablet by mouth once daily 90 tablet 0    spironolactone (Aldactone) 50 MG tablet Take 1 tablet by mouth Daily. 90 tablet 2    terazosin (HYTRIN) 2 MG capsule TAKE 1 CAPSULE BY MOUTH ONCE DAILY AT NIGHT 90 capsule 0       Current Hospital Medications:  Current Facility-Administered Medications   Medication Dose Route Frequency Provider Last Rate Last Admin    albuterol sulfate HFA (PROVENTIL HFA;VENTOLIN HFA;PROAIR HFA) inhaler 2 puff  2 puff Inhalation Q4H PRN Day, Candis CURRY MD        allopurinol (ZYLOPRIM) tablet 100 mg  100 mg Oral Daily Day, Candis CURRY MD   100 mg at 07/25/25 0821    atorvastatin (LIPITOR) tablet 20 mg  20 mg Oral Daily Day, Candis CURRY MD   20 mg at 07/25/25 0821    benzonatate (TESSALON) capsule 100 mg  100 mg Oral TID PRN Day, Candis CURRY MD   100 mg at 07/23/25 0311    sennosides-docusate (PERICOLACE) 8.6-50 MG per tablet 2 tablet  2 tablet Oral BID PRN Day, Candis CURRY MD        And    polyethylene glycol (MIRALAX) packet 17 g  17 g Oral Daily PRN Day, Candis CURRY MD        And    bisacodyl (DULCOLAX) EC tablet 5 mg  5 mg Oral Daily PRN Day, Candis CURRY MD        And    bisacodyl (DULCOLAX) suppository 10 mg  10 mg Rectal Daily PRN Day, Candis CURRY MD        Calcium Replacement - Follow Nurse / BPA Driven Protocol   Not Applicable PRN Day, Candis CURRY MD        cefTRIAXone (ROCEPHIN) 2,000 mg in sodium chloride 0.9 % 100 mL MBP  2,000 mg Intravenous Q24H Josette Clifford  mL/hr at 07/24/25 1715 2,000 mg at 07/24/25 1715    folic acid (FOLVITE) tablet 400 mcg  400 mcg Oral Daily Day, Candis CURRY MD   400 mcg at 07/25/25 0821    levothyroxine (SYNTHROID, LEVOTHROID) tablet 25 mcg   25 mcg Oral QAM Candis Grant MD   25 mcg at 07/25/25 0623    lidocaine PF 1% (XYLOCAINE) injection 10 mL  10 mL Infiltration Once Bj Oviedo, Zahraa        Magnesium Standard Dose Replacement - Follow Nurse / BPA Driven Protocol   Not Applicable PRN Juanita, Candis CURRY MD        midodrine (PROAMATINE) tablet 5 mg  5 mg Oral TID AC Day, Candis CURRY MD   5 mg at 07/25/25 1133    nitroglycerin (NITROSTAT) SL tablet 0.4 mg  0.4 mg Sublingual Q5 Min PRN Candis Grant MD        Phosphorus Replacement - Follow Nurse / BPA Driven Protocol   Not Applicable PRN Day, Candis CURRY MD        Potassium Replacement - Follow Nurse / BPA Driven Protocol   Not Applicable PRN Day, Candis CURRY MD        sodium chloride 0.9 % flush 10 mL  10 mL Intravenous PRN Miguel Mixon MD        sodium chloride 0.9 % flush 10 mL  10 mL Intravenous Q12H Day, Candis CURRY MD   10 mL at 07/25/25 0821    sodium chloride 0.9 % flush 10 mL  10 mL Intravenous PRN DayCandis MD        sodium chloride 0.9 % infusion 40 mL  40 mL Intravenous PRN Day, Candis CURRY MD        [Held by provider] spironolactone (ALDACTONE) tablet 50 mg  50 mg Oral Daily Day, Candis CURRY MD   50 mg at 07/24/25 0815       Antimicrobials:  Anti-Infectives (From admission, onward)      Ordered     Dose/Rate Route Frequency Start Stop    07/23/25 1719  cefTRIAXone (ROCEPHIN) 2,000 mg in sodium chloride 0.9 % 100 mL MBP        Ordering Provider: Josette Clifford MD    2,000 mg  200 mL/hr over 30 Minutes Intravenous Every 24 Hours 07/23/25 1815 07/30/25 1814    07/23/25 0301  ceFAZolin 2000 mg IVPB in 100 mL NS (MBP)  Status:  Discontinued        Ordering Provider: Candis Grant MD    2,000 mg  over 30 Minutes Intravenous Every 12 Hours 07/23/25 0400 07/23/25 1719    07/22/25 1852  vancomycin IVPB 2000 mg in 0.9% Sodium Chloride 500 mL        Ordering Provider: Miguel Mixon MD    20 mg/kg × 99.8 kg  250 mL/hr over 120 Minutes Intravenous Once 07/22/25 1908 07/22/25 4861     "25 185  piperacillin-tazobactam (ZOSYN) 3.375 g IVPB in 100 mL NS MBP (CD)        Ordering Provider: Miguel Mixon MD    3.375 g  over 30 Minutes Intravenous Once 25          Review of Systems:   Constitutional:  No fever, chills, or sweats.  +fatigue.  HEENT:  No new vision, hearing or throat complaints.  No oral sores.  No headache, photophobia or neck stiffness.  CV:  No chest pain, palpitations, or syncope.  Respiratory:  + SOB  GI:  +ascites, +cirrhosis  :  No dysuria, hematuria, urgency, or flank pain.  Lymph:  No swollen lymph nodes in neck, axilla, or groin.   Hematologic:  No easy bruising or bleeding.  Endo:  + diabetes.    Musculoskeletal:  No new swelling or pain of joints.  No new back pain.  Neurologic:  No acute focal weakness or numbness.  No seizures.  Skin:  No rashes, ulcers, or lesions.       Vital Signs:  Temp (24hrs), Av.6 °F (36.4 °C), Min:97.4 °F (36.3 °C), Max:98.2 °F (36.8 °C)    BP 96/61 (BP Location: Right arm, Patient Position: Lying)   Pulse 82   Temp 97.4 °F (36.3 °C) (Oral)   Resp 16   Ht 180.3 cm (71\")   Wt 99.8 kg (220 lb)   SpO2 96%   BMI 30.68 kg/m²     Physical Examination:  GENERAL: Chronically ill-appearing male.  Awake and alert, in no acute distress.   HEENT: Normocephalic, atraumatic.  EOMI. No conjunctival injection or subconjunctival hemorrhage.   NECK: Supple   CV: RRR. No murmur, rubs, gallops.  Normal S1S2.  LUNGS: Clear to auscultation bilaterally without wheezing, rales, rhonchi. Normal respiratory effort.  ABDOMEN: Positive bowel sounds.  Soft, nontender, large ascites, ventral hernia.  No rebound or guarding.    EXT: 2+ bilateral lower extremity pitting edema with chronic venous stasis dermatitis  SKIN: Warm and dry   NEURO: A&O. No focal deficits.  Face symmetric.  Speech fluent.  Moves all extremities well.       Laboratory Data:    Results from last 7 days   Lab Units 25  0527 25  0358 " 07/23/25  0413   WBC 10*3/mm3 6.26 6.14 5.98   HEMOGLOBIN g/dL 8.9* 8.9* 8.9*   HEMATOCRIT % 28.0* 28.1* 27.7*   PLATELETS 10*3/mm3 135* 130* 130*     Results from last 7 days   Lab Units 07/25/25  0527   SODIUM mmol/L 129*   POTASSIUM mmol/L 4.5   CHLORIDE mmol/L 96*   CO2 mmol/L 22.0   BUN mg/dL 43.9*   CREATININE mg/dL 2.04*   GLUCOSE mg/dL 117*   CALCIUM mg/dL 7.9*     Results from last 7 days   Lab Units 07/22/25  1842   ALK PHOS U/L 114   BILIRUBIN mg/dL 0.8   ALT (SGPT) U/L 13   AST (SGOT) U/L 39         Results from last 7 days   Lab Units 07/22/25  1842   CRP mg/dL 10.10*     Estimated Creatinine Clearance: 28.4 mL/min (A) (by C-G formula based on SCr of 2.04 mg/dL (H)).  Results from last 7 days   Lab Units 07/22/25  2330   LACTATE mmol/L 1.4                   Microbiology:  Microbiology Results (last 10 days)       Procedure Component Value - Date/Time    Body Fluid Culture - Body Fluid, Peritoneum [[id number]] Collected: 07/23/25 1440    Lab Status: Preliminary result Specimen: Body Fluid from Peritoneum Updated: 07/25/25 0700     Body Fluid Culture No growth at 2 days     Gram Stain No WBCs or organisms seen    Blood Culture - Blood, Hand, Right [[id number]]  (Abnormal)  (Susceptibility) Collected: 07/22/25 1908    Lab Status: Final result Specimen: Blood from Hand, Right Updated: 07/25/25 0619     Blood Culture Escherichia coli     Isolated from Aerobic and Anaerobic Bottles     Gram Stain Aerobic Bottle Gram negative bacilli      Anaerobic Bottle Gram negative bacilli    Susceptibility        Escherichia coli      ALFREDITO      Amoxicillin + Clavulanate Susceptible      Ampicillin Resistant      Ampicillin + Sulbactam Resistant      Cefazolin (Non Urine) Resistant      Cefepime Susceptible      Ceftazidime Susceptible      Ceftriaxone Susceptible      Cefuroxime axetil Susceptible      Ciprofloxacin Resistant      Gentamicin Susceptible      Levofloxacin Intermediate      Piperacillin + Tazobactam  Susceptible      Trimethoprim + Sulfamethoxazole Susceptible                       Susceptibility Comments       Escherichia coli    With the exception of urinary-sourced infections, aminoglycosides should not be used as monotherapy.               Blood Culture - Blood, Arm, Left [[id number]]  (Normal) Collected: 07/22/25 1908    Lab Status: Preliminary result Specimen: Blood from Arm, Left Updated: 07/24/25 2045     Blood Culture No growth at 2 days    Blood Culture ID, PCR - Blood, Hand, Right [[id number]]  (Abnormal) Collected: 07/22/25 1908    Lab Status: Final result Specimen: Blood from Hand, Right Updated: 07/23/25 1715     BCID, PCR Escherichia coli. Identification by BCID2 PCR.     BOTTLE TYPE Aerobic Bottle    Narrative:      No resistance genes detected.    COVID PRE-OP / PRE-PROCEDURE SCREENING ORDER (NO ISOLATION) - Swab, Nasopharynx [[id number]]  (Normal) Collected: 07/22/25 1906    Lab Status: Final result Specimen: Swab from Nasopharynx Updated: 07/22/25 2025    Narrative:      The following orders were created for panel order COVID PRE-OP / PRE-PROCEDURE SCREENING ORDER (NO ISOLATION) - Swab, Nasopharynx.  Procedure                               Abnormality         Status                     ---------                               -----------         ------                     Respiratory Panel PCR w/...[[id number]]  Normal              Final result                 Please view results for these tests on the individual orders.    Rapid Strep A Screen - Swab, Throat [[id number]]  (Normal) Collected: 07/22/25 1906    Lab Status: Final result Specimen: Swab from Throat Updated: 07/22/25 1935     Strep A Ag Negative    Narrative:      Test performed by [facility].    Respiratory Panel PCR w/COVID-19(SARS-CoV-2) [facility] In-House, NP Swab in UTM/VTM, 2 HR TAT - Swab, Nasopharynx [[id number]]  (Normal) Collected: 07/22/25 1906    Lab Status: Final result Specimen: Swab from Nasopharynx  Updated: 07/22/25 2025     ADENOVIRUS, PCR Not Detected     Coronavirus 229E Not Detected     Coronavirus HKU1 Not Detected     Coronavirus NL63 Not Detected     Coronavirus OC43 Not Detected     COVID19 Not Detected     Human Metapneumovirus Not Detected     Human Rhinovirus/Enterovirus Not Detected     Influenza A PCR Not Detected     Influenza B PCR Not Detected     Parainfluenza Virus 1 Not Detected     Parainfluenza Virus 2 Not Detected     Parainfluenza Virus 3 Not Detected     Parainfluenza Virus 4 Not Detected     RSV, PCR Not Detected     Bordetella pertussis pcr Not Detected     Bordetella parapertussis PCR Not Detected     Chlamydophila pneumoniae PCR Not Detected     Mycoplasma pneumo by PCR Not Detected    Narrative:      In the setting of a positive respiratory panel with a viral infection PLUS a negative procalcitonin without other underlying concern for bacterial infection, consider observing off antibiotics or discontinuation of antibiotics and continue supportive care. If the respiratory panel is positive for atypical bacterial infection (Bordetella pertussis, Chlamydophila pneumoniae, or Mycoplasma pneumoniae), consider antibiotic de-escalation to target atypical bacterial infection.    Beta Strep Culture, Throat - Swab, Throat [[id number]]  (Normal) Collected: 07/22/25 1906    Lab Status: Final result Specimen: Swab from Throat Updated: 07/24/25 1112     Throat Culture, Beta Strep No Beta Hemolytic Streptococcus Isolated at 2 days    Narrative:      Group A Strep incidence is low in adults. Positive culture for Beta hemolytic Streptococcus species can reflect colonization and not true infection. Please correlate clinically.      Urine Culture - Urine, Urine, Clean Catch [[id number]]  (Abnormal)  (Susceptibility) Collected: 07/17/25 1603    Lab Status: Final result Specimen: Urine, Clean Catch Updated: 07/19/25 0456     Urine Culture >100,000 CFU/mL Escherichia coli    Narrative:      Colonization  of the urinary tract without infection is common. Treatment is discouraged unless the patient is symptomatic, pregnant, or undergoing an invasive urologic procedure.    Susceptibility        Escherichia coli      ALFREDITO      Amoxicillin + Clavulanate Resistant      Ampicillin Resistant      Ampicillin + Sulbactam Resistant      Cefazolin (Urine) Susceptible      Cefepime Susceptible      Ceftazidime Susceptible      Ceftriaxone Susceptible      Cefuroxime axetil Susceptible      Ciprofloxacin Resistant      Gentamicin Susceptible      Levofloxacin Intermediate      Nitrofurantoin Susceptible      Piperacillin + Tazobactam Susceptible      Trimethoprim + Sulfamethoxazole Susceptible                                       Radiology:  Imaging Results (Last 72 Hours)       Procedure Component Value Units Date/Time    US Paracentesis [[id number]] Collected: 07/23/25 1608    Specimen: Body Fluid Updated: 07/23/25 1613    Narrative:      PROCEDURE: Ultrasound-guided paracentesis     Procedural Personnel  Attending physician(s): Miguel Kong     Pre-procedure diagnosis: Paracentesis  Post-procedure diagnosis: Same  Indication: Uncomplicated ascites  Additional clinical history: None     Complications: No immediate complications.       Impression:         Ultrasound-guided paracentesis with drainage of 9100 mL of serous fluid.     Plan:      Resume care by clinical team.  _______________________________________________________________     PROCEDURE SUMMARY:  - Limited abdominal ultrasound  - Ultrasound-guided paracentesis  - Additional procedure(s): None     PROCEDURE DETAILS:     Pre-procedure  Consent: Informed consent for the procedure including risks, benefits  and alternatives was obtained and time-out was performed prior to the  procedure.  Preparation: The site was prepared and draped using maximal sterile  barrier technique including cutaneous antisepsis.     Initial abdominal ultrasound  Initial abdominal ultrasound  was performed.  Findings: Large ascites. A safe window for paracentesis was identified.     Paracentesis  Local anesthesia was administered. The peritoneal cavity was accessed  and fluid return confirmed position. Ascites was drained. The catheter  was then removed, and a sterile bandage was applied.  Paracentesis access technique: Real-time ultrasound guidance  Catheter placed: 5F Marieeh  Post-drainage ultrasound: Not performed     Additional Details  Additional description of procedure: None  Equipment details: None  Specimens removed: Abdominal fluid  Estimated blood loss (mL): Less than 10                 7/23/2025 4:10 PM by Miguel Kong MD on Workstation: [id number]       CT Angiogram Chest Pulmonary Embolism [[id number]] Collected: 07/22/25 2138     Updated: 07/22/25 2148    Narrative:      CT ANGIOGRAM CHEST PULMONARY EMBOLISM    Date of Exam: 7/22/2025 8:53 PM EDT    Indication: sepsis, dyspnea, chronic immobilization, leg swelling, dimer >500.    Comparison: 7/20/2025.    Technique: Axial CT images were obtained of the chest after the uneventful intravenous administration of 80 mL Isovue-370 utilizing pulmonary embolism protocol.  In addition, a 3-D volume rendered image was created for interpretation.  Reconstructed   coronal and sagittal images were also obtained. Automated exposure control and iterative construction methods were used.      Findings:  There is no pathologic axillary adenopathy or other worrisome body wall soft tissue finding in the chest. The partially characterized upper abdomen demonstrates known cirrhosis and moderate volume ascites. There is a small pericardial effusion as well as   unchanged small left pleural effusion. Atherosclerotic, nonaneurysmal thoracic aorta. The pulmonary arteries demonstrate no evidence of focal filling defect. The lung fields demonstrate similar prominent volume loss in the left lower lobe adjacent to   the pleural effusion. There is no distinct  suspicious pulmonary nodularity. The osseous structures again demonstrate a nonacute left clavicle fracture. No acute fracture or new aggressive osseous lesion is identified.      Impression:      Impression:  No evidence of acute pulmonary embolus. Redemonstrated chronic left pleural effusion with adjacent left basilar atelectasis as well as chronic pericardial effusion.        Electronically Signed: Son Carey MD    7/22/2025 9:45 PM EDT    Workstation ID: [id number]    CT Abdomen Pelvis With Contrast [[id number]] Collected: 07/22/25 2123     Updated: 07/22/25 2137    Narrative:      CT ABDOMEN PELVIS W CONTRAST    Date of Exam: 7/22/2025 8:53 PM EDT    Indication: Sepsis of unknown source, abdominal distention, history of chronic hernia and cirrhosis with ascites.    Comparison: CT abdomen pelvis 3/20/2025    Technique: Axial CT images were obtained of the abdomen and pelvis following the uneventful intravenous administration of 100 mL Isovue 370. Reconstructed coronal and sagittal images were also obtained. Automated exposure control and iterative   construction methods were used.      Findings:  Separately dictated CT chest.    Cirrhotic liver morphology. No discrete hypervascular liver lesion. Portal vasculature, splenic vein and superior mesenteric vein patent. Cholecystectomy. No dilation of biliary tree. Atrophic pancreas. Spleen within normal limits in size. Minimal   varices. Large volume ascites.    No suspicious adrenal nodule. Symmetric renal size, contour and enhancement. Few small hypodense renal lesions favoring cysts too small to accurately characterize. There is a stable 1.1 cm partially exophytic lesion in the left anterior midpole showing   intrinsic high density on prior noncontrasted exam consistent with hemorrhagic/proteinaceous cyst. Punctate nonobstructing right upper pole calculus. No hydronephrosis.    Focal somewhat nodular urinary bladder wall thickening near hepatic dome measuring up  to 1.2 cm thickness sagittal image 79 series 901. Prostate within normal limits in size. Symmetric seminal vesicles. Small hiatal hernia. Expected configuration stomach   and duodenum. Colonic diverticulosis. No evidence of bowel obstruction or active inflammation. Normal appendix. Diffuse aortic atherosclerotic disease. Fusiform dilation of infrarenal abdominal aorta measuring 3.1 cm, technically aneurysmal without   significant change from prior exam.    No overtly suspicious adenopathy. No organized fluid collection. No free air. There is a large fluid containing ventral abdominal wall hernia measuring 11.1 x 20.2 x 16.2 cm AP, transverse and craniocaudal dimension. Hernia defect measures 7.7 cm   transverse dimension containing a small portion of the traversing bowel. Small fat-containing right and small to moderate fat and fluid-containing left inguinal hernias. These appear similar to prior. Diffuse body wall edema. Gynecomastia. Paraspinous   muscle atrophy. Multilevel degenerative change throughout the spine. No aggressive bone lesion.      Impression:      Impression:  1. Focal nodular bladder wall thickening near urinary bladder dome, possibly presenting a primary malignancy (urothelial carcinoma). Recommend nonemergent urology consultation for consideration of cystoscopy and tissue sampling.  2. Cirrhotic liver morphology with sequelae of portal hypertension including large volume ascites and minimal varices. No significant splenomegaly. Portal vasculature patent. No signs of hepatocellular carcinoma.  3. Large ventral and smaller bilateral inguinal hernias detailed above, without significant change from prior CT comparison. The large ventral hernia contains a small portion of traversing bowel without obstruction.  4. Anasarca.  5. Colonic diverticulosis.  6. 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  7. Separately dictated CT  chest.        Electronically Signed: Robson Lopez MD    7/22/2025 9:34 PM EDT    Workstation ID: [id number]    CT Head Without Contrast [[id number]] Collected: 07/22/25 2121     Updated: 07/22/25 2126    Narrative:      CT HEAD WO CONTRAST    Date of Exam: 7/22/2025 8:53 PM EDT    Indication: Somnolence, confusion, suspect metabolic.    Comparison: Head CT 7/20/2025    Technique: Axial CT images were obtained of the head without contrast administration.  Automated exposure control and iterative construction methods were used.      Findings:  Motion-degraded exam. 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 No new large territory infarct, acute intracranial hemorrhage, large mass lesion, midline shift or hydrocephalus. Mild global   parenchymal volume loss and sequelae of chronic microvascular ischemic change similar to prior. Senescent related basal ganglia calcifications on the right. No large extra-axial fluid collection. No obvious displaced fracture or joint malalignment. No   obstructive sinus disease or large mastoid effusion.      Impression:      Impression:  Allowing for motion degradation, no acute intracranial findings. Grossly stable CT exam since 7/20/2025 comparison.        Electronically Signed: Robson Lopez MD    7/22/2025 9:23 PM EDT    Workstation ID: [id number]    XR Chest 1 View [[id number]] Collected: 07/22/25 1902     Updated: 07/22/25 1907    Narrative:      XR CHEST 1 VW    Date of Exam: 7/22/2025 6:40 PM EDT    Indication: SOA triage protocol    Comparison: Chest CT 7/20/2025    Findings:  Enlarged cardiac silhouette probably combination of cardiomegaly and pericardial effusion compared to recent CT. Right lung appears relatively clear. Increasing probably small to moderate size left-sided pleural effusion and adjacent left lower lobe   atelectasis versus airspace disease. No pneumothorax. Aortic atherosclerotic disease.  Degenerative elated osseous change.      Impression:      Impression:  1. Worsening left lower lobe atelectasis/airspace disease and adjacent pleural effusion.  2. Grossly similar cardiomegaly and probable pericardial effusion correlating with recent CT findings.      Electronically Signed: Robson Lopez MD    7/22/2025 7:04 PM EDT    Workstation ID: [id number]                IMPRESSION:  89 y.o. male with history of cirrhosis, urethral stricture and recurrent UTI admitted with lethargy and falls.   Recent positive outpatient urine culture with E. coli and on admission blood cultures positive.    PROBLEM LIST:   -- E. coli UTI with secondary bacteremia.  Urethral stricture contributing.  Urology following.  Resistance to ampicillin and Cipro.  -- Allergy to sulfa, unknown reaction.  -- CKD  -- Cirrhosis with large ascites.  Paracentesis performed, peritoneal fluid not consistent with peritonitis.    PLAN:  -- Ceftriaxone 2 g IV daily through 7/28/2025  -- Patient will continue to be at risk for recurrent UTI until urethral stricture can be addressed.  Urology following.    Thank you for the consultation.  I will continue to follow along with you.  Plan discussed with patient/family.      Hamzah Avery MD  7/25/2025  14:41 EDT

## 2025-07-25 NOTE — PLAN OF CARE
Goal Outcome Evaluation:  Plan of Care Reviewed With: patient                   Patient complained about wearing Purewick and has been up to toilet multiple times during shift. Patient attempted to get up without seeking assistance and was educated on CALL, DON'T FALL. Patient A-Fib on tele-monitor. Oxygenation 96% on RA. No unmet needs.

## 2025-07-26 LAB
ANION GAP SERPL CALCULATED.3IONS-SCNC: 10 MMOL/L (ref 5–15)
BACTERIA FLD CULT: NORMAL
BASOPHILS # BLD AUTO: 0.03 10*3/MM3 (ref 0–0.2)
BASOPHILS NFR BLD AUTO: 0.5 % (ref 0–1.5)
BUN SERPL-MCNC: 43.7 MG/DL (ref 8–23)
BUN/CREAT SERPL: 24.1 (ref 7–25)
CALCIUM SPEC-SCNC: 7.9 MG/DL (ref 8.2–9.6)
CHLORIDE SERPL-SCNC: 93 MMOL/L (ref 98–107)
CO2 SERPL-SCNC: 23 MMOL/L (ref 22–29)
CREAT SERPL-MCNC: 1.81 MG/DL (ref 0.76–1.27)
DEPRECATED RDW RBC AUTO: 59.2 FL (ref 37–54)
EGFRCR SERPLBLD CKD-EPI 2021: 34.9 ML/MIN/1.73
EOSINOPHIL # BLD AUTO: 0.09 10*3/MM3 (ref 0–0.4)
EOSINOPHIL NFR BLD AUTO: 1.4 % (ref 0.3–6.2)
ERYTHROCYTE [DISTWIDTH] IN BLOOD BY AUTOMATED COUNT: 17.1 % (ref 12.3–15.4)
GLUCOSE SERPL-MCNC: 100 MG/DL (ref 65–99)
GRAM STN SPEC: NORMAL
HCT VFR BLD AUTO: 27.9 % (ref 37.5–51)
HGB BLD-MCNC: 8.5 G/DL (ref 13–17.7)
IMM GRANULOCYTES # BLD AUTO: 0.04 10*3/MM3 (ref 0–0.05)
IMM GRANULOCYTES NFR BLD AUTO: 0.6 % (ref 0–0.5)
LYMPHOCYTES # BLD AUTO: 0.44 10*3/MM3 (ref 0.7–3.1)
LYMPHOCYTES NFR BLD AUTO: 7.1 % (ref 19.6–45.3)
MCH RBC QN AUTO: 28.6 PG (ref 26.6–33)
MCHC RBC AUTO-ENTMCNC: 30.5 G/DL (ref 31.5–35.7)
MCV RBC AUTO: 93.9 FL (ref 79–97)
MONOCYTES # BLD AUTO: 0.61 10*3/MM3 (ref 0.1–0.9)
MONOCYTES NFR BLD AUTO: 9.8 % (ref 5–12)
NEUTROPHILS NFR BLD AUTO: 5 10*3/MM3 (ref 1.7–7)
NEUTROPHILS NFR BLD AUTO: 80.6 % (ref 42.7–76)
NRBC BLD AUTO-RTO: 0 /100 WBC (ref 0–0.2)
OSMOLALITY SERPL: 285 MOSM/KG (ref 275–295)
OSMOLALITY UR: 255 MOSM/KG (ref 300–1100)
PLATELET # BLD AUTO: 121 10*3/MM3 (ref 140–450)
PMV BLD AUTO: 10.1 FL (ref 6–12)
POTASSIUM SERPL-SCNC: 4.4 MMOL/L (ref 3.5–5.2)
RBC # BLD AUTO: 2.97 10*6/MM3 (ref 4.14–5.8)
SODIUM SERPL-SCNC: 126 MMOL/L (ref 136–145)
SODIUM SERPL-SCNC: 130 MMOL/L (ref 136–145)
SODIUM SERPL-SCNC: 131 MMOL/L (ref 136–145)
SODIUM UR-SCNC: 53 MMOL/L
WBC NRBC COR # BLD AUTO: 6.21 10*3/MM3 (ref 3.4–10.8)

## 2025-07-26 PROCEDURE — 84295 ASSAY OF SERUM SODIUM: CPT | Performed by: INTERNAL MEDICINE

## 2025-07-26 PROCEDURE — 25010000002 FUROSEMIDE PER 20 MG: Performed by: INTERNAL MEDICINE

## 2025-07-26 PROCEDURE — 80048 BASIC METABOLIC PNL TOTAL CA: CPT | Performed by: STUDENT IN AN ORGANIZED HEALTH CARE EDUCATION/TRAINING PROGRAM

## 2025-07-26 PROCEDURE — 83930 ASSAY OF BLOOD OSMOLALITY: CPT | Performed by: INTERNAL MEDICINE

## 2025-07-26 PROCEDURE — 84300 ASSAY OF URINE SODIUM: CPT | Performed by: INTERNAL MEDICINE

## 2025-07-26 PROCEDURE — 25010000002 CEFTRIAXONE PER 250 MG: Performed by: STUDENT IN AN ORGANIZED HEALTH CARE EDUCATION/TRAINING PROGRAM

## 2025-07-26 PROCEDURE — 83935 ASSAY OF URINE OSMOLALITY: CPT | Performed by: INTERNAL MEDICINE

## 2025-07-26 PROCEDURE — 99233 SBSQ HOSP IP/OBS HIGH 50: CPT | Performed by: INTERNAL MEDICINE

## 2025-07-26 PROCEDURE — 85025 COMPLETE CBC W/AUTO DIFF WBC: CPT | Performed by: STUDENT IN AN ORGANIZED HEALTH CARE EDUCATION/TRAINING PROGRAM

## 2025-07-26 RX ORDER — FUROSEMIDE 10 MG/ML
40 INJECTION INTRAMUSCULAR; INTRAVENOUS ONCE
Status: COMPLETED | OUTPATIENT
Start: 2025-07-26 | End: 2025-07-26

## 2025-07-26 RX ADMIN — FOLIC ACID TAB 400 MCG 400 MCG: 400 TAB at 09:04

## 2025-07-26 RX ADMIN — Medication 10 ML: at 09:05

## 2025-07-26 RX ADMIN — LEVOTHYROXINE SODIUM 25 MCG: 0.03 TABLET ORAL at 06:12

## 2025-07-26 RX ADMIN — MIDODRINE HYDROCHLORIDE 5 MG: 5 TABLET ORAL at 09:04

## 2025-07-26 RX ADMIN — FUROSEMIDE 40 MG: 10 INJECTION, SOLUTION INTRAMUSCULAR; INTRAVENOUS at 12:13

## 2025-07-26 RX ADMIN — ATORVASTATIN CALCIUM 20 MG: 20 TABLET, FILM COATED ORAL at 09:04

## 2025-07-26 RX ADMIN — MIDODRINE HYDROCHLORIDE 5 MG: 5 TABLET ORAL at 18:19

## 2025-07-26 RX ADMIN — CEFTRIAXONE 2000 MG: 2 INJECTION, POWDER, FOR SOLUTION INTRAMUSCULAR; INTRAVENOUS at 18:19

## 2025-07-26 RX ADMIN — MIDODRINE HYDROCHLORIDE 5 MG: 5 TABLET ORAL at 12:13

## 2025-07-26 RX ADMIN — ALLOPURINOL 100 MG: 100 TABLET ORAL at 09:04

## 2025-07-26 NOTE — PLAN OF CARE
Goal Outcome Evaluation:   VSS. Continues to be on RA with oxygen saturation >92%. Denies pain, nausea, vomiting. Tolerating PO intake well. Up in chair. Denies any needs at this time. Family at the bedside.

## 2025-07-26 NOTE — CONSULTS
visited patient per palliative spiritual care consult.  Patient sitting up in bed, greeted me with a smile, talkative.  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  Sacraments are important to him.  He said his wife will be going to [place] tomorrow and will bring eucharist.  I let him know about the sacramental  for [employer] day patients who will also most likely visit.  He shared about his life; his marriage of 63 years, children and grandchildren plus his career.  Feeling grateful for a full life.

## 2025-07-26 NOTE — PROGRESS NOTES
"Urology    Patient Name: Jordi Sam  Medical Record Number: [id number]  YOB: 1934     LOS: 3 days   Patient Care Team:  Jasper Avery MD as PCP - General (Internal Medicine)  Jordi Law MD as Consulting Physician (Urology)  Chapin Villagran MD as Consulting Physician (Otolaryngology)  Gen Laurent MD as Consulting Physician (Gastroenterology)  Ezekiel Abarca MD as Referring Physician (Dermatology)  Renny Faustin MD as Consulting Physician (Radiation Oncology)  Larry Sin MD as Consulting Physician (Cardiology)  Lv Van MD as Consulting Physician (Ophthalmology)    Chief Complaint:    Chief Complaint   Patient presents with    Weakness - Generalized       Subjective     Interval History:     He feels okay with some general weakness.  He is breathing okay.  He says he is voiding okay but the family says he cringe his when he voids.  He is voiding into a male PureWick.  His wife and other family members are at the bedside.  He was seen by palliative care.    Review of Systems:    The following systems were reviewed and negative;  respiratory, cardiovascular, and gastrointestinal    Objective     Vital Signs  /85 (BP Location: Right arm, Patient Position: Lying)   Pulse 77   Temp 98.3 °F (36.8 °C) (Oral)   Resp 18   Ht 180.3 cm (71\")   Wt 99.8 kg (220 lb)   SpO2 (!) 86%   BMI 30.68 kg/m²   I/O last 3 completed shifts:  In: 125 [P.O.:125]  Out: 150 [Urine:150]  I/O this shift:  In: 480 [P.O.:480]  Out: -       Physical Exam:  General Appearance: No acute distress, sitting up in a chair, pleasant, appears comfortable  Lungs: Respirations regular, even and  unlabored  Genital: Urine yellow in the pure wick canister     Results Review:     I reviewed the patient's new clinical results.  I reviewed the patient's new imaging results and agree with the interpretation.  Lab Results (last 24 hours)       Procedure Component Value Units " Date/Time    Body Fluid Culture - Body Fluid, Peritoneum [[id number]] Collected: 07/23/25 1440    Specimen: Body Fluid from Peritoneum Updated: 07/26/25 0910     Body Fluid Culture No growth at 3 days     Gram Stain No WBCs or organisms seen    Anaerobic Culture - Body Fluid, Peritoneum [[id number]]  (Normal) Collected: 07/23/25 1440    Specimen: Body Fluid from Peritoneum Updated: 07/26/25 0812     Anaerobic Culture No anaerobes isolated at 3 days    Basic Metabolic Panel [[id number]]  (Abnormal) Collected: 07/26/25 0406    Specimen: Blood Updated: 07/26/25 0533     Glucose 100 mg/dL      BUN 43.7 mg/dL      Creatinine 1.81 mg/dL      Sodium 126 mmol/L      Potassium 4.4 mmol/L      Chloride 93 mmol/L      CO2 23.0 mmol/L      Calcium 7.9 mg/dL      BUN/Creatinine Ratio 24.1     Anion Gap 10.0 mmol/L      eGFR 34.9 mL/min/1.73     Narrative:      GFR Categories in Chronic Kidney Disease (CKD)              GFR Category          GFR (mL/min/1.73)    Interpretation  G1                    90 or greater        Normal or high (1)  G2                    60-89                Mild decrease (1)  G3a                   45-59                Mild to moderate decrease  G3b                   30-44                Moderate to severe decrease  G4                    15-29                Severe decrease  G5                    14 or less           Kidney failure    (1)In the absence of evidence of kidney disease, neither GFR category G1 or G2 fulfill the criteria for CKD.    eGFR calculation 2021 CKD-EPI creatinine equation, which does not include race as a factor    CBC & Differential [[id number]]  (Abnormal) Collected: 07/26/25 0406    Specimen: Blood Updated: 07/26/25 0445    Narrative:      The following orders were created for panel order CBC & Differential.  Procedure                               Abnormality         Status                     ---------                               -----------         ------                     CBC  Auto Differential[[id number]]        Abnormal            Final result                 Please view results for these tests on the individual orders.    CBC Auto Differential [[id number]]  (Abnormal) Collected: 07/26/25 0406    Specimen: Blood Updated: 07/26/25 0445     WBC 6.21 10*3/mm3      RBC 2.97 10*6/mm3      Hemoglobin 8.5 g/dL      Hematocrit 27.9 %      MCV 93.9 fL      MCH 28.6 pg      MCHC 30.5 g/dL      RDW 17.1 %      RDW-SD 59.2 fl      MPV 10.1 fL      Platelets 121 10*3/mm3      Neutrophil % 80.6 %      Lymphocyte % 7.1 %      Monocyte % 9.8 %      Eosinophil % 1.4 %      Basophil % 0.5 %      Immature Grans % 0.6 %      Neutrophils, Absolute 5.00 10*3/mm3      Lymphocytes, Absolute 0.44 10*3/mm3      Monocytes, Absolute 0.61 10*3/mm3      Eosinophils, Absolute 0.09 10*3/mm3      Basophils, Absolute 0.03 10*3/mm3      Immature Grans, Absolute 0.04 10*3/mm3      nRBC 0.0 /100 WBC     Blood Culture - Blood, Arm, Left [[id number]]  (Normal) Collected: 07/22/25 1908    Specimen: Blood from Arm, Left Updated: 07/25/25 2045     Blood Culture No growth at 3 days            Medication Review:    Current Facility-Administered Medications:     albuterol sulfate HFA (PROVENTIL HFA;VENTOLIN HFA;PROAIR HFA) inhaler 2 puff, 2 puff, Inhalation, Q4H PRN, Day, Candis CURRY MD    allopurinol (ZYLOPRIM) tablet 100 mg, 100 mg, Oral, Daily, Day, Candis CURRY MD, 100 mg at 07/26/25 0904    atorvastatin (LIPITOR) tablet 20 mg, 20 mg, Oral, Daily, Day, Candis CURRY MD, 20 mg at 07/26/25 0904    benzonatate (TESSALON) capsule 100 mg, 100 mg, Oral, TID PRN, Day, Candis CURRY MD, 100 mg at 07/23/25 0311    sennosides-docusate (PERICOLACE) 8.6-50 MG per tablet 2 tablet, 2 tablet, Oral, BID PRN **AND** polyethylene glycol (MIRALAX) packet 17 g, 17 g, Oral, Daily PRN **AND** bisacodyl (DULCOLAX) EC tablet 5 mg, 5 mg, Oral, Daily PRN **AND** bisacodyl (DULCOLAX) suppository 10 mg, 10 mg, Rectal, Daily PRN, Day, Candis CURRY MD    Calcium Replacement  - Follow Nurse / BPA Driven Protocol, , Not Applicable, PRN, Candis Grant MD    cefTRIAXone (ROCEPHIN) 2,000 mg in sodium chloride 0.9 % 100 mL MBP, 2,000 mg, Intravenous, Q24H, Josette Clifford MD, Last Rate: 200 mL/hr at 07/25/25 1800, 2,000 mg at 07/25/25 1800    folic acid (FOLVITE) tablet 400 mcg, 400 mcg, Oral, Daily, Candis Grant MD, 400 mcg at 07/26/25 0904    levothyroxine (SYNTHROID, LEVOTHROID) tablet 25 mcg, 25 mcg, Oral, QAM, Candis Grant MD, 25 mcg at 07/26/25 0612    lidocaine PF 1% (XYLOCAINE) injection 10 mL, 10 mL, Infiltration, Once, Bj Oviedo, PharmD    Magnesium Standard Dose Replacement - Follow Nurse / BPA Driven Protocol, , Not Applicable, PRN, Candis Grant MD    midodrine (PROAMATINE) tablet 5 mg, 5 mg, Oral, TID AC, Candis Grant MD, 5 mg at 07/26/25 1213    nitroglycerin (NITROSTAT) SL tablet 0.4 mg, 0.4 mg, Sublingual, Q5 Min PRN, Candis Grant MD    Phosphorus Replacement - Follow Nurse / BPA Driven Protocol, , Not Applicable, PRN, Candis Grant MD    Potassium Replacement - Follow Nurse / BPA Driven Protocol, , Not Applicable, PRN, Candis Grant MD    sodium chloride 0.9 % flush 10 mL, 10 mL, Intravenous, PRN, Miguel Mixon MD    sodium chloride 0.9 % flush 10 mL, 10 mL, Intravenous, Q12H, Candis Grant MD, 10 mL at 07/26/25 0905    sodium chloride 0.9 % flush 10 mL, 10 mL, Intravenous, PRN, Candis Grant MD    sodium chloride 0.9 % infusion 40 mL, 40 mL, Intravenous, PRN, Candis Grant MD    [Held by provider] spironolactone (ALDACTONE) tablet 50 mg, 50 mg, Oral, Daily, Candis Grant MD, 50 mg at 07/24/25 0815    Assessment and Plan    89-year-old white male with a history of BPH, recurrent UTIs,gross hematuria,urethral stricture, diabetes, A-fib on Eliquis, CAD, CHF, HTN, HLD, cirrhosis, ascites, ventral hernia, and cellulitis.  He gets monthly paracentesis.  He had a couple episodes of gross hematuria and underwent local cystoscopy at the [facility]  5/2025 and was found to have a 5 Romanian stricture in the pendulous or bulbous urethra.  We planned cystoscopy under anesthesia with possible urethral dilation, direct vision internal urethrotomy, and bladder biopsy but the patient and his wife opted to defer that due to other medical issues.     He was admitted 7/22/2025 for sepsis due to UTI and MELVI.  A urine culture 7/17/2025 and blood cultures 7/22/2025 showed E. coli.  He received Zosyn and vancomycin and switched to Rocephin.  The Eliquis is on hold.  A CT scan with contrast showed cirrhosis with large ascites, large ventral hernia, small bilateral inguinal hernias, and thickened bladder at the dome.  A CT angio of the chest showed no PE. He underwent paracentesis 7/23/2025 with 9 L fluid drained.     We discussed possible cystoscopy with urethral dilation, DVIU, and bladder biopsy which would require general or MAC anesthesia.  We agree on conservative management for now given his multiple comorbidities.  Of note, placement of a Devries catheter would be difficult or not possible given the stricture.  The recurrent UTIs are likely due to urethral stricture and the gross hematuria is likely due to hemorrhagic cystitis.    He is voiding reasonably well into a male PureWick.  The urine is yellow in the canister.  The creatinine is improving.  He was seen by palliative care.      Plan: Continue antibiotic for UTI, currently Rocephin.  Continue male PureWick.  Hold Eliquis for now.  Conservative management of the urethral stricture for now.        Sepsis    Panlobular emphysema    Dyslipidemia    Chronic atrial fibrillation    Chronic right heart failure    Cirrhosis of liver with ascites    Ventral hernia    Elevated troponin    Acute UTI (urinary tract infection)    Lactic acidosis    Hyponatremia    Elevated serum creatinine    Anemia, chronic disease    Chronic bilateral pleural effusions    Pericardial effusion    Cellulitis          Plan for disposition:Where:  home and When:  TBD    Bucky García MD  07/26/25  14:03 EDT

## 2025-07-26 NOTE — PROGRESS NOTES
[facility] Medicine Services  PROGRESS NOTE    Patient Name: Jordi Sam  : 1934  MRN: [id number]    Date of Admission: 2025  Primary Care Physician: Jasper Avery MD    Subjective   Subjective     CC:  F/u weakness    HPI:  Patient resting in bed. Says he feels very weak and short of breath. Abdominal swelling is about the same. No family at bedside.      Objective   Objective     Vital Signs:   Temp:  [97.5 °F (36.4 °C)-97.8 °F (36.6 °C)] 97.5 °F (36.4 °C)  Heart Rate:  [73-85] 85  Resp:  [16-18] 16  BP: (110-123)/(55-69) 123/69     Physical Exam:  Constitutional: No acute distress, awake, alert, chronically ill appearing male  HENT: NCAT, mucous membranes moist  Respiratory: Clear to auscultation bilaterally, respiratory effort normal   Cardiovascular: RRR, no murmurs, rubs, or gallops  Gastrointestinal: soft, nontender, + ventral hernia, some distention present  Musculoskeletal: 1+ bilateral ankle edema  Psychiatric: Appropriate affect, cooperative  Neurologic: Oriented x 3, speech clear, no focal deficits  Skin: venous stasis changes on LE        Results Reviewed:  LAB RESULTS:      Lab 25  0406 25  0527 25  0358 25  0413 25  2330 25  2037 25  1842   WBC 6.21 6.26 6.14 5.98  --   --  6.98   HEMOGLOBIN 8.5* 8.9* 8.9* 8.9*  --   --  9.7*   HEMATOCRIT 27.9* 28.0* 28.1* 27.7*  --   --  29.7*   PLATELETS 121* 135* 130* 130*  --   --  148   NEUTROS ABS 5.00 5.12 5.10 5.21  --   --  6.23   IMMATURE GRANS (ABS) 0.04 0.02 0.04 0.04  --   --  0.04   LYMPHS ABS 0.44* 0.34* 0.33* 0.17*  --   --  0.21*   MONOS ABS 0.61 0.70 0.62 0.53  --   --  0.48   EOS ABS 0.09 0.05 0.03 0.01  --   --  0.00   MCV 93.9 93.0 93.4 93.0  --   --  92.2   CRP  --   --   --   --   --   --  10.10*   PROCALCITONIN  --   --   --   --   --   --  0.49*   LACTATE  --   --   --   --  1.4  --  2.4*   D DIMER QUANT  --   --   --   --   --   --  5.36*   HSTROP T  --    --   --   --   --  81* 86*         Lab 07/26/25  0406 07/25/25  0527 07/24/25  0358 07/23/25  0413 07/22/25  1842   SODIUM 126* 129* 128* 129* 130*   POTASSIUM 4.4 4.5 4.7 5.1 4.9   CHLORIDE 93* 96* 95* 96* 95*   CO2 23.0 22.0 22.0 20.8* 21.8*   ANION GAP 10.0 11.0 11.0 12.2 13.2   BUN 43.7* 43.9* 40.5* 33.3* 32.0*   CREATININE 1.81* 2.04* 2.17* 2.18* 2.02*   EGFR 34.9* 30.2* 28.0* 27.9* 30.6*   GLUCOSE 100* 117* 99 125* 126*   CALCIUM 7.9* 7.9* 8.0* 8.4 8.7   MAGNESIUM  --   --   --  2.4* 2.3   PHOSPHORUS  --   --   --  3.3 2.6   TSH  --   --   --   --  3.810         Lab 07/22/25 1842   TOTAL PROTEIN 6.1   ALBUMIN 3.4*   GLOBULIN 2.7   ALT (SGPT) 13   AST (SGOT) 39   BILIRUBIN 0.8   ALK PHOS 114   LIPASE 28         Lab 07/22/25 2037 07/22/25 1842   PROBNP  --  12,872.0*   HSTROP T 81* 86*             Lab 07/23/25 0413   ABO TYPING O   RH TYPING Positive   ANTIBODY SCREEN Negative         Lab 07/22/25 1919   PH, ARTERIAL 7.470*   PCO2, ARTERIAL 30.7*   PO2 ART 71.6*   FIO2 21   HCO3 ART 22.3   BASE EXCESS ART -0.9*   CARBOXYHEMOGLOBIN 1.6     Brief Urine Lab Results  (Last result in the past 365 days)        Color   Clarity   Blood   Leuk Est   Nitrite   Protein   CREAT   Urine HCG        07/22/25 2252 Dark Yellow   Turbid   Large (3+)   Large (3+)   Negative   100 mg/dL (2+)                   Microbiology Results Abnormal       Procedure Component Value - Date/Time    Blood Culture - Blood, Hand, Right [[id number]]  (Abnormal)  (Susceptibility) Collected: 07/22/25 1908    Lab Status: Final result Specimen: Blood from Hand, Right Updated: 07/25/25 0619     Blood Culture Escherichia coli     Isolated from Aerobic and Anaerobic Bottles     Gram Stain Aerobic Bottle Gram negative bacilli      Anaerobic Bottle Gram negative bacilli    Susceptibility        Escherichia coli      ALFREDITO      Amoxicillin + Clavulanate Susceptible      Ampicillin Resistant      Ampicillin + Sulbactam Resistant      Cefazolin (Non Urine)  Resistant      Cefepime Susceptible      Ceftazidime Susceptible      Ceftriaxone Susceptible      Cefuroxime axetil Susceptible      Ciprofloxacin Resistant      Gentamicin Susceptible      Levofloxacin Intermediate      Piperacillin + Tazobactam Susceptible      Trimethoprim + Sulfamethoxazole Susceptible                       Susceptibility Comments       Escherichia coli    With the exception of urinary-sourced infections, aminoglycosides should not be used as monotherapy.               Blood Culture ID, PCR - Blood, Hand, Right [[id number]]  (Abnormal) Collected: 07/22/25 1908    Lab Status: Final result Specimen: Blood from Hand, Right Updated: 07/23/25 1715     BCID, PCR Escherichia coli. Identification by BCID2 PCR.     BOTTLE TYPE Aerobic Bottle    Narrative:      No resistance genes detected.            No radiology results from the last 24 hrs          Current medications:  Scheduled Meds:allopurinol, 100 mg, Oral, Daily  atorvastatin, 20 mg, Oral, Daily  cefTRIAXone, 2,000 mg, Intravenous, Q24H  folic acid, 400 mcg, Oral, Daily  levothyroxine, 25 mcg, Oral, QAM  lidocaine PF 1%, 10 mL, Infiltration, Once  midodrine, 5 mg, Oral, TID AC  sodium chloride, 10 mL, Intravenous, Q12H  [Held by provider] spironolactone, 50 mg, Oral, Daily      Continuous Infusions:   PRN Meds:.  albuterol sulfate HFA    benzonatate    senna-docusate sodium **AND** polyethylene glycol **AND** bisacodyl **AND** bisacodyl    Calcium Replacement - Follow Nurse / BPA Driven Protocol    Magnesium Standard Dose Replacement - Follow Nurse / BPA Driven Protocol    nitroglycerin    Phosphorus Replacement - Follow Nurse / BPA Driven Protocol    Potassium Replacement - Follow Nurse / BPA Driven Protocol    sodium chloride    sodium chloride    sodium chloride    Assessment & Plan   Assessment & Plan     Active Hospital Problems    Diagnosis  POA    **Sepsis [A41.9]  Yes    Ventral hernia [K43.9]  Yes    Elevated troponin [R79.89]  Yes     Acute UTI (urinary tract infection) [N39.0]  Yes    Lactic acidosis [E87.20]  Yes    Hyponatremia [E87.1]  Yes    Elevated serum creatinine [R79.89]  Yes    Anemia, chronic disease [D63.8]  Yes    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        Brief Hospital Course to date:  Jordi Sam is a 89 y.o. male with a history of A-fib, CAD, cellulitis, CHF, NAFLD cirrhosis, lipidemia, hypertension, diabetes presenting with weakness     Sepsis - POA  UTI--recurrent in setting of urethral stricture  E coli bacteremia  Hematuria  -- ID following, plan to continue CTX through 7/28  -- E.coli likely secondary to urinary source in setting of urethral stricture, Urology following, recommends continuing abx for UTI, Hematuria likely from hemorrhagic cystitis   --recommends conservative management for stricture, holding eliquis also     Decompensated Cirrhosis  Ascites  TCP  - s/p para 7/23 w 9L removal    Acute on Chronic Hyponatremia:  - worsening in setting of decompensated cirrhosis  - baseline in low 130's, currently 126  - check urine studies  - lasix 40mg IV x1    Hypotension  --controlled on midodrine     Volume overload  MELVI on CKD  - improved, baseline appears to be around 1.4-1.6  - suspect hepatorenal syndrome possibly playing a role  - consider nephrology involvement     Ventral hernia    Chronic Anemia     A-fib  - Rate controlled, not currently on anticoagulation    GOC  --palliative met w family, addressed GOC. At this time interested in pursuing rehab options    Expected Discharge Location and Transportation: rehab  Expected Discharge   Expected Discharge Date:  7/28/2025; Expected Discharge Time:      VTE Prophylaxis:  Mechanical VTE prophylaxis orders are present.         AM-PAC 6 Clicks Score (PT): 16 (07/26/25 1000)    CODE STATUS:   Code Status and Medical Interventions: No CPR (Do Not Attempt to Resuscitate); Limited Support; No intubation (DNI), No cardioversion, No dialysis, No vasopressors, No artificial nutrition; No ICU level care   Ordered at: 07/25/25 1338     Code Status (Patient has no pulse and is not breathing):    No CPR (Do Not Attempt to Resuscitate)     Medical Interventions (Patient has pulse or is breathing):    Limited Support     Medical Intervention Limits:    No intubation (DNI)       No cardioversion       No dialysis       No vasopressors       No artificial nutrition     Comments:    No ICU level care     Level Of Support Discussed With:    Next of Kin (If No Surrogate)       Patient       Alva Lal,   07/26/25

## 2025-07-26 NOTE — PLAN OF CARE
Goal Outcome Evaluation:  Plan of Care Reviewed With: patient      Patient's wife at bedside at start of shift, providing oral care. Patient A-Fib on tele-monitor. Oxygenation 90% on RA. No unmet needs

## 2025-07-26 NOTE — CASE MANAGEMENT/SOCIAL WORK
Continued Stay Note  [facility]     Patient Name: Jordi Sam  MRN: [id number]  Today's Date: 7/26/2025    Admit Date: 7/22/2025    Plan: [facility]   Discharge Plan       Row Name 07/26/25 0846       Plan    Plan [facility]    Plan Comments Per Ludivina at [facility], Pt has insurance approval for SNF. Pt is not medically ready today as Na+ has dropped to 126. CM will continue to follow and assist with discharge planning as more information becomes available.    Final Discharge Disposition Code 03 - skilled nursing facility (SNF)                   Discharge Codes    No documentation.                 Expected Discharge Date and Time       Expected Discharge Date Expected Discharge Time    Jul 28, 2025               Jasmine Pinzon RN

## 2025-07-27 LAB
ANION GAP SERPL CALCULATED.3IONS-SCNC: 10 MMOL/L (ref 5–15)
BACTERIA SPEC AEROBE CULT: NORMAL
BUN SERPL-MCNC: 42.1 MG/DL (ref 8–23)
BUN/CREAT SERPL: 26.6 (ref 7–25)
CALCIUM SPEC-SCNC: 8.2 MG/DL (ref 8.2–9.6)
CHLORIDE SERPL-SCNC: 96 MMOL/L (ref 98–107)
CO2 SERPL-SCNC: 25 MMOL/L (ref 22–29)
CREAT SERPL-MCNC: 1.58 MG/DL (ref 0.76–1.27)
EGFRCR SERPLBLD CKD-EPI 2021: 41 ML/MIN/1.73
GLUCOSE SERPL-MCNC: 104 MG/DL (ref 65–99)
POTASSIUM SERPL-SCNC: 4.5 MMOL/L (ref 3.5–5.2)
SODIUM SERPL-SCNC: 130 MMOL/L (ref 136–145)
SODIUM SERPL-SCNC: 131 MMOL/L (ref 136–145)
SODIUM SERPL-SCNC: 131 MMOL/L (ref 136–145)
SODIUM SERPL-SCNC: 135 MMOL/L (ref 136–145)

## 2025-07-27 PROCEDURE — 80048 BASIC METABOLIC PNL TOTAL CA: CPT | Performed by: INTERNAL MEDICINE

## 2025-07-27 PROCEDURE — 84295 ASSAY OF SERUM SODIUM: CPT | Performed by: INTERNAL MEDICINE

## 2025-07-27 PROCEDURE — 25010000002 CEFTRIAXONE PER 250 MG: Performed by: STUDENT IN AN ORGANIZED HEALTH CARE EDUCATION/TRAINING PROGRAM

## 2025-07-27 PROCEDURE — 99232 SBSQ HOSP IP/OBS MODERATE 35: CPT | Performed by: INTERNAL MEDICINE

## 2025-07-27 PROCEDURE — 92526 ORAL FUNCTION THERAPY: CPT

## 2025-07-27 RX ORDER — SPIRONOLACTONE 25 MG/1
25 TABLET ORAL DAILY
Status: DISCONTINUED | OUTPATIENT
Start: 2025-07-28 | End: 2025-07-28 | Stop reason: HOSPADM

## 2025-07-27 RX ORDER — FUROSEMIDE 40 MG/1
40 TABLET ORAL DAILY
Status: DISCONTINUED | OUTPATIENT
Start: 2025-07-27 | End: 2025-07-28 | Stop reason: HOSPADM

## 2025-07-27 RX ADMIN — Medication 10 ML: at 08:14

## 2025-07-27 RX ADMIN — FOLIC ACID TAB 400 MCG 400 MCG: 400 TAB at 08:13

## 2025-07-27 RX ADMIN — MIDODRINE HYDROCHLORIDE 5 MG: 5 TABLET ORAL at 08:13

## 2025-07-27 RX ADMIN — MIDODRINE HYDROCHLORIDE 5 MG: 5 TABLET ORAL at 18:01

## 2025-07-27 RX ADMIN — ATORVASTATIN CALCIUM 20 MG: 20 TABLET, FILM COATED ORAL at 08:13

## 2025-07-27 RX ADMIN — Medication 10 ML: at 20:02

## 2025-07-27 RX ADMIN — ALLOPURINOL 100 MG: 100 TABLET ORAL at 08:13

## 2025-07-27 RX ADMIN — LEVOTHYROXINE SODIUM 25 MCG: 0.03 TABLET ORAL at 08:13

## 2025-07-27 RX ADMIN — CEFTRIAXONE 2000 MG: 2 INJECTION, POWDER, FOR SOLUTION INTRAMUSCULAR; INTRAVENOUS at 17:54

## 2025-07-27 RX ADMIN — FUROSEMIDE 40 MG: 40 TABLET ORAL at 08:13

## 2025-07-27 RX ADMIN — MIDODRINE HYDROCHLORIDE 5 MG: 5 TABLET ORAL at 11:46

## 2025-07-27 NOTE — PLAN OF CARE
Problem: Adult Inpatient Plan of Care  Goal: Plan of Care Review  Outcome: Progressing  Flowsheets (Taken 7/27/2025 6070)  Progress: improving  Plan of Care Reviewed With:   patient   family   Goal Outcome Evaluation:  Plan of Care Reviewed With: patient, family        Progress: improving                     Treatment Assessment (SLP): continued, toleration of diet, no clinical signs of, aspiration (07/27/25 1400)     Plan for Continued Treatment (SLP): treatment no longer indicated as all goals met (07/27/25 1400)  SLP treatment completed. Will sign-off dysphagia. Please see note for further details and recommendations.

## 2025-07-27 NOTE — PLAN OF CARE
Goal Outcome Evaluation:      Pt denies pain, nausea, vomiting. On RA with oxygen saturation >92%. Tolerating PO intake well. Up in chair. Denies any needs at this time. Family at the bedside.

## 2025-07-27 NOTE — PROGRESS NOTES
"Urology    Patient Name: Jordi Sam  Medical Record Number: [id number]  YOB: 1934     LOS: 4 days   Patient Care Team:  Jasper Avery MD as PCP - General (Internal Medicine)  Jordi Law MD as Consulting Physician (Urology)  Chapin Villagran MD as Consulting Physician (Otolaryngology)  Gen Laurent MD as Consulting Physician (Gastroenterology)  Ezekiel Abarca MD as Referring Physician (Dermatology)  Renny Faustin MD as Consulting Physician (Radiation Oncology)  Larry Sin MD as Consulting Physician (Cardiology)  Lv Van MD as Consulting Physician (Ophthalmology)    Chief Complaint:    Chief Complaint   Patient presents with    Weakness - Generalized       Subjective     Interval History:     He feels better today.  He started Lasix 40 mg yesterday.  He had 2200 mL recorded urine output.  He is voiding into a male PureWick.  The urine has been more clear.  He denies difficulty urinating.  His wife and other family members are at the bedside.    Review of Systems:    The following systems were reviewed and negative;  respiratory and cardiovascular    Objective     Vital Signs  /57 (BP Location: Right arm, Patient Position: Lying)   Pulse 73   Temp 97.5 °F (36.4 °C) (Oral)   Resp 18   Ht 180.3 cm (71\")   Wt 99.8 kg (220 lb)   SpO2 94%   BMI 30.68 kg/m²   I/O last 3 completed shifts:  In: 965 [P.O.:965]  Out: 2250 [Urine:2250]  I/O this shift:  In: 480 [P.O.:480]  Out: -       Physical Exam:  General Appearance: No acute distress, sitting up in a chair, pleasant, appears comfortable  Lungs: Respirations regular, even and  unlabored  Genital: Urine clear yellow in the canister     Results Review:     I reviewed the patient's new clinical results.  I reviewed the patient's new imaging results and agree with the interpretation.  Lab Results (last 24 hours)       Procedure Component Value Units Date/Time    Sodium [[id number]] Collected: " 07/27/25 1408    Specimen: Blood Updated: 07/27/25 1434    Basic Metabolic Panel [[id number]]  (Abnormal) Collected: 07/27/25 0632    Specimen: Blood Updated: 07/27/25 0808     Glucose 104 mg/dL      BUN 42.1 mg/dL      Creatinine 1.58 mg/dL      Sodium 131 mmol/L      Potassium 4.5 mmol/L      Chloride 96 mmol/L      CO2 25.0 mmol/L      Calcium 8.2 mg/dL      BUN/Creatinine Ratio 26.6     Anion Gap 10.0 mmol/L      eGFR 41.0 mL/min/1.73     Narrative:      GFR Categories in Chronic Kidney Disease (CKD)              GFR Category          GFR (mL/min/1.73)    Interpretation  G1                    90 or greater        Normal or high (1)  G2                    60-89                Mild decrease (1)  G3a                   45-59                Mild to moderate decrease  G3b                   30-44                Moderate to severe decrease  G4                    15-29                Severe decrease  G5                    14 or less           Kidney failure    (1)In the absence of evidence of kidney disease, neither GFR category G1 or G2 fulfill the criteria for CKD.    eGFR calculation 2021 CKD-EPI creatinine equation, which does not include race as a factor    Sodium [[id number]]  (Abnormal) Collected: 07/27/25 0632    Specimen: Blood Updated: 07/27/25 0740     Sodium 131 mmol/L     Sodium [[id number]]  (Abnormal) Collected: 07/27/25 0014    Specimen: Blood Updated: 07/27/25 0111     Sodium 130 mmol/L     Blood Culture - Blood, Arm, Left [[id number]]  (Normal) Collected: 07/22/25 1908    Specimen: Blood from Arm, Left Updated: 07/26/25 2046     Blood Culture No growth at 4 days    Sodium [[id number]]  (Abnormal) Collected: 07/26/25 1739    Specimen: Blood Updated: 07/26/25 1838     Sodium 131 mmol/L     Osmolality, Urine - Urine, Clean Catch [[id number]]  (Abnormal) Collected: 07/26/25 1533    Specimen: Urine, Clean Catch Updated: 07/26/25 1551     Osmolality, Urine 255 mOsm/kg     Osmolality, Serum [[id number]]  (Normal)  Collected: 07/26/25 1446    Specimen: Blood Updated: 07/26/25 1551     Osmolality 285 mOsm/kg     Sodium, Urine, Random - Urine, Clean Catch [[id number]] Collected: 07/26/25 1533    Specimen: Urine, Clean Catch Updated: 07/26/25 1543     Sodium, Urine 53 mmol/L     Narrative:      Reference intervals for random urine have not been established.  Clinical usage is dependent upon physician's interpretation in combination with other laboratory tests.       Sodium [[id number]]  (Abnormal) Collected: 07/26/25 1446    Specimen: Blood Updated: 07/26/25 1519     Sodium 130 mmol/L             Medication Review:    Current Facility-Administered Medications:     albuterol sulfate HFA (PROVENTIL HFA;VENTOLIN HFA;PROAIR HFA) inhaler 2 puff, 2 puff, Inhalation, Q4H PRN, Juanita, Candis CURRY MD    allopurinol (ZYLOPRIM) tablet 100 mg, 100 mg, Oral, Daily, Day, Candis CURRY MD, 100 mg at 07/27/25 0813    atorvastatin (LIPITOR) tablet 20 mg, 20 mg, Oral, Daily, Juanita, Candis CURRY MD, 20 mg at 07/27/25 0813    benzonatate (TESSALON) capsule 100 mg, 100 mg, Oral, TID PRN, Candis Grant MD, 100 mg at 07/23/25 0311    sennosides-docusate (PERICOLACE) 8.6-50 MG per tablet 2 tablet, 2 tablet, Oral, BID PRN **AND** polyethylene glycol (MIRALAX) packet 17 g, 17 g, Oral, Daily PRN **AND** bisacodyl (DULCOLAX) EC tablet 5 mg, 5 mg, Oral, Daily PRN **AND** bisacodyl (DULCOLAX) suppository 10 mg, 10 mg, Rectal, Daily PRN, Candis Grant MD    Calcium Replacement - Follow Nurse / BPA Driven Protocol, , Not Applicable, PRN, Juanita, Candis CURRY MD    cefTRIAXone (ROCEPHIN) 2,000 mg in sodium chloride 0.9 % 100 mL MBP, 2,000 mg, Intravenous, Q24H, Josette Clifford MD, Last Rate: 200 mL/hr at 07/26/25 1819, 2,000 mg at 07/26/25 1819    folic acid (FOLVITE) tablet 400 mcg, 400 mcg, Oral, Daily, Day, Candis CURRY MD, 400 mcg at 07/27/25 0813    furosemide (LASIX) tablet 40 mg, 40 mg, Oral, Daily, Alva Lal DO, 40 mg at 07/27/25 0813    levothyroxine (SYNTHROID,  LEVOTHROID) tablet 25 mcg, 25 mcg, Oral, QAM, Candis Grant MD, 25 mcg at 07/27/25 0813    lidocaine PF 1% (XYLOCAINE) injection 10 mL, 10 mL, Infiltration, Once, Bj Oviedo, PharmD    Magnesium Standard Dose Replacement - Follow Nurse / BPA Driven Protocol, , Not Applicable, PRN, Candis Grant MD    midodrine (PROAMATINE) tablet 5 mg, 5 mg, Oral, TID AC, Candis Grant MD, 5 mg at 07/27/25 1146    nitroglycerin (NITROSTAT) SL tablet 0.4 mg, 0.4 mg, Sublingual, Q5 Min PRN, Candis Grant MD    Phosphorus Replacement - Follow Nurse / BPA Driven Protocol, , Not Applicable, PRN, Candis Grant MD    Potassium Replacement - Follow Nurse / BPA Driven Protocol, , Not Applicable, PRN, Candis Grant MD    sodium chloride 0.9 % flush 10 mL, 10 mL, Intravenous, PRN, ApurvaMiguel rivera MD    sodium chloride 0.9 % flush 10 mL, 10 mL, Intravenous, Q12H, Candis Grant MD, 10 mL at 07/27/25 0814    sodium chloride 0.9 % flush 10 mL, 10 mL, Intravenous, PRN, Candis Grant MD    sodium chloride 0.9 % infusion 40 mL, 40 mL, Intravenous, PRN, Candis Grant MD    [START ON 7/28/2025] spironolactone (ALDACTONE) tablet 25 mg, 25 mg, Oral, Daily, Alva Lal, DO    Assessment and Plan   89-year-old white male with a history of BPH, recurrent UTIs,gross hematuria,urethral stricture, diabetes, A-fib on Eliquis, CAD, CHF, HTN, HLD, cirrhosis, ascites, ventral hernia, and cellulitis.  He gets monthly paracentesis.  He had a couple episodes of gross hematuria and underwent local cystoscopy at the [facility] 5/2025 and was found to have a 5 Italian stricture in the pendulous or bulbous urethra.  We planned cystoscopy under anesthesia with possible urethral dilation, direct vision internal urethrotomy, and bladder biopsy but the patient and his wife opted to defer that due to other medical issues.     He was admitted 7/22/2025 for sepsis due to UTI and MELVI.  A urine culture 7/17/2025 and blood cultures 7/22/2025 showed  E. coli.  He received Zosyn and vancomycin and switched to Rocephin.  The Eliquis is on hold.  A CT scan with contrast showed cirrhosis with large ascites, large ventral hernia, small bilateral inguinal hernias, and thickened bladder at the dome.  A CT angio of the chest showed no PE. He underwent paracentesis 7/23/2025 with 9 L fluid drained.      We discussed possible cystoscopy with urethral dilation, DVIU, and bladder biopsy which would require general or MAC anesthesia.  We agree on conservative management for now given his multiple comorbidities.  Of note, placement of a Devries catheter would be difficult or not possible given the stricture.  The recurrent UTIs are likely due to urethral stricture and the gross hematuria is likely due to hemorrhagic cystitis.     He started Lasix yesterday and is also on spironolactone.  He is voiding well into a male PureWick.  He has good urine output and the urine is yellow in the canister.  The creatinine is improving.        Plan: Continue antibiotic for UTI, currently Rocephin.  Continue male PureWick.  Conservative management of the urethral stricture for now.        Sepsis    Panlobular emphysema    Dyslipidemia    Chronic atrial fibrillation    Chronic right heart failure    Cirrhosis of liver with ascites    Ventral hernia    Elevated troponin    Acute UTI (urinary tract infection)    Lactic acidosis    Hyponatremia    Elevated serum creatinine    Anemia, chronic disease    Chronic bilateral pleural effusions    Pericardial effusion    Cellulitis          Plan for disposition:Where: TBD and When:  TBD    Bucky García MD  07/27/25  14:36 EDT

## 2025-07-27 NOTE — THERAPY DISCHARGE NOTE
Acute Care - Speech Language Pathology   Swallow Treatment Note/Discharge [facility]     Patient Name: Jordi Sam  : 1934  MRN: [id number]  Today's Date: 2025               Admit Date: 2025    Visit Dx:    ICD-10-CM ICD-9-CM   1. Sepsis with acute renal failure without septic shock, due to unspecified organism, unspecified acute renal failure type  A41.9 038.9    R65.20 995.92    N17.9 584.9   2. Acute UTI (urinary tract infection)  N39.0 599.0   3. Cellulitis of right lower extremity  L03.115 682.6   4. Stage 3b chronic kidney disease  N18.32 585.3   5. Chronic right heart failure  I50.812 428.0   6. Panlobular emphysema  J43.1 492.8   7. Cirrhosis of liver with ascites, unspecified hepatic cirrhosis type  K74.60 571.5    R18.8    8. Dysphagia, unspecified type  R13.10 787.20     Patient Active Problem List   Diagnosis    Coronary arteriosclerosis in native artery    Panlobular emphysema    Functional diarrhea    Essential hypertension    Chronic cough    Dyslipidemia    Nephrolithiasis    Chronic atrial fibrillation    Shortness of breath    Chronic right heart failure    Other ascites    Cirrhosis of liver with ascites    Stage 3 chronic kidney disease    End stage liver disease    S/P umbilical hernia repair, follow-up exam    Constipation    Right hip pain    Hearing loss of right ear due to cerumen impaction    Pre-diabetes    Umbilical hernia    Benign prostatic hyperplasia with nocturia    Chronic anticoagulation    Facial cellulitis    Right ankle pain    Medicare annual wellness visit, subsequent    Lower urinary tract symptoms due to benign prostatic hyperplasia    Basal cell carcinoma of right ala nasi    Acute pain of right shoulder    Laceration of head and neck    Wound infection    Oral pain    Squamous cell carcinoma of scalp    Incisional hernia, without obstruction or gangrene    Other specified hypothyroidism    Gross hematuria    Congenital hemorrhagic cyst of kidney     Sepsis    Ventral hernia    Elevated troponin    Acute UTI (urinary tract infection)    Lactic acidosis    Hyponatremia    Elevated serum creatinine    Anemia, chronic disease    Chronic bilateral pleural effusions    Pericardial effusion    Cellulitis     Past Medical History:   Diagnosis Date    A-fib     Acute inferior myocardial infarction     Acute inferior STEMI with RV infarct features, January 2009.     Arrhythmia     CAD (coronary artery disease)     Cellulitis 02/26/2023    Facial. Feb 26-March 1st admission at [facility]. 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 Seeing Dr Kiersten Nova and Dr Jasper Avery 8-2021     Past Surgical History:   Procedure Laterality Date    BRONCHOSCOPY N/A 4/12/2019    Procedure: BRONCHOSCOPY WITH THORACENTESIS;  Surgeon: Marciano Higginbotham MD;  Location:  [facility];  Service: Pulmonary    CARDIAC CATHETERIZATION Bilateral 1/30/2019    Procedure: Right and Left Heart Cath;  Surgeon: Efrem Posadas MD;  Location:  [facility];  Service: Cardiology    CARDIAC CATHETERIZATION      CHOLECYSTECTOMY      CORONARY ANGIOPLASTY WITH STENT PLACEMENT  01/09/2009    Sirolimus eluting stents to the RCA, 01/09/2009.     HERNIA REPAIR      Hernia repair x 2.     PARACENTESIS  06/17/2019    multiple    UMBILICAL HERNIA REPAIR N/A 7/26/2020    Procedure: UMBILICAL HERNIA REPAIR;  Surgeon: Maribell Her MD;   Location: [facility] OR;  Service: General;  Laterality: N/A;       SLP Recommendation and Plan                                   Daily Summary of Progress (SLP): progress toward functional goals is good (07/27/25 1400)                       Treatment Assessment (SLP): continued, toleration of diet, no clinical signs of, aspiration (07/27/25 1400)     Plan for Continued Treatment (SLP): treatment no longer indicated as all goals met (07/27/25 1400)    Progress: improving (07/27/25 1420)    SWALLOW EVALUATION (Last 72 Hours)       SLP Adult Swallow Evaluation       Row Name 07/27/25 1400                   Rehab Evaluation    Document Type discharge treatment  -AW        Subjective Information no complaints  -AW        Patient Observations alert;cooperative  -AW        Patient/Family/Caregiver Comments/Observations multiple family present  -AW        Patient Effort good  -AW        Symptoms Noted During/After Treatment none  -AW           General Information    Patient Profile Reviewed yes  -AW           Pain    Pretreatment Pain Rating 0/10 - no pain  -AW        Posttreatment Pain Rating 0/10 - no pain  -AW           SLP Treatment Clinical Impressions    Treatment Assessment (SLP) continued;toleration of diet;no clinical signs of;aspiration  -AW        Daily Summary of Progress (SLP) progress toward functional goals is good  -AW        Plan for Continued Treatment (SLP) treatment no longer indicated as all goals met  -AW        Care Plan Review care plan/treatment goals reviewed  -AW           Swallow Goals (SLP)    Swallow LTGs Patient will demonstrate functional swallow for  -AW        Swallow STGs diet tolerance goal selection (SLP)  -AW        Diet Tolerance Goal Selection (SLP) Patient will tolerate trials of  -AW           (LTG) Patient will demonstrate functional swallow for    Diet Texture (Demonstrate functional swallow) regular textures  -AW        Liquid viscosity (Demonstrate functional swallow) thin liquids   -AW        [place] (Demonstrate functional swallow) independently (over 90% accuracy)  -AW        Time Frame (Demonstrate functional swallow) 1 week  -AW        Progress/Outcomes (Demonstrate functional swallow) goal met  -AW           (STG) Patient will tolerate trials of    Consistencies Trialed (Tolerate trials) regular textures;thin liquids  -AW        Desired Outcome (Tolerate trials) without signs/symptoms of aspiration;without signs of distress  -AW        [place] (Tolerate trials) independently (over 90% accuracy)  -AW        Time Frame (Tolerate trials) 1 week  -AW        Progress/Outcomes (Tolerate trials) goal met  -AW        Comment (Tolerate trials) pt ate full lunch tray without issue per family, observed pt with soft drink - no s/s dysphagia  -AW                  User Key  (r) = Recorded By, (t) = Taken By, (c) = Cosigned By      Initials Name Effective Dates    AW Jeanne Salinas, MS CCC-SLP 02/03/23 -                     EDUCATION  The patient has been educated in the following areas:   Dysphagia (Swallowing Impairment).         SLP GOALS       Row Name 07/27/25 1400             (LTG) Patient will demonstrate functional swallow for    Diet Texture (Demonstrate functional swallow) regular textures  -AW      Liquid viscosity (Demonstrate functional swallow) thin liquids  -AW      [place] (Demonstrate functional swallow) independently (over 90% accuracy)  -AW      Time Frame (Demonstrate functional swallow) 1 week  -AW      Progress/Outcomes (Demonstrate functional swallow) goal met  -AW         (STG) Patient will tolerate trials of    Consistencies Trialed (Tolerate trials) regular textures;thin liquids  -AW      Desired Outcome (Tolerate trials) without signs/symptoms of aspiration;without signs of distress  -AW      [place] (Tolerate trials) independently (over 90% accuracy)  -AW      Time Frame (Tolerate trials) 1 week  -AW      Progress/Outcomes (Tolerate trials) goal met  -AW       Comment (Tolerate trials) pt ate full lunch tray without issue per family, observed pt with soft drink - no s/s dysphagia  -AW                User Key  (r) = Recorded By, (t) = Taken By, (c) = Cosigned By      Initials Name Provider Type    Jeanne Walters MS CCC-SLP Speech and Language Pathologist                           Time Calculation:    Time Calculation- SLP       Row Name 07/27/25 1421             Time Calculation- SLP    SLP Start Time 1330  -AW      SLP Stop Time 1355  -AW      SLP Time Calculation (min) 25 min  -AW      SLP Received On 07/27/25  -                User Key  (r) = Recorded By, (t) = Taken By, (c) = Cosigned By      Initials Name Provider Type    Jeanne Walters MS CCC-SLP Speech and Language Pathologist                    Therapy Charges for Today       Code Description Service Date Service Provider Modifiers Qty    [id number]  ST TREATMENT SWALLOW 2 7/27/2025 Jeanne Salinas MS CCC-SLP GN 1                      Jeanne Salinas MS CCC-JENELLE  7/27/2025

## 2025-07-27 NOTE — PROGRESS NOTES
"    [facility] Medicine Services  PROGRESS NOTE    Patient Name: Jordi Sam  : 1934  MRN: [id number]    Date of Admission: 2025  Primary Care Physician: Jasper Avery MD    Subjective   Subjective     CC:  F/u weakness    HPI:  Patient sitting up in bed. Says he is feeling \"remarkably better\" today.       Objective   Objective     Vital Signs:   Temp:  [97.5 °F (36.4 °C)-97.9 °F (36.6 °C)] 97.6 °F (36.4 °C)  Heart Rate:  [70-77] 70  Resp:  [18] 18  BP: (108-121)/(56-77) 121/62     Physical Exam:  Constitutional: No acute distress, awake, alert, chronically ill appearing male  HENT: NCAT, mucous membranes moist  Respiratory: Clear to auscultation bilaterally, respiratory effort normal   Cardiovascular: RRR, no murmurs, rubs, or gallops  Gastrointestinal: soft, nontender, + ventral hernia, some distention present  Musculoskeletal: 1+ bilateral ankle edema which improved overnight.  Psychiatric: Appropriate affect, cooperative  Neurologic: Oriented x 3, speech clear, no focal deficits  Skin: venous stasis changes on LE        Results Reviewed:  LAB RESULTS:      Lab 25  0406 25  0527 25  0358 25  0413 25  2330 25  2037 25  1842   WBC 6.21 6.26 6.14 5.98  --   --  6.98   HEMOGLOBIN 8.5* 8.9* 8.9* 8.9*  --   --  9.7*   HEMATOCRIT 27.9* 28.0* 28.1* 27.7*  --   --  29.7*   PLATELETS 121* 135* 130* 130*  --   --  148   NEUTROS ABS 5.00 5.12 5.10 5.21  --   --  6.23   IMMATURE GRANS (ABS) 0.04 0.02 0.04 0.04  --   --  0.04   LYMPHS ABS 0.44* 0.34* 0.33* 0.17*  --   --  0.21*   MONOS ABS 0.61 0.70 0.62 0.53  --   --  0.48   EOS ABS 0.09 0.05 0.03 0.01  --   --  0.00   MCV 93.9 93.0 93.4 93.0  --   --  92.2   CRP  --   --   --   --   --   --  10.10*   PROCALCITONIN  --   --   --   --   --   --  0.49*   LACTATE  --   --   --   --  1.4  --  2.4*   D DIMER QUANT  --   --   --   --   --   --  5.36*   HSTROP T  --   --   --   --   --  81* 86*      "    Lab 07/27/25  0632 07/27/25  0014 07/26/25  1739 07/26/25  1446 07/26/25  0406 07/25/25  0527 07/24/25  0358 07/23/25  0413 07/22/25  1842   SODIUM 131*  131* 130* 131* 130* 126* 129* 128* 129* 130*   POTASSIUM 4.5  --   --   --  4.4 4.5 4.7 5.1 4.9   CHLORIDE 96*  --   --   --  93* 96* 95* 96* 95*   CO2 25.0  --   --   --  23.0 22.0 22.0 20.8* 21.8*   ANION GAP 10.0  --   --   --  10.0 11.0 11.0 12.2 13.2   BUN 42.1*  --   --   --  43.7* 43.9* 40.5* 33.3* 32.0*   CREATININE 1.58*  --   --   --  1.81* 2.04* 2.17* 2.18* 2.02*   EGFR 41.0*  --   --   --  34.9* 30.2* 28.0* 27.9* 30.6*   GLUCOSE 104*  --   --   --  100* 117* 99 125* 126*   CALCIUM 8.2  --   --   --  7.9* 7.9* 8.0* 8.4 8.7   MAGNESIUM  --   --   --   --   --   --   --  2.4* 2.3   PHOSPHORUS  --   --   --   --   --   --   --  3.3 2.6   TSH  --   --   --   --   --   --   --   --  3.810         Lab 07/22/25 1842   TOTAL PROTEIN 6.1   ALBUMIN 3.4*   GLOBULIN 2.7   ALT (SGPT) 13   AST (SGOT) 39   BILIRUBIN 0.8   ALK PHOS 114   LIPASE 28         Lab 07/22/25 2037 07/22/25  1842   PROBNP  --  12,872.0*   HSTROP T 81* 86*             Lab 07/23/25 0413   ABO TYPING O   RH TYPING Positive   ANTIBODY SCREEN Negative         Lab 07/22/25 1919   PH, ARTERIAL 7.470*   PCO2, ARTERIAL 30.7*   PO2 ART 71.6*   FIO2 21   HCO3 ART 22.3   BASE EXCESS ART -0.9*   CARBOXYHEMOGLOBIN 1.6     Brief Urine Lab Results  (Last result in the past 365 days)        Color   Clarity   Blood   Leuk Est   Nitrite   Protein   CREAT   Urine HCG        07/22/25 8440 Dark Yellow   Turbid   Large (3+)   Large (3+)   Negative   100 mg/dL (2+)                   Microbiology Results Abnormal       Procedure Component Value - Date/Time    Blood Culture - Blood, Hand, Right [[id number]]  (Abnormal)  (Susceptibility) Collected: 07/22/25 1908    Lab Status: Final result Specimen: Blood from Hand, Right Updated: 07/25/25 0619     Blood Culture Escherichia coli     Isolated from Aerobic and  Anaerobic Bottles     Gram Stain Aerobic Bottle Gram negative bacilli      Anaerobic Bottle Gram negative bacilli    Susceptibility        Escherichia coli      ALFREDITO      Amoxicillin + Clavulanate Susceptible      Ampicillin Resistant      Ampicillin + Sulbactam Resistant      Cefazolin (Non Urine) Resistant      Cefepime Susceptible      Ceftazidime Susceptible      Ceftriaxone Susceptible      Cefuroxime axetil Susceptible      Ciprofloxacin Resistant      Gentamicin Susceptible      Levofloxacin Intermediate      Piperacillin + Tazobactam Susceptible      Trimethoprim + Sulfamethoxazole Susceptible                       Susceptibility Comments       Escherichia coli    With the exception of urinary-sourced infections, aminoglycosides should not be used as monotherapy.               Blood Culture ID, PCR - Blood, Hand, Right [[id number]]  (Abnormal) Collected: 07/22/25 1908    Lab Status: Final result Specimen: Blood from Hand, Right Updated: 07/23/25 1715     BCID, PCR Escherichia coli. Identification by BCID2 PCR.     BOTTLE TYPE Aerobic Bottle    Narrative:      No resistance genes detected.            No radiology results from the last 24 hrs          Current medications:  Scheduled Meds:allopurinol, 100 mg, Oral, Daily  atorvastatin, 20 mg, Oral, Daily  cefTRIAXone, 2,000 mg, Intravenous, Q24H  folic acid, 400 mcg, Oral, Daily  furosemide, 40 mg, Oral, Daily  levothyroxine, 25 mcg, Oral, QAM  lidocaine PF 1%, 10 mL, Infiltration, Once  midodrine, 5 mg, Oral, TID AC  sodium chloride, 10 mL, Intravenous, Q12H  [Held by provider] spironolactone, 50 mg, Oral, Daily      Continuous Infusions:   PRN Meds:.  albuterol sulfate HFA    benzonatate    senna-docusate sodium **AND** polyethylene glycol **AND** bisacodyl **AND** bisacodyl    Calcium Replacement - Follow Nurse / BPA Driven Protocol    Magnesium Standard Dose Replacement - Follow Nurse / BPA Driven Protocol    nitroglycerin    Phosphorus Replacement - Follow  Nurse / BPA Driven Protocol    Potassium Replacement - Follow Nurse / BPA Driven Protocol    sodium chloride    sodium chloride    sodium chloride    Assessment & Plan   Assessment & Plan     Active Hospital Problems    Diagnosis  POA    **Sepsis [A41.9]  Yes    Ventral hernia [K43.9]  Yes    Elevated troponin [R79.89]  Yes    Acute UTI (urinary tract infection) [N39.0]  Yes    Lactic acidosis [E87.20]  Yes    Hyponatremia [E87.1]  Yes    Elevated serum creatinine [R79.89]  Yes    Anemia, chronic disease [D63.8]  Yes    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        Brief Hospital Course to date:  Jordi Sam is a 89 y.o. male with a history of A-fib, CAD, cellulitis, CHF, NAFLD cirrhosis, lipidemia, hypertension, diabetes presenting with weakness     Sepsis - POA  UTI--recurrent in setting of urethral stricture  E coli bacteremia  Hematuria  -- ID following, plan to continue CTX through 7/28  -- E.coli likely secondary to urinary source in setting of urethral stricture, Urology following, recommends continuing abx for UTI, Hematuria likely from hemorrhagic cystitis   --recommends conservative management for stricture     Decompensated Cirrhosis  Ascites  TCP  - s/p para 7/23 w 9L removal    Acute on Chronic Hyponatremia:  - worsening in setting of decompensated cirrhosis  - baseline in low 130's  - improved s/p IV lasix, restart oral lasix daily and monitor Na    Hypotension  --controlled on midodrine     Volume overload  MELVI on CKD  - improved, baseline appears to be around 1.4-1.6  - suspect hepatorenal syndrome possibly playing a role  - consider nephrology  involvement if worsening, however currently remains stable     Ventral hernia    Chronic Anemia     A-fib  - Rate controlled, not currently on anticoagulation, says he stopped taking eliquis permanently     GOC  --palliative met w family, addressed GOC. At this time interested in pursuing rehab options    Expected Discharge Location and Transportation: to [facility] tomorrow  Expected Discharge   Expected Discharge Date: 7/28/2025; Expected Discharge Time:      VTE Prophylaxis:  Mechanical VTE prophylaxis orders are present.         AM-PAC 6 Clicks Score (PT): 16 (07/27/25 0806)    CODE STATUS:   Code Status and Medical Interventions: No CPR (Do Not Attempt to Resuscitate); Limited Support; No intubation (DNI), No cardioversion, No dialysis, No vasopressors, No artificial nutrition; No ICU level care   Ordered at: 07/25/25 8198     Code Status (Patient has no pulse and is not breathing):    No CPR (Do Not Attempt to Resuscitate)     Medical Interventions (Patient has pulse or is breathing):    Limited Support     Medical Intervention Limits:    No intubation (DNI)       No cardioversion       No dialysis       No vasopressors       No artificial nutrition     Comments:    No ICU level care     Level Of Support Discussed With:    Next of Kin (If No Surrogate)       Patient       Alva Lal, DO  07/27/25

## 2025-07-28 VITALS
HEART RATE: 69 BPM | DIASTOLIC BLOOD PRESSURE: 68 MMHG | BODY MASS INDEX: 30.8 KG/M2 | RESPIRATION RATE: 20 BRPM | SYSTOLIC BLOOD PRESSURE: 119 MMHG | HEIGHT: 71 IN | WEIGHT: 220 LBS | OXYGEN SATURATION: 99 % | TEMPERATURE: 97.5 F

## 2025-07-28 LAB
ANION GAP SERPL CALCULATED.3IONS-SCNC: 10 MMOL/L (ref 5–15)
BACTERIA SPEC ANAEROBE CULT: NORMAL
BUN SERPL-MCNC: 35.8 MG/DL (ref 8–23)
BUN/CREAT SERPL: 24 (ref 7–25)
CALCIUM SPEC-SCNC: 8.1 MG/DL (ref 8.2–9.6)
CHLORIDE SERPL-SCNC: 99 MMOL/L (ref 98–107)
CO2 SERPL-SCNC: 22 MMOL/L (ref 22–29)
CREAT SERPL-MCNC: 1.49 MG/DL (ref 0.76–1.27)
EGFRCR SERPLBLD CKD-EPI 2021: 44 ML/MIN/1.73
GLUCOSE SERPL-MCNC: 100 MG/DL (ref 65–99)
POTASSIUM SERPL-SCNC: 4.7 MMOL/L (ref 3.5–5.2)
SODIUM SERPL-SCNC: 131 MMOL/L (ref 136–145)

## 2025-07-28 PROCEDURE — 97535 SELF CARE MNGMENT TRAINING: CPT | Performed by: OCCUPATIONAL THERAPIST

## 2025-07-28 PROCEDURE — 80048 BASIC METABOLIC PNL TOTAL CA: CPT | Performed by: INTERNAL MEDICINE

## 2025-07-28 PROCEDURE — 99239 HOSP IP/OBS DSCHRG MGMT >30: CPT | Performed by: NURSE PRACTITIONER

## 2025-07-28 PROCEDURE — 25010000002 CEFTRIAXONE PER 250 MG: Performed by: NURSE PRACTITIONER

## 2025-07-28 PROCEDURE — 97110 THERAPEUTIC EXERCISES: CPT | Performed by: OCCUPATIONAL THERAPIST

## 2025-07-28 RX ORDER — MIDODRINE HYDROCHLORIDE 5 MG/1
5 TABLET ORAL
Start: 2025-07-28

## 2025-07-28 RX ORDER — FUROSEMIDE 40 MG/1
TABLET ORAL
Start: 2025-07-28

## 2025-07-28 RX ORDER — SPIRONOLACTONE 50 MG/1
25 TABLET, FILM COATED ORAL DAILY
Start: 2025-07-28

## 2025-07-28 RX ADMIN — MIDODRINE HYDROCHLORIDE 5 MG: 5 TABLET ORAL at 06:22

## 2025-07-28 RX ADMIN — Medication 10 ML: at 08:39

## 2025-07-28 RX ADMIN — LEVOTHYROXINE SODIUM 25 MCG: 0.03 TABLET ORAL at 06:21

## 2025-07-28 RX ADMIN — MIDODRINE HYDROCHLORIDE 5 MG: 5 TABLET ORAL at 11:59

## 2025-07-28 RX ADMIN — ATORVASTATIN CALCIUM 20 MG: 20 TABLET, FILM COATED ORAL at 08:37

## 2025-07-28 RX ADMIN — SPIRONOLACTONE 25 MG: 25 TABLET ORAL at 08:37

## 2025-07-28 RX ADMIN — FUROSEMIDE 40 MG: 40 TABLET ORAL at 08:37

## 2025-07-28 RX ADMIN — ALLOPURINOL 100 MG: 100 TABLET ORAL at 08:37

## 2025-07-28 RX ADMIN — FOLIC ACID TAB 400 MCG 400 MCG: 400 TAB at 08:37

## 2025-07-28 RX ADMIN — CEFTRIAXONE 2000 MG: 2 INJECTION, POWDER, FOR SOLUTION INTRAMUSCULAR; INTRAVENOUS at 11:58

## 2025-07-28 NOTE — PLAN OF CARE
Problem: Violence Risk or Actual  Goal: Anger and Impulse Control  Outcome: Adequate for Care Transition  Intervention: Minimize Safety Risk  Recent Flowsheet Documentation  Taken 7/28/2025 1158 by Valarie Gutierrez RN  Enhanced Safety Measures: chair alarm set  Taken 7/28/2025 1000 by Valarie Gutierrez RN  Enhanced Safety Measures: chair alarm set  Taken 7/28/2025 0835 by Valarie Gutierrez RN  Enhanced Safety Measures: bed alarm set     Problem: Adult Inpatient Plan of Care  Goal: Plan of Care Review  Outcome: Adequate for Care Transition  Goal: Patient-Specific Goal (Individualized)  Outcome: Adequate for Care Transition  Goal: Absence of Hospital-Acquired Illness or Injury  Outcome: Adequate for Care Transition  Intervention: Identify and Manage Fall Risk  Recent Flowsheet Documentation  Taken 7/28/2025 1158 by Valarie Gutierrez RN  Safety Promotion/Fall Prevention:   activity supervised   assistive device/personal items within reach   clutter free environment maintained   fall prevention program maintained   lighting adjusted   nonskid shoes/slippers when out of bed   safety round/check completed  Taken 7/28/2025 1000 by Valarie Gutierrez RN  Safety Promotion/Fall Prevention:   activity supervised   assistive device/personal items within reach   clutter free environment maintained   fall prevention program maintained   lighting adjusted   nonskid shoes/slippers when out of bed   safety round/check completed  Taken 7/28/2025 0835 by Valarie Gutierrez RN  Safety Promotion/Fall Prevention:   activity supervised   assistive device/personal items within reach   clutter free environment maintained   fall prevention program maintained   lighting adjusted   nonskid shoes/slippers when out of bed   safety round/check completed  Intervention: Prevent Skin Injury  Recent Flowsheet Documentation  Taken 7/28/2025 1158 by Valarie Gutierrez RN  Body Position: legs elevated  Skin Protection:   incontinence pads utilized   silicone foam dressing in  place   transparent dressing maintained  Taken 7/28/2025 1000 by Valarie Gutierrez RN  Body Position: legs elevated  Skin Protection:   incontinence pads utilized   silicone foam dressing in place   transparent dressing maintained  Taken 7/28/2025 0835 by Valarie Gutierrez RN  Body Position:   sitting up in bed   turned  Skin Protection:   incontinence pads utilized   silicone foam dressing in place   transparent dressing maintained  Intervention: Prevent Infection  Recent Flowsheet Documentation  Taken 7/28/2025 1158 by Valarie Gutierrez RN  Infection Prevention:   environmental surveillance performed   rest/sleep promoted   single patient room provided  Taken 7/28/2025 1000 by Valarie Gutierrez RN  Infection Prevention:   environmental surveillance performed   rest/sleep promoted   single patient room provided  Taken 7/28/2025 0835 by Valarie Gutierrez RN  Infection Prevention:   environmental surveillance performed   rest/sleep promoted   single patient room provided  Goal: Optimal Comfort and Wellbeing  Outcome: Adequate for Care Transition  Intervention: Provide Person-Centered Care  Recent Flowsheet Documentation  Taken 7/28/2025 1158 by Valarie Gutierrez RN  Trust Relationship/Rapport:   care explained   choices provided   questions answered   questions encouraged  Taken 7/28/2025 1000 by Valarie Gutierrez RN  Trust Relationship/Rapport:   care explained   choices provided   questions answered   questions encouraged  Taken 7/28/2025 0835 by Valarie Gutierrez RN  Trust Relationship/Rapport:   care explained   choices provided   questions answered   questions encouraged  Goal: Readiness for Transition of Care  Outcome: Adequate for Care Transition     Problem: Skin Injury Risk Increased  Goal: Skin Health and Integrity  Outcome: Adequate for Care Transition  Intervention: Optimize Skin Protection  Recent Flowsheet Documentation  Taken 7/28/2025 1158 by Valarie Gutierrez RN  Activity Management: up in chair  Pressure Reduction Techniques:    frequent weight shift encouraged   pressure points protected   weight shift assistance provided  Head of Bed ([facility]) Positioning: [facility] elevated  Pressure Reduction Devices:   pressure-redistributing mattress utilized   positioning supports utilized   chair cushion utilized  Skin Protection:   incontinence pads utilized   silicone foam dressing in place   transparent dressing maintained  Taken 7/28/2025 1000 by Valarie Gutierrez RN  Activity Management: up in chair  Pressure Reduction Techniques:   frequent weight shift encouraged   pressure points protected   weight shift assistance provided  Head of Bed ([facility]) Positioning: [facility] elevated  Pressure Reduction Devices:   pressure-redistributing mattress utilized   positioning supports utilized   chair cushion utilized  Skin Protection:   incontinence pads utilized   silicone foam dressing in place   transparent dressing maintained  Taken 7/28/2025 0835 by Valarie Gutierrez RN  Activity Management: activity encouraged  Pressure Reduction Techniques:   frequent weight shift encouraged   pressure points protected   weight shift assistance provided  Head of Bed ([facility]) Positioning: [facility] elevated  Pressure Reduction Devices:   pressure-redistributing mattress utilized   positioning supports utilized  Skin Protection:   incontinence pads utilized   silicone foam dressing in place   transparent dressing maintained     Problem: Sepsis/Septic Shock  Goal: Optimal Coping  Outcome: Adequate for Care Transition  Goal: Absence of Bleeding  Outcome: Adequate for Care Transition  Goal: Blood Glucose Level Within Target Range  Outcome: Adequate for Care Transition  Goal: Absence of Infection Signs and Symptoms  Outcome: Adequate for Care Transition  Intervention: Initiate Sepsis Management  Recent Flowsheet Documentation  Taken 7/28/2025 1158 by Valarie Gutierrez RN  Infection Prevention:   environmental surveillance performed   rest/sleep promoted   single patient room provided  Taken 7/28/2025 1000 by  Gutierrez, Nikul, RN  Infection Prevention:   environmental surveillance performed   rest/sleep promoted   single patient room provided  Taken 7/28/2025 0835 by Valarie Gutierrez RN  Infection Prevention:   environmental surveillance performed   rest/sleep promoted   single patient room provided  Intervention: Promote Recovery  Recent Flowsheet Documentation  Taken 7/28/2025 1158 by Valarie Gutierrez RN  Activity Management: up in chair  Taken 7/28/2025 1000 by Valarie Gutierrez RN  Activity Management: up in chair  Taken 7/28/2025 0835 by Valarie Gutierrez RN  Activity Management: activity encouraged  Goal: Optimal Nutrition Delivery  Outcome: Adequate for Care Transition     Problem: Fall Injury Risk  Goal: Absence of Fall and Fall-Related Injury  Outcome: Adequate for Care Transition  Intervention: Identify and Manage Contributors  Recent Flowsheet Documentation  Taken 7/28/2025 1158 by Valarie Gutierrez RN  Medication Review/Management: medications reviewed  Taken 7/28/2025 1000 by Valarie Gutierrez RN  Medication Review/Management: medications reviewed  Taken 7/28/2025 0835 by Valarie Gutierrez RN  Medication Review/Management: medications reviewed  Intervention: Promote Injury-Free Environment  Recent Flowsheet Documentation  Taken 7/28/2025 1158 by Valarie Gutierrez RN  Safety Promotion/Fall Prevention:   activity supervised   assistive device/personal items within reach   clutter free environment maintained   fall prevention program maintained   lighting adjusted   nonskid shoes/slippers when out of bed   safety round/check completed  Taken 7/28/2025 1000 by Valarie Gutierrez RN  Safety Promotion/Fall Prevention:   activity supervised   assistive device/personal items within reach   clutter free environment maintained   fall prevention program maintained   lighting adjusted   nonskid shoes/slippers when out of bed   safety round/check completed  Taken 7/28/2025 0835 by Valarie Gutierrez RN  Safety Promotion/Fall Prevention:   activity supervised    assistive device/personal items within reach   clutter free environment maintained   fall prevention program maintained   lighting adjusted   nonskid shoes/slippers when out of bed   safety round/check completed     Problem: Palliative Care  Goal: Enhanced Quality of Life  Outcome: Adequate for Care Transition   Goal Outcome Evaluation:

## 2025-07-28 NOTE — NURSING NOTE
AVS printed and reviewed with pt and spouse. Discharge education provided. Verbalizes understanding. Denies any questions. PIV removed. Tele monitor off. Paperwork sent with pt. Pt belongings packed and returned. Pt dressed in home clothes. Nurse report given over the phone to Monika at the receiving facility. Pt discharged to rehab facility with spouse.

## 2025-07-28 NOTE — DISCHARGE SUMMARY
[facility] Medicine Services  DISCHARGE SUMMARY    Patient Name: Jordi Sam  : 1934  MRN: [id number]    Date of Admission: 2025  6:35 PM  Date of Discharge:  25  Primary Care Physician: Jasper Avery MD    Consults       Date and Time Order Name Status Description    2025 10:43 AM Inpatient Palliative Care MD Consult Completed     2025  8:57 AM Inpatient Infectious Diseases Consult Completed     2025  1:57 PM Inpatient Urology Consult              Hospital Course     Presenting Problem: f/u weakness     Active Hospital Problems    Diagnosis  POA    **Sepsis [A41.9]  Yes    Ventral hernia [K43.9]  Yes    Elevated troponin [R79.89]  Yes    Acute UTI (urinary tract infection) [N39.0]  Yes    Lactic acidosis [E87.20]  Yes    Hyponatremia [E87.1]  Yes    Elevated serum creatinine [R79.89]  Yes    Anemia, chronic disease [D63.8]  Yes    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          Hospital Course:  Jordi Sam is a 89 y.o. male  with a history of A-fib, CAD, cellulitis, CHF, NAFLD cirrhosis, lipidemia, hypertension, diabetes presenting with weakness     Sepsis - POA  UTI--recurrent in setting of urethral stricture  E coli bacteremia  Hematuria  -- ID following, plan to continue CTX through   -- E.coli likely secondary to urinary source in setting of urethral stricture, Urology following, recommends continuing abx for UTI, Hematuria likely from hemorrhagic cystitis   --recommends conservative management for stricture     Decompensated Cirrhosis  Ascites  TCP  - s/p para  w 9L removal     Acute  on Chronic Hyponatremia:  - worsening in setting of decompensated cirrhosis  - baseline in low 130's  - improved s/p IV lasix, restart oral lasix daily and monitor Na     Hypotension  --controlled on midodrine     Volume overload  MELVI on CKD  - improved, baseline appears to be around 1.4-1.6  - suspect hepatorenal syndrome possibly playing a role  - consider nephrology involvement if worsening, however currently remains stable     Ventral hernia     Chronic Anemia     A-fib  - Rate controlled, not currently on anticoagulation, says he stopped taking eliquis permanently      GOC  --palliative met w family, addressed GOC. At this time interested in pursuing rehab options     Patient has remained clinically stable and will be discharged to rehab today.     Discharge Follow Up Recommendations for outpatient labs/diagnostics:   Follow up with pcp after dc from rehab   Follow up with urology Dr García after dc from rehab     Day of Discharge     HPI:   Patient is sitting up in bed in NAD. He feels good. Tolerating diet. Plan for rehab today.     Review of Systems  Gen- No fevers, chills  CV- No chest pain, palpitations  Resp- No cough, dyspnea  GI- No N/V/D, abd pain      Vital Signs:   Temp:  [96.7 °F (35.9 °C)-98.2 °F (36.8 °C)] 97.5 °F (36.4 °C)  Heart Rate:  [67-85] 69  Resp:  [16-20] 20  BP: (115-122)/(59-89) 119/68      Physical Exam:  Constitutional: No acute distress, awake, alert  HENT: NCAT, mucous membranes moist  Respiratory: Clear to auscultation bilaterally, respiratory effort normal   Cardiovascular: RRR, no murmurs, rubs, or gallops  Gastrointestinal: Positive bowel sounds, soft, nontender, nondistended  Musculoskeletal: No bilateral ankle edema  Psychiatric: Appropriate affect, cooperative  Neurologic: Oriented x 3, strength symmetric in all extremities, Cranial Nerves grossly intact to confrontation, speech clear  Skin: No rashes      Pertinent  and/or Most Recent Results     LAB RESULTS:      Lab  07/26/25  0406 07/25/25  0527 07/24/25  0358 07/23/25  0413 07/22/25  2330 07/22/25  1842   WBC 6.21 6.26 6.14 5.98  --  6.98   HEMOGLOBIN 8.5* 8.9* 8.9* 8.9*  --  9.7*   HEMATOCRIT 27.9* 28.0* 28.1* 27.7*  --  29.7*   PLATELETS 121* 135* 130* 130*  --  148   NEUTROS ABS 5.00 5.12 5.10 5.21  --  6.23   IMMATURE GRANS (ABS) 0.04 0.02 0.04 0.04  --  0.04   LYMPHS ABS 0.44* 0.34* 0.33* 0.17*  --  0.21*   MONOS ABS 0.61 0.70 0.62 0.53  --  0.48   EOS ABS 0.09 0.05 0.03 0.01  --  0.00   MCV 93.9 93.0 93.4 93.0  --  92.2   CRP  --   --   --   --   --  10.10*   PROCALCITONIN  --   --   --   --   --  0.49*   LACTATE  --   --   --   --  1.4 2.4*   D DIMER QUANT  --   --   --   --   --  5.36*         Lab 07/28/25  0400 07/27/25  1408 07/27/25  0632 07/27/25  0014 07/26/25  1739 07/26/25  1446 07/26/25  0406 07/25/25  0527 07/24/25  0358 07/23/25  0413 07/22/25  1842   SODIUM 131* 135* 131*  131* 130* 131*   < > 126* 129* 128* 129* 130*   POTASSIUM 4.7  --  4.5  --   --   --  4.4 4.5 4.7 5.1 4.9   CHLORIDE 99  --  96*  --   --   --  93* 96* 95* 96* 95*   CO2 22.0  --  25.0  --   --   --  23.0 22.0 22.0 20.8* 21.8*   ANION GAP 10.0  --  10.0  --   --   --  10.0 11.0 11.0 12.2 13.2   BUN 35.8*  --  42.1*  --   --   --  43.7* 43.9* 40.5* 33.3* 32.0*   CREATININE 1.49*  --  1.58*  --   --   --  1.81* 2.04* 2.17* 2.18* 2.02*   EGFR 44.0*  --  41.0*  --   --   --  34.9* 30.2* 28.0* 27.9* 30.6*   GLUCOSE 100*  --  104*  --   --   --  100* 117* 99 125* 126*   CALCIUM 8.1*  --  8.2  --   --   --  7.9* 7.9* 8.0* 8.4 8.7   MAGNESIUM  --   --   --   --   --   --   --   --   --  2.4* 2.3   PHOSPHORUS  --   --   --   --   --   --   --   --   --  3.3 2.6   TSH  --   --   --   --   --   --   --   --   --   --  3.810    < > = values in this interval not displayed.         Lab 07/22/25  1842   TOTAL PROTEIN 6.1   ALBUMIN 3.4*   GLOBULIN 2.7   ALT (SGPT) 13   AST (SGOT) 39   BILIRUBIN 0.8   ALK PHOS 114   LIPASE 28         Lab 07/22/25 2037  07/22/25  1842   PROBNP  --  12,872.0*   HSTROP T 81* 86*             Lab 07/23/25  0413   ABO TYPING O   RH TYPING Positive   ANTIBODY SCREEN Negative         Lab 07/22/25  1919   PH, ARTERIAL 7.470*   PCO2, ARTERIAL 30.7*   PO2 ART 71.6*   FIO2 21   HCO3 ART 22.3   BASE EXCESS ART -0.9*   CARBOXYHEMOGLOBIN 1.6     Brief Urine Lab Results  (Last result in the past 365 days)        Color   Clarity   Blood   Leuk Est   Nitrite   Protein   CREAT   Urine HCG        07/22/25 2252 Dark Yellow   Turbid   Large (3+)   Large (3+)   Negative   100 mg/dL (2+)                 Microbiology Results (last 10 days)       Procedure Component Value - Date/Time    Anaerobic Culture - Body Fluid, Peritoneum [[id number]]  (Normal) Collected: 07/23/25 1440    Lab Status: Final result Specimen: Body Fluid from Peritoneum Updated: 07/28/25 0645     Anaerobic Culture No anaerobes isolated at 5 days    Body Fluid Culture - Body Fluid, Peritoneum [[id number]] Collected: 07/23/25 1440    Lab Status: Final result Specimen: Body Fluid from Peritoneum Updated: 07/26/25 0910     Body Fluid Culture No growth at 3 days     Gram Stain No WBCs or organisms seen    Blood Culture - Blood, Hand, Right [[id number]]  (Abnormal)  (Susceptibility) Collected: 07/22/25 1908    Lab Status: Final result Specimen: Blood from Hand, Right Updated: 07/25/25 0619     Blood Culture Escherichia coli     Isolated from Aerobic and Anaerobic Bottles     Gram Stain Aerobic Bottle Gram negative bacilli      Anaerobic Bottle Gram negative bacilli    Susceptibility        Escherichia coli      ALFREDITO      Amoxicillin + Clavulanate Susceptible      Ampicillin Resistant      Ampicillin + Sulbactam Resistant      Cefazolin (Non Urine) Resistant      Cefepime Susceptible      Ceftazidime Susceptible      Ceftriaxone Susceptible      Cefuroxime axetil Susceptible      Ciprofloxacin Resistant      Gentamicin Susceptible      Levofloxacin Intermediate      Piperacillin + Tazobactam  Susceptible      Trimethoprim + Sulfamethoxazole Susceptible                       Susceptibility Comments       Escherichia coli    With the exception of urinary-sourced infections, aminoglycosides should not be used as monotherapy.               Blood Culture - Blood, Arm, Left [[id number]]  (Normal) Collected: 07/22/25 1908    Lab Status: Final result Specimen: Blood from Arm, Left Updated: 07/27/25 2046     Blood Culture No growth at 5 days    Blood Culture ID, PCR - Blood, Hand, Right [[id number]]  (Abnormal) Collected: 07/22/25 1908    Lab Status: Final result Specimen: Blood from Hand, Right Updated: 07/23/25 1715     BCID, PCR Escherichia coli. Identification by BCID2 PCR.     BOTTLE TYPE Aerobic Bottle    Narrative:      No resistance genes detected.    COVID PRE-OP / PRE-PROCEDURE SCREENING ORDER (NO ISOLATION) - Swab, Nasopharynx [[id number]]  (Normal) Collected: 07/22/25 1906    Lab Status: Final result Specimen: Swab from Nasopharynx Updated: 07/22/25 2025    Narrative:      The following orders were created for panel order COVID PRE-OP / PRE-PROCEDURE SCREENING ORDER (NO ISOLATION) - Swab, Nasopharynx.  Procedure                               Abnormality         Status                     ---------                               -----------         ------                     Respiratory Panel PCR w/...[[id number]]  Normal              Final result                 Please view results for these tests on the individual orders.    Rapid Strep A Screen - Swab, Throat [[id number]]  (Normal) Collected: 07/22/25 1906    Lab Status: Final result Specimen: Swab from Throat Updated: 07/22/25 1935     Strep A Ag Negative    Narrative:      Test performed by [facility].    Respiratory Panel PCR w/COVID-19(SARS-CoV-2) MATTHEW/ZAY/SEVEN/PAD/COR/TONY In-House, NP Swab in UTM/VTM, 2 HR TAT - Swab, Nasopharynx [[id number]]  (Normal) Collected: 07/22/25 1906    Lab Status: Final result Specimen: Swab from Nasopharynx  Updated: 07/22/25 2025     ADENOVIRUS, PCR Not Detected     Coronavirus 229E Not Detected     Coronavirus HKU1 Not Detected     Coronavirus NL63 Not Detected     Coronavirus OC43 Not Detected     COVID19 Not Detected     Human Metapneumovirus Not Detected     Human Rhinovirus/Enterovirus Not Detected     Influenza A PCR Not Detected     Influenza B PCR Not Detected     Parainfluenza Virus 1 Not Detected     Parainfluenza Virus 2 Not Detected     Parainfluenza Virus 3 Not Detected     Parainfluenza Virus 4 Not Detected     RSV, PCR Not Detected     Bordetella pertussis pcr Not Detected     Bordetella parapertussis PCR Not Detected     Chlamydophila pneumoniae PCR Not Detected     Mycoplasma pneumo by PCR Not Detected    Narrative:      In the setting of a positive respiratory panel with a viral infection PLUS a negative procalcitonin without other underlying concern for bacterial infection, consider observing off antibiotics or discontinuation of antibiotics and continue supportive care. If the respiratory panel is positive for atypical bacterial infection (Bordetella pertussis, Chlamydophila pneumoniae, or Mycoplasma pneumoniae), consider antibiotic de-escalation to target atypical bacterial infection.    Beta Strep Culture, Throat - Swab, Throat [[id number]]  (Normal) Collected: 07/22/25 1906    Lab Status: Final result Specimen: Swab from Throat Updated: 07/24/25 1112     Throat Culture, Beta Strep No Beta Hemolytic Streptococcus Isolated at 2 days    Narrative:      Group A Strep incidence is low in adults. Positive culture for Beta hemolytic Streptococcus species can reflect colonization and not true infection. Please correlate clinically.              US Paracentesis  Result Date: 7/23/2025  PROCEDURE: Ultrasound-guided paracentesis  Procedural Personnel Attending physician(s): Miguel Kong  Pre-procedure diagnosis: Paracentesis Post-procedure diagnosis: Same Indication: Uncomplicated ascites Additional  clinical history: None  Complications: No immediate complications.       Ultrasound-guided paracentesis with drainage of 9100 mL of serous fluid.  Plan:  Resume care by clinical team. _______________________________________________________________  PROCEDURE SUMMARY: - Limited abdominal ultrasound - Ultrasound-guided paracentesis - Additional procedure(s): None  PROCEDURE DETAILS:  Pre-procedure Consent: Informed consent for the procedure including risks, benefits and alternatives was obtained and time-out was performed prior to the procedure. Preparation: The site was prepared and draped using maximal sterile barrier technique including cutaneous antisepsis.  Initial abdominal ultrasound Initial abdominal ultrasound was performed. Findings: Large ascites. A safe window for paracentesis was identified.  Paracentesis Local anesthesia was administered. The peritoneal cavity was accessed and fluid return confirmed position. Ascites was drained. The catheter was then removed, and a sterile bandage was applied. Paracentesis access technique: Real-time ultrasound guidance Catheter placed: 5F Yueh Post-drainage ultrasound: Not performed  Additional Details Additional description of procedure: None Equipment details: None Specimens removed: Abdominal fluid Estimated blood loss (mL): Less than 10      7/23/2025 4:10 PM by Miguel Kong MD on Workstation: [id number]      CT Angiogram Chest Pulmonary Embolism  Result Date: 7/22/2025  CT ANGIOGRAM CHEST PULMONARY EMBOLISM Date of Exam: 7/22/2025 8:53 PM EDT Indication: sepsis, dyspnea, chronic immobilization, leg swelling, dimer >500. Comparison: 7/20/2025. Technique: Axial CT images were obtained of the chest after the uneventful intravenous administration of 80 mL Isovue-370 utilizing pulmonary embolism protocol.  In addition, a 3-D volume rendered image was created for interpretation.  Reconstructed coronal and sagittal images were also obtained. Automated exposure  control and iterative construction methods were used. Findings: There is no pathologic axillary adenopathy or other worrisome body wall soft tissue finding in the chest. The partially characterized upper abdomen demonstrates known cirrhosis and moderate volume ascites. There is a small pericardial effusion as well as  unchanged small left pleural effusion. Atherosclerotic, nonaneurysmal thoracic aorta. The pulmonary arteries demonstrate no evidence of focal filling defect. The lung fields demonstrate similar prominent volume loss in the left lower lobe adjacent to the pleural effusion. There is no distinct suspicious pulmonary nodularity. The osseous structures again demonstrate a nonacute left clavicle fracture. No acute fracture or new aggressive osseous lesion is identified.     Impression: No evidence of acute pulmonary embolus. Redemonstrated chronic left pleural effusion with adjacent left basilar atelectasis as well as chronic pericardial effusion. Electronically Signed: Son Carey MD  7/22/2025 9:45 PM EDT  Workstation ID: [id number]    CT Abdomen Pelvis With Contrast  Result Date: 7/22/2025  CT ABDOMEN PELVIS W CONTRAST Date of Exam: 7/22/2025 8:53 PM EDT Indication: Sepsis of unknown source, abdominal distention, history of chronic hernia and cirrhosis with ascites. Comparison: CT abdomen pelvis 3/20/2025 Technique: Axial CT images were obtained of the abdomen and pelvis following the uneventful intravenous administration of 100 mL Isovue 370. Reconstructed coronal and sagittal images were also obtained. Automated exposure control and iterative construction methods were used. Findings: Separately dictated CT chest. Cirrhotic liver morphology. No discrete hypervascular liver lesion. Portal vasculature, splenic vein and superior mesenteric vein patent. Cholecystectomy. No dilation of biliary tree. Atrophic pancreas. Spleen within normal limits in size. Minimal varices. Large volume ascites. No suspicious  adrenal nodule. Symmetric renal size, contour and enhancement. Few small hypodense renal lesions favoring cysts too small to accurately characterize. There is a stable 1.1 cm partially exophytic lesion in the left anterior midpole showing intrinsic high density on prior noncontrasted exam consistent with hemorrhagic/proteinaceous cyst. Punctate nonobstructing right upper pole calculus. No hydronephrosis. Focal somewhat nodular urinary bladder wall thickening near hepatic dome measuring up to 1.2 cm thickness sagittal image 79 series 901. Prostate within normal limits in size. Symmetric seminal vesicles. Small hiatal hernia. Expected configuration stomach  and duodenum. Colonic diverticulosis. No evidence of bowel obstruction or active inflammation. Normal appendix. Diffuse aortic atherosclerotic disease. Fusiform dilation of infrarenal abdominal aorta measuring 3.1 cm, technically aneurysmal without significant change from prior exam. No overtly suspicious adenopathy. No organized fluid collection. No free air. There is a large fluid containing ventral abdominal wall hernia measuring 11.1 x 20.2 x 16.2 cm AP, transverse and craniocaudal dimension. Hernia defect measures 7.7 cm transverse dimension containing a small portion of the traversing bowel. Small fat-containing right and small to moderate fat and fluid-containing left inguinal hernias. These appear similar to prior. Diffuse body wall edema. Gynecomastia. Paraspinous muscle atrophy. Multilevel degenerative change throughout the spine. No aggressive bone lesion.     Impression: 1. Focal nodular bladder wall thickening near urinary bladder dome, possibly presenting a primary malignancy (urothelial carcinoma). Recommend nonemergent urology consultation for consideration of cystoscopy and tissue sampling. 2. Cirrhotic liver morphology with sequelae of portal hypertension including large volume ascites and minimal varices. No significant splenomegaly. Portal  vasculature patent. No signs of hepatocellular carcinoma. 3. Large ventral and smaller bilateral inguinal hernias detailed above, without significant change from prior CT comparison. The large ventral hernia contains a small portion of traversing bowel without obstruction. 4. Anasarca. 5. Colonic diverticulosis. 6. 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 7. Separately dictated CT chest. Electronically Signed: Robson Lopez MD  7/22/2025 9:34 PM EDT  Workstation ID: [id number]    CT Head Without Contrast  Result Date: 7/22/2025  CT HEAD WO CONTRAST Date of Exam: 7/22/2025 8:53 PM EDT Indication: Somnolence, confusion, suspect metabolic. Comparison: Head CT 7/20/2025 Technique: Axial CT images were obtained of the head without contrast administration.  Automated exposure control and iterative construction methods were used. Findings: Motion-degraded exam. 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 No new large territory infarct, acute intracranial hemorrhage, large mass lesion, midline shift or hydrocephalus. Mild global parenchymal volume loss and sequelae of chronic microvascular ischemic change similar to prior. Senescent related basal ganglia calcifications on the right. No large extra-axial fluid collection. No obvious displaced fracture or joint malalignment. No obstructive sinus disease or large mastoid effusion.     Impression: Allowing for motion degradation, no acute intracranial findings. Grossly stable CT exam since 7/20/2025 comparison. Electronically Signed: Robson Lopez MD  7/22/2025 9:23 PM EDT  Workstation ID: [id number]    XR Chest 1 View  Result Date: 7/22/2025  XR CHEST 1 VW Date of Exam: 7/22/2025 6:40 PM EDT Indication: SOA triage protocol Comparison: Chest CT 7/20/2025 Findings: Enlarged cardiac silhouette probably combination of cardiomegaly and pericardial  effusion compared to recent CT. Right lung appears relatively clear. Increasing probably small to moderate size left-sided pleural effusion and adjacent left lower lobe atelectasis versus airspace disease. No pneumothorax. Aortic atherosclerotic disease. Degenerative elated osseous change.     Impression: 1. Worsening left lower lobe atelectasis/airspace disease and adjacent pleural effusion. 2. Grossly similar cardiomegaly and probable pericardial effusion correlating with recent CT findings. Electronically Signed: Robson Lopez MD  7/22/2025 7:04 PM EDT  Workstation ID: [id number]    CT Head Without Contrast  Result Date: 7/20/2025  CT HEAD WO CONTRAST Date of Exam: 7/20/2025 11:16 AM EDT Indication: fall with head trauma on thinners. Comparison: CT head 12/26/2024 Technique: Axial CT images were obtained of the head without contrast administration.  Automated exposure control and iterative construction methods were used. Findings: There is no evidence of acute infarction. Previous infarct is noted in the left posterior parietal region with decreased attenuation. There there is no acute intracranial hemorrhage. There are no extra-axial collections. Ventricles and CSF spaces are symmetric. No mass effect nor hydrocephalus. Periventricular white matter changes are noted compatible with small vessel disease. Some punctate lacunar infarcts in the left basal ganglia and adjacent to the right caudate nucleus are suspected.  Paranasal sinuses and mastoid air cells are adequately aerated.  Osseous structures and orbits appear intact.     Impression: 1. Stable examination with chronic left parietal infarct and age-related changes. Electronically Signed: Shahla Joseph MD  7/20/2025 11:54 AM EDT  Workstation ID: [id number]    CT Chest Without Contrast Diagnostic  Result Date: 7/20/2025  CT CHEST WO CONTRAST DIAGNOSTIC Date of Exam: 7/20/2025 11:16 AM EDT Indication: anterior chest wall and sternal pain/TTP after fall, hx  chronic effusions. Comparison: Chest x-ray 3/17/2025 and CT chest/12/19 Technique: Axial CT images were obtained of the chest without contrast administration.  Reconstructed coronal and sagittal images were also obtained. Automated exposure control and iterative construction methods were used. Findings: Hilum and Mediastinum: No pathologically enlarged lymph nodes. The heart is prominent in size with a pericardial effusion measuring up to 12 mm in diameter.  Unremarkable thoracic aorta and pulmonary arteries. Heavy coronary artery calcifications are noted. Coronary artery stent is present. There is substernal extension of the right thyroid gland with underlying thyroid nodule unchanged from prior study. Peripherally calcified left thyroid nodule is noted. Lung Parenchyma and Pleura: Evaluation of lung parenchyma demonstrates emphysema. There is faint subpleural reticulation compatible with chronic lung changes. There is left basilar airspace disease with bronchial wall thickening and consolidation. There is a loculated mild to moderate left pleural effusion. No evidence for pneumothorax. Upper Abdomen: Evaluation of the upper abdomen demonstrates nodular contour to the liver compatible with cirrhosis. Abdominal ascites is present. The gallbladder is surgically absent. There is bilateral flank edema. There is focal diaphragmatic hernia anteriorly containing abdominal ascites. Soft tissues: There is development of bilateral gynecomastia. Osseous structures: Evaluation of osseous structures demonstrates a fracture involving the medial left clavicle (image 14 of series 3). There is some sclerosis along the fracture margin suggesting subacute injury. There is heterogeneous appearance to the  proximal body of the sternum centrally and left of midline (image 61 of series 901 and image 23 of series 3). Some cortical thinning is noted along the inner margin. Minimally displaced fracture or underlying osseous lesion cannot be  excluded. No evidence for abnormal soft tissue density or stranding within the adjacent mediastinum. There is mild superior endplate irregularity involving T1. Acute compression injury not excluded. Remaining thoracic vertebral bodies appear normally aligned. No evidence for acute rib injury.     Impression: 1. Findings compatible with a subacute appearing fracture involving the medial left clavicle. 2. Abnormal heterogeneous appearance involving the proximal body of the sternum. This may be posttraumatic related to healing or previous osseous injury however underlying osseous lesion cannot be excluded 3. Suspected compression deformity along the superior endplate of T1 with minimal loss of vertebral body height. 4. Left basilar airspace disease with loculated mild to moderate left pleural effusion. 5. Cardiomegaly and pericardial effusion. 6. Cirrhosis and abdominal ascites. Electronically Signed: Shahla Joseph MD  7/20/2025 11:50 AM EDT  Workstation ID: [id number]      Results for orders placed during the hospital encounter of 02/08/23    Duplex Venous Lower Extremity - Right CAR 02/08/2023  1:35 PM    Interpretation Summary    Chronic right lower extremity superficial thrombophlebitis noted in the small saphenous.    No evidence of DVT.      Results for orders placed during the hospital encounter of 02/08/23    Duplex Venous Lower Extremity - Right CAR 02/08/2023  1:35 PM    Interpretation Summary    Chronic right lower extremity superficial thrombophlebitis noted in the small saphenous.    No evidence of DVT.          Plan for Follow-up of Pending Labs/Results:     Discharge Details        Discharge Medications        New Medications        Instructions Start Date   midodrine 5 MG tablet  Commonly known as: PROAMATINE   5 mg, Oral, 3 Times Daily Before Meals             Changes to Medications        Instructions Start Date   furosemide 40 MG tablet  Commonly known as: LASIX  What changed: additional  instructions   TAKE 1 TABLET BY MOUTH ONCE DAILY IN THE MORNING,  1/2 TABLET IN THE AFTERNOON AND AS NEEDED FOR WEIGHT GAIN, EDEMA AND DYPNEA      spironolactone 50 MG tablet  Commonly known as: Aldactone  What changed: how much to take   25 mg, Oral, Daily             Continue These Medications        Instructions Start Date   albuterol sulfate  (90 Base) MCG/ACT inhaler  Commonly known as: PROVENTIL HFA;VENTOLIN HFA;PROAIR HFA   2 puffs, Inhalation, Every 4 Hours PRN      allopurinol 100 MG tablet  Commonly known as: ZYLOPRIM   100 mg, Oral, Daily      benzonatate 100 MG capsule  Commonly known as: Tessalon Perles   100 mg, Oral, 3 Times Daily PRN      folic acid 400 MCG tablet  Commonly known as: FOLVITE   400 mcg, Daily      ibuprofen 600 MG tablet  Commonly known as: ADVIL,MOTRIN   600 mg, Oral, Every 6 Hours PRN      levothyroxine 25 MCG tablet  Commonly known as: SYNTHROID, LEVOTHROID   25 mcg, Oral, Every Morning      lidocaine 5 %  Commonly known as: LIDODERM   1 patch, Transdermal, Every 24 Hours, Remove & Discard patch within 12 hours or as directed by MD      simvastatin 40 MG tablet  Commonly known as: ZOCOR   40 mg, Oral, Daily      Vitamin D3 25 MCG (1000 UT) capsule   2,000 Units, Oral, Daily             Stop These Medications      apixaban 2.5 MG tablet tablet  Commonly known as: ELIQUIS     cefdinir 300 MG capsule  Commonly known as: OMNICEF     HYDROcodone-acetaminophen 5-325 MG per tablet  Commonly known as: NORCO     Hydrocortisone (Perianal) 2.5 % rectal cream  Commonly known as: Anusol-HC     methocarbamol 750 MG tablet  Commonly known as: ROBAXIN     terazosin 2 MG capsule  Commonly known as: HYTRIN              Allergies   Allergen Reactions    Bee Venom Swelling    Sulfamethoxazole-Trimethoprim Other (See Comments)     Pt unaware         Discharge Disposition:  Rehab Facility or Unit (DC - External)    Diet:  Hospital:  Diet Order   Procedures    Diet: Cardiac, Diabetic, Fluid  Restriction (240 mL/tray); Healthy Heart (2-3 Na+); Consistent Carbohydrate; 1500 mL/day; Fluid Consistency: Thin (IDDSI 0)       Diet Instructions       Diet: Cardiac Diets, Diabetic Diets, Fluid Restriction (240 mL/tray) Diets; Healthy Heart (2-3 Na+); Regular (IDDSI 7); Thin (IDDSI 0); Consistent Carbohydrate; 1500 mL/day      Discharge Diet:  Cardiac Diets  Diabetic Diets  Fluid Restriction (240 mL/tray) Diets       Cardiac Diet: Healthy Heart (2-3 Na+)    Texture: Regular (IDDSI 7)    Fluid Consistency: Thin (IDDSI 0)    Diabetic Diet: Consistent Carbohydrate    Fluid Restriction Diet (240 mL/tray): 1500 mL/day    Cardiac, Diabetic, Fluid Restriction (240 mL/tray); Healthy Heart (2-3 Na+); Consistent Carbohydrate; 1500 mL/day; Fluid Consistency: Thin             Activity:  Activity Instructions       Activity as Tolerated      Measure Blood Pressure      Measure Weight              Restrictions or Other Recommendations:         CODE STATUS:    Code Status and Medical Interventions: No CPR (Do Not Attempt to Resuscitate); Limited Support; No intubation (DNI), No cardioversion, No dialysis, No vasopressors, No artificial nutrition; No ICU level care   Ordered at: 07/25/25 3240     Code Status (Patient has no pulse and is not breathing):    No CPR (Do Not Attempt to Resuscitate)     Medical Interventions (Patient has pulse or is breathing):    Limited Support     Medical Intervention Limits:    No intubation (DNI)       No cardioversion       No dialysis       No vasopressors       No artificial nutrition     Comments:    No ICU level care     Level Of Support Discussed With:    Next of Kin (If No Surrogate)       Patient       Future Appointments   Date Time Provider Department [place]   8/1/2025  9:45 AM [facility]   8/27/2025  2:00 PM Jasper Avery MD [facility]   10/30/2025 10:15 AM Diogo Weber APRN [facility]   12/16/2025 11:15 AM Milagro Garcia APRN [facility]  [facility]AY   5/1/2026  1:45 PM Larry Sin MD [facility] ZAY       Additional Instructions for the Follow-ups that You Need to Schedule       Discharge Follow-up with PCP   As directed       Currently Documented PCP:    Jasper Avery MD    PCP Phone Number:    [phone]     Follow Up Details: follow up with pcp after dc from rehab        Discharge Follow-up with Specified Provider: Follow up with urology Dr García after dc from rehab   As directed      To: Follow up with urology Dr García after dc from rehab                      MAKAYLA Kent  07/28/25      Time Spent on Discharge:  I spent  45  minutes on this discharge activity which included: face-to-face encounter with the patient, reviewing the data in the system, coordination of the care with the nursing staff as well as consultants, documentation, and entering orders.

## 2025-07-28 NOTE — THERAPY TREATMENT NOTE
Patient Name: Jordi Sam  : 1934    MRN: [id number]                              Today's Date: 2025       Admit Date: 2025    Visit Dx:     ICD-10-CM ICD-9-CM   1. Sepsis with acute renal failure without septic shock, due to unspecified organism, unspecified acute renal failure type  A41.9 038.9    R65.20 995.92    N17.9 584.9   2. Acute UTI (urinary tract infection)  N39.0 599.0   3. Cellulitis of right lower extremity  L03.115 682.6   4. Stage 3b chronic kidney disease  N18.32 585.3   5. Chronic right heart failure  I50.812 428.0   6. Panlobular emphysema  J43.1 492.8   7. Cirrhosis of liver with ascites, unspecified hepatic cirrhosis type  K74.60 571.5    R18.8    8. Dysphagia, unspecified type  R13.10 787.20     Patient Active Problem List   Diagnosis    Coronary arteriosclerosis in native artery    Panlobular emphysema    Functional diarrhea    Essential hypertension    Chronic cough    Dyslipidemia    Nephrolithiasis    Chronic atrial fibrillation    Shortness of breath    Chronic right heart failure    Other ascites    Cirrhosis of liver with ascites    Stage 3 chronic kidney disease    End stage liver disease    S/P umbilical hernia repair, follow-up exam    Constipation    Right hip pain    Hearing loss of right ear due to cerumen impaction    Pre-diabetes    Umbilical hernia    Benign prostatic hyperplasia with nocturia    Chronic anticoagulation    Facial cellulitis    Right ankle pain    Medicare annual wellness visit, subsequent    Lower urinary tract symptoms due to benign prostatic hyperplasia    Basal cell carcinoma of right ala nasi    Acute pain of right shoulder    Laceration of head and neck    Wound infection    Oral pain    Squamous cell carcinoma of scalp    Incisional hernia, without obstruction or gangrene    Other specified hypothyroidism    Gross hematuria    Congenital hemorrhagic cyst of kidney    Sepsis    Ventral hernia    Elevated troponin    Acute UTI (urinary  tract infection)    Lactic acidosis    Hyponatremia    Elevated serum creatinine    Anemia, chronic disease    Chronic bilateral pleural effusions    Pericardial effusion    Cellulitis     Past Medical History:   Diagnosis Date    A-fib     Acute inferior myocardial infarction     Acute inferior STEMI with RV infarct features, January 2009.     Arrhythmia     CAD (coronary artery disease)     Cellulitis 02/26/2023    Facial. Feb 26-March 1st admission at [facility]. 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 Seeing Dr Kiersten Nova and Dr Jasper Avery 8-2021     Past Surgical History:   Procedure Laterality Date    BRONCHOSCOPY N/A 4/12/2019    Procedure: BRONCHOSCOPY WITH THORACENTESIS;  Surgeon: Marciano Higginbotham MD;  Location:  [facility];  Service: Pulmonary    CARDIAC CATHETERIZATION Bilateral 1/30/2019    Procedure: Right and Left Heart Cath;  Surgeon: Efrem Posadas MD;  Location:  [facility];  Service: Cardiology    CARDIAC CATHETERIZATION      CHOLECYSTECTOMY      CORONARY ANGIOPLASTY WITH STENT PLACEMENT  01/09/2009    Sirolimus eluting stents to the RCA, 01/09/2009.     HERNIA REPAIR      Hernia repair x 2.     PARACENTESIS  06/17/2019    multiple    UMBILICAL HERNIA REPAIR N/A 7/26/2020    Procedure: UMBILICAL HERNIA REPAIR;  Surgeon: Maribell Her MD;  Location:  [facility];  Service: General;  Laterality: N/A;      General  Information       Row Name 07/28/25 1045          OT Time and Intention    Subjective Information no complaints  -     Document Type therapy note (daily note)  -CHAD     Mode of Treatment individual therapy;occupational therapy  -CHAD     Patient Effort good  -CHAD     Symptoms Noted During/After Treatment shortness of breath  -CHAD     Comment short rest periods needed throughout session  -       Row Name 07/28/25 1045          General Information    Patient Profile Reviewed yes  -CHAD     Existing Precautions/Restrictions fall  -CHAD     Barriers to Rehab previous functional deficit;hearing deficit  hearing aids  -[facility] Name 07/28/25 1045          Cognition    Orientation Status (Cognition) oriented x 3  -[facility] Name 07/28/25 1045          Safety Issues/Impairments Affecting Functional Mobility    Safety Issues Affecting Function (Mobility) safety precaution awareness;safety precautions follow-through/compliance;sequencing abilities  -CHAD     Impairments Affecting Function (Mobility) balance;endurance/activity tolerance;range of motion (ROM);strength;postural/trunk control  -               User Key  (r) = Recorded By, (t) = Taken By, (c) = Cosigned By      Initials Name Provider Type    CHAD Rosario Parker, OT Occupational Therapist                     Mobility/ADL's       [facility] Name 07/28/25 1047          Bed Mobility    Supine-Sit [place] (Bed Mobility) standby assist  -     Assistive Device (Bed Mobility) bed rails;head of bed elevated  -     Comment, (Bed Mobility) extra time and effort, no assist needed today  -[facility] Name 07/28/25 1047          Transfers    Transfers sit-stand transfer;stand-sit transfer  -CHAD     Comment, (Transfers) cues needed for pt. to position hands correctly and not pull on the walker  -[facility] Name 07/28/25 1047          Sit-Stand Transfer    Sit-Stand [place] (Transfers) contact guard;verbal cues  -     Assistive Device (Sit-Stand Transfers) walker,  front-wheeled  -[facility] Name 07/28/25 1047          Stand-Sit Transfer    Stand-Sit [place] (Transfers) contact guard;verbal cues  -     Assistive Device (Stand-Sit Transfers) walker, front-wheeled  -[facility] Name 07/28/25 1047          Functional Mobility    Functional Mobility- Ind. Level contact guard assist;verbal cues required  -     Functional Mobility- Device walker, front-wheeled  -     Functional Mobility-Distance (Feet) 6  + 50  -     Functional Mobility- Safety Issues step length decreased  -     Functional Mobility- Comment slowed pace, flexed trunk  -[facility] Name 07/28/25 1047          Activities of Daily Living    BADL Assessment/Intervention upper body dressing;lower body dressing;grooming  -CHAD       Row Name 07/28/25 1047          Hygiene Care    Oral Care teeth brushed - regular toothbrush  -CHAD       Row Name 07/28/25 1047          Lower Body Dressing Assessment/Training    [place] Level (Lower Body Dressing) don;shoes/slippers;minimum assist (75% patient effort)  -CHAD     Position (Lower Body Dressing) edge of bed sitting  -CHAD     Comment, (Lower Body Dressing) OT blocked feet so pt. could push foot into slipper due to they slide away on floor surface  -[facility] Name 07/28/25 1047          Toileting Assessment/Training    Comment, (Toileting) pt. urinated into purewick on standing  -CHAD       Row Name 07/28/25 104          Upper Body Dressing Assessment/Training    [place] Level (Upper Body Dressing) don;pajama/robe;moderate assist (50% patient effort)  -CHAD     Position (Upper Body Dressing) edge of bed sitting  -CHAD     Comment, (Upper Body Dressing) assist to bring around back  -[facility] Name 07/28/25 1047          Grooming Assessment/Training    [place] Level (Grooming) oral care regimen;wash face, hands;set up;contact guard assist  -CHAD     Position (Grooming) sink side;unsupported standing  -CHAD     Comment, (Grooming) pt. also wet his hair down  with the wash cloth  -CHAD               User Key  (r) = Recorded By, (t) = Taken By, (c) = Cosigned By      Initials Name Provider Type    Rosario Ibarra OT Occupational Therapist                   Obj/Interventions       Row Name 07/28/25 1052          Shoulder (Therapeutic Exercise)    Shoulder (Therapeutic Exercise) AROM (active range of motion)  -     Shoulder AROM (Therapeutic Exercise) bilateral;flexion;extension;aBduction;aDduction;horizontal aBduction/aDduction;15 repititions;sitting  -       Row Name 07/28/25 1052          Elbow/Forearm (Therapeutic Exercise)    Elbow/Forearm (Therapeutic Exercise) strengthening exercise  -CHAD     Elbow/Forearm Strengthening (Therapeutic Exercise) bilateral;flexion;extension;sitting;15 repititions  mod manual resistance  -       Row Name 07/28/25 1052          Motor Skills    Motor Skills functional endurance  -     Functional Endurance impaired, short rest periods due to SOA needed throughout the session, remains on 02 NC  -CHAD     Therapeutic Exercise shoulder;elbow/forearm;other (see comments)  10 reps knee press with gluteal squeeze  -       Row Name 07/28/25 1052          Balance    Static Sitting Balance standby assist  -CHAD     Dynamic Sitting Balance standby assist  -CHAD     Position, Sitting Balance unsupported;sitting edge of bed  -CHAD     Sit to Stand Dynamic Balance contact guard;1-person assist;verbal cues  -CHAD     Static Standing Balance contact guard;1-person assist  -CHAD     Dynamic Standing Balance contact guard;1-person assist  -CHAD     Position/Device Used, Standing Balance supported;walker, front-wheeled  -CHAD     Balance Interventions sit to stand;occupation based/functional task  -CHAD     Comment, Balance UBD, LBD, grooming  -CHAD               User Key  (r) = Recorded By, (t) = Taken By, (c) = Cosigned By      Initials Name Provider Type    Rosario Ibarra OT Occupational Therapist                   Goals/Plan       Row Name 07/28/25 1055           Bed Mobility Goal 1 (OT)    Activity/Assistive Device (Bed Mobility Goal 1, OT) sit to supine;supine to sit  -CHAD     [place] Level/Cues Needed (Bed Mobility Goal 1, OT) modified independence  -CHAD     Time Frame (Bed Mobility Goal 1, OT) long term goal (LTG);10 days  -CHAD     Progress/Outcomes (Bed Mobility Goal 1, OT) good progress toward goal;goal ongoing  -CHAD       Row Name 07/28/25 1056          Transfer Goal 1 (OT)    Activity/Assistive Device (Transfer Goal 1, OT) sit-to-stand/stand-to-sit;toilet;commode;walker, rolling  -CHAD     [place] Level/Cues Needed (Transfer Goal 1, OT) contact guard required  -CHAD     Time Frame (Transfer Goal 1, OT) long term goal (LTG);10 days  -CHAD     Progress/Outcome (Transfer Goal 1, OT) goal partially met;goal ongoing  -CHAD       Row Name 07/28/25 1056          Dressing Goal 1 (OT)    Activity/Device (Dressing Goal 1, OT) lower body dressing  -CHAD     [place]/Cues Needed (Dressing Goal 1, OT) minimum assist (75% or more patient effort);set-up required  -CHAD     Time Frame (Dressing Goal 1, OT) long term goal (LTG);10 days  -CHAD     Strategies/Barriers (Dressing Goal 1, OT) socks and pants  -CHAD     Progress/Outcome (Dressing Goal 1, OT) goal ongoing  -CHAD       Row Name 07/28/25 1056          Grooming Goal 1 (OT)    Activity/Device (Grooming Goal 1, OT) hair care;oral care;wash face, hands  -CHAD     [place] (Grooming Goal 1, OT) contact guard required  -CHAD     Time Frame (Grooming Goal 1, OT) short term goal (STG);5 days  -CHAD     Progress/Outcome (Grooming Goal 1, OT) goal met  -CHAD               User Key  (r) = Recorded By, (t) = Taken By, (c) = Cosigned By      Initials Name Provider Type    Rosario Ibarra, OT Occupational Therapist                   Clinical Impression       Row Name 07/28/25 1056          Pain Assessment    Pretreatment Pain Rating 0/10 - no pain  -CHAD     Posttreatment Pain Rating 0/10 - no pain  -CHAD       Row Name 07/28/25 1059          Plan  of Care Review    Plan of Care Reviewed With patient;spouse  -CHAD     Progress improving  -CHAD     Outcome Evaluation Patient demonstrated improved bed mobility and sit to stand today to CGA and cues for hand placement.  Pt. progressed to sinkside grooming with CGA.  Pt. needed min A for donning slippers and mod A for robe.  Short rest periods needed throughout the session due to SOA.  Pt. still needs to use the walker over the cane as normally uses at home. Plans to rehab today.  -CHAD       Row Name 07/28/25 1054          Therapy Assessment/Plan (OT)    Therapy Frequency (OT) daily  -CHAD       Row Name 07/28/25 1054          Therapy Plan Review/Discharge Plan (OT)    Anticipated Discharge Disposition (OT) skilled nursing facility  -CHAD       Row Name 07/28/25 1054          Vital Signs    Pre Systolic BP Rehab 121  -CHAD     Pre Treatment Diastolic BP 63  -CHAD     Pretreatment Heart Rate (beats/min) 73  -CHAD     Posttreatment Heart Rate (beats/min) 76  -CHAD     Pre SpO2 (%) 96  -CHAD     O2 Delivery Pre Treatment room air  -CHAD     O2 Delivery Intra Treatment room air  -CHAD     Post SpO2 (%) 98  -CHAD     O2 Delivery Post Treatment room air  -CHAD     Pre Patient Position Supine  -HCAD     Intra Patient Position Standing  -CHAD     Post Patient Position Sitting  -CHAD       Row Name 07/28/25 1054          Positioning and Restraints    Pre-Treatment Position in bed  -CHAD     Post Treatment Position chair  -CHAD     In Chair reclined;call light within reach;encouraged to call for assist;exit alarm on;with family/caregiver;waffle cushion;legs elevated;with nsg  -CHAD               User Key  (r) = Recorded By, (t) = Taken By, (c) = Cosigned By      Initials Name Provider Type    Rosario Ibarra, OT Occupational Therapist                   Outcome Measures       Row Name 07/28/25 1055          How much help from another is currently needed...    Putting on and taking off regular lower body clothing? 2  -CHAD     Bathing (including washing, rinsing,  and drying) 2  -CHAD     Toileting (which includes using toilet bed pan or urinal) 2  -CHAD     Putting on and taking off regular upper body clothing 2  -CHAD     Taking care of personal grooming (such as brushing teeth) 3  -CHAD     Eating meals 4  -CHAD     AM-PAC 6 Clicks Score (OT) 15  -CHAD       Row Name 07/28/25 1057          Functional Assessment    Outcome Measure Options AM-PAC 6 Clicks Daily Activity (OT)  -CHAD               User Key  (r) = Recorded By, (t) = Taken By, (c) = Cosigned By      Initials Name Provider Type    Rosario Ibarra, OT Occupational Therapist                    Occupational Therapy Education       Title: PT OT SLP Therapies (In Progress)       Topic: Occupational Therapy (Done)       Point: ADL training (Done)       Learning Progress Summary            Patient Acceptance, E,D, NR,VU by CHAD at 7/28/2025 1057    Comment: UE TE, pacing self, benefit of UE TE on own throughout the day, transfer safety, safety with loose slippers   Family Acceptance, E,D, NR,VU by CHAD at 7/28/2025 1057    Comment: UE TE, pacing self, benefit of UE TE on own throughout the day, transfer safety, safety with loose slippers                      Point: Home exercise program (Done)       Learning Progress Summary            Patient Acceptance, E,D, NR,VU by CHAD at 7/28/2025 1057    Comment: UE TE, pacing self, benefit of UE TE on own throughout the day, transfer safety, safety with loose slippers   Family Acceptance, E,D, NR,VU by CHAD at 7/28/2025 1057    Comment: UE TE, pacing self, benefit of UE TE on own throughout the day, transfer safety, safety with loose slippers                      Point: Precautions (Done)       Learning Progress Summary            Patient Acceptance, E,D, NR,VU by CHAD at 7/28/2025 1057    Comment: UE TE, pacing self, benefit of UE TE on own throughout the day, transfer safety, safety with loose slippers   Family Acceptance, E,D, NR,VU by CHAD at 7/28/2025 1057    Comment: UE TE, pacing self, benefit  of UE TE on own throughout the day, transfer safety, safety with loose slippers                                      User Key       Initials Effective Dates Name Provider Type Discipline     07/11/23 -  Rosario Parker, OT Occupational Therapist OT                  OT Recommendation and Plan  Planned Therapy Interventions (OT): activity tolerance training, BADL retraining, patient/caregiver education/training, ROM/therapeutic exercise, occupation/activity based interventions, functional balance retraining, strengthening exercise, transfer/mobility retraining  Therapy Frequency (OT): daily  Plan of Care Review  Plan of Care Reviewed With: patient, spouse  Progress: improving  Outcome Evaluation: Patient demonstrated improved bed mobility and sit to stand today to CGA and cues for hand placement.  Pt. progressed to sinkside grooming with CGA.  Pt. needed min A for donning slippers and mod A for robe.  Short rest periods needed throughout the session due to SOA.  Pt. still needs to use the walker over the cane as normally uses at home. Plans to rehab today.     Time Calculation:   Evaluation Complexity (OT)  Review Occupational Profile/Medical/Therapy History Complexity: brief/low complexity  Assessment, Occupational Performance/Identification of Deficit Complexity: 1-3 performance deficits  Clinical Decision Making Complexity (OT): problem focused assessment/low complexity  Overall Complexity of Evaluation (OT): low complexity     Time Calculation- OT       Row Name 07/28/25 1058             Time Calculation- OT    OT Start Time 1007  -CHAD      OT Received On 07/28/25  -CHAD      OT Goal Re-Cert Due Date 08/03/25  -CHAD         Timed Charges    74747 - OT Therapeutic Exercise Minutes 12  -CHAD      68313 - OT Therapeutic Activity Minutes 10  -CHAD      01761 - OT Self Care/Mgmt Minutes 12  -CHAD         Total Minutes    Timed Charges Total Minutes 34  -CHAD       Total Minutes 34  -CHAD                User Key  (r) = Recorded By,  (t) = Taken By, (c) = Cosigned By      Initials Name Provider Type    Rosario Ibarra, OT Occupational Therapist                  Therapy Charges for Today       Code Description Service Date Service Provider Modifiers Qty    [id number]  OT THER PROC EA 15 MIN 7/28/2025 Rosario Parker, OT GO 1    [id number]  OT SELF CARE/MGMT/TRAIN EA 15 MIN 7/28/2025 Rosario Parker OT GO 1                 Rosario Parker OT  7/28/2025

## 2025-07-28 NOTE — PLAN OF CARE
Goal Outcome Evaluation:  Plan of Care Reviewed With: patient        Progress: improving  Outcome Evaluation: Afib on tele. RA. No c/o pain this shift. Pt slept between care.

## 2025-07-28 NOTE — CASE MANAGEMENT/SOCIAL WORK
Case Management Discharge Note      Final Note: Pt is discharging to SNF at [facility] today. CM verified with [facility] facility liaison, Pt has insurance approval and can be accepted today. Facility will retrieve discharge summary from [facility]. RN to call report to [phone]. If accepting RN is unavailable for report, RN may call report to house supervisor at [phone]. Transportation to facility was offered but refused. Spouse will provide transportation via private vehicle at 1230. Staff will assist Pt into the vehicle at discharge. Pt and spouse were instructed to request assistance from [facility] staff to get him out of the car upon arrival; They verbalized understanding. Pt and spouse are aware and agreeable to plan. No other discharge needs identified.         Selected Continued Care - Admitted Since 7/22/2025       Destination Coordination complete.      Service Provider Services Address Phone Fax Patient Preferred    [facility] [address] [address] [phone] --              Durable Medical Equipment    No services have been selected for the patient.                Dialysis/Infusion    No services have been selected for the patient.                Home Medical Care    No services have been selected for the patient.                Therapy    No services have been selected for the patient.                Community Resources    No services have been selected for the patient.                Community & DME    No services have been selected for the patient.                    Transportation Services  Transportation: Private Transportation  Private: Car    Final Discharge Disposition Code: 03 - skilled nursing facility (SNF)

## 2025-07-28 NOTE — PLAN OF CARE
Goal Outcome Evaluation:  Plan of Care Reviewed With: patient, spouse        Progress: improving  Outcome Evaluation: Patient demonstrated improved bed mobility and sit to stand today to CGA and cues for hand placement.  Pt. progressed to sinkside grooming with CGA.  Pt. needed min A for donning slippers and mod A for robe.  Short rest periods needed throughout the session due to SOA.  Pt. still needs to use the walker over the cane as normally uses at home. Plans to rehab today.    Anticipated Discharge Disposition (OT): skilled nursing facility

## 2025-08-08 LAB
QT INTERVAL: 408 MS
QTC INTERVAL: 482 MS

## 2025-08-21 ENCOUNTER — HOSPITAL ENCOUNTER (OUTPATIENT)
Dept: INTERVENTIONAL RADIOLOGY/VASCULAR | Facility: HOSPITAL | Age: OVER 89
Discharge: HOME | End: 2025-08-21
Payer: MEDICARE

## 2025-08-21 VITALS
TEMPERATURE: 97.6 F | BODY MASS INDEX: 33.38 KG/M2 | HEART RATE: 90 BPM | WEIGHT: 238.4 LBS | SYSTOLIC BLOOD PRESSURE: 108 MMHG | DIASTOLIC BLOOD PRESSURE: 56 MMHG | OXYGEN SATURATION: 97 % | RESPIRATION RATE: 20 BRPM | HEIGHT: 71 IN

## 2025-08-21 DIAGNOSIS — R18.8 OTHER ASCITES: Primary | ICD-10-CM

## 2025-08-21 LAB
BASOPHILS # BLD AUTO: 0.03 10*3/MM3 (ref 0–0.2)
BASOPHILS NFR BLD AUTO: 0.6 % (ref 0–1.5)
DEPRECATED RDW RBC AUTO: 62.5 FL (ref 37–54)
EOSINOPHIL # BLD AUTO: 0.04 10*3/MM3 (ref 0–0.4)
EOSINOPHIL NFR BLD AUTO: 0.8 % (ref 0.3–6.2)
ERYTHROCYTE [DISTWIDTH] IN BLOOD BY AUTOMATED COUNT: 17.8 % (ref 12.3–15.4)
HCT VFR BLD AUTO: 27.7 % (ref 37.5–51)
HGB BLD-MCNC: 8.5 G/DL (ref 13–17.7)
IMM GRANULOCYTES # BLD AUTO: 0.02 10*3/MM3 (ref 0–0.05)
IMM GRANULOCYTES NFR BLD AUTO: 0.4 % (ref 0–0.5)
INR PPP: 1.22 (ref 0.89–1.12)
LYMPHOCYTES # BLD AUTO: 0.32 10*3/MM3 (ref 0.7–3.1)
LYMPHOCYTES NFR BLD AUTO: 6.5 % (ref 19.6–45.3)
MCH RBC QN AUTO: 29.1 PG (ref 26.6–33)
MCHC RBC AUTO-ENTMCNC: 30.7 G/DL (ref 31.5–35.7)
MCV RBC AUTO: 94.9 FL (ref 79–97)
MONOCYTES # BLD AUTO: 0.52 10*3/MM3 (ref 0.1–0.9)
MONOCYTES NFR BLD AUTO: 10.6 % (ref 5–12)
NEUTROPHILS NFR BLD AUTO: 3.96 10*3/MM3 (ref 1.7–7)
NEUTROPHILS NFR BLD AUTO: 81.1 % (ref 42.7–76)
NRBC BLD AUTO-RTO: 0 /100 WBC (ref 0–0.2)
PLATELET # BLD AUTO: 155 10*3/MM3 (ref 140–450)
PMV BLD AUTO: 10.4 FL (ref 6–12)
PROTHROMBIN TIME: 16.2 SECONDS (ref 12.2–15.3)
RBC # BLD AUTO: 2.92 10*6/MM3 (ref 4.14–5.8)
WBC NRBC COR # BLD AUTO: 4.89 10*3/MM3 (ref 3.4–10.8)

## 2025-08-21 PROCEDURE — 85610 PROTHROMBIN TIME: CPT | Performed by: STUDENT IN AN ORGANIZED HEALTH CARE EDUCATION/TRAINING PROGRAM

## 2025-08-21 PROCEDURE — 85025 COMPLETE CBC W/AUTO DIFF WBC: CPT | Performed by: STUDENT IN AN ORGANIZED HEALTH CARE EDUCATION/TRAINING PROGRAM

## 2025-08-21 PROCEDURE — 96365 THER/PROPH/DIAG IV INF INIT: CPT

## 2025-08-21 PROCEDURE — 25010000002 LIDOCAINE PF 1% 1 % SOLUTION

## 2025-08-21 PROCEDURE — 76942 ECHO GUIDE FOR BIOPSY: CPT

## 2025-08-21 PROCEDURE — 25010000002 ALBUMIN HUMAN 25% PER 50 ML: Performed by: NURSE PRACTITIONER

## 2025-08-21 PROCEDURE — P9047 ALBUMIN (HUMAN), 25%, 50ML: HCPCS | Performed by: NURSE PRACTITIONER

## 2025-08-21 PROCEDURE — C1729 CATH, DRAINAGE: HCPCS

## 2025-08-21 RX ORDER — ALBUMIN (HUMAN) 12.5 G/50ML
12.5 SOLUTION INTRAVENOUS ONCE
Status: DISCONTINUED | OUTPATIENT
Start: 2025-08-21 | End: 2025-08-21

## 2025-08-21 RX ORDER — ALBUMIN (HUMAN) 12.5 G/50ML
50 SOLUTION INTRAVENOUS ONCE
Status: COMPLETED | OUTPATIENT
Start: 2025-08-21 | End: 2025-08-21

## 2025-08-21 RX ORDER — LIDOCAINE HYDROCHLORIDE 10 MG/ML
INJECTION, SOLUTION EPIDURAL; INFILTRATION; INTRACAUDAL; PERINEURAL
Status: COMPLETED
Start: 2025-08-21 | End: 2025-08-21

## 2025-08-21 RX ORDER — DOXYCYCLINE HYCLATE 100 MG
100 TABLET ORAL 2 TIMES DAILY WITH MEALS
COMMUNITY
Start: 2025-08-15

## 2025-08-21 RX ORDER — SODIUM CHLORIDE 0.9 % (FLUSH) 0.9 %
10 SYRINGE (ML) INJECTION EVERY 12 HOURS SCHEDULED
Status: DISCONTINUED | OUTPATIENT
Start: 2025-08-21 | End: 2025-08-22 | Stop reason: HOSPADM

## 2025-08-21 RX ORDER — ALBUMIN (HUMAN) 12.5 G/50ML
25 SOLUTION INTRAVENOUS ONCE
Status: DISCONTINUED | OUTPATIENT
Start: 2025-08-21 | End: 2025-08-21

## 2025-08-21 RX ORDER — SODIUM CHLORIDE 0.9 % (FLUSH) 0.9 %
10 SYRINGE (ML) INJECTION AS NEEDED
Status: DISCONTINUED | OUTPATIENT
Start: 2025-08-21 | End: 2025-08-22 | Stop reason: HOSPADM

## 2025-08-21 RX ORDER — ALBUMIN (HUMAN) 12.5 G/50ML
25 SOLUTION INTRAVENOUS ONCE
OUTPATIENT
Start: 2025-08-21 | End: 2025-08-21

## 2025-08-21 RX ADMIN — ALBUMIN (HUMAN) 50 G: 0.25 INJECTION, SOLUTION INTRAVENOUS at 15:42

## 2025-08-21 RX ADMIN — LIDOCAINE HYDROCHLORIDE 4 ML: 10 INJECTION, SOLUTION EPIDURAL; INFILTRATION; INTRACAUDAL; PERINEURAL at 14:48

## 2025-08-22 ENCOUNTER — TELEPHONE (OUTPATIENT)
Dept: INFUSION THERAPY | Facility: HOSPITAL | Age: OVER 89
End: 2025-08-22
Payer: MEDICARE

## 2025-08-22 ENCOUNTER — TELEPHONE (OUTPATIENT)
Dept: FAMILY MEDICINE CLINIC | Facility: CLINIC | Age: OVER 89
End: 2025-08-22
Payer: MEDICARE

## 2025-08-27 ENCOUNTER — LAB (OUTPATIENT)
Dept: LAB | Facility: HOSPITAL | Age: OVER 89
End: 2025-08-27
Payer: MEDICARE

## 2025-08-27 ENCOUNTER — OFFICE VISIT (OUTPATIENT)
Dept: FAMILY MEDICINE CLINIC | Facility: CLINIC | Age: OVER 89
End: 2025-08-27
Payer: MEDICARE

## 2025-08-27 VITALS
WEIGHT: 224 LBS | HEIGHT: 71 IN | BODY MASS INDEX: 31.36 KG/M2 | DIASTOLIC BLOOD PRESSURE: 56 MMHG | SYSTOLIC BLOOD PRESSURE: 98 MMHG | HEART RATE: 73 BPM | OXYGEN SATURATION: 96 %

## 2025-08-27 DIAGNOSIS — J18.9 COMMUNITY ACQUIRED PNEUMONIA OF LEFT LOWER LOBE OF LUNG: ICD-10-CM

## 2025-08-27 DIAGNOSIS — N17.9 SEPSIS WITH ACUTE RENAL FAILURE WITHOUT SEPTIC SHOCK, DUE TO UNSPECIFIED ORGANISM, UNSPECIFIED ACUTE RENAL FAILURE TYPE: Primary | ICD-10-CM

## 2025-08-27 DIAGNOSIS — R65.20 SEPSIS WITH ACUTE RENAL FAILURE WITHOUT SEPTIC SHOCK, DUE TO UNSPECIFIED ORGANISM, UNSPECIFIED ACUTE RENAL FAILURE TYPE: Primary | ICD-10-CM

## 2025-08-27 DIAGNOSIS — N18.32 STAGE 3B CHRONIC KIDNEY DISEASE: ICD-10-CM

## 2025-08-27 DIAGNOSIS — A41.9 SEPSIS WITH ACUTE RENAL FAILURE WITHOUT SEPTIC SHOCK, DUE TO UNSPECIFIED ORGANISM, UNSPECIFIED ACUTE RENAL FAILURE TYPE: Primary | ICD-10-CM

## 2025-08-27 DIAGNOSIS — I48.20 CHRONIC ATRIAL FIBRILLATION: ICD-10-CM

## 2025-08-27 DIAGNOSIS — R18.8 OTHER ASCITES: ICD-10-CM

## 2025-08-27 DIAGNOSIS — K43.9 VENTRAL HERNIA WITHOUT OBSTRUCTION OR GANGRENE: ICD-10-CM

## 2025-08-27 DIAGNOSIS — R06.02 SHORTNESS OF BREATH: ICD-10-CM

## 2025-08-27 DIAGNOSIS — R06.00 DYSPNEA, UNSPECIFIED TYPE: ICD-10-CM

## 2025-08-27 DIAGNOSIS — R05.1 ACUTE COUGH: ICD-10-CM

## 2025-08-27 DIAGNOSIS — R31.9 HEMATURIA, UNSPECIFIED TYPE: ICD-10-CM

## 2025-08-27 LAB
BILIRUB BLD-MCNC: NEGATIVE MG/DL
CLARITY, POC: CLEAR
COLOR UR: YELLOW
EXPIRATION DATE: NORMAL
GLUCOSE UR STRIP-MCNC: NEGATIVE MG/DL
KETONES UR QL: NEGATIVE
LEUKOCYTE EST, POC: NEGATIVE
Lab: NORMAL
NITRITE UR-MCNC: NEGATIVE MG/ML
PH UR: 6 [PH] (ref 5–8)
PROT UR STRIP-MCNC: NEGATIVE MG/DL
RBC # UR STRIP: NEGATIVE /UL
SP GR UR: 1.01 (ref 1–1.03)
UROBILINOGEN UR QL: NORMAL

## 2025-08-27 PROCEDURE — 80053 COMPREHEN METABOLIC PANEL: CPT

## 2025-08-27 RX ORDER — ALBUTEROL SULFATE 90 UG/1
2 INHALANT RESPIRATORY (INHALATION) EVERY 4 HOURS PRN
Qty: 18 G | Refills: 0 | Status: SHIPPED | OUTPATIENT
Start: 2025-08-27

## 2025-08-28 ENCOUNTER — RESULTS FOLLOW-UP (OUTPATIENT)
Dept: FAMILY MEDICINE CLINIC | Facility: CLINIC | Age: OVER 89
End: 2025-08-28
Payer: MEDICARE

## 2025-08-28 ENCOUNTER — TELEPHONE (OUTPATIENT)
Dept: FAMILY MEDICINE CLINIC | Facility: CLINIC | Age: OVER 89
End: 2025-08-28
Payer: MEDICARE

## 2025-08-28 LAB
ALBUMIN SERPL-MCNC: 3.3 G/DL (ref 3.5–5.2)
ALBUMIN/GLOB SERPL: 1.4 G/DL
ALP SERPL-CCNC: 101 U/L (ref 39–117)
ALT SERPL W P-5'-P-CCNC: 9 U/L (ref 1–41)
ANION GAP SERPL CALCULATED.3IONS-SCNC: 14.9 MMOL/L (ref 5–15)
AST SERPL-CCNC: 15 U/L (ref 1–40)
BILIRUB SERPL-MCNC: 0.5 MG/DL (ref 0–1.2)
BUN SERPL-MCNC: 42 MG/DL (ref 8–23)
BUN/CREAT SERPL: 24 (ref 7–25)
CALCIUM SPEC-SCNC: 9.1 MG/DL (ref 8.2–9.6)
CHLORIDE SERPL-SCNC: 99 MMOL/L (ref 98–107)
CO2 SERPL-SCNC: 22.1 MMOL/L (ref 22–29)
CREAT SERPL-MCNC: 1.75 MG/DL (ref 0.76–1.27)
EGFRCR SERPLBLD CKD-EPI 2021: 36.3 ML/MIN/1.73
GLOBULIN UR ELPH-MCNC: 2.3 GM/DL
GLUCOSE SERPL-MCNC: 120 MG/DL (ref 65–99)
POTASSIUM SERPL-SCNC: 4.9 MMOL/L (ref 3.5–5.2)
PROT SERPL-MCNC: 5.6 G/DL (ref 6–8.5)
SODIUM SERPL-SCNC: 136 MMOL/L (ref 136–145)

## (undated) DEVICE — SINGLE USE SUCTION VALVE MAJ-209: Brand: SINGLE USE SUCTION VALVE (STERILE)

## (undated) DEVICE — TRAP,MUCUS SPECIMEN,40CC: Brand: MEDLINE

## (undated) DEVICE — CANNULA,ADULT,SOFT-TOUCH,7'TUBE,UC: Brand: PENDING

## (undated) DEVICE — ANGIO-SEAL VIP VASCULAR CLOSURE DEVICE: Brand: ANGIO-SEAL

## (undated) DEVICE — PK CATH CARD 10

## (undated) DEVICE — SWAN-GANZ THERMODILUTION CATHETER: Brand: SWAN-GANZ

## (undated) DEVICE — GW J TP FIX CORE .035 150

## (undated) DEVICE — CATH DIAG EXPO M/ PK 6FR FL4/FR4 PIG 3PK

## (undated) DEVICE — BNDR ABD PREMIUM/UNIV 10IN 27TO48IN

## (undated) DEVICE — THE BITE BLOCK MAXI, LATEX FREE STRAP IS USED TO PROTECT THE ENDOSCOPE INSERTION TUBE FROM BEING BITTEN BY THE PATIENT.

## (undated) DEVICE — ANTIBACTERIAL UNDYED BRAIDED (POLYGLACTIN 910), SYNTHETIC ABSORBABLE SUTURE: Brand: COATED VICRYL

## (undated) DEVICE — CO-SET DELIVERY SYSTEM FOR 123 ROOM TEMPATURE INJECTATE: Brand: CO-SET+

## (undated) DEVICE — SOL IRR NACL 0.9PCT BT 1000ML

## (undated) DEVICE — THORACENTESIS TRAY WITH SAFETY COMPONENTS: Brand: CURITY

## (undated) DEVICE — 3M™ STERI-STRIP™ REINFORCED ADHESIVE SKIN CLOSURES, R1547, 1/2 IN X 4 IN (12 MM X 100 MM), 6 STRIPS/ENVELOPE: Brand: 3M™ STERI-STRIP™

## (undated) DEVICE — Device

## (undated) DEVICE — SINGLE USE BIOPSY VALVE MAJ-210: Brand: SINGLE USE BIOPSY VALVE (STERILE)

## (undated) DEVICE — SIDESTREAM™ HIGH-EFFICIENCY NEBULIZER: Brand: AIRLIFE™

## (undated) DEVICE — SUT VIC 0 SH 27IN J418H

## (undated) DEVICE — GOWN,NON-REINFORCED,SIRUS,SET IN SLV,XL: Brand: MEDLINE

## (undated) DEVICE — GW FC J TFE/COAT .025 3MM 180CM

## (undated) DEVICE — SYR SLP TP 10ML DISP

## (undated) DEVICE — DRSNG WND GZ PAD BORDERED 4X8IN STRL

## (undated) DEVICE — BOWL UTIL STRL 32OZ

## (undated) DEVICE — SYR SLPTP 20CC

## (undated) DEVICE — SUT ETHIB 0/0 MO6 I8IN CX45D

## (undated) DEVICE — ADHS LIQ MASTISOL 2/3ML

## (undated) DEVICE — CATH DIAG EXPO MPA1 6F .041IN 100CM

## (undated) DEVICE — SPEC CONT WIDE MOUTH 3OZ 400/CS 20 CS/SK: Brand: MEDEGEN MEDICAL PRODUCTS, LLC

## (undated) DEVICE — INTRO SHEATH ART/FEM ENGAGE .038 6F12CM

## (undated) DEVICE — SUT MNCRYL PLS ANTIB UD 4/0 PS2 18IN

## (undated) DEVICE — KT MANIFOLD CATHLAB CUST

## (undated) DEVICE — GLV SURG SENSICARE PI ORTHO SZ7.5 LF STRL

## (undated) DEVICE — INTRO SHEATH ENGAGE W/50 GW .038 8F12

## (undated) DEVICE — PK MINOR SPLT 10

## (undated) DEVICE — SUT SILK 3/0 TIES 18IN A184H

## (undated) DEVICE — POOLE SUCTION INSTRUMENT WITH REMOVABLE SHEATH: Brand: POOLE